# Patient Record
Sex: FEMALE | Race: BLACK OR AFRICAN AMERICAN | NOT HISPANIC OR LATINO | Employment: OTHER | URBAN - METROPOLITAN AREA
[De-identification: names, ages, dates, MRNs, and addresses within clinical notes are randomized per-mention and may not be internally consistent; named-entity substitution may affect disease eponyms.]

---

## 2017-01-30 ENCOUNTER — TELEPHONE (OUTPATIENT)
Dept: ENDOCRINOLOGY | Facility: CLINIC | Age: 72
End: 2017-01-30

## 2017-01-30 DIAGNOSIS — E11.40 TYPE 2 DIABETES MELLITUS WITH DIABETIC NEUROPATHY, WITH LONG-TERM CURRENT USE OF INSULIN: ICD-10-CM

## 2017-01-30 DIAGNOSIS — E11.22 UNCONTROLLED TYPE 2 DIABETES MELLITUS WITH CHRONIC KIDNEY DISEASE, WITH LONG-TERM CURRENT USE OF INSULIN, UNSPECIFIED CKD STAGE: Primary | ICD-10-CM

## 2017-01-30 DIAGNOSIS — Z79.4 UNCONTROLLED TYPE 2 DIABETES MELLITUS WITH CHRONIC KIDNEY DISEASE, WITH LONG-TERM CURRENT USE OF INSULIN, UNSPECIFIED CKD STAGE: Primary | ICD-10-CM

## 2017-01-30 DIAGNOSIS — Z79.4 TYPE 2 DIABETES MELLITUS WITH DIABETIC NEUROPATHY, WITH LONG-TERM CURRENT USE OF INSULIN: ICD-10-CM

## 2017-01-30 DIAGNOSIS — E11.65 UNCONTROLLED TYPE 2 DIABETES MELLITUS WITH CHRONIC KIDNEY DISEASE, WITH LONG-TERM CURRENT USE OF INSULIN, UNSPECIFIED CKD STAGE: Primary | ICD-10-CM

## 2017-01-30 NOTE — TELEPHONE ENCOUNTER
----- Message from Jodi Barney sent at 1/25/2017 10:54 AM CST -----  Contact: Patient  Patient is requesting to complete labs at W location [facility] to 2/8/2017 appointment.    Please call patient at [phone].

## 2017-02-01 ENCOUNTER — LAB VISIT (OUTPATIENT)
Dept: LAB | Facility: HOSPITAL | Age: 72
End: 2017-02-01
Attending: INTERNAL MEDICINE
Payer: MEDICARE

## 2017-02-01 DIAGNOSIS — Z79.4 TYPE 2 DIABETES MELLITUS WITH DIABETIC NEUROPATHY, WITH LONG-TERM CURRENT USE OF INSULIN: ICD-10-CM

## 2017-02-01 DIAGNOSIS — E11.40 TYPE 2 DIABETES MELLITUS WITH DIABETIC NEUROPATHY, WITH LONG-TERM CURRENT USE OF INSULIN: ICD-10-CM

## 2017-02-01 DIAGNOSIS — E11.22 UNCONTROLLED TYPE 2 DIABETES MELLITUS WITH CHRONIC KIDNEY DISEASE, WITH LONG-TERM CURRENT USE OF INSULIN, UNSPECIFIED CKD STAGE: ICD-10-CM

## 2017-02-01 DIAGNOSIS — E11.65 UNCONTROLLED TYPE 2 DIABETES MELLITUS WITH CHRONIC KIDNEY DISEASE, WITH LONG-TERM CURRENT USE OF INSULIN, UNSPECIFIED CKD STAGE: ICD-10-CM

## 2017-02-01 DIAGNOSIS — Z79.4 UNCONTROLLED TYPE 2 DIABETES MELLITUS WITH CHRONIC KIDNEY DISEASE, WITH LONG-TERM CURRENT USE OF INSULIN, UNSPECIFIED CKD STAGE: ICD-10-CM

## 2017-02-01 LAB
ALBUMIN SERPL BCP-MCNC: 3.4 G/DL
ANION GAP SERPL CALC-SCNC: 10 MMOL/L
BUN SERPL-MCNC: 16 MG/DL
CALCIUM SERPL-MCNC: 9.2 MG/DL
CHLORIDE SERPL-SCNC: 105 MMOL/L
CO2 SERPL-SCNC: 26 MMOL/L
CREAT SERPL-MCNC: 0.8 MG/DL
EST. GFR  (AFRICAN AMERICAN): >60 ML/MIN/1.73 M^2
EST. GFR  (NON AFRICAN AMERICAN): >60 ML/MIN/1.73 M^2
ESTIMATED AVG GLUCOSE: 183 MG/DL
GLUCOSE SERPL-MCNC: 179 MG/DL
HBA1C MFR BLD HPLC: 8 %
PHOSPHATE SERPL-MCNC: 2.9 MG/DL
POTASSIUM SERPL-SCNC: 4 MMOL/L
SODIUM SERPL-SCNC: 141 MMOL/L

## 2017-02-01 PROCEDURE — 36415 COLL VENOUS BLD VENIPUNCTURE: CPT

## 2017-02-01 PROCEDURE — 83036 HEMOGLOBIN GLYCOSYLATED A1C: CPT

## 2017-02-01 PROCEDURE — 80069 RENAL FUNCTION PANEL: CPT

## 2017-02-08 ENCOUNTER — OFFICE VISIT (OUTPATIENT)
Dept: ENDOCRINOLOGY | Facility: CLINIC | Age: 72
End: 2017-02-08
Payer: MEDICARE

## 2017-02-08 VITALS
WEIGHT: 173.06 LBS | HEIGHT: 62 IN | BODY MASS INDEX: 31.85 KG/M2 | SYSTOLIC BLOOD PRESSURE: 130 MMHG | HEART RATE: 90 BPM | DIASTOLIC BLOOD PRESSURE: 76 MMHG

## 2017-02-08 DIAGNOSIS — Z79.4 ENCOUNTER FOR LONG-TERM (CURRENT) USE OF INSULIN: ICD-10-CM

## 2017-02-08 PROCEDURE — 1157F ADVNC CARE PLAN IN RCRD: CPT | Mod: S$GLB,,, | Performed by: INTERNAL MEDICINE

## 2017-02-08 PROCEDURE — 99999 PR PBB SHADOW E&M-EST. PATIENT-LVL III: CPT | Mod: PBBFAC,,, | Performed by: INTERNAL MEDICINE

## 2017-02-08 PROCEDURE — 3075F SYST BP GE 130 - 139MM HG: CPT | Mod: S$GLB,,, | Performed by: INTERNAL MEDICINE

## 2017-02-08 PROCEDURE — 1126F AMNT PAIN NOTED NONE PRSNT: CPT | Mod: S$GLB,,, | Performed by: INTERNAL MEDICINE

## 2017-02-08 PROCEDURE — 4010F ACE/ARB THERAPY RXD/TAKEN: CPT | Mod: S$GLB,,, | Performed by: INTERNAL MEDICINE

## 2017-02-08 PROCEDURE — 99499 UNLISTED E&M SERVICE: CPT | Mod: S$GLB,,, | Performed by: INTERNAL MEDICINE

## 2017-02-08 PROCEDURE — 2022F DILAT RTA XM EVC RTNOPTHY: CPT | Mod: S$GLB,,, | Performed by: INTERNAL MEDICINE

## 2017-02-08 PROCEDURE — 99214 OFFICE O/P EST MOD 30 MIN: CPT | Mod: S$GLB,,, | Performed by: INTERNAL MEDICINE

## 2017-02-08 PROCEDURE — 3078F DIAST BP <80 MM HG: CPT | Mod: S$GLB,,, | Performed by: INTERNAL MEDICINE

## 2017-02-08 PROCEDURE — 3045F PR MOST RECENT HEMOGLOBIN A1C LEVEL 7.0-9.0%: CPT | Mod: S$GLB,,, | Performed by: INTERNAL MEDICINE

## 2017-02-08 PROCEDURE — 1160F RVW MEDS BY RX/DR IN RCRD: CPT | Mod: S$GLB,,, | Performed by: INTERNAL MEDICINE

## 2017-02-08 PROCEDURE — 1159F MED LIST DOCD IN RCRD: CPT | Mod: S$GLB,,, | Performed by: INTERNAL MEDICINE

## 2017-02-08 RX ORDER — GLIMEPIRIDE 4 MG/1
2 TABLET ORAL
Qty: 45 TABLET | Refills: 3
Start: 2017-02-08 | End: 2017-02-15 | Stop reason: SDUPTHER

## 2017-02-08 NOTE — MR AVS SNAPSHOT
[facility]Met[facility]  [address]  [facility] [address]  Phone: [phone]  Fax: [phone]                  Alisia Gusman   2017 1:00 PM   Office Visit    Description:  Female : 1945   Provider:  Lindy Nelson MD   Department:  [facility]Metab           Reason for Visit     Diabetes Mellitus           Diagnoses this Visit        Comments    Type 2 diabetes, uncontrolled, with neuropathy    -  Primary     Encounter for long-term (current) use of insulin                To Do List           Future Appointments        Provider Department Dept Phone    2017 10:20 AM MD [facility] - Internal Medicine [phone]    2017 8:00 AM LAB, APPOINTMENT [facility] [phone]    2017 8:30 AM MD [facility] - Endo/Diab/Metab [phone]      Goals (5 Years of Data)     None       These Medications        Disp Refills Start End    glimepiride (AMARYL) 4 MG tablet 45 tablet 3 2017    Take 0.5 tablets (2 mg total) by mouth before breakfast. - Oral    Pharmacy: [facility] Mail [facility] - [address] Ph #: [phone]         [facility]     [facility] On Call Nurse Care Line -  Assistance  Registered nurses in the [facility] On Call Center provide clinical advisement, health education, appointment booking, and other advisory services.  Call for this free service at [phone].             Medications           Message regarding Medications     Verify the changes and/or additions to your medication regime listed below are the same as discussed with your clinician today.  If any of these changes or additions are incorrect, please notify your healthcare provider.             Verify that the below list of medications is an accurate representation of the medications you are currently taking.  If none reported, the list may be blank. If incorrect, please contact your  "healthcare provider. Carry this list with you in case of emergency.           Current Medications     ACCU-CHEK DOMENICO PLUS TEST STRP Strp USE THREE TIMES DAILY AS DIRECTED    ACCU-CHEK SOFTCLIX LANCETS Misc 1 each by Misc.(Non-Drug; Combo Route) route 3 (three) times daily.    albuterol 90 mcg/actuation inhaler Inhale 2 puffs into the lungs every 6 (six) hours as needed for Wheezing.    aspirin 81 MG chewable tablet Take 1 tablet by mouth At bedtime.    BD ALCOHOL SWABS PadM 1 each 2 (two) times daily.    calcium carbonate-vitamin D3 (CALTRATE 600 + D) 600 mg(1,500mg) -400 unit Chew once daily.     cyanocobalamin (VITAMIN B-12) 1000 MCG tablet Take 100 mcg by mouth once daily.    fluticasone (FLONASE) 50 mcg/actuation nasal spray 2 sprays by Each Nare route once daily.    FOLIC ACID/MULTIVIT-MIN/LUTEIN (CENTRUM SILVER ORAL) Take 1 tablet by mouth once daily.    gabapentin (NEURONTIN) 300 MG capsule 300mg in AM, 300mg in PM and 600mg at bedtime    insulin glargine (LANTUS SOLOSTAR) 100 unit/mL (3 mL) InPn pen Inject 22 Units into the skin once daily.    insulin regular 100 unit/mL Inj injection Inject 4 Units into the skin 3 (three) times daily before meals. RELION brand    insulin syringe-needle U-100 (BD INSULIN SYRINGE ULTRA-FINE) 0.3 mL 31 gauge x 15/64" Syrg 1 each by Misc.(Non-Drug; Combo Route) route 2 (two) times daily.    insulin syringe-needle U-100 (BD INSULIN SYRINGE ULTRA-FINE) 1/2 mL 31 gauge x 15/64" Syrg 3 each by Misc.(Non-Drug; Combo Route) route 3 (three) times daily.    losartan-hydrochlorothiazide 50-12.5 mg (HYZAAR) 50-12.5 mg per tablet Take 1 tablet by mouth once daily.    metformin (GLUCOPHAGE-XR) 750 MG 24 hr tablet Take 1 tablet (750 mg total) by mouth 2 (two) times daily with meals.    pen needle, diabetic (BD ULTRA-FINE SUSIE PEN NEEDLES) 32 gauge x 5/32" Ndle For one time daily injection    potassium chloride (KLOR-CON) 10 MEQ TbSR TAKE 1 TABLET ONE TIME DAILY    pravastatin (PRAVACHOL) " "40 MG tablet TAKE 1 TABLET (40 MG TOTAL) BY MOUTH NIGHTLY.    glimepiride (AMARYL) 4 MG tablet Take 0.5 tablets (2 mg total) by mouth before breakfast.           Clinical Reference Information           Your Vitals Were     BP Pulse Height Weight BMI    130/76 (BP Location: Left arm, Patient Position: Sitting, BP Method: Manual) 90 5' 2" (1.575 m) 78.5 kg (173 lb 1 oz) 31.65 kg/m2      Blood Pressure          Most Recent Value    BP  130/76      Allergies as of 2/8/2017     Nubain [Nalbuphine]    Grass Pollen-june Grass Standard    Sinus & Allergy [Chlorpheniramine-phenylephrine]      Immunizations Administered on Date of Encounter - 2/8/2017     None      Orders Placed During Today's Visit     Future Labs/Procedures Expected by Expires    Hemoglobin A1c  2/8/2017 4/9/2018      [facility] Sign-Up     Activating your [facility] account is as easy as 1-2-3!     1) Visit [email or web address], select Sign Up Now, enter this activation code and your date of birth, then select Next.  [email or web address]  Expires: 3/25/2017  1:59 PM      2) Create a username and password to use when you visit [facility] in the future and select a security question in case you lose your password and select Next.    3) Enter your e-mail address and click Sign Up!    Additional Information  If you have questions, please e-mail [email or web address] or call [phone] to talk to our [facility] staff. Remember, [facility] is NOT to be used for urgent needs. For medical emergencies, dial 911.         Language Assistance Services     ATTENTION: Language assistance services are available, free of charge. Please call [phone].      ATENCIÓN: Si habla nissa, tiene a salguero disposición servicios gratuitos de asistencia lingüística. Llame al [phone].     CHÚ Ý: N?u b?n nói Ti?ng Vi?t, có các d?ch v? h? tr? ngôn ng? mi?n phí dành cho b?n. G?i s? [phone].         [facility] complies with applicable Federal civil rights laws and " does not discriminate on the basis of race, color, national origin, age, disability, or sex.

## 2017-02-08 NOTE — PROGRESS NOTES
Subjective:     Patient ID: Alisia Gusman is a 71 y.o. female.    Chief Complaint: Diabetes Mellitus    HPI:   Ms. Gusman is a 71 y.o. female who is here for a follow-up visit for evaluation type 2 diabetes mellitus uncontrolled on insulin for the past three years. T2DM is complicated by neuropathy, currently on gabapentin.   Reports numbness associated with burning over right and left plantar aspect of feet. Limited to toes and mid foot.   Stopped nexium due to cost, GERD bothering her at night. (so not sleeping well.)  Continues to exercise regularly. Eats healthy.    Regimen:  Lantus 22 units at bedtime  Regular insulin 5 units thirty minutes before supper only  Metformin 750 mg one tablet twice a day    No glimepiride:  Fastin, 195, 124, 164, 191, 161, 155, 156  Before lunch: 189, 156, 136, 123, 242, 212, x  Before dinner: x, 225, 261, 199, 229, 191, 262, 164  Bedtime: x, 265, 207, 236, 197, 177, 203, 193    With glimepiride:  Fastin, 130, 139, 156, 143, 152, 153  Before lunch: 155, x,x, 206, 113, 168, 160  Before dinner: 237, x, 167, 140, 185, 185, 233  Beditme: 238, 192, 200, 223, 186, 225, x  Lowest blood sugars were: 80, 86, 87, 83, 81    ADA STANDARDS of CARE:  ACE inhibitor of angiotensin II receptor blocker: Losartan 50 mg  Statin drug: Pravastatin 40 mg  Low dose ASA: baby aspirin  Eye exam within last year: 2016  Dental exam: 2016  Flu shot: up to date  Pneumonia vaccine: up to date  Microalbumin: normal - 3/2016    Review of Systems   Constitutional: Negative for chills and fever.   HENT: Negative for congestion and sinus pressure.    Eyes: Negative for visual disturbance.   Respiratory: Negative for chest tightness and shortness of breath.    Cardiovascular: Negative for chest pain, palpitations and leg swelling.   Gastrointestinal: Negative for abdominal pain and vomiting.   Genitourinary: Negative for dysuria.   Musculoskeletal: Negative for arthralgias.   Skin: Negative for  "rash.   Neurological: Negative for weakness.   Hematological: Does not bruise/bleed easily.   Psychiatric/Behavioral: Negative for sleep disturbance.        Objective:     Physical Exam   Constitutional: She appears well-developed and well-nourished.   Cardiovascular: Normal rate, regular rhythm and normal heart sounds.    Pulmonary/Chest: Effort normal and breath sounds normal.   Abdominal:   Sites of insulin administration over upper posterior thighs are not firm.    Skin:   Microfilament test is absent b/l  Vibratory sense is intact b/l  Good pulses b/l +2  No edema  No calluses or abrasion.        Vitals:    02/08/17 1313   BP: 130/76   BP Location: Left arm   Patient Position: Sitting   BP Method: Manual   Pulse: 90   Weight: 78.5 kg (173 lb 1 oz)   Height: 5' 2" (1.575 m)     Results for CINDA TATUM (MRN [id number]) as of 2/8/2017 13:36   Ref. Range 2/1/2017 08:29   Hemoglobin A1C Latest Ref Range: 4.5 - 6.2 % 8.0 (H)   Estimated Avg Glucose Latest Ref Range: 68 - 131 mg/dL 183 (H)   Results for CINDA TATUM (MRN [id number]) as of 2/8/2017 13:36   Ref. Range 2/1/2017 08:29   Sodium Latest Ref Range: 136 - 145 mmol/L 141   Potassium Latest Ref Range: 3.5 - 5.1 mmol/L 4.0   Chloride Latest Ref Range: 95 - 110 mmol/L 105   CO2 Latest Ref Range: 23 - 29 mmol/L 26   Anion Gap Latest Ref Range: 8 - 16 mmol/L 10   BUN, Bld Latest Ref Range: 8 - 23 mg/dL 16   Creatinine Latest Ref Range: 0.5 - 1.4 mg/dL 0.8   eGFR if non African American Latest Ref Range: >60 mL/min/1.73 m^2 >60.0   eGFR if African American Latest Ref Range: >60 mL/min/1.73 m^2 >60.0   Glucose Latest Ref Range: 70 - 110 mg/dL 179 (H)   Calcium Latest Ref Range: 8.7 - 10.5 mg/dL 9.2   Phosphorus Latest Ref Range: 2.7 - 4.5 mg/dL 2.9       Assessment/Plan:     1. Type 2 diabetes, uncontrolled, with neuropathy  - restart glimepiride, no lows on lantus and regular with dinner only.   - metformin is still ideal given age, lack of s/e and good kidney " function  - Hemoglobin A1c; Future  - glimepiride (AMARYL) 4 MG tablet; Take 0.5 tablets (2 mg total) by mouth before breakfast.  Dispense: 45 tablet; Refill: 3    2. Encounter for long-term (current) use of insulin  - continue Lantus 22 units and regular insulin 5 units before dinnertime.   - discussed hypoglycemia symptoms and treatment. Advised to have a snack handy as she is physically very active. Also check if she has a light lunch prior to her exercise class.    Repeat labs in four months.

## 2017-02-15 RX ORDER — GLIMEPIRIDE 4 MG/1
2 TABLET ORAL
Qty: 45 TABLET | Refills: 3 | Status: SHIPPED | OUTPATIENT
Start: 2017-02-15 | End: 2017-09-21 | Stop reason: SDUPTHER

## 2017-02-16 ENCOUNTER — OFFICE VISIT (OUTPATIENT)
Dept: INTERNAL MEDICINE | Facility: CLINIC | Age: 72
End: 2017-02-16
Payer: MEDICARE

## 2017-02-16 VITALS
HEART RATE: 80 BPM | SYSTOLIC BLOOD PRESSURE: 131 MMHG | HEIGHT: 65 IN | TEMPERATURE: 98 F | WEIGHT: 175.06 LBS | RESPIRATION RATE: 17 BRPM | BODY MASS INDEX: 29.17 KG/M2 | DIASTOLIC BLOOD PRESSURE: 79 MMHG

## 2017-02-16 DIAGNOSIS — E11.42 DIABETIC POLYNEUROPATHY ASSOCIATED WITH TYPE 2 DIABETES MELLITUS: ICD-10-CM

## 2017-02-16 DIAGNOSIS — K21.9 GASTROESOPHAGEAL REFLUX DISEASE, ESOPHAGITIS PRESENCE NOT SPECIFIED: ICD-10-CM

## 2017-02-16 DIAGNOSIS — R00.2 HEART PALPITATIONS: Primary | ICD-10-CM

## 2017-02-16 DIAGNOSIS — I70.0 AORTIC ATHEROSCLEROSIS: ICD-10-CM

## 2017-02-16 DIAGNOSIS — I15.2 HYPERTENSION ASSOCIATED WITH DIABETES: ICD-10-CM

## 2017-02-16 DIAGNOSIS — E11.59 HYPERTENSION ASSOCIATED WITH DIABETES: ICD-10-CM

## 2017-02-16 PROCEDURE — 4010F ACE/ARB THERAPY RXD/TAKEN: CPT | Mod: S$GLB,,, | Performed by: INTERNAL MEDICINE

## 2017-02-16 PROCEDURE — 99999 PR PBB SHADOW E&M-EST. PATIENT-LVL III: CPT | Mod: PBBFAC,,, | Performed by: INTERNAL MEDICINE

## 2017-02-16 PROCEDURE — 3078F DIAST BP <80 MM HG: CPT | Mod: S$GLB,,, | Performed by: INTERNAL MEDICINE

## 2017-02-16 PROCEDURE — 99499 UNLISTED E&M SERVICE: CPT | Mod: S$GLB,,, | Performed by: INTERNAL MEDICINE

## 2017-02-16 PROCEDURE — 1160F RVW MEDS BY RX/DR IN RCRD: CPT | Mod: S$GLB,,, | Performed by: INTERNAL MEDICINE

## 2017-02-16 PROCEDURE — 93005 ELECTROCARDIOGRAM TRACING: CPT | Mod: S$GLB,,, | Performed by: INTERNAL MEDICINE

## 2017-02-16 PROCEDURE — 93010 ELECTROCARDIOGRAM REPORT: CPT | Mod: S$GLB,,, | Performed by: INTERNAL MEDICINE

## 2017-02-16 PROCEDURE — 3075F SYST BP GE 130 - 139MM HG: CPT | Mod: S$GLB,,, | Performed by: INTERNAL MEDICINE

## 2017-02-16 PROCEDURE — 2022F DILAT RTA XM EVC RTNOPTHY: CPT | Mod: S$GLB,,, | Performed by: INTERNAL MEDICINE

## 2017-02-16 PROCEDURE — 99214 OFFICE O/P EST MOD 30 MIN: CPT | Mod: S$GLB,,, | Performed by: INTERNAL MEDICINE

## 2017-02-16 PROCEDURE — 3045F PR MOST RECENT HEMOGLOBIN A1C LEVEL 7.0-9.0%: CPT | Mod: S$GLB,,, | Performed by: INTERNAL MEDICINE

## 2017-02-16 PROCEDURE — 1126F AMNT PAIN NOTED NONE PRSNT: CPT | Mod: S$GLB,,, | Performed by: INTERNAL MEDICINE

## 2017-02-16 PROCEDURE — 1159F MED LIST DOCD IN RCRD: CPT | Mod: S$GLB,,, | Performed by: INTERNAL MEDICINE

## 2017-02-16 PROCEDURE — 1157F ADVNC CARE PLAN IN RCRD: CPT | Mod: S$GLB,,, | Performed by: INTERNAL MEDICINE

## 2017-02-16 NOTE — PROGRESS NOTES
"Subjective:       Patient ID: Alisia Gusman is a 71 y.o. female.    Chief Complaint: Follow-up (right side arth joint ) and Shortness of Breath    HPI   HTN - pt was not taking her BP meds except intermittently. However after she saw Dr. Nelson and BP was elevated. She restarted back on her losartan-hctz 50-12.5mg daily.     DM2 - follows w/ Dr. Nelson. a1c is 8. Doing better.   Diabetic neuropathy. On gabapentin 300mg TID. Burning in the feet. Exercises 4 days a week.     Feels some palpitations in the mid substernal area; gets it almost daily; last up to 5 min but sometimes seconds. Not associated w/ CP/SOB/dizziness.     CT A/P 9/2012 w/ aortic atherosclerosis. On pravastatin 40mg dialy and asa 81mg daily.    Does have the feeling of something coming up the esophagus when she lies down at night and then goes down when she wakes up. Previously on nexium.     Review of Systems   Constitutional: Negative for chills and fever.   HENT: Positive for postnasal drip. Negative for congestion and sinus pressure.    Respiratory: Negative for cough, shortness of breath and wheezing.    Cardiovascular: Positive for palpitations. Negative for chest pain and leg swelling.   Gastrointestinal: Negative for abdominal pain, constipation, diarrhea, nausea and vomiting.   Endocrine: Negative for polydipsia and polyuria.   Neurological: Positive for numbness (B feet). Negative for dizziness and weakness.   Psychiatric/Behavioral: Negative for dysphoric mood and sleep disturbance. The patient is not nervous/anxious.          Objective:      Physical Exam    Visit Vitals    /79    Pulse 80    Temp 97.8 °F (36.6 °C) (Oral)    Resp 17    Ht 5' 5" (1.651 m)    Wt 79.4 kg (175 lb 0.7 oz)    BMI 29.13 kg/m2     Gen - A+Ox4, NAD  HEENT - PERRL,OP clear. MMM.   NECK - No LAD  CV - RRR, no m/r  Chest - CTAB, no wheezing/rhonchi  Abd - S/NT/ND/+BS  Ext - 2+ BDP and radial pulses. No LE edema.   Feet - no open lesions. Darkening of B " big toenails. No sensation to monofilament.   MSK - normal gait.     Labs and MMG reviewed w/ pt.    Assessment/Plan     Alisia was seen today for follow-up and shortness of breath.    Diagnoses and all orders for this visit:    Heart palpitations - TSH WNL 11/18/16. Will check EKG and 48 hr holter. If palpitations not captured on 48 hr holter, may do 30 day monitor.   -     IN OFFICE EKG 12-LEAD (to [place])  -     Holter monitor - 48 hour; Future    Diabetic polyneuropathy associated with type 2 diabetes mellitus - doing better but not quite controlled. Cont to work on this w/ Dr. Nelson. Pt exercises 4x/week. Discussed foot hygiene.     Aortic atherosclerosis - LDL<70. On asa 81 and atorvastatin. Cont current medicines.    Hypertension associated with diabetes - Stable and controlled. Continue current medications. Advised to keep consistently taking her BP medicine.    Gastroesophageal reflux disease, esophagitis presence not specified  -     ranitidine (ZANTAC) 150 MG tablet; Take 1 tablet (150 mg total) by mouth 2 (two) times daily.      Return in about 3 months (around 5/16/2017). Pt will get eye exam from outside doctor and will fax the records over to us.      Selena Montemayor MD  Department of Internal Medicine - [facility]  10:47 AM

## 2017-02-16 NOTE — MR AVS SNAPSHOT
[facility] - Internal Medicine  [address]  [facility] [address]  Phone: [phone]  Fax: [phone]                  Alisia Gusman   2017 10:20 AM   Office Visit    Description:  Female : 1945   Provider:  Selena Montemayor MD   Department:  [facility] - Internal Medicine           Reason for Visit     Follow-up     Shortness of Breath           Diagnoses this Visit        Comments    Heart palpitations    -  Primary     Diabetic polyneuropathy associated with type 2 diabetes mellitus         Aortic atherosclerosis         Hypertension associated with diabetes         Gastroesophageal reflux disease, esophagitis presence not specified                To Do List           Future Appointments        Provider Department Dept Phone    3/2/2017 9:00 AM HRA, [facility] - Internal Medicine [phone]    2017 8:00 AM LAB, APPOINTMENT [facility] [phone]    2017 8:30 AM Lindy Nelson MD [facility] - Endo/Diab/Metab [phone]      Goals (5 Years of Data)     None      Follow-Up and Disposition     Return in about 3 months (around 2017).    Follow-up and Disposition History       These Medications        Disp Refills Start End    ranitidine (ZANTAC) 150 MG tablet 180 tablet 0 2017    Take 1 tablet (150 mg total) by mouth 2 (two) times daily. - Oral    Pharmacy: [facility] - [address] Ph #: [phone]         [facility] On Call     [facility] On [facility] Nurse Care Line -  Assistance  Registered nurses in the [facility] On Call Center provide clinical advisement, health education, appointment booking, and other advisory services.  Call for this free service at [phone].             Medications           Message regarding Medications     Verify the changes and/or additions to your medication regime listed below are the same as discussed with your clinician today.  If any of these  "changes or additions are incorrect, please notify your healthcare provider.        START taking these NEW medications        Refills    ranitidine (ZANTAC) 150 MG tablet 0    Sig: Take 1 tablet (150 mg total) by mouth 2 (two) times daily.    Class: Normal    Route: Oral      STOP taking these medications     influenza vaccine 180 mcg/0.5 mL(for patients > 65) injection            Verify that the below list of medications is an accurate representation of the medications you are currently taking.  If none reported, the list may be blank. If incorrect, please contact your healthcare provider. Carry this list with you in case of emergency.           Current Medications     albuterol 90 mcg/actuation inhaler Inhale 2 puffs into the lungs every 6 (six) hours as needed for Wheezing.    aspirin 81 MG chewable tablet Take 1 tablet by mouth At bedtime.    BD ALCOHOL SWABS PadM 1 each 2 (two) times daily.    calcium carbonate-vitamin D3 (CALTRATE 600 + D) 600 mg(1,500mg) -400 unit Chew once daily.     cyanocobalamin (VITAMIN B-12) 1000 MCG tablet Take 100 mcg by mouth once daily.    fluticasone (FLONASE) 50 mcg/actuation nasal spray 2 sprays by Each Nare route once daily.    FOLIC ACID/MULTIVIT-MIN/LUTEIN (CENTRUM SILVER ORAL) Take 1 tablet by mouth once daily.    gabapentin (NEURONTIN) 300 MG capsule 300mg in AM, 300mg in PM and 600mg at bedtime    glimepiride (AMARYL) 4 MG tablet Take 0.5 tablets (2 mg total) by mouth before breakfast.    insulin glargine (LANTUS SOLOSTAR) 100 unit/mL (3 mL) InPn pen Inject 22 Units into the skin once daily.    insulin regular 100 unit/mL Inj injection Inject 4 Units into the skin 3 (three) times daily before meals. RELION brand    insulin syringe-needle U-100 (BD INSULIN SYRINGE ULTRA-FINE) 0.3 mL 31 gauge x 15/64" Syrg 1 each by Misc.(Non-Drug; Combo Route) route 2 (two) times daily.    insulin syringe-needle U-100 (BD INSULIN SYRINGE ULTRA-FINE) 1/2 mL 31 gauge x 15/64" Syrg 3 each by " "Misc.(Non-Drug; Combo Route) route 3 (three) times daily.    losartan-hydrochlorothiazide 50-12.5 mg (HYZAAR) 50-12.5 mg per tablet Take 1 tablet by mouth once daily.    metformin (GLUCOPHAGE-XR) 750 MG 24 hr tablet Take 1 tablet (750 mg total) by mouth 2 (two) times daily with meals.    pen needle, diabetic (BD ULTRA-FINE SUSIE PEN NEEDLES) 32 gauge x 5/32" Ndle For one time daily injection    potassium chloride (KLOR-CON) 10 MEQ TbSR TAKE 1 TABLET ONE TIME DAILY    pravastatin (PRAVACHOL) 40 MG tablet TAKE 1 TABLET (40 MG TOTAL) BY MOUTH NIGHTLY.    ACCU-CHEK DOMENICO PLUS TEST STRP Strp USE THREE TIMES DAILY AS DIRECTED    ACCU-CHEK SOFTCLIX LANCETS Misc 1 each by Misc.(Non-Drug; Combo Route) route 3 (three) times daily.    ranitidine (ZANTAC) 150 MG tablet Take 1 tablet (150 mg total) by mouth 2 (two) times daily.           Clinical Reference Information           Your Vitals Were     BP Pulse Temp Resp Height Weight    131/79 80 97.8 °F (36.6 °C) (Oral) 17 5' 5" (1.651 m) 79.4 kg (175 lb 0.7 oz)    BMI                29.13 kg/m2          Blood Pressure          Most Recent Value    BP  131/79      Allergies as of 2/16/2017     Nubain [Nalbuphine]    Grass Pollen-june Grass Standard    Sinus & Allergy [Chlorpheniramine-phenylephrine]      Immunizations Administered on Date of Encounter - 2/16/2017     None      Orders Placed During Today's Visit      Normal Orders This Visit    IN OFFICE EKG 12-LEAD (to [place])     Future Labs/Procedures Expected by Expires    Holter monitor - 48 hour  As directed 2/16/2018      [facility] Sign-Up     Activating your [facility] account is as easy as 1-2-3!     1) Visit [email or web address], select Sign Up Now, enter this activation code and your date of birth, then select Next.  [email or web address]  Expires: 3/25/2017  1:59 PM      2) Create a username and password to use when you visit [facility] in the future and select a security question in case you lose your password and select Next.    3) " Enter your e-mail address and click Sign Up!    Additional Information  If you have questions, please e-mail [email or web address] or call [phone] to talk to our [facility] staff. Remember, [facility] is NOT to be used for urgent needs. For medical emergencies, dial 911.         Language Assistance Services     ATTENTION: Language assistance services are available, free of charge. Please call [phone].      ATENCIÓN: Si habla español, tiene a salguero disposición servicios gratuitos de asistencia lingüística. Llame al [phone].     CHÚ Ý: N?u b?n nói Ti?ng Vi?t, có các d?ch v? h? tr? ngôn ng? mi?n phí dành cho b?n. G?i s? [phone].         [facility] complies with applicable Federal civil rights laws and does not discriminate on the basis of race, color, national origin, age, disability, or sex.

## 2017-02-17 ENCOUNTER — CLINICAL SUPPORT (OUTPATIENT)
Dept: ELECTROPHYSIOLOGY | Facility: CLINIC | Age: 72
End: 2017-02-17
Payer: MEDICARE

## 2017-02-17 DIAGNOSIS — R00.2 HEART PALPITATIONS: ICD-10-CM

## 2017-02-17 PROCEDURE — 93224 XTRNL ECG REC UP TO 48 HRS: CPT | Mod: S$GLB,,, | Performed by: INTERNAL MEDICINE

## 2017-02-22 ENCOUNTER — TELEPHONE (OUTPATIENT)
Dept: INTERNAL MEDICINE | Facility: CLINIC | Age: 72
End: 2017-02-22

## 2017-02-22 NOTE — TELEPHONE ENCOUNTER
----- Message from Selena Montemayor MD sent at 2/21/2017  6:18 PM CST -----  Please call and notify patient that there were some early beats on the holter monitor but this is benign. The episode of shortness of breath she reported corresponds to normal rhythm just a faster heart rate.

## 2017-03-02 ENCOUNTER — OFFICE VISIT (OUTPATIENT)
Dept: INTERNAL MEDICINE | Facility: CLINIC | Age: 72
End: 2017-03-02
Payer: MEDICARE

## 2017-03-02 VITALS
WEIGHT: 175.94 LBS | HEIGHT: 65 IN | SYSTOLIC BLOOD PRESSURE: 122 MMHG | DIASTOLIC BLOOD PRESSURE: 62 MMHG | HEART RATE: 80 BPM | BODY MASS INDEX: 29.31 KG/M2

## 2017-03-02 DIAGNOSIS — Z85.3 HISTORY OF BREAST CANCER IN FEMALE: ICD-10-CM

## 2017-03-02 DIAGNOSIS — I70.0 AORTIC ATHEROSCLEROSIS: ICD-10-CM

## 2017-03-02 DIAGNOSIS — Z79.4 TYPE 2 DIABETES MELLITUS WITH DIABETIC NEPHROPATHY, WITH LONG-TERM CURRENT USE OF INSULIN: ICD-10-CM

## 2017-03-02 DIAGNOSIS — K57.30 DIVERTICULOSIS OF LARGE INTESTINE WITHOUT HEMORRHAGE: ICD-10-CM

## 2017-03-02 DIAGNOSIS — N18.2 CHRONIC KIDNEY DISEASE, STAGE II (MILD): ICD-10-CM

## 2017-03-02 DIAGNOSIS — I10 ESSENTIAL HYPERTENSION: ICD-10-CM

## 2017-03-02 DIAGNOSIS — E11.42 DIABETIC POLYNEUROPATHY ASSOCIATED WITH TYPE 2 DIABETES MELLITUS: ICD-10-CM

## 2017-03-02 DIAGNOSIS — E78.5 HYPERLIPIDEMIA, UNSPECIFIED HYPERLIPIDEMIA TYPE: ICD-10-CM

## 2017-03-02 DIAGNOSIS — Z00.00 ENCOUNTER FOR PREVENTIVE HEALTH EXAMINATION: ICD-10-CM

## 2017-03-02 DIAGNOSIS — M85.80 OSTEOPENIA: ICD-10-CM

## 2017-03-02 DIAGNOSIS — Z78.0 POST-MENOPAUSAL: Primary | ICD-10-CM

## 2017-03-02 DIAGNOSIS — E11.21 TYPE 2 DIABETES MELLITUS WITH DIABETIC NEPHROPATHY, WITH LONG-TERM CURRENT USE OF INSULIN: ICD-10-CM

## 2017-03-02 PROCEDURE — 99999 PR PBB SHADOW E&M-EST. PATIENT-LVL V: CPT | Mod: PBBFAC,,, | Performed by: NURSE PRACTITIONER

## 2017-03-02 PROCEDURE — 99499 UNLISTED E&M SERVICE: CPT | Mod: S$GLB,,, | Performed by: NURSE PRACTITIONER

## 2017-03-02 PROCEDURE — 99397 PER PM REEVAL EST PAT 65+ YR: CPT | Mod: S$GLB,,, | Performed by: NURSE PRACTITIONER

## 2017-03-02 RX ORDER — PHENYLEPHRINE HCL 10 MG
500 TABLET ORAL 3 TIMES DAILY
COMMUNITY
End: 2017-06-14

## 2017-03-02 NOTE — PROGRESS NOTES
"Alisia Gusman presented for a  Medicare AWV and comprehensive Health Risk Assessment today. The following components were reviewed and updated:    · Medical history  · Family History  · Social history  · Allergies and Current Medications  · Health Risk Assessment  · Health Maintenance  · Care Team     ** See Completed Assessments for Annual Wellness Visit within the encounter summary.**       The following assessments were completed:  · Living Situation  · CAGE  · Depression Screening  · Timed Get Up and Go  · Whisper Test  · Cognitive Function Screening  ·   ·   ·   · Nutrition Screening  · ADL Screening  · PAQ Screening    Vitals:    03/02/17 0855   BP: 122/62   BP Location: Left arm   Patient Position: Sitting   BP Method: Manual   Pulse: 80   Weight: 79.8 kg (175 lb 14.8 oz)   Height: 5' 5" (1.651 m)     Body mass index is 29.28 kg/(m^2).  Physical Exam   Constitutional: She is oriented to person, place, and time. She appears well-developed and well-nourished.   HENT:   Head: Normocephalic.   Cardiovascular: Normal rate, regular rhythm and normal heart sounds.    No murmur heard.  Pulmonary/Chest: Effort normal and breath sounds normal. She has no wheezes. She has no rales.   Abdominal: Soft. Bowel sounds are normal.   Musculoskeletal: Normal range of motion. She exhibits no edema.   Neurological: She is alert and oriented to person, place, and time. She exhibits normal muscle tone.   Skin: Skin is warm and dry.   Psychiatric: She has a normal mood and affect.   Nursing note and vitals reviewed.        Diagnoses and health risks identified today and associated recommendations/orders:    1. Encounter for preventive health examination  Follow by outside Optometrist, Dr. Butts. She will schedule appointment.  Here for Health Risk Assessment. Follow up in one year.    2. Post-menopausal  - DXA Bone Density Spine And Hip; Future    3. Essential hypertension  Chronic, stable on current medications. Followed by " PCP.    4. Type 2 diabetes, uncontrolled, with neuropathy  Chronic, stable on current medications. A1C trending downward. Followed by Endocrinology, PCP.    5. Diabetic polyneuropathy associated with type 2 diabetes mellitus  Chronic, stable on current medications. Followed by Endocrinology, PCP.    6. Type 2 diabetes mellitus with diabetic nephropathy, with long-term current use of insulin  Chronic, stable on current medications. Followed by Endocrinology, PCP.    7. Chronic kidney disease, stage II (mild)  Chronic, mild, stable. GFR 85. .  Followed by PCP, Endocrinology.    8. Aortic atherosclerosis  Chronic, stable on current medications. Noted on CT of abdomen/pelvis 9/17/12.  Followed by PCP.    9. Hyperlipidemia, unspecified hyperlipidemia type  Chronic, stable on current medication. Followed by PCP.    10. Osteopenia  Chronic, stable on current medication. DEXA ordered. Followed by PCP.    11. History of breast cancer in female  Stable. Followed by PCP.    12. Diverticulosis of large intestine without hemorrhage  Chronic, stable. Followed by PCP.      Provided Alisia with a 5-10 year written screening schedule and personal prevention plan. Recommendations were developed using the USPSTF age appropriate recommendations. Education, counseling, and referrals were provided as needed. After Visit Summary printed and given to patient which includes a list of additional screenings\tests needed.    Return in 2 months (on 5/16/2017).with PCP.    Adrianne Chu NP

## 2017-03-02 NOTE — PATIENT INSTRUCTIONS
Counseling and Referral of Other Preventative  (Italic type indicates deductible and co-insurance are waived)    Patient Name: Alisia Gusman  Today's Date: 3/2/2017      SERVICE LIMITATIONS RECOMMENDATION    Vaccines    · Pneumococcal (once after 65)    · Influenza (annually)    · Hepatitis B (if medium/high risk)    · Prevnar 13      Hepatitis B medium/high risk factors:       - End-stage renal disease       - Hemophiliacs who received Factor VII or         IX concentrates       - Clients of institutions for the mentally             retarded       - Persons who live in the same house as          a HepB carrier       - Homosexual men       - Illicit injectable drug abusers     Pneumococcal: Done, no repeat necessary     Influenza: Done, repeat in one year     Hepatitis B: N/A Not indicated     Prevnar 13: N/A Done, no repeat necessary    Mammogram (biennial age 50-74)  Annually (age 40 or over)  Last done 12/05/16, recommend to repeat every 1  years    Pap (up to age 70 and after 70 if unknown history or abnormal study last 10 years)    N/A Followed by Gynecology - 5/31/16     The USPSTF recommends against screening for cervical cancer in women older than age 65 years who have had adequate prior screening and are not otherwise at high risk for cervical cancer.      Colorectal cancer screening (to age 75)    · Fecal occult blood test (annual)  · Flexible sigmoidoscopy (5y)  · Screening colonoscopy (10y)  · Barium enema   Last done 11/06/15, recommend to repeat every 5  years    Diabetes self-management training (no USPSTF recommendations)  Requires referral by treating physician for patient with diabetes or renal disease. 10 hours of initial DSMT sessions of no less than 30 minutes each in a continuous 12-month period. 2 hours of follow-up DSMT in subsequent years.  N/A Followed by Diabetic Management    Bone mass measurements (age 65 & older, biennial)  Requires diagnosis related to osteoporosis or estrogen  deficiency. Biennial benefit unless patient has history of long-term glucocorticoid  Scheduled, see appointments    Glaucoma screening (no USPSTF recommendation)  Diabetes mellitus, family history   , age 50 or over    American, age 65 or over  N/A Will schedule own appointment    Medical nutrition therapy for diabetes or renal disease (no recommended schedule)  Requires referral by treating physician for patient with diabetes or renal disease or kidney transplant within the past 3 years.  Can be provided in same year as diabetes self-management training (DSMT), and CMS recommends medical nutrition therapy take place after DSMT. Up to 3 hours for initial year and 2 hours in subsequent years.  N/A Not indicated    Cardiovascular screening blood tests (every 5 years)  · Fasting lipid panel  Order as a panel if possible  Last done 11/18/18, recommend to repeat every 1  years    Diabetes screening tests (at least every 3 years, Medicare covers annually or at 6-month intervals for prediabetic patients)  · Fasting blood sugar (FBS) or glucose tolerance test (GTT)  Patient must be diagnosed with one of the following:       - Hypertension       - Dyslipidemia       - Obesity (BMI 30kg/m2)       - Previous elevated impaired FBS or GTT       ... or any two of the following:       - Overweight (BMI 25 but <30)       - Family history of diabetes       - Age 65 or older       - History of gestational diabetes or birth of baby weighing more than 9 pounds  Last done 2/01/17, recommend to repeat every 4  months    Abdominal aortic aneurysm screening (once)  · Sonogram   Limited to patients who meet one of the following criteria:       - Men who are 65-75 years old and have smoked more than 100 cigarette in their lifetime       - Anyone with a family history of abdominal aortic aneurysm       - Anyone recommended for screening by the USPSTF  N/A Not indicated    HIV screening (annually for increased risk  patients)  · HIV-1 and HIV-2 by EIA, or GERMAN, rapid antibody test or oral mucosa transudate  Patients must be at increased risk for HIV infection per USPSTF guidelines or pregnant. Tests covered annually for patient at increased risk or as requested by the patient. Pregnant patients may receive up to 3 tests during pregnancy.  Risks discussed, screening is not recommended    Smoking cessation counseling (up to 8 sessions per year)  Patients must be asymptomatic of tobacco-related conditions to receive as a preventative service.  Not indicated    Subsequent annual wellness visit  At least 12 months since last AWV  Return in one year     The following information is provided to all patients.  This information is to help you find resources for any of the problems found today that may be affecting your health:                Living healthy guide: www.[facility].[place].gov      Understanding Diabetes: www.diabetes.org      Eating healthy: www.cdc.gov/healthyweight      CDC home safety checklist: www.cdc.gov/steadi/patient.html      Agency on Aging: www.goea.[place].[facility]      Alcoholics anonymous (AA): www.aa.org      Physical Activity: www.osvaldo.nih.gov/lp8zcji      Tobacco use: www.quitwithusla.org

## 2017-03-02 NOTE — MR AVS SNAPSHOT
[facility] Medicine  [address]  [facility] [address]  Phone: [phone]  Fax: [phone]                  Alisia Gusman   3/2/2017 9:00 AM   Office Visit    Description:  Female : 1945   Provider:  HRA, NOM 3   Department:  [facility] - [facility] Medicine           Reason for Visit     HRA 3           Diagnoses this Visit        Comments    Post-menopausal    -  Primary     Encounter for preventive health examination                To Do List           Future Appointments        Provider Department Dept Phone    2017 9:00 AM MD [facility] Blancas [facility] - Internal Medicine [phone]    2017 8:00 AM LAB, APPOINTMENT [facility] [phone]    2017 8:30 AM MD [facility] Shaver [facility] - Endo/Diab/Metab [phone]      Goals (5 Years of Data)     None      [facility]     [facility] Care Line -  Assistance  Registered nurses in the [facility] provide clinical advisement, health education, appointment booking, and other advisory services.  Call for this free service at [phone].             Medications           Message regarding Medications     Verify the changes and/or additions to your medication regime listed below are the same as discussed with your clinician today.  If any of these changes or additions are incorrect, please notify your healthcare provider.             Verify that the below list of medications is an accurate representation of the medications you are currently taking.  If none reported, the list may be blank. If incorrect, please contact your healthcare provider. Carry this list with you in case of emergency.           Current Medications     ACCU-CHEK DOMENICO PLUS TEST STRP Strp USE THREE TIMES DAILY AS DIRECTED    ACCU-CHEK SOFTCLIX LANCETS Misc 1 each by Misc.(Non-Drug; Combo Route) route 3 (three) times daily.    albuterol 90 mcg/actuation inhaler Inhale 2 puffs into the lungs every  "6 (six) hours as needed for Wheezing.    aspirin 81 MG chewable tablet Take 1 tablet by mouth At bedtime.    BD ALCOHOL SWABS PadM 1 each 2 (two) times daily.    calcium carbonate-vitamin D3 (CALTRATE 600 + D) 600 mg(1,500mg) -400 unit Chew 1 tablet once daily.     cinnamon bark 500 mg capsule Take 500 mg by mouth 3 (three) times daily.    cyanocobalamin (VITAMIN B-12) 1000 MCG tablet Take 100 mcg by mouth once daily.    fluticasone (FLONASE) 50 mcg/actuation nasal spray 2 sprays by Each Nare route once daily.    FOLIC ACID/MULTIVIT-MIN/LUTEIN (CENTRUM SILVER ORAL) Take 1 tablet by mouth once daily.    gabapentin (NEURONTIN) 300 MG capsule 300mg in AM, 300mg in PM and 600mg at bedtime    glimepiride (AMARYL) 4 MG tablet Take 0.5 tablets (2 mg total) by mouth before breakfast.    insulin glargine (LANTUS SOLOSTAR) 100 unit/mL (3 mL) InPn pen Inject 22 Units into the skin once daily.    insulin regular 100 unit/mL Inj injection Inject 4 Units into the skin 3 (three) times daily before meals. RELION brand    insulin syringe-needle U-100 (BD INSULIN SYRINGE ULTRA-FINE) 0.3 mL 31 gauge x 15/64" Syrg 1 each by Misc.(Non-Drug; Combo Route) route 2 (two) times daily.    insulin syringe-needle U-100 (BD INSULIN SYRINGE ULTRA-FINE) 1/2 mL 31 gauge x 15/64" Syrg 3 each by Misc.(Non-Drug; Combo Route) route 3 (three) times daily.    losartan-hydrochlorothiazide 50-12.5 mg (HYZAAR) 50-12.5 mg per tablet Take 1 tablet by mouth once daily.    metformin (GLUCOPHAGE-XR) 750 MG 24 hr tablet Take 1 tablet (750 mg total) by mouth 2 (two) times daily with meals.    potassium chloride (KLOR-CON) 10 MEQ TbSR TAKE 1 TABLET ONE TIME DAILY    pravastatin (PRAVACHOL) 40 MG tablet TAKE 1 TABLET (40 MG TOTAL) BY MOUTH NIGHTLY.    ranitidine (ZANTAC) 150 MG tablet Take 1 tablet (150 mg total) by mouth 2 (two) times daily.    pen needle, diabetic (BD ULTRA-FINE SUSIE PEN NEEDLES) 32 gauge x 5/32" Ndle For one time daily injection         "   Clinical Reference Information           Your Vitals Were     BP                   122/62 (BP Location: Left arm, Patient Position: Sitting, BP Method: Manual)           Blood Pressure          Most Recent Value    BP  122/62      Allergies as of 3/2/2017     Nubain [Nalbuphine]    Grass Pollen-june Grass Standard    Sinus & Allergy [Chlorpheniramine-phenylephrine]      Immunizations Administered on Date of Encounter - 3/2/2017     None      Orders Placed During Today's Visit     Future Labs/Procedures Expected by Expires    DXA Bone Density Spine And Hip  3/2/2017 3/2/2018      [facility] Sign-Up     Activating your [facility] account is as easy as 1-2-3!     1) Visit [email or web address], select Sign Up Now, enter this activation code and your date of birth, then select Next.  [email or web address]  Expires: 3/25/2017  1:59 PM      2) Create a username and password to use when you visit [facility] in the future and select a security question in case you lose your password and select Next.    3) Enter your e-mail address and click Sign Up!    Additional Information  If you have questions, please e-mail [email or web address] or call [phone] to talk to our [facility] staff. Remember, [facility] is NOT to be used for urgent needs. For medical emergencies, dial 911.         Instructions      Counseling and Referral of Other Preventative  (Italic type indicates deductible and co-insurance are waived)    Patient Name: Alisia Gusman  Today's Date: 3/2/2017      SERVICE LIMITATIONS RECOMMENDATION    Vaccines    · Pneumococcal (once after 65)    · Influenza (annually)    · Hepatitis B (if medium/high risk)    · Prevnar 13      Hepatitis B medium/high risk factors:       - End-stage renal disease       - Hemophiliacs who received Factor VII or         IX concentrates       - Clients of institutions for the mentally             retarded       - Persons who live in the same house as          a HepB carrier       - Homosexual men        - Illicit injectable drug abusers     Pneumococcal: Done, no repeat necessary     Influenza: Done, repeat in one year     Hepatitis B: N/A Not indicated     Prevnar 13: N/A Done, no repeat necessary    Mammogram (biennial age 50-74)  Annually (age 40 or over)  Last done 12/05/16, recommend to repeat every 1  years    Pap (up to age 70 and after 70 if unknown history or abnormal study last 10 years)    N/A Followed by Gynecology - 5/31/16     The USPSTF recommends against screening for cervical cancer in women older than age 65 years who have had adequate prior screening and are not otherwise at high risk for cervical cancer.      Colorectal cancer screening (to age 75)    · Fecal occult blood test (annual)  · Flexible sigmoidoscopy (5y)  · Screening colonoscopy (10y)  · Barium enema   Last done 11/06/15, recommend to repeat every 5  years    Diabetes self-management training (no USPSTF recommendations)  Requires referral by treating physician for patient with diabetes or renal disease. 10 hours of initial DSMT sessions of no less than 30 minutes each in a continuous 12-month period. 2 hours of follow-up DSMT in subsequent years.  N/A Followed by Diabetic Management    Bone mass measurements (age 65 & older, biennial)  Requires diagnosis related to osteoporosis or estrogen deficiency. Biennial benefit unless patient has history of long-term glucocorticoid  Scheduled, see appointments    Glaucoma screening (no USPSTF recommendation)  Diabetes mellitus, family history   , age 50 or over    American, age 65 or over  N/A Will schedule own appointment    Medical nutrition therapy for diabetes or renal disease (no recommended schedule)  Requires referral by treating physician for patient with diabetes or renal disease or kidney transplant within the past 3 years.  Can be provided in same year as diabetes self-management training (DSMT), and CMS recommends medical nutrition therapy take place after  DSMT. Up to 3 hours for initial year and 2 hours in subsequent years.  N/A Not indicated    Cardiovascular screening blood tests (every 5 years)  · Fasting lipid panel  Order as a panel if possible  Last done 11/18/18, recommend to repeat every 1  years    Diabetes screening tests (at least every 3 years, Medicare covers annually or at 6-month intervals for prediabetic patients)  · Fasting blood sugar (FBS) or glucose tolerance test (GTT)  Patient must be diagnosed with one of the following:       - Hypertension       - Dyslipidemia       - Obesity (BMI 30kg/m2)       - Previous elevated impaired FBS or GTT       ... or any two of the following:       - Overweight (BMI 25 but <30)       - Family history of diabetes       - Age 65 or older       - History of gestational diabetes or birth of baby weighing more than 9 pounds  Last done 2/01/17, recommend to repeat every 4  months    Abdominal aortic aneurysm screening (once)  · Sonogram   Limited to patients who meet one of the following criteria:       - Men who are 65-75 years old and have smoked more than 100 cigarette in their lifetime       - Anyone with a family history of abdominal aortic aneurysm       - Anyone recommended for screening by the USPSTF  N/A Not indicated    HIV screening (annually for increased risk patients)  · HIV-1 and HIV-2 by EIA, or GERMAN, rapid antibody test or oral mucosa transudate  Patients must be at increased risk for HIV infection per USPSTF guidelines or pregnant. Tests covered annually for patient at increased risk or as requested by the patient. Pregnant patients may receive up to 3 tests during pregnancy.  Risks discussed, screening is not recommended    Smoking cessation counseling (up to 8 sessions per year)  Patients must be asymptomatic of tobacco-related conditions to receive as a preventative service.  Not indicated    Subsequent annual wellness visit  At least 12 months since last AWV  Return in one year     The following  information is provided to all patients.  This information is to help you find resources for any of the problems found today that may be affecting your health:                Living healthy guide: www.[facility].[place].gov      Understanding Diabetes: www.diabetes.org      Eating healthy: www.cdc.gov/healthyweight      CDC home safety checklist: www.cdc.gov/steadi/patient.html      Agency on Aging: www.goea.[place].[facility]      Alcoholics anonymous (AA): www.aa.org      Physical Activity: www.osvaldo.nih.gov/xw1lwgr      Tobacco use: www.quitwithusla.org          Language Assistance Services     ATTENTION: Language assistance services are available, free of charge. Please call [phone].      ATENCIÓN: Si marila nissa, tiene a salguero disposición servicios gratuitos de asistencia lingüística. Llame al [phone].     CHÚ Ý: N?u b?n nói Ti?ng Vi?t, có các d?ch v? h? tr? ngôn ng? mi?n phí dành cho b?n. G?i s? [phone].         [facility] complies with applicable Federal civil rights laws and does not discriminate on the basis of race, color, national origin, age, disability, or sex.

## 2017-03-03 DIAGNOSIS — E11.9 TYPE 2 DIABETES MELLITUS WITHOUT COMPLICATION: ICD-10-CM

## 2017-04-06 ENCOUNTER — OFFICE VISIT (OUTPATIENT)
Dept: INTERNAL MEDICINE | Facility: CLINIC | Age: 72
End: 2017-04-06
Payer: MEDICARE

## 2017-04-06 VITALS
HEART RATE: 88 BPM | HEIGHT: 62 IN | SYSTOLIC BLOOD PRESSURE: 116 MMHG | WEIGHT: 177 LBS | TEMPERATURE: 98 F | RESPIRATION RATE: 17 BRPM | DIASTOLIC BLOOD PRESSURE: 60 MMHG | BODY MASS INDEX: 32.57 KG/M2

## 2017-04-06 DIAGNOSIS — R63.4 WEIGHT LOSS: ICD-10-CM

## 2017-04-06 DIAGNOSIS — R05.3 CHRONIC COUGH: Primary | ICD-10-CM

## 2017-04-06 DIAGNOSIS — L74.9 SWEATING ABNORMALITY: ICD-10-CM

## 2017-04-06 LAB
CREAT UR-MCNC: 74 MG/DL
MICROALBUMIN UR DL<=1MG/L-MCNC: 7 UG/ML
MICROALBUMIN/CREATININE RATIO: 9.5 UG/MG

## 2017-04-06 PROCEDURE — 82570 ASSAY OF URINE CREATININE: CPT

## 2017-04-06 PROCEDURE — 3074F SYST BP LT 130 MM HG: CPT | Mod: S$GLB,,, | Performed by: INTERNAL MEDICINE

## 2017-04-06 PROCEDURE — 1157F ADVNC CARE PLAN IN RCRD: CPT | Mod: S$GLB,,, | Performed by: INTERNAL MEDICINE

## 2017-04-06 PROCEDURE — 3045F PR MOST RECENT HEMOGLOBIN A1C LEVEL 7.0-9.0%: CPT | Mod: S$GLB,,, | Performed by: INTERNAL MEDICINE

## 2017-04-06 PROCEDURE — 1126F AMNT PAIN NOTED NONE PRSNT: CPT | Mod: S$GLB,,, | Performed by: INTERNAL MEDICINE

## 2017-04-06 PROCEDURE — 3078F DIAST BP <80 MM HG: CPT | Mod: S$GLB,,, | Performed by: INTERNAL MEDICINE

## 2017-04-06 PROCEDURE — 99499 UNLISTED E&M SERVICE: CPT | Mod: S$GLB,,, | Performed by: INTERNAL MEDICINE

## 2017-04-06 PROCEDURE — 4010F ACE/ARB THERAPY RXD/TAKEN: CPT | Mod: S$GLB,,, | Performed by: INTERNAL MEDICINE

## 2017-04-06 PROCEDURE — 1159F MED LIST DOCD IN RCRD: CPT | Mod: S$GLB,,, | Performed by: INTERNAL MEDICINE

## 2017-04-06 PROCEDURE — 99214 OFFICE O/P EST MOD 30 MIN: CPT | Mod: S$GLB,,, | Performed by: INTERNAL MEDICINE

## 2017-04-06 PROCEDURE — 1160F RVW MEDS BY RX/DR IN RCRD: CPT | Mod: S$GLB,,, | Performed by: INTERNAL MEDICINE

## 2017-04-06 PROCEDURE — 99999 PR PBB SHADOW E&M-EST. PATIENT-LVL IV: CPT | Mod: PBBFAC,,, | Performed by: INTERNAL MEDICINE

## 2017-04-06 NOTE — MR AVS SNAPSHOT
[facility] - Internal Medicine  [address]  [facility] [address]  Phone: [phone]  Fax: [phone]                  Alisia Gusman   2017 11:40 AM   Office Visit    Description:  Female : 1945   Provider:  Selena Montemayor MD   Department:  [facility]           Reason for Visit     Chest Congestion     Cough           Diagnoses this Visit        Comments    Chronic cough    -  Primary     Type 2 diabetes, uncontrolled, with neuropathy         Weight loss         Sweating abnormality                To Do List           Future Appointments        Provider Department Dept Phone    2017 12:20 PM LAB, APPOINTMENT [facility] [phone]    2017 9:20 AM [facility], DEXA1 [facility] [phone]    2017 8:30 AM Pauly Chirinos MD [place] - [facility] Medicine [phone]    2017 9:00 AM Selena Montemayor MD [facility] - Internal Medicine [phone]    2017 8:00 AM LAB, APPOINTMENT [facility] [phone]      Goals (5 Years of Data)     None      [facility] On [facility]     [facility] Nurse Care Line -  Assistance  Unless otherwise directed by your provider, please contact [facility], our nurse care line that is available for  assistance.     Registered nurses in the [facility] provide: appointment scheduling, clinical advisement, health education, and other advisory services.  Call: [phone] (toll free)               Medications           Message regarding Medications     Verify the changes and/or additions to your medication regime listed below are the same as discussed with your clinician today.  If any of these changes or additions are incorrect, please notify your healthcare provider.        STOP taking these medications     albuterol 90 mcg/actuation inhaler Inhale 2 puffs into the lungs every 6 (six) hours as needed for Wheezing.           Verify that the below  "list of medications is an accurate representation of the medications you are currently taking.  If none reported, the list may be blank. If incorrect, please contact your healthcare provider. Carry this list with you in case of emergency.           Current Medications     ACCU-CHEK DOMENICO PLUS TEST STRP Strp USE THREE TIMES DAILY AS DIRECTED    ACCU-CHEK SOFTCLIX LANCETS Misc 1 each by Misc.(Non-Drug; Combo Route) route 3 (three) times daily.    aspirin 81 MG chewable tablet Take 1 tablet by mouth At bedtime.    BD ALCOHOL SWABS PadM 1 each 2 (two) times daily.    calcium carbonate-vitamin D3 (CALTRATE 600 + D) 600 mg(1,500mg) -400 unit Chew 1 tablet once daily.     cinnamon bark 500 mg capsule Take 500 mg by mouth 3 (three) times daily.    cyanocobalamin (VITAMIN B-12) 1000 MCG tablet Take 100 mcg by mouth once daily.    fluticasone (FLONASE) 50 mcg/actuation nasal spray 2 sprays by Each Nare route once daily.    FOLIC ACID/MULTIVIT-MIN/LUTEIN (CENTRUM SILVER ORAL) Take 1 tablet by mouth once daily.    gabapentin (NEURONTIN) 300 MG capsule 300mg in AM, 300mg in PM and 600mg at bedtime    glimepiride (AMARYL) 4 MG tablet Take 0.5 tablets (2 mg total) by mouth before breakfast.    insulin glargine (LANTUS SOLOSTAR) 100 unit/mL (3 mL) InPn pen Inject 22 Units into the skin once daily.    insulin regular 100 unit/mL Inj injection Inject 4 Units into the skin 3 (three) times daily before meals. RELION brand    insulin syringe-needle U-100 (BD INSULIN SYRINGE ULTRA-FINE) 0.3 mL 31 gauge x 15/64" Syrg 1 each by Misc.(Non-Drug; Combo Route) route 2 (two) times daily.    insulin syringe-needle U-100 (BD INSULIN SYRINGE ULTRA-FINE) 1/2 mL 31 gauge x 15/64" Syrg 3 each by Misc.(Non-Drug; Combo Route) route 3 (three) times daily.    losartan-hydrochlorothiazide 50-12.5 mg (HYZAAR) 50-12.5 mg per tablet Take 1 tablet by mouth once daily.    metformin (GLUCOPHAGE-XR) 750 MG 24 hr tablet Take 1 tablet (750 mg total) by mouth 2 " "(two) times daily with meals.    pen needle, diabetic (BD ULTRA-FINE SUSIE PEN NEEDLES) 32 gauge x 5/32" Ndle For one time daily injection    potassium chloride (KLOR-CON) 10 MEQ TbSR TAKE 1 TABLET ONE TIME DAILY    pravastatin (PRAVACHOL) 40 MG tablet TAKE 1 TABLET (40 MG TOTAL) BY MOUTH NIGHTLY.    ranitidine (ZANTAC) 150 MG tablet Take 1 tablet (150 mg total) by mouth 2 (two) times daily.           Clinical Reference Information           Your Vitals Were     BP Pulse Temp Resp Height Weight    116/60 88 97.5 °F (36.4 °C) (Oral) 17 5' 2" (1.575 m) 80.3 kg (177 lb 0.5 oz)    BMI                32.38 kg/m2          Blood Pressure          Most Recent Value    BP  116/60      Allergies as of 4/6/2017     Nubain [Nalbuphine]    Grass Pollen-june Grass Standard    Sinus & Allergy [Chlorpheniramine-phenylephrine]      Immunizations Administered on Date of Encounter - 4/6/2017     None      Orders Placed During Today's Visit      Normal Orders This Visit    Ambulatory Referral to ENT     MICROALBUMIN / CREATININE RATIO URINE     Future Labs/Procedures Expected by Expires    Comprehensive metabolic panel  4/6/2017 (Approximate) 6/5/2018    TSH  4/6/2017 6/5/2018      [facility] Sign-Up     Activating your [facility] account is as easy as 1-2-3!     1) Visit [email or web address], select Sign Up Now, enter this activation code and your date of birth, then select Next.  [email or web address]  Expires: 5/21/2017 12:17 PM      2) Create a username and password to use when you visit [facility] in the future and select a security question in case you lose your password and select Next.    3) Enter your e-mail address and click Sign Up!    Additional Information  If you have questions, please e-mail [email or web address] or call [phone] to talk to our [facility] staff. Remember, [facility] is NOT to be used for urgent needs. For medical emergencies, dial 911.         Instructions    Use flonase nightly for 3-4 weeks to see if it helps with " the cough at night. If the cough does not resolve, follow up with ENT (ear, nose and throat doctor).        Language Assistance Services     ATTENTION: Language assistance services are available, free of charge. Please call [phone].      ATENCIÓN: Si earnest azar, tiene a salguero disposición servicios gratuitos de asistencia lingüística. Llame al [phone].     MARC Ý: N?u b?n nói Ti?ng Vi?t, có các d?ch v? h? tr? ngôn ng? mi?n phí dành cho b?n. G?i s? [phone].         [facility] complies with applicable Federal civil rights laws and does not discriminate on the basis of race, color, national origin, age, disability, or sex.

## 2017-04-06 NOTE — PATIENT INSTRUCTIONS
Use flonase nightly for 3-4 weeks to see if it helps with the cough at night. If the cough does not resolve, follow up with ENT (ear, nose and throat doctor).

## 2017-04-06 NOTE — PROGRESS NOTES
"Subjective:       Patient ID: Alisia Gusman is a 71 y.o. female.    Chief Complaint: Chest Congestion and Cough    HPI urgent  Chronic cough x 3 yrs.   When she tries to sleep at night, starts coughing. Sometimes in AM, cough productive of white sputum sometimes. No cough during the day. Thinks this is worse over the last 3 yrs.     Takes zantac 150mg bid. Reports this helps w/ the acid reflux.     Started using flonase a week ago but for 2 nights. L ear fullness and postnasal drip. No rhinorrhea.     Reports that pt feels a slight wheeze when she's lying down at night.     C/o sweats at night a few nights a week previously. Then for x 2 weeks, she's been having sweats (not soaked in sweats) intermittently throughout the day. Restarted on glimepiride 2/23/17. Not associated w/ any CP/dizziness/palpitations/SOB. Reports a few lbs weight loss. Water aerobics 1-2x/week. Tries to cut back on the eating.     Review of Systems   Constitutional: Negative for chills and fever.   HENT: Negative for ear pain, postnasal drip, rhinorrhea and sore throat.    Respiratory: Positive for cough. Negative for shortness of breath and wheezing.    Cardiovascular: Negative for chest pain.   Musculoskeletal: Negative for myalgias.   Skin: Negative for rash.   Allergic/Immunologic: Negative for environmental allergies.   Neurological: Negative for headaches.         Objective:      Physical Exam    Resp 17  Ht 5' 2" (1.575 m)  Wt 80.3 kg (177 lb 0.5 oz)  BMI 32.38 kg/m2    Gen - A+OX4, NAD  HEENT - PERRL, OP clear. MMM. TM normal  Neck - no LAD  CV - RRR, no m/r  Chest - CTAB, no wheezing/rhonchi/crackles  Abd - S/NT/ND/+BS  Ext - 2+ B radial pulses. No LE edema.     Glucose log:              Assessment/Plan     Alisia was seen today for chest congestion and cough.    Diagnoses and all orders for this visit:    Chronic cough - try flonase nightly x at least 3-4 weeks to assess if it helps w/ cough.   -     Ambulatory Referral to " ENT    Type 2 diabetes, uncontrolled, with neuropathy - cont current meds. F/u w/ endocrinology  -     MICROALBUMIN / CREATININE RATIO URINE    Weight loss - objectively here pt has not lost weight.   -     TSH; Future  -     Comprehensive metabolic panel; Future    Sweating abnormality - check glucose during sweating eps to r/o hypoglycemia.  -     TSH; Future  -     Comprehensive metabolic panel; Future    Return in about 3 months (around 7/6/2017).      Selena Montemayor MD  Department of Internal Medicine - [facility]  11:49 AM

## 2017-04-11 ENCOUNTER — HOSPITAL ENCOUNTER (OUTPATIENT)
Dept: RADIOLOGY | Facility: CLINIC | Age: 72
Discharge: HOME | End: 2017-04-11
Attending: NURSE PRACTITIONER
Payer: MEDICARE

## 2017-04-11 DIAGNOSIS — Z78.0 POST-MENOPAUSAL: ICD-10-CM

## 2017-04-11 PROCEDURE — 77080 DXA BONE DENSITY AXIAL: CPT | Mod: 26,,, | Performed by: INTERNAL MEDICINE

## 2017-04-11 PROCEDURE — 77080 DXA BONE DENSITY AXIAL: CPT | Mod: TC

## 2017-04-13 DIAGNOSIS — E11.9 DIABETES MELLITUS WITHOUT COMPLICATION: ICD-10-CM

## 2017-04-25 ENCOUNTER — HOSPITAL ENCOUNTER (EMERGENCY)
Facility: HOSPITAL | Age: 72
Discharge: HOME | End: 2017-04-25
Attending: EMERGENCY MEDICINE | Admitting: EMERGENCY MEDICINE
Payer: MEDICARE

## 2017-04-25 VITALS
TEMPERATURE: 98 F | BODY MASS INDEX: 32.39 KG/M2 | SYSTOLIC BLOOD PRESSURE: 142 MMHG | DIASTOLIC BLOOD PRESSURE: 73 MMHG | RESPIRATION RATE: 18 BRPM | WEIGHT: 176 LBS | HEIGHT: 62 IN | HEART RATE: 83 BPM | OXYGEN SATURATION: 99 %

## 2017-04-25 DIAGNOSIS — R09.82 POST-NASAL DRIP: Primary | ICD-10-CM

## 2017-04-25 DIAGNOSIS — R05.9 COUGH: ICD-10-CM

## 2017-04-25 PROCEDURE — 99283 EMERGENCY DEPT VISIT LOW MDM: CPT | Mod: ,,, | Performed by: EMERGENCY MEDICINE

## 2017-04-25 PROCEDURE — 99283 EMERGENCY DEPT VISIT LOW MDM: CPT

## 2017-04-25 RX ORDER — LORATADINE 10 MG/1
10 TABLET ORAL DAILY
Qty: 30 TABLET | Refills: 0 | COMMUNITY
Start: 2017-04-25 | End: 2017-10-09

## 2017-04-25 NOTE — ED TRIAGE NOTES
Presents to ER for complaint of cough and wheezing especially at night.  Reports cough productive of minimal amount of white sputum.

## 2017-04-25 NOTE — ED AVS SNAPSHOT
[facility]  [address] [address]               Alisia Gusman   2017  9:12 AM   ED    Description:  Female : 1945   Department:  [facility]           Your Care was Coordinated By:     Provider Role From To    Chetan Alvarez III, MD Attending Provider 17 0912 --      Reason for Visit     Wheezing           Diagnoses this Visit        Comments    Post-nasal drip    -  Primary     Cough           ED Disposition     None           To Do List           Follow-up Information     Follow up with Selena Montemayor MD. Schedule an appointment as soon as possible for a visit in 3 days.    Specialty:  Internal Medicine    Contact information:    [address]  [place] [address]  [phone]        PURCHASE these Medications (No prescription required)        Start End    loratadine (CLARITIN) 10 mg tablet 2017    Sig - Route: Take 1 tablet (10 mg total) by mouth once daily. - Oral    Class: OTC      [facility] On Call     [facility] Care [facility] -  Assistance  Unless otherwise directed by your provider, please contact [facility], our nurse care line that is available for  assistance.     Registered nurses in the [facility] provide: appointment scheduling, clinical advisement, health education, and other advisory services.  Call: [phone] (toll free)               Medications           Message regarding Medications     Verify the changes and/or additions to your medication regime listed below are the same as discussed with your clinician today.  If any of these changes or additions are incorrect, please notify your healthcare provider.        START taking these NEW medications        Refills    loratadine (CLARITIN) 10 mg tablet 0    Sig: Take 1 tablet (10 mg total) by mouth once daily.    Class: OTC    Route: Oral           Verify that the below list of medications is an accurate  "representation of the medications you are currently taking.  If none reported, the list may be blank. If incorrect, please contact your healthcare provider. Carry this list with you in case of emergency.           Current Medications     ACCU-CHEK DOMENICO PLUS TEST STRP Strp USE THREE TIMES DAILY AS DIRECTED    ACCU-CHEK SOFTCLIX LANCETS Misc 1 each by Misc.(Non-Drug; Combo Route) route 3 (three) times daily.    aspirin 81 MG chewable tablet Take 1 tablet by mouth At bedtime.    BD ALCOHOL SWABS PadM 1 each 2 (two) times daily.    calcium carbonate-vitamin D3 (CALTRATE 600 + D) 600 mg(1,500mg) -400 unit Chew 1 tablet once daily.     cinnamon bark 500 mg capsule Take 500 mg by mouth 3 (three) times daily.    cyanocobalamin (VITAMIN B-12) 1000 MCG tablet Take 100 mcg by mouth once daily.    fluticasone (FLONASE) 50 mcg/actuation nasal spray 2 sprays by Each Nare route once daily.    FOLIC ACID/MULTIVIT-MIN/LUTEIN (CENTRUM SILVER ORAL) Take 1 tablet by mouth once daily.    gabapentin (NEURONTIN) 300 MG capsule 300mg in AM, 300mg in PM and 600mg at bedtime    glimepiride (AMARYL) 4 MG tablet Take 0.5 tablets (2 mg total) by mouth before breakfast.    insulin glargine (LANTUS SOLOSTAR) 100 unit/mL (3 mL) InPn pen Inject 22 Units into the skin once daily.    insulin regular 100 unit/mL Inj injection Inject 4 Units into the skin 3 (three) times daily before meals. RELION brand    insulin syringe-needle U-100 (BD INSULIN SYRINGE ULTRA-FINE) 0.3 mL 31 gauge x 15/64" Syrg 1 each by Misc.(Non-Drug; Combo Route) route 2 (two) times daily.    insulin syringe-needle U-100 (BD INSULIN SYRINGE ULTRA-FINE) 1/2 mL 31 gauge x 15/64" Syrg 3 each by Misc.(Non-Drug; Combo Route) route 3 (three) times daily.    losartan-hydrochlorothiazide 50-12.5 mg (HYZAAR) 50-12.5 mg per tablet Take 1 tablet by mouth once daily.    metformin (GLUCOPHAGE-XR) 750 MG 24 hr tablet Take 1 tablet (750 mg total) by mouth 2 (two) times daily with meals.    pen " "needle, diabetic (BD ULTRA-FINE SUSIE PEN NEEDLES) 32 gauge x 5/32" Ndle For one time daily injection    potassium chloride (KLOR-CON) 10 MEQ TbSR TAKE 1 TABLET ONE TIME DAILY    pravastatin (PRAVACHOL) 40 MG tablet TAKE 1 TABLET (40 MG TOTAL) BY MOUTH NIGHTLY.    ranitidine (ZANTAC) 150 MG tablet Take 1 tablet (150 mg total) by mouth 2 (two) times daily.    loratadine (CLARITIN) 10 mg tablet Take 1 tablet (10 mg total) by mouth once daily.           Clinical Reference Information           Your Vitals Were     BP Pulse Temp Resp Height Weight    142/73 83 98.2 °F (36.8 °C) (Oral) 18 5' 2" (1.575 m) 79.8 kg (176 lb)    SpO2 BMI             99% 32.19 kg/m2         Allergies as of 4/25/2017        Reactions    Nubain [Nalbuphine] Other (See Comments)    Other reaction(s): Hypotension    Grass Pollen-tim Grass Standard     Sinus & Allergy [Chlorpheniramine-phenylephrine]       Immunizations Administered on Date of Encounter - 4/25/2017     None      ED Micro, Lab, POCT     None      ED Imaging Orders     Start Ordered       Status Ordering Provider    04/25/17 0921 04/25/17 0920  X-Ray Chest PA And Lateral  1 time imaging      Final result       Discharge References/Attachments     ALLERGIC RHINITIS (ENGLISH)    ALLERGIES (NASAL), UNDERSTANDING (ENGLISH)      Your Scheduled Appointments     May 01, 2017  8:30 AM CDT   Established Patient Visit with Pauly Chirinos MD   [facility] ([facility])    [address]  [facility] [address]   [phone]            May 16, 2017  9:00 AM CDT   Established Patient Visit with MD [facility] Internal Medicine ([facility])    [address]  [place] [address] [phone]            Jun 07, 2017  8:00 AM CDT   Non-Fasting Lab with LAB, APPOINTMENT [facility] ([facility])    [address]  [place] [address] "   [phone]            Jun 14, 2017  8:30 AM CDT   Established Patient with MD [facility] - Endo/Diab/Metab ([facility] )    [address]  [facility] [address]   [phone]              [facility] Sign-Up     Activating your [facility] account is as easy as 1-2-3!     1) Visit [email or web address], select Sign Up Now, enter this activation code and your date of birth, then select Next.  [email or web address]  Expires: 5/21/2017 12:17 PM      2) Create a username and password to use when you visit [facility] in the future and select a security question in case you lose your password and select Next.    3) Enter your e-mail address and click Sign Up!    Additional Information  If you have questions, please e-mail [email or web address] or call [phone] to talk to our [facility] staff. Remember, [facility] is NOT to be used for urgent needs. For medical emergencies, dial 911.          [facility] complies with applicable Federal civil rights laws and does not discriminate on the basis of race, color, national origin, age, disability, or sex.        Language Assistance Services     ATTENTION: Language assistance services are available, free of charge. Please call [phone].      ATENCIÓN: Si habla español, tiene a salguero disposición servicios gratuitos de asistencia lingüística. Llame al [phone].     CHÚ Ý: N?u b?n nói Ti?ng Vi?t, có các d?ch v? h? tr? ngôn ng? mi?n phí dành cho b?n. G?i s? [phone].

## 2017-04-25 NOTE — ED NOTES
GENERAL: The patient is a well-developed, well-nourished female in no apparent distress. She is alert and oriented x3.    HEENT: Head is normocephalic and atraumatic. Extraocular muscles are intact. Pupils are equal, round, and reactive to light and accommodation. Nares appeared normal. Mouth is well hydrated and without lesions. Mucous membranes are moist. Posterior pharynx clear of any exudate or lesions.    NECK: Supple. No carotid bruits. No lymphadenopathy or thyromegaly.    LUNGS: Clear to auscultation.  Reports productive cough of white sputum.    HEART: Regular rate and rhythm without murmur.     ABDOMEN: Soft, nontender, and nondistended. Positive bowel sounds. No hepatosplenomegaly was noted.     EXTREMITIES: Without any cyanosis, clubbing, rash, lesions or edema.     NEUROLOGIC: Cranial nerves II through XII are grossly intact.     PSYCHIATRIC: Flat affect, but denies suicidal or homicidal ideations.    SKIN: No ulceration or induration present.

## 2017-04-25 NOTE — ED PROVIDER NOTES
"Encounter Date: 4/25/2017    SCRIBE #1 NOTE: I, Angela Coombs, am scribing for, and in the presence of, Dr. Alvarez .       History     Chief Complaint   Patient presents with    Wheezing     off and on for 1 year, seen by pcp and can't find anything, increases at night     Review of patient's allergies indicates:   Allergen Reactions    Nubain [nalbuphine] Other (See Comments)     Other reaction(s): Hypotension    Grass pollen-june grass standard     Sinus & allergy [chlorpheniramine-phenylephrine]      HPI Comments: Time patient was seen by the provider: 9:14 AM      The patient is a 71 y.o. female with a hx of DM and HTN who presents to the ED with a complaint of wheezing that has been going on for a year. Associated symptoms include SOB during the first 30 mins in the morning, chronic nonproductive cough(occasional production of white sputum in the mornings upon waking), and a sensation of "a dripping constant on both sides of the neck." Denies any fever, CP, or pain elsewhere in the body. States not feeling good all day yesterday. Pt has seen PCP regarding similar symptoms and was put on Flonase. No further complains or concerns at this time.             The history is provided by the patient and medical records.     Past Medical History:   Diagnosis Date    Allergy     Anemia     Arthritis     Asthma     Cancer 1993    L eft breast    Cataract     Chronic cervical radiculopathy 9/20/2012    Diabetes mellitus     Diabetes mellitus type II     Diabetes with neurologic complications     Diverticulosis     Dry eyes     Dysphagia     after knee surgery June 2014    GERD (gastroesophageal reflux disease)     Hyperlipidemia     Hypertension     Neuropathy     feet    Scalp tenderness     Shortness of breath     Ulcer      Past Surgical History:   Procedure Laterality Date    BREAST SURGERY      Lumpectomy 1993    CARPAL TUNNEL RELEASE Bilateral 2011    CATARACT EXTRACTION W/  INTRAOCULAR " LENS IMPLANT Bilateral 2013 and 2014    CHOLECYSTECTOMY      COLONOSCOPY N/A 11/6/2015    Procedure: COLONOSCOPY;  Surgeon: Major Michelle MD;  Location: [facility] ([address] [place] [address]);  Service: Endoscopy;  Laterality: N/A;    HYSTERECTOMY      partial hyst    JOINT REPLACEMENT Left June 2014    knee    uvuloplasty       Family History   Problem Relation Age of Onset    Diabetes Mother     Diabetes Sister     Heart disease Father     No Known Problems Brother     No Known Problems Daughter     No Known Problems Son     Cancer Sister      breast and colon ca    Diabetes Sister     Cancer Sister      colon ca    Arthritis Sister     Psoriasis Sister     No Known Problems Daughter     Asthma Neg Hx     Emphysema Neg Hx     Lupus Neg Hx     Rheum arthritis Neg Hx     Melanoma Neg Hx      Social History   Substance Use Topics    Smoking status: Never Smoker    Smokeless tobacco: Never Used    Alcohol use No     Review of Systems   Constitutional: Negative for fever.   HENT: Positive for postnasal drip. Negative for sore throat.    Eyes: Negative for pain.   Respiratory: Positive for cough (Chronic nonproductive cough with occasional production of white sputum in the mornings) and shortness of breath (During the first 30mins in the mornings).    Cardiovascular: Negative for chest pain.   Gastrointestinal: Negative for abdominal pain and nausea.   Genitourinary: Negative for dysuria.   Musculoskeletal: Negative for back pain.   Skin: Negative for rash.   Neurological: Negative for weakness.   Hematological: Does not bruise/bleed easily.       Physical Exam   Initial Vitals   BP Pulse Resp Temp SpO2   04/25/17 0909 04/25/17 0909 04/25/17 0909 04/25/17 0909 04/25/17 0909   142/73 83 18 98.2 °F (36.8 °C) 99 %     Physical Exam    Nursing note and vitals reviewed.  Constitutional: She appears well-developed and well-nourished.   HENT:   Head: Normocephalic and atraumatic.   No sinus tenderness   Eyes: EOM are  normal. Pupils are equal, round, and reactive to light.   Neck: Normal range of motion. Neck supple.   Cardiovascular: Normal rate and regular rhythm. Exam reveals no gallop and no friction rub.    No murmur heard.  Pulmonary/Chest: Breath sounds normal. No respiratory distress. She has no wheezes. She has no rhonchi. She has no rales.   Musculoskeletal: Normal range of motion. She exhibits no edema or tenderness.   Neurological: She is alert and oriented to person, place, and time. She has normal strength. No cranial nerve deficit or sensory deficit.   Skin: No erythema.   Psychiatric: She has a normal mood and affect. Thought content normal.         ED Course   Procedures  Labs Reviewed - No data to display       X-Rays:   Independently Interpreted Readings:   Chest X-Ray: No acute process     Medical Decision Making:   History:   Old Medical Records: I decided to obtain old medical records.  Initial Assessment:   Pt with morning cough and post nasal drip, with no other cardiopulmonary worrisome etiology. Will check CXR but likely discharge with decongestant.  Differential Diagnosis:   Post nasal drip, bronchitis and PNA.  Independently Interpreted Test(s):   I have ordered and independently interpreted X-rays - see prior notes.  Clinical Tests:   Radiological Study: Ordered            Scribe Attestation:   Scribe #1: I performed the above scribed service and the documentation accurately describes the services I performed. I attest to the accuracy of the note.    Attending Attestation:           Physician Attestation for Scribe:  Physician Attestation Statement for Scribe #1: I, Dr. Alvarez, reviewed documentation, as scribed by Angela Coombs in my presence, and it is both accurate and complete.                 ED Course     Clinical Impression:   The primary encounter diagnosis was Post-nasal drip. A diagnosis of Cough was also pertinent to this visit.    Disposition:   Disposition: Discharged  Condition:  Stable       Chetan Alvarez III, MD  04/25/17 4647

## 2017-05-01 ENCOUNTER — HOSPITAL ENCOUNTER (OUTPATIENT)
Dept: RADIOLOGY | Facility: HOSPITAL | Age: 72
Discharge: HOME | End: 2017-05-01
Attending: ORTHOPAEDIC SURGERY
Payer: MEDICARE

## 2017-05-01 ENCOUNTER — OFFICE VISIT (OUTPATIENT)
Dept: SPORTS MEDICINE | Facility: CLINIC | Age: 72
End: 2017-05-01
Payer: MEDICARE

## 2017-05-01 VITALS
WEIGHT: 176 LBS | DIASTOLIC BLOOD PRESSURE: 82 MMHG | BODY MASS INDEX: 32.39 KG/M2 | HEART RATE: 91 BPM | HEIGHT: 62 IN | SYSTOLIC BLOOD PRESSURE: 141 MMHG

## 2017-05-01 DIAGNOSIS — M12.561 TRAUMATIC ARTHRITIS OF RIGHT KNEE: ICD-10-CM

## 2017-05-01 DIAGNOSIS — M23.92 KNEE INTERNAL DERANGEMENT, LEFT: ICD-10-CM

## 2017-05-01 DIAGNOSIS — M25.562 LEFT KNEE PAIN, UNSPECIFIED CHRONICITY: Primary | ICD-10-CM

## 2017-05-01 DIAGNOSIS — M25.562 LEFT KNEE PAIN, UNSPECIFIED CHRONICITY: ICD-10-CM

## 2017-05-01 DIAGNOSIS — M21.161 GENU VARUM OF RIGHT LOWER EXTREMITY: ICD-10-CM

## 2017-05-01 PROCEDURE — 99999 PR PBB SHADOW E&M-EST. PATIENT-LVL V: CPT | Mod: PBBFAC,,, | Performed by: ORTHOPAEDIC SURGERY

## 2017-05-01 PROCEDURE — 99499 UNLISTED E&M SERVICE: CPT | Mod: S$GLB,,, | Performed by: ORTHOPAEDIC SURGERY

## 2017-05-01 PROCEDURE — 1125F AMNT PAIN NOTED PAIN PRSNT: CPT | Mod: S$GLB,,, | Performed by: ORTHOPAEDIC SURGERY

## 2017-05-01 PROCEDURE — 1160F RVW MEDS BY RX/DR IN RCRD: CPT | Mod: S$GLB,,, | Performed by: ORTHOPAEDIC SURGERY

## 2017-05-01 PROCEDURE — 1159F MED LIST DOCD IN RCRD: CPT | Mod: S$GLB,,, | Performed by: ORTHOPAEDIC SURGERY

## 2017-05-01 PROCEDURE — 73564 X-RAY EXAM KNEE 4 OR MORE: CPT | Mod: TC,50,PO,LT

## 2017-05-01 PROCEDURE — 3077F SYST BP >= 140 MM HG: CPT | Mod: S$GLB,,, | Performed by: ORTHOPAEDIC SURGERY

## 2017-05-01 PROCEDURE — 99214 OFFICE O/P EST MOD 30 MIN: CPT | Mod: S$GLB,,, | Performed by: ORTHOPAEDIC SURGERY

## 2017-05-01 PROCEDURE — 3079F DIAST BP 80-89 MM HG: CPT | Mod: S$GLB,,, | Performed by: ORTHOPAEDIC SURGERY

## 2017-05-01 PROCEDURE — 73564 X-RAY EXAM KNEE 4 OR MORE: CPT | Mod: 26,50,, | Performed by: RADIOLOGY

## 2017-05-01 RX ORDER — MELOXICAM 15 MG/1
15 TABLET ORAL DAILY
Qty: 30 TABLET | Refills: 6 | Status: SHIPPED | OUTPATIENT
Start: 2017-05-01 | End: 2017-06-14

## 2017-05-01 NOTE — MR AVS SNAPSHOT
[facility]  [address]  [facility] [address]  Phone: [phone]                  Alisia Gusman   2017 8:30 AM   Office Visit    Description:  Female : 1945   Provider:  Pauly Chirinos MD   Department:  [facility]           Reason for Visit     Left Knee - Follow-up           Diagnoses this Visit        Comments    Left knee pain, unspecified chronicity    -  Primary     Knee internal derangement, left         Genu varum of right lower extremity         Traumatic arthritis of right knee                To Do List           Future Appointments        Provider Department Dept Phone    2017 9:00 AM Selena Montemayor MD [facility] - Internal Medicine [phone]    2017 8:00 AM LAB, APPOINTMENT [facility] [phone]    2017 8:30 AM Lindy Nelson MD [facility] - Endo/Diab/Metab [phone]      Goals (5 Years of Data)     None      Follow-Up and Disposition     Return in about 6 months (around 2017), or RTC in 6 months with Mid-level provider Patient will fill out IKDC, SF-12 and KOOS and bilateral kne, for RTC in 6 months with Mid-level provider Patient will fill out IKDC, SF-12 and KOOS and bilateral kne.       These Medications        Disp Refills Start End    meloxicam (MOBIC) 15 MG tablet 30 tablet 6 2017     Take 1 tablet (15 mg total) by mouth once daily. - Oral    Pharmacy: [facility] [address] [place] [address] (N), [place] - [address] Ph #: [phone]         [facility]     [facility] Care [facility] -  Assistance  Unless otherwise directed by your provider, please contact [facility], our nurse care line that is available for  assistance.     Registered nurses in the [facility] provide: appointment scheduling, clinical advisement, health education, and other advisory services.  Call: [phone] (toll free)               Medications           Message  regarding Medications     Verify the changes and/or additions to your medication regime listed below are the same as discussed with your clinician today.  If any of these changes or additions are incorrect, please notify your healthcare provider.        START taking these NEW medications        Refills    meloxicam (MOBIC) 15 MG tablet 6    Sig: Take 1 tablet (15 mg total) by mouth once daily.    Class: Normal    Route: Oral           Verify that the below list of medications is an accurate representation of the medications you are currently taking.  If none reported, the list may be blank. If incorrect, please contact your healthcare provider. Carry this list with you in case of emergency.           Current Medications     ACCU-CHEK DOMENICO PLUS TEST STRP Strp USE THREE TIMES DAILY AS DIRECTED    ACCU-CHEK SOFTCLIX LANCETS Misc 1 each by Misc.(Non-Drug; Combo Route) route 3 (three) times daily.    aspirin 81 MG chewable tablet Take 1 tablet by mouth At bedtime.    BD ALCOHOL SWABS PadM 1 each 2 (two) times daily.    calcium carbonate-vitamin D3 (CALTRATE 600 + D) 600 mg(1,500mg) -400 unit Chew 1 tablet once daily.     cinnamon bark 500 mg capsule Take 500 mg by mouth 3 (three) times daily.    cyanocobalamin (VITAMIN B-12) 1000 MCG tablet Take 100 mcg by mouth once daily.    fluticasone (FLONASE) 50 mcg/actuation nasal spray 2 sprays by Each Nare route once daily.    FOLIC ACID/MULTIVIT-MIN/LUTEIN (CENTRUM SILVER ORAL) Take 1 tablet by mouth once daily.    gabapentin (NEURONTIN) 300 MG capsule 300mg in AM, 300mg in PM and 600mg at bedtime    glimepiride (AMARYL) 4 MG tablet Take 0.5 tablets (2 mg total) by mouth before breakfast.    insulin glargine (LANTUS SOLOSTAR) 100 unit/mL (3 mL) InPn pen Inject 22 Units into the skin once daily.    insulin regular 100 unit/mL Inj injection Inject 4 Units into the skin 3 (three) times daily before meals. RELION brand    insulin syringe-needle U-100 (BD INSULIN SYRINGE  "ULTRA-FINE) 0.3 mL 31 gauge x 15/64" Syrg 1 each by Misc.(Non-Drug; Combo Route) route 2 (two) times daily.    insulin syringe-needle U-100 (BD INSULIN SYRINGE ULTRA-FINE) 1/2 mL 31 gauge x 15/64" Syrg 3 each by Misc.(Non-Drug; Combo Route) route 3 (three) times daily.    loratadine (CLARITIN) 10 mg tablet Take 1 tablet (10 mg total) by mouth once daily.    losartan-hydrochlorothiazide 50-12.5 mg (HYZAAR) 50-12.5 mg per tablet Take 1 tablet by mouth once daily.    metformin (GLUCOPHAGE-XR) 750 MG 24 hr tablet Take 1 tablet (750 mg total) by mouth 2 (two) times daily with meals.    pen needle, diabetic (BD ULTRA-FINE SUSIE PEN NEEDLES) 32 gauge x 5/32" Ndle For one time daily injection    potassium chloride (KLOR-CON) 10 MEQ TbSR TAKE 1 TABLET ONE TIME DAILY    pravastatin (PRAVACHOL) 40 MG tablet TAKE 1 TABLET (40 MG TOTAL) BY MOUTH NIGHTLY.    ranitidine (ZANTAC) 150 MG tablet Take 1 tablet (150 mg total) by mouth 2 (two) times daily.    meloxicam (MOBIC) 15 MG tablet Take 1 tablet (15 mg total) by mouth once daily.           Clinical Reference Information           Your Vitals Were     BP Pulse Height Weight BMI    141/82 91 5' 2" (1.575 m) 79.8 kg (176 lb) 32.19 kg/m2      Blood Pressure          Most Recent Value    BP  (!)  141/82      Allergies as of 5/1/2017     Nubain [Nalbuphine]    Grass Pollen-june Grass Standard    Sinus & Allergy [Chlorpheniramine-phenylephrine]      Immunizations Administered on Date of Encounter - 5/1/2017     None      Orders Placed During Today's Visit     Future Labs/Procedures Expected by Expires    X-ray Knee Ortho Bilateral with Flexion  5/1/2017 5/1/2018      [facility] Sign-Up     Activating your [facility] account is as easy as 1-2-3!     1) Visit [email or web address], select Sign Up Now, enter this activation code and your date of birth, then select Next.  [email or web address]  Expires: 5/21/2017 12:17 PM      2) Create a username and password to use when you visit [facility] in the " future and select a security question in case you lose your password and select Next.    3) Enter your e-mail address and click Sign Up!    Additional Information  If you have questions, please e-mail [email or web address] or call [phone] to talk to our [facility] staff. Remember, [facility] is NOT to be used for urgent needs. For medical emergencies, dial 911.         Instructions        DESCRIPTION  Synvisc-One (hylan G-F 20) is an elastoviscous high molecular weight fluid containing hylan A and hylan B polymers produced from chicken lucio. Hylans are derivatives of hyaluronan (sodium hyaluronate). Hylan G-F 20 is unique in that the hyaluronan is chemically cross linked. Hyaluronan is a long-chain polymer containing repeating disaccharide units of Yp-gfbqbyaejzp-T-acetylglucosamine.     INDICATIONS FOR USE  Synvisc-One is indicated for the treatment of pain in osteoarthritis (OA) of the knee in patients who have failed to respond adequately to conservative nonpharmacologic therapy and simple analgesics, e.g., acetaminophen.     Who is a candidate for Synvisc-One  Patients with knee osteoarthritis, who have tried diet, exercise and over-the-counter pain medication but still have pain, should talk to their doctor to see if Synvisc-One is right for them.     How Synvisc-One is administered  Synvisc-One is a single injection. It's a simple, in-office procedure that only takes a few minutes.     What you can expect following a Synvisc-One knee injection Synvisc-One can provide up to six months of osteoarthritis knee pain relief. Everyone responds differently, but in a medical study* patients experienced relief starting one month after the injection.     After the injection, you can resume normal day-to-day activities, but you should avoid any strenuous activities for about 48 hours.     Contraindication  Do not administer to patients with known hypersensitivity (allergy) to hyaluronan (sodium hyaluronate)  preparations.   Do not inject Synvisc-One in the knees of patients having knee joint infections or skin diseases or infections in the area of theinjection site.    What are possible side effects?  Synvisc may occur short-term pain, swelling at the injection site and / or the appearance of synovial exudate after injection. In some cases, exudation may be significant and cause more prolonged pain.     Important Safety Information  Before trying Synvisc-One or SYNVISC, tell your doctor if you are allergic to products from birds - such as feathers, eggs or poultry - or if your leg is swollen or infected. Synvisc-One and SYNVISC are only for injection into the knee, performed by a doctor or other qualified health care professional. Synvisc-One and SYNVISC have not been tested to show pain relief in joints other than the knee. Talk to your doctor before resuming strenuous weight-bearing activities after treatment. Synvisc-One and SYNVISC have not been tested in children, pregnant women or women who are nursing. You should tell your doctor if you think you are pregnant or if you are nursing a child. The side effects most commonly seen when Synvisc-One or SYNVISC is injected into the knee were pain, swelling and/or fluid buildup in or around the knee. Cases where the swelling is extensive or painful should be discussed with your doctor. Allergic reactions such as rash and hives have been reported rarely.            Language Assistance Services     ATTENTION: Language assistance services are available, free of charge. Please call [phone].      ATENCIÓN: Si habla nissa, tiene a salguero disposición servicios gratuitos de asistencia lingüística. Llame al [phone].     [facility] Ý: N?u b?n nói Ti?ng Vi?t, có các d?ch v? h? tr? ngôn ng? mi?n phí dành cho b?n. G?i s? [phone].         [facility] complies with applicable Federal civil rights laws and does not discriminate on the basis of race, color,  national origin, age, disability, or sex.

## 2017-05-01 NOTE — PATIENT INSTRUCTIONS
DESCRIPTION  Synvisc-One (hylan G-F 20) is an elastoviscous high molecular weight fluid containing hylan A and hylan B polymers produced from chicken lucio. Hylans are derivatives of hyaluronan (sodium hyaluronate). Hylan G-F 20 is unique in that the hyaluronan is chemically cross linked. Hyaluronan is a long-chain polymer containing repeating disaccharide units of Nk-mraewzfnley-X-acetylglucosamine.     INDICATIONS FOR USE  Synvisc-One is indicated for the treatment of pain in osteoarthritis (OA) of the knee in patients who have failed to respond adequately to conservative nonpharmacologic therapy and simple analgesics, e.g., acetaminophen.     Who is a candidate for Synvisc-One  Patients with knee osteoarthritis, who have tried diet, exercise and over-the-counter pain medication but still have pain, should talk to their doctor to see if Synvisc-One is right for them.     How Synvisc-One is administered  Synvisc-One is a single injection. It's a simple, in-office procedure that only takes a few minutes.     What you can expect following a Synvisc-One knee injection Synvisc-One can provide up to six months of osteoarthritis knee pain relief. Everyone responds differently, but in a medical study* patients experienced relief starting one month after the injection.     After the injection, you can resume normal day-to-day activities, but you should avoid any strenuous activities for about 48 hours.     Contraindication  Do not administer to patients with known hypersensitivity (allergy) to hyaluronan (sodium hyaluronate) preparations.   Do not inject Synvisc-One in the knees of patients having knee joint infections or skin diseases or infections in the area of theinjection site.    What are possible side effects?  Synvisc may occur short-term pain, swelling at the injection site and / or the appearance of synovial exudate after injection. In some cases, exudation may be significant and cause more prolonged pain.      Important Safety Information  Before trying Synvisc-One or SYNVISC, tell your doctor if you are allergic to products from birds - such as feathers, eggs or poultry - or if your leg is swollen or infected. Synvisc-One and SYNVISC are only for injection into the knee, performed by a doctor or other qualified health care professional. Synvisc-One and SYNVISC have not been tested to show pain relief in joints other than the knee. Talk to your doctor before resuming strenuous weight-bearing activities after treatment. Synvisc-One and SYNVISC have not been tested in children, pregnant women or women who are nursing. You should tell your doctor if you think you are pregnant or if you are nursing a child. The side effects most commonly seen when Synvisc-One or SYNVISC is injected into the knee were pain, swelling and/or fluid buildup in or around the knee. Cases where the swelling is extensive or painful should be discussed with your doctor. Allergic reactions such as rash and hives have been reported rarely.

## 2017-05-01 NOTE — PROGRESS NOTES
Subjective:          Chief Complaint: Alisia Gusman is a 71 y.o. female who had concerns including Follow-up of the Left Knee.    HPI Comments: Patient is here for a follow up for her left knee. She is doing okay. She has noticed increased stiffness and pain. She recently increased her distance of walking in the lap pool at [place]. She was walking for 2 hours at a time. It got a little better when she went back to one hour at a time but she is still having pain.     DATE OF PROCEDURE:  11/04/2014  ATTENDING SURGEON:  Pauly Chirinos M.D.  FIRST ASSISTANT:  Raheem Garcia M.D. (RES)  SECOND ASSISTANT:  Karen Crocker.    PREOPERATIVE DIAGNOSES:  1.  Left knee anterior interval scar.  2.  Left knee intraarticular adhesions.  3.  Left knee medial meniscus tear.  4.  Left knee arthrofibrosis.    POSTOPERATIVE DIAGNOSES:  1.  Left knee anterior interval scar.  2.  Left knee intraarticular adhesions.  3.  Left knee medial meniscus tear.  4.  Left knee arthrofibrosis.    PROCEDURES PERFORMED:  1.  Left knee arthroscopic anterior interval and lateral release.  2.  Left knee arthroscopic lysis of adhesions.  3.  Left knee arthroscopic medial meniscectomy.  4.  Left knee arthroscopic manipulation under anesthesia.         DATE OF PROCEDURE:  06/17/2014  ATTENDING SURGEON:  Pauly Chirinos M.D.  FIRST ASSISTANT:  Kellee Moe PA-C.    PREOPERATIVE DIAGNOSIS:  Left knee osteoarthritis.  POSTOPERATIVE DIAGNOSIS:  Left knee osteoarthritis.    OPERATIVE PROCEDURES PERFORMED:  1.  Left total knee replacement.  2.  Left femoral autologous bone grafting.         Handedness: right-handed  Sport played:    Level:            Knee Pain    Associated symptoms include stiffness. Pertinent negatives include no numbness. There is no history of gout.       Review of Systems   Constitution: Negative for chills, decreased appetite, diaphoresis, weakness, malaise/fatigue, night sweats, weight gain and weight loss.   Eyes: Negative for blurred  vision, double vision and pain.   Cardiovascular: Negative for chest pain, cyanosis, dyspnea on exertion, leg swelling, orthopnea and palpitations.   Respiratory: Negative for hemoptysis, shortness of breath and wheezing.    Skin: Negative for color change and rash.   Musculoskeletal: Positive for joint pain, joint swelling and stiffness. Negative for arthritis, back pain, falls, gout, muscle cramps, muscle weakness and myalgias.   Gastrointestinal: Positive for heartburn. Negative for hematemesis, hematochezia, melena, nausea and vomiting.   Genitourinary: Negative for dysuria, frequency and hematuria.   Neurological: Negative for dizziness, light-headedness, loss of balance and numbness.   Psychiatric/Behavioral: Negative for altered mental status, depression and memory loss. The patient does not have insomnia and is not nervous/anxious.        Pain Related Questions  Over the past 3 days, what was your average pain during activity? (I.e. running, jogging, walking, climbing stairs, getting dressed, ect.): 3  Over the past 3 days, what was your highest pain level?: 3  Over the past 3 days, what was your lowest pain level? : 3    Other  How many nights a week are you awakened by your affected body part?: 0  Was the patient's HEIGHT measured or patient reported?: Patient Reported  Was the patient's WEIGHT measured or patient reported?: Measured      Objective:        General: Alisia is well-developed, well-nourished, appears stated age, in no acute distress, alert and oriented to time, place and person.     General    Vitals reviewed.  Constitutional: She is oriented to person, place, and time. She appears well-developed and well-nourished. No distress.   HENT:   Head: Normocephalic and atraumatic.   Mouth/Throat: No oropharyngeal exudate.   Eyes: Right eye exhibits no discharge. Left eye exhibits no discharge.   Neck: Normal range of motion. No tracheal deviation present.   Cardiovascular: Normal rate and intact  distal pulses.    Pulmonary/Chest: Effort normal and breath sounds normal. No respiratory distress.   Abdominal: She exhibits no distension.   Neurological: She is alert and oriented to person, place, and time. She has normal reflexes. No cranial nerve deficit. She exhibits normal muscle tone. Coordination normal.   Psychiatric: She has a normal mood and affect. Her behavior is normal. Judgment and thought content normal.     General Musculoskeletal Exam   Gait: normal       Right Knee Exam     Inspection   Erythema: absent  Scars: absent  Swelling: absent  Effusion: effusion  Deformity: deformity  Bruising: absent    Tenderness   The patient is experiencing no tenderness.         Range of Motion   Extension: 0   Flexion: 130     Tests   Meniscus   Kris:  Medial - negative Lateral - negative  Ligament Examination Lachman: normal (-1 to 2mm) PCL-Posterior Drawer: normal (0 to 2mm)     MCL - Valgus: normal (0 to 2mm)  LCL - Varus: normalPivot Shift: normal (Equal)Reverse Pivot Shift: normal (Equal)Dial Test at 30 degrees: normal (< 5 degrees)Dial Test at 90 degrees: normal (< 5 degrees)  Posterior Sag Test: negative  Posterolateral Corner: unstable (>15 degrees difference)  Patella   Patellar Apprehension: negative  Passive Patellar Tilt: neutral  Patellar Tracking: normal  Patellar Glide (quadrants): Lateral - 1   Medial - 2  Q-Angle at 90 degrees: normal  Patellar Grind: negative  J-Sign: none    Other   Meniscal Cyst: absent  Popliteal (Baker's) Cyst: absent  Sensation: normal    Left Knee Exam     Inspection   Erythema: absent  Scars: present  Swelling: absent  Effusion: absent  Deformity: deformity  Bruising: absent    Tenderness   The patient tender to palpation of the medial retinaculum (mild with terminal flexion).    Range of Motion   Extension: 0   Flexion: 130     Tests   Meniscus   Kris:  Medial - negative Lateral - negative  Stability Lachman: normal (-1 to 2mm) PCL-Posterior Drawer: normal (0 to  2mm)  MCL - Valgus: normal (0 to 2mm)  LCL - Varus: normal (0 to 2mm)Pivot Shift: normal (Equal)Reverse Pivot Shift: normal (Equal)Dial Test at 30 degrees: normal (< 5 degrees)Dial Test at 90 degrees: normal (< 5 degrees)  Posterior Sag Test: negative  Posterolateral Corner: unstable (>15 degrees difference)  Patella   Patellar Apprehension: negative  Passive Patellar Tilt: neutral  Patellar Tracking: normal  Patellar Glide (Quadrants): Lateral - 1 Medial - 2  Q-Angle at 90 degrees: normal  Patellar Grind: negative  J-Sign: J sign absent    Other   Meniscal Cyst: absent  Popliteal (Baker's) Cyst: absent  Muscle Tightness: hamstring tightness  Sensation: normal    Comments:  Mild quad atrophy        Right Hip Exam     Tests   Zenaida: negative  Left Hip Exam     Tests   Zenaida: negative          Muscle Strength   Right Lower Extremity   Hip Abduction: 5/5   Quadriceps:  5/5   Hamstrin/5   Left Lower Extremity   Hip Abduction: 5/5   Quadriceps:  4/5   Hamstrin/5     Reflexes     Left Side  Quadriceps:  2+  Achilles:  2+    Right Side   Quadriceps:  2+  Achilles:  2+    Vascular Exam     Right Pulses  Dorsalis Pedis:      2+  Posterior Tibial:      2+        Left Pulses  Dorsalis Pedis:      2+  Posterior Tibial:      2+        Edema  Right Lower Leg: absent  Left Lower Leg: absent      RADIOGRAPHS TODAY: Radiographs ordered and reviewed today in clinic of the bilateral knee demonstrates Complete bone on bone loss at the medial compartment right knee with medial greater than lateral disease noted; spurring at the medial/lateral joint lines; mild varus KL 3 deformity noted.   Left TKR - Depuy in good position with maintenance of alignment and reduction.            Assessment:       Encounter Diagnoses   Name Primary?    Left knee pain, unspecified chronicity Yes    Knee internal derangement, left     Genu varum of right lower extremity     Traumatic arthritis of right knee           Plan:       1. IKDC, SF-12  and KOOS was filled out today in clinic.     RTC in 6 months with Mid-level provider Patient will fill out IKDC, SF-12 and KOOS and bilateral knee series on return.    2. Continue with activities as tolerated    3. Meloxicam 15mg qday prescribed today                            Patient questionnaires may have been collected.

## 2017-05-16 ENCOUNTER — OFFICE VISIT (OUTPATIENT)
Dept: INTERNAL MEDICINE | Facility: CLINIC | Age: 72
End: 2017-05-16
Payer: MEDICARE

## 2017-05-16 VITALS
DIASTOLIC BLOOD PRESSURE: 66 MMHG | HEART RATE: 86 BPM | BODY MASS INDEX: 32.81 KG/M2 | WEIGHT: 178.31 LBS | RESPIRATION RATE: 17 BRPM | SYSTOLIC BLOOD PRESSURE: 126 MMHG | TEMPERATURE: 98 F | HEIGHT: 62 IN

## 2017-05-16 DIAGNOSIS — R27.9 LACK OF COORDINATION: ICD-10-CM

## 2017-05-16 DIAGNOSIS — E11.69 HYPERLIPIDEMIA ASSOCIATED WITH TYPE 2 DIABETES MELLITUS: ICD-10-CM

## 2017-05-16 DIAGNOSIS — E11.59 HYPERTENSION ASSOCIATED WITH DIABETES: ICD-10-CM

## 2017-05-16 DIAGNOSIS — E11.42 DIABETIC PERIPHERAL NEUROPATHY: ICD-10-CM

## 2017-05-16 DIAGNOSIS — E78.5 HYPERLIPIDEMIA ASSOCIATED WITH TYPE 2 DIABETES MELLITUS: ICD-10-CM

## 2017-05-16 DIAGNOSIS — E66.9 DIABETES MELLITUS TYPE 2 IN OBESE: Primary | ICD-10-CM

## 2017-05-16 DIAGNOSIS — I15.2 HYPERTENSION ASSOCIATED WITH DIABETES: ICD-10-CM

## 2017-05-16 DIAGNOSIS — E11.69 DIABETES MELLITUS TYPE 2 IN OBESE: Primary | ICD-10-CM

## 2017-05-16 DIAGNOSIS — L21.9 SEBORRHEIC DERMATITIS OF SCALP: ICD-10-CM

## 2017-05-16 DIAGNOSIS — L81.9 HYPERPIGMENTATION: ICD-10-CM

## 2017-05-16 DIAGNOSIS — K21.9 GASTROESOPHAGEAL REFLUX DISEASE, ESOPHAGITIS PRESENCE NOT SPECIFIED: ICD-10-CM

## 2017-05-16 PROCEDURE — 1126F AMNT PAIN NOTED NONE PRSNT: CPT | Mod: S$GLB,,, | Performed by: INTERNAL MEDICINE

## 2017-05-16 PROCEDURE — 1160F RVW MEDS BY RX/DR IN RCRD: CPT | Mod: S$GLB,,, | Performed by: INTERNAL MEDICINE

## 2017-05-16 PROCEDURE — 3078F DIAST BP <80 MM HG: CPT | Mod: S$GLB,,, | Performed by: INTERNAL MEDICINE

## 2017-05-16 PROCEDURE — 99214 OFFICE O/P EST MOD 30 MIN: CPT | Mod: S$GLB,,, | Performed by: INTERNAL MEDICINE

## 2017-05-16 PROCEDURE — 99999 PR PBB SHADOW E&M-EST. PATIENT-LVL III: CPT | Mod: PBBFAC,,, | Performed by: INTERNAL MEDICINE

## 2017-05-16 PROCEDURE — 3045F PR MOST RECENT HEMOGLOBIN A1C LEVEL 7.0-9.0%: CPT | Mod: S$GLB,,, | Performed by: INTERNAL MEDICINE

## 2017-05-16 PROCEDURE — 4010F ACE/ARB THERAPY RXD/TAKEN: CPT | Mod: S$GLB,,, | Performed by: INTERNAL MEDICINE

## 2017-05-16 PROCEDURE — 1159F MED LIST DOCD IN RCRD: CPT | Mod: S$GLB,,, | Performed by: INTERNAL MEDICINE

## 2017-05-16 PROCEDURE — 99499 UNLISTED E&M SERVICE: CPT | Mod: S$GLB,,, | Performed by: INTERNAL MEDICINE

## 2017-05-16 PROCEDURE — 3074F SYST BP LT 130 MM HG: CPT | Mod: S$GLB,,, | Performed by: INTERNAL MEDICINE

## 2017-05-16 RX ORDER — GABAPENTIN 300 MG/1
CAPSULE ORAL
Qty: 360 CAPSULE | Refills: 3 | Status: SHIPPED | OUTPATIENT
Start: 2017-05-16 | End: 2018-01-03 | Stop reason: SDUPTHER

## 2017-05-16 RX ORDER — KETOCONAZOLE 20 MG/ML
SHAMPOO, SUSPENSION TOPICAL
Qty: 120 ML | Refills: 0 | Status: SHIPPED | OUTPATIENT
Start: 2017-05-18 | End: 2017-06-14

## 2017-05-16 RX ORDER — ALBUTEROL SULFATE 90 UG/1
1-2 AEROSOL, METERED RESPIRATORY (INHALATION) EVERY 6 HOURS PRN
Qty: 3 INHALER | Refills: 11 | Status: SHIPPED | OUTPATIENT
Start: 2017-05-16 | End: 2017-07-21

## 2017-05-16 RX ORDER — CLOTRIMAZOLE AND BETAMETHASONE DIPROPIONATE 10; .64 MG/G; MG/G
CREAM TOPICAL 2 TIMES DAILY
Qty: 45 G | Refills: 0 | Status: SHIPPED | OUTPATIENT
Start: 2017-05-16 | End: 2017-06-14

## 2017-05-16 NOTE — PATIENT INSTRUCTIONS
Start alpha lipoic over the counter daily to help with burning sensation in the feet.    Increase regular insulin to 6 units prior to dinner.

## 2017-05-16 NOTE — MR AVS SNAPSHOT
[facility]  [address]  [facility] [address]  Phone: [phone]  Fax: [phone]                  Alisia Gusman   2017 9:00 AM   Office Visit    Description:  Female : 1945   Provider:  Selena Montemayor MD   Department:  [facility]           Reason for Visit     Hyperlipidemia     Hypertension           Diagnoses this Visit        Comments    Diabetes mellitus type 2 in obese    -  Primary     Gastroesophageal reflux disease, esophagitis presence not specified         Hyperlipidemia associated with type 2 diabetes mellitus         Hypertension associated with diabetes         Diabetic peripheral neuropathy         Lack of coordination         Hyperpigmentation         Seborrheic dermatitis of scalp                To Do List           Future Appointments        Provider Department Dept Phone    2017 8:00 AM LAB, APPOINTMENT [facility] [phone]    2017 8:30 AM MD [facility] - Endo/Diab/Metab [phone]      Goals (5 Years of Data)     None      Follow-Up and Disposition     Return in about 4 months (around 2017).       These Medications        Disp Refills Start End    ranitidine (ZANTAC) 150 MG tablet 180 tablet 3 2017    Take 1 tablet (150 mg total) by mouth 2 (two) times daily. - Oral    Pharmacy: [facility] - [address] Ph #: [phone]       gabapentin (NEURONTIN) 300 MG capsule 360 capsule 3 2017     300mg in AM, 300mg in PM and 600mg at bedtime    Pharmacy: [facility] - [address] Ph #: [phone]       albuterol 90 mcg/actuation inhaler 3 Inhaler 11 2017     Inhale 1-2 puffs into the lungs every 6 (six) hours as needed for Wheezing or Shortness of Breath. Rescue - Inhalation    Pharmacy: [facility] - [address] Ph #:  [phone]       insulin regular 100 unit/mL Inj injection 10 mL 0 5/16/2017 5/16/2018    Inject 6 Units into the skin before dinner. RELION brand - Subcutaneous    Pharmacy: [facility] - [facility] [address] Ph #: [phone]       clotrimazole-betamethasone 1-0.05% (LOTRISONE) cream 45 g 0 5/16/2017 6/15/2017    Apply topically 2 (two) times daily. - Topical (Top)    Pharmacy: [facility] - [facility] [address] Ph #: [phone]       ketoconazole (NIZORAL) 2 % shampoo 120 mL 0 5/18/2017     Apply topically twice a week. - Topical (Top)    Pharmacy: [facility] [address] Ph #: [phone]         [facility]     [facility] Care Line - 24/7 Assistance  Unless otherwise directed by your provider, please contact [facility], our nurse care line that is available for 24/7 assistance.     Registered nurses in the [facility] Center provide: appointment scheduling, clinical advisement, health education, and other advisory services.  Call: [phone] (toll free)               Medications           Message regarding Medications     Verify the changes and/or additions to your medication regime listed below are the same as discussed with your clinician today.  If any of these changes or additions are incorrect, please notify your healthcare provider.        START taking these NEW medications        Refills    albuterol 90 mcg/actuation inhaler 11    Sig: Inhale 1-2 puffs into the lungs every 6 (six) hours as needed for Wheezing or Shortness of Breath. Rescue    Class: Normal    Route: Inhalation    clotrimazole-betamethasone 1-0.05% (LOTRISONE) cream 0    Sig: Apply topically 2 (two) times daily.    Class: Print    Route: Topical (Top)    ketoconazole (NIZORAL) 2 % shampoo 0    Starting on: 5/18/2017    Sig: Apply topically twice a week.    Class: Print    Route: Topical (Top)     "  CHANGE how you are taking these medications     Start Taking Instead of    insulin regular 100 unit/mL Inj injection insulin regular 100 unit/mL Inj injection    Dosage:  Inject 6 Units into the skin before dinner. RELION brand Dosage:  Inject 4 Units into the skin 3 (three) times daily before meals. RELION brand    Reason for Change:  Reorder            Verify that the below list of medications is an accurate representation of the medications you are currently taking.  If none reported, the list may be blank. If incorrect, please contact your healthcare provider. Carry this list with you in case of emergency.           Current Medications     ACCU-CHEK DOMENICO PLUS TEST STRP Strp USE THREE TIMES DAILY AS DIRECTED    ACCU-CHEK SOFTCLIX LANCETS Misc 1 each by Misc.(Non-Drug; Combo Route) route 3 (three) times daily.    aspirin 81 MG chewable tablet Take 1 tablet by mouth At bedtime.    BD ALCOHOL SWABS PadM 1 each 2 (two) times daily.    calcium carbonate-vitamin D3 (CALTRATE 600 + D) 600 mg(1,500mg) -400 unit Chew 1 tablet once daily.     cinnamon bark 500 mg capsule Take 500 mg by mouth 3 (three) times daily.    cyanocobalamin (VITAMIN B-12) 1000 MCG tablet Take 100 mcg by mouth once daily.    fluticasone (FLONASE) 50 mcg/actuation nasal spray 2 sprays by Each Nare route once daily.    FOLIC ACID/MULTIVIT-MIN/LUTEIN (CENTRUM SILVER ORAL) Take 1 tablet by mouth once daily.    gabapentin (NEURONTIN) 300 MG capsule 300mg in AM, 300mg in PM and 600mg at bedtime    glimepiride (AMARYL) 4 MG tablet Take 0.5 tablets (2 mg total) by mouth before breakfast.    insulin glargine (LANTUS SOLOSTAR) 100 unit/mL (3 mL) InPn pen Inject 22 Units into the skin once daily.    insulin regular 100 unit/mL Inj injection Inject 6 Units into the skin before dinner. RELION brand    insulin syringe-needle U-100 (BD INSULIN SYRINGE ULTRA-FINE) 0.3 mL 31 gauge x 15/64" Syrg 1 each by Misc.(Non-Drug; Combo Route) route 2 (two) times daily.    " "insulin syringe-needle U-100 (BD INSULIN SYRINGE ULTRA-FINE) 1/2 mL 31 gauge x 15/64" Syrg 3 each by Misc.(Non-Drug; Combo Route) route 3 (three) times daily.    loratadine (CLARITIN) 10 mg tablet Take 1 tablet (10 mg total) by mouth once daily.    losartan-hydrochlorothiazide 50-12.5 mg (HYZAAR) 50-12.5 mg per tablet Take 1 tablet by mouth once daily.    meloxicam (MOBIC) 15 MG tablet Take 1 tablet (15 mg total) by mouth once daily.    metformin (GLUCOPHAGE-XR) 750 MG 24 hr tablet Take 1 tablet (750 mg total) by mouth 2 (two) times daily with meals.    pen needle, diabetic (BD ULTRA-FINE SUSIE PEN NEEDLES) 32 gauge x 5/32" Ndle For one time daily injection    potassium chloride (KLOR-CON) 10 MEQ TbSR TAKE 1 TABLET ONE TIME DAILY    pravastatin (PRAVACHOL) 40 MG tablet TAKE 1 TABLET (40 MG TOTAL) BY MOUTH NIGHTLY.    ranitidine (ZANTAC) 150 MG tablet Take 1 tablet (150 mg total) by mouth 2 (two) times daily.    albuterol 90 mcg/actuation inhaler Inhale 1-2 puffs into the lungs every 6 (six) hours as needed for Wheezing or Shortness of Breath. Rescue    clotrimazole-betamethasone 1-0.05% (LOTRISONE) cream Apply topically 2 (two) times daily.    ketoconazole (NIZORAL) 2 % shampoo Starting on May 18, 2017. Apply topically twice a week.           Clinical Reference Information           Your Vitals Were     BP Pulse Temp Resp Height Weight    126/66 86 97.9 °F (36.6 °C) (Oral) 17 5' 2" (1.575 m) 80.9 kg (178 lb 4.8 oz)    BMI                32.61 kg/m2          Blood Pressure          Most Recent Value    BP  126/66      Allergies as of 5/16/2017     Nubain [Nalbuphine]    Grass Pollen-tim Grass Standard    Sinus & Allergy [Chlorpheniramine-phenylephrine]      Immunizations Administered on Date of Encounter - 5/16/2017     None      Orders Placed During Today's Visit     Future Labs/Procedures Expected by Expires    Lipid panel  5/16/2017 7/15/2018    Vitamin B12  5/16/2017 7/15/2018      [facility] Sign-Up     " Activating your [facility] account is as easy as 1-2-3!     1) Visit [email or web address], select Sign Up Now, enter this activation code and your date of birth, then select Next.  [email or web address]  Expires: 5/21/2017 12:17 PM      2) Create a username and password to use when you visit [facility] in the future and select a security question in case you lose your password and select Next.    3) Enter your e-mail address and click Sign Up!    Additional Information  If you have questions, please e-mail [email or web address] or call [phone] to talk to our [facility] staff. Remember, [facility] is NOT to be used for urgent needs. For medical emergencies, dial 911.         Instructions    Start alpha lipoic over the counter daily to help with burning sensation in the feet.    Increase regular insulin to 6 units prior to dinner.        Language Assistance Services     ATTENTION: Language assistance services are available, free of charge. Please call [phone].      ATENCIÓN: Si habla nissa, tiene a salguero disposición servicios gratuitos de asistencia lingüística. Llame al [phone].     MARC Ý: N?u b?n nói Ti?ng Vi?t, có các d?ch v? h? tr? ngôn ng? mi?n phí dành cho b?n. G?i s? [phone].         [facility] complies with applicable Federal civil rights laws and does not discriminate on the basis of race, color, national origin, age, disability, or sex.

## 2017-06-07 ENCOUNTER — LAB VISIT (OUTPATIENT)
Dept: LAB | Facility: HOSPITAL | Age: 72
End: 2017-06-07
Attending: INTERNAL MEDICINE
Payer: MEDICARE

## 2017-06-07 DIAGNOSIS — R63.4 WEIGHT LOSS: ICD-10-CM

## 2017-06-07 DIAGNOSIS — L74.9 SWEATING ABNORMALITY: ICD-10-CM

## 2017-06-07 LAB
ALBUMIN SERPL BCP-MCNC: 3.4 G/DL
ALP SERPL-CCNC: 50 U/L
ALT SERPL W/O P-5'-P-CCNC: 13 U/L
ANION GAP SERPL CALC-SCNC: 11 MMOL/L
AST SERPL-CCNC: 18 U/L
BILIRUB SERPL-MCNC: 0.8 MG/DL
BUN SERPL-MCNC: 15 MG/DL
CALCIUM SERPL-MCNC: 9.8 MG/DL
CHLORIDE SERPL-SCNC: 107 MMOL/L
CO2 SERPL-SCNC: 25 MMOL/L
CREAT SERPL-MCNC: 0.9 MG/DL
EST. GFR  (AFRICAN AMERICAN): >60 ML/MIN/1.73 M^2
EST. GFR  (NON AFRICAN AMERICAN): >60 ML/MIN/1.73 M^2
GLUCOSE SERPL-MCNC: 139 MG/DL
POTASSIUM SERPL-SCNC: 4.1 MMOL/L
PROT SERPL-MCNC: 6.8 G/DL
SODIUM SERPL-SCNC: 143 MMOL/L
TSH SERPL DL<=0.005 MIU/L-ACNC: 1.94 UIU/ML

## 2017-06-07 PROCEDURE — 84443 ASSAY THYROID STIM HORMONE: CPT

## 2017-06-07 PROCEDURE — 83036 HEMOGLOBIN GLYCOSYLATED A1C: CPT

## 2017-06-07 PROCEDURE — 36415 COLL VENOUS BLD VENIPUNCTURE: CPT

## 2017-06-07 PROCEDURE — 80053 COMPREHEN METABOLIC PANEL: CPT

## 2017-06-08 LAB
ESTIMATED AVG GLUCOSE: 180 MG/DL
HBA1C MFR BLD HPLC: 7.9 %

## 2017-06-11 ENCOUNTER — TELEPHONE (OUTPATIENT)
Dept: INTERNAL MEDICINE | Facility: CLINIC | Age: 72
End: 2017-06-11

## 2017-06-12 NOTE — TELEPHONE ENCOUNTER
Please call and let pt know that her thyroid level and liver and kidney functions are normal. Her a1c is 7.9. Follow up with Dr. Nelson as scheduled. Please make sure pt does go in for fasting labs for her lipid panel and I'd also like to check her b12. Those are ordered. Please schedule.

## 2017-06-14 ENCOUNTER — OFFICE VISIT (OUTPATIENT)
Dept: ENDOCRINOLOGY | Facility: CLINIC | Age: 72
End: 2017-06-14
Payer: MEDICARE

## 2017-06-14 VITALS
SYSTOLIC BLOOD PRESSURE: 137 MMHG | WEIGHT: 178 LBS | DIASTOLIC BLOOD PRESSURE: 76 MMHG | BODY MASS INDEX: 32.76 KG/M2 | HEIGHT: 62 IN | HEART RATE: 89 BPM

## 2017-06-14 DIAGNOSIS — Z79.4 ENCOUNTER FOR LONG-TERM (CURRENT) USE OF INSULIN: ICD-10-CM

## 2017-06-14 DIAGNOSIS — I10 ESSENTIAL HYPERTENSION: ICD-10-CM

## 2017-06-14 PROCEDURE — 99999 PR PBB SHADOW E&M-EST. PATIENT-LVL III: CPT | Mod: PBBFAC,,, | Performed by: INTERNAL MEDICINE

## 2017-06-14 PROCEDURE — 3045F PR MOST RECENT HEMOGLOBIN A1C LEVEL 7.0-9.0%: CPT | Mod: S$GLB,,, | Performed by: INTERNAL MEDICINE

## 2017-06-14 PROCEDURE — 4010F ACE/ARB THERAPY RXD/TAKEN: CPT | Mod: S$GLB,,, | Performed by: INTERNAL MEDICINE

## 2017-06-14 PROCEDURE — 1126F AMNT PAIN NOTED NONE PRSNT: CPT | Mod: S$GLB,,, | Performed by: INTERNAL MEDICINE

## 2017-06-14 PROCEDURE — 99214 OFFICE O/P EST MOD 30 MIN: CPT | Mod: S$GLB,,, | Performed by: INTERNAL MEDICINE

## 2017-06-14 PROCEDURE — 99499 UNLISTED E&M SERVICE: CPT | Mod: S$GLB,,, | Performed by: INTERNAL MEDICINE

## 2017-06-14 PROCEDURE — 1159F MED LIST DOCD IN RCRD: CPT | Mod: S$GLB,,, | Performed by: INTERNAL MEDICINE

## 2017-06-14 NOTE — PROGRESS NOTES
Subjective:     Patient ID: Alisia Gusman is a 71 y.o. female.    Chief Complaint: Diabetes Mellitus    HPI:   Ms. Gusman is a 71 y.o. female who is here for a follow-up visit for evaluation type 2 diabetes that is reasonably well controlled.     Exercise class and then likes to go in the pool to walk in the water.     Breakfast: two strips of doty, two slices of toast and half a glass of milk  Lunch: half a bag of chips and sandwich severino, mustard, turkey breast   Dinner: string beans, potato (small amount), meat loaf and two tbsp of rice    Fastin, 160, 135, 127, 164, 159, 132, 139, 124  Before lunch: 188, 123, 117, 124, 261, 154, 122, 141  Before dinner: 151, 315, (ice cream), 167, 214, 216, 179, 131  Bedtime: x, 154, 230, 171, 157, 210, 183, 174, 169    Reports higher /81, 102/62, 135/64, 136/75 last week    Regimen:   Relion R 6 units before meals  Lantus 22 units before bedtime (usually at 9)  Glimepiride 4 mg HALF a tablet daily   Metformin 750 mg XR two tablets a day - has significant diarrhea daily after breakfast.      Takes ranitidine 150 mg one tablet twice a day    Also feels overheated during the day, does not feel like a hot flush and does not occur with exposure to sun (she avoids), or spicy/hot foods.      Eye exam is uptodate    Review of Systems   Constitutional: Negative for chills and fever.   HENT: Negative for congestion and sinus pressure.    Eyes: Negative for visual disturbance.   Respiratory: Negative for chest tightness and shortness of breath.    Cardiovascular: Negative for chest pain, palpitations and leg swelling.   Gastrointestinal: Negative for abdominal pain and vomiting.   Genitourinary: Negative for dysuria.   Musculoskeletal: Negative for arthralgias.   Skin: Negative for rash.   Neurological: Negative for weakness.   Hematological: Does not bruise/bleed easily.   Psychiatric/Behavioral: Negative for sleep disturbance.        Objective:     Physical Exam    Vitals:  "   06/14/17 0833   BP: 137/76   BP Location: Right arm   Patient Position: Sitting   BP Method: Automatic   Pulse: 89   Weight: 80.7 kg (178 lb)   Height: 5' 2" (1.575 m)     Hemoglobin A1C   Date Value Ref Range Status   06/07/2017 7.9 (H) 4.5 - 6.2 % Final     Comment:     According to ADA guidelines, hemoglobin A1C <7.0% represents  optimal control in non-pregnant diabetic patients.  Different  metrics may apply to specific populations.   Standards of Medical Care in Diabetes - 2016.  For the purpose of screening for the presence of diabetes:  <5.7%     Consistent with the absence of diabetes  5.7-6.4%  Consistent with increasing risk for diabetes   (prediabetes)  >or=6.5%  Consistent with diabetes  Currently no consensus exists for use of hemoglobin A1C  for diagnosis of diabetes for children.     02/01/2017 8.0 (H) 4.5 - 6.2 % Final     Comment:     According to ADA guidelines, hemoglobin A1C <7.0% represents  optimal control in non-pregnant diabetic patients.  Different  metrics may apply to specific populations.   Standards of Medical Care in Diabetes - 2016.  For the purpose of screening for the presence of diabetes:  <5.7%     Consistent with the absence of diabetes  5.7-6.4%  Consistent with increasing risk for diabetes   (prediabetes)  >or=6.5%  Consistent with diabetes  Currently no consensus exists for use of hemoglobin A1C  for diagnosis of diabetes for children.     11/03/2016 8.4 (H) 4.5 - 6.2 % Final     Comment:     According to ADA guidelines, hemoglobin A1C <7.0% represents  optimal control in non-pregnant diabetic patients.  Different  metrics may apply to specific populations.   Standards of Medical Care in Diabetes - 2016.  For the purpose of screening for the presence of diabetes:  <5.7%     Consistent with the absence of diabetes  5.7-6.4%  Consistent with increasing risk for diabetes   (prediabetes)  >or=6.5%  Consistent with diabetes  Currently no consensus exists for use of hemoglobin " A1C  for diagnosis of diabetes for children.       Results for CINDA TATUM (MRN [id number]) as of 6/14/2017 09:10   Ref. Range 4/6/2017 12:17   Microalbum.,U,Random Latest Units: ug/mL 7.0   Microalb Creat Ratio Latest Ref Range: 0.0 - 30.0 ug/mg 9.5     4/2017 BONE MINERAL DENSITY RESULTS:  Lumbar Spine: Lumbar bone mineral density L1-L4 is 0.942g/cm2, which is a t-score of -1.0. The z-score is 0.7.    Total Hip: The total hip bone mineral density is 0.777g/cm2.  The t-score is -1.3, and the z-score is -0.4.  Femoral neck BMD is 0.741g/cm2 and the t-score is -1.0.    Assessment/Plan:     1. Type 2 diabetes, uncontrolled, with neuropathy  - has improved from 8.4% to 7.9% in the past eight months  - excellent dietary and exercise compliance.   - cut back on metformin 750 mg XR one tablet daily due to diarrhea    2. Encounter for long-term (current) use of insulin  - continue Relion 6 units before meals  - Lantus 23 units at bedtime to avoid fasting BG in the 160s.   - very close to goal, (ideally about 7.5%)  - will send log in a few weeks.     3. Essential hypertension  - reviewed BP readings from a few weeks ago, had several episodes of BP that was elevated, unclear if related to ibuprofen use.   - for now will monitor, BP in the office is acceptable  - will continue to monitor and if needed adjust mendicatins    PLAN:  F/u in six months  Labs one week before visit.

## 2017-06-14 NOTE — PATIENT INSTRUCTIONS
Lantus 23 units at bedtime, since we are cutting back on the metformin to one a day, you may need more lantus. OK to increase to 25 units at night if you find your BG in the morning are >140 - 160.   Continue insulin R 6 units before meals (about half an hour)    Send log in about a week or two.

## 2017-06-15 ENCOUNTER — LAB VISIT (OUTPATIENT)
Dept: LAB | Facility: HOSPITAL | Age: 72
End: 2017-06-15
Attending: INTERNAL MEDICINE
Payer: MEDICARE

## 2017-06-15 DIAGNOSIS — E11.59 HYPERTENSION ASSOCIATED WITH DIABETES: ICD-10-CM

## 2017-06-15 DIAGNOSIS — R27.9 LACK OF COORDINATION: ICD-10-CM

## 2017-06-15 DIAGNOSIS — E11.42 DIABETIC PERIPHERAL NEUROPATHY: ICD-10-CM

## 2017-06-15 DIAGNOSIS — E11.69 HYPERLIPIDEMIA ASSOCIATED WITH TYPE 2 DIABETES MELLITUS: ICD-10-CM

## 2017-06-15 DIAGNOSIS — E78.5 HYPERLIPIDEMIA ASSOCIATED WITH TYPE 2 DIABETES MELLITUS: ICD-10-CM

## 2017-06-15 DIAGNOSIS — I15.2 HYPERTENSION ASSOCIATED WITH DIABETES: ICD-10-CM

## 2017-06-15 LAB
CHOLEST/HDLC SERPL: 2.9 {RATIO}
HDL/CHOLESTEROL RATIO: 34.9 %
HDLC SERPL-MCNC: 169 MG/DL
HDLC SERPL-MCNC: 59 MG/DL
LDLC SERPL CALC-MCNC: 59.4 MG/DL
NONHDLC SERPL-MCNC: 110 MG/DL
TRIGL SERPL-MCNC: 253 MG/DL
VIT B12 SERPL-MCNC: 1144 PG/ML

## 2017-06-15 PROCEDURE — 80061 LIPID PANEL: CPT

## 2017-06-15 PROCEDURE — 36415 COLL VENOUS BLD VENIPUNCTURE: CPT

## 2017-06-15 PROCEDURE — 82607 VITAMIN B-12: CPT

## 2017-07-19 ENCOUNTER — OFFICE VISIT (OUTPATIENT)
Dept: PODIATRY | Facility: CLINIC | Age: 72
End: 2017-07-19
Payer: MEDICARE

## 2017-07-19 VITALS
WEIGHT: 172 LBS | HEART RATE: 88 BPM | HEIGHT: 62 IN | DIASTOLIC BLOOD PRESSURE: 77 MMHG | BODY MASS INDEX: 31.65 KG/M2 | SYSTOLIC BLOOD PRESSURE: 137 MMHG

## 2017-07-19 DIAGNOSIS — L84 CORN OR CALLUS: ICD-10-CM

## 2017-07-19 DIAGNOSIS — E11.49 TYPE II DIABETES MELLITUS WITH NEUROLOGICAL MANIFESTATIONS: Primary | ICD-10-CM

## 2017-07-19 DIAGNOSIS — B35.1 ONYCHOMYCOSIS DUE TO DERMATOPHYTE: ICD-10-CM

## 2017-07-19 PROCEDURE — 99999 PR PBB SHADOW E&M-EST. PATIENT-LVL III: CPT | Mod: PBBFAC,,, | Performed by: PODIATRIST

## 2017-07-19 PROCEDURE — 99499 UNLISTED E&M SERVICE: CPT | Mod: S$GLB,,, | Performed by: PODIATRIST

## 2017-07-19 PROCEDURE — 11721 DEBRIDE NAIL 6 OR MORE: CPT | Mod: 59,Q9,S$GLB, | Performed by: PODIATRIST

## 2017-07-19 PROCEDURE — 11056 PARNG/CUTG B9 HYPRKR LES 2-4: CPT | Mod: Q9,S$GLB,, | Performed by: PODIATRIST

## 2017-07-19 NOTE — PROGRESS NOTES
Subjective:      Patient ID: Alisia Gusman is a 71 y.o. female.    Chief Complaint: Diabetes Mellitus (pcp Dr DAMON 5/16/17); Diabetic Foot Exam; Nail Care; and Peripheral Neuropathy    Alisia is a 71 y.o. female who presents to the clinic for evaluation and treatment of high risk feet. Alisia has a past medical history of Allergy; Anemia; Arthritis; Asthma; Cancer (1993); Cataract; Chronic cervical radiculopathy (9/20/2012); Diabetes mellitus; Diabetes mellitus type II; Diabetes with neurologic complications; Diverticulosis; Dry eyes; Dysphagia; GERD (gastroesophageal reflux disease); Hyperlipidemia; Hypertension; Neuropathy; Scalp tenderness; Shortness of breath; and Ulcer. The patient's chief complaint is long, thick toenails. This patient has documented high risk feet requiring routine maintenance secondary to diabetes mellitis and those secondary complications of diabetes, as mentioned..    PCP: Selena Damon MD    Date Last Seen by PCP:   Chief Complaint   Patient presents with    Diabetes Mellitus     pcp Dr DAMON 5/16/17    Diabetic Foot Exam    Nail Care    Peripheral Neuropathy         Hemoglobin A1C   Date Value Ref Range Status   06/07/2017 7.9 (H) 4.5 - 6.2 % Final     Comment:     According to ADA guidelines, hemoglobin A1C <7.0% represents  optimal control in non-pregnant diabetic patients.  Different  metrics may apply to specific populations.   Standards of Medical Care in Diabetes - 2016.  For the purpose of screening for the presence of diabetes:  <5.7%     Consistent with the absence of diabetes  5.7-6.4%  Consistent with increasing risk for diabetes   (prediabetes)  >or=6.5%  Consistent with diabetes  Currently no consensus exists for use of hemoglobin A1C  for diagnosis of diabetes for children.     02/01/2017 8.0 (H) 4.5 - 6.2 % Final     Comment:     According to ADA guidelines, hemoglobin A1C <7.0% represents  optimal control in non-pregnant diabetic patients.  Different  metrics may apply to specific  "populations.   Standards of Medical Care in Diabetes - 2016.  For the purpose of screening for the presence of diabetes:  <5.7%     Consistent with the absence of diabetes  5.7-6.4%  Consistent with increasing risk for diabetes   (prediabetes)  >or=6.5%  Consistent with diabetes  Currently no consensus exists for use of hemoglobin A1C  for diagnosis of diabetes for children.     11/03/2016 8.4 (H) 4.5 - 6.2 % Final     Comment:     According to ADA guidelines, hemoglobin A1C <7.0% represents  optimal control in non-pregnant diabetic patients.  Different  metrics may apply to specific populations.   Standards of Medical Care in Diabetes - 2016.  For the purpose of screening for the presence of diabetes:  <5.7%     Consistent with the absence of diabetes  5.7-6.4%  Consistent with increasing risk for diabetes   (prediabetes)  >or=6.5%  Consistent with diabetes  Currently no consensus exists for use of hemoglobin A1C  for diagnosis of diabetes for children.         Review of Systems   Constitution: Negative for chills, decreased appetite and fever.   Cardiovascular: Negative for leg swelling.   Skin: Positive for dry skin and nail changes.   Musculoskeletal: Negative for arthritis, joint pain, joint swelling and myalgias.   Gastrointestinal: Negative for nausea and vomiting.   Neurological: Positive for numbness. Negative for loss of balance and paresthesias.           Objective:       Vitals:    07/19/17 0829   BP: 137/77   Pulse: 88   Weight: 78 kg (172 lb)   Height: 5' 2" (1.575 m)   PainSc: 0-No pain        Physical Exam   Constitutional: She is oriented to person, place, and time. She appears well-developed and well-nourished.   Cardiovascular:   Pulses:       Dorsalis pedis pulses are 2+ on the right side, and 2+ on the left side.        Posterior tibial pulses are 1+ on the right side, and 1+ on the left side.   Toes are cool to touch. Feet are warm proximally.There is decreased digital hair. Skin is atrophic, " slightly hyperpigmented, and mildly edematous       Musculoskeletal: Normal range of motion. She exhibits no edema or tenderness.   Adequate joint range of motion without pain, limitation, nor crepitation Bilateral feet and ankle joints. Muscle strength is 5/5 in all groups bilaterally.       reducible IPJ digital contracture 2-3 b/l     Neurological: She is alert and oriented to person, place, and time.   [place]-Leonel 5.07 monofilamant testing is diminished Vikash feet. Sharp/dull sensation diminished Bilaterally. Light touch absent Bilaterally.       Skin: Skin is warm and dry. No ecchymosis and no lesion noted. No erythema.   Nails x10 are elongated by  2-5mm's, thickened by 2-5 mm's, dystrophic, and are darkened in  coloration . Xerosis Bilaterally. No open lesions noted.    Hyperkeratotic tissue noted to distal second toes b/l      Psychiatric: She has a normal mood and affect. Her behavior is normal.             Assessment:       Encounter Diagnoses   Name Primary?    Type II diabetes mellitus with neurological manifestations Yes    Onychomycosis due to dermatophyte     Corn or callus          Plan:       Alisia was seen today for diabetes mellitus, diabetic foot exam, nail care and peripheral neuropathy.    Diagnoses and all orders for this visit:    Type II diabetes mellitus with neurological manifestations  -     DIABETIC SHOES FOR HOME USE    Onychomycosis due to dermatophyte  -     DIABETIC SHOES FOR HOME USE    Corn or callus  -     DIABETIC SHOES FOR HOME USE      I counseled the patient on her conditions, their implications and medical management.        - Shoe inspection. Diabetic Foot Education. Patient reminded of the importance of good nutrition and blood sugar control to help prevent podiatric complications of diabetes. Patient instructed on proper foot hygeine. We discussed wearing proper shoe gear, daily foot inspections, never walking without protective shoe gear, never putting sharp  instruments to feet, routine podiatric nail visits every 2-3 months.      - With patient's permission, nails were aggressively reduced and debrided x 10 to their soft tissue attachment mechanically and with electric , removing all offending nail and debris. Patient relates relief following the procedure. She will continue to monitor the areas daily, inspect her feet, wear protective shoe gear when ambulatory, moisturizer to maintain skin integrity and follow in this office in approximately 2-3 months, sooner p.r.n.    - After cleansing the  area w/ alcohol prep pad the above mentioned hyperkeratosis was trimmed utilizing No 15 scapel, to a smooth base with out incident. Patient tolerated this  well and reported comfort to the area of  distal second toes b/l     - Patient requires diabetic shoes due to high risk for ulceration and /or amputation. Patient was  dispensed a prescription for diabetic shoes with one pair of custom molded inserts. A list of potential orthotist  was also provided

## 2017-07-21 ENCOUNTER — OFFICE VISIT (OUTPATIENT)
Dept: INTERNAL MEDICINE | Facility: CLINIC | Age: 72
End: 2017-07-21
Payer: MEDICARE

## 2017-07-21 VITALS
TEMPERATURE: 98 F | WEIGHT: 178 LBS | DIASTOLIC BLOOD PRESSURE: 80 MMHG | HEART RATE: 78 BPM | RESPIRATION RATE: 18 BRPM | SYSTOLIC BLOOD PRESSURE: 124 MMHG | HEIGHT: 62 IN | BODY MASS INDEX: 32.76 KG/M2

## 2017-07-21 DIAGNOSIS — R09.82 POSTNASAL DRIP: ICD-10-CM

## 2017-07-21 DIAGNOSIS — I15.2 HYPERTENSION ASSOCIATED WITH DIABETES: ICD-10-CM

## 2017-07-21 DIAGNOSIS — R05.3 CHRONIC COUGH: Primary | ICD-10-CM

## 2017-07-21 DIAGNOSIS — E11.69 DIABETES MELLITUS TYPE 2 IN OBESE: ICD-10-CM

## 2017-07-21 DIAGNOSIS — E11.59 HYPERTENSION ASSOCIATED WITH DIABETES: ICD-10-CM

## 2017-07-21 DIAGNOSIS — E66.9 DIABETES MELLITUS TYPE 2 IN OBESE: ICD-10-CM

## 2017-07-21 PROCEDURE — 1126F AMNT PAIN NOTED NONE PRSNT: CPT | Mod: S$GLB,,, | Performed by: INTERNAL MEDICINE

## 2017-07-21 PROCEDURE — 99214 OFFICE O/P EST MOD 30 MIN: CPT | Mod: S$GLB,,, | Performed by: INTERNAL MEDICINE

## 2017-07-21 PROCEDURE — 3045F PR MOST RECENT HEMOGLOBIN A1C LEVEL 7.0-9.0%: CPT | Mod: S$GLB,,, | Performed by: INTERNAL MEDICINE

## 2017-07-21 PROCEDURE — 99999 PR PBB SHADOW E&M-EST. PATIENT-LVL III: CPT | Mod: PBBFAC,,, | Performed by: INTERNAL MEDICINE

## 2017-07-21 PROCEDURE — 1159F MED LIST DOCD IN RCRD: CPT | Mod: S$GLB,,, | Performed by: INTERNAL MEDICINE

## 2017-07-21 RX ORDER — HYDROCHLOROTHIAZIDE 25 MG/1
25 TABLET ORAL DAILY
Qty: 90 TABLET | Refills: 3 | Status: SHIPPED | OUTPATIENT
Start: 2017-07-21 | End: 2017-09-29 | Stop reason: SDUPTHER

## 2017-07-21 RX ORDER — FLUTICASONE PROPIONATE 50 MCG
2 SPRAY, SUSPENSION (ML) NASAL DAILY
Qty: 3 BOTTLE | Refills: 3 | Status: SHIPPED | OUTPATIENT
Start: 2017-07-21 | End: 2017-07-26

## 2017-07-21 NOTE — PROGRESS NOTES
"Subjective:       Patient ID: Alisia Gusman is a 71 y.o. female.    Chief Complaint: Shortness of Breath; Cough; and Fatigue    HPI urgent    Chronic cough (mild). On claritin.   Thinks she has walking pneumonia.   Difficulty sleeping and when she wakes up, hears a rattle in the chest. Cough (sometimes productive) is worse since the past week. Hasn't been to spa this week at all and usually there 4 days a week. Feels fatigued. No fevers/chills. Reports sweating a lot though. No rhinorrhea. Sometimes w/ postnasal drip.   On losartan-hctz 50-12.5mg daily.  Using flonase prn. Hasn't used it since Monday. Takes ranitidine BID.   PFT WNL 7/2013. Tried to rx albuterol in the past but it was too expensive.     DM2 - reports intermittently good and bad.     Review of Systems  No CP/palpitations.      Objective:      Physical Exam    /80   Pulse 78   Temp 97.9 °F (36.6 °C) (Oral)   Resp 18   Ht 5' 2" (1.575 m)   Wt 80.7 kg (178 lb)   BMI 32.56 kg/m²     GEN - A+OX4, NAD   HEENT - PERRL, EOMI, OP clear. MMM. TM dullness.  Neck - No thyromegaly or cervical LAD. No thyroid masses felt.  CV - RRR, no m/r   Chest - CTAB, no wheezing or rhonchi  Abd - S/NT/ND/+BS.   Ext - 2+B radial pulses. No LE edema.  Neuro - 2+ DTRs. Normal gait.   Skin - No rash.    Labs reviewed.    Assessment/Plan     Alisia was seen today for shortness of breath, cough and fatigue.    Diagnoses and all orders for this visit:    Chronic cough - stopped losartan to see if cough would resolve.  -     CT Chest Without Contrast; Future    Diabetes mellitus type 2 in obese - cont current regimen.    Postnasal drip  -     fluticasone (FLONASE) 50 mcg/actuation nasal spray; 2 sprays by Each Nare route once daily.    Hypertension associated with diabetes - BMP in 1 week. Pt to keep track of her BP at home.  Stop losartan-hydrochlorothiazide 50-12.5mg daily. Start taking hydrochlorothiazide 25mg daily.   -     hydrochlorothiazide (HYDRODIURIL) 25 MG " tablet; Take 1 tablet (25 mg total) by mouth once daily.  -     Basic metabolic panel; Future    Return in about 2 months (around 9/21/2017).      Selena Montemayor MD  Department of Internal Medicine - [facility]  1:46 PM

## 2017-07-21 NOTE — PATIENT INSTRUCTIONS
Stop losartan-hydrochlorothiazide 50-12.5mg daily. Start taking hydrochlorothiazide 25mg daily.     Schedule CT scan of the chest to assess for the cough.

## 2017-07-26 ENCOUNTER — TELEPHONE (OUTPATIENT)
Dept: INTERNAL MEDICINE | Facility: CLINIC | Age: 72
End: 2017-07-26

## 2017-07-26 ENCOUNTER — HOSPITAL ENCOUNTER (OUTPATIENT)
Dept: RADIOLOGY | Facility: HOSPITAL | Age: 72
Discharge: HOME | End: 2017-07-26
Attending: INTERNAL MEDICINE
Payer: MEDICARE

## 2017-07-26 DIAGNOSIS — R05.3 CHRONIC COUGH: ICD-10-CM

## 2017-07-26 DIAGNOSIS — J47.9 ADULT BRONCHIECTASIS: ICD-10-CM

## 2017-07-26 PROCEDURE — 71250 CT THORAX DX C-: CPT | Mod: 26,,, | Performed by: RADIOLOGY

## 2017-07-26 PROCEDURE — 71250 CT THORAX DX C-: CPT | Mod: TC

## 2017-07-26 NOTE — TELEPHONE ENCOUNTER
Results for blood work and CAT scan given.  Adult bronchiectasis.  Refer back to see Dr. Villalba.  Patient will call to make an appointment.  Potassium on the low side of normal.  Continue potassium supplement.

## 2017-08-01 ENCOUNTER — OFFICE VISIT (OUTPATIENT)
Dept: PULMONOLOGY | Facility: CLINIC | Age: 72
End: 2017-08-01
Payer: MEDICARE

## 2017-08-01 ENCOUNTER — HOSPITAL ENCOUNTER (OUTPATIENT)
Dept: PULMONOLOGY | Facility: CLINIC | Age: 72
Discharge: HOME | End: 2017-08-01
Payer: MEDICARE

## 2017-08-01 VITALS
DIASTOLIC BLOOD PRESSURE: 76 MMHG | RESPIRATION RATE: 14 BRPM | SYSTOLIC BLOOD PRESSURE: 126 MMHG | BODY MASS INDEX: 32.91 KG/M2 | WEIGHT: 178.81 LBS | OXYGEN SATURATION: 98 % | HEART RATE: 88 BPM | HEIGHT: 62 IN

## 2017-08-01 DIAGNOSIS — R05.3 CHRONIC COUGH: ICD-10-CM

## 2017-08-01 DIAGNOSIS — E66.9 NON MORBID OBESITY, UNSPECIFIED OBESITY TYPE: ICD-10-CM

## 2017-08-01 DIAGNOSIS — I10 ESSENTIAL HYPERTENSION: Primary | ICD-10-CM

## 2017-08-01 LAB
POST FEV1 FVC: 0.89
POST FEV1: 2.01
POST FVC: 2.26
PRE FEV1 FVC: 85
PRE FEV1: 1.9
PRE FVC: 2.23
PREDICTED FEV1 FVC: 79
PREDICTED FEV1: 1.98
PREDICTED FVC: 2.57

## 2017-08-01 PROCEDURE — 99203 OFFICE O/P NEW LOW 30 MIN: CPT | Mod: 25,S$GLB,, | Performed by: INTERNAL MEDICINE

## 2017-08-01 PROCEDURE — 99499 UNLISTED E&M SERVICE: CPT | Mod: S$GLB,,, | Performed by: INTERNAL MEDICINE

## 2017-08-01 PROCEDURE — 1125F AMNT PAIN NOTED PAIN PRSNT: CPT | Mod: S$GLB,,, | Performed by: INTERNAL MEDICINE

## 2017-08-01 PROCEDURE — 94060 EVALUATION OF WHEEZING: CPT | Mod: S$GLB,,, | Performed by: INTERNAL MEDICINE

## 2017-08-01 PROCEDURE — 1159F MED LIST DOCD IN RCRD: CPT | Mod: S$GLB,,, | Performed by: INTERNAL MEDICINE

## 2017-08-01 PROCEDURE — 99999 PR PBB SHADOW E&M-EST. PATIENT-LVL IV: CPT | Mod: PBBFAC,,, | Performed by: INTERNAL MEDICINE

## 2017-08-02 ENCOUNTER — TELEPHONE (OUTPATIENT)
Dept: RADIOLOGY | Facility: HOSPITAL | Age: 72
End: 2017-08-02

## 2017-08-02 NOTE — PROGRESS NOTES
Subjective:       Patient ID: Alisia Gusman is a 72 y.o. female.    Chief Complaint: Cough    HPI HISTORY OF PRESENT ILLNESS:  Ms. Gusman is a 72-year-old nonsmoker, who comes   for assessment of chronic cough.  She has had previous visits here (the most recent   of which was in July 2013).  Today, she describes an ongoing problem with cough   and wheezing.  The symptoms seem to be worse during the night and in the early   morning after she awakens.  She also describes a mild amount of nonpurulent   sputum.  She states that these problems have been present for most of the past   year.    In her previous evaluation here, Ms. Gusman has had respiratory symptoms   which seem to be the result of gastroesophageal reflux and/or allergic rhinitis.    She had clinical benefit from medical treatment for these conditions.  At   present, she is not having any prominent nasal symptoms.  She does feel   postnasal drainage at times during the day, but this does not seem to correlate   with the cough and wheezing.  She currently takes Zantac for control of upper GI   symptoms.  She had previously taken Nexium, which may have been more effective.    She has not had any fever or hemoptysis.    DATA:  I have reviewed the images from a chest x-ray done in April and a CT scan   of the chest done in July.  These show the cardiac silhouette is at the upper   limit of normal in size.  There are no acute cardiovascular abnormalities.    There appears to be mild bronchiectasis involving the lower lung zones.  This   may not be a new feature, since a previous cardiac study done in 2013 suggests   similar findings (allowing for the difference in technique between these two   scans).      CB/HN  dd: 08/01/2017 19:54:05 (CDT)  td: 08/01/2017 23:53:12 (CDT)  Doc ID   #[id number]  Job ID #[id number]    CC:       Review of Systems   Constitutional: Negative for fever and fatigue.   HENT: Positive for postnasal drip and congestion. Negative for sinus  pressure and voice change.    Respiratory: Positive for cough, sputum production and wheezing. Negative for shortness of breath and dyspnea on extertion.    Cardiovascular: Negative for chest pain and leg swelling.   Genitourinary: Negative for difficulty urinating.   Musculoskeletal: Negative for arthralgias and back pain.   Skin: Negative for rash.   Gastrointestinal: Negative for abdominal pain and acid reflux.   Neurological: Negative for dizziness and weakness.   Hematological: Negative for adenopathy.       Objective:      Physical Exam   Constitutional: She is oriented to person, place, and time. She appears well-developed and well-nourished.   HENT:   Head: Normocephalic.   Nose: Nose normal.   Mouth/Throat: No oropharyngeal exudate.   Neck: Normal range of motion. No JVD present. No tracheal deviation present. No thyromegaly present.   Cardiovascular: Normal rate, regular rhythm and normal heart sounds.    No murmur heard.  Pulmonary/Chest: Normal expansion. No stridor. She has no wheezes. She has no rhonchi. She has no rales. She exhibits no tenderness.   Abdominal: Soft. There is no tenderness.   Musculoskeletal: She exhibits no edema.   Lymphadenopathy:     She has no cervical adenopathy.   Neurological: She is alert and oriented to person, place, and time.   Skin: Skin is warm and dry. No rash noted. No erythema. Nails show no clubbing.   Psychiatric: She has a normal mood and affect.   Vitals reviewed.    Personal Diagnostic Review    No flowsheet data found.      Assessment:       1. Essential hypertension    2. Chronic cough    3. Non morbid obesity, unspecified obesity type        Outpatient Encounter Prescriptions as of 8/1/2017   Medication Sig Dispense Refill    ACCU-CHEK DOMENICO PLUS TEST STRP Strp USE THREE TIMES DAILY AS DIRECTED 300 strip 11    ACCU-CHEK SOFTCLIX LANCETS Misc 1 each by Misc.(Non-Drug; Combo Route) route 3 (three) times daily. 270 each 3    aspirin 81 MG chewable tablet Take  "1 tablet by mouth At bedtime.      BD ALCOHOL SWABS PadM 1 each 2 (two) times daily.      calcium carbonate-vitamin D3 (CALTRATE 600 + D) 600 mg(1,500mg) -400 unit Chew 1 tablet once daily.       cyanocobalamin (VITAMIN B-12) 1000 MCG tablet Take 100 mcg by mouth once daily.      FOLIC ACID/MULTIVIT-MIN/LUTEIN (CENTRUM SILVER ORAL) Take 1 tablet by mouth once daily.      gabapentin (NEURONTIN) 300 MG capsule 300mg in AM, 300mg in PM and 600mg at bedtime 360 capsule 3    glimepiride (AMARYL) 4 MG tablet Take 0.5 tablets (2 mg total) by mouth before breakfast. 45 tablet 3    hydrochlorothiazide (HYDRODIURIL) 25 MG tablet Take 1 tablet (25 mg total) by mouth once daily. 90 tablet 3    insulin glargine (LANTUS SOLOSTAR) 100 unit/mL (3 mL) InPn pen Inject 22 Units into the skin once daily. 3 Box 4    insulin regular 100 unit/mL Inj injection Inject 6 Units into the skin before dinner. RELION brand 10 mL 0    insulin syringe-needle U-100 (BD INSULIN SYRINGE ULTRA-FINE) 0.3 mL 31 gauge x 15/64" Syrg 1 each by Misc.(Non-Drug; Combo Route) route 2 (two) times daily. 200 Syringe 0    insulin syringe-needle U-100 (BD INSULIN SYRINGE ULTRA-FINE) 1/2 mL 31 gauge x 15/64" Syrg 3 each by Misc.(Non-Drug; Combo Route) route 3 (three) times daily. 100 Syringe 11    loratadine (CLARITIN) 10 mg tablet Take 1 tablet (10 mg total) by mouth once daily. 30 tablet 0    metformin (GLUCOPHAGE-XR) 750 MG 24 hr tablet Take 1 tablet (750 mg total) by mouth 2 (two) times daily with meals. 180 tablet 3    pen needle, diabetic (BD ULTRA-FINE SUSIE PEN NEEDLES) 32 gauge x 5/32" Ndle For one time daily injection 150 each 4    potassium chloride (KLOR-CON) 10 MEQ TbSR TAKE 1 TABLET ONE TIME DAILY 90 tablet 3    pravastatin (PRAVACHOL) 40 MG tablet TAKE 1 TABLET (40 MG TOTAL) BY MOUTH NIGHTLY. 90 tablet 3    ranitidine (ZANTAC) 150 MG tablet Take 1 tablet (150 mg total) by mouth 2 (two) times daily. 180 tablet 3     No " facility-administered encounter medications on file as of 8/1/2017.      Orders Placed This Encounter   Procedures    FL Esophagram Complete     Standing Status:   Future     Standing Expiration Date:   8/1/2018    Spirometry with/without bronchodilator     Standing Status:   Future     Number of Occurrences:   1     Standing Expiration Date:   8/1/2018     Plan:     Pre/Post Spirometry and Ba Swallow.  Phone report and decide on any changes in therapy.

## 2017-08-03 ENCOUNTER — HOSPITAL ENCOUNTER (OUTPATIENT)
Dept: RADIOLOGY | Facility: HOSPITAL | Age: 72
Discharge: HOME | End: 2017-08-03
Attending: INTERNAL MEDICINE
Payer: MEDICARE

## 2017-08-03 DIAGNOSIS — R05.3 CHRONIC COUGH: ICD-10-CM

## 2017-08-03 PROCEDURE — 74220 X-RAY XM ESOPHAGUS 1CNTRST: CPT | Mod: TC

## 2017-08-03 PROCEDURE — 74220 X-RAY XM ESOPHAGUS 1CNTRST: CPT | Mod: 26,,, | Performed by: RADIOLOGY

## 2017-08-07 ENCOUNTER — TELEPHONE (OUTPATIENT)
Dept: PULMONOLOGY | Facility: CLINIC | Age: 72
End: 2017-08-07

## 2017-08-07 DIAGNOSIS — R05.3 CHRONIC COUGH: ICD-10-CM

## 2017-08-07 DIAGNOSIS — K21.00 GASTROESOPHAGEAL REFLUX DISEASE WITH ESOPHAGITIS: Primary | ICD-10-CM

## 2017-08-07 NOTE — TELEPHONE ENCOUNTER
----- Message from Georgia Gant sent at 8/7/2017 12:57 PM CDT -----  Contact: Self  /   [phone]  Villalba   -    Patient  Needs to speak with the nurse in regards to her test result   Thanks,

## 2017-08-08 RX ORDER — PANTOPRAZOLE SODIUM 40 MG/1
40 TABLET, DELAYED RELEASE ORAL DAILY
Qty: 30 TABLET | Refills: 6 | Status: SHIPPED | OUTPATIENT
Start: 2017-08-08 | End: 2018-01-16

## 2017-08-08 NOTE — TELEPHONE ENCOUNTER
Pt informed that recent spirometry is normal.  UGI Xray reported to show esophageal narrowing, dysmotility, and reflux.  I believe her cough relates to these problems.  She has uncontrolled UGI symptoms on current therapy with Zantac.  I have asked her to begin Protonix 40 mg daily, and have visit in GI in 3-4 weeks to f/u result from this treatment for long term plan for management of UGI symptoms.

## 2017-08-24 ENCOUNTER — OFFICE VISIT (OUTPATIENT)
Dept: INTERNAL MEDICINE | Facility: CLINIC | Age: 72
End: 2017-08-24
Payer: MEDICARE

## 2017-08-24 ENCOUNTER — TELEPHONE (OUTPATIENT)
Dept: ENDOCRINOLOGY | Facility: CLINIC | Age: 72
End: 2017-08-24

## 2017-08-24 VITALS
WEIGHT: 179.13 LBS | HEART RATE: 92 BPM | BODY MASS INDEX: 32.96 KG/M2 | RESPIRATION RATE: 16 BRPM | HEIGHT: 62 IN | SYSTOLIC BLOOD PRESSURE: 134 MMHG | TEMPERATURE: 98 F | DIASTOLIC BLOOD PRESSURE: 70 MMHG

## 2017-08-24 DIAGNOSIS — J47.9 ADULT BRONCHIECTASIS: ICD-10-CM

## 2017-08-24 DIAGNOSIS — K21.00 GASTROESOPHAGEAL REFLUX DISEASE WITH ESOPHAGITIS: Primary | ICD-10-CM

## 2017-08-24 DIAGNOSIS — B35.1 ONYCHOMYCOSIS: ICD-10-CM

## 2017-08-24 DIAGNOSIS — E11.69 DIABETES MELLITUS TYPE 2 IN OBESE: ICD-10-CM

## 2017-08-24 DIAGNOSIS — Z79.899 ENCOUNTER FOR MONITORING LONG-TERM PROTON PUMP INHIBITOR THERAPY: ICD-10-CM

## 2017-08-24 DIAGNOSIS — Z51.81 ENCOUNTER FOR MONITORING LONG-TERM PROTON PUMP INHIBITOR THERAPY: ICD-10-CM

## 2017-08-24 DIAGNOSIS — K76.0 FATTY LIVER: ICD-10-CM

## 2017-08-24 DIAGNOSIS — E66.9 DIABETES MELLITUS TYPE 2 IN OBESE: ICD-10-CM

## 2017-08-24 PROCEDURE — 3078F DIAST BP <80 MM HG: CPT | Mod: S$GLB,,, | Performed by: INTERNAL MEDICINE

## 2017-08-24 PROCEDURE — 3008F BODY MASS INDEX DOCD: CPT | Mod: S$GLB,,, | Performed by: INTERNAL MEDICINE

## 2017-08-24 PROCEDURE — 3075F SYST BP GE 130 - 139MM HG: CPT | Mod: S$GLB,,, | Performed by: INTERNAL MEDICINE

## 2017-08-24 PROCEDURE — 99999 PR PBB SHADOW E&M-EST. PATIENT-LVL IV: CPT | Mod: PBBFAC,,, | Performed by: INTERNAL MEDICINE

## 2017-08-24 PROCEDURE — 99499 UNLISTED E&M SERVICE: CPT | Mod: S$GLB,,, | Performed by: INTERNAL MEDICINE

## 2017-08-24 PROCEDURE — 1159F MED LIST DOCD IN RCRD: CPT | Mod: S$GLB,,, | Performed by: INTERNAL MEDICINE

## 2017-08-24 PROCEDURE — 3045F PR MOST RECENT HEMOGLOBIN A1C LEVEL 7.0-9.0%: CPT | Mod: S$GLB,,, | Performed by: INTERNAL MEDICINE

## 2017-08-24 PROCEDURE — 99214 OFFICE O/P EST MOD 30 MIN: CPT | Mod: S$GLB,,, | Performed by: INTERNAL MEDICINE

## 2017-08-24 PROCEDURE — 1126F AMNT PAIN NOTED NONE PRSNT: CPT | Mod: S$GLB,,, | Performed by: INTERNAL MEDICINE

## 2017-08-24 RX ORDER — TERBINAFINE HYDROCHLORIDE 250 MG/1
250 TABLET ORAL DAILY
Qty: 30 TABLET | Refills: 0 | Status: SHIPPED | OUTPATIENT
Start: 2017-08-24 | End: 2017-08-24 | Stop reason: SDUPTHER

## 2017-08-24 RX ORDER — TERBINAFINE HYDROCHLORIDE 250 MG/1
250 TABLET ORAL DAILY
Qty: 30 TABLET | Refills: 0 | Status: SHIPPED | OUTPATIENT
Start: 2017-08-24 | End: 2017-09-21 | Stop reason: SDUPTHER

## 2017-08-24 NOTE — PROGRESS NOTES
"Subjective:       Patient ID: Alisia Gusman is a 72 y.o. female.    Chief Complaint: Nail Problem (left thumb nail  )    HPI  Chronic cough. CT chest w/ bronchiectasis. Recently saw Dr. Villalba.  PFT wnl.  Recent esophagram ordered by Dr. Villalba shows mild esophageal dysmotility, small hiatal hernia, slight narrowing in the distal esophagus, small gastric polyp, mild gastroesophageal reflux.  Started on protonix 40mg daily. Pt reports that this helps her acid reflux a lot.   Sometimes w/ coughing when she's eating/drinking water. Wakes up in the middle of the night w/ cough and wheezing. Was waking up every other hour due to this. Now on protonix, it's allowing her to sleep.     No fevers/chills.     Reports infrequently will have epigastric pain. Not associated w/ exercise. Transient and last a few min.     C/o L thumb w/ thickened fingernails that's receding. Occurs in the toes also. No pain.   Fatty liver. Has been using OTC nail fungus.     Snores at night. Doesn't always feel like she's refreshed from sleep. Sometimes naps in the day.  TSH 6/7/17 - WNL. B12 6/15/17 - high.    Review of Systems  as above in HPI.     Objective:      Physical Exam    /70   Pulse 92   Temp 98.4 °F (36.9 °C) (Oral)   Resp 16   Ht 5' 2" (1.575 m)   Wt 81.2 kg (179 lb 1.6 oz)   BMI 32.76 kg/m²      GEN - A+OX4, NAD   HEENT - PERRL, EOMI, OP clear. MMM. TM dullness.  Neck - No thyromegaly or cervical LAD. No thyroid masses felt.  CV - RRR, no m/r   Chest - CTAB, no wheezing or rhonchi  Abd - S/NT/ND/+BS.   Ext - 2+B radial pulses. No LE edema.  Neuro - 2+ DTRs. Normal gait.   Skin - No rash.                        Assessment/Plan     Alisia was seen today for nail problem.    Diagnoses and all orders for this visit:    Gastroesophageal reflux disease with esophagitis  -     Ambulatory referral to Gastroenterology  -     Vitamin B12; Future  -     Vitamin D; Future    Adult bronchiectasis - PFT normal.  Follow-up with " pulmonology.    Onychomycosis - discussed risks including liver issues.  Recheck CMP in 1 month.  Reassess at 1 month to see if any benefit on Lamisil.  If no improvement in the nails, will discontinue.  -     Comprehensive metabolic panel; Future  -     terbinafine HCl (LAMISIL) 250 mg tablet; Take 1 tablet (250 mg total) by mouth once daily.    Fatty liver  -     Comprehensive metabolic panel; Future    Diabetes mellitus type 2 in obese = last A1c 7.9 in June.  Will repeat hemoglobin A1c.  -     Hemoglobin A1c; Future    Encounter for monitoring long-term proton pump inhibitor therapy  -     Vitamin B12; Future  -     Vitamin D; Future  -     CBC auto differential; Future      Return in about 4 weeks (around 9/21/2017).      Selena Montemayor MD  Department of Internal Medicine - [facility]  3:33 PM

## 2017-08-24 NOTE — TELEPHONE ENCOUNTER
Reviewed blood sugar log and regimen.    Fastin, 131, 142, 142, 138, 147, 145  Before lunch: 131, 196, 163, 82, 145  Before dinner: x, 177, 186, 239, 239, 149,   Bedtime: 253, 141,170, 167, 171, 182    Trying to take into consideration:  Cost of medications  Lifestyle (very active person)  And persistent diarrhea    PLAN:  Decrease Metformin 750 mg one a day  lantus 26 units at 9 pm (increase from 23)  If fasting blood sugars are > 150 increase to 28 units  Increase regular 7 units before dinner only     Continue glimepiride 2 mg daily   Send log in two weeks.

## 2017-09-14 ENCOUNTER — LAB VISIT (OUTPATIENT)
Dept: LAB | Facility: HOSPITAL | Age: 72
End: 2017-09-14
Attending: INTERNAL MEDICINE
Payer: MEDICARE

## 2017-09-14 DIAGNOSIS — B35.1 ONYCHOMYCOSIS: ICD-10-CM

## 2017-09-14 DIAGNOSIS — Z51.81 ENCOUNTER FOR MONITORING LONG-TERM PROTON PUMP INHIBITOR THERAPY: ICD-10-CM

## 2017-09-14 DIAGNOSIS — K21.00 GASTROESOPHAGEAL REFLUX DISEASE WITH ESOPHAGITIS: ICD-10-CM

## 2017-09-14 DIAGNOSIS — Z79.899 ENCOUNTER FOR MONITORING LONG-TERM PROTON PUMP INHIBITOR THERAPY: ICD-10-CM

## 2017-09-14 DIAGNOSIS — E66.9 DIABETES MELLITUS TYPE 2 IN OBESE: ICD-10-CM

## 2017-09-14 DIAGNOSIS — K76.0 FATTY LIVER: ICD-10-CM

## 2017-09-14 DIAGNOSIS — E11.69 DIABETES MELLITUS TYPE 2 IN OBESE: ICD-10-CM

## 2017-09-14 LAB
25(OH)D3+25(OH)D2 SERPL-MCNC: 44 NG/ML
ALBUMIN SERPL BCP-MCNC: 3.4 G/DL
ALP SERPL-CCNC: 52 U/L
ALT SERPL W/O P-5'-P-CCNC: 18 U/L
ANION GAP SERPL CALC-SCNC: 13 MMOL/L
AST SERPL-CCNC: 20 U/L
BASOPHILS # BLD AUTO: 0.05 K/UL
BASOPHILS NFR BLD: 0.8 %
BILIRUB SERPL-MCNC: 1.1 MG/DL
BUN SERPL-MCNC: 13 MG/DL
CALCIUM SERPL-MCNC: 9.7 MG/DL
CHLORIDE SERPL-SCNC: 102 MMOL/L
CO2 SERPL-SCNC: 28 MMOL/L
CREAT SERPL-MCNC: 0.9 MG/DL
DIFFERENTIAL METHOD: NORMAL
EOSINOPHIL # BLD AUTO: 0.1 K/UL
EOSINOPHIL NFR BLD: 2 %
ERYTHROCYTE [DISTWIDTH] IN BLOOD BY AUTOMATED COUNT: 14.1 %
EST. GFR  (AFRICAN AMERICAN): >60 ML/MIN/1.73 M^2
EST. GFR  (NON AFRICAN AMERICAN): >60 ML/MIN/1.73 M^2
ESTIMATED AVG GLUCOSE: 180 MG/DL
GLUCOSE SERPL-MCNC: 173 MG/DL
HBA1C MFR BLD HPLC: 7.9 %
HCT VFR BLD AUTO: 40 %
HGB BLD-MCNC: 13.3 G/DL
LYMPHOCYTES # BLD AUTO: 2.9 K/UL
LYMPHOCYTES NFR BLD: 45.9 %
MCH RBC QN AUTO: 29 PG
MCHC RBC AUTO-ENTMCNC: 33.3 G/DL
MCV RBC AUTO: 87 FL
MONOCYTES # BLD AUTO: 0.5 K/UL
MONOCYTES NFR BLD: 7.7 %
NEUTROPHILS # BLD AUTO: 2.8 K/UL
NEUTROPHILS NFR BLD: 43.3 %
PLATELET # BLD AUTO: 180 K/UL
PMV BLD AUTO: 11.1 FL
POTASSIUM SERPL-SCNC: 3.7 MMOL/L
PROT SERPL-MCNC: 6.8 G/DL
RBC # BLD AUTO: 4.59 M/UL
SODIUM SERPL-SCNC: 143 MMOL/L
VIT B12 SERPL-MCNC: 1515 PG/ML
WBC # BLD AUTO: 6.38 K/UL

## 2017-09-14 PROCEDURE — 82306 VITAMIN D 25 HYDROXY: CPT

## 2017-09-14 PROCEDURE — 80053 COMPREHEN METABOLIC PANEL: CPT

## 2017-09-14 PROCEDURE — 36415 COLL VENOUS BLD VENIPUNCTURE: CPT

## 2017-09-14 PROCEDURE — 82607 VITAMIN B-12: CPT

## 2017-09-14 PROCEDURE — 83036 HEMOGLOBIN GLYCOSYLATED A1C: CPT

## 2017-09-14 PROCEDURE — 85025 COMPLETE CBC W/AUTO DIFF WBC: CPT

## 2017-09-21 ENCOUNTER — IMMUNIZATION (OUTPATIENT)
Dept: INTERNAL MEDICINE | Facility: CLINIC | Age: 72
End: 2017-09-21
Payer: MEDICARE

## 2017-09-21 ENCOUNTER — OFFICE VISIT (OUTPATIENT)
Dept: INTERNAL MEDICINE | Facility: CLINIC | Age: 72
End: 2017-09-21
Payer: MEDICARE

## 2017-09-21 VITALS
TEMPERATURE: 98 F | HEART RATE: 89 BPM | DIASTOLIC BLOOD PRESSURE: 70 MMHG | SYSTOLIC BLOOD PRESSURE: 128 MMHG | WEIGHT: 179.44 LBS | BODY MASS INDEX: 33.02 KG/M2 | HEIGHT: 62 IN | RESPIRATION RATE: 17 BRPM

## 2017-09-21 DIAGNOSIS — E11.69 DIABETES MELLITUS TYPE 2 IN OBESE: Primary | ICD-10-CM

## 2017-09-21 DIAGNOSIS — B35.1 ONYCHOMYCOSIS: ICD-10-CM

## 2017-09-21 DIAGNOSIS — E66.9 DIABETES MELLITUS TYPE 2 IN OBESE: Primary | ICD-10-CM

## 2017-09-21 PROCEDURE — 3078F DIAST BP <80 MM HG: CPT | Mod: S$GLB,,, | Performed by: INTERNAL MEDICINE

## 2017-09-21 PROCEDURE — 1126F AMNT PAIN NOTED NONE PRSNT: CPT | Mod: S$GLB,,, | Performed by: INTERNAL MEDICINE

## 2017-09-21 PROCEDURE — 99214 OFFICE O/P EST MOD 30 MIN: CPT | Mod: S$GLB,,, | Performed by: INTERNAL MEDICINE

## 2017-09-21 PROCEDURE — 3008F BODY MASS INDEX DOCD: CPT | Mod: S$GLB,,, | Performed by: INTERNAL MEDICINE

## 2017-09-21 PROCEDURE — 3045F PR MOST RECENT HEMOGLOBIN A1C LEVEL 7.0-9.0%: CPT | Mod: S$GLB,,, | Performed by: INTERNAL MEDICINE

## 2017-09-21 PROCEDURE — 99499 UNLISTED E&M SERVICE: CPT | Mod: S$GLB,,, | Performed by: INTERNAL MEDICINE

## 2017-09-21 PROCEDURE — 3074F SYST BP LT 130 MM HG: CPT | Mod: S$GLB,,, | Performed by: INTERNAL MEDICINE

## 2017-09-21 PROCEDURE — 1159F MED LIST DOCD IN RCRD: CPT | Mod: S$GLB,,, | Performed by: INTERNAL MEDICINE

## 2017-09-21 PROCEDURE — 99999 PR PBB SHADOW E&M-EST. PATIENT-LVL III: CPT | Mod: PBBFAC,,, | Performed by: INTERNAL MEDICINE

## 2017-09-21 PROCEDURE — G0008 ADMIN INFLUENZA VIRUS VAC: HCPCS | Mod: S$GLB,,, | Performed by: INTERNAL MEDICINE

## 2017-09-21 PROCEDURE — 90662 IIV NO PRSV INCREASED AG IM: CPT | Mod: S$GLB,,, | Performed by: INTERNAL MEDICINE

## 2017-09-21 RX ORDER — TERBINAFINE HYDROCHLORIDE 250 MG/1
250 TABLET ORAL DAILY
Qty: 30 TABLET | Refills: 1 | Status: SHIPPED | OUTPATIENT
Start: 2017-09-21 | End: 2017-11-20

## 2017-09-21 RX ORDER — METFORMIN HYDROCHLORIDE 750 MG/1
750 TABLET, EXTENDED RELEASE ORAL DAILY
Start: 2017-09-21 | End: 2018-01-29 | Stop reason: SDUPTHER

## 2017-09-21 RX ORDER — AMOXICILLIN 500 MG/1
500 CAPSULE ORAL 4 TIMES DAILY
COMMUNITY
End: 2017-10-09

## 2017-09-21 RX ORDER — GLIMEPIRIDE 4 MG/1
4 TABLET ORAL
Qty: 45 TABLET | Refills: 3 | Status: SHIPPED | OUTPATIENT
Start: 2017-09-21 | End: 2018-03-13 | Stop reason: SDUPTHER

## 2017-09-21 RX ORDER — INSULIN GLARGINE 100 [IU]/ML
26 INJECTION, SOLUTION SUBCUTANEOUS DAILY
Start: 2017-09-21 | End: 2017-12-18 | Stop reason: SDUPTHER

## 2017-09-21 NOTE — PROGRESS NOTES
"Subjective:       Patient ID: Alisia Gusman is a 72 y.o. female.    Chief Complaint: Nail Problem (left thumb nail check)    HPI   Onychomycosis. Started on lamisil. Uncertain if better. Recent CMP last week w/ normal liver enzymes. T bili 1.1. No rash. No pain.    DM2 - a1c 7.9 9/14/17  Dec metformin to daily in July due to diarrhea. Even when she was taking xr daily, she would still have diarrhea.   Is regularly exercises.   On lantus 26 units nightly and amaryl 2mg daily    Review of Systems  Comprehensive review of systems otherwise negative. See history/subjective section for more details.    Objective:      Physical Exam    /70   Pulse 89   Temp 97.9 °F (36.6 °C) (Oral)   Resp 17   Ht 5' 2" (1.575 m)   Wt 81.4 kg (179 lb 7.3 oz)   BMI 32.82 kg/m²     GEN - A+OX4, NAD   HEENT - PERRL, EOMI, OP clear. MMM. TM dullness.  Neck - No thyromegaly or cervical LAD. No thyroid masses felt.  CV - RRR, no m/r   Chest - CTAB, no wheezing or rhonchi  Abd - S/NT/ND/+BS.   Ext - 2+B radial pulses. No LE edema.  Neuro - 2+ DTRs. Normal gait.   Skin - No rash. Nails look similar.    Labs reviewed w/ pt.    Assessment/Plan     Alisia was seen today for nail problem.    Diagnoses and all orders for this visit:    Diabetes mellitus type 2 in obese - in amaryl to 4mg daily.  -     glimepiride (AMARYL) 4 MG tablet; Take 1 tablet (4 mg total) by mouth daily with breakfast.  -     insulin glargine (LANTUS SOLOSTAR) 100 unit/mL (3 mL) InPn pen; Inject 26 Units into the skin once daily. If over 150 then use 28 units    Type 2 diabetes, uncontrolled, with neuropathy  -     glimepiride (AMARYL) 4 MG tablet; Take 1 tablet (4 mg total) by mouth daily with breakfast.  -     insulin glargine (LANTUS SOLOSTAR) 100 unit/mL (3 mL) InPn pen; Inject 26 Units into the skin once daily. If over 150 then use 28 units    Onychomycosis  -     terbinafine HCl (LAMISIL) 250 mg tablet; Take 1 tablet (250 mg total) by mouth once " daily.      Return in about 3 months (around 12/21/2017).      Selena Montemayor MD  Department of Internal Medicine - [facility]  11:50 AM

## 2017-09-29 ENCOUNTER — TELEPHONE (OUTPATIENT)
Dept: OBSTETRICS AND GYNECOLOGY | Facility: CLINIC | Age: 72
End: 2017-09-29

## 2017-09-29 DIAGNOSIS — E11.59 HYPERTENSION ASSOCIATED WITH DIABETES: ICD-10-CM

## 2017-09-29 DIAGNOSIS — I15.2 HYPERTENSION ASSOCIATED WITH DIABETES: ICD-10-CM

## 2017-09-29 RX ORDER — HYDROCHLOROTHIAZIDE 25 MG/1
25 TABLET ORAL DAILY
Qty: 90 TABLET | Refills: 3 | Status: SHIPPED | OUTPATIENT
Start: 2017-09-29 | End: 2018-08-27 | Stop reason: SDUPTHER

## 2017-09-29 NOTE — TELEPHONE ENCOUNTER
----- Message from Mari Newsome sent at 9/29/2017 11:04 AM CDT -----  Contact: CINDA TATUM [[id number]]  x_  1st Request  _  2nd Request  _  3rd Request    Who: CINDA TATUM [[id number]]    Why: Requesting a call back in regards to scheduling an appt.  Please return the call at earliest convenience. Thanks!    What Number to Call Back: [phone] or [phone]    When to Expect a call back: (Within 24 hours)

## 2017-09-29 NOTE — TELEPHONE ENCOUNTER
----- Message from Reji Carlton sent at 9/29/2017 10:42 AM CDT -----  Contact: Self [phone]  Type: Rx    Name of medication(s): hydrochlorothiazide (HYDRODIURIL) 25 MG tablet    Is this a refill? New rx? Refill    Who prescribed medication? Dr Montemayor    Pharmacy Name, Phone, & Location: [facility] - [facility] [address]    Comments:Advice    Thanks

## 2017-10-04 ENCOUNTER — TELEPHONE (OUTPATIENT)
Dept: ENDOCRINOLOGY | Facility: CLINIC | Age: 72
End: 2017-10-04

## 2017-10-04 NOTE — TELEPHONE ENCOUNTER
Her control is getting better, last A1C was 7.9%.   This is why I asked to see her in six months, if she is having any issues in the mean time she can call and see an NP or urgent appt with MD.   Did you look on the thursdays on the 6th floor?

## 2017-10-04 NOTE — TELEPHONE ENCOUNTER
Please advise. No available appointments.    ----- Message from Zahira Ting sent at 10/4/2017 11:30 AM CDT -----  Contact: Alisia    Tel:   [phone]    or    cell:  [phone]   Pt.says she is trying to get in to see you in January.   Asking why are you putting her off for 6 mos. , her pcp changed her meds.and you changed some meds.    Would very much like to see you sooner if possible.    As per pt..    Pls call to discuss this.

## 2017-10-09 ENCOUNTER — TELEPHONE (OUTPATIENT)
Dept: ENDOCRINOLOGY | Facility: CLINIC | Age: 72
End: 2017-10-09

## 2017-10-09 ENCOUNTER — OFFICE VISIT (OUTPATIENT)
Dept: ENDOCRINOLOGY | Facility: CLINIC | Age: 72
End: 2017-10-09
Payer: MEDICARE

## 2017-10-09 VITALS
RESPIRATION RATE: 18 BRPM | SYSTOLIC BLOOD PRESSURE: 140 MMHG | BODY MASS INDEX: 32.81 KG/M2 | HEART RATE: 91 BPM | HEIGHT: 62 IN | DIASTOLIC BLOOD PRESSURE: 68 MMHG | WEIGHT: 178.31 LBS

## 2017-10-09 DIAGNOSIS — Z79.4 TYPE 2 DIABETES MELLITUS WITH HYPERGLYCEMIA, WITH LONG-TERM CURRENT USE OF INSULIN: ICD-10-CM

## 2017-10-09 DIAGNOSIS — I10 ESSENTIAL HYPERTENSION: ICD-10-CM

## 2017-10-09 DIAGNOSIS — E66.9 OBESITY (BMI 30-39.9): ICD-10-CM

## 2017-10-09 DIAGNOSIS — E78.2 MIXED HYPERLIPIDEMIA: ICD-10-CM

## 2017-10-09 DIAGNOSIS — E11.69 DIABETES MELLITUS TYPE 2 IN OBESE: ICD-10-CM

## 2017-10-09 DIAGNOSIS — E11.65 TYPE 2 DIABETES MELLITUS WITH HYPERGLYCEMIA, WITH LONG-TERM CURRENT USE OF INSULIN: ICD-10-CM

## 2017-10-09 DIAGNOSIS — E66.9 DIABETES MELLITUS TYPE 2 IN OBESE: ICD-10-CM

## 2017-10-09 DIAGNOSIS — E11.42 DIABETIC PERIPHERAL NEUROPATHY: ICD-10-CM

## 2017-10-09 PROCEDURE — 99214 OFFICE O/P EST MOD 30 MIN: CPT | Mod: S$GLB,,, | Performed by: NURSE PRACTITIONER

## 2017-10-09 PROCEDURE — 99499 UNLISTED E&M SERVICE: CPT | Mod: S$GLB,,, | Performed by: NURSE PRACTITIONER

## 2017-10-09 PROCEDURE — 99999 PR PBB SHADOW E&M-EST. PATIENT-LVL III: CPT | Mod: PBBFAC,,, | Performed by: NURSE PRACTITIONER

## 2017-10-09 NOTE — TELEPHONE ENCOUNTER
She is a patient of Dr. Nelson and would like to follow up with her. She was seen by me today and will see me in 3 months, can we schedule her with Dr. Nelson in 6 months? She said she called and nothing was available up to February 2018.   Thank you in advance for your assistance.

## 2017-10-09 NOTE — PATIENT INSTRUCTIONS
Alpha Lipoic Acid daily.   Over the counter fish oil.     Continue Metformin  mg nightly, Glimepiride 4 mg daily with breakfast, and Lantus 26 units nightly  Add Regular insulin 6 units 30 minutes before lunch.   Increase Regular insulin to 9 units 30 minutes before dinner.     Check with insurance on Humalog or Novolog.             Snacks can be an important part of a balanced, healthy meal plan. They allow you to eat more frequently, feeling full and satisfied throughout the day. Also, they allow you to spread carbohydrates evenly, which may stabilize blood sugars.  Plus, snacks are enjoyable!     The amount of carbohydrate needed at snacks varies. Generally, about 15-30 grams of carbohydrate per snack is recommended.  Below you will find some tasty treats.       0-5 gm carb   Crystal Light   Vitamin Water Zero   Herbal tea, unsweetened   2 tsp peanut butter on celery   1./2 cup sugar-free jell-o   1 sugar-free popsicle   ¼ cup blueberries   8oz Blue [place] unsweetened almond milk   5 baby carrots & celery sticks, cucumbers, bell peppers dipped in ¼ cup salsa, 2Tbsp light ranch dressing or 2Tbsp plain Greek yogurt   10 Goldfish crackers   ½ oz low-fat cheese or string cheese   1 closed handful of nuts, unsalted   1 Tbsp of sunflower seeds, unsalted   1 cup Smart Pop popcorn   1 whole grain brown rice cake        15 gm carb   1 small piece of fruit or ½ banana or 1/2 cup lite canned fruit   3 yecenia cracker squares   3 cups Smart Pop popcorn, top spray butter, Hirsch lite salt or cinnamon and Truvia   5 Vanilla Wafers   ½ cup low fat, no added sugar ice cream or frozen yogurt (Blue bell, Blue Bunny, Weight Watchers, Skinny Cow)   ½ turkey, ham, or chicken sandwich   ½ c fruit with ½ c Cottage cheese   4-6 unsalted wheat crackers with 1 oz low fat cheese or 1 tbsp peanut butter    30-45 goldfish crackers (depending on flavor)    7-8 Zoroastrianism mini brown rice cakes (caramel, apple  cinnamon, chocolate)    12 Pentecostalism mini brown rice cakes (cheddar, bbq, ranch)    1/3 cup hummus dip with raw veg   1/2 whole wheat justino, 1Tbsp hummus   Mini Pizza (1/2 whole wheat English muffin, low-fat  cheese, tomato sauce)   100 calorie snack pack (Oreo, Chips Ahoy, Ritz Mix, Baked Cheetos)   4-6 oz. light or Greek Style yogurt (Chobani, Yoplait, Okios, [place]oneyfield)   ½ cup sugar-free pudding     6 in. wheat tortilla or justino oven toasted chips (topped with spray butter flavoring, cinnamon, Truvia OR spray butter, garlic powder, chili powder)    18 BBQ Popchips (available at [employer], [employer], Fresh Market)

## 2017-10-16 RX ORDER — PEN NEEDLE, DIABETIC 31 GX5/16"
NEEDLE, DISPOSABLE MISCELLANEOUS
Qty: 90 EACH | Refills: 3 | Status: SHIPPED | OUTPATIENT
Start: 2017-10-16 | End: 2020-03-04 | Stop reason: SDUPTHER

## 2017-10-23 ENCOUNTER — OFFICE VISIT (OUTPATIENT)
Dept: PODIATRY | Facility: CLINIC | Age: 72
End: 2017-10-23
Payer: MEDICARE

## 2017-10-23 VITALS
HEART RATE: 86 BPM | RESPIRATION RATE: 18 BRPM | WEIGHT: 178 LBS | DIASTOLIC BLOOD PRESSURE: 75 MMHG | HEIGHT: 62 IN | BODY MASS INDEX: 32.76 KG/M2 | SYSTOLIC BLOOD PRESSURE: 133 MMHG

## 2017-10-23 DIAGNOSIS — B35.1 ONYCHOMYCOSIS DUE TO DERMATOPHYTE: ICD-10-CM

## 2017-10-23 DIAGNOSIS — E11.49 TYPE II DIABETES MELLITUS WITH NEUROLOGICAL MANIFESTATIONS: Primary | ICD-10-CM

## 2017-10-23 PROCEDURE — 99999 PR PBB SHADOW E&M-EST. PATIENT-LVL III: CPT | Mod: PBBFAC,,, | Performed by: PODIATRIST

## 2017-10-23 PROCEDURE — 99499 UNLISTED E&M SERVICE: CPT | Mod: S$GLB,,, | Performed by: PODIATRIST

## 2017-10-23 PROCEDURE — 11721 DEBRIDE NAIL 6 OR MORE: CPT | Mod: Q9,S$GLB,, | Performed by: PODIATRIST

## 2017-10-23 RX ORDER — MENTHOL 5.8 MG/1
LOZENGE ORAL
COMMUNITY
Start: 2017-10-16 | End: 2017-11-28

## 2017-10-23 NOTE — PROGRESS NOTES
Subjective:      Patient ID: Alisia Gusman is a 72 y.o. female.    Chief Complaint: PCP (Selena Montemayor MD 9/21/17); Diabetic Foot Exam; Nail Care; and Foot Problem (numbness,tingling,burning )    Alisia is a 72 y.o. female who presents to the clinic for evaluation and treatment of high risk feet. Alisia has a past medical history of Adult bronchiectasis (7/26/2017); Allergy; Anemia; Arthritis; Asthma; Cancer (1993); Cataract; Chronic cervical radiculopathy (9/20/2012); Diabetes mellitus; Diabetes mellitus type II; Diabetes with neurologic complications; Diverticulosis; Dry eyes; Dysphagia; GERD (gastroesophageal reflux disease); Hyperlipidemia; Hypertension; Neuropathy; Scalp tenderness; Shortness of breath; and Ulcer. The patient's chief complaint is long, thick toenails. This patient has documented high risk feet requiring routine maintenance secondary to diabetes mellitis and those secondary complications of diabetes, as mentioned..    PCP: Selena Montemayor MD    Date Last Seen by PCP:   Chief Complaint   Patient presents with    PCP     Selena Montemayor MD 9/21/17    Diabetic Foot Exam    Nail Care    Foot Problem     numbness,tingling,burning          Hemoglobin A1C   Date Value Ref Range Status   09/14/2017 7.9 (H) 4.0 - 5.6 % Final     Comment:     According to ADA guidelines, hemoglobin A1c <7.0% represents  optimal control in non-pregnant diabetic patients. Different  metrics may apply to specific patient populations.   Standards of Medical Care in Diabetes-2016.  For the purpose of screening for the presence of diabetes:  <5.7%     Consistent with the absence of diabetes  5.7-6.4%  Consistent with increasing risk for diabetes   (prediabetes)  >or=6.5%  Consistent with diabetes  Currently, no consensus exists for use of hemoglobin A1c  for diagnosis of diabetes for children.  This Hemoglobin A1c assay has significant interference with fetal   hemoglobin   (HbF). The results are invalid for patients with abnormal amounts of    HbF,   including those with known Hereditary Persistence   of Fetal Hemoglobin. Heterozygous hemoglobin variants (HbAS, HbAC,   HbAD, HbAE, HbA2) do not significantly interfere with this assay;   however, presence of multiple variants in a sample may impact the %   interference.     06/07/2017 7.9 (H) 4.5 - 6.2 % Final     Comment:     According to ADA guidelines, hemoglobin A1C <7.0% represents  optimal control in non-pregnant diabetic patients.  Different  metrics may apply to specific populations.   Standards of Medical Care in Diabetes - 2016.  For the purpose of screening for the presence of diabetes:  <5.7%     Consistent with the absence of diabetes  5.7-6.4%  Consistent with increasing risk for diabetes   (prediabetes)  >or=6.5%  Consistent with diabetes  Currently no consensus exists for use of hemoglobin A1C  for diagnosis of diabetes for children.     02/01/2017 8.0 (H) 4.5 - 6.2 % Final     Comment:     According to ADA guidelines, hemoglobin A1C <7.0% represents  optimal control in non-pregnant diabetic patients.  Different  metrics may apply to specific populations.   Standards of Medical Care in Diabetes - 2016.  For the purpose of screening for the presence of diabetes:  <5.7%     Consistent with the absence of diabetes  5.7-6.4%  Consistent with increasing risk for diabetes   (prediabetes)  >or=6.5%  Consistent with diabetes  Currently no consensus exists for use of hemoglobin A1C  for diagnosis of diabetes for children.         Review of Systems   Constitution: Negative for chills, decreased appetite and fever.   Cardiovascular: Negative for leg swelling.   Skin: Positive for dry skin and nail changes. Negative for flushing and itching.   Musculoskeletal: Negative for arthritis, joint pain, joint swelling and myalgias.   Gastrointestinal: Negative for nausea and vomiting.   Neurological: Positive for numbness. Negative for loss of balance and paresthesias.           Objective:       Vitals:     "10/23/17 0918   BP: 133/75   Pulse: 86   Resp: 18   Weight: 80.7 kg (178 lb)   Height: 5' 2" (1.575 m)   PainSc: 0-No pain        Physical Exam   Constitutional: She is oriented to person, place, and time. She appears well-developed and well-nourished.   Cardiovascular:   Pulses:       Dorsalis pedis pulses are 2+ on the right side, and 2+ on the left side.        Posterior tibial pulses are 1+ on the right side, and 1+ on the left side.   Toes are cool to touch. Feet are warm proximally.There is decreased digital hair. Skin is atrophic, slightly hyperpigmented, and mildly edematous       Musculoskeletal: Normal range of motion. She exhibits no edema or tenderness.   Adequate joint range of motion without pain, limitation, nor crepitation Bilateral feet and ankle joints. Muscle strength is 5/5 in all groups bilaterally.       reducible IPJ digital contracture 2-3 b/l     Neurological: She is alert and oriented to person, place, and time.   [place] 5.07 monofilamant testing is diminished Vikash feet. Sharp/dull sensation diminished Bilaterally. Light touch absent Bilaterally.       Skin: Skin is warm and dry. No ecchymosis and no lesion noted. No erythema.   Nails x10 are elongated by  2-4mm's, thickened by 2-5 mm's, dystrophic, and are darkened in  coloration . Xerosis Bilaterally. No open lesions noted.     Psychiatric: She has a normal mood and affect. Her behavior is normal.   Vitals reviewed.            Assessment:       Encounter Diagnoses   Name Primary?    Type II diabetes mellitus with neurological manifestations Yes    Onychomycosis due to dermatophyte          Plan:       Alisia was seen today for pcp, diabetic foot exam, nail care and foot problem.    Diagnoses and all orders for this visit:    Type II diabetes mellitus with neurological manifestations    Onychomycosis due to dermatophyte      I counseled the patient on her conditions, their implications and medical management.        - Shoe " inspection. Diabetic Foot Education. Patient reminded of the importance of good nutrition and blood sugar control to help prevent podiatric complications of diabetes. Patient instructed on proper foot hygeine. We discussed wearing proper shoe gear, daily foot inspections, never walking without protective shoe gear, never putting sharp instruments to feet, routine podiatric nail visits every 2-3 months.      - With patient's permission, nails were aggressively reduced and debrided x 10 to their soft tissue attachment mechanically and with electric , removing all offending nail and debris. Patient relates relief following the procedure. She will continue to monitor the areas daily, inspect her feet, wear protective shoe gear when ambulatory, moisturizer to maintain skin integrity and follow in this office in approximately 2-3 months, sooner p.r.n.

## 2017-10-24 ENCOUNTER — TELEPHONE (OUTPATIENT)
Dept: ENDOCRINOLOGY | Facility: CLINIC | Age: 72
End: 2017-10-24

## 2017-10-27 NOTE — TELEPHONE ENCOUNTER
Spoke with patient about her blood sugar readings, She stated that she didn't mail the readings in yet. She wanted to give them over the phone.     10/23    Morning-161, 1 glimeprirde    Lunch-179, 6 units of Novolin     Dinner-133 7 units of Novolin    Before Bed -155 26 of Lantus Solostar    10-24    Morning-102 1 glimepride    Lunch -164 6 units of Novolin    Dinner-211 7 units of Novolin     Before Bed 216 26 Lantus Solostar

## 2017-11-02 ENCOUNTER — TELEPHONE (OUTPATIENT)
Dept: ENDOCRINOLOGY | Facility: HOSPITAL | Age: 72
End: 2017-11-02

## 2017-11-03 NOTE — TELEPHONE ENCOUNTER
Please call patient and inform her that I received her BG logs. Thank you for sending them.     Continue Glimepiride, Metformin, and Lantus 26 units.   Increase regular insulin with lunch to 8 units and increase regular insulin with dinner to 9 units as previously directed at last visit. Regular insulin should be administered about 30 minutes before your meal. Hold if you are skipping a meal. We may need to discuss adding Regular insulin with breakfast and getting rid of the Glimepiride at your next visit.   Please let me know if you have any questions or concerns.

## 2017-11-03 NOTE — TELEPHONE ENCOUNTER
Spoke with patient and she understands the new instructions that was giving to her. She also had some concerns about her insulin Novolin, she stated she doesn't think that its working for her. She doesn't take the insulin at noon like she suppose to, she takes it around 2.

## 2017-11-13 RX ORDER — DEXTROSE 4 G
1 TABLET,CHEWABLE ORAL DAILY
Qty: 1 EACH | Refills: 0 | Status: SHIPPED | OUTPATIENT
Start: 2017-11-13 | End: 2022-02-10

## 2017-11-13 NOTE — TELEPHONE ENCOUNTER
----- Message from Maxi Thacker sent at 11/13/2017  9:21 AM CST -----  Contact: self [phone]  Type: Orders Request    What orders/ testing are being requested? Mammogram      Is there a future appointment scheduled for the patient with PCP? Yes     When? 01/03/17    Comments:  Please advise, Thanks

## 2017-11-27 ENCOUNTER — TELEPHONE (OUTPATIENT)
Dept: INTERNAL MEDICINE | Facility: CLINIC | Age: 72
End: 2017-11-27

## 2017-11-27 DIAGNOSIS — Z12.31 ENCOUNTER FOR SCREENING MAMMOGRAM FOR BREAST CANCER: Primary | ICD-10-CM

## 2017-11-27 NOTE — TELEPHONE ENCOUNTER
----- Message from Alisha Cadena sent at 11/27/2017 11:04 AM CST -----  Contact: SElf [phone]  Patient would like to schedule their annual mammogram appointment, please enter the order and contact the patient to schedule.

## 2017-11-28 ENCOUNTER — OFFICE VISIT (OUTPATIENT)
Dept: GASTROENTEROLOGY | Facility: CLINIC | Age: 72
End: 2017-11-28
Payer: MEDICARE

## 2017-11-28 VITALS
HEIGHT: 62 IN | SYSTOLIC BLOOD PRESSURE: 139 MMHG | DIASTOLIC BLOOD PRESSURE: 77 MMHG | WEIGHT: 178.38 LBS | BODY MASS INDEX: 32.82 KG/M2 | HEART RATE: 78 BPM

## 2017-11-28 DIAGNOSIS — Z86.010 HISTORY OF COLON POLYPS: ICD-10-CM

## 2017-11-28 DIAGNOSIS — K21.9 GASTROESOPHAGEAL REFLUX DISEASE WITHOUT ESOPHAGITIS: Primary | ICD-10-CM

## 2017-11-28 PROCEDURE — 99499 UNLISTED E&M SERVICE: CPT | Mod: S$GLB,,, | Performed by: NURSE PRACTITIONER

## 2017-11-28 PROCEDURE — 99214 OFFICE O/P EST MOD 30 MIN: CPT | Mod: S$GLB,,, | Performed by: NURSE PRACTITIONER

## 2017-11-28 PROCEDURE — 99999 PR PBB SHADOW E&M-EST. PATIENT-LVL III: CPT | Mod: PBBFAC,,, | Performed by: NURSE PRACTITIONER

## 2017-11-28 RX ORDER — TERBINAFINE HYDROCHLORIDE 250 MG/1
250 TABLET ORAL DAILY
COMMUNITY
End: 2018-03-07

## 2017-11-28 NOTE — LETTER
November 29, 2017      CALILE Villalba MD  [address] [facility] [address]           [facility] Gastroenterology  [address]  [facility] [address]  Phone: [phone]  Fax: [phone]          Patient: Alisia Gusman   MR Number: [id number]   YOB: 1945   Date of Visit: 11/28/2017       Dear Dr. CALLIE Villalba:    Thank you for referring Alisia Gusman to me for evaluation. Attached you will find relevant portions of my assessment and plan of care.    If you have questions, please do not hesitate to call me. I look forward to following Alisia Gusman along with you.    Sincerely,    Deysi Lomax, SANG    Enclosure  CC:  No Recipients    If you would like to receive this communication electronically, please contact [email or web address] or [phone] to request more information on [employer] Link access.    For providers and/or their staff who would like to refer a patient to [facility], please contact us through our one-stop-shop provider referral line, [facility], at [phone].    If you feel you have received this communication in error or would no longer like to receive these types of communications, please e-mail [email or web address]

## 2017-11-28 NOTE — PROGRESS NOTES
[facility] Note    Reason for Visit:  The primary encounter diagnosis was Gastroesophageal reflux disease without esophagitis. A diagnosis of History of colon polyps was also pertinent to this visit.    PCP:   Selena Montemayor   [address] [facility] [place] [address]    Referring MD:  CALLIE Villalba Md  [address] [place]  [place] [address]    HPI:  This is a 72 y.o. female here for evaluation of Gerd. Ms. Gusman was last seen in clinic in 2012 with Dr. Ying. She is new to me.     Hx of Gerd x years. Having acidic taste in mouth and regurgitation occurring nightly. Began Protonix 40 mg before dinner in August. Reflux symptoms improved still occurring a few times per week. Stille eating spicy food frequently. Denies dysphagia and odynophagia. NSAID usage- none. Last EGD 4/2015 A single fundic gland polyp. Two gastric polyps bx benign. Otherwise normal.     Normal formed stool daily. Denies abdominal pain, blood in stool, and black tarry stool.     ROS:  Constitutional: No fevers, no chills, No unintentional weight loss, no fatigue   ENT: No allergies  CV: No chest pain, no palpitations, no perif. edema  Pulm: No cough, No shortness of breath, no wheezes, no sputum  Ophtho: No vision changes  GI: see HPI; also no nausea, no vomiting, no change in appetite  Derm: No rash  Heme: No lymphadenopathy, No bruising  MSK: No arthritis, no muscle pain, no muscle weakness  : No dysuria, No hematuria  Endo: No hot or cold intolerance  Neuro: No syncope, No seizure     Medical History:  has a past medical history of Adult bronchiectasis (7/26/2017); Allergy; Anemia; Arthritis; Asthma; Cancer (1993); Cataract; Chronic cervical radiculopathy (9/20/2012); Diabetes mellitus; Diabetes mellitus type II; Diabetes with neurologic complications; Diverticulosis; Dry eyes; Dysphagia; GERD (gastroesophageal reflux disease); Hyperlipidemia; Hypertension; Neuropathy; Scalp tenderness; Shortness of breath;  "and Ulcer.    Surgical History:  has a past surgical history that includes Cholecystectomy; Breast surgery; Carpal tunnel release (Bilateral, 2011); uvuloplasty; Hysterectomy; Joint replacement (Left, June 2014); Cataract extraction w/  intraocular lens implant (Bilateral, 2013 and 2014); and Colonoscopy (N/A, 11/6/2015).    Family History: family history includes Arthritis in her sister; Cancer in her sister and sister; Colon polyps in her sister and sister; Diabetes in her mother, sister, and sister; Heart disease in her father; No Known Problems in her brother, daughter, daughter, and son; Psoriasis in her sister..     Social History:  reports that she has never smoked. She has never used smokeless tobacco. She reports that she does not drink alcohol or use drugs.    Review of patient's allergies indicates:   Allergen Reactions    Nubain [nalbuphine] Other (See Comments)     Other reaction(s): Hypotension    Grass pollen-june grass standard     Losartan      cough    Sinus & allergy [chlorpheniramine-phenylephrine]      Current Outpatient Prescriptions   Medication Sig    ACCU-CHEK DOMENICO PLUS TEST STRP Strp USE THREE TIMES DAILY AS DIRECTED    ACCU-CHEK SOFTCLIX LANCETS Misc 1 each by Misc.(Non-Drug; Combo Route) route 3 (three) times daily.    ALCOHOL ANTISEPTIC PADS (ALCOHOL PREP PADS TOP)     aspirin 81 MG chewable tablet Take 1 tablet by mouth At bedtime.    BD ALCOHOL SWABS PadM 1 each 2 (two) times daily.    BD ULTRA-FINE SUSIE PEN NEEDLES 32 gauge x 5/32" Ndle USE  TO  INJECT  ONE  TIME  DAILY    blood-glucose meter Misc 1 each by Misc.(Non-Drug; Combo Route) route once daily.    calcium carbonate-vitamin D3 (CALTRATE 600 + D) 600 mg(1,500mg) -400 unit Chew 1 tablet once daily.     cyanocobalamin (VITAMIN B-12) 1000 MCG tablet Take 100 mcg by mouth once daily.    FOLIC ACID/MULTIVIT-MIN/LUTEIN (CENTRUM SILVER ORAL) Take 1 tablet by mouth once daily.    gabapentin (NEURONTIN) 300 MG capsule " "300mg in AM, 300mg in PM and 600mg at bedtime (Patient taking differently: 300mg in AM, 300mg in Afternoon, 300 mg in the evening and 600mg at bedtime)    glimepiride (AMARYL) 4 MG tablet Take 1 tablet (4 mg total) by mouth daily with breakfast.    hydrochlorothiazide (HYDRODIURIL) 25 MG tablet Take 1 tablet (25 mg total) by mouth once daily.    insulin glargine (LANTUS SOLOSTAR) 100 unit/mL (3 mL) InPn pen Inject 26 Units into the skin once daily. If over 150 then use 28 units    insulin regular 100 unit/mL Inj injection RELION brand  Inject 8 units with lunch and 9 units with dinner.    insulin syringe-needle U-100 (BD INSULIN SYRINGE ULTRA-FINE) 0.3 mL 31 gauge x 15/64" Syrg 1 each by Misc.(Non-Drug; Combo Route) route 2 (two) times daily.    metformin (GLUCOPHAGE-XR) 750 MG 24 hr tablet Take 1 tablet (750 mg total) by mouth once daily.    pantoprazole (PROTONIX) 40 MG tablet Take 1 tablet (40 mg total) by mouth once daily.    potassium chloride (KLOR-CON) 10 MEQ TbSR TAKE 1 TABLET ONE TIME DAILY    pravastatin (PRAVACHOL) 40 MG tablet TAKE 1 TABLET (40 MG TOTAL) BY MOUTH NIGHTLY.    ranitidine (ZANTAC) 300 MG tablet Take 1 tablet (300 mg total) by mouth nightly.    terbinafine HCl (LAMISIL) 250 mg tablet Take 250 mg by mouth once daily.     No current facility-administered medications for this visit.      Objective Findings:    Vital Signs:  /77   Pulse 78   Ht 5' 2" (1.575 m)   Wt 80.9 kg (178 lb 5.6 oz)   BMI 32.62 kg/m²   Body mass index is 32.62 kg/m².    Physical Exam:  General Appearance: Well appearing in no acute distress  Head: Normocephalic, without obvious abnormality  Eyes: No scleral icterus, EOMI  ENT: Neck supple, Lips, mucosa, and tongue normal; teeth and gums normal  Lungs: CTA bilaterally in anterior and posterior fields, no wheezes, no crackles.  Heart: Regular rate and rhythm, no murmurs heard  Abdomen: Soft, non tender, non distended with positive bowel sounds in all " four quadrants.  Extremities: No clubbing, cyanosis or edema  Skin: No rash to exposed areas  Neurologic: AAOx4    Labs:  Lab Results   Component Value Date    WBC 6.38 09/14/2017    HGB 13.3 09/14/2017    HCT 40.0 09/14/2017     09/14/2017    CHOL 169 06/15/2017    TRIG 253 (H) 06/15/2017    HDL 59 06/15/2017    ALT 18 09/14/2017    AST 20 09/14/2017     09/14/2017    K 3.7 09/14/2017     09/14/2017    CREATININE 0.9 09/14/2017    BUN 13 09/14/2017    CO2 28 09/14/2017    TSH 1.938 06/07/2017    INR 1.0 07/05/2014    HGBA1C 7.9 (H) 09/14/2017     Endoscopy:    EGD- 4/22/2015- A single fundic gland polyp. Two gastric polyps bx benign. Otherwise normal.     Colonoscopy- 11/6/2015- ascending colon polyp removed, transverse colon polyps x 3 removed, descending colon polyp removed. Diverticulosis in the entire examined colon. Repeat in 3 years.     Assessment:  1. Gastroesophageal reflux disease without esophagitis    2. History of colon polyps      Recommendations:  1. Continue taking Protonix 40 mg in morning and add Zantac 300 mg at night. Sent to pharmacy.   2. Repeat colonoscopy 2018 and pt understood.     Return in about 8 weeks (around 1/23/2018).    Order summary:  Orders Placed This Encounter    ranitidine (ZANTAC) 300 MG tablet     Thank you so much for allowing me to participate in the care of TRACEY Roa, FNP-C

## 2017-12-07 ENCOUNTER — HOSPITAL ENCOUNTER (OUTPATIENT)
Dept: RADIOLOGY | Facility: HOSPITAL | Age: 72
Discharge: HOME | End: 2017-12-07
Attending: INTERNAL MEDICINE
Payer: MEDICARE

## 2017-12-07 DIAGNOSIS — Z12.31 ENCOUNTER FOR SCREENING MAMMOGRAM FOR BREAST CANCER: ICD-10-CM

## 2017-12-07 PROCEDURE — 77067 SCR MAMMO BI INCL CAD: CPT | Mod: TC,PO

## 2017-12-07 PROCEDURE — 77067 SCR MAMMO BI INCL CAD: CPT | Mod: 26,,, | Performed by: RADIOLOGY

## 2017-12-07 PROCEDURE — 77063 BREAST TOMOSYNTHESIS BI: CPT | Mod: 26,,, | Performed by: RADIOLOGY

## 2017-12-18 DIAGNOSIS — E66.9 DIABETES MELLITUS TYPE 2 IN OBESE: ICD-10-CM

## 2017-12-18 DIAGNOSIS — E11.69 DIABETES MELLITUS TYPE 2 IN OBESE: ICD-10-CM

## 2017-12-18 RX ORDER — INSULIN GLARGINE 100 [IU]/ML
INJECTION, SOLUTION SUBCUTANEOUS
Qty: 30 ML | Refills: 4 | Status: SHIPPED | OUTPATIENT
Start: 2017-12-18 | End: 2018-09-18

## 2017-12-18 RX ORDER — BLOOD SUGAR DIAGNOSTIC
STRIP MISCELLANEOUS
Qty: 300 STRIP | Refills: 3 | Status: SHIPPED | OUTPATIENT
Start: 2017-12-18 | End: 2018-10-19 | Stop reason: SDUPTHER

## 2017-12-18 NOTE — TELEPHONE ENCOUNTER
----- Message from Geogria Gant sent at 12/18/2017  9:51 AM CST -----  Contact: Pt  [phone]  Dipp   -  Pt calling to get an refill on medication Lancets  And  ACCU-CHEK DOMENICO PLUS TEST STRP Strp  , call the pt back to verify . Pt stated that an new prescription is needed through [facility]  back number [phone]  Thanks,

## 2017-12-19 NOTE — TELEPHONE ENCOUNTER
Left pt a message letting her know that both requested scripts were sent to her pharmacy on yesterday.    ----- Message from Dominga Chance sent at 12/19/2017  8:34 AM CST -----  Contact: Self  Needs the following meds send to:  LANTUS SOLOSTAR 100 unit/mL (3 mL) InPn pen  ACCU-CHEK DOMENICO PLUS TEST STRP Strp      ..  [facility] - [place], [place] - [address]  [address] [facility] [address]  Phone: [phone] Fax: [phone]

## 2018-01-03 ENCOUNTER — OFFICE VISIT (OUTPATIENT)
Dept: INTERNAL MEDICINE | Facility: CLINIC | Age: 73
End: 2018-01-03
Payer: MEDICARE

## 2018-01-03 VITALS
SYSTOLIC BLOOD PRESSURE: 134 MMHG | DIASTOLIC BLOOD PRESSURE: 68 MMHG | OXYGEN SATURATION: 98 % | TEMPERATURE: 98 F | WEIGHT: 176 LBS | HEIGHT: 62 IN | BODY MASS INDEX: 32.39 KG/M2 | HEART RATE: 85 BPM

## 2018-01-03 DIAGNOSIS — I70.0 AORTIC ATHEROSCLEROSIS: ICD-10-CM

## 2018-01-03 DIAGNOSIS — Z79.4 CURRENT USE OF INSULIN: ICD-10-CM

## 2018-01-03 DIAGNOSIS — E66.9 DIABETES MELLITUS TYPE 2 IN OBESE: Primary | ICD-10-CM

## 2018-01-03 DIAGNOSIS — R00.2 PALPITATIONS: ICD-10-CM

## 2018-01-03 DIAGNOSIS — E11.69 DIABETES MELLITUS TYPE 2 IN OBESE: Primary | ICD-10-CM

## 2018-01-03 DIAGNOSIS — E11.59 HYPERTENSION ASSOCIATED WITH DIABETES: ICD-10-CM

## 2018-01-03 DIAGNOSIS — K76.0 FATTY LIVER: ICD-10-CM

## 2018-01-03 DIAGNOSIS — E87.6 DRUG-INDUCED HYPOKALEMIA: ICD-10-CM

## 2018-01-03 DIAGNOSIS — E11.69 HYPERLIPIDEMIA ASSOCIATED WITH TYPE 2 DIABETES MELLITUS: ICD-10-CM

## 2018-01-03 DIAGNOSIS — K21.9 GASTROESOPHAGEAL REFLUX DISEASE, ESOPHAGITIS PRESENCE NOT SPECIFIED: ICD-10-CM

## 2018-01-03 DIAGNOSIS — I15.2 HYPERTENSION ASSOCIATED WITH DIABETES: ICD-10-CM

## 2018-01-03 DIAGNOSIS — T50.905A DRUG-INDUCED HYPOKALEMIA: ICD-10-CM

## 2018-01-03 DIAGNOSIS — J47.9 ADULT BRONCHIECTASIS: ICD-10-CM

## 2018-01-03 DIAGNOSIS — E11.42 DIABETIC PERIPHERAL NEUROPATHY: ICD-10-CM

## 2018-01-03 DIAGNOSIS — E78.5 HYPERLIPIDEMIA ASSOCIATED WITH TYPE 2 DIABETES MELLITUS: ICD-10-CM

## 2018-01-03 PROCEDURE — 99214 OFFICE O/P EST MOD 30 MIN: CPT | Mod: S$GLB,,, | Performed by: INTERNAL MEDICINE

## 2018-01-03 PROCEDURE — 99999 PR PBB SHADOW E&M-EST. PATIENT-LVL IV: CPT | Mod: PBBFAC,,, | Performed by: INTERNAL MEDICINE

## 2018-01-03 PROCEDURE — 99499 UNLISTED E&M SERVICE: CPT | Mod: S$GLB,,, | Performed by: INTERNAL MEDICINE

## 2018-01-03 RX ORDER — ATENOLOL 25 MG/1
12.5 TABLET ORAL DAILY
Qty: 45 TABLET | Refills: 3 | Status: SHIPPED | OUTPATIENT
Start: 2018-01-03 | End: 2018-11-29 | Stop reason: SDUPTHER

## 2018-01-03 RX ORDER — POTASSIUM CHLORIDE 750 MG/1
TABLET, EXTENDED RELEASE ORAL
Qty: 90 TABLET | Refills: 3 | Status: SHIPPED | OUTPATIENT
Start: 2018-01-03 | End: 2018-08-28 | Stop reason: SDUPTHER

## 2018-01-03 RX ORDER — PRAVASTATIN SODIUM 40 MG/1
TABLET ORAL
Qty: 90 TABLET | Refills: 3 | Status: SHIPPED | OUTPATIENT
Start: 2018-01-03 | End: 2019-01-16 | Stop reason: SDUPTHER

## 2018-01-03 RX ORDER — GABAPENTIN 300 MG/1
CAPSULE ORAL
Qty: 360 CAPSULE | Refills: 3 | Status: SHIPPED | OUTPATIENT
Start: 2018-01-03 | End: 2018-10-17 | Stop reason: SDUPTHER

## 2018-01-03 RX ORDER — SYRINGE AND NEEDLE,INSULIN,1ML 31GX15/64"
1 SYRINGE, EMPTY DISPOSABLE MISCELLANEOUS 2 TIMES DAILY
Qty: 200 SYRINGE | Refills: 0 | Status: SHIPPED | OUTPATIENT
Start: 2018-01-03 | End: 2018-03-23 | Stop reason: SDUPTHER

## 2018-01-03 NOTE — PROGRESS NOTES
"Subjective:       Patient ID: Alisia Gusman is a 72 y.o. female.    Chief Complaint: DM2 follow up    HPI   Pt is well known to me. Last seen 9/21/17.   Onychomycosis of fingers. Tried on lamisil for 3 mo and it's helped immensely. Off of it x 3 weeks now as humana didn't have lamisil.     DM2 - decreased 7/2017 metformin to xr 750mg daily from BID 2/2 diarrhea. Increased amaryl from 2mg to 4mg qam w/ breakfast. Continued on lantus 26 units daily (if glucose over 150, use 28 units). Seen in endocrinology w/ Adrianne Licea NP 10/9/17 and added regular 6 u before lunch and 9 u before dinner. This was eventually inc to 8 units before lunch. F/u appt w/ Dr. Nelson 1/29/18  Checks glucose TID. Reviewed pt's glucose log.  a1c 9/14/17 was 7.9  MAC 4/6/17 neg  Foot - 10/23/17 w/ Dr. Dao.  Asa 81mg daily, pravastatin 40mg daily  LDL 59.4    GERD - protonix 40mg qam and added zantac 300mg qhs. Seen in GI. Repeat cscope in 2018.   Esophagram - mild esophageal dysmotility. Small hiatal hernia and slight narrowing in the distal esophagus. Small gastric polyp. Mild GERD.   Upcoming appt w/ Deysi Lomax NP 1/23/18    Chronic cough. Adult bronchiectasis. No fevers/chills. Reports cough is no longer productive. Dry cough occasionally.     CT A/P 9/2012 w/ aortic atherosclerosis. On pravastatin 40mg dialy and asa 81mg daily.    Review of Systems   Constitutional: Negative for chills and fever.   HENT: Negative.    Respiratory: Positive for cough. Negative for shortness of breath and wheezing.    Cardiovascular: Positive for palpitations (sometimes it gets very "heavy" - 2-3days out of the week. last for a second. not assoc w/ CP/SOB/lightheadedness. had Holter in 2/2017 neg). Negative for chest pain and leg swelling.   Gastrointestinal: Negative.    Genitourinary: Negative.    Skin: Positive for rash.   Neurological: Positive for numbness (in the feet. chronic. ). Negative for dizziness, weakness, light-headedness and " "headaches.         Objective:      Physical Exam    /68 (BP Location: Right arm, Patient Position: Sitting, BP Method: Large (Manual))   Pulse 85   Temp 97.9 °F (36.6 °C)   Ht 5' 2" (1.575 m)   Wt 79.8 kg (176 lb)   LMP  (LMP Unknown)   SpO2 98%   BMI 32.19 kg/m²     GEN - A+OX4, NAD   HEENT - PERRL, EOMI, OP clear. MMM. TM dullness.  Neck - No thyromegaly or cervical LAD. No thyroid masses felt.  CV - RRR, no m/r   Chest - CTAB, no wheezing or rhonchi  Abd - S/NT/ND/+BS.   Ext - 2+B radial pulses. No LE edema.  Neuro - 2+ DTRs. Normal gait.   Skin - No rash. Fingernails are much better. Toenails are not improved..    Labs reviewed.    Assessment/Plan     Alisia was seen today for annual exam.    Diagnoses and all orders for this visit:    Diabetes mellitus type 2 in obese  -     insulin syringe-needle U-100 (BD INSULIN SYRINGE ULTRA-FINE) 0.3 mL 31 gauge x 15/64" Syrg; 1 each by Misc.(Non-Drug; Combo Route) route 2 (two) times daily.    Hyperlipidemia associated with type 2 diabetes mellitus  -     pravastatin (PRAVACHOL) 40 MG tablet; TAKE 1 TABLET (40 MG TOTAL) BY MOUTH NIGHTLY.    Gastroesophageal reflux disease, esophagitis presence not specified - cont nexium    Adult bronchiectasis - cough much improved after PPI.     Current use of insulin  -     insulin syringe-needle U-100 (BD INSULIN SYRINGE ULTRA-FINE) 0.3 mL 31 gauge x 15/64" Syrg; 1 each by Misc.(Non-Drug; Combo Route) route 2 (two) times daily.    Type 2 diabetes mellitus, uncontrolled, with neuropathy  -     insulin syringe-needle U-100 (BD INSULIN SYRINGE ULTRA-FINE) 0.3 mL 31 gauge x 15/64" Syrg; 1 each by Misc.(Non-Drug; Combo Route) route 2 (two) times daily.    Aortic atherosclerosis - cont asa 81 and statin.     Hypertension associated with diabetes - Stable and controlled. Continue current medications.  -     atenolol (TENORMIN) 25 MG tablet; Take 0.5 tablets (12.5 mg total) by mouth once daily.    Palpitations - add 1/2 of atenolol " "25mg daily to see if helps w/ palpitations. 48hr holter neg.  -     atenolol (TENORMIN) 25 MG tablet; Take 0.5 tablets (12.5 mg total) by mouth once daily.    Diabetic peripheral neuropathy  -     gabapentin (NEURONTIN) 300 MG capsule; 300mg in AM, 300mg in PM and 600mg at bedtime  -     insulin syringe-needle U-100 (BD INSULIN SYRINGE ULTRA-FINE) 0.3 mL 31 gauge x 15/64" Syrg; 1 each by Misc.(Non-Drug; Combo Route) route 2 (two) times daily.    Drug-induced hypokalemia  -     potassium chloride (KLOR-CON) 10 MEQ TbSR; TAKE 1 TABLET ONE TIME DAILY    Fatty liver  -     US Abdomen Complete; Future      Return in about 4 months (around 5/3/2018).      Selena Montemayor MD  Department of Internal Medicine - [facility]  10:45 AM  "

## 2018-01-08 ENCOUNTER — HOSPITAL ENCOUNTER (OUTPATIENT)
Dept: RADIOLOGY | Facility: HOSPITAL | Age: 73
Discharge: HOME | End: 2018-01-08
Attending: INTERNAL MEDICINE
Payer: MEDICARE

## 2018-01-08 DIAGNOSIS — K76.0 FATTY LIVER: ICD-10-CM

## 2018-01-08 PROCEDURE — 76700 US EXAM ABDOM COMPLETE: CPT | Mod: 26,,, | Performed by: RADIOLOGY

## 2018-01-08 PROCEDURE — 76700 US EXAM ABDOM COMPLETE: CPT | Mod: TC

## 2018-01-11 ENCOUNTER — TELEPHONE (OUTPATIENT)
Dept: INTERNAL MEDICINE | Facility: CLINIC | Age: 73
End: 2018-01-11

## 2018-01-11 NOTE — TELEPHONE ENCOUNTER
Please call and let pt know that her US shows fatty liver, which is not new. There are no masses seen. Liver function is ok though. I recommend keep working on weight loss as that can help with the fatty liver. We'll repeat the ultrasound in a year.

## 2018-01-16 ENCOUNTER — OFFICE VISIT (OUTPATIENT)
Dept: OTOLARYNGOLOGY | Facility: CLINIC | Age: 73
End: 2018-01-16
Payer: MEDICARE

## 2018-01-16 VITALS
SYSTOLIC BLOOD PRESSURE: 148 MMHG | HEIGHT: 62 IN | HEART RATE: 75 BPM | DIASTOLIC BLOOD PRESSURE: 75 MMHG | WEIGHT: 183 LBS | BODY MASS INDEX: 33.68 KG/M2

## 2018-01-16 DIAGNOSIS — R05.3 CHRONIC COUGH: ICD-10-CM

## 2018-01-16 DIAGNOSIS — J31.0 CHRONIC RHINITIS, UNSPECIFIED TYPE: Primary | ICD-10-CM

## 2018-01-16 DIAGNOSIS — K21.9 LARYNGOPHARYNGEAL REFLUX (LPR): ICD-10-CM

## 2018-01-16 DIAGNOSIS — H68.002 SALPINGITIS OF LEFT EUSTACHIAN TUBE: ICD-10-CM

## 2018-01-16 PROCEDURE — 31575 DIAGNOSTIC LARYNGOSCOPY: CPT | Mod: S$GLB,,, | Performed by: OTOLARYNGOLOGY

## 2018-01-16 PROCEDURE — 99204 OFFICE O/P NEW MOD 45 MIN: CPT | Mod: 25,S$GLB,, | Performed by: OTOLARYNGOLOGY

## 2018-01-16 PROCEDURE — 99499 UNLISTED E&M SERVICE: CPT | Mod: S$GLB,,, | Performed by: OTOLARYNGOLOGY

## 2018-01-16 PROCEDURE — 99999 PR PBB SHADOW E&M-EST. PATIENT-LVL III: CPT | Mod: PBBFAC,,, | Performed by: OTOLARYNGOLOGY

## 2018-01-16 RX ORDER — PANTOPRAZOLE SODIUM 40 MG/1
40 TABLET, DELAYED RELEASE ORAL
Qty: 180 TABLET | Refills: 1 | Status: SHIPPED | OUTPATIENT
Start: 2018-01-16 | End: 2018-07-28 | Stop reason: SDUPTHER

## 2018-01-16 NOTE — PROGRESS NOTES
Subjective:      Alisia Gusman is a 72 y.o. female who was self-referred for aural fullness.    She relates a several-month history of daily left-sided aural fullness, ear pain, and associated throat pain and feeling of something draining from the ear to the throat.  She denies hearing loss but notes tinnitus, and also describes a runny nose and occasional facial pressure sensation.  She notes baseline nasal congestion and hyposmia which are longstanding, and have not improved with Flonase.  She relates having an EGD recently that was normal, but was recently placed on pantoprazole by Dr. Villalba, which she takes once a day before supper.  She denies pruritic symptoms and denies prior allergy testing or history of asthma.  She denies prior nasal trauma os surgery.    SNOT-22 score = 19, NOSE score = 15%, ETDQ-7 score = 2.9    Global QOL = 50%    Days missed = Less than 5      Past Medical History  She has a past medical history of Adult bronchiectasis; Allergy; Anemia; Arthritis; Asthma; Breast cancer; Cancer; Cataract; Chronic cervical radiculopathy; Diabetes mellitus; Diabetes mellitus type II; Diabetes with neurologic complications; Diverticulosis; Dry eyes; Dysphagia; GERD (gastroesophageal reflux disease); Hyperlipidemia; Hypertension; Neuropathy; Scalp tenderness; Shortness of breath; and Ulcer.    Past Surgical History  She has a past surgical history that includes Cholecystectomy; Carpal tunnel release (Bilateral, 2011); uvuloplasty; Hysterectomy; Joint replacement (Left, June 2014); Cataract extraction w/  intraocular lens implant (Bilateral, 2013 and 2014); Colonoscopy (N/A, 11/6/2015); Breast biopsy (1993); and Breast lumpectomy (Left).    Family History  Her family history includes Arthritis in her sister; Cancer in her sister and sister; Colon polyps in her sister and sister; Diabetes in her mother, sister, and sister; Heart disease in her father; No Known Problems in her brother, daughter, daughter,  and son; Psoriasis in her sister.    Social History  She reports that she has never smoked. She has never used smokeless tobacco. She reports that she does not drink alcohol or use drugs.    Allergies  She is allergic to nubain [nalbuphine]; grass pollen-tim grass standard; losartan; and sinus & allergy [chlorpheniramine-phenylephrine].    Medications   She has a current medication list which includes the following prescription(s): accu-chek shakila plus test strp, accu-chek softclix lancets, alcohol antiseptic pads, aspirin, atenolol, bd alcohol swabs, bd ultra-fine leona pen needles, blood-glucose meter, calcium carbonate-vitamin d3, cyanocobalamin, folic acid/multivit-min/lutein, gabapentin, glimepiride, hydrochlorothiazide, insulin regular, insulin syringe-needle u-100, lantus solostar, metformin, potassium chloride, pravastatin, ranitidine, terbinafine hcl, and pantoprazole.    Review of Systems  Review of Systems   Constitutional: Negative for fatigue, fever and unexpected weight change.   HENT: Positive for ear discharge, rhinorrhea, sinus pressure, tinnitus and trouble swallowing. Negative for congestion, dental problem, ear pain, facial swelling, hearing loss, hoarse voice, nosebleeds, postnasal drip, sore throat and voice change.    Eyes: Negative for photophobia, discharge, itching and visual disturbance.   Respiratory: Positive for apnea, cough and shortness of breath. Negative for wheezing.    Cardiovascular: Negative for chest pain and palpitations.   Gastrointestinal: Negative for abdominal pain, nausea and vomiting.   Endocrine: Negative for cold intolerance and heat intolerance.   Genitourinary: Negative for difficulty urinating.   Musculoskeletal: Positive for arthralgias. Negative for back pain, myalgias and neck pain.   Skin: Negative for rash.   Allergic/Immunologic: Positive for environmental allergies. Negative for food allergies.   Neurological: Negative for dizziness, seizures, syncope,  "weakness and headaches.   Hematological: Negative for adenopathy. Does not bruise/bleed easily.   Psychiatric/Behavioral: Negative for decreased concentration, dysphoric mood and sleep disturbance. The patient is not nervous/anxious.           Objective:     BP (!) 148/75   Pulse 75   Ht 5' 2" (1.575 m)   Wt 83 kg (183 lb)   LMP  (LMP Unknown)   BMI 33.47 kg/m²        Constitutional:   She appears well-developed. She is cooperative. Normal speech.  No hoarse voice.      Head:  Normocephalic. Salivary glands normal.  Facial strength is normal.      Ears:    Right Ear: No drainage or tenderness. Tympanic membrane is not perforated. Tympanic membrane mobility is normal. No middle ear effusion. No decreased hearing is noted.   Left Ear: No drainage or tenderness. Tympanic membrane is not perforated. Tympanic membrane mobility is normal.  No middle ear effusion. No decreased hearing is noted.     Nose:  No mucosal edema, rhinorrhea, septal deviation or polyps. No epistaxis. Turbinates normal, no turbinate masses and no turbinate hypertrophy.  Right sinus exhibits no maxillary sinus tenderness and no frontal sinus tenderness. Left sinus exhibits no maxillary sinus tenderness and no frontal sinus tenderness.     Mouth/Throat  Oropharynx clear and moist without lesions or asymmetry and normal uvula midline. She does not have dentures. Normal dentition. No oral lesions or mucous membrane lesions. No oropharyngeal exudate or posterior oropharyngeal erythema. Mirror exam not performed due to patient tolerance.  Mirror exam not performed due to patient tolerance.      Neck:  Neck normal without thyromegaly masses, asymmetry, normal tracheal structure, crepitus, and tenderness, thyroid normal, trachea normal and no adenopathy. Normal range of motion present.     She has no cervical adenopathy.     Cardiovascular:   Regular rhythm.      Pulmonary/Chest:   Effort normal.     Psychiatric:   She has a normal mood and affect. " Her speech is normal and behavior is normal.     Neurological:   No cranial nerve deficit.     Skin:   No rash noted.       Procedure    Flexible laryngoscopy performed.  See procedure note.     Left nasal valve     Left MT     Left middle meatus     Left choana     Right nasal valve     Right MT     Right choana     Nasopharynx     Postcricoid edema     Larynx        Data Reviewed    WBC (K/uL)   Date Value   09/14/2017 6.38     Eosinophil% (%)   Date Value   09/14/2017 2.0     Eos # (K/uL)   Date Value   09/14/2017 0.1     Platelets (K/uL)   Date Value   09/14/2017 180     Glucose (mg/dL)   Date Value   09/14/2017 173 (H)     No results found for: IGE    No sinus imaging available.       Assessment:     1. Chronic rhinitis, unspecified type    2. Salpingitis of left eustachian tube    3. Laryngopharyngeal reflux (LPR)    4. Chronic cough         Plan:     I had a long discussion with the patient regarding her condition and the further workup and management options.  I reassured her as to the benign nature of today's findings.  I prescribed an increase of her pantoprazole to 40mg BID AC to treat extra-esophageal reflux as a contributor to her symptoms.  I advised that she may continue flonase as well.    Follow-up in about 3 months (around 4/16/2018).

## 2018-01-16 NOTE — PROCEDURES
Laryngoscopy  Date/Time: 1/16/2018 4:51 PM  Performed by: GEORGE SMITH  Authorized by: GEORGE SMITH     Consent Done?:  Yes (Verbal)  Anesthesia:     Local anesthetic:  Lidocaine 4% and Rene-Synephrine 1/2%    Patient tolerance:  Patient tolerated the procedure well with no immediate complications  Laryngoscopy:     Areas examined:  Nasopharynx, oropharynx, hypopharynx, larynx, vocal cords and nasal cavities    Laryngoscope size:  4 mm  Nose Intranasal:      Mucosa no polyps     No mucosa lesions present     No septum gross deformity     Turbinates not enlarged  Nasopharynx:      No mucosa lesions     Adenoids not present     Posterior choanae patent     Eustachian tube patent  Larynx/hypopharynx:      No epiglottis lesions     No epiglottis edema     No AE folds lesions     No vocal cord polyps     Equal and normal bilateral     No hypopharynx lesions     No piriform sinus pooling     No piriform sinus lesions     Post cricoid edema     No post cricoid erythema     Sinonasal exam wnl.  Mild posterior MT hypertrophy and mucus.  Moderate postcricoid edema.

## 2018-01-22 ENCOUNTER — LAB VISIT (OUTPATIENT)
Dept: LAB | Facility: HOSPITAL | Age: 73
End: 2018-01-22
Payer: MEDICARE

## 2018-01-22 DIAGNOSIS — E78.2 MIXED HYPERLIPIDEMIA: ICD-10-CM

## 2018-01-22 LAB
ALBUMIN SERPL BCP-MCNC: 3.6 G/DL
ALP SERPL-CCNC: 55 U/L
ALT SERPL W/O P-5'-P-CCNC: 12 U/L
ANION GAP SERPL CALC-SCNC: 9 MMOL/L
AST SERPL-CCNC: 16 U/L
BILIRUB SERPL-MCNC: 0.9 MG/DL
BUN SERPL-MCNC: 16 MG/DL
CALCIUM SERPL-MCNC: 9.7 MG/DL
CHLORIDE SERPL-SCNC: 104 MMOL/L
CHOLEST SERPL-MCNC: 170 MG/DL
CHOLEST/HDLC SERPL: 2.9 {RATIO}
CO2 SERPL-SCNC: 29 MMOL/L
CREAT SERPL-MCNC: 0.9 MG/DL
EST. GFR  (AFRICAN AMERICAN): >60 ML/MIN/1.73 M^2
EST. GFR  (NON AFRICAN AMERICAN): >60 ML/MIN/1.73 M^2
ESTIMATED AVG GLUCOSE: 171 MG/DL
GLUCOSE SERPL-MCNC: 136 MG/DL
HBA1C MFR BLD HPLC: 7.6 %
HDLC SERPL-MCNC: 58 MG/DL
HDLC SERPL: 34.1 %
LDLC SERPL CALC-MCNC: 78.4 MG/DL
NONHDLC SERPL-MCNC: 112 MG/DL
POTASSIUM SERPL-SCNC: 3.7 MMOL/L
PROT SERPL-MCNC: 6.7 G/DL
SODIUM SERPL-SCNC: 142 MMOL/L
TRIGL SERPL-MCNC: 168 MG/DL

## 2018-01-22 PROCEDURE — 80053 COMPREHEN METABOLIC PANEL: CPT

## 2018-01-22 PROCEDURE — 36415 COLL VENOUS BLD VENIPUNCTURE: CPT

## 2018-01-22 PROCEDURE — 80061 LIPID PANEL: CPT

## 2018-01-22 PROCEDURE — 83036 HEMOGLOBIN GLYCOSYLATED A1C: CPT

## 2018-01-29 ENCOUNTER — OFFICE VISIT (OUTPATIENT)
Dept: ENDOCRINOLOGY | Facility: CLINIC | Age: 73
End: 2018-01-29
Payer: MEDICARE

## 2018-01-29 VITALS
HEIGHT: 62 IN | DIASTOLIC BLOOD PRESSURE: 76 MMHG | HEART RATE: 60 BPM | WEIGHT: 181 LBS | SYSTOLIC BLOOD PRESSURE: 124 MMHG | BODY MASS INDEX: 33.31 KG/M2 | RESPIRATION RATE: 16 BRPM

## 2018-01-29 DIAGNOSIS — E66.9 DIABETES MELLITUS TYPE 2 IN OBESE: ICD-10-CM

## 2018-01-29 DIAGNOSIS — E11.69 DIABETES MELLITUS TYPE 2 IN OBESE: ICD-10-CM

## 2018-01-29 DIAGNOSIS — Z79.4 ENCOUNTER FOR LONG-TERM (CURRENT) USE OF INSULIN: ICD-10-CM

## 2018-01-29 DIAGNOSIS — E11.40 TYPE 2 DIABETES, CONTROLLED, WITH NEUROPATHY: ICD-10-CM

## 2018-01-29 DIAGNOSIS — E11.42 DIABETIC POLYNEUROPATHY ASSOCIATED WITH TYPE 2 DIABETES MELLITUS: Primary | ICD-10-CM

## 2018-01-29 PROCEDURE — 99214 OFFICE O/P EST MOD 30 MIN: CPT | Mod: S$GLB,,, | Performed by: INTERNAL MEDICINE

## 2018-01-29 PROCEDURE — 99499 UNLISTED E&M SERVICE: CPT | Mod: S$GLB,,, | Performed by: INTERNAL MEDICINE

## 2018-01-29 PROCEDURE — 99999 PR PBB SHADOW E&M-EST. PATIENT-LVL III: CPT | Mod: PBBFAC,,, | Performed by: INTERNAL MEDICINE

## 2018-01-29 RX ORDER — METFORMIN HYDROCHLORIDE 750 MG/1
TABLET, EXTENDED RELEASE ORAL
Qty: 90 TABLET | Refills: 3
Start: 2018-01-29 | End: 2018-02-15 | Stop reason: SDUPTHER

## 2018-01-29 NOTE — PROGRESS NOTES
"Subjective:     Patient ID: Alisia Gusman is a 72 y.o. female.    Chief Complaint: Diabetes    HPI:   Ms. Gusman is a 72 y.o. female who is here for a follow-up visit for evaluation of type 2 diabetes that is controlled complicated by peripheral neuropathy.   She reports shortness of breath on the weekend, feels like a little flutter and associates this with a little shortness of breath. She reports atenolol does not help. It feels like a "worry or nervousness" but she denies any stressful stimuli or situation. Denies personal stressors.     Denies chest pain/pressure. Has neuropathy that affects her feet bilaterally. Has numbness and burning. Does not interfere with sleep. Currently on gabapentin daily.   Silver sneakers, (/), fit and Fun, and water aerobics ( and ); also walks in the pool for one hour.      Diabetes regimen:   Metformin  mg nightly,   Glimepiride 4 mg daily with breakfast  Lantus 26 units nightly  Regular insulin 7 units 30 minutes before lunch.   Regular insulin to 9 units 30 minutes before dinner.      No difficulties with injections. Denies hypoglycemic symptoms or bg less than 70.    Reviewed blood sugar logs.   Fastin, 143, 144, 160, 116, 118, 128  Lunch: 183, 236, 174, 177, 174, 162, 179  Dinner: 184, 152, 168, 155, 193, 249, 150  Bedtime: 156, 220, 226, 172, 169, 193, 176    Most recent A1C is 7.6%.     ADA STANDARDS of CARE:        ACE inhibitor of angiotensin II receptor blocker:  No microalbuminuria, on HCTZ well controlled.         Statin drug:  Pravastatin 40 mg daily         Low dose ASA: 81 mg daily         Eye exam within last year:         Dental exam:         Flu shot:        Pneumonia vaccine:        Microalbumin:         Review of Systems   Constitutional: Negative for chills and fever.   HENT: Negative for congestion and sinus pressure.    Eyes: Negative for visual disturbance.   Respiratory: Negative for chest tightness and shortness of breath.  " "  Cardiovascular: Negative for chest pain, palpitations and leg swelling.   Gastrointestinal: Negative for abdominal pain and vomiting.   Genitourinary: Negative for dysuria.   Musculoskeletal: Negative for arthralgias.   Skin: Negative for rash.   Neurological: Negative for weakness.   Hematological: Does not bruise/bleed easily.   Psychiatric/Behavioral: Negative for sleep disturbance.        Objective:     Physical Exam    Vitals:    01/29/18 0938   BP: 124/76   BP Location: Right arm   Patient Position: Sitting   BP Method: Medium (Manual)   Pulse: 60   Resp: 16   Weight: 82.1 kg (181 lb)   Height: 5' 2" (1.575 m)     Hemoglobin A1C   Date Value Ref Range Status   01/22/2018 7.6 (H) 4.0 - 5.6 % Final     Comment:     According to ADA guidelines, hemoglobin A1c <7.0% represents  optimal control in non-pregnant diabetic patients. Different  metrics may apply to specific patient populations.   Standards of Medical Care in Diabetes-2016.  For the purpose of screening for the presence of diabetes:  <5.7%     Consistent with the absence of diabetes  5.7-6.4%  Consistent with increasing risk for diabetes   (prediabetes)  >or=6.5%  Consistent with diabetes  Currently, no consensus exists for use of hemoglobin A1c  for diagnosis of diabetes for children.  This Hemoglobin A1c assay has significant interference with fetal   hemoglobin   (HbF). The results are invalid for patients with abnormal amounts of   HbF,   including those with known Hereditary Persistence   of Fetal Hemoglobin. Heterozygous hemoglobin variants (HbAS, HbAC,   HbAD, HbAE, HbA2) do not significantly interfere with this assay;   however, presence of multiple variants in a sample may impact the %   interference.     09/14/2017 7.9 (H) 4.0 - 5.6 % Final     Comment:     According to ADA guidelines, hemoglobin A1c <7.0% represents  optimal control in non-pregnant diabetic patients. Different  metrics may apply to specific patient populations.   Standards " of Medical Care in Diabetes-2016.  For the purpose of screening for the presence of diabetes:  <5.7%     Consistent with the absence of diabetes  5.7-6.4%  Consistent with increasing risk for diabetes   (prediabetes)  >or=6.5%  Consistent with diabetes  Currently, no consensus exists for use of hemoglobin A1c  for diagnosis of diabetes for children.  This Hemoglobin A1c assay has significant interference with fetal   hemoglobin   (HbF). The results are invalid for patients with abnormal amounts of   HbF,   including those with known Hereditary Persistence   of Fetal Hemoglobin. Heterozygous hemoglobin variants (HbAS, HbAC,   HbAD, HbAE, HbA2) do not significantly interfere with this assay;   however, presence of multiple variants in a sample may impact the %   interference.     06/07/2017 7.9 (H) 4.5 - 6.2 % Final     Comment:     According to ADA guidelines, hemoglobin A1C <7.0% represents  optimal control in non-pregnant diabetic patients.  Different  metrics may apply to specific populations.   Standards of Medical Care in Diabetes - 2016.  For the purpose of screening for the presence of diabetes:  <5.7%     Consistent with the absence of diabetes  5.7-6.4%  Consistent with increasing risk for diabetes   (prediabetes)  >or=6.5%  Consistent with diabetes  Currently no consensus exists for use of hemoglobin A1C  for diagnosis of diabetes for children.     Results for CINDA TATUM (MRN [id number]) as of 1/29/2018 09:56   Ref. Range 1/22/2018 09:11   BUN, Bld Latest Ref Range: 8 - 23 mg/dL 16   Creatinine Latest Ref Range: 0.5 - 1.4 mg/dL 0.9   eGFR if non African American Latest Ref Range: >60 mL/min/1.73 m^2 >60   eGFR if  Latest Ref Range: >60 mL/min/1.73 m^2 >60   Glucose Latest Ref Range: 70 - 110 mg/dL 136 (H)     Results for CINDA TATUM (MRN [id number]) as of 1/29/2018 09:56   Ref. Range 1/22/2018 09:11   Cholesterol Latest Ref Range: 120 - 199 mg/dL 170   HDL Latest Ref Range: 40 - 75 mg/dL  58   LDL Cholesterol Latest Ref Range: 63.0 - 159.0 mg/dL 78.4   Total Cholesterol/HDL Ratio Latest Ref Range: 2.0 - 5.0  2.9   Triglycerides Latest Ref Range: 30 - 150 mg/dL 168 (H)   Results for CINDA TATUM (MRN [id number]) as of 1/29/2018 09:56   Ref. Range 4/6/2017 12:17   Microalbum.,U,Random Latest Units: ug/mL 7.0   Microalb Creat Ratio Latest Ref Range: 0.0 - 30.0 ug/mg 9.5     Assessment/Plan:     1. Diabetic polyneuropathy associated with type 2 diabetes mellitus      2. Type 2 diabetes, controlled, with neuropathy      3. Encounter for long-term (current) use of insulin      4. Type 2 diabetes, uncontrolled, with neuropathy    - metFORMIN (GLUCOPHAGE-XR) 750 MG 24 hr tablet; One tablet with dinner.  Dispense: 90 tablet; Refill: 3    5. Diabetes mellitus type 2 in obese    - metFORMIN (GLUCOPHAGE-XR) 750 MG 24 hr tablet; One tablet with dinner.  Dispense: 90 tablet; Refill: 3

## 2018-01-29 NOTE — PATIENT INSTRUCTIONS
Please continue current regimen.     Regular insulin 7 units before lunch and 9 units before dinner  lantus 26 units before bedtime  Metformin 750 mg XR one tablet at bedtime   Glimepiride 4 mg daily (one tablet)

## 2018-01-31 ENCOUNTER — PES CALL (OUTPATIENT)
Dept: ADMINISTRATIVE | Facility: CLINIC | Age: 73
End: 2018-01-31

## 2018-02-15 DIAGNOSIS — E66.9 DIABETES MELLITUS TYPE 2 IN OBESE: ICD-10-CM

## 2018-02-15 DIAGNOSIS — E11.69 DIABETES MELLITUS TYPE 2 IN OBESE: ICD-10-CM

## 2018-02-15 RX ORDER — METFORMIN HYDROCHLORIDE 750 MG/1
TABLET, EXTENDED RELEASE ORAL
Qty: 90 TABLET | Refills: 3 | Status: SHIPPED | OUTPATIENT
Start: 2018-02-15 | End: 2018-10-17 | Stop reason: SDUPTHER

## 2018-02-15 NOTE — TELEPHONE ENCOUNTER
----- Message from Zahira Maguire sent at 2/15/2018  9:09 AM CST -----  Contact: Alisia      [phone] or cell: [phone]   Pt.says she is out of Metformin.  [facility] says they have not received your order for the Metformin 750mgs.  ER . Needs a 3 mos. Supply.     Asking for you to please send this to her asap.

## 2018-03-07 ENCOUNTER — OFFICE VISIT (OUTPATIENT)
Dept: INTERNAL MEDICINE | Facility: CLINIC | Age: 73
End: 2018-03-07
Payer: MEDICARE

## 2018-03-07 VITALS
HEIGHT: 62 IN | WEIGHT: 181 LBS | BODY MASS INDEX: 33.31 KG/M2 | SYSTOLIC BLOOD PRESSURE: 124 MMHG | HEART RATE: 78 BPM | DIASTOLIC BLOOD PRESSURE: 68 MMHG

## 2018-03-07 DIAGNOSIS — E66.9 OBESITY (BMI 30-39.9): ICD-10-CM

## 2018-03-07 DIAGNOSIS — N18.2 CHRONIC KIDNEY DISEASE, STAGE II (MILD): ICD-10-CM

## 2018-03-07 DIAGNOSIS — E11.65 TYPE 2 DIABETES MELLITUS WITH HYPERGLYCEMIA, WITH LONG-TERM CURRENT USE OF INSULIN: ICD-10-CM

## 2018-03-07 DIAGNOSIS — K21.9 GASTROESOPHAGEAL REFLUX DISEASE, ESOPHAGITIS PRESENCE NOT SPECIFIED: ICD-10-CM

## 2018-03-07 DIAGNOSIS — E11.42 DIABETIC POLYNEUROPATHY ASSOCIATED WITH TYPE 2 DIABETES MELLITUS: ICD-10-CM

## 2018-03-07 DIAGNOSIS — I70.0 AORTIC ATHEROSCLEROSIS: ICD-10-CM

## 2018-03-07 DIAGNOSIS — M85.80 OSTEOPENIA, UNSPECIFIED LOCATION: ICD-10-CM

## 2018-03-07 DIAGNOSIS — I10 ESSENTIAL HYPERTENSION: ICD-10-CM

## 2018-03-07 DIAGNOSIS — E11.21 TYPE 2 DIABETES MELLITUS WITH DIABETIC NEPHROPATHY, WITH LONG-TERM CURRENT USE OF INSULIN: ICD-10-CM

## 2018-03-07 DIAGNOSIS — E78.5 HYPERLIPIDEMIA, UNSPECIFIED HYPERLIPIDEMIA TYPE: ICD-10-CM

## 2018-03-07 DIAGNOSIS — E11.40 TYPE 2 DIABETES, CONTROLLED, WITH NEUROPATHY: ICD-10-CM

## 2018-03-07 DIAGNOSIS — K57.30 DIVERTICULOSIS OF LARGE INTESTINE WITHOUT HEMORRHAGE: ICD-10-CM

## 2018-03-07 DIAGNOSIS — J47.9 ADULT BRONCHIECTASIS: ICD-10-CM

## 2018-03-07 DIAGNOSIS — Z00.00 ENCOUNTER FOR PREVENTIVE HEALTH EXAMINATION: Primary | ICD-10-CM

## 2018-03-07 DIAGNOSIS — Z85.3 HISTORY OF BREAST CANCER IN FEMALE: ICD-10-CM

## 2018-03-07 DIAGNOSIS — Z79.4 TYPE 2 DIABETES MELLITUS WITH HYPERGLYCEMIA, WITH LONG-TERM CURRENT USE OF INSULIN: ICD-10-CM

## 2018-03-07 DIAGNOSIS — Z79.4 TYPE 2 DIABETES MELLITUS WITH DIABETIC NEPHROPATHY, WITH LONG-TERM CURRENT USE OF INSULIN: ICD-10-CM

## 2018-03-07 DIAGNOSIS — R05.3 CHRONIC COUGH: ICD-10-CM

## 2018-03-07 PROCEDURE — 99499 UNLISTED E&M SERVICE: CPT | Mod: S$GLB,,, | Performed by: NURSE PRACTITIONER

## 2018-03-07 PROCEDURE — 99999 PR PBB SHADOW E&M-EST. PATIENT-LVL V: CPT | Mod: PBBFAC,,, | Performed by: NURSE PRACTITIONER

## 2018-03-07 PROCEDURE — G0439 PPPS, SUBSEQ VISIT: HCPCS | Mod: S$GLB,,, | Performed by: NURSE PRACTITIONER

## 2018-03-07 RX ORDER — IBUPROFEN 200 MG
200 TABLET ORAL EVERY 6 HOURS PRN
Status: ON HOLD | COMMUNITY
End: 2021-11-24 | Stop reason: HOSPADM

## 2018-03-07 NOTE — PATIENT INSTRUCTIONS
Counseling and Referral of Other Preventative  (Italic type indicates deductible and co-insurance are waived)    Patient Name: Alisia Gusman  Today's Date: 3/7/2018    Health Maintenance       Date Due Completion Date    Urine Microalbumin 04/06/2018 4/6/2017    Hemoglobin A1c 07/22/2018 1/22/2018    Eye Exam 10/06/2018 10/6/2017    Override on 3/20/2017: Done ([facility]    Override on 3/16/2016: Done (seen by outside MD - Dr. Kike Butts)    Override on 6/1/2015: Done    Override on 2/7/2014: Done    Override on 1/29/2013: (N/S)    Foot Exam 10/09/2018 10/9/2017    Override on 2/16/2017: Done    Override on 3/1/2016: Done    Override on 4/8/2015: Done    Colonoscopy 11/06/2018 11/6/2015    Override on 9/16/2010: Done    Mammogram 12/07/2018 12/7/2017    Override on 11/17/2011: Done    Lipid Panel 01/22/2019 1/22/2018    High Dose Statin 03/07/2019 3/7/2018    DEXA SCAN 04/11/2020 4/11/2017    Override on 9/6/2011: Done    TETANUS VACCINE 05/31/2026 5/31/2016 (N/S)    Override on 5/31/2016: (N/S) (presciption given)        No orders of the defined types were placed in this encounter.    The following information is provided to all patients.  This information is to help you find resources for any of the problems found today that may be affecting your health:                Living healthy guide: www.[facility].[place].gov      Understanding Diabetes: www.diabetes.org      Eating healthy: www.cdc.gov/healthyweight      CDC home safety checklist: www.cdc.gov/steadi/patient.html      Agency on Aging: www.goea.[place].gov      Alcoholics anonymous (AA): www.aa.org      Physical Activity: www.osvaldo.nih.gov/rp9tvif      Tobacco use: www.quitwithusla.org

## 2018-03-07 NOTE — PROGRESS NOTES
"Alisia Gusman presented for a  Medicare AWV and comprehensive Health Risk Assessment today. The following components were reviewed and updated:    · Medical history  · Family History  · Social history  · Allergies and Current Medications  · Health Risk Assessment  · Health Maintenance  · Care Team     ** See Completed Assessments for Annual Wellness Visit within the encounter summary.**       The following assessments were completed:  · Living Situation  · CAGE  · Depression Screening  · Timed Get Up and Go  · Whisper Test  · Cognitive Function Screening  ·   ·   ·   · Nutrition Screening  · ADL Screening  · PAQ Screening    Vitals:    03/07/18 1005   BP: 124/68   BP Location: Right arm   Pulse: 78   Weight: 82.1 kg (181 lb)   Height: 5' 2" (1.575 m)     Body mass index is 33.1 kg/m².  Physical Exam   Constitutional: She is oriented to person, place, and time.   Obese   HENT:   Head: Normocephalic.   Cardiovascular: Normal rate and regular rhythm.    Pulmonary/Chest: Effort normal and breath sounds normal.   Abdominal: Soft. Bowel sounds are normal.   Obese   Musculoskeletal: Normal range of motion. She exhibits no edema.   Neurological: She is alert and oriented to person, place, and time.   Skin: Skin is warm and dry.   Psychiatric: She has a normal mood and affect.   Nursing note and vitals reviewed.        Diagnoses and health risks identified today and associated recommendations/orders:    1. Encounter for preventive health examination  Here for Health Risk Assessment/Annual Wellness Visit.  Follow up in one year.    2. Essential hypertension  Chronic, stable on current medications. Followed by PCP.    3. Aortic atherosclerosis  Chronic, stable on current medications. Noted CT abdomen/pelvix 9/17/12. Followed by PCP.    4. Hyperlipidemia, unspecified hyperlipidemia type  Chronic, stable on current medication. Followed by PCP.    5. Type 2 diabetes, controlled, with neuropathy  Chronic, stable on current " medications. Followed by PCP, Endocrinology.    6. Diabetic polyneuropathy associated with type 2 diabetes mellitus  Chronic, stable on current medications. Followed by PCP, Endocrinology, Podiatry.    7. Type 2 diabetes mellitus with diabetic nephropathy, with long-term current use of insulin  Chronic, stable on current medications. Followed by PCP, Endocrinology.    8. Chronic kidney disease, stage II (mild)  Chronic, mild, stable. GFR 73.  Followed by PCP.    9. Adult bronchiectasis  Chronic, stable. Followed by PCP, Pulmonology.    10. Chronic cough  Chronic, seen by ENT. PPI increased to BID. Followed by PCP, Gastroenterology, ENT.    11. Gastroesophageal reflux disease, esophagitis presence not specified  Chronic, stable on current medications. Reporting persistent cough, seen by ENT, PPI increased to BID. Followed by PCP.    12. Diverticulosis of large intestine without hemorrhage  Chronic, stable. Followed by PCP.    13. Obesity (BMI 30-39.9)  Chronic, reinforced diet/exercise per PCP recommendations. Followed by PCP.    14. Type 2 diabetes mellitus with hyperglycemia, with long-term current use of insulin  Chronic, stable on current medications. Followed by PCP, Endocrinology.    15. History of breast cancer in female  Stable. Followed by PCP.    16. Osteopenia, unspecified location  Chronic, stable on current medications. Followed by PCP.      Provided Alisai with a 5-10 year written screening schedule and personal prevention plan. Recommendations were developed using the USPSTF age appropriate recommendations. Education, counseling, and referrals were provided as needed. After Visit Summary printed and given to patient which includes a list of additional screenings\tests needed.    Follow-up in 2 months (on 5/7/2018).with PCP    Adrianne Chu NP

## 2018-03-13 DIAGNOSIS — E11.69 DIABETES MELLITUS TYPE 2 IN OBESE: ICD-10-CM

## 2018-03-13 DIAGNOSIS — E66.9 DIABETES MELLITUS TYPE 2 IN OBESE: ICD-10-CM

## 2018-03-13 RX ORDER — GLIMEPIRIDE 4 MG/1
TABLET ORAL
Qty: 90 TABLET | Refills: 3 | Status: SHIPPED | OUTPATIENT
Start: 2018-03-13 | End: 2019-03-15 | Stop reason: SDUPTHER

## 2018-03-14 ENCOUNTER — OFFICE VISIT (OUTPATIENT)
Dept: PODIATRY | Facility: CLINIC | Age: 73
End: 2018-03-14
Payer: MEDICARE

## 2018-03-14 VITALS
SYSTOLIC BLOOD PRESSURE: 128 MMHG | HEIGHT: 62 IN | WEIGHT: 181 LBS | BODY MASS INDEX: 33.31 KG/M2 | RESPIRATION RATE: 18 BRPM | HEART RATE: 72 BPM | DIASTOLIC BLOOD PRESSURE: 80 MMHG

## 2018-03-14 DIAGNOSIS — R20.2 NUMBNESS AND TINGLING: ICD-10-CM

## 2018-03-14 DIAGNOSIS — R20.0 NUMBNESS AND TINGLING: ICD-10-CM

## 2018-03-14 DIAGNOSIS — E11.49 TYPE II DIABETES MELLITUS WITH NEUROLOGICAL MANIFESTATIONS: Primary | ICD-10-CM

## 2018-03-14 PROCEDURE — 3079F DIAST BP 80-89 MM HG: CPT | Mod: CPTII,S$GLB,, | Performed by: PODIATRIST

## 2018-03-14 PROCEDURE — 99499 UNLISTED E&M SERVICE: CPT | Mod: S$GLB,,, | Performed by: PODIATRIST

## 2018-03-14 PROCEDURE — 3074F SYST BP LT 130 MM HG: CPT | Mod: CPTII,S$GLB,, | Performed by: PODIATRIST

## 2018-03-14 PROCEDURE — 3045F PR MOST RECENT HEMOGLOBIN A1C LEVEL 7.0-9.0%: CPT | Mod: CPTII,S$GLB,, | Performed by: PODIATRIST

## 2018-03-14 PROCEDURE — 99999 PR PBB SHADOW E&M-EST. PATIENT-LVL III: CPT | Mod: PBBFAC,,, | Performed by: PODIATRIST

## 2018-03-14 PROCEDURE — 99213 OFFICE O/P EST LOW 20 MIN: CPT | Mod: S$GLB,,, | Performed by: PODIATRIST

## 2018-03-14 NOTE — TELEPHONE ENCOUNTER
----- Message from Zulma Bruce sent at 3/14/2018  9:47 AM CDT -----  Contact: Pt at [phone]  Pt calling to f/u on refill request on script glimepiride (AMARYL) 4 MG tablet.

## 2018-03-15 NOTE — PROGRESS NOTES
Subjective:      Patient ID: Alisia Gusman is a 72 y.o. female.    Chief Complaint: PCP (Adrianne Chu NP  1/3/18); Diabetic Foot Exam; Nail Problem; Nail Care; and Foot Pain    Alisia is a 72 y.o. female who presents to the clinic for evaluation and treatment of high risk feet. Alisia has a past medical history of Adult bronchiectasis (7/26/2017); Allergy; Anemia; Arthritis; Asthma; Breast cancer; Cancer (1993); Cataract; Chronic cervical radiculopathy (9/20/2012); Diabetes mellitus; Diabetes mellitus type II; Diabetes with neurologic complications; Diverticulosis; Dry eyes; Dysphagia; GERD (gastroesophageal reflux disease); Hyperlipidemia; Hypertension; Neuropathy; Scalp tenderness; Shortness of breath; and Ulcer. The patient's chief complaint is numbness and burning to feet . This patient has documented high risk feet requiring routine maintenance secondary to diabetes mellitis and those secondary complications of diabetes, as mentioned..    PCP: Selena Montemayor MD    Date Last Seen by PCP:   Chief Complaint   Patient presents with    PCP     dArianne Chu NP  1/3/18    Diabetic Foot Exam    Nail Problem    Nail Care    Foot Pain         Hemoglobin A1C   Date Value Ref Range Status   01/22/2018 7.6 (H) 4.0 - 5.6 % Final     Comment:     According to ADA guidelines, hemoglobin A1c <7.0% represents  optimal control in non-pregnant diabetic patients. Different  metrics may apply to specific patient populations.   Standards of Medical Care in Diabetes-2016.  For the purpose of screening for the presence of diabetes:  <5.7%     Consistent with the absence of diabetes  5.7-6.4%  Consistent with increasing risk for diabetes   (prediabetes)  >or=6.5%  Consistent with diabetes  Currently, no consensus exists for use of hemoglobin A1c  for diagnosis of diabetes for children.  This Hemoglobin A1c assay has significant interference with fetal   hemoglobin   (HbF). The results are invalid for patients with  abnormal amounts of   HbF,   including those with known Hereditary Persistence   of Fetal Hemoglobin. Heterozygous hemoglobin variants (HbAS, HbAC,   HbAD, HbAE, HbA2) do not significantly interfere with this assay;   however, presence of multiple variants in a sample may impact the %   interference.     09/14/2017 7.9 (H) 4.0 - 5.6 % Final     Comment:     According to ADA guidelines, hemoglobin A1c <7.0% represents  optimal control in non-pregnant diabetic patients. Different  metrics may apply to specific patient populations.   Standards of Medical Care in Diabetes-2016.  For the purpose of screening for the presence of diabetes:  <5.7%     Consistent with the absence of diabetes  5.7-6.4%  Consistent with increasing risk for diabetes   (prediabetes)  >or=6.5%  Consistent with diabetes  Currently, no consensus exists for use of hemoglobin A1c  for diagnosis of diabetes for children.  This Hemoglobin A1c assay has significant interference with fetal   hemoglobin   (HbF). The results are invalid for patients with abnormal amounts of   HbF,   including those with known Hereditary Persistence   of Fetal Hemoglobin. Heterozygous hemoglobin variants (HbAS, HbAC,   HbAD, HbAE, HbA2) do not significantly interfere with this assay;   however, presence of multiple variants in a sample may impact the %   interference.     06/07/2017 7.9 (H) 4.5 - 6.2 % Final     Comment:     According to ADA guidelines, hemoglobin A1C <7.0% represents  optimal control in non-pregnant diabetic patients.  Different  metrics may apply to specific populations.   Standards of Medical Care in Diabetes - 2016.  For the purpose of screening for the presence of diabetes:  <5.7%     Consistent with the absence of diabetes  5.7-6.4%  Consistent with increasing risk for diabetes   (prediabetes)  >or=6.5%  Consistent with diabetes  Currently no consensus exists for use of hemoglobin A1C  for diagnosis of diabetes for children.         Review of Systems  "  Constitution: Negative for chills, decreased appetite and fever.   Cardiovascular: Negative for leg swelling.   Skin: Positive for dry skin and nail changes. Negative for flushing and itching.   Musculoskeletal: Negative for arthritis, joint pain, joint swelling and myalgias.   Gastrointestinal: Negative for nausea and vomiting.   Neurological: Positive for numbness and paresthesias. Negative for loss of balance.           Objective:       Vitals:    03/14/18 1313   BP: 128/80   Pulse: 72   Resp: 18   Weight: 82.1 kg (181 lb)   Height: 5' 2" (1.575 m)   PainSc:   7   PainLoc: Foot        Physical Exam   Constitutional: She is oriented to person, place, and time. She appears well-developed and well-nourished.   Cardiovascular:   Pulses:       Dorsalis pedis pulses are 2+ on the right side, and 2+ on the left side.        Posterior tibial pulses are 1+ on the right side, and 1+ on the left side.   Toes are cool to touch. Feet are warm proximally.There is decreased digital hair. Skin is atrophic, slightly hyperpigmented, and mildly edematous       Musculoskeletal: Normal range of motion. She exhibits no edema or tenderness.   Adequate joint range of motion without pain, limitation, nor crepitation Bilateral feet and ankle joints. Muscle strength is 5/5 in all groups bilaterally.       reducible IPJ digital contracture 2-3 b/l     Neurological: She is alert and oriented to person, place, and time.   Oberon-Leonel 5.07 monofilamant testing is diminished Vikash feet. Sharp/dull sensation diminished Bilaterally. Light touch absent Bilaterally.       Skin: Skin is warm and dry. No ecchymosis and no lesion noted. No erythema.   Psychiatric: She has a normal mood and affect. Her behavior is normal.   Vitals reviewed.            Assessment:       Encounter Diagnoses   Name Primary?    Type II diabetes mellitus with neurological manifestations Yes    Numbness and tingling          Plan:       Alisia was seen today for pcp, " diabetic foot exam, nail problem, nail care and foot pain.    Diagnoses and all orders for this visit:    Type II diabetes mellitus with neurological manifestations    Numbness and tingling      I counseled the patient on her conditions, their implications and medical management.        - Shoe inspection. Diabetic Foot Education. Patient reminded of the importance of good nutrition and blood sugar control to help prevent podiatric complications of diabetes. Patient instructed on proper foot hygeine. We discussed wearing proper shoe gear, daily foot inspections, never walking without protective shoe gear, never putting sharp instruments to feet, routine podiatric nail visits every 2-3 months.      - Discussed the  importance of maintaining  low blood sugar levels, and the direct affect elevated blood sugars have on progression of neuropathic symptoms    - Rx topical compound pain cream [facility]     - will increased gabapentin to 2 pills TID

## 2018-03-23 DIAGNOSIS — E11.42 DIABETIC PERIPHERAL NEUROPATHY: ICD-10-CM

## 2018-03-23 DIAGNOSIS — E11.69 DIABETES MELLITUS TYPE 2 IN OBESE: ICD-10-CM

## 2018-03-23 DIAGNOSIS — Z79.4 CURRENT USE OF INSULIN: ICD-10-CM

## 2018-03-23 DIAGNOSIS — E66.9 DIABETES MELLITUS TYPE 2 IN OBESE: ICD-10-CM

## 2018-04-10 ENCOUNTER — TELEPHONE (OUTPATIENT)
Dept: ENDOCRINOLOGY | Facility: CLINIC | Age: 73
End: 2018-04-10

## 2018-04-16 ENCOUNTER — TELEPHONE (OUTPATIENT)
Dept: INTERNAL MEDICINE | Facility: CLINIC | Age: 73
End: 2018-04-16

## 2018-04-16 DIAGNOSIS — E66.9 DIABETES MELLITUS TYPE 2 IN OBESE: Primary | ICD-10-CM

## 2018-04-16 DIAGNOSIS — E11.69 DIABETES MELLITUS TYPE 2 IN OBESE: Primary | ICD-10-CM

## 2018-04-16 NOTE — TELEPHONE ENCOUNTER
----- Message from Preston Browning sent at 4/16/2018  9:33 AM CDT -----  Contact: Patient [phone]  She is requesting labs if you think she needs them done before her appointment on 5/7/18.    Thank you

## 2018-04-16 NOTE — TELEPHONE ENCOUNTER
Yes ordered including urine. Looks like pt has labs on 5/1 for Dr. Nelson. Please see if she wants to do it all at once. Thanks!

## 2018-04-17 ENCOUNTER — OFFICE VISIT (OUTPATIENT)
Dept: OTOLARYNGOLOGY | Facility: CLINIC | Age: 73
End: 2018-04-17
Payer: MEDICARE

## 2018-04-17 VITALS — SYSTOLIC BLOOD PRESSURE: 131 MMHG | HEART RATE: 72 BPM | DIASTOLIC BLOOD PRESSURE: 73 MMHG

## 2018-04-17 DIAGNOSIS — K21.9 LARYNGOPHARYNGEAL REFLUX (LPR): Primary | ICD-10-CM

## 2018-04-17 DIAGNOSIS — H68.002 SALPINGITIS OF LEFT EUSTACHIAN TUBE: ICD-10-CM

## 2018-04-17 DIAGNOSIS — J31.0 CHRONIC RHINITIS, UNSPECIFIED TYPE: ICD-10-CM

## 2018-04-17 PROCEDURE — 99213 OFFICE O/P EST LOW 20 MIN: CPT | Mod: S$GLB,,, | Performed by: OTOLARYNGOLOGY

## 2018-04-17 PROCEDURE — 3075F SYST BP GE 130 - 139MM HG: CPT | Mod: CPTII,S$GLB,, | Performed by: OTOLARYNGOLOGY

## 2018-04-17 PROCEDURE — 99499 UNLISTED E&M SERVICE: CPT | Mod: S$PBB,,, | Performed by: OTOLARYNGOLOGY

## 2018-04-17 PROCEDURE — 99999 PR PBB SHADOW E&M-EST. PATIENT-LVL IV: CPT | Mod: PBBFAC,,, | Performed by: OTOLARYNGOLOGY

## 2018-04-17 PROCEDURE — 3078F DIAST BP <80 MM HG: CPT | Mod: CPTII,S$GLB,, | Performed by: OTOLARYNGOLOGY

## 2018-04-17 RX ORDER — FAMOTIDINE 20 MG/1
20 TABLET, FILM COATED ORAL NIGHTLY
Qty: 60 TABLET | Refills: 2 | Status: SHIPPED | OUTPATIENT
Start: 2018-04-17 | End: 2018-10-17 | Stop reason: SDUPTHER

## 2018-04-17 NOTE — PROGRESS NOTES
Subjective:      Alisia is a 72 y.o. female who comes for follow-up of reflux.  Her last visit with me was on 1/16/2018.  Doing better, breathing well through nose, ears less bothersome, perhaps 5 days monthly she has aural fullness and pressure.  Using protonix 40 mg BID now, still feels a glob of mucus in throat at night, awakens with white around mouth.  Using Flonase as needed.    SNOT-22 score = 14, NOSE score = 25%    The patient's medications, allergies, past medical, surgical, social and family histories were reviewed and updated as appropriate.    A detailed review of systems was obtained with pertinent positives as per the above HPI, and otherwise negative.        Objective:     /73   Pulse 72   LMP  (LMP Unknown)        Constitutional:   She appears well-developed. She is cooperative. Normal speech.  No hoarse voice.      Head:  Normocephalic. Salivary glands normal.  Facial strength is normal.      Ears:    Right Ear: No drainage or tenderness. Tympanic membrane is not perforated. Tympanic membrane mobility is normal. No middle ear effusion. No decreased hearing is noted.   Left Ear: No drainage or tenderness. Tympanic membrane is not perforated. Tympanic membrane mobility is normal.  No middle ear effusion. No decreased hearing is noted.     Nose:  No mucosal edema, rhinorrhea, septal deviation or polyps. No epistaxis. Turbinates normal, no turbinate masses and no turbinate hypertrophy.  Right sinus exhibits no maxillary sinus tenderness and no frontal sinus tenderness. Left sinus exhibits no maxillary sinus tenderness and no frontal sinus tenderness.     Mouth/Throat  Oropharynx clear and moist without lesions or asymmetry and normal uvula midline. She does not have dentures. Normal dentition. No oral lesions or mucous membrane lesions. No oropharyngeal exudate or posterior oropharyngeal erythema. Mirror exam not performed due to patient tolerance.  Mirror exam not performed due to patient  tolerance.      Neck:  Neck normal without thyromegaly masses, asymmetry, normal tracheal structure, crepitus, and tenderness, thyroid normal, trachea normal and no adenopathy. Normal range of motion present.     She has no cervical adenopathy.     Cardiovascular:   Regular rhythm.      Pulmonary/Chest:   Effort normal.     Psychiatric:   She has a normal mood and affect. Her speech is normal and behavior is normal.     Neurological:   No cranial nerve deficit.     Skin:   No rash noted.       Procedure    None        Data Reviewed    WBC (K/uL)   Date Value   09/14/2017 6.38     Eosinophil% (%)   Date Value   09/14/2017 2.0     Eos # (K/uL)   Date Value   09/14/2017 0.1     Platelets (K/uL)   Date Value   09/14/2017 180     Glucose (mg/dL)   Date Value   01/22/2018 136 (H)     No results found for: IGE      Assessment:     1. Laryngopharyngeal reflux (LPR)    2. Chronic rhinitis, unspecified type    3. Salpingitis of left eustachian tube         Plan:     Rx pepcid 20 mg nightly.  Continue protonix BID and flonase prn.  May call with update.  Follow-up in about 3 months (around 7/17/2018).

## 2018-05-01 ENCOUNTER — LAB VISIT (OUTPATIENT)
Dept: LAB | Facility: HOSPITAL | Age: 73
End: 2018-05-01
Attending: INTERNAL MEDICINE
Payer: MEDICARE

## 2018-05-01 DIAGNOSIS — E11.42 DIABETIC POLYNEUROPATHY ASSOCIATED WITH TYPE 2 DIABETES MELLITUS: ICD-10-CM

## 2018-05-01 LAB
ESTIMATED AVG GLUCOSE: 180 MG/DL
HBA1C MFR BLD HPLC: 7.9 %

## 2018-05-01 PROCEDURE — 36415 COLL VENOUS BLD VENIPUNCTURE: CPT

## 2018-05-01 PROCEDURE — 83036 HEMOGLOBIN GLYCOSYLATED A1C: CPT

## 2018-05-07 ENCOUNTER — OFFICE VISIT (OUTPATIENT)
Dept: INTERNAL MEDICINE | Facility: CLINIC | Age: 73
End: 2018-05-07
Payer: MEDICARE

## 2018-05-07 ENCOUNTER — LAB VISIT (OUTPATIENT)
Dept: LAB | Facility: HOSPITAL | Age: 73
End: 2018-05-07
Attending: INTERNAL MEDICINE
Payer: MEDICARE

## 2018-05-07 VITALS
DIASTOLIC BLOOD PRESSURE: 78 MMHG | TEMPERATURE: 98 F | BODY MASS INDEX: 33.63 KG/M2 | HEART RATE: 69 BPM | SYSTOLIC BLOOD PRESSURE: 127 MMHG | HEIGHT: 62 IN | WEIGHT: 182.75 LBS

## 2018-05-07 DIAGNOSIS — Z79.899 ENCOUNTER FOR MONITORING PROTON PUMP INHIBITOR THERAPY: ICD-10-CM

## 2018-05-07 DIAGNOSIS — E11.9 DIABETES MELLITUS WITHOUT COMPLICATION: ICD-10-CM

## 2018-05-07 DIAGNOSIS — Z51.81 ENCOUNTER FOR MONITORING PROTON PUMP INHIBITOR THERAPY: ICD-10-CM

## 2018-05-07 DIAGNOSIS — K21.9 LARYNGOPHARYNGEAL REFLUX (LPR): ICD-10-CM

## 2018-05-07 DIAGNOSIS — E11.42 DIABETIC POLYNEUROPATHY ASSOCIATED WITH TYPE 2 DIABETES MELLITUS: Primary | ICD-10-CM

## 2018-05-07 DIAGNOSIS — R60.0 BILATERAL LOWER EXTREMITY EDEMA: ICD-10-CM

## 2018-05-07 LAB
25(OH)D3+25(OH)D2 SERPL-MCNC: 51 NG/ML
ALBUMIN SERPL BCP-MCNC: 3.7 G/DL
ALP SERPL-CCNC: 59 U/L
ALT SERPL W/O P-5'-P-CCNC: 16 U/L
ANION GAP SERPL CALC-SCNC: 9 MMOL/L
AST SERPL-CCNC: 21 U/L
BASOPHILS # BLD AUTO: 0.04 K/UL
BASOPHILS NFR BLD: 0.6 %
BILIRUB SERPL-MCNC: 1.2 MG/DL
BNP SERPL-MCNC: <10 PG/ML
BUN SERPL-MCNC: 14 MG/DL
CALCIUM SERPL-MCNC: 10.4 MG/DL
CHLORIDE SERPL-SCNC: 103 MMOL/L
CO2 SERPL-SCNC: 29 MMOL/L
CREAT SERPL-MCNC: 0.9 MG/DL
DIFFERENTIAL METHOD: NORMAL
EOSINOPHIL # BLD AUTO: 0.1 K/UL
EOSINOPHIL NFR BLD: 1.7 %
ERYTHROCYTE [DISTWIDTH] IN BLOOD BY AUTOMATED COUNT: 14.3 %
EST. GFR  (AFRICAN AMERICAN): >60 ML/MIN/1.73 M^2
EST. GFR  (NON AFRICAN AMERICAN): >60 ML/MIN/1.73 M^2
GLUCOSE SERPL-MCNC: 193 MG/DL
HCT VFR BLD AUTO: 39.2 %
HGB BLD-MCNC: 13.2 G/DL
LYMPHOCYTES # BLD AUTO: 3 K/UL
LYMPHOCYTES NFR BLD: 43 %
MAGNESIUM SERPL-MCNC: 1.7 MG/DL
MCH RBC QN AUTO: 29 PG
MCHC RBC AUTO-ENTMCNC: 33.7 G/DL
MCV RBC AUTO: 86 FL
MONOCYTES # BLD AUTO: 0.7 K/UL
MONOCYTES NFR BLD: 9.2 %
NEUTROPHILS # BLD AUTO: 3.2 K/UL
NEUTROPHILS NFR BLD: 45.2 %
PLATELET # BLD AUTO: 190 K/UL
PMV BLD AUTO: 11.5 FL
POTASSIUM SERPL-SCNC: 4.1 MMOL/L
PROT SERPL-MCNC: 7.1 G/DL
RBC # BLD AUTO: 4.55 M/UL
SODIUM SERPL-SCNC: 141 MMOL/L
VIT B12 SERPL-MCNC: 1672 PG/ML
WBC # BLD AUTO: 7.04 K/UL

## 2018-05-07 PROCEDURE — 99999 PR PBB SHADOW E&M-EST. PATIENT-LVL III: CPT | Mod: PBBFAC,,, | Performed by: INTERNAL MEDICINE

## 2018-05-07 PROCEDURE — 83880 ASSAY OF NATRIURETIC PEPTIDE: CPT

## 2018-05-07 PROCEDURE — 83735 ASSAY OF MAGNESIUM: CPT

## 2018-05-07 PROCEDURE — 99499 UNLISTED E&M SERVICE: CPT | Mod: S$PBB,,, | Performed by: INTERNAL MEDICINE

## 2018-05-07 PROCEDURE — 82607 VITAMIN B-12: CPT

## 2018-05-07 PROCEDURE — 3045F PR MOST RECENT HEMOGLOBIN A1C LEVEL 7.0-9.0%: CPT | Mod: CPTII,S$GLB,, | Performed by: INTERNAL MEDICINE

## 2018-05-07 PROCEDURE — 3074F SYST BP LT 130 MM HG: CPT | Mod: CPTII,S$GLB,, | Performed by: INTERNAL MEDICINE

## 2018-05-07 PROCEDURE — 82306 VITAMIN D 25 HYDROXY: CPT

## 2018-05-07 PROCEDURE — 36415 COLL VENOUS BLD VENIPUNCTURE: CPT

## 2018-05-07 PROCEDURE — 80053 COMPREHEN METABOLIC PANEL: CPT

## 2018-05-07 PROCEDURE — 85025 COMPLETE CBC W/AUTO DIFF WBC: CPT

## 2018-05-07 PROCEDURE — 99214 OFFICE O/P EST MOD 30 MIN: CPT | Mod: S$GLB,,, | Performed by: INTERNAL MEDICINE

## 2018-05-07 PROCEDURE — 3078F DIAST BP <80 MM HG: CPT | Mod: CPTII,S$GLB,, | Performed by: INTERNAL MEDICINE

## 2018-05-07 NOTE — PROGRESS NOTES
"Subjective:       Patient ID: Alisia Gusman is a 72 y.o. female.    Chief Complaint: f/u for DM    HPI   Pt is well known to me. Last seen 1/3/18    DM2 - lantus 26 units at bedtime, regular 7 units w/ lung and 8 units w/ dinner. Glimepiride 4mg before breakfast. Metformin xr 750mg daily.   Follows w/ Dr. Nelson. Last seen pt 1/29/18. Has appt tomorrow.   Glucose log reviewed. Fasting glucose from 110s-170s. Does not eat any breakfast but for some reason lunch and dinner readings are all in the 200s.   a1c 5/1/18 - 7.9<--7.6  Vision is same. On gabapentin for numbness/tingling.   Still w/ diarrhea due to metformin.     HLD - LDL 1/22/18 78.4  No myalgia. No nausea/vomiting.    GERD/LPR - recently saw Dr. Covarrubias. Currently on protonix BID and flonase prn. Given pepcid 20mg nightly.  Cough is occasional. This is better w/ pepcid.     HTN and palpitations. At last visit, added 1/2 of atenolol 25mg daily to see if it helps w/ palpitations.     Sometimes w/ intermittent vaginal discharge. No blood. No vaginal dryness or itching. No dysuria/hematuria/urinary frequency.     BLE ankle edema since Jan. Not new. No long car rides or plane rides.     Review of Systems  as above in HPI.     Objective:      Physical Exam    /78 (BP Location: Right arm, Patient Position: Sitting, BP Method: Large (Manual))   Pulse 69   Temp 97.6 °F (36.4 °C)   Ht 5' 2" (1.575 m)   Wt 82.9 kg (182 lb 12.2 oz)   LMP  (LMP Unknown)   BMI 33.43 kg/m²     GEN - A+OX4, NAD   HEENT - PERRL, EOMI, OP clear. MMM. TM dullness.  Neck - No thyromegaly or cervical LAD. No thyroid masses felt.  CV - RRR, no m/r   Chest - CTAB, no wheezing or rhonchi  Abd - S/NT/ND/+BS.   Ext - 1+ B dp and 2+B radial pulses. Trace BLE ankle edema.  Neuro - 2+ DTRs. Normal gait.   Skin - No rash. Small sebaceous cyst on the R/mid upper back.     Labs reviewed.     Assessment/Plan     Alisia was seen today for follow-up.    Diagnoses and all orders for this " visit:    Diabetic polyneuropathy associated with type 2 diabetes mellitus - as she's seeing Dr. Nelson tomorrow, will defer changing her DM regimen.   -     Microalbumin/creatinine urine ratio; Future    Laryngopharyngeal reflux (LPR) - cont protonix bid and pepcid. Finds benefit w/ cough and hoarse voice. Discussed risks of chronic PPI inc malabsorption and renal disease. Pt agrees benefits outweigh the risk at this point. Will check vitamins and renal function.   -     Comprehensive metabolic panel; Future  -     CBC auto differential; Future  -     Vitamin D; Future  -     Vitamin B12; Future  -     Magnesium; Future    Encounter for monitoring proton pump inhibitor therapy  -     Comprehensive metabolic panel; Future  -     CBC auto differential; Future  -     Vitamin D; Future  -     Vitamin B12; Future  -     Magnesium; Future    BMI 33.0-33.9,adult  -     CBC auto differential; Future    Bilateral lower extremity edema - probably will end up needing compression stockings.  -     Brain natriuretic peptide; Future  -     US Lower Extremity Veins Bilateral Insufficiency; Future      Follow-up in about 5 months (around 10/7/2018).      Selena Montemayor MD  Department of Internal Medicine - [facility] [place] jim  7:09 AM

## 2018-05-08 ENCOUNTER — APPOINTMENT (OUTPATIENT)
Dept: LAB | Facility: HOSPITAL | Age: 73
End: 2018-05-08
Attending: INTERNAL MEDICINE
Payer: MEDICARE

## 2018-05-08 ENCOUNTER — OFFICE VISIT (OUTPATIENT)
Dept: ENDOCRINOLOGY | Facility: CLINIC | Age: 73
End: 2018-05-08
Payer: MEDICARE

## 2018-05-08 VITALS
DIASTOLIC BLOOD PRESSURE: 60 MMHG | BODY MASS INDEX: 32.3 KG/M2 | HEART RATE: 81 BPM | WEIGHT: 182.31 LBS | HEIGHT: 63 IN | RESPIRATION RATE: 17 BRPM | SYSTOLIC BLOOD PRESSURE: 118 MMHG

## 2018-05-08 DIAGNOSIS — N18.2 CHRONIC KIDNEY DISEASE, STAGE II (MILD): Primary | ICD-10-CM

## 2018-05-08 DIAGNOSIS — E11.42 DIABETIC PERIPHERAL NEUROPATHY ASSOCIATED WITH TYPE 2 DIABETES MELLITUS: ICD-10-CM

## 2018-05-08 DIAGNOSIS — Z79.4 TYPE 2 DIABETES MELLITUS WITH HYPERGLYCEMIA, WITH LONG-TERM CURRENT USE OF INSULIN: ICD-10-CM

## 2018-05-08 DIAGNOSIS — Z79.4 ENCOUNTER FOR LONG-TERM (CURRENT) USE OF INSULIN: ICD-10-CM

## 2018-05-08 DIAGNOSIS — E11.65 TYPE 2 DIABETES MELLITUS WITH HYPERGLYCEMIA, WITH LONG-TERM CURRENT USE OF INSULIN: ICD-10-CM

## 2018-05-08 PROCEDURE — 99999 PR PBB SHADOW E&M-EST. PATIENT-LVL IV: CPT | Mod: PBBFAC,,, | Performed by: INTERNAL MEDICINE

## 2018-05-08 PROCEDURE — 99499 UNLISTED E&M SERVICE: CPT | Mod: S$PBB,,, | Performed by: INTERNAL MEDICINE

## 2018-05-08 PROCEDURE — 3045F PR MOST RECENT HEMOGLOBIN A1C LEVEL 7.0-9.0%: CPT | Mod: CPTII,S$GLB,, | Performed by: INTERNAL MEDICINE

## 2018-05-08 PROCEDURE — 99214 OFFICE O/P EST MOD 30 MIN: CPT | Mod: S$GLB,,, | Performed by: INTERNAL MEDICINE

## 2018-05-08 PROCEDURE — 3074F SYST BP LT 130 MM HG: CPT | Mod: CPTII,S$GLB,, | Performed by: INTERNAL MEDICINE

## 2018-05-08 PROCEDURE — 3078F DIAST BP <80 MM HG: CPT | Mod: CPTII,S$GLB,, | Performed by: INTERNAL MEDICINE

## 2018-05-08 NOTE — PROGRESS NOTES
"Subjective:     Patient ID: Alisia Gusman is a 72 y.o. female.    Chief Complaint: No chief complaint on file.    HPI:   Ms. Gusman is a 72 y.o. female who is here for a follow-up visit for evaluationof type 2 diabetes that is controlled complicated by peripheral neuropathy.   She reports shortness of breath on the weekend, feels like a little flutter and associates this with a little shortness of breath. She reports atenolol does not help. It feels like a "worry or nervousness" but she denies any stressful stimuli or situation. Denies personal stressors.      Denies chest pain/pressure. Has neuropathy that affects her feet bilaterally. Has numbness and burning. Does not interfere with sleep. Currently on gabapentin daily.   Silver sneakers, (), fit and Fun, and water aerobics ( and ); also walks in the pool for one hour.        Diabetes regimen:   Metformin  mg nightly,   Glimepiride 4 mg daily with breakfast  Lantus 26 units nightly  Regular insulin 7 units 30 minutes before lunch.   Regular insulin to 9 units 30 minutes before dinner.      No difficulties with injections. Denies hypoglycemic symptoms or bg less than 70.     Reviewed blood sugar logs. Numbers look higher than previously. Reports taking R but notices a wet spot. Did not bring needles today.     Fastin, 133, 175, 143, 121, 211, 154, 148  Lunch: 204, 277, 224, 156, 239, 233, 249  Dinner: 275, 214, 238, 257, 279, 265, 203  Bedtime: 249, 206, 251, 212, 261, 204, 205     Most recent A1C is 7.9%.      ADA STANDARDS of CARE:        ACE inhibitor of angiotensin II receptor blocker:  No microalbuminuria, on HCTZ well controlled.         Statin drug:  Pravastatin 40 mg daily         Low dose ASA: 81 mg daily         Eye exam within last year:         Dental exam:         Flu shot:        Pneumonia vaccine:        Microalbumin: 2017 - normal    Review of Systems   Constitutional: Negative for chills and fever.   HENT: Negative for " congestion and sinus pressure.    Eyes: Negative for visual disturbance.   Respiratory: Negative for chest tightness and shortness of breath.    Cardiovascular: Negative for chest pain, palpitations and leg swelling.   Gastrointestinal: Negative for abdominal pain and vomiting.   Genitourinary: Negative for dysuria.   Musculoskeletal: Negative for arthralgias.   Skin: Negative for rash.   Neurological: Negative for weakness.   Hematological: Does not bruise/bleed easily.   Psychiatric/Behavioral: Negative for sleep disturbance.        Objective:     Physical Exam   Constitutional: She is oriented to person, place, and time. She appears well-developed and well-nourished. No distress.   HENT:   Head: Normocephalic and atraumatic.   Mouth/Throat: No oropharyngeal exudate.   Eyes: Conjunctivae and EOM are normal. Pupils are equal, round, and reactive to light. No scleral icterus.   Neck: Normal range of motion. Neck supple. No tracheal deviation present. No thyromegaly present.   Cardiovascular: Normal rate, regular rhythm, normal heart sounds and intact distal pulses.    Pulmonary/Chest: Effort normal and breath sounds normal.   Abdominal: Soft. Bowel sounds are normal. She exhibits no distension. There is no tenderness.   Sites of insulin administration are normal appearing.   Genitourinary: No breast discharge.   Musculoskeletal: Normal range of motion. She exhibits no edema or tenderness.   Lymphadenopathy:     She has no cervical adenopathy.   Neurological: She is alert and oriented to person, place, and time. She has normal reflexes. No cranial nerve deficit.   Skin: Skin is warm and dry.   FOOT EXAM:  Visual inspection is normal, no abrasions, bruising or calluses.   Microfilament test is intact b/l.   Vibratory sense is intact b/l.   Distal pulses are present b/l.    Psychiatric: She has a normal mood and affect.     Vitals:    05/08/18 1053   BP: 118/60   Pulse: 81   Resp: 17   Weight: 82.7 kg (182 lb 5.1 oz)  "  Height: 5' 3" (1.6 m)         Results for CINDA TATUM (MRN [id number]) as of 5/8/2018 10:49   Ref. Range 5/7/2018 11:15   Sodium Latest Ref Range: 136 - 145 mmol/L 141   Potassium Latest Ref Range: 3.5 - 5.1 mmol/L 4.1   Chloride Latest Ref Range: 95 - 110 mmol/L 103   CO2 Latest Ref Range: 23 - 29 mmol/L 29   Anion Gap Latest Ref Range: 8 - 16 mmol/L 9   BUN, Bld Latest Ref Range: 8 - 23 mg/dL 14   Creatinine Latest Ref Range: 0.5 - 1.4 mg/dL 0.9   eGFR if non African American Latest Ref Range: >60 mL/min/1.73 m^2 >60   eGFR if  Latest Ref Range: >60 mL/min/1.73 m^2 >60   Glucose Latest Ref Range: 70 - 110 mg/dL 193 (H)   Calcium Latest Ref Range: 8.7 - 10.5 mg/dL 10.4   Magnesium Latest Ref Range: 1.6 - 2.6 mg/dL 1.7   Alkaline Phosphatase Latest Ref Range: 55 - 135 U/L 59   Total Protein Latest Ref Range: 6.0 - 8.4 g/dL 7.1   Albumin Latest Ref Range: 3.5 - 5.2 g/dL 3.7   Total Bilirubin Latest Ref Range: 0.1 - 1.0 mg/dL 1.2 (H)   AST Latest Ref Range: 10 - 40 U/L 21   ALT Latest Ref Range: 10 - 44 U/L 16   Results for CINDA TATUM (MRN [id number]) as of 5/8/2018 10:49   Ref. Range 5/1/2018 08:09   Hemoglobin A1C Latest Ref Range: 4.0 - 5.6 % 7.9 (H)   Estimated Avg Glucose Latest Ref Range: 68 - 131 mg/dL 180 (H)     Assessment/Plan:     1. Chronic kidney disease, stage II (mild)  - avoid tight control, goal should be 7 - 7.5%    2. Type 2 diabetes mellitus with hyperglycemia, with long-term current use of insulin  - Has appointment with DM education 5/22/2018  - due to hyperglycemia that is occurring post prandially and much different then at her last visit, I am recommending she bring her needles and insulin to diabetes education appointment for injection review.   - if she is injecting her insulin correctly will need a 20% increase and additional insulin with breakfast (insulin R 4/10/10)  - may need to decrease lantus back to 26     3. Diabetic peripheral neuropathy associated with type 2 " diabetes mellitus  - DM foot check regularly.     Will call in a month or so with BG for review and titration of insulin.   F/u in three to six months. A1c in four months. Call in one month    4. Encounter for long-term (current) use of insulin  See above

## 2018-05-10 ENCOUNTER — HOSPITAL ENCOUNTER (OUTPATIENT)
Dept: RADIOLOGY | Facility: HOSPITAL | Age: 73
Discharge: HOME | End: 2018-05-10
Attending: INTERNAL MEDICINE
Payer: MEDICARE

## 2018-05-10 DIAGNOSIS — R60.0 BILATERAL LOWER EXTREMITY EDEMA: ICD-10-CM

## 2018-05-10 PROCEDURE — 93970 EXTREMITY STUDY: CPT | Mod: TC

## 2018-05-10 PROCEDURE — 93970 EXTREMITY STUDY: CPT | Mod: 26,,, | Performed by: RADIOLOGY

## 2018-05-22 ENCOUNTER — CLINICAL SUPPORT (OUTPATIENT)
Dept: DIABETES | Facility: CLINIC | Age: 73
End: 2018-05-22
Payer: MEDICARE

## 2018-05-22 DIAGNOSIS — E11.9 DIABETES MELLITUS WITHOUT COMPLICATION: ICD-10-CM

## 2018-05-22 PROCEDURE — 99999 PR PBB SHADOW E&M-EST. PATIENT-LVL I: CPT | Mod: PBBFAC,,,

## 2018-05-22 PROCEDURE — G0108 DIAB MANAGE TRN  PER INDIV: HCPCS | Mod: S$GLB,,, | Performed by: DIETITIAN, REGISTERED

## 2018-05-22 NOTE — PROGRESS NOTES
Diabetes Education  Author: Jo Flynn RD  Date: 5/22/2018    Diabetes Education Visit  Diabetes Education Record Assessment/Progress: Initial (last DE in 2016)    Diabetes Type  Diabetes Type : Type II    Diabetes History  Diabetes Diagnosis: >10 years    Nutrition  Meal Planning: 3 meals per day  What type of beverages do you drink?: water, diet soda/tea (coke zero; sometimes regular sprite)  Meal Plan 24 Hour Recall - Breakfast:  (2 toaster streudel with little icing; or toast with peanut butter; or grilled cheese; or blueberry pancakes with light syrup)  Meal Plan 24 Hour Recall - Lunch:  (sandwich, sometimes with fritos)  Meal Plan 24 Hour Recall - Dinner:  (mustard greens, 2 table sp rice, turkey neck (drains fat when cooking), cornbread (small pc))  Meal Plan 24 Hour Recall - Snack:  (berries, fruits, sometimes cookies - bertrand snaps, fun size M&Ms)    Monitoring   Monitoring: Accu-check [employer] Smart View ([employer] accu check)  Self Monitoring :  (SMBG 4 times daily)  Blood Glucose Logs: Yes (see upload)  Do you use a personal glucose monitor?: No  In the last month, how often have you had a low blood sugar reaction?: never  Can you tell when your blood sugar is too high?: no    Exercise   Exercise Type: exercise class, swimming, walking  Frequency: 3-5 Times per week    Current Diabetes Treatment   Current Treatment: Insulin, Oral Medication (Lantus (pen) 26 units at night (same time); Regular (vial) - 8 at lunch, 9 at dinner (takes 30 min before eating), currently none at breakfast; metformin 750 mg with dinner; glimiperide 4 mg with breakfast)    Social History  Preferred Learning Method: Face to Face  Primary Support: Self  Smoking Status: Never a Smoker    Barriers to Change  Barriers to Change: None  Learning Challenges : None    Readiness to Learn   Readiness to Learn : Acceptance    Cultural Influences  Cultural Influences: No      Diabetes Education Assessment/Progress  Diabetes Disease Process  (diabetes disease process and treatment options): Discussion, Instructed, Individual Session, Written Materials Provided, Comprehends Key Points (Reviewed disease process and discussed possible causes of recently elevated A1c. )    Nutrition (Incorporating nutritional management into one's lifestyle): Discussion, Instructed, Individual Session, Written Materials Provided, Comprehends Key Points (Complains of acid reflux - only at nightime when laying flat in bed. Discussed possible food causes of reflux. Reviewed mechanical changes to help lessen heartburn, especially sleeping with HOB elevated. Discussed all carb vs non-carb foods and reviewed appropriate amounts of carbs to have at each meal. Breakfast is currently a little carb heavy. Instructed to aim for 30-45 gm carb at each meal. Reviewed label reading and discussed appropriate serving sizes of frequent foods she eats.)    Physical Activity (incorporating physical activity into one's lifestyle): Discussion, Instructed, Individual Session, Written Materials Provided, Comprehends Key Points (Currrently meeting recommendations for PA - encouraged continuance.)    Medications (states correct name, dose, onset, peak, duration, side effects & timing of meds): Discussion, Individual Session, Instructed, Written Materials Provided, Comprehends Key Points (Reviewed timing and MOA of all current diabetes meds. We also reviewed use of her protonix and pepcid for heartburn. Reports she is only able to tolerate metformin once daily 2/2 diarrhea - reports she still occasionally has diarrhea despite once daily dose. Reviewed injection techniques with pen and syringe. Primary fault would be that she is removing needle immediately after injecting - instructed she needs to hold needle under the skin for at least 5-10 seconds to ensure absorption. Otherwise technique appropriate. Reviewed proper rotation of sites - she uses entirety of abdomen and sometimes outer thigh/hip. Per  MD recommendations - she will increase Regular doses to 4/10/10 -   30 min-1 hour before meals. Return for log review in 3 weeks.)    Monitoring (monitoring blood glucose/other parameters & using results): Individual Session, Discussion, Instructed, Written Materials Provided, Comprehends Key Points (Keeps excellent record of BG's, testing 4 times daily. All BG's elevated. Reviewed goal BG's and discussed need for increased insulin doses at this time.)    Acute Complications (preventing, detecting, and treating acute complications): Discussion, Instructed, Individual Session, Written Materials Provided, Comprehends Key Points (No reported hypos. Reviewed hypo vs hyper symptoms and discussed appropriate treatment for each. )    Chronic Complications (preventing, detecting, and treating chronic complications): Discussion    Diabetes Distress and Support Systems: Discussion (No distress noted)    Behavioral (readiness for change, lifestyle practices, self-care behaviors): Discussion (Reports she has lacked motivation for dietary changes in the past; thinks she feels a little more motivated to make changes  now.)        Goals  Patient has selected/evaluated goals during today's session: Yes, selected    Healthy Eating: Set (Limit carbs to 30-45 gm per meal.)  Start Date: 05/22/18         Diabetes Care Plan/Intervention  Education Plan/Intervention: Individual Follow-Up DSMT (3 week f/u for log review)      Diabetes Meal Plan  Restrictions: Restricted Carbohydrate  Carbohydrate Per Meal: 30-45g  Carbohydrate Per Snack : 7-15g      Education Units of Time   Time Spent: 60 min (75 minutes)      Health Maintenance was reviewed today with patient. Discussed with patient importance of routine eye exams, foot exams/foot care, blood work (i.e.: A1c, microalbumin, and lipid), dental visits, yearly flu vaccine, and pneumonia vaccine as indicated by PCP. Patient verbalized understanding.     Health Maintenance Topics with due  status: Not Due       Topic Last Completion Date    Colonoscopy 11/06/2015    TETANUS VACCINE 05/31/2016    DEXA SCAN 04/11/2017    Influenza Vaccine 09/21/2017    Eye Exam 10/06/2017    Foot Exam 10/09/2017    Mammogram 12/07/2017    Lipid Panel 01/22/2018    Hemoglobin A1c 05/01/2018    High Dose Statin 05/07/2018    Urine Microalbumin 05/08/2018     There are no preventive care reminders to display for this patient.

## 2018-05-22 NOTE — Clinical Note
She seems to be injecting appropriately - needs to hold the needle under the skin a little longer, but otherwise fine. Her BG's are still very elevated - uploaded log to my note. Told her to go ahead and start the doses you recommended - regular 4/10/10 with meals. She will come back in 3 weeks for log review.

## 2018-05-30 ENCOUNTER — TELEPHONE (OUTPATIENT)
Dept: ENDOCRINOLOGY | Facility: CLINIC | Age: 73
End: 2018-05-30

## 2018-05-30 NOTE — TELEPHONE ENCOUNTER
----- Message from Viri Roberts sent at 5/30/2018  8:34 AM CDT -----  Contact: self  Pt called in about wanting to schedule appt for  in August. Pt would like the nurse to give her a call back in regards to this matter      Pt can be reached at [phone]        TY

## 2018-06-12 ENCOUNTER — TELEPHONE (OUTPATIENT)
Dept: INTERNAL MEDICINE | Facility: CLINIC | Age: 73
End: 2018-06-12

## 2018-06-12 NOTE — TELEPHONE ENCOUNTER
Repeat in 3 mo (August). Dr. Nelson had ordered it 5/8/18. So she can schedule that when it's time.   She can see Dr. Burton, Dr. Ledesma, Dr. Hwang, Dr. Valenzuela, etc. Thanks!

## 2018-06-12 NOTE — TELEPHONE ENCOUNTER
----- Message from Magdalena Cali sent at 6/7/2018  8:02 AM CDT -----  Contact: Patient [phone]      ----- Message -----  From: Racheal Serrano  Sent: 6/7/2018   7:24 AM  To: Montemayor Selena Staff    Patient would like to speak with you about her diabetes.    Please call and advise.    Thank You

## 2018-06-12 NOTE — TELEPHONE ENCOUNTER
Spoke with pt, she would like to know when she needs to have blood work done to check up on her diabetes. And would like to know if there is a certain doctor she should see while you are gone on leave.        She wants someone as good as you.      Please advise.

## 2018-06-13 ENCOUNTER — CLINICAL SUPPORT (OUTPATIENT)
Dept: DIABETES | Facility: CLINIC | Age: 73
End: 2018-06-13
Payer: MEDICARE

## 2018-06-13 DIAGNOSIS — E11.9 DIABETES MELLITUS WITHOUT COMPLICATION: ICD-10-CM

## 2018-06-13 PROCEDURE — G0108 DIAB MANAGE TRN  PER INDIV: HCPCS | Mod: S$GLB,,, | Performed by: DIETITIAN, REGISTERED

## 2018-06-13 NOTE — PROGRESS NOTES
"Diabetes Education  Author: Jo Flynn RD  Date: 2018    Diabetes Education Visit  Diabetes Education Record Assessment/Progress: Comprehensive/Group (1 month f/u - log review)    Diabetes Type  Diabetes Type : Type II    Diabetes History  Diabetes Diagnosis: >10 years    Nutrition  Meal Planning: 3 meals per day  What type of beverages do you drink?: water, diet soda/tea (coke zero)  Meal Plan 24 Hour Recall - Breakfast:  (9am)  Meal Plan 24 Hour Recall - Lunch:  (if exercise class eats at 2-3pm; if no exercise 1pm)  Meal Plan 24 Hour Recall - Dinner:  (630-7pm)  Meal Plan 24 Hour Recall - Snack:  (bananas, canned fruit in lite syrup, bertrand snaps (4 or 5))    Monitoring   Self Monitoring :  (SMBG 4 times daily; FB-175; pre-lunch: 116-272; pre-dinner: 125-268; bedtime (2-3 hours after dinner): 139-242)  Blood Glucose Logs: Yes (see upload)  Do you use a personal glucose monitor?: No  In the last month, how often have you had a low blood sugar reaction?: never    Exercise   Exercise Type: exercise class, swimming, walking  Frequency: 3-5 Times per week  Duration: 1 hour    Current Diabetes Treatment   Current Treatment: Insulin, Oral Medication (metformin BID; glimiperide daily; Regular 4/10/10 AC; Lantus 26 units nightly)    Social History  Preferred Learning Method: Face to Face  Primary Support: Self  Smoking Status: Never a Smoker    Barriers to Change  Barriers to Change: None  Learning Challenges : None    Readiness to Learn   Readiness to Learn : Acceptance    Cultural Influences  Cultural Influences: No        Diabetes Education Assessment/Progress  Nutrition (Incorporating nutritional management into one's lifestyle): Discussion, Instructed, Individual Session, Written Materials Provided, Comprehends Key Points (Discussed list of snack foods appropriate for pt; recommended <20gm per snack. Pt was tearful during visit today - expressed some distress in trying to "figure out" carb amounts of " foods. Encouraged pt to focus less on carb amounts and more on picking foods/amounts from the lists we created today.)    Physical Activity (incorporating physical activity into one's lifestyle): Discussion (Currrently meeting recommendations for PA - encouraged continuance.)    Medications (states correct name, dose, onset, peak, duration, side effects & timing of meds): Discussion, Individual Session, Instructed, Written Materials Provided, Comprehends Key Points (Reviewed timing and MOA of all current diabetes meds. Reports metformin continues to give her daily diarrhea - discussed possibilty of stopping this med pending discussion with MD. She was very tearful today and expressed distress when we started discussing adding a correction scale and/or increasing insulin doses. She is very concerned and seemingly hard on herself for a continued need to change insulin doses. Discussed lifelong need for insulin titration - reviewed that this is a normal change for all patients. She also expressed distress in taking insulin 4-5 times per day - reports her  now developing worsening dementia and she has less and less time to care for herself. Discussed possibility of changing to mixed insulin so that she will only need to take 2 injections per day, as well as likely decrease # of BG checks per day - pt very amenable to this. Will discuss with MD and relay and changes to pt. Until possible med changes, instructed pt to continue current doses and begin using correction scale of 200-250 +1, 250-300 +2, with Regular insulin only.)    Monitoring (monitoring blood glucose/other parameters & using results): Individual Session, Discussion, Instructed, Written Materials Provided, Comprehends Key Points (Keeps excellent record of BG's, testing 4 times daily. All BG's elevated. Reviewed goal BG's and discussed need for increased insulin doses at this time.)    Acute Complications (preventing, detecting, and treating acute  complications): Discussion, Instructed, Individual Session, Written Materials Provided, Comprehends Key Points (No reported hypos. Reviewed hypo vs hyper symptoms and discussed appropriate treatment for each. )    Diabetes Distress and Support Systems: Discussion (Distress noted today - pt became very tearful during visit. Discussed steps we can take (relating to med changes and dietary restrictions) to help decrease these stressors. Pt amenable to making some changes.)        Goals  Patient has selected/evaluated goals during today's session: Yes, evaluated  Healthy Eating: In Progress (Limit carbs to 30-45 gm per meal.)         Diabetes Care Plan/Intervention  Education Plan/Intervention: Individual Follow-Up DSMT      Diabetes Meal Plan  Restrictions: Restricted Carbohydrate  Carbohydrate Per Meal: 30-45g  Carbohydrate Per Snack : 7-15g, 15-20g    Education Units of Time   Time Spent: 30 min (75 minutes)    Health Maintenance was reviewed today with patient. Discussed with patient importance of routine eye exams, foot exams/foot care, blood work (i.e.: A1c, microalbumin, and lipid), dental visits, yearly flu vaccine, and pneumonia vaccine as indicated by PCP. Patient verbalized understanding.     Health Maintenance Topics with due status: Not Due       Topic Last Completion Date    Colonoscopy 11/06/2015    TETANUS VACCINE 05/31/2016    DEXA SCAN 04/11/2017    Influenza Vaccine 09/21/2017    Eye Exam 10/06/2017    Foot Exam 10/09/2017    Mammogram 12/07/2017    Lipid Panel 01/22/2018    Hemoglobin A1c 05/01/2018    High Dose Statin 05/07/2018    Urine Microalbumin 05/08/2018     There are no preventive care reminders to display for this patient.

## 2018-06-13 NOTE — Clinical Note
Dr. Nelson, I saw Ms. Gusman today for f/u education - her latest BG logs are attached to my note/in media tab. She was very tearful today and expressed some distress with her insulin, specifically when we started talking about making dosage changes. I suggested possibly switching to mixed insulin (relion 70/30) so that she would only need to take 2 shots per day as opposed to 4 or 5. She was very receptive to this and would really like to change if possible - she is spending more time caring for her  with worsening dementia and has less time for herself. Would it be possible to change her to Relion 70/30 BID? She will most definitely come in for log reviews as needed. Thanks so much, Jo

## 2018-06-20 ENCOUNTER — PATIENT OUTREACH (OUTPATIENT)
Dept: DIABETES | Facility: CLINIC | Age: 73
End: 2018-06-20

## 2018-06-20 ENCOUNTER — TELEPHONE (OUTPATIENT)
Dept: ENDOCRINOLOGY | Facility: CLINIC | Age: 73
End: 2018-06-20

## 2018-06-20 NOTE — TELEPHONE ENCOUNTER
TDD is 42 units  10% less due to peaks of NPH and risk for hypoglycemia in CKD    70/30 NPH/R 26 units thirty minutes before breakfast   70/30 NPH/R 12 units thirty minutes before dinner time.

## 2018-06-20 NOTE — PROGRESS NOTES
Attempted to call pt to instruct on insulin changes -  reports pt not available today but will be available tomorrow.

## 2018-06-21 DIAGNOSIS — Z79.4 TYPE 2 DIABETES MELLITUS WITH COMPLICATION, WITH LONG-TERM CURRENT USE OF INSULIN: Primary | ICD-10-CM

## 2018-06-21 DIAGNOSIS — E11.8 TYPE 2 DIABETES MELLITUS WITH COMPLICATION, WITH LONG-TERM CURRENT USE OF INSULIN: Primary | ICD-10-CM

## 2018-06-21 NOTE — PROGRESS NOTES
Pt called to discuss possible switch to mixed insulin (Relion 70/30) from current Lantus and Relion Regular use. Pt reports she has about 3 or 4 pens of Lantus left and would like to finish those before switch. Scheduled appt in 1 month for mixed insulin teaching. Pt confirmed.     Pt also reports BG's have been doing well since our last visit - no logs in front of her, but thinks she is doing well.

## 2018-07-25 ENCOUNTER — CLINICAL SUPPORT (OUTPATIENT)
Dept: DIABETES | Facility: CLINIC | Age: 73
End: 2018-07-25
Payer: MEDICARE

## 2018-07-25 DIAGNOSIS — Z79.4 TYPE 2 DIABETES MELLITUS WITH COMPLICATION, WITH LONG-TERM CURRENT USE OF INSULIN: ICD-10-CM

## 2018-07-25 DIAGNOSIS — E11.8 TYPE 2 DIABETES MELLITUS WITH COMPLICATION, WITH LONG-TERM CURRENT USE OF INSULIN: ICD-10-CM

## 2018-07-25 PROCEDURE — G0108 DIAB MANAGE TRN  PER INDIV: HCPCS | Mod: S$GLB,,, | Performed by: DIETITIAN, REGISTERED

## 2018-07-25 NOTE — Clinical Note
Hi. Just added a note with new dosing. Can you please communicate with Ms. Gusman. I am so sorry I am going out of town.

## 2018-07-25 NOTE — PROGRESS NOTES
Diabetes Education  Author: Jo Flynn RD  Date: 7/25/2018    Diabetes Education Visit  Diabetes Education Record Assessment/Progress: Comprehensive/Group (6 week f/u)    Diabetes Type  Diabetes Type : Type II    Diabetes History  Diabetes Diagnosis: >10 years    Nutrition  Meal Planning: 3 meals per day  What type of beverages do you drink?: water, diet soda/tea (coke zero)    Monitoring   Self Monitoring :  (SMBG 4 times daily)  Blood Glucose Logs: Yes (see upload)  Do you use a personal glucose monitor?: No  In the last month, how often have you had a low blood sugar reaction?: once  What are your symptoms of low blood sugar?:  (weakness)  How do you treat low blood sugar?:  (candy or juice)    Exercise   Exercise Type: use exercise equipment, swimming  Frequency: 3-5 Times per week  Duration: > 1 hour    Current Diabetes Treatment   Current Treatment: Insulin, Oral Medication (glimiperide 4 mg daily; metformin 750 mg daily; Novolin 70/30 - 26 units in AM and 12 units in PM; still taking Lantus 26 units at night)    Social History  Preferred Learning Method: Face to Face  Primary Support: Self, Spouse  Occupation:  (retired)  Smoking Status: Never a Smoker    Barriers to Change  Barriers to Change: None  Learning Challenges : None        Diabetes Education Assessment/Progress  Diabetes Disease Process (diabetes disease process and treatment options): Discussion, Individual Session, Comprehends Key Points  Reviewed treatment options available for her at this time, especially different insulin options.    Nutrition (Incorporating nutritional management into one's lifestyle): Discussion, Instructed, Individual Session, Written Materials Provided, Comprehends Key Points (Pt reports some trouble with determining which foods contain carbohydrate. Reviewed carb vs non-carb food lists; reviewed label reading. Encouraged pt to reference DSM book when unsure if a food contains carb. Discussed appropriate eating pattern  for new mixed insulin routine. Instructed pt to consume 30-45 gm carb at breakfast and dinner, and 20-30 gm carb at lunch; <15 gm carb at morning snack; 15 gm snack at bedtime if BG <100.)    Physical Activity (incorporating physical activity into one's lifestyle): Discussion (Still meeting recommendations for PA - encouraged continuance.)    Medications (states correct name, dose, onset, peak, duration, side effects & timing of meds): Discussion, Individual Session, Instructed, Written Materials Provided, Comprehends Key Points, Needs Review (On last visit, pt with desire to switch to easier insulin routine - discussed with endo MD and rx sent for mixed insulin. Pt was supposed to receive instruction on mixed insulin today, but she started a few days ago (>1 week). In addition to the mixed insulin, she has continued to take evening Lantus of 26 units. Instructed pt to stop Lantus - only take mixed insulin 30 min before breakfast and 30 min before dinner. Based on BG logs, she will likely need increased doses of mixed insulin.)    Monitoring (monitoring blood glucose/other parameters & using results): Individual Session, Discussion, Comprehends Key Points, Instructed, Written Materials Provided  Reviewed goal BGs; encouraged to continue testing before meals and at bedtime.    Acute Complications (preventing, detecting, and treating acute complications): Discussion, Instructed, Individual Session, Written Materials Provided, Comprehends Key Points (A few hypos (upper 60, low 70s) since changing insulins - likely because she is taking Lantus AND mixed insulin. Reviewed hypo symptoms and appropriate treatment.)    Diabetes Distress and Support Systems: Discussion (Much more positive today; minimal distress noted.)    Behavioral (readiness for change, lifestyle practices, self-care behaviors): Discussion (Reports she has lacked motivation for dietary changes in the past; thinks she feels a little more motivated to make  changes  now.)          Goals  Patient has selected/evaluated goals during today's session: Yes, evaluated  Healthy Eating: In Progress (Limit carbs to 30-45 gm per meal.)         Diabetes Care Plan/Intervention  Education Plan/Intervention: Individual Follow-Up DSMT        Diabetes Meal Plan  Restrictions: Restricted Carbohydrate  Carbohydrate Per Meal: 30-45g  Carbohydrate Per Snack : 7-15g    Education Units of Time   Time Spent: 60 min    Health Maintenance was reviewed today with patient. Discussed with patient importance of routine eye exams, foot exams/foot care, blood work (i.e.: A1c, microalbumin, and lipid), dental visits, yearly flu vaccine, and pneumonia vaccine as indicated by PCP. Patient verbalized understanding.     Health Maintenance Topics with due status: Not Due       Topic Last Completion Date    Colonoscopy 11/06/2015    TETANUS VACCINE 05/31/2016    DEXA SCAN 04/11/2017    Influenza Vaccine 09/21/2017    Foot Exam 10/09/2017    Mammogram 12/07/2017    Lipid Panel 01/22/2018    Eye Exam 02/12/2018    High Dose Statin 05/07/2018    Hemoglobin A1c 05/08/2018    Urine Microalbumin 05/08/2018     There are no preventive care reminders to display for this patient.

## 2018-07-25 NOTE — Clinical Note
Dr. Nelson, Ms. Gusman was supposed to receive education on mixed insulin today, but she started taking the insulin about a week ago. In addition to the 26 units in am and 12 units in pm of the mixed insulin, she is STILL taking her lantus-26 units at bedtime. I instructed her to stop the lantus - but based on her BG logs, I think she'll need increased doses of the mixed insulin. Logs are attached to my note/in media tab. Can you let me know what doses to tell her? Thanks so much.

## 2018-07-26 NOTE — PROGRESS NOTES
Reviewed labs  NPH 26  NPH 12     And continuing lantus 26 at bedtime.     PLAN:  Please call ms Gusman and ask her to increase NPH 32 units in the morning and 20 units before dinner.

## 2018-07-28 DIAGNOSIS — K21.9 LARYNGOPHARYNGEAL REFLUX (LPR): ICD-10-CM

## 2018-07-30 RX ORDER — PANTOPRAZOLE SODIUM 40 MG/1
40 TABLET, DELAYED RELEASE ORAL
Qty: 180 TABLET | Refills: 1 | Status: SHIPPED | OUTPATIENT
Start: 2018-07-30 | End: 2018-11-30

## 2018-08-27 DIAGNOSIS — E11.59 HYPERTENSION ASSOCIATED WITH DIABETES: ICD-10-CM

## 2018-08-27 DIAGNOSIS — I15.2 HYPERTENSION ASSOCIATED WITH DIABETES: ICD-10-CM

## 2018-08-27 RX ORDER — HYDROCHLOROTHIAZIDE 25 MG/1
25 TABLET ORAL DAILY
Qty: 90 TABLET | Refills: 3 | Status: SHIPPED | OUTPATIENT
Start: 2018-08-27 | End: 2019-07-22 | Stop reason: SDUPTHER

## 2018-08-27 NOTE — TELEPHONE ENCOUNTER
----- Message from Kimo Mtz sent at 8/27/2018  1:15 PM CDT -----  Contact: Patient [phone]  RX request - refill or new RX.  Is this a refill or new RX:  Refill  RX name and strength: hydrochlorothiazide (HYDRODIURIL) 25 MG tablet    Pharmacy name and phone # ): [facility] - [facility] [id number]    Please call an advise  Thank you

## 2018-08-28 DIAGNOSIS — E87.6 DRUG-INDUCED HYPOKALEMIA: ICD-10-CM

## 2018-08-28 DIAGNOSIS — T50.905A DRUG-INDUCED HYPOKALEMIA: ICD-10-CM

## 2018-08-28 RX ORDER — POTASSIUM CHLORIDE 750 MG/1
TABLET, EXTENDED RELEASE ORAL
Qty: 90 TABLET | Refills: 0 | Status: SHIPPED | OUTPATIENT
Start: 2018-08-28 | End: 2019-01-16 | Stop reason: SDUPTHER

## 2018-08-29 ENCOUNTER — HOSPITAL ENCOUNTER (OUTPATIENT)
Dept: RADIOLOGY | Facility: HOSPITAL | Age: 73
Discharge: HOME | End: 2018-08-29
Attending: PHYSICIAN ASSISTANT
Payer: MEDICARE

## 2018-08-29 ENCOUNTER — OFFICE VISIT (OUTPATIENT)
Dept: SPORTS MEDICINE | Facility: CLINIC | Age: 73
End: 2018-08-29
Payer: MEDICARE

## 2018-08-29 VITALS
DIASTOLIC BLOOD PRESSURE: 77 MMHG | HEIGHT: 65 IN | SYSTOLIC BLOOD PRESSURE: 126 MMHG | WEIGHT: 182 LBS | HEART RATE: 88 BPM | BODY MASS INDEX: 30.32 KG/M2

## 2018-08-29 DIAGNOSIS — M25.562 LEFT KNEE PAIN, UNSPECIFIED CHRONICITY: ICD-10-CM

## 2018-08-29 DIAGNOSIS — M23.92 KNEE INTERNAL DERANGEMENT, LEFT: Primary | ICD-10-CM

## 2018-08-29 DIAGNOSIS — R60.0 EDEMA OF BOTH FEET: ICD-10-CM

## 2018-08-29 PROCEDURE — 73564 X-RAY EXAM KNEE 4 OR MORE: CPT | Mod: TC,50,FY,PO

## 2018-08-29 PROCEDURE — 99214 OFFICE O/P EST MOD 30 MIN: CPT | Mod: S$GLB,,, | Performed by: PHYSICIAN ASSISTANT

## 2018-08-29 PROCEDURE — 3074F SYST BP LT 130 MM HG: CPT | Mod: CPTII,S$GLB,, | Performed by: PHYSICIAN ASSISTANT

## 2018-08-29 PROCEDURE — 3078F DIAST BP <80 MM HG: CPT | Mod: CPTII,S$GLB,, | Performed by: PHYSICIAN ASSISTANT

## 2018-08-29 PROCEDURE — 73564 X-RAY EXAM KNEE 4 OR MORE: CPT | Mod: 26,50,, | Performed by: RADIOLOGY

## 2018-08-29 PROCEDURE — 99999 PR PBB SHADOW E&M-EST. PATIENT-LVL IV: CPT | Mod: PBBFAC,,, | Performed by: PHYSICIAN ASSISTANT

## 2018-08-29 RX ORDER — DICLOFENAC SODIUM 10 MG/G
2 GEL TOPICAL 3 TIMES DAILY
Qty: 1 TUBE | Refills: 0 | Status: SHIPPED | OUTPATIENT
Start: 2018-08-29 | End: 2018-09-18
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
## 2018-08-30 ENCOUNTER — OFFICE VISIT (OUTPATIENT)
Dept: INTERNAL MEDICINE | Facility: CLINIC | Age: 73
End: 2018-08-30
Payer: MEDICARE

## 2018-08-30 ENCOUNTER — LAB VISIT (OUTPATIENT)
Dept: LAB | Facility: HOSPITAL | Age: 73
End: 2018-08-30
Attending: INTERNAL MEDICINE
Payer: MEDICARE

## 2018-08-30 VITALS
HEART RATE: 72 BPM | BODY MASS INDEX: 32.58 KG/M2 | SYSTOLIC BLOOD PRESSURE: 130 MMHG | OXYGEN SATURATION: 98 % | WEIGHT: 183.88 LBS | HEIGHT: 63 IN | DIASTOLIC BLOOD PRESSURE: 70 MMHG

## 2018-08-30 DIAGNOSIS — R60.0 BILATERAL LOWER EXTREMITY EDEMA: Primary | ICD-10-CM

## 2018-08-30 DIAGNOSIS — R60.0 BILATERAL LOWER EXTREMITY EDEMA: ICD-10-CM

## 2018-08-30 DIAGNOSIS — E11.65 TYPE 2 DIABETES MELLITUS WITH HYPERGLYCEMIA, WITH LONG-TERM CURRENT USE OF INSULIN: ICD-10-CM

## 2018-08-30 DIAGNOSIS — E11.42 DIABETIC POLYNEUROPATHY ASSOCIATED WITH TYPE 2 DIABETES MELLITUS: ICD-10-CM

## 2018-08-30 DIAGNOSIS — I15.2 HYPERTENSION ASSOCIATED WITH DIABETES: ICD-10-CM

## 2018-08-30 DIAGNOSIS — E11.59 HYPERTENSION ASSOCIATED WITH DIABETES: ICD-10-CM

## 2018-08-30 DIAGNOSIS — Z79.4 TYPE 2 DIABETES MELLITUS WITH HYPERGLYCEMIA, WITH LONG-TERM CURRENT USE OF INSULIN: ICD-10-CM

## 2018-08-30 LAB
ANION GAP SERPL CALC-SCNC: 8 MMOL/L
BNP SERPL-MCNC: 14 PG/ML
BUN SERPL-MCNC: 13 MG/DL
CALCIUM SERPL-MCNC: 9.8 MG/DL
CHLORIDE SERPL-SCNC: 107 MMOL/L
CO2 SERPL-SCNC: 27 MMOL/L
CREAT SERPL-MCNC: 0.8 MG/DL
EST. GFR  (AFRICAN AMERICAN): >60 ML/MIN/1.73 M^2
EST. GFR  (NON AFRICAN AMERICAN): >60 ML/MIN/1.73 M^2
GLUCOSE SERPL-MCNC: 145 MG/DL
POTASSIUM SERPL-SCNC: 3.7 MMOL/L
SODIUM SERPL-SCNC: 142 MMOL/L

## 2018-08-30 PROCEDURE — 3075F SYST BP GE 130 - 139MM HG: CPT | Mod: CPTII,S$GLB,, | Performed by: INTERNAL MEDICINE

## 2018-08-30 PROCEDURE — 3045F PR MOST RECENT HEMOGLOBIN A1C LEVEL 7.0-9.0%: CPT | Mod: CPTII,S$GLB,, | Performed by: INTERNAL MEDICINE

## 2018-08-30 PROCEDURE — 80048 BASIC METABOLIC PNL TOTAL CA: CPT

## 2018-08-30 PROCEDURE — 99214 OFFICE O/P EST MOD 30 MIN: CPT | Mod: S$GLB,,, | Performed by: INTERNAL MEDICINE

## 2018-08-30 PROCEDURE — 3078F DIAST BP <80 MM HG: CPT | Mod: CPTII,S$GLB,, | Performed by: INTERNAL MEDICINE

## 2018-08-30 PROCEDURE — 36415 COLL VENOUS BLD VENIPUNCTURE: CPT

## 2018-08-30 PROCEDURE — 99999 PR PBB SHADOW E&M-EST. PATIENT-LVL IV: CPT | Mod: PBBFAC,,, | Performed by: INTERNAL MEDICINE

## 2018-08-30 PROCEDURE — 83880 ASSAY OF NATRIURETIC PEPTIDE: CPT

## 2018-08-30 NOTE — PROGRESS NOTES
CHIEF COMPLAINT:  Swelling of feet and ankles.    HISTORY OF PRESENT ILLNESS:  The patient is a 73-year-old female who is   currently being treated for diabetes, hypertension, hyperlipidemia as well as   neuropathy involving her feet who comes in today with complaints of increased   swelling of her feet and ankles for the past one to two weeks.  Her feet and   ankles are better in the morning, but tend to get worse as the day goes by.  She   states by the end of the day it is hard for her to keep her shoes on.  She has   been trying to keep her legs elevated as much as possible.  She does state that   she is going to [facility] and used the cold water pool, which   seemed to help.  She also reports ice seems to help as well to keep the swelling   down as well as make her feel better.  The patient was seen in Sports Medicine   concerning her knees.  She was prescribed compression stockings.  She is not   sure what compression strength.    REVIEW OF SYSTEMS:  The patient does complain of a dry cough.  She does get a   little bit out of breath when lying flat.  She does have problems with reflux.    She does report having some epigastric discomfort, which is relieved with   belching.  She does check her blood sugars four times a day.  The patient rates   her pain currently is 7/10.    PHYSICAL EXAMINATION:  GENERAL APPEARANCE:  No acute distress.  HEENT:  Trachea was midline without JVD.  PULMONARY:  Good inspiratory, expiratory breath sounds are heard.  Lungs are   clear to auscultation.  She had no crackles, wheezing or rhonchi.  CARDIOVASCULAR:  S1, S2.  EXTREMITIES:  Showed trace to 1+ foot and ankle edema.  The patient's feet were   inspected.  She had 2+ dorsal pedal pulses.  ABDOMEN:  Nontender, nondistended, without hepatosplenomegaly.    ASSESSMENT:  1.  Bilateral lower extremity edema, appears to be mild limited to just the foot   and the beginning of the ankle.  2.  Diabetes with neuropathy.  3.   Hypertension, currently stable.    PLAN:  We will check a BMP, BNP.  Further management will depend on results of   tests.      YARIEL/SHARI  dd: 08/30/2018 14:29:14 (CDT)  td: 08/31/2018 08:00:30 (CDT)  Doc ID   #[id number]  Job ID #[id number]    CC:

## 2018-09-03 ENCOUNTER — HOSPITAL ENCOUNTER (EMERGENCY)
Facility: HOSPITAL | Age: 73
Discharge: HOME | End: 2018-09-03
Attending: EMERGENCY MEDICINE
Payer: MEDICARE

## 2018-09-03 VITALS
RESPIRATION RATE: 17 BRPM | TEMPERATURE: 98 F | SYSTOLIC BLOOD PRESSURE: 125 MMHG | HEART RATE: 68 BPM | OXYGEN SATURATION: 98 % | DIASTOLIC BLOOD PRESSURE: 68 MMHG

## 2018-09-03 DIAGNOSIS — R07.9 CHEST PAIN, UNSPECIFIED TYPE: Primary | ICD-10-CM

## 2018-09-03 DIAGNOSIS — R00.2 PALPITATIONS: ICD-10-CM

## 2018-09-03 LAB
ALBUMIN SERPL BCP-MCNC: 3.3 G/DL
ALP SERPL-CCNC: 57 U/L
ALT SERPL W/O P-5'-P-CCNC: 12 U/L
ANION GAP SERPL CALC-SCNC: 12 MMOL/L
AST SERPL-CCNC: 19 U/L
BACTERIA #/AREA URNS AUTO: NORMAL /HPF
BASOPHILS # BLD AUTO: 0.05 K/UL
BASOPHILS NFR BLD: 0.7 %
BILIRUB SERPL-MCNC: 0.9 MG/DL
BILIRUB UR QL STRIP: NEGATIVE
BNP SERPL-MCNC: <10 PG/ML
BNP SERPL-MCNC: <10 PG/ML
BUN SERPL-MCNC: 17 MG/DL
CALCIUM SERPL-MCNC: 10.3 MG/DL
CHLORIDE SERPL-SCNC: 103 MMOL/L
CLARITY UR REFRACT.AUTO: CLEAR
CO2 SERPL-SCNC: 25 MMOL/L
COLOR UR AUTO: YELLOW
CREAT SERPL-MCNC: 0.9 MG/DL
DIFFERENTIAL METHOD: ABNORMAL
EOSINOPHIL # BLD AUTO: 0.1 K/UL
EOSINOPHIL NFR BLD: 1.9 %
ERYTHROCYTE [DISTWIDTH] IN BLOOD BY AUTOMATED COUNT: 14.5 %
EST. GFR  (AFRICAN AMERICAN): >60 ML/MIN/1.73 M^2
EST. GFR  (NON AFRICAN AMERICAN): >60 ML/MIN/1.73 M^2
GLUCOSE SERPL-MCNC: 110 MG/DL
GLUCOSE UR QL STRIP: NEGATIVE
HCT VFR BLD AUTO: 37.1 %
HGB BLD-MCNC: 11.9 G/DL
HGB UR QL STRIP: NEGATIVE
HYALINE CASTS UR QL AUTO: 1 /LPF
IMM GRANULOCYTES # BLD AUTO: 0.03 K/UL
IMM GRANULOCYTES NFR BLD AUTO: 0.4 %
INR PPP: 0.9
KETONES UR QL STRIP: NEGATIVE
LEUKOCYTE ESTERASE UR QL STRIP: ABNORMAL
LYMPHOCYTES # BLD AUTO: 3.5 K/UL
LYMPHOCYTES NFR BLD: 47.4 %
MCH RBC QN AUTO: 28.4 PG
MCHC RBC AUTO-ENTMCNC: 32.1 G/DL
MCV RBC AUTO: 89 FL
MICROSCOPIC COMMENT: NORMAL
MONOCYTES # BLD AUTO: 0.6 K/UL
MONOCYTES NFR BLD: 8.5 %
NEUTROPHILS # BLD AUTO: 3 K/UL
NEUTROPHILS NFR BLD: 41.1 %
NITRITE UR QL STRIP: NEGATIVE
NON-SQ EPI CELLS #/AREA URNS AUTO: 0 /HPF
NRBC BLD-RTO: 0 /100 WBC
PH UR STRIP: 5 [PH] (ref 5–8)
PLATELET # BLD AUTO: 174 K/UL
PMV BLD AUTO: 11 FL
POTASSIUM SERPL-SCNC: 3.6 MMOL/L
PROT SERPL-MCNC: 6.7 G/DL
PROT UR QL STRIP: NEGATIVE
PROTHROMBIN TIME: 9.6 SEC
RBC # BLD AUTO: 4.19 M/UL
RBC #/AREA URNS AUTO: 2 /HPF (ref 0–4)
SODIUM SERPL-SCNC: 140 MMOL/L
SP GR UR STRIP: 1.02 (ref 1–1.03)
SQUAMOUS #/AREA URNS AUTO: 5 /HPF
TROPONIN I SERPL DL<=0.01 NG/ML-MCNC: 0.01 NG/ML
TROPONIN I SERPL DL<=0.01 NG/ML-MCNC: <0.006 NG/ML
URN SPEC COLLECT METH UR: ABNORMAL
UROBILINOGEN UR STRIP-ACNC: NEGATIVE EU/DL
WBC # BLD AUTO: 7.39 K/UL
WBC #/AREA URNS AUTO: 3 /HPF (ref 0–5)

## 2018-09-03 PROCEDURE — 84484 ASSAY OF TROPONIN QUANT: CPT

## 2018-09-03 PROCEDURE — 99284 EMERGENCY DEPT VISIT MOD MDM: CPT | Mod: 25

## 2018-09-03 PROCEDURE — 93005 ELECTROCARDIOGRAM TRACING: CPT

## 2018-09-03 PROCEDURE — 81001 URINALYSIS AUTO W/SCOPE: CPT

## 2018-09-03 PROCEDURE — 25000003 PHARM REV CODE 250: Performed by: EMERGENCY MEDICINE

## 2018-09-03 PROCEDURE — 84484 ASSAY OF TROPONIN QUANT: CPT | Mod: 91

## 2018-09-03 PROCEDURE — 83880 ASSAY OF NATRIURETIC PEPTIDE: CPT

## 2018-09-03 PROCEDURE — 85610 PROTHROMBIN TIME: CPT

## 2018-09-03 PROCEDURE — 85025 COMPLETE CBC W/AUTO DIFF WBC: CPT

## 2018-09-03 PROCEDURE — 80053 COMPREHEN METABOLIC PANEL: CPT

## 2018-09-03 PROCEDURE — 93010 ELECTROCARDIOGRAM REPORT: CPT | Mod: ,,, | Performed by: INTERNAL MEDICINE

## 2018-09-03 PROCEDURE — 99284 EMERGENCY DEPT VISIT MOD MDM: CPT | Mod: ,,, | Performed by: EMERGENCY MEDICINE

## 2018-09-03 RX ORDER — ASPIRIN 325 MG
325 TABLET ORAL
Status: COMPLETED | OUTPATIENT
Start: 2018-09-03 | End: 2018-09-03

## 2018-09-03 RX ADMIN — ALUMINUM HYDROXIDE, MAGNESIUM HYDROXIDE, AND SIMETHICONE 50 ML: 200; 200; 20 SUSPENSION ORAL at 03:09

## 2018-09-03 RX ADMIN — ASPIRIN 325 MG ORAL TABLET 325 MG: 325 PILL ORAL at 02:09

## 2018-09-03 NOTE — ED TRIAGE NOTES
"Alisia Gusman, a 73 y.o. female presents to the ED w/ complaint of for mild epigastric chest pain that "feels like somone is walking" on her chest. PT denies SOB, dizziness, fever,and n/v/d. PT has no other complaints at this time.    Triage note:  Chief Complaint   Patient presents with    Palpitations     Pt reports palpitations. Denies chest pain or SOB.     Review of patient's allergies indicates:   Allergen Reactions    Nubain [nalbuphine] Other (See Comments)     Other reaction(s): Hypotension    Grass pollen-june grass standard     Losartan      cough    Sinus & allergy [chlorpheniramine-phenylephrine]      Past Medical History:   Diagnosis Date    Adult bronchiectasis 7/26/2017    Allergy     Anemia     Arthritis     Asthma     Breast cancer     Cancer 1993    L eft breast    Cataract     Chronic cervical radiculopathy 9/20/2012    Diabetes mellitus     Diabetes mellitus type II     Diabetes with neurologic complications     Diverticulosis     Dry eyes     Dysphagia     after knee surgery June 2014    GERD (gastroesophageal reflux disease)     Hyperlipidemia     Hypertension     Neuropathy     feet    Scalp tenderness     Shortness of breath     Ulcer      Adult Physical Assessment  LOC: Alisia Gusman, 73 y.o. female verified via two identifiers.  The patient is awake, alert, oriented and speaking appropriately at this time.  APPEARANCE: Patient resting comfortably and appears to be in no acute distress at this time. Patient is clean and well groomed, patient's clothing is properly fastened.  SKIN:The skin is warm and dry, color consistent with ethnicity, patient has normal skin turgor and moist mucus membranes, skin intact, no breakdown or brusing noted.  MUSCULOSKELETAL: Patient moving all extremities well, no obvious swelling or deformities noted.  RESPIRATORY: Airway is open and patent, respirations are spontaneous, patient has a normal effort and rate, no accessory muscle " use noted.  CARDIAC: Patient has a normal rate and rhythm, 1+ periphreal edema noted in any extremity, capillary refill < 3 seconds in all extremities  ABDOMEN: Soft and non tender to palpation, no abdominal distention noted. Bowel sounds present in all four quadrants.  NEUROLOGIC: Eyes open spontaneously, behavior appropriate to situation, follows commands, facial expression symmetrical, bilateral hand grasp equal and even, purposeful motor response noted, normal sensation in all extremities when touched with a finger.

## 2018-09-03 NOTE — ED NOTES
"Pt states that she feels slight relief from GI cocktail. Pt says that "the pain walking up and down her chest" is gone. Will continue to monitor.   "

## 2018-09-03 NOTE — ED PROVIDER NOTES
"Encounter Date: 9/3/2018    SCRIBE #1 NOTE: I, Mary Jansen, am scribing for, and in the presence of, . I have scribed the following portions of the note - the Resident attestation.       History     Chief Complaint   Patient presents with    Palpitations     Pt reports palpitations. Denies chest pain or SOB.     HPI     74 yo female presents for evaluation of chest discomfort. Describes a "feeling like something is crawling up and down my chest."  Denies any sensation of racing heart beat or irregular beating.  States "to be honest it feels like my GERD but my doctor said I should have my heart checked the next time it happens".  Symptoms began at 2100 and remained constant since.  No relief with ganesh seltzer at home.  No other associated symptoms     Review of patient's allergies indicates:   Allergen Reactions    Nubain [nalbuphine] Other (See Comments)     Other reaction(s): Hypotension    Grass pollen-june grass standard     Losartan      cough    Sinus & allergy [chlorpheniramine-phenylephrine]      Past Medical History:   Diagnosis Date    Adult bronchiectasis 7/26/2017    Allergy     Anemia     Arthritis     Asthma     Breast cancer     Cancer 1993    L eft breast    Cataract     Chronic cervical radiculopathy 9/20/2012    Diabetes mellitus     Diabetes mellitus type II     Diabetes with neurologic complications     Diverticulosis     Dry eyes     Dysphagia     after knee surgery June 2014    GERD (gastroesophageal reflux disease)     Hyperlipidemia     Hypertension     Neuropathy     feet    Scalp tenderness     Shortness of breath     Ulcer      Past Surgical History:   Procedure Laterality Date    BREAST BIOPSY  1993    BREAST LUMPECTOMY Left     CARPAL TUNNEL RELEASE Bilateral 2011    CATARACT EXTRACTION W/  INTRAOCULAR LENS IMPLANT Bilateral 2013 and 2014    CHOLECYSTECTOMY      EYE SURGERY      HYSTERECTOMY      partial hyst    JOINT REPLACEMENT Left June " 2014    knee    uvuloplasty       Family History   Problem Relation Age of Onset    Diabetes Mother     Diabetes Sister     Colon polyps Sister     Heart disease Father     No Known Problems Brother     No Known Problems Daughter     No Known Problems Son     Cancer Sister         breast and colon ca    Colon polyps Sister     Diabetes Sister     Cancer Sister         colon ca    Arthritis Sister     Psoriasis Sister     No Known Problems Daughter     Asthma Neg Hx     Emphysema Neg Hx     Lupus Neg Hx     Rheum arthritis Neg Hx     Melanoma Neg Hx     Colon cancer Neg Hx     Irritable bowel syndrome Neg Hx     Inflammatory bowel disease Neg Hx     Stomach cancer Neg Hx     Esophageal cancer Neg Hx      Social History     Tobacco Use    Smoking status: Never Smoker    Smokeless tobacco: Never Used   Substance Use Topics    Alcohol use: No    Drug use: No     Review of Systems   Constitutional: Negative for chills and fever.   HENT: Negative for sore throat and voice change.    Eyes: Negative for pain and visual disturbance.   Respiratory: Negative for cough and shortness of breath.    Cardiovascular: Positive for leg swelling (mild; chronic). Negative for chest pain.   Gastrointestinal: Negative for abdominal pain, constipation, diarrhea, nausea and vomiting.   Genitourinary: Negative for dysuria and flank pain.   Musculoskeletal: Negative for back pain and neck pain.   Skin: Negative for rash and wound.   Neurological: Negative for syncope, weakness and headaches.       Physical Exam     Initial Vitals [09/03/18 0141]   BP Pulse Resp Temp SpO2   (!) 155/93 71 17 97.8 °F (36.6 °C) 96 %      MAP       --         Physical Exam    Nursing note and vitals reviewed.  Constitutional: She is not diaphoretic. No distress.   HENT:   Head: Normocephalic and atraumatic.   Eyes: Conjunctivae are normal. No scleral icterus.   Neck: Normal range of motion.   Cardiovascular: Normal rate, regular  rhythm, normal heart sounds and intact distal pulses.   No murmur heard.  Pulmonary/Chest: Breath sounds normal. No respiratory distress.   Abdominal: Soft. There is no tenderness. There is no rebound.   Musculoskeletal: She exhibits edema (1+ pretibial edema bilaterally).   Neurological: She is alert and oriented to person, place, and time.   Skin: Skin is warm and dry.         ED Course   Procedures  Labs Reviewed   CBC W/ AUTO DIFFERENTIAL - Abnormal; Notable for the following components:       Result Value    Hemoglobin 11.9 (*)     All other components within normal limits   COMPREHENSIVE METABOLIC PANEL - Abnormal; Notable for the following components:    Albumin 3.3 (*)     All other components within normal limits   URINALYSIS, REFLEX TO URINE CULTURE - Abnormal; Notable for the following components:    Leukocytes, UA Trace (*)     All other components within normal limits    Narrative:     Preferred Collection Type->Urine, Clean Catch   B-TYPE NATRIURETIC PEPTIDE   TROPONIN I   PROTIME-INR   B-TYPE NATRIURETIC PEPTIDE   URINALYSIS MICROSCOPIC    Narrative:     Preferred Collection Type->Urine, Clean Catch   TROPONIN I          Imaging Results          X-Ray Chest AP Portable (Final result)  Result time 09/03/18 02:41:26    Final result by Paty Devlin MD (09/03/18 02:41:26)                 Impression:      No acute intrathoracic abnormality identified on this single radiographic view of the chest.      Electronically signed by: Paty Devlin MD  Date:    09/03/2018  Time:    02:41             Narrative:    EXAMINATION:  XR CHEST AP PORTABLE    CLINICAL HISTORY:  palpitations;    TECHNIQUE:  Single frontal view of the chest was performed.    COMPARISON:  Chest radiograph 04/25/2017    FINDINGS:  Monitoring leads overlie the chest.  The cardiomediastinal silhouette is stable.  The visualized airway is unremarkable.  The lungs appear symmetrically aerated without definite focal alveolar consolidation.   Slight increased attenuation in the lower lung zones likely secondary to overlying soft tissue.  No large pleural effusion or pneumothorax is appreciated.Visualized osseous structures demonstrate no acute abnormality.                                       APC / Resident Notes:   OSITO MCNEILL    DDx includes but is not limited to:ACS, GERD, PNA, pericarditis, PTX, PE  A/P:  Pt is a 74 yo female with chest discomfort.  Signs and symptoms consistent with GERD.  Pt has known h/o dz and states she has had similar episodes.  However, given her RF pt will need cardiac r/o.  1st set of enzymes neg.  CXR neg for acute cardio pulm process.  EKG NSR with normal intervals and no ST elevation. Will repeat Tn at 0500. If neg pt can be discharged with PCP f/u.  Dispo pending , labs/imaging and reassessment.  Case Discussed with Dr. Julio GIPSON  09/03/2018 3:22 AM      UPDATE   2nd Tn neg.  Discussed all options and results with pt  She reported feeling much better with symptom resolution after GI cocktail..  Will discharge  Selena GIPSON  09/03/2018 5:53 AM                  Attending Attestation:   Physician Attestation Statement for Resident:  As the supervising MD  I agree with the above history. -:   As the supervising MD I agree with the above PE.    As the supervising MD I agree with the above treatment, course, plan, and disposition.                       Clinical Impression:     1. Chest pain, unspecified type    2. Palpitations                                   Selena Leavitt MD  Resident  09/03/18 0597

## 2018-09-06 ENCOUNTER — TELEPHONE (OUTPATIENT)
Dept: INTERNAL MEDICINE | Facility: CLINIC | Age: 73
End: 2018-09-06

## 2018-09-06 NOTE — TELEPHONE ENCOUNTER
Pt stated that she is feeling fine, swelling has went done but pt did go to ED on 09/03/18. Appt scheduled, reminder mailed.

## 2018-09-11 ENCOUNTER — LAB VISIT (OUTPATIENT)
Dept: LAB | Facility: HOSPITAL | Age: 73
End: 2018-09-11
Attending: INTERNAL MEDICINE
Payer: MEDICARE

## 2018-09-11 DIAGNOSIS — Z79.4 TYPE 2 DIABETES MELLITUS WITH HYPERGLYCEMIA, WITH LONG-TERM CURRENT USE OF INSULIN: ICD-10-CM

## 2018-09-11 DIAGNOSIS — E11.65 TYPE 2 DIABETES MELLITUS WITH HYPERGLYCEMIA, WITH LONG-TERM CURRENT USE OF INSULIN: ICD-10-CM

## 2018-09-11 LAB
ESTIMATED AVG GLUCOSE: 151 MG/DL
HBA1C MFR BLD HPLC: 6.9 %

## 2018-09-11 PROCEDURE — 36415 COLL VENOUS BLD VENIPUNCTURE: CPT

## 2018-09-11 PROCEDURE — 83036 HEMOGLOBIN GLYCOSYLATED A1C: CPT

## 2018-09-17 NOTE — PROGRESS NOTES
Subjective:     Patient ID: Alisia Gusman is a 73 y.o. female.    Chief Complaint: No chief complaint on file.    HPI:   Ms. Gusman is a 73 y.o. female who is here for a follow-up visit for evaluation of type 2 diabetes that is controlled complicated by peripheral neuropathy.   Today she is feeling, feels some shortness of breath in the morning. Denies palpitations, chest pain or pressure. Thinks insulin may improve it.     Denies chest pain/pressure. Has neuropathy that affects her feet bilaterally. Has numbness and burning. Does not interfere with sleep. Currently on gabapentin daily.   Silver sneakers, (/), fit and Fun, and water aerobics ( and ); also walks in the pool for one hour.        Diabetes regimen:   Metformin  mg nightly,   Glimepiride 4 mg daily with breakfast  NPH/R 70/30 - 32 units before breakfast and 20 units before dinner time.       No difficulties with injections. Denies hypoglycemic symptoms or bg less than 70.     Reviewed blood sugar logs. Checks four times a day every day.       Fastin, 150, 160, 136, 137, 170, 138, 122, 111, 136  Lunch: x, 177, 125, 173, 167, 186, 110, 128, 150, 77  Dinner: 134, 246, 191, 126, 220, 220, 179, 166, 169  Bedtime: x, 181, 210, 185, x, 196, 225, x, 209     Most recent A1C is 6.9%.      ADA STANDARDS of CARE:        ACE inhibitor or angiotensin II receptor blocker:  No microalbuminuria, on HCTZ well controlled.         Statin drug:  Pravastatin 40 mg daily         Low dose ASA: 81 mg daily         Eye exam within last year:         Dental exam:         Flu shot:        Pneumonia vaccine:        Microalbumin: 2017 - normal    Review of Systems   No recent illness  Appetite is normal   BMs are normal.     Objective:     Physical Exam   Constitutional: She is oriented to person, place, and time. She appears well-developed and well-nourished. No distress.   HENT:   Head: Normocephalic and atraumatic.   Mouth/Throat: No oropharyngeal exudate.  "  Eyes: Conjunctivae and EOM are normal. Pupils are equal, round, and reactive to light. No scleral icterus.   Neck: Normal range of motion. Neck supple. No tracheal deviation present. No thyromegaly present.   Cardiovascular: Normal rate, regular rhythm, normal heart sounds and intact distal pulses.   Pulmonary/Chest: Effort normal and breath sounds normal.   Abdominal: Soft. Bowel sounds are normal. She exhibits no distension. There is no tenderness.   Sites of insulin administration are normal appearing.   Genitourinary: No breast discharge.   Musculoskeletal: Normal range of motion. She exhibits no edema or tenderness.   Lymphadenopathy:     She has no cervical adenopathy.   Neurological: She is alert and oriented to person, place, and time. She has normal reflexes. No cranial nerve deficit.   Skin: Skin is warm and dry.   FOOT EXAM:  Visual inspection is normal, no abrasions, bruising or calluses.   Microfilament test is diminshed b/l.   Vibratory sense is intact b/l.   Distal pulses are present b/l.    Psychiatric: She has a normal mood and affect.       Vitals:    09/18/18 1057   BP: 122/62   Pulse: 74   Resp: 16   Weight: 82.1 kg (180 lb 16 oz)   Height: 5' 2" (1.575 m)       Results for orders placed or performed in visit on 09/11/18   Hemoglobin A1c   Result Value Ref Range    Hemoglobin A1C 6.9 (H) 4.0 - 5.6 %    Estimated Avg Glucose 151 (H) 68 - 131 mg/dL     Results for CINDA TATUM (MRN [id number]) as of 9/18/2018 11:08   Ref. Range 5/1/2018 08:09 9/11/2018 08:25   Hemoglobin A1C Latest Ref Range: 4.0 - 5.6 % 7.9 (H) 6.9 (H)   Estimated Avg Glucose Latest Ref Range: 68 - 131 mg/dL 180 (H) 151 (H)   Results for CINDA TATUM (MRN [id number]) as of 9/18/2018 11:08   Ref. Range 9/3/2018 02:08   Sodium Latest Ref Range: 136 - 145 mmol/L 140   Potassium Latest Ref Range: 3.5 - 5.1 mmol/L 3.6   Chloride Latest Ref Range: 95 - 110 mmol/L 103   CO2 Latest Ref Range: 23 - 29 mmol/L 25   Anion Gap Latest Ref " Range: 8 - 16 mmol/L 12   BUN, Bld Latest Ref Range: 8 - 23 mg/dL 17   Creatinine Latest Ref Range: 0.5 - 1.4 mg/dL 0.9   eGFR if non African American Latest Ref Range: >60 mL/min/1.73 m^2 >60.0   eGFR if African American Latest Ref Range: >60 mL/min/1.73 m^2 >60.0   Glucose Latest Ref Range: 70 - 110 mg/dL 110   Calcium Latest Ref Range: 8.7 - 10.5 mg/dL 10.3   Alkaline Phosphatase Latest Ref Range: 55 - 135 U/L 57   Total Protein Latest Ref Range: 6.0 - 8.4 g/dL 6.7   Albumin Latest Ref Range: 3.5 - 5.2 g/dL 3.3 (L)   Total Bilirubin Latest Ref Range: 0.1 - 1.0 mg/dL 0.9   AST Latest Ref Range: 10 - 40 U/L 19   ALT Latest Ref Range: 10 - 44 U/L 12   Results for CINDA TATUM (MRN [id number]) as of 9/18/2018 11:08   Ref. Range 5/8/2018 12:04   Microalbum.,U,Random Latest Units: ug/mL 6.0   Microalb Creat Ratio Latest Ref Range: 0.0 - 30.0 ug/mg 3.5       Assessment/Plan:     1. Type 2 diabetes mellitus with hyperglycemia, with long-term current use of insulin  - no change continue current regimen  - no hypoglycemia and we are being very vigilant as she is taking salguero and 70/30  -     2. Obesity (BMI 30-39.9)  - lost two pounds    3. Diabetic peripheral neuropathy associated with type 2 diabetes mellitus  - discussed good foot care.     F/u in six months.

## 2018-09-18 ENCOUNTER — OFFICE VISIT (OUTPATIENT)
Dept: ENDOCRINOLOGY | Facility: CLINIC | Age: 73
End: 2018-09-18
Payer: MEDICARE

## 2018-09-18 VITALS
RESPIRATION RATE: 16 BRPM | WEIGHT: 181 LBS | HEIGHT: 62 IN | HEART RATE: 74 BPM | SYSTOLIC BLOOD PRESSURE: 122 MMHG | BODY MASS INDEX: 33.31 KG/M2 | DIASTOLIC BLOOD PRESSURE: 62 MMHG

## 2018-09-18 DIAGNOSIS — Z79.4 TYPE 2 DIABETES MELLITUS WITH HYPERGLYCEMIA, WITH LONG-TERM CURRENT USE OF INSULIN: Primary | ICD-10-CM

## 2018-09-18 DIAGNOSIS — E66.9 OBESITY (BMI 30-39.9): ICD-10-CM

## 2018-09-18 DIAGNOSIS — E11.42 DIABETIC PERIPHERAL NEUROPATHY ASSOCIATED WITH TYPE 2 DIABETES MELLITUS: ICD-10-CM

## 2018-09-18 DIAGNOSIS — E11.65 TYPE 2 DIABETES MELLITUS WITH HYPERGLYCEMIA, WITH LONG-TERM CURRENT USE OF INSULIN: Primary | ICD-10-CM

## 2018-09-18 PROCEDURE — 99215 OFFICE O/P EST HI 40 MIN: CPT | Mod: PBBFAC | Performed by: INTERNAL MEDICINE

## 2018-09-18 PROCEDURE — 3044F HG A1C LEVEL LT 7.0%: CPT | Mod: CPTII,,, | Performed by: INTERNAL MEDICINE

## 2018-09-18 PROCEDURE — 3074F SYST BP LT 130 MM HG: CPT | Mod: CPTII,,, | Performed by: INTERNAL MEDICINE

## 2018-09-18 PROCEDURE — 99999 PR PBB SHADOW E&M-EST. PATIENT-LVL V: CPT | Mod: PBBFAC,,, | Performed by: INTERNAL MEDICINE

## 2018-09-18 PROCEDURE — 3078F DIAST BP <80 MM HG: CPT | Mod: CPTII,,, | Performed by: INTERNAL MEDICINE

## 2018-09-18 PROCEDURE — 1101F PT FALLS ASSESS-DOCD LE1/YR: CPT | Mod: CPTII,,, | Performed by: INTERNAL MEDICINE

## 2018-09-18 PROCEDURE — 99213 OFFICE O/P EST LOW 20 MIN: CPT | Mod: S$PBB,,, | Performed by: INTERNAL MEDICINE

## 2018-10-03 DIAGNOSIS — E11.42 TYPE 2 DIABETES MELLITUS WITH POLYNEUROPATHY: ICD-10-CM

## 2018-10-03 RX ORDER — LANCETS
1 EACH MISCELLANEOUS 3 TIMES DAILY
Qty: 270 EACH | Refills: 3 | Status: SHIPPED | OUTPATIENT
Start: 2018-10-03 | End: 2022-12-23 | Stop reason: SDUPTHER

## 2018-10-03 NOTE — TELEPHONE ENCOUNTER
Send over Rx meter             ----- Message from Felicity Beard sent at 10/3/2018 12:21 PM CDT -----  Contact: Pt  Pt says she was supposeed to get a machine ordered to test her blood and was checking on the status    Pt can be reached at [phone]    Thanks    
The patient is a 33y Female complaining of cough.

## 2018-10-17 ENCOUNTER — IMMUNIZATION (OUTPATIENT)
Dept: INTERNAL MEDICINE | Facility: CLINIC | Age: 73
End: 2018-10-17
Payer: MEDICARE

## 2018-10-17 ENCOUNTER — OFFICE VISIT (OUTPATIENT)
Dept: INTERNAL MEDICINE | Facility: CLINIC | Age: 73
End: 2018-10-17
Payer: MEDICARE

## 2018-10-17 VITALS
BODY MASS INDEX: 33.68 KG/M2 | DIASTOLIC BLOOD PRESSURE: 64 MMHG | SYSTOLIC BLOOD PRESSURE: 124 MMHG | TEMPERATURE: 98 F | WEIGHT: 183 LBS | HEART RATE: 74 BPM | HEIGHT: 62 IN

## 2018-10-17 DIAGNOSIS — E11.42 DIABETIC PERIPHERAL NEUROPATHY: ICD-10-CM

## 2018-10-17 DIAGNOSIS — R20.2 PARESTHESIA: Primary | ICD-10-CM

## 2018-10-17 DIAGNOSIS — K21.9 LARYNGOPHARYNGEAL REFLUX (LPR): ICD-10-CM

## 2018-10-17 DIAGNOSIS — E11.69 DIABETES MELLITUS TYPE 2 IN OBESE: ICD-10-CM

## 2018-10-17 DIAGNOSIS — Z12.31 ENCOUNTER FOR SCREENING MAMMOGRAM FOR BREAST CANCER: ICD-10-CM

## 2018-10-17 DIAGNOSIS — E66.9 DIABETES MELLITUS TYPE 2 IN OBESE: ICD-10-CM

## 2018-10-17 DIAGNOSIS — R60.0 LOWER EXTREMITY EDEMA: ICD-10-CM

## 2018-10-17 PROCEDURE — 3078F DIAST BP <80 MM HG: CPT | Mod: CPTII,S$PBB,, | Performed by: INTERNAL MEDICINE

## 2018-10-17 PROCEDURE — 3074F SYST BP LT 130 MM HG: CPT | Mod: CPTII,S$PBB,, | Performed by: INTERNAL MEDICINE

## 2018-10-17 PROCEDURE — 90662 IIV NO PRSV INCREASED AG IM: CPT | Mod: PBBFAC

## 2018-10-17 PROCEDURE — 99999 PR PBB SHADOW E&M-EST. PATIENT-LVL III: CPT | Mod: PBBFAC,,, | Performed by: INTERNAL MEDICINE

## 2018-10-17 PROCEDURE — 99499 UNLISTED E&M SERVICE: CPT | Mod: S$GLB,,, | Performed by: INTERNAL MEDICINE

## 2018-10-17 PROCEDURE — 99213 OFFICE O/P EST LOW 20 MIN: CPT | Mod: PBBFAC,25 | Performed by: INTERNAL MEDICINE

## 2018-10-17 PROCEDURE — 1101F PT FALLS ASSESS-DOCD LE1/YR: CPT | Mod: CPTII,S$PBB,, | Performed by: INTERNAL MEDICINE

## 2018-10-17 PROCEDURE — 99214 OFFICE O/P EST MOD 30 MIN: CPT | Mod: S$PBB,,, | Performed by: INTERNAL MEDICINE

## 2018-10-17 PROCEDURE — 3044F HG A1C LEVEL LT 7.0%: CPT | Mod: CPTII,S$PBB,, | Performed by: INTERNAL MEDICINE

## 2018-10-17 RX ORDER — FAMOTIDINE 20 MG/1
20 TABLET, FILM COATED ORAL NIGHTLY
Qty: 60 TABLET | Refills: 2 | Status: SHIPPED | OUTPATIENT
Start: 2018-10-17 | End: 2018-10-19 | Stop reason: SDUPTHER

## 2018-10-17 RX ORDER — GABAPENTIN 300 MG/1
CAPSULE ORAL
Qty: 360 CAPSULE | Refills: 3 | Status: SHIPPED | OUTPATIENT
Start: 2018-10-17 | End: 2019-03-15

## 2018-10-17 RX ORDER — METFORMIN HYDROCHLORIDE 750 MG/1
TABLET, EXTENDED RELEASE ORAL
Qty: 90 TABLET | Refills: 3 | Status: SHIPPED | OUTPATIENT
Start: 2018-10-17 | End: 2019-07-22 | Stop reason: SDUPTHER

## 2018-10-17 NOTE — PROGRESS NOTES
"Subjective:       Patient ID: Alisia Gusman is a 73 y.o. female.    Chief Complaint: Follow-up    HPI   DM2 - (off lantus 26 units and regular at bedtime). On 70/30 32 in AM and  units BID. Glimepiride 4mg before breakfast. Metformin xr 750mg daily.   a1c 6.9<--7.9    C/o B feet and ankle swelling - since August (although based on notes, predates Aug). Mostly on the L foot and R ankle. Intermittent. No changes in diet. Sometimes does more walking in water aerobics. The day after that increased activity, may be a little more swollen.   cmp 9/3/18  BNP - <10  BLE US 5/10/18 - no evidence of significant reflux. No DVT.   Does not think she can tolerate higher pressure stockings. Wants to buy OTC as ins does not cover 15-20.   No leg pains.    Reports has been having SOB when she first wakes up. But after she eats breakfast and takes her insulin it improves. No CP. No HICKS. Not exertional.  C/o gas - belch. Taking protonix 40mg consistently and pepcid prn.   PFT 8/2017 normal.   CTA cardiac 10/13/13 - normal coronary CT angio.  Has f/u  appt w/ Dr. Covarrubias.   CXR 9/3/18 - reviewed  Holter - normal rhythm but faster heart rate when SOB. Added 1/2 of atenolol to see if it helps w/ feeling of palpitations. Doesn't think it helps.     C/o B hand pain - feels like it's burning. No weakness. Numbness/tingling in the fingers (have it in the feet also)    Review of Systems  as above.    Objective:      Physical Exam    /64 (BP Location: Left arm, Patient Position: Sitting, BP Method: Large (Manual))   Pulse 74   Temp 98 °F (36.7 °C)   Ht 5' 2" (1.575 m)   Wt 83 kg (183 lb)   LMP  (LMP Unknown)   BMI 33.47 kg/m²     GEN - A+OX4, NAD   HEENT - PERRL, EOMI, OP clear. MMM. TM dullness.  Neck - No thyromegaly or cervical LAD. No thyroid masses felt.  CV - RRR, no m/r   Chest - CTAB, no wheezing or rhonchi  Abd - S/NT/ND/+BS.   Ext - 1+ B dp and 2+B radial pulses. Trace BLE ankle edema.  Neuro - 2+ DTRs. Normal gait.   MSK " - good hand  B. No pain on palpation of the hands. No tenosynovitis. Marie's nodes.   Neuro - no tinel's or phalen's signs.    Labs reviewed.    Assessment/Plan     Alisia was seen today for follow-up.    Diagnoses and all orders for this visit:    Paresthesia  -     EMG W/ ULTRASOUND AND NERVE CONDUCTION TEST 4 Extremities; Future    Diabetic peripheral neuropathy - known BLE neuropathy. Pt takes OTC B12 1000mcg daily. B12 high 5/7/18. Told to take it every other day.  -     EMG W/ ULTRASOUND AND NERVE CONDUCTION TEST 4 Extremities; Future  -     gabapentin (NEURONTIN) 300 MG capsule; 300mg in AM, 300mg in PM and 600mg at bedtime    Type 2 diabetes, uncontrolled, with neuropathy - DM much improved on current regimen.  -     metFORMIN (GLUCOPHAGE-XR) 750 MG 24 hr tablet; One tablet with dinner.    Diabetes mellitus type 2 in obese  -     metFORMIN (GLUCOPHAGE-XR) 750 MG 24 hr tablet; One tablet with dinner.    Laryngopharyngeal reflux (LPR)  -     famotidine (PEPCID) 20 MG tablet; Take 1 tablet (20 mg total) by mouth every evening.    Encounter for screening mammogram for breast cancer  -     Mammo Digital Screening Bilat with CAD; Standing    Lower extremity edema  -     COMPRESSION STOCKINGS      Follow-up in about 6 months (around 4/17/2019).      Selena Montemayor MD  Department of Internal Medicine - [facility]  11:21 AM

## 2018-10-19 DIAGNOSIS — K21.9 LARYNGOPHARYNGEAL REFLUX (LPR): ICD-10-CM

## 2018-10-19 RX ORDER — FAMOTIDINE 20 MG/1
TABLET, FILM COATED ORAL
Qty: 90 TABLET | Refills: 2 | Status: SHIPPED | OUTPATIENT
Start: 2018-10-19 | End: 2019-03-15 | Stop reason: SDUPTHER

## 2018-10-20 RX ORDER — BLOOD SUGAR DIAGNOSTIC
STRIP MISCELLANEOUS
Qty: 300 STRIP | Refills: 3 | Status: SHIPPED | OUTPATIENT
Start: 2018-10-20 | End: 2019-08-20 | Stop reason: SDUPTHER

## 2018-10-31 ENCOUNTER — OFFICE VISIT (OUTPATIENT)
Dept: OTOLARYNGOLOGY | Facility: CLINIC | Age: 73
End: 2018-10-31
Payer: MEDICARE

## 2018-10-31 VITALS — DIASTOLIC BLOOD PRESSURE: 79 MMHG | SYSTOLIC BLOOD PRESSURE: 138 MMHG | HEART RATE: 89 BPM

## 2018-10-31 DIAGNOSIS — R05.3 CHRONIC COUGH: ICD-10-CM

## 2018-10-31 DIAGNOSIS — H61.23 IMPACTED CERUMEN OF BOTH EARS: ICD-10-CM

## 2018-10-31 DIAGNOSIS — K21.9 LARYNGOPHARYNGEAL REFLUX (LPR): ICD-10-CM

## 2018-10-31 DIAGNOSIS — H68.003 SALPINGITIS OF BOTH EUSTACHIAN TUBES: Primary | ICD-10-CM

## 2018-10-31 PROCEDURE — 99213 OFFICE O/P EST LOW 20 MIN: CPT | Mod: PBBFAC,25 | Performed by: OTOLARYNGOLOGY

## 2018-10-31 PROCEDURE — 69210 REMOVE IMPACTED EAR WAX UNI: CPT | Mod: 50,PBBFAC | Performed by: OTOLARYNGOLOGY

## 2018-10-31 PROCEDURE — 31575 DIAGNOSTIC LARYNGOSCOPY: CPT | Mod: PBBFAC | Performed by: OTOLARYNGOLOGY

## 2018-10-31 PROCEDURE — 3075F SYST BP GE 130 - 139MM HG: CPT | Mod: CPTII,,, | Performed by: OTOLARYNGOLOGY

## 2018-10-31 PROCEDURE — 3078F DIAST BP <80 MM HG: CPT | Mod: CPTII,,, | Performed by: OTOLARYNGOLOGY

## 2018-10-31 PROCEDURE — 99213 OFFICE O/P EST LOW 20 MIN: CPT | Mod: 25,S$PBB,, | Performed by: OTOLARYNGOLOGY

## 2018-10-31 PROCEDURE — 1101F PT FALLS ASSESS-DOCD LE1/YR: CPT | Mod: CPTII,,, | Performed by: OTOLARYNGOLOGY

## 2018-10-31 PROCEDURE — 99999 PR PBB SHADOW E&M-EST. PATIENT-LVL III: CPT | Mod: PBBFAC,,, | Performed by: OTOLARYNGOLOGY

## 2018-10-31 NOTE — PROCEDURES
Laryngoscopy  Date/Time: 10/31/2018 3:37 PM  Performed by: Ralph Covarrubias MD  Authorized by: Ralph Covarrubias MD     Consent Done?:  Yes (Verbal)  Anesthesia:     Local anesthetic:  Lidocaine 4% and Rene-Synephrine 1/2%    Patient tolerance:  Patient tolerated the procedure well with no immediate complications  Laryngoscopy:     Areas examined:  Nasopharynx, oropharynx, hypopharynx, larynx, vocal cords and nasal cavities    Laryngoscope size:  4 mm  Nose Intranasal:      Mucosa no polyps     No mucosa lesions present     No septum gross deformity     Turbinates not enlarged  Nasopharynx:      No mucosa lesions     Adenoids not present     Posterior choanae patent     Eustachian tube patent  Larynx/hypopharynx:      No epiglottis lesions     No epiglottis edema     No AE folds lesions     No vocal cord polyps     Equal and normal bilateral     No hypopharynx lesions     No piriform sinus pooling     No piriform sinus lesions     Post cricoid edema     Post cricoid erythema     Improved edema but still erythema of larynx.

## 2018-10-31 NOTE — PROCEDURES
Ear Cerumen Removal  Date/Time: 10/31/2018 2:41 PM  Performed by: Ralph Covarrubias MD  Authorized by: Ralph Covarrubias MD     Consent Done?:  Yes (Verbal)    Local anesthetic:  None  Location details:  Both ears  Procedure type: curette    Cerumen  Removal Results:  Cerumen completely removed  Patient tolerance:  Patient tolerated the procedure well with no immediate complications

## 2018-10-31 NOTE — PROGRESS NOTES
Subjective:      Alisia is a 73 y.o. female who comes for follow-up of reflux.  Her last visit with me was on 4/17/2018.  Using pantoprazole BID and pepcid nightly.  Over past 1 month the coughing has become worse, keeps her awake at night.  Feels like something is dripping in throat and mild aural fullness.  Denies dysphagia, voice change, hemoptysis.  No throat pain or sinus infections.    SNOT-22 score = 18, NOSE score = 5%, ETDQ-7 score = 3.1    The patient's medications, allergies, past medical, surgical, social and family histories were reviewed and updated as appropriate.    A detailed review of systems was obtained with pertinent positives as per the above HPI, and otherwise negative.        Objective:     /79   Pulse 89   LMP  (LMP Unknown)        Constitutional:   She appears well-developed. She is cooperative. Normal speech.  No hoarse voice.      Head:  Normocephalic. Salivary glands normal.  Facial strength is normal.      Ears:    Right Ear: No drainage or tenderness. Tympanic membrane is not perforated. Tympanic membrane mobility is normal. No middle ear effusion. No decreased hearing is noted.   Left Ear: No drainage or tenderness. Tympanic membrane is not perforated. Tympanic membrane mobility is normal.  No middle ear effusion. No decreased hearing is noted.     Nose:  No mucosal edema, rhinorrhea, septal deviation or polyps. No epistaxis. Turbinates normal, no turbinate masses and no turbinate hypertrophy.  Right sinus exhibits no maxillary sinus tenderness and no frontal sinus tenderness. Left sinus exhibits no maxillary sinus tenderness and no frontal sinus tenderness.     Mouth/Throat  Oropharynx clear and moist without lesions or asymmetry and normal uvula midline. She does not have dentures. Normal dentition. No oral lesions or mucous membrane lesions. No oropharyngeal exudate or posterior oropharyngeal erythema. Mirror exam not performed due to patient tolerance.  Mirror exam not  performed due to patient tolerance.      Neck:  Neck normal without thyromegaly masses, asymmetry, normal tracheal structure, crepitus, and tenderness, thyroid normal, trachea normal and no adenopathy. Normal range of motion present.     She has no cervical adenopathy.     Cardiovascular:   Regular rhythm.      Pulmonary/Chest:   Effort normal.     Psychiatric:   She has a normal mood and affect. Her speech is normal and behavior is normal.     Neurological:   No cranial nerve deficit.     Skin:   No rash noted.       Procedure    Cerumen impaction removed.  See procedure note.    Flexible laryngoscopy performed.  See procedure note.                Data Reviewed    WBC (K/uL)   Date Value   09/03/2018 7.39     Eosinophil% (%)   Date Value   09/03/2018 1.9     Eos # (K/uL)   Date Value   09/03/2018 0.1     Platelets (K/uL)   Date Value   09/03/2018 174     Glucose (mg/dL)   Date Value   09/03/2018 110     No results found for: IGE      Assessment:     1. Salpingitis of both eustachian tubes    2. Laryngopharyngeal reflux (LPR)    3. Chronic cough    4. Impacted cerumen of both ears         Plan:     Discussed continuance of PPI in light of her concerns about side effects.  I suggested trialing famotidine BID, but she wishes to continue on the current regimen with frequent renal function monitoring by her PCP.  Follow-up in about 6 months (around 4/30/2019).

## 2018-11-29 ENCOUNTER — TELEPHONE (OUTPATIENT)
Dept: INTERNAL MEDICINE | Facility: CLINIC | Age: 73
End: 2018-11-29

## 2018-11-29 DIAGNOSIS — E11.59 HYPERTENSION ASSOCIATED WITH DIABETES: ICD-10-CM

## 2018-11-29 DIAGNOSIS — R00.2 PALPITATIONS: ICD-10-CM

## 2018-11-29 DIAGNOSIS — I15.2 HYPERTENSION ASSOCIATED WITH DIABETES: ICD-10-CM

## 2018-11-29 NOTE — TELEPHONE ENCOUNTER
----- Message from Sandra John sent at 11/29/2018  8:58 AM CST -----  Contact: Patient [phone] or [phone]  Patient is calling to get an earlier appointment to come in to discuss medications and other questions about treatment.. Earliest appointment is for 3/15/19.      Please call and advise  Thank you

## 2018-11-30 ENCOUNTER — TELEPHONE (OUTPATIENT)
Dept: DERMATOLOGY | Facility: CLINIC | Age: 73
End: 2018-11-30

## 2018-11-30 RX ORDER — ATENOLOL 25 MG/1
TABLET ORAL
Qty: 45 TABLET | Refills: 3 | Status: SHIPPED | OUTPATIENT
Start: 2018-11-30 | End: 2019-07-22 | Stop reason: SDUPTHER

## 2018-11-30 NOTE — TELEPHONE ENCOUNTER
----- Message from Tay Sheth sent at 11/30/2018  9:28 AM CST -----  Contact: Patient   Patient Requesting Sooner Appointment.     Reason for sooner appt.: scalp dryness, skin check   When is the first available appointment? 2/8/19  Communication Preference: [phone]  Additional Information:

## 2018-11-30 NOTE — TELEPHONE ENCOUNTER
Spoke with pt, she says she has some questions about pantoprazole and pepcid. She says with the long term side effects that pantoprazole causes she would like to stop taking it. She asked ENT doctor in October but she was told to consult with PCP. She would like to discontinue pantoprazole and take pepcid one tab twice a day. Please advise     Pt also says it time for her to have some lab work done to check kidney and liver function.

## 2018-11-30 NOTE — TELEPHONE ENCOUNTER
Liver and kidney functions are fine on September.   That's definitely fine. Can stop pantoprazole and start pepcid.

## 2018-11-30 NOTE — TELEPHONE ENCOUNTER
----- Message from Iris Quezada sent at 11/30/2018  9:23 AM CST -----  Contact: Pt Home [phone] or Mobile [phone]  Patient is returning a phone call.  Who left a message for the patient:   Does patient know what this is regarding:    Comments: Patient said she received a call on yesterday and she would like a call back please.

## 2018-11-30 NOTE — TELEPHONE ENCOUNTER
I was able to speak to the patient and schedule her an appointment for Wednesday. She was very pleased with this and thanked me for the call.

## 2018-12-10 ENCOUNTER — HOSPITAL ENCOUNTER (OUTPATIENT)
Dept: RADIOLOGY | Facility: HOSPITAL | Age: 73
Discharge: HOME | End: 2018-12-10
Attending: INTERNAL MEDICINE
Payer: MEDICARE

## 2018-12-10 VITALS — HEIGHT: 62 IN | WEIGHT: 183 LBS | BODY MASS INDEX: 33.68 KG/M2

## 2018-12-10 DIAGNOSIS — Z12.31 ENCOUNTER FOR SCREENING MAMMOGRAM FOR BREAST CANCER: ICD-10-CM

## 2018-12-10 PROCEDURE — 77067 SCR MAMMO BI INCL CAD: CPT | Mod: 26,HCNC,, | Performed by: RADIOLOGY

## 2018-12-10 PROCEDURE — 77067 SCR MAMMO BI INCL CAD: CPT | Mod: TC,HCNC

## 2018-12-10 PROCEDURE — 77063 BREAST TOMOSYNTHESIS BI: CPT | Mod: 26,HCNC,, | Performed by: RADIOLOGY

## 2018-12-10 PROCEDURE — 77063 BREAST TOMOSYNTHESIS BI: CPT | Mod: TC,HCNC

## 2018-12-12 ENCOUNTER — OFFICE VISIT (OUTPATIENT)
Dept: DERMATOLOGY | Facility: CLINIC | Age: 73
End: 2018-12-12
Payer: MEDICARE

## 2018-12-12 DIAGNOSIS — L82.1 SEBORRHEIC KERATOSIS: ICD-10-CM

## 2018-12-12 DIAGNOSIS — L65.9 HAIR LOSS DISORDER: Primary | ICD-10-CM

## 2018-12-12 PROCEDURE — 1101F PT FALLS ASSESS-DOCD LE1/YR: CPT | Mod: CPTII,HCNC,S$GLB, | Performed by: NURSE PRACTITIONER

## 2018-12-12 PROCEDURE — 11900 INJECT SKIN LESIONS </W 7: CPT | Mod: HCNC,S$GLB,, | Performed by: NURSE PRACTITIONER

## 2018-12-12 PROCEDURE — 99202 OFFICE O/P NEW SF 15 MIN: CPT | Mod: 25,HCNC,S$GLB, | Performed by: NURSE PRACTITIONER

## 2018-12-12 PROCEDURE — 99999 PR PBB SHADOW E&M-EST. PATIENT-LVL II: CPT | Mod: PBBFAC,HCNC,, | Performed by: NURSE PRACTITIONER

## 2018-12-12 RX ORDER — KETOCONAZOLE 20 MG/ML
SHAMPOO, SUSPENSION TOPICAL
Qty: 120 ML | Refills: 5 | Status: SHIPPED | OUTPATIENT
Start: 2018-12-12 | End: 2020-05-19

## 2018-12-12 RX ORDER — CLOBETASOL PROPIONATE 0.46 MG/ML
SOLUTION TOPICAL
Qty: 50 ML | Refills: 3 | Status: SHIPPED | OUTPATIENT
Start: 2018-12-12 | End: 2019-03-21

## 2018-12-12 NOTE — PROGRESS NOTES
Subjective:       Patient ID:  Alisia Gusman is a 73 y.o. female who presents for   Chief Complaint   Patient presents with    Hair/Scalp Problem     scalp, 1+ year, itching, burning, Tx. otc shampoo     Hair/Scalp Problem  - Initial  Affected locations: scalp  Duration: 1 year  Signs / symptoms: burning, itching and scaling  Severity: mild to moderate  Timing: recurrent  Aggravated by: nothing  Treatments tried: OTC Head and Shoulders shampoo - washes once q 2 wks.  Improvement on treatment: no relief    hot rolls q 2 weeks  Denies any perms or relaxers    Review of Systems   Constitutional: Negative for fever, chills, weight loss, weight gain, fatigue, night sweats and malaise.   Skin: Positive for itching, rash and dry skin. Negative for daily sunscreen use and activity-related sunscreen use.   Hematologic/Lymphatic: Does not bruise/bleed easily.   Allergic/Immunologic: Positive for environmental allergies.        Objective:    Physical Exam   Constitutional: She appears well-developed and well-nourished. No distress.   Neurological: She is alert and oriented to person, place, and time. She is not disoriented.   Psychiatric: She has a normal mood and affect.   Skin:   Areas Examined (abnormalities noted in diagram):   Scalp / Hair Palpated and Inspected  Head / Face Inspection Performed  Neck Inspection Performed  Nails and Digits Inspection Performed              Diagram Legend     Erythematous scaling macule/papule c/w actinic keratosis       Vascular papule c/w angioma      Pigmented verrucoid papule/plaque c/w seborrheic keratosis      Yellow umbilicated papule c/w sebaceous hyperplasia      Irregularly shaped tan macule c/w lentigo     1-2 mm smooth white papules consistent with Milia      Movable subcutaneous cyst with punctum c/w epidermal inclusion cyst      Subcutaneous movable cyst c/w pilar cyst      Firm pink to brown papule c/w dermatofibroma      Pedunculated fleshy papule(s) c/w skin tag(s)       Evenly pigmented macule c/w junctional nevus     Mildly variegated pigmented, slightly irregular-bordered macule c/w mildly atypical nevus      Flesh colored to evenly pigmented papule c/w intradermal nevus       Pink pearly papule/plaque c/w basal cell carcinoma      Erythematous hyperkeratotic cursted plaque c/w SCC      Surgical scar with no sign of skin cancer recurrence      Open and closed comedones      Inflammatory papules and pustules      Verrucoid papule consistent consistent with wart     Erythematous eczematous patches and plaques     Dystrophic onycholytic nail with subungual debris c/w onychomycosis     Umbilicated papule    Erythematous-base heme-crusted tan verrucoid plaque consistent with inflamed seborrheic keratosis     Erythematous Silvery Scaling Plaque c/w Psoriasis     See annotation      Assessment / Plan:        Hair loss disorder  CCCA  -     ketoconazole (NIZORAL) 2 % shampoo; Wash hair with medicated shampoo at least 2x/week - let sit on scalp at least 5 minutes prior to rinsing  Dispense: 120 mL; Refill: 5  -     clobetasol (TEMOVATE) 0.05 % external solution; Use on scalp one - two times daily as needed for scaling or itching  Dispense: 50 mL; Refill: 3  -     triamcinolone acetonide injection 10 mg    Intralesional Kenalog 3mg/cc (1.4 cc total) injected into 2 lesions on the scalp today after obtaining verbal consent including risk of surrounding hypopigmentation. Patient tolerated procedure well.    Units: 2  NDC for Kenalog 10mg/cc:  9885-2239-40    Encouraged hair supplement daily- list given    Encourage natural (chemical and heat-free) hair styles and limited styling products. D/c flat iron use, hair relaxers, and adhesives to hold wigs or hair pieces to scalp.    Seborrheic keratosis  These are benign inherited growths without a malignant potential. Reassurance given to patient. No treatment is necessary.              Follow-up in about 6 weeks (around 1/23/2019).

## 2018-12-31 ENCOUNTER — TELEPHONE (OUTPATIENT)
Dept: INTERNAL MEDICINE | Facility: CLINIC | Age: 73
End: 2018-12-31

## 2018-12-31 ENCOUNTER — PROCEDURE VISIT (OUTPATIENT)
Dept: NEUROLOGY | Facility: CLINIC | Age: 73
End: 2018-12-31
Payer: MEDICARE

## 2018-12-31 DIAGNOSIS — E11.42 DIABETIC PERIPHERAL NEUROPATHY: ICD-10-CM

## 2018-12-31 DIAGNOSIS — R20.2 PARESTHESIA: ICD-10-CM

## 2018-12-31 PROCEDURE — 95886 MUSC TEST DONE W/N TEST COMP: CPT | Mod: HCNC,S$GLB,, | Performed by: PSYCHIATRY & NEUROLOGY

## 2018-12-31 PROCEDURE — 95912 NRV CNDJ TEST 11-12 STUDIES: CPT | Mod: HCNC,S$GLB,, | Performed by: PSYCHIATRY & NEUROLOGY

## 2019-01-08 ENCOUNTER — TELEPHONE (OUTPATIENT)
Dept: INTERNAL MEDICINE | Facility: CLINIC | Age: 74
End: 2019-01-08

## 2019-01-08 DIAGNOSIS — E66.9 DIABETES MELLITUS TYPE 2 IN OBESE: Primary | ICD-10-CM

## 2019-01-08 DIAGNOSIS — E11.69 DIABETES MELLITUS TYPE 2 IN OBESE: Primary | ICD-10-CM

## 2019-01-08 NOTE — TELEPHONE ENCOUNTER
----- Message from Kimo Mtz sent at 1/8/2019 10:21 AM CST -----  Contact: Patient [phone] or [phone]  Type: Referral to a Specialist    Where would you like a referral to? Podiatrist     Have you previously requested? No     Is the physician within the [facility]? Yes    Name and phone number of specialist:    Reason for appointment: Diabetic and is needing toe nails trimmed    Is an appointment scheduled with specialist? When?    Comments:    Please call an advise  Thank you

## 2019-01-14 ENCOUNTER — TELEPHONE (OUTPATIENT)
Dept: ENDOCRINOLOGY | Facility: CLINIC | Age: 74
End: 2019-01-14

## 2019-01-14 NOTE — TELEPHONE ENCOUNTER
----- Message from Shania Lora sent at 1/14/2019  3:41 PM CST -----  Contact: Self [phone]  PT states the top of her hand and feet are burning. She is requesting advice or an appointment.

## 2019-01-14 NOTE — TELEPHONE ENCOUNTER
Pt said that dermatologist gave her steroid shot when she visited there for a rash on top of her head. She stated that also have black blisters on top of her head. I ask did she talk to her dermatologist when she got these blisters she stated no. I recommend her to call them since she gotten her steroid shot. Pt didn't mention if her Blood sugars went up or not since she had the shot. I told her if this still continue I talk to dr boyce to see if we can see her earlier than may. She agrees

## 2019-01-16 DIAGNOSIS — E11.69 HYPERLIPIDEMIA ASSOCIATED WITH TYPE 2 DIABETES MELLITUS: ICD-10-CM

## 2019-01-16 DIAGNOSIS — E78.5 HYPERLIPIDEMIA ASSOCIATED WITH TYPE 2 DIABETES MELLITUS: ICD-10-CM

## 2019-01-16 DIAGNOSIS — E87.6 DRUG-INDUCED HYPOKALEMIA: ICD-10-CM

## 2019-01-16 DIAGNOSIS — T50.905A DRUG-INDUCED HYPOKALEMIA: ICD-10-CM

## 2019-01-17 ENCOUNTER — OFFICE VISIT (OUTPATIENT)
Dept: PODIATRY | Facility: CLINIC | Age: 74
End: 2019-01-17
Payer: MEDICARE

## 2019-01-17 VITALS
BODY MASS INDEX: 33.68 KG/M2 | HEIGHT: 62 IN | WEIGHT: 183 LBS | DIASTOLIC BLOOD PRESSURE: 76 MMHG | SYSTOLIC BLOOD PRESSURE: 140 MMHG | HEART RATE: 80 BPM

## 2019-01-17 DIAGNOSIS — E11.49 TYPE II DIABETES MELLITUS WITH NEUROLOGICAL MANIFESTATIONS: Primary | ICD-10-CM

## 2019-01-17 DIAGNOSIS — R20.2 NUMBNESS AND TINGLING: ICD-10-CM

## 2019-01-17 DIAGNOSIS — B35.1 ONYCHOMYCOSIS DUE TO DERMATOPHYTE: ICD-10-CM

## 2019-01-17 DIAGNOSIS — R20.0 NUMBNESS AND TINGLING: ICD-10-CM

## 2019-01-17 PROCEDURE — 11721 DEBRIDE NAIL 6 OR MORE: CPT | Mod: Q9,HCNC,S$GLB, | Performed by: PODIATRIST

## 2019-01-17 PROCEDURE — 99213 PR OFFICE/OUTPT VISIT, EST, LEVL III, 20-29 MIN: ICD-10-PCS | Mod: 25,HCNC,S$GLB, | Performed by: PODIATRIST

## 2019-01-17 PROCEDURE — 99999 PR PBB SHADOW E&M-EST. PATIENT-LVL III: CPT | Mod: PBBFAC,HCNC,, | Performed by: PODIATRIST

## 2019-01-17 PROCEDURE — 99213 OFFICE O/P EST LOW 20 MIN: CPT | Mod: 25,HCNC,S$GLB, | Performed by: PODIATRIST

## 2019-01-17 PROCEDURE — 3078F PR MOST RECENT DIASTOLIC BLOOD PRESSURE < 80 MM HG: ICD-10-PCS | Mod: CPTII,HCNC,S$GLB, | Performed by: PODIATRIST

## 2019-01-17 PROCEDURE — 3078F DIAST BP <80 MM HG: CPT | Mod: CPTII,HCNC,S$GLB, | Performed by: PODIATRIST

## 2019-01-17 PROCEDURE — 3077F PR MOST RECENT SYSTOLIC BLOOD PRESSURE >= 140 MM HG: ICD-10-PCS | Mod: CPTII,HCNC,S$GLB, | Performed by: PODIATRIST

## 2019-01-17 PROCEDURE — 11721 PR DEBRIDEMENT OF NAILS, 6 OR MORE: ICD-10-PCS | Mod: Q9,HCNC,S$GLB, | Performed by: PODIATRIST

## 2019-01-17 PROCEDURE — 1101F PR PT FALLS ASSESS DOC 0-1 FALLS W/OUT INJ PAST YR: ICD-10-PCS | Mod: CPTII,HCNC,S$GLB, | Performed by: PODIATRIST

## 2019-01-17 PROCEDURE — 3077F SYST BP >= 140 MM HG: CPT | Mod: CPTII,HCNC,S$GLB, | Performed by: PODIATRIST

## 2019-01-17 PROCEDURE — 3044F PR MOST RECENT HEMOGLOBIN A1C LEVEL <7.0%: ICD-10-PCS | Mod: CPTII,HCNC,S$GLB, | Performed by: PODIATRIST

## 2019-01-17 PROCEDURE — 3044F HG A1C LEVEL LT 7.0%: CPT | Mod: CPTII,HCNC,S$GLB, | Performed by: PODIATRIST

## 2019-01-17 PROCEDURE — 1101F PT FALLS ASSESS-DOCD LE1/YR: CPT | Mod: CPTII,HCNC,S$GLB, | Performed by: PODIATRIST

## 2019-01-17 PROCEDURE — 99999 PR PBB SHADOW E&M-EST. PATIENT-LVL III: ICD-10-PCS | Mod: PBBFAC,HCNC,, | Performed by: PODIATRIST

## 2019-01-17 RX ORDER — PRAVASTATIN SODIUM 40 MG/1
TABLET ORAL
Qty: 90 TABLET | Refills: 3 | Status: SHIPPED | OUTPATIENT
Start: 2019-01-17 | End: 2020-03-26 | Stop reason: SDUPTHER

## 2019-01-17 RX ORDER — CICLOPIROX 80 MG/ML
SOLUTION TOPICAL NIGHTLY
Qty: 6.6 ML | Refills: 11 | Status: SHIPPED | OUTPATIENT
Start: 2019-01-17 | End: 2019-11-27

## 2019-01-17 RX ORDER — POTASSIUM CHLORIDE 750 MG/1
TABLET, EXTENDED RELEASE ORAL
Qty: 90 TABLET | Refills: 3 | Status: SHIPPED | OUTPATIENT
Start: 2019-01-17 | End: 2019-10-30 | Stop reason: SDUPTHER

## 2019-01-17 NOTE — PROGRESS NOTES
Subjective:      Patient ID: Alisia Gusman is a 73 y.o. female.    Chief Complaint: Diabetes Mellitus (dr frias 10/17/2018) and Diabetic Foot Exam    Alisia is a 73 y.o. female who presents to the clinic upon referral from Dr. Lee  for evaluation and treatment of diabetic feet. Alisia has a past medical history of Adult bronchiectasis (7/26/2017), Allergy, Anemia, Arthritis, Asthma, Breast cancer (1993), Cancer (1993), Cataract, Chronic cervical radiculopathy (9/20/2012), Diabetes mellitus, Diabetes mellitus type II, Diabetes with neurologic complications, Diverticulosis, Dry eyes, Dysphagia, GERD (gastroesophageal reflux disease), Hyperlipidemia, Hypertension, Neuropathy, Scalp tenderness, Shortness of breath, and Ulcer. Patient relates no major problem with feet. Only complaints today consist of dark thick misshapen toenails .  Gradual onset, worsening over past several weeks, aggravated by increased weight bearing, shoe gear, pressure.  Periodic debridement helps symptoms.     Diabetes, increased risk amputation needing evaluation/management/optomization of foot care.     PCP: Selena Frias MD    Date Last Seen by PCP:   Chief Complaint   Patient presents with    Diabetes Mellitus     dr frias 10/17/2018    Diabetic Foot Exam         Current shoe gear: Casual shoes    Hemoglobin A1C   Date Value Ref Range Status   09/11/2018 6.9 (H) 4.0 - 5.6 % Final     Comment:     ADA Screening Guidelines:  5.7-6.4%  Consistent with prediabetes  >or=6.5%  Consistent with diabetes  High levels of fetal hemoglobin interfere with the HbA1C  assay. Heterozygous hemoglobin variants (HbS, HgC, etc)do  not significantly interfere with this assay.   However, presence of multiple variants may affect accuracy.     05/01/2018 7.9 (H) 4.0 - 5.6 % Final     Comment:     According to ADA guidelines, hemoglobin A1c <7.0% represents  optimal control in non-pregnant diabetic patients. Different  metrics may apply to specific patient populations.    Standards of Medical Care in Diabetes-2016.  For the purpose of screening for the presence of diabetes:  <5.7%     Consistent with the absence of diabetes  5.7-6.4%  Consistent with increasing risk for diabetes   (prediabetes)  >or=6.5%  Consistent with diabetes  Currently, no consensus exists for use of hemoglobin A1c  for diagnosis of diabetes for children.  This Hemoglobin A1c assay has significant interference with fetal   hemoglobin   (HbF). The results are invalid for patients with abnormal amounts of   HbF,   including those with known Hereditary Persistence   of Fetal Hemoglobin. Heterozygous hemoglobin variants (HbAS, HbAC,   HbAD, HbAE, HbA2) do not significantly interfere with this assay;   however, presence of multiple variants in a sample may impact the %   interference.     01/22/2018 7.6 (H) 4.0 - 5.6 % Final     Comment:     According to ADA guidelines, hemoglobin A1c <7.0% represents  optimal control in non-pregnant diabetic patients. Different  metrics may apply to specific patient populations.   Standards of Medical Care in Diabetes-2016.  For the purpose of screening for the presence of diabetes:  <5.7%     Consistent with the absence of diabetes  5.7-6.4%  Consistent with increasing risk for diabetes   (prediabetes)  >or=6.5%  Consistent with diabetes  Currently, no consensus exists for use of hemoglobin A1c  for diagnosis of diabetes for children.  This Hemoglobin A1c assay has significant interference with fetal   hemoglobin   (HbF). The results are invalid for patients with abnormal amounts of   HbF,   including those with known Hereditary Persistence   of Fetal Hemoglobin. Heterozygous hemoglobin variants (HbAS, HbAC,   HbAD, HbAE, HbA2) do not significantly interfere with this assay;   however, presence of multiple variants in a sample may impact the %   interference.             Review of Systems   Constitution: Negative for chills, diaphoresis, fever, malaise/fatigue and night sweats.    Cardiovascular: Negative for claudication, cyanosis, leg swelling and syncope.   Skin: Positive for nail changes. Negative for color change, dry skin, rash, suspicious lesions and unusual hair distribution.   Musculoskeletal: Negative for falls, joint pain, joint swelling, muscle cramps, muscle weakness and stiffness.   Gastrointestinal: Negative for constipation, diarrhea, nausea and vomiting.   Neurological: Positive for numbness, paresthesias and sensory change. Negative for brief paralysis, disturbances in coordination, focal weakness and tremors.           Objective:      Physical Exam   Constitutional: She is oriented to person, place, and time. She appears well-developed and well-nourished. She is cooperative.   Oriented to time, place, and person.   Cardiovascular:   Pulses:       Dorsalis pedis pulses are 1+ on the right side, and 1+ on the left side.        Posterior tibial pulses are 1+ on the right side, and 1+ on the left side.   Capillary fill time 3-5 seconds.  All toes warm to touch.      Negative lower extremity edema bilateral.    Negative elevational pallor and dependent rubor bilateral.     Musculoskeletal:   Normal angle, base, station of gait. Decreased stride length, early heel off, moderately propulsive toe off bilateral.    All ten toes without clubbing, cyanosis, or signs of ischemia.      No pain to palpation bilateral lower extremities.      Range of motion, stability, muscle strength, and muscle tone are age and health appropriate normal bilateral feet and legs.       Lymphadenopathy:   Negative lymphadenopathy bilateral popliteal fossa and tarsal tunnel.  Negative lymphangitic streaking bilateral foot/ankle bilateral.     Neurological: She is alert and oriented to person, place, and time. She has normal strength. She is not disoriented. She displays no atrophy and no tremor. A sensory deficit is present. She exhibits normal muscle tone.   Reflex Scores:       Patellar reflexes are 2+  on the right side and 2+ on the left side.       Achilles reflexes are 2+ on the right side and 2+ on the left side.  Decreased/absent vibratory sensation bilateral feet to 128Hz tuning fork.    Paresthesias, and burning bilateral feet with no clearly identified trigger or source.     Skin: Skin is warm, dry and intact. No abrasion, no bruising, no burn, no ecchymosis, no laceration, no lesion, no petechiae and no rash noted. She is not diaphoretic. No cyanosis or erythema. No pallor. Nails show no clubbing.   Skin thin, atrophic, with decreased density and distribution of pedal hair bilateral, but without hyperpigmentation, jennifer discoloration,  ulcers, masses, nodules or cords palpated bilateral feet and legs.      Toenails 1st, 2nd, 3rd, 4th, 5th  bilateral are hypertrophic thickened 2-3 mm, dystrophic, discolored tanish brown with tan, gray crumbly subungual debris.  Tender to distal nail plate pressure, without periungual skin abnormality of each.               Assessment:       Encounter Diagnoses   Name Primary?    Type II diabetes mellitus with neurological manifestations Yes    Numbness and tingling     Onychomycosis due to dermatophyte          Plan:       Alisia was seen today for diabetes mellitus and diabetic foot exam.    Diagnoses and all orders for this visit:    Type II diabetes mellitus with neurological manifestations    Numbness and tingling    Onychomycosis due to dermatophyte    Other orders  -     ciclopirox (PENLAC) 8 % Soln; Apply topically nightly.      I counseled the patient on her conditions, their implications and medical management.        - Shoe inspection. Diabetic Foot Education. Patient reminded of the importance of good nutrition and blood sugar control to help prevent podiatric complications of diabetes. Patient instructed on proper foot hygeine. We discussed wearing proper shoe gear, daily foot inspections, never walking without protective shoe gear, never putting sharp  instruments to feet, routine podiatric visits at least annually.      Discussed conservative treatment with shoes of adequate dimensions, material, and style to alleviate symptoms and delay or prevent surgical intervention.    Rx penlac      - With patient's permission, nails were aggressively reduced and debrided x 10 to their soft tissue attachment mechanically and with electric , removing all offending nail and debris. Patient relates relief following the procedure. She will continue to monitor the areas daily, inspect her feet, wear protective shoe gear when ambulatory, moisturizer to maintain skin integrity and follow in this office p.r.n.          Follow-up in about 1 year (around 1/17/2020), or if symptoms worsen or fail to improve.

## 2019-01-23 ENCOUNTER — OFFICE VISIT (OUTPATIENT)
Dept: DERMATOLOGY | Facility: CLINIC | Age: 74
End: 2019-01-23
Payer: MEDICARE

## 2019-01-23 DIAGNOSIS — L60.3 NAIL DYSTROPHY: ICD-10-CM

## 2019-01-23 DIAGNOSIS — L65.9 HAIR LOSS DISORDER: Primary | ICD-10-CM

## 2019-01-23 PROCEDURE — 1101F PT FALLS ASSESS-DOCD LE1/YR: CPT | Mod: HCNC,CPTII,S$GLB, | Performed by: NURSE PRACTITIONER

## 2019-01-23 PROCEDURE — 99213 PR OFFICE/OUTPT VISIT, EST, LEVL III, 20-29 MIN: ICD-10-PCS | Mod: 25,HCNC,S$GLB, | Performed by: NURSE PRACTITIONER

## 2019-01-23 PROCEDURE — 99999 PR PBB SHADOW E&M-EST. PATIENT-LVL II: CPT | Mod: PBBFAC,HCNC,, | Performed by: NURSE PRACTITIONER

## 2019-01-23 PROCEDURE — 99213 OFFICE O/P EST LOW 20 MIN: CPT | Mod: 25,HCNC,S$GLB, | Performed by: NURSE PRACTITIONER

## 2019-01-23 PROCEDURE — 1101F PR PT FALLS ASSESS DOC 0-1 FALLS W/OUT INJ PAST YR: ICD-10-PCS | Mod: HCNC,CPTII,S$GLB, | Performed by: NURSE PRACTITIONER

## 2019-01-23 PROCEDURE — 99999 PR PBB SHADOW E&M-EST. PATIENT-LVL II: ICD-10-PCS | Mod: PBBFAC,HCNC,, | Performed by: NURSE PRACTITIONER

## 2019-01-23 NOTE — PROGRESS NOTES
Subjective:       Patient ID:  Alisia Gusman is a 73 y.o. female who presents for   Chief Complaint   Patient presents with    Hair Loss     follow up     Hair Loss  - Follow-up  Symptom course: improving  Currently using: keto shampoo weekly, clobetasol daily xfirst 2 weeks, now using ~ every other day. burning sensation has improved. s/p ILk at  last visit.  Affected locations: scalp  Signs / symptoms: tender (improved but still there)  Severity: mild    Nail Problem  - Initial  Affected locations: right fingers  Signs and Symptoms: nail fungus.  Severity: mild  Timing: constant  Aggravated by: nothing  Treatments tried: previously seen Dr. Conn for toenail fungus, would like to know if can use that on finger nail.        Review of Systems   Constitutional: Negative for fever, chills, weight loss, weight gain, fatigue, night sweats and malaise.   Skin: Positive for itching, rash and dry skin. Negative for daily sunscreen use and activity-related sunscreen use.   Hematologic/Lymphatic: Does not bruise/bleed easily.   Allergic/Immunologic: Positive for environmental allergies.        Objective:    Physical Exam   Constitutional: She appears well-developed and well-nourished. No distress.   Neurological: She is alert and oriented to person, place, and time. She is not disoriented.   Psychiatric: She has a normal mood and affect.   Skin:   Areas Examined (abnormalities noted in diagram):   Scalp / Hair Palpated and Inspected  Head / Face Inspection Performed  Neck Inspection Performed  Nails and Digits Inspection Performed                  Diagram Legend     Erythematous scaling macule/papule c/w actinic keratosis       Vascular papule c/w angioma      Pigmented verrucoid papule/plaque c/w seborrheic keratosis      Yellow umbilicated papule c/w sebaceous hyperplasia      Irregularly shaped tan macule c/w lentigo     1-2 mm smooth white papules consistent with Milia      Movable subcutaneous cyst with punctum c/w  epidermal inclusion cyst      Subcutaneous movable cyst c/w pilar cyst      Firm pink to brown papule c/w dermatofibroma      Pedunculated fleshy papule(s) c/w skin tag(s)      Evenly pigmented macule c/w junctional nevus     Mildly variegated pigmented, slightly irregular-bordered macule c/w mildly atypical nevus      Flesh colored to evenly pigmented papule c/w intradermal nevus       Pink pearly papule/plaque c/w basal cell carcinoma      Erythematous hyperkeratotic cursted plaque c/w SCC      Surgical scar with no sign of skin cancer recurrence      Open and closed comedones      Inflammatory papules and pustules      Verrucoid papule consistent consistent with wart     Erythematous eczematous patches and plaques     Dystrophic onycholytic nail with subungual debris c/w onychomycosis     Umbilicated papule    Erythematous-base heme-crusted tan verrucoid plaque consistent with inflamed seborrheic keratosis     Erythematous Silvery Scaling Plaque c/w Psoriasis     See annotation      Assessment / Plan:        Hair loss disorder  -     triamcinolone acetonide injection 10 mg    Intralesional Kenalog 5mg/cc (1 cc total) injected into 1 lesions on the scalp today after obtaining verbal consent including risk of surrounding hypopigmentation. Patient tolerated procedure well.    Units: 1  NDC for Kenalog 10mg/cc:  5621-1590-09    Continue clobetasol prn itching  Continue keto shampoo    Nail dystrophy  Ok to use Penlac (currently using on toenails) for affected fingernail  Discussed adding weekly white vinegar soaks             Follow-up in about 2 months (around 3/23/2019).

## 2019-01-30 ENCOUNTER — PES CALL (OUTPATIENT)
Dept: ADMINISTRATIVE | Facility: CLINIC | Age: 74
End: 2019-01-30

## 2019-02-01 DIAGNOSIS — E11.9 TYPE 2 DIABETES MELLITUS WITHOUT COMPLICATION: ICD-10-CM

## 2019-02-08 ENCOUNTER — OFFICE VISIT (OUTPATIENT)
Dept: INTERNAL MEDICINE | Facility: CLINIC | Age: 74
End: 2019-02-08
Payer: MEDICARE

## 2019-02-08 VITALS
HEART RATE: 76 BPM | SYSTOLIC BLOOD PRESSURE: 122 MMHG | HEIGHT: 62 IN | WEIGHT: 184.94 LBS | BODY MASS INDEX: 34.03 KG/M2 | DIASTOLIC BLOOD PRESSURE: 70 MMHG | OXYGEN SATURATION: 99 %

## 2019-02-08 DIAGNOSIS — I70.0 AORTIC ATHEROSCLEROSIS: ICD-10-CM

## 2019-02-08 DIAGNOSIS — E78.5 HYPERLIPIDEMIA, UNSPECIFIED HYPERLIPIDEMIA TYPE: ICD-10-CM

## 2019-02-08 DIAGNOSIS — N18.2 TYPE 2 DIABETES MELLITUS WITH STAGE 2 CHRONIC KIDNEY DISEASE, WITH LONG-TERM CURRENT USE OF INSULIN: ICD-10-CM

## 2019-02-08 DIAGNOSIS — Z79.4 TYPE 2 DIABETES MELLITUS WITH STAGE 2 CHRONIC KIDNEY DISEASE, WITH LONG-TERM CURRENT USE OF INSULIN: ICD-10-CM

## 2019-02-08 DIAGNOSIS — I10 ESSENTIAL HYPERTENSION: ICD-10-CM

## 2019-02-08 DIAGNOSIS — M85.80 OSTEOPENIA, UNSPECIFIED LOCATION: ICD-10-CM

## 2019-02-08 DIAGNOSIS — Z00.00 ENCOUNTER FOR PREVENTIVE HEALTH EXAMINATION: Primary | ICD-10-CM

## 2019-02-08 DIAGNOSIS — E11.42 DIABETIC PERIPHERAL NEUROPATHY ASSOCIATED WITH TYPE 2 DIABETES MELLITUS: ICD-10-CM

## 2019-02-08 DIAGNOSIS — E66.9 OBESITY (BMI 30-39.9): ICD-10-CM

## 2019-02-08 DIAGNOSIS — M54.12 CHRONIC CERVICAL RADICULOPATHY: ICD-10-CM

## 2019-02-08 DIAGNOSIS — Z79.4 TYPE 2 DIABETES MELLITUS WITH HYPERGLYCEMIA, WITH LONG-TERM CURRENT USE OF INSULIN: ICD-10-CM

## 2019-02-08 DIAGNOSIS — J47.9 ADULT BRONCHIECTASIS: ICD-10-CM

## 2019-02-08 DIAGNOSIS — N18.2 CHRONIC KIDNEY DISEASE, STAGE II (MILD): ICD-10-CM

## 2019-02-08 DIAGNOSIS — I77.1 TORTUOUS AORTA: ICD-10-CM

## 2019-02-08 DIAGNOSIS — I77.819 ECTATIC AORTA: ICD-10-CM

## 2019-02-08 DIAGNOSIS — Z85.3 HISTORY OF BREAST CANCER IN FEMALE: ICD-10-CM

## 2019-02-08 DIAGNOSIS — E66.9 DIABETES MELLITUS TYPE 2 IN OBESE: ICD-10-CM

## 2019-02-08 DIAGNOSIS — Z12.11 COLON CANCER SCREENING: ICD-10-CM

## 2019-02-08 DIAGNOSIS — E11.22 TYPE 2 DIABETES MELLITUS WITH STAGE 2 CHRONIC KIDNEY DISEASE, WITH LONG-TERM CURRENT USE OF INSULIN: ICD-10-CM

## 2019-02-08 DIAGNOSIS — E11.65 TYPE 2 DIABETES MELLITUS WITH HYPERGLYCEMIA, WITH LONG-TERM CURRENT USE OF INSULIN: ICD-10-CM

## 2019-02-08 DIAGNOSIS — R09.89 PROMINENT AORTA: ICD-10-CM

## 2019-02-08 DIAGNOSIS — E11.69 DIABETES MELLITUS TYPE 2 IN OBESE: ICD-10-CM

## 2019-02-08 PROBLEM — M25.562 LEFT KNEE PAIN: Status: RESOLVED | Noted: 2017-05-01 | Resolved: 2019-02-08

## 2019-02-08 PROCEDURE — 99999 PR PBB SHADOW E&M-EST. PATIENT-LVL V: CPT | Mod: PBBFAC,HCNC,, | Performed by: NURSE PRACTITIONER

## 2019-02-08 PROCEDURE — 3044F PR MOST RECENT HEMOGLOBIN A1C LEVEL <7.0%: ICD-10-PCS | Mod: HCNC,CPTII,S$GLB, | Performed by: NURSE PRACTITIONER

## 2019-02-08 PROCEDURE — 3074F SYST BP LT 130 MM HG: CPT | Mod: HCNC,CPTII,S$GLB, | Performed by: NURSE PRACTITIONER

## 2019-02-08 PROCEDURE — 99999 PR PBB SHADOW E&M-EST. PATIENT-LVL V: ICD-10-PCS | Mod: PBBFAC,HCNC,, | Performed by: NURSE PRACTITIONER

## 2019-02-08 PROCEDURE — 99499 RISK ADDL DX/OHS AUDIT: ICD-10-PCS | Mod: HCNC,S$GLB,, | Performed by: NURSE PRACTITIONER

## 2019-02-08 PROCEDURE — G0439 PPPS, SUBSEQ VISIT: HCPCS | Mod: HCNC,S$GLB,, | Performed by: NURSE PRACTITIONER

## 2019-02-08 PROCEDURE — 3078F PR MOST RECENT DIASTOLIC BLOOD PRESSURE < 80 MM HG: ICD-10-PCS | Mod: HCNC,CPTII,S$GLB, | Performed by: NURSE PRACTITIONER

## 2019-02-08 PROCEDURE — 99499 UNLISTED E&M SERVICE: CPT | Mod: HCNC,S$GLB,, | Performed by: NURSE PRACTITIONER

## 2019-02-08 PROCEDURE — 3078F DIAST BP <80 MM HG: CPT | Mod: HCNC,CPTII,S$GLB, | Performed by: NURSE PRACTITIONER

## 2019-02-08 PROCEDURE — 3044F HG A1C LEVEL LT 7.0%: CPT | Mod: HCNC,CPTII,S$GLB, | Performed by: NURSE PRACTITIONER

## 2019-02-08 PROCEDURE — 3074F PR MOST RECENT SYSTOLIC BLOOD PRESSURE < 130 MM HG: ICD-10-PCS | Mod: HCNC,CPTII,S$GLB, | Performed by: NURSE PRACTITIONER

## 2019-02-08 PROCEDURE — G0439 PR MEDICARE ANNUAL WELLNESS SUBSEQUENT VISIT: ICD-10-PCS | Mod: HCNC,S$GLB,, | Performed by: NURSE PRACTITIONER

## 2019-02-08 NOTE — PATIENT INSTRUCTIONS
Counseling and Referral of Other Preventative  (Italic type indicates deductible and co-insurance are waived)    Patient Name: Alisia Gusman  Today's Date: 2/8/2019    Health Maintenance       Date Due Completion Date    Colonoscopy 11/06/2018 11/6/2015    Override on 9/16/2010: Done    Lipid Panel 01/22/2019 1/22/2018    Eye Exam 02/12/2019 2/12/2018    Override on 3/20/2017: Done ([facility]    Override on 3/16/2016: Done (seen by outside MD - Dr. Kike Butts)    Override on 6/1/2015: Done    Override on 2/7/2014: Done    Override on 1/29/2013: (N/S)    Hemoglobin A1c 03/18/2019 9/18/2018    Urine Microalbumin 05/08/2019 5/8/2018    Mammogram 12/10/2019 12/10/2018    Override on 11/17/2011: Done    Foot Exam 01/17/2020 1/17/2019 (Done)    Override on 1/17/2019: Done    Override on 9/18/2018: Done (Per MD note)    Override on 2/16/2017: Done    Override on 3/1/2016: Done    Override on 4/8/2015: Done    High Dose Statin 01/23/2020 1/23/2019    DEXA SCAN 04/11/2020 4/11/2017    Override on 9/6/2011: Done    TETANUS VACCINE 05/31/2026 5/31/2016 (N/S)    Override on 5/31/2016: (N/S) (presciption given)        No orders of the defined types were placed in this encounter.    The following information is provided to all patients.  This information is to help you find resources for any of the problems found today that may be affecting your health:                Living healthy guide: www.[facility].gov      Understanding Diabetes: www.diabetes.org      Eating healthy: www.cdc.gov/healthyweight      CDC home safety checklist: www.cdc.gov/steadi/patient.html      Agency on Aging: www.goea.[place].gov      Alcoholics anonymous (AA): www.aa.org      Physical Activity: www.osvaldo.nih.gov/zm4ciqz      Tobacco use: www.quitwithusla.org

## 2019-02-08 NOTE — PROGRESS NOTES
"Alisia Gusman presented for a  Medicare AWV and comprehensive Health Risk Assessment today. The following components were reviewed and updated:    · Medical history  · Family History  · Social history  · Allergies and Current Medications  · Health Risk Assessment  · Health Maintenance  · Care Team     ** See Completed Assessments for Annual Wellness Visit within the encounter summary.**       The following assessments were completed:  · Living Situation  · CAGE  · Depression Screening  · Timed Get Up and Go  · Whisper Test  · Cognitive Function Screening  ·   ·   · Nutrition Screening  · ADL Screening  · PAQ Screening    Vitals:    02/08/19 1005   BP: 122/70   Pulse: 76   SpO2: 99%   Weight: 83.9 kg (184 lb 15.5 oz)   Height: 5' 2.4" (1.585 m)     Body mass index is 33.4 kg/m².  Physical Exam   Constitutional: She is oriented to person, place, and time. She appears well-developed and well-nourished.   HENT:   Head: Normocephalic and atraumatic.   Nose: Nose normal.   Eyes: Conjunctivae and EOM are normal.   Cardiovascular: Normal rate, regular rhythm, normal heart sounds and intact distal pulses.   No murmur heard.  Pulmonary/Chest: Effort normal and breath sounds normal.   Musculoskeletal: Normal range of motion.   Neurological: She is alert and oriented to person, place, and time.   Skin: Skin is warm and dry.   Psychiatric: She has a normal mood and affect. Her behavior is normal. Judgment and thought content normal.   Nursing note and vitals reviewed.        Diagnoses and health risks identified today and associated recommendations/orders:    1. Encounter for preventive health examination  Assessment performed. Health maintenance updated. Chart review completed.    2. Type 2 diabetes mellitus with hyperglycemia, with long-term current use of insulin  Chronic. Continue on current regimen as instructed. Followed by PCP.  Component      Latest Ref Rng & Units 9/11/2018   Hemoglobin A1C External      4.0 - 5.6 % 6.9 " (H)   Estimated Avg Glucose      68 - 131 mg/dL 151 (H)     3. Type 2 diabetes mellitus with stage 2 chronic kidney disease, with long-term current use of insulin  Component      Latest Ref Rng & Units 9/11/2018   Hemoglobin A1C External      4.0 - 5.6 % 6.9 (H)   Estimated Avg Glucose      68 - 131 mg/dL 151 (H)     Component      Latest Ref Rng & Units 9/3/2018   Creatinine      0.5 - 1.4 mg/dL 0.9   Chronic. Continue on current regimen as instructed. Followed by PCP.    4. Diabetes mellitus type 2 in obese  Chronic. Continue on current regimen as instructed. Followed by PCP.  Continue active lifestyle.    5. Diabetic peripheral neuropathy associated with type 2 diabetes mellitus  Chronic. Continue on current regimen as instructed. Followed by PCP.    6. Ectatic aorta  Noted on imaging. Chronic. Continue current regimen. Followed by PCP.    7. Adult bronchiectasis  Chronic. Stable. Followed by PCP,    8. Prominent aorta  Noted on imaging. Chronic. Continue current regimen. Followed by PCP.    9. Tortuous aorta  Noted on imaging. Chronic. Continue current regimen. Followed by PCP.    10. Aortic atherosclerosis  Noted on imaging. Chronic. Continue current regimen. Followed by PCP.    11. Essential hypertension  Chronic. Stable on current regimen. Followed by PCP.    12. Hyperlipidemia, unspecified hyperlipidemia type  Chronic. Stable on current regimen. Followed by PCP.    13. Chronic kidney disease, stage II (mild)  Chronic. Stable on current regimen. Followed by PCP.  Component      Latest Ref Rng & Units 9/3/2018   Creatinine      0.5 - 1.4 mg/dL 0.9     14. History of breast cancer in female  Stable. Continue yearly mammograms.  Followed by PCP.    15. Obesity (BMI 30-39.9)  Chronic. Continue current exercise regimen. Followed by PCP.  Silver sneakers, water aerobics 4 days each week.    16. Colon cancer screening  - Case request GI: COLONOSCOPY    17. Osteopenia, unspecified location  Chronic. Stable on current  regimen. Followed by PCP.    18. Chronic cervical radiculopathy  Chronic. Stable. Followed by Orthopedics.      Provided Alisia with a 5-10 year written screening schedule and personal prevention plan. Recommendations were developed using the USPSTF age appropriate recommendations. Education, counseling, and referrals were provided as needed. After Visit Summary printed and given to patient which includes a list of additional screenings\tests needed.    Follow-up for Annual Wellness Visit in 1 year, follow up with Primary Care Provider as instructed, ;sooner if prob.    JUANA Zuniga

## 2019-02-15 DIAGNOSIS — E11.9 TYPE 2 DIABETES MELLITUS WITHOUT COMPLICATION, UNSPECIFIED WHETHER LONG TERM INSULIN USE: ICD-10-CM

## 2019-03-07 ENCOUNTER — TELEPHONE (OUTPATIENT)
Dept: INTERNAL MEDICINE | Facility: CLINIC | Age: 74
End: 2019-03-07

## 2019-03-07 DIAGNOSIS — E11.69 HYPERLIPIDEMIA ASSOCIATED WITH TYPE 2 DIABETES MELLITUS: Primary | ICD-10-CM

## 2019-03-07 DIAGNOSIS — D64.9 NORMOCYTIC ANEMIA: ICD-10-CM

## 2019-03-07 DIAGNOSIS — E78.5 HYPERLIPIDEMIA ASSOCIATED WITH TYPE 2 DIABETES MELLITUS: Primary | ICD-10-CM

## 2019-03-07 NOTE — TELEPHONE ENCOUNTER
----- Message from Silvana Baron sent at 3/7/2019 10:39 AM CST -----  Contact: self/[phone]  Type: Orders Request    What orders/ testing are being requested? Routine Labs    Is there a future appointment scheduled for the patient with PCP? Yes     When? 3/15/19    Would you prefer a response via [employer]? No     Comments: Please advise.      Thank You

## 2019-03-15 ENCOUNTER — OFFICE VISIT (OUTPATIENT)
Dept: INTERNAL MEDICINE | Facility: CLINIC | Age: 74
End: 2019-03-15
Payer: MEDICARE

## 2019-03-15 VITALS
HEART RATE: 77 BPM | HEIGHT: 65 IN | TEMPERATURE: 98 F | BODY MASS INDEX: 30.59 KG/M2 | WEIGHT: 183.63 LBS | SYSTOLIC BLOOD PRESSURE: 126 MMHG | DIASTOLIC BLOOD PRESSURE: 74 MMHG

## 2019-03-15 DIAGNOSIS — E66.9 DIABETES MELLITUS TYPE 2 IN OBESE: ICD-10-CM

## 2019-03-15 DIAGNOSIS — E11.69 DIABETES MELLITUS TYPE 2 IN OBESE: ICD-10-CM

## 2019-03-15 DIAGNOSIS — Z12.11 COLON CANCER SCREENING: ICD-10-CM

## 2019-03-15 DIAGNOSIS — E11.42 DIABETIC PERIPHERAL NEUROPATHY ASSOCIATED WITH TYPE 2 DIABETES MELLITUS: Primary | ICD-10-CM

## 2019-03-15 DIAGNOSIS — Z78.0 POSTMENOPAUSAL ESTROGEN DEFICIENCY: ICD-10-CM

## 2019-03-15 DIAGNOSIS — K21.9 LARYNGOPHARYNGEAL REFLUX (LPR): ICD-10-CM

## 2019-03-15 PROCEDURE — 3044F HG A1C LEVEL LT 7.0%: CPT | Mod: HCNC,CPTII,S$GLB, | Performed by: INTERNAL MEDICINE

## 2019-03-15 PROCEDURE — 3044F PR MOST RECENT HEMOGLOBIN A1C LEVEL <7.0%: ICD-10-PCS | Mod: HCNC,CPTII,S$GLB, | Performed by: INTERNAL MEDICINE

## 2019-03-15 PROCEDURE — 99999 PR PBB SHADOW E&M-EST. PATIENT-LVL V: ICD-10-PCS | Mod: PBBFAC,HCNC,, | Performed by: INTERNAL MEDICINE

## 2019-03-15 PROCEDURE — 3078F DIAST BP <80 MM HG: CPT | Mod: HCNC,CPTII,S$GLB, | Performed by: INTERNAL MEDICINE

## 2019-03-15 PROCEDURE — 99999 PR PBB SHADOW E&M-EST. PATIENT-LVL V: CPT | Mod: PBBFAC,HCNC,, | Performed by: INTERNAL MEDICINE

## 2019-03-15 PROCEDURE — 1101F PR PT FALLS ASSESS DOC 0-1 FALLS W/OUT INJ PAST YR: ICD-10-PCS | Mod: HCNC,CPTII,S$GLB, | Performed by: INTERNAL MEDICINE

## 2019-03-15 PROCEDURE — 99214 PR OFFICE/OUTPT VISIT, EST, LEVL IV, 30-39 MIN: ICD-10-PCS | Mod: HCNC,S$GLB,, | Performed by: INTERNAL MEDICINE

## 2019-03-15 PROCEDURE — 3074F SYST BP LT 130 MM HG: CPT | Mod: HCNC,CPTII,S$GLB, | Performed by: INTERNAL MEDICINE

## 2019-03-15 PROCEDURE — 3078F PR MOST RECENT DIASTOLIC BLOOD PRESSURE < 80 MM HG: ICD-10-PCS | Mod: HCNC,CPTII,S$GLB, | Performed by: INTERNAL MEDICINE

## 2019-03-15 PROCEDURE — 3074F PR MOST RECENT SYSTOLIC BLOOD PRESSURE < 130 MM HG: ICD-10-PCS | Mod: HCNC,CPTII,S$GLB, | Performed by: INTERNAL MEDICINE

## 2019-03-15 PROCEDURE — 1101F PT FALLS ASSESS-DOCD LE1/YR: CPT | Mod: HCNC,CPTII,S$GLB, | Performed by: INTERNAL MEDICINE

## 2019-03-15 PROCEDURE — 99214 OFFICE O/P EST MOD 30 MIN: CPT | Mod: HCNC,S$GLB,, | Performed by: INTERNAL MEDICINE

## 2019-03-15 RX ORDER — GLIMEPIRIDE 4 MG/1
TABLET ORAL
Qty: 90 TABLET | Refills: 3 | Status: SHIPPED | OUTPATIENT
Start: 2019-03-15 | End: 2020-02-05

## 2019-03-15 RX ORDER — PREGABALIN 75 MG/1
CAPSULE ORAL
Qty: 180 CAPSULE | Refills: 1 | Status: SHIPPED | OUTPATIENT
Start: 2019-03-15 | End: 2019-09-05 | Stop reason: SDUPTHER

## 2019-03-15 RX ORDER — FAMOTIDINE 20 MG/1
20 TABLET, FILM COATED ORAL NIGHTLY
Qty: 90 TABLET | Refills: 3 | Status: SHIPPED | OUTPATIENT
Start: 2019-03-15 | End: 2019-07-22 | Stop reason: SDUPTHER

## 2019-03-15 NOTE — PROGRESS NOTES
"Subjective:       Patient ID: Alisia Gusman is a 73 y.o. female.    Chief Complaint: FU for DM2    HPI   Pt had Medicare wellness done w/ Yanet Knight NP 2/8/19  Pt is well known to me.     C/o bad dandruff. Went to derm twice. Given steroid injections in the scalp.   Using clobetasol lotion and ketoconazole shampoo. Has upcoming appt on 3/21/19. Reports     DM2 - (off lantus 26 units and regular at bedtime). On 70/30 32 in AM and  units BID. Glimepiride 4mg before breakfast. Metformin xr 750mg daily.   a1c 6.9 9/11/18<--7.9    BLE peripheral neuropathy. EMG 12/31/18 w/ chronic peripheral neuropathy consistent w/ chronic diabetes. On gabapentin 300-300-600.  Reports the hands are burning despite the gabapentin.     HLD - pravastatin 40mg daily.     HTN - hctz 25mg daily.   Losartan caused cough.    R foot - underside - burning sensation sometimes. Reports was not addressed by podiatrist.     Review of Systems  Comprehensive review of systems otherwise negative. See history/subjective section for more details.    Objective:      Physical Exam    /74 (BP Location: Right arm, Patient Position: Sitting, BP Method: Large (Manual))   Pulse 77   Temp 97.9 °F (36.6 °C)   Ht 5' 5" (1.651 m)   Wt 83.3 kg (183 lb 10.3 oz)   LMP  (LMP Unknown)   BMI 30.56 kg/m²     GEN - A+OX4, NAD   HEENT - PERRL, EOMI, OP clear. MMM. TM dullness.  Neck - No thyromegaly or cervical LAD. No thyroid masses felt.  CV - RRR, no m/r   Chest - CTAB, no wheezing or rhonchi  Abd - S/NT/ND/+BS.   Ext - 1+ B dp and 2+B radial pulses. Trace BLE ankle edema. No open lesions of feet and no pain on dorsiflexion.  Neuro - 2+ DTRs. Normal gait.   Skin - no rash.    Labs reviewed.     Assessment/Plan     Alisia was seen today for annual exam.    Diagnoses and all orders for this visit:    Diabetic peripheral neuropathy associated with type 2 diabetes mellitus - stop gabapentin since it's not helping. Trial of lyrica. Refer to neurology. EMG " consistent w/ diabetic peripheral neuropathy.  -     pregabalin (LYRICA) 75 MG capsule; Take 1 pill daily x 7 days and then twice daily onwards.  -     glimepiride (AMARYL) 4 MG tablet; TAKE 1 TABLET EVERY DAY WITH BREAKFAST  -     Ambulatory Referral to Neurology    Type 2 diabetes, uncontrolled, with neuropathy  -     pregabalin (LYRICA) 75 MG capsule; Take 1 pill daily x 7 days and then twice daily onwards.  -     glimepiride (AMARYL) 4 MG tablet; TAKE 1 TABLET EVERY DAY WITH BREAKFAST  -     Ambulatory Referral to Neurology    Diabetes mellitus type 2 in obese - recheck A1C. F/U W/ Dr. Nelson. Home glucose log reviewed. Variable sugars.   -     pregabalin (LYRICA) 75 MG capsule; Take 1 pill daily x 7 days and then twice daily onwards.  -     glimepiride (AMARYL) 4 MG tablet; TAKE 1 TABLET EVERY DAY WITH BREAKFAST    Laryngopharyngeal reflux (LPR) - well controlled on daily pepcid.  -     famotidine (PEPCID) 20 MG tablet; Take 1 tablet (20 mg total) by mouth every evening.    Colon cancer screening  -     Case request GI: COLONOSCOPY    Postmenopausal estrogen deficiency  -     DXA Bone Density Spine And Hip; Future    Likely w/ h/o plantar fasciitis of the R foot. Discussed ice and stretches per pain under the R foot.     Follow-up in about 4 months (around 7/15/2019).      Selena Montemayor MD  [facility] Elliott Uma[facility]  11:08 AM

## 2019-03-21 ENCOUNTER — OFFICE VISIT (OUTPATIENT)
Dept: DERMATOLOGY | Facility: CLINIC | Age: 74
End: 2019-03-21
Payer: MEDICARE

## 2019-03-21 DIAGNOSIS — L66.8 CENTRAL CENTRIFUGAL SCARRING ALOPECIA: Primary | ICD-10-CM

## 2019-03-21 DIAGNOSIS — L82.1 SEBORRHEIC KERATOSES: ICD-10-CM

## 2019-03-21 PROCEDURE — 99999 PR PBB SHADOW E&M-EST. PATIENT-LVL II: CPT | Mod: PBBFAC,HCNC,, | Performed by: NURSE PRACTITIONER

## 2019-03-21 PROCEDURE — 1101F PT FALLS ASSESS-DOCD LE1/YR: CPT | Mod: HCNC,CPTII,S$GLB, | Performed by: NURSE PRACTITIONER

## 2019-03-21 PROCEDURE — 1101F PR PT FALLS ASSESS DOC 0-1 FALLS W/OUT INJ PAST YR: ICD-10-PCS | Mod: HCNC,CPTII,S$GLB, | Performed by: NURSE PRACTITIONER

## 2019-03-21 PROCEDURE — 99999 PR PBB SHADOW E&M-EST. PATIENT-LVL II: ICD-10-PCS | Mod: PBBFAC,HCNC,, | Performed by: NURSE PRACTITIONER

## 2019-03-21 PROCEDURE — 99213 OFFICE O/P EST LOW 20 MIN: CPT | Mod: HCNC,S$GLB,, | Performed by: NURSE PRACTITIONER

## 2019-03-21 PROCEDURE — 99213 PR OFFICE/OUTPT VISIT, EST, LEVL III, 20-29 MIN: ICD-10-PCS | Mod: HCNC,S$GLB,, | Performed by: NURSE PRACTITIONER

## 2019-03-21 RX ORDER — FLUOCINOLONE ACETONIDE 0.11 MG/ML
OIL TOPICAL
Qty: 1 BOTTLE | Refills: 3 | Status: SHIPPED | OUTPATIENT
Start: 2019-03-21 | End: 2020-05-19

## 2019-03-21 NOTE — PROGRESS NOTES
Subjective:       Patient ID:  Alisia Gusman is a 73 y.o. female who presents for   Chief Complaint   Patient presents with    Hair Loss     scalp blisters, no pain, itchy and burning      Hair Loss  - Follow-up  Symptom course: worsening (last seen 1/23/19)  Currently using: ketoconazole shampoos BIW. clobetasol QOHS. Biotin daily. s/p ILk   Affected locations: scalp  Signs / symptoms: itching, blistering and spreading  Severity: mild to moderate        Review of Systems   Constitutional: Negative for fever, chills and fatigue.   Skin: Positive for itching.        Objective:    Physical Exam   Constitutional: She appears well-developed and well-nourished. No distress.   Neurological: She is alert and oriented to person, place, and time. She is not disoriented.   Psychiatric: She has a normal mood and affect.   Skin:   Areas Examined (abnormalities noted in diagram):   Scalp / Hair Palpated and Inspected  Head / Face Inspection Performed  Neck Inspection Performed              Diagram Legend     Erythematous scaling macule/papule c/w actinic keratosis       Vascular papule c/w angioma      Pigmented verrucoid papule/plaque c/w seborrheic keratosis      Yellow umbilicated papule c/w sebaceous hyperplasia      Irregularly shaped tan macule c/w lentigo     1-2 mm smooth white papules consistent with Milia      Movable subcutaneous cyst with punctum c/w epidermal inclusion cyst      Subcutaneous movable cyst c/w pilar cyst      Firm pink to brown papule c/w dermatofibroma      Pedunculated fleshy papule(s) c/w skin tag(s)      Evenly pigmented macule c/w junctional nevus     Mildly variegated pigmented, slightly irregular-bordered macule c/w mildly atypical nevus      Flesh colored to evenly pigmented papule c/w intradermal nevus       Pink pearly papule/plaque c/w basal cell carcinoma      Erythematous hyperkeratotic cursted plaque c/w SCC      Surgical scar with no sign of skin cancer recurrence      Open and  closed comedones      Inflammatory papules and pustules      Verrucoid papule consistent consistent with wart     Erythematous eczematous patches and plaques     Dystrophic onycholytic nail with subungual debris c/w onychomycosis     Umbilicated papule    Erythematous-base heme-crusted tan verrucoid plaque consistent with inflamed seborrheic keratosis     Erythematous Silvery Scaling Plaque c/w Psoriasis     See annotation      Assessment / Plan:        Central centrifugal scarring alopecia  -     DERMA-SMOOTHE/FS SCALP OIL 0.01 % Oil; Apply oil to damp scalp nightly and cover with shower cap.  Dispense: 1 Bottle; Refill: 3    D/c clobetasol solution  Start Dermasmoothe/FS  Continue Biotin  Discussed ILK today, patient would like to defer at this time.    Continue- Encourage natural (chemical and heat-free) hair styles and limited styling products.     Seborrheic keratoses  These are benign inherited growths without a malignant potential. Reassurance given to patient. No treatment is necessary.              Follow-up in about 3 months (around 6/21/2019).

## 2019-03-25 ENCOUNTER — TELEPHONE (OUTPATIENT)
Dept: INTERNAL MEDICINE | Facility: CLINIC | Age: 74
End: 2019-03-25

## 2019-03-25 NOTE — TELEPHONE ENCOUNTER
----- Message from Avelinokrishan Tod sent at 3/22/2019  4:29 PM CDT -----  Contact: self/[phone] cell   Pt is calling to speak with someone in the office in regards to the medication she's supposed to take before she gets a colonoscopy. Pt states that it was supposed to be sent to the pharmacy when all other medications were sent on 3/15 but it never made it. Please advise.        Thank You

## 2019-03-26 DIAGNOSIS — Z12.11 SPECIAL SCREENING FOR MALIGNANT NEOPLASMS, COLON: Primary | ICD-10-CM

## 2019-03-26 RX ORDER — POLYETHYLENE GLYCOL 3350, SODIUM SULFATE ANHYDROUS, SODIUM BICARBONATE, SODIUM CHLORIDE, POTASSIUM CHLORIDE 236; 22.74; 6.74; 5.86; 2.97 G/4L; G/4L; G/4L; G/4L; G/4L
4 POWDER, FOR SOLUTION ORAL ONCE
Qty: 4000 ML | Refills: 0 | Status: SHIPPED | OUTPATIENT
Start: 2019-03-26 | End: 2019-03-26

## 2019-03-27 ENCOUNTER — LAB VISIT (OUTPATIENT)
Dept: LAB | Facility: HOSPITAL | Age: 74
End: 2019-03-27
Attending: INTERNAL MEDICINE
Payer: MEDICARE

## 2019-03-27 DIAGNOSIS — D64.9 NORMOCYTIC ANEMIA: ICD-10-CM

## 2019-03-27 DIAGNOSIS — E11.42 DIABETIC PERIPHERAL NEUROPATHY ASSOCIATED WITH TYPE 2 DIABETES MELLITUS: ICD-10-CM

## 2019-03-27 DIAGNOSIS — E11.9 TYPE 2 DIABETES MELLITUS WITHOUT COMPLICATION: ICD-10-CM

## 2019-03-27 DIAGNOSIS — E78.5 HYPERLIPIDEMIA ASSOCIATED WITH TYPE 2 DIABETES MELLITUS: ICD-10-CM

## 2019-03-27 DIAGNOSIS — Z79.4 TYPE 2 DIABETES MELLITUS WITH HYPERGLYCEMIA, WITH LONG-TERM CURRENT USE OF INSULIN: ICD-10-CM

## 2019-03-27 DIAGNOSIS — E66.9 OBESITY (BMI 30-39.9): ICD-10-CM

## 2019-03-27 DIAGNOSIS — E11.69 HYPERLIPIDEMIA ASSOCIATED WITH TYPE 2 DIABETES MELLITUS: ICD-10-CM

## 2019-03-27 DIAGNOSIS — E11.65 TYPE 2 DIABETES MELLITUS WITH HYPERGLYCEMIA, WITH LONG-TERM CURRENT USE OF INSULIN: ICD-10-CM

## 2019-03-27 LAB
ALBUMIN SERPL BCP-MCNC: 3.8 G/DL (ref 3.5–5.2)
ALP SERPL-CCNC: 58 U/L (ref 55–135)
ALT SERPL W/O P-5'-P-CCNC: 12 U/L (ref 10–44)
ANION GAP SERPL CALC-SCNC: 11 MMOL/L (ref 8–16)
AST SERPL-CCNC: 18 U/L (ref 10–40)
BASOPHILS # BLD AUTO: 0.03 K/UL (ref 0–0.2)
BASOPHILS NFR BLD: 0.5 % (ref 0–1.9)
BILIRUB SERPL-MCNC: 1.2 MG/DL (ref 0.1–1)
BUN SERPL-MCNC: 13 MG/DL (ref 8–23)
CALCIUM SERPL-MCNC: 10.5 MG/DL (ref 8.7–10.5)
CHLORIDE SERPL-SCNC: 103 MMOL/L (ref 95–110)
CHOLEST SERPL-MCNC: 179 MG/DL (ref 120–199)
CHOLEST/HDLC SERPL: 2.8 {RATIO} (ref 2–5)
CO2 SERPL-SCNC: 29 MMOL/L (ref 23–29)
CREAT SERPL-MCNC: 0.9 MG/DL (ref 0.5–1.4)
DIFFERENTIAL METHOD: ABNORMAL
EOSINOPHIL # BLD AUTO: 0.1 K/UL (ref 0–0.5)
EOSINOPHIL NFR BLD: 1.8 % (ref 0–8)
ERYTHROCYTE [DISTWIDTH] IN BLOOD BY AUTOMATED COUNT: 15 % (ref 11.5–14.5)
EST. GFR  (AFRICAN AMERICAN): >60 ML/MIN/1.73 M^2
EST. GFR  (NON AFRICAN AMERICAN): >60 ML/MIN/1.73 M^2
ESTIMATED AVG GLUCOSE: 163 MG/DL (ref 68–131)
GLUCOSE SERPL-MCNC: 149 MG/DL (ref 70–110)
HBA1C MFR BLD HPLC: 7.3 % (ref 4–5.6)
HCT VFR BLD AUTO: 42 % (ref 37–48.5)
HDLC SERPL-MCNC: 65 MG/DL (ref 40–75)
HDLC SERPL: 36.3 % (ref 20–50)
HGB BLD-MCNC: 13.7 G/DL (ref 12–16)
LDLC SERPL CALC-MCNC: 77.2 MG/DL (ref 63–159)
LYMPHOCYTES # BLD AUTO: 2.3 K/UL (ref 1–4.8)
LYMPHOCYTES NFR BLD: 42.1 % (ref 18–48)
MCH RBC QN AUTO: 28.8 PG (ref 27–31)
MCHC RBC AUTO-ENTMCNC: 32.6 G/DL (ref 32–36)
MCV RBC AUTO: 88 FL (ref 82–98)
MONOCYTES # BLD AUTO: 0.4 K/UL (ref 0.3–1)
MONOCYTES NFR BLD: 7.6 % (ref 4–15)
NEUTROPHILS # BLD AUTO: 2.7 K/UL (ref 1.8–7.7)
NEUTROPHILS NFR BLD: 47.8 % (ref 38–73)
NONHDLC SERPL-MCNC: 114 MG/DL
PLATELET # BLD AUTO: 179 K/UL (ref 150–350)
PMV BLD AUTO: 10.8 FL (ref 9.2–12.9)
POTASSIUM SERPL-SCNC: 3.9 MMOL/L (ref 3.5–5.1)
PROT SERPL-MCNC: 7.3 G/DL (ref 6–8.4)
RBC # BLD AUTO: 4.76 M/UL (ref 4–5.4)
SODIUM SERPL-SCNC: 143 MMOL/L (ref 136–145)
TRIGL SERPL-MCNC: 184 MG/DL (ref 30–150)
WBC # BLD AUTO: 5.54 K/UL (ref 3.9–12.7)

## 2019-03-27 PROCEDURE — 85025 COMPLETE CBC W/AUTO DIFF WBC: CPT | Mod: HCNC

## 2019-03-27 PROCEDURE — 83036 HEMOGLOBIN GLYCOSYLATED A1C: CPT | Mod: HCNC

## 2019-03-27 PROCEDURE — 80053 COMPREHEN METABOLIC PANEL: CPT | Mod: HCNC

## 2019-03-27 PROCEDURE — 80061 LIPID PANEL: CPT | Mod: HCNC

## 2019-03-27 PROCEDURE — 36415 COLL VENOUS BLD VENIPUNCTURE: CPT | Mod: HCNC

## 2019-03-28 ENCOUNTER — TELEPHONE (OUTPATIENT)
Dept: INTERNAL MEDICINE | Facility: CLINIC | Age: 74
End: 2019-03-28

## 2019-03-28 NOTE — TELEPHONE ENCOUNTER
----- Message from Selena Montemayor MD sent at 3/28/2019  1:47 PM CDT -----  Please call and notify pt:    Cholesterol is controlled. Triglycerides are still mildly high but that's ok. Continue pravastatin at current dose. Blood count, liver and kidney functions normal.

## 2019-03-28 NOTE — LETTER
[facility]  [address]  [facility] [address]  Phone: [phone]  Fax: [phone]         Dear Alisia Gusman,    Your Cholesterol is controlled. Your Triglycerides are still mildly high but Dr. Montemayor says that is ok. Please continue pravastatin at current dose. Your Blood count, liver and kidney functions are normal.    Let us know if you have any questions. Thanks!    ARTUR Thompson

## 2019-04-03 ENCOUNTER — OFFICE VISIT (OUTPATIENT)
Dept: ENDOCRINOLOGY | Facility: CLINIC | Age: 74
End: 2019-04-03
Payer: MEDICARE

## 2019-04-03 VITALS
SYSTOLIC BLOOD PRESSURE: 126 MMHG | BODY MASS INDEX: 31 KG/M2 | WEIGHT: 186.06 LBS | HEIGHT: 65 IN | DIASTOLIC BLOOD PRESSURE: 60 MMHG | HEART RATE: 65 BPM

## 2019-04-03 DIAGNOSIS — Z79.4 TYPE 2 DIABETES MELLITUS WITH DIABETIC POLYNEUROPATHY, WITH LONG-TERM CURRENT USE OF INSULIN: ICD-10-CM

## 2019-04-03 DIAGNOSIS — E11.22 TYPE 2 DIABETES MELLITUS WITH STAGE 2 CHRONIC KIDNEY DISEASE, WITH LONG-TERM CURRENT USE OF INSULIN: Primary | ICD-10-CM

## 2019-04-03 DIAGNOSIS — N18.2 TYPE 2 DIABETES MELLITUS WITH STAGE 2 CHRONIC KIDNEY DISEASE, WITH LONG-TERM CURRENT USE OF INSULIN: Primary | ICD-10-CM

## 2019-04-03 DIAGNOSIS — E11.42 TYPE 2 DIABETES MELLITUS WITH DIABETIC POLYNEUROPATHY, WITH LONG-TERM CURRENT USE OF INSULIN: ICD-10-CM

## 2019-04-03 DIAGNOSIS — Z79.4 TYPE 2 DIABETES MELLITUS WITH STAGE 2 CHRONIC KIDNEY DISEASE, WITH LONG-TERM CURRENT USE OF INSULIN: Primary | ICD-10-CM

## 2019-04-03 PROCEDURE — 99213 PR OFFICE/OUTPT VISIT, EST, LEVL III, 20-29 MIN: ICD-10-PCS | Mod: HCNC,S$GLB,, | Performed by: INTERNAL MEDICINE

## 2019-04-03 PROCEDURE — 3045F PR MOST RECENT HEMOGLOBIN A1C LEVEL 7.0-9.0%: CPT | Mod: HCNC,CPTII,S$GLB, | Performed by: INTERNAL MEDICINE

## 2019-04-03 PROCEDURE — 1101F PT FALLS ASSESS-DOCD LE1/YR: CPT | Mod: HCNC,CPTII,S$GLB, | Performed by: INTERNAL MEDICINE

## 2019-04-03 PROCEDURE — 3078F PR MOST RECENT DIASTOLIC BLOOD PRESSURE < 80 MM HG: ICD-10-PCS | Mod: HCNC,CPTII,S$GLB, | Performed by: INTERNAL MEDICINE

## 2019-04-03 PROCEDURE — 3074F SYST BP LT 130 MM HG: CPT | Mod: HCNC,CPTII,S$GLB, | Performed by: INTERNAL MEDICINE

## 2019-04-03 PROCEDURE — 99499 UNLISTED E&M SERVICE: CPT | Mod: HCNC,S$GLB,, | Performed by: INTERNAL MEDICINE

## 2019-04-03 PROCEDURE — 99499 RISK ADDL DX/OHS AUDIT: ICD-10-PCS | Mod: HCNC,S$GLB,, | Performed by: INTERNAL MEDICINE

## 2019-04-03 PROCEDURE — 3078F DIAST BP <80 MM HG: CPT | Mod: HCNC,CPTII,S$GLB, | Performed by: INTERNAL MEDICINE

## 2019-04-03 PROCEDURE — 99213 OFFICE O/P EST LOW 20 MIN: CPT | Mod: HCNC,S$GLB,, | Performed by: INTERNAL MEDICINE

## 2019-04-03 PROCEDURE — 99999 PR PBB SHADOW E&M-EST. PATIENT-LVL V: ICD-10-PCS | Mod: PBBFAC,HCNC,, | Performed by: INTERNAL MEDICINE

## 2019-04-03 PROCEDURE — 3045F PR MOST RECENT HEMOGLOBIN A1C LEVEL 7.0-9.0%: ICD-10-PCS | Mod: HCNC,CPTII,S$GLB, | Performed by: INTERNAL MEDICINE

## 2019-04-03 PROCEDURE — 1101F PR PT FALLS ASSESS DOC 0-1 FALLS W/OUT INJ PAST YR: ICD-10-PCS | Mod: HCNC,CPTII,S$GLB, | Performed by: INTERNAL MEDICINE

## 2019-04-03 PROCEDURE — 99999 PR PBB SHADOW E&M-EST. PATIENT-LVL V: CPT | Mod: PBBFAC,HCNC,, | Performed by: INTERNAL MEDICINE

## 2019-04-03 PROCEDURE — 3074F PR MOST RECENT SYSTOLIC BLOOD PRESSURE < 130 MM HG: ICD-10-PCS | Mod: HCNC,CPTII,S$GLB, | Performed by: INTERNAL MEDICINE

## 2019-04-03 NOTE — PROGRESS NOTES
Subjective:     Patient ID: Alisia Gusman is a 73 y.o. female.    Chief Complaint: No chief complaint on file.    HPI:   Ms. Gusman is a 73 y.o. female who is here for a follow-up visit for evaluation  of type 2 diabetes that is controlled complicated by peripheral neuropathy.   Today denies shortness of breath, palpitations, chest pain or pressure.  Has neuropathy that affects her feet bilaterally. Has numbness and burning. Does not interfere with sleep. Switched to lyrica 75 mg twice daily.    Silver sneakers, (/), fit and Fun, and water aerobics ( and ); also walks in the pool for one hour.        Diabetes regimen:   Metformin  mg nightly,   Glimepiride 4 mg daily with breakfast  NPH/R 70/30 - 32 units before breakfast and 20 units before dinner time.       No difficulties with injections. Denies hypoglycemic symptoms or bg less than 70.     Reviewed blood sugar logs. Checks four times a day every day.       Fastin, 145, 168, 161, 101, 179, 169, 157, 140, 142, 175, 188, 162, 161, 171, 179  Lunch: 131, 230, 163, 132, 177, 112, x, 190, 127, 140, 99, 122, 126, 137, 96  Dinner: 185, 151, 256, 143, 100, 139, 108, 202, 93, 158, 122, 199, 164, 273, 260  Bedtime: x, 128, 186, 171, 135, 87, 118, 166, 161, 187, 175, x, 207,x , 186     Most recent A1C is 7.3%.      ADA STANDARDS of CARE:        ACE inhibitor or angiotensin II receptor blocker:  No microalbuminuria, on HCTZ well controlled.         Statin drug:  Pravastatin 40 mg daily         Low dose ASA: 81 mg daily         Eye exam within last year:         Dental exam:         Flu shot:        Pneumonia vaccine:        Microalbumin: 2017 - normal      Review of Systems   Congestion  Post nasal drip  Cough  Denies fever, chills.   Good appetite, less exercise due toURI.   + sick contacts ()  ROS otherwise negative    Objective:     Physical Exam   Constitutional: She is oriented to person, place, and time. She appears well-developed and  "well-nourished. No distress.   HENT:   Head: Normocephalic and atraumatic.   Mouth/Throat: No oropharyngeal exudate.   Eyes: Pupils are equal, round, and reactive to light. Conjunctivae and EOM are normal. No scleral icterus.   Neck: Normal range of motion. Neck supple. No tracheal deviation present. No thyromegaly present.   Cardiovascular: Normal rate, regular rhythm, normal heart sounds and intact distal pulses.   Pulmonary/Chest: Effort normal and breath sounds normal. No breast discharge.   Abdominal: Soft. Bowel sounds are normal. She exhibits no distension. There is no tenderness.   Sites of insulin administration are normal appearing.   Genitourinary: No breast discharge.   Musculoskeletal: Normal range of motion. She exhibits no edema or tenderness.   Lymphadenopathy:     She has no cervical adenopathy.   Neurological: She is alert and oriented to person, place, and time. She has normal reflexes. No cranial nerve deficit.   Skin: Skin is warm and dry.   FOOT EXAM:  Visual inspection is normal, no abrasions, bruising or calluses.   Microfilament test is diminished b/l.   Vibratory sense is diminshed left > right.   Distal pulses are present b/l.    Psychiatric: She has a normal mood and affect.       Vitals:    04/03/19 0934   BP: 126/60   Pulse: 65   Weight: 84.4 kg (186 lb 1.1 oz)   Height: 5' 5" (1.651 m)         Assessment/Plan:     1. Type 2 diabetes mellitus with stage 2 chronic kidney disease, with long-term current use of insulin  No changes to regimen  A1C goal 7 - 7.5%  Still ill right now  Continue checking, exercise  No hypoglycemia on glimepiride    2. Neuropathy  Doing better on lyrica    3. Continue exercise.        "

## 2019-04-08 DIAGNOSIS — Z79.4 CURRENT USE OF INSULIN: ICD-10-CM

## 2019-04-08 DIAGNOSIS — E11.42 DIABETIC PERIPHERAL NEUROPATHY: ICD-10-CM

## 2019-04-08 DIAGNOSIS — E11.69 DIABETES MELLITUS TYPE 2 IN OBESE: ICD-10-CM

## 2019-04-08 DIAGNOSIS — E66.9 DIABETES MELLITUS TYPE 2 IN OBESE: ICD-10-CM

## 2019-04-09 RX ORDER — SYRING-NEEDL,DISP,INSUL,0.3 ML 31GX15/64"
SYRINGE, EMPTY DISPOSABLE MISCELLANEOUS
Qty: 200 SYRINGE | Refills: 5 | Status: SHIPPED | OUTPATIENT
Start: 2019-04-09 | End: 2020-08-17

## 2019-04-16 ENCOUNTER — OFFICE VISIT (OUTPATIENT)
Dept: NEUROLOGY | Facility: CLINIC | Age: 74
End: 2019-04-16
Payer: MEDICARE

## 2019-04-16 VITALS
HEART RATE: 74 BPM | WEIGHT: 186.5 LBS | HEIGHT: 65 IN | BODY MASS INDEX: 31.07 KG/M2 | SYSTOLIC BLOOD PRESSURE: 144 MMHG | DIASTOLIC BLOOD PRESSURE: 71 MMHG

## 2019-04-16 DIAGNOSIS — M54.2 CERVICALGIA: Primary | ICD-10-CM

## 2019-04-16 DIAGNOSIS — M54.12 CHRONIC CERVICAL RADICULOPATHY: ICD-10-CM

## 2019-04-16 DIAGNOSIS — E11.42 DIABETIC PERIPHERAL NEUROPATHY ASSOCIATED WITH TYPE 2 DIABETES MELLITUS: ICD-10-CM

## 2019-04-16 DIAGNOSIS — E11.65 TYPE 2 DIABETES MELLITUS WITH HYPERGLYCEMIA, WITH LONG-TERM CURRENT USE OF INSULIN: ICD-10-CM

## 2019-04-16 DIAGNOSIS — R05.3 CHRONIC COUGH: ICD-10-CM

## 2019-04-16 DIAGNOSIS — Z79.4 TYPE 2 DIABETES MELLITUS WITH HYPERGLYCEMIA, WITH LONG-TERM CURRENT USE OF INSULIN: ICD-10-CM

## 2019-04-16 PROCEDURE — 3045F PR MOST RECENT HEMOGLOBIN A1C LEVEL 7.0-9.0%: CPT | Mod: HCNC,CPTII,S$GLB, | Performed by: PSYCHIATRY & NEUROLOGY

## 2019-04-16 PROCEDURE — 99215 OFFICE O/P EST HI 40 MIN: CPT | Mod: HCNC,S$GLB,, | Performed by: PSYCHIATRY & NEUROLOGY

## 2019-04-16 PROCEDURE — 3077F PR MOST RECENT SYSTOLIC BLOOD PRESSURE >= 140 MM HG: ICD-10-PCS | Mod: HCNC,CPTII,S$GLB, | Performed by: PSYCHIATRY & NEUROLOGY

## 2019-04-16 PROCEDURE — 1101F PR PT FALLS ASSESS DOC 0-1 FALLS W/OUT INJ PAST YR: ICD-10-PCS | Mod: HCNC,CPTII,S$GLB, | Performed by: PSYCHIATRY & NEUROLOGY

## 2019-04-16 PROCEDURE — 1101F PT FALLS ASSESS-DOCD LE1/YR: CPT | Mod: HCNC,CPTII,S$GLB, | Performed by: PSYCHIATRY & NEUROLOGY

## 2019-04-16 PROCEDURE — 99215 PR OFFICE/OUTPT VISIT, EST, LEVL V, 40-54 MIN: ICD-10-PCS | Mod: HCNC,S$GLB,, | Performed by: PSYCHIATRY & NEUROLOGY

## 2019-04-16 PROCEDURE — 99999 PR PBB SHADOW E&M-EST. PATIENT-LVL III: CPT | Mod: PBBFAC,HCNC,, | Performed by: PSYCHIATRY & NEUROLOGY

## 2019-04-16 PROCEDURE — 99999 PR PBB SHADOW E&M-EST. PATIENT-LVL III: ICD-10-PCS | Mod: PBBFAC,HCNC,, | Performed by: PSYCHIATRY & NEUROLOGY

## 2019-04-16 PROCEDURE — 3045F PR MOST RECENT HEMOGLOBIN A1C LEVEL 7.0-9.0%: ICD-10-PCS | Mod: HCNC,CPTII,S$GLB, | Performed by: PSYCHIATRY & NEUROLOGY

## 2019-04-16 PROCEDURE — 3078F PR MOST RECENT DIASTOLIC BLOOD PRESSURE < 80 MM HG: ICD-10-PCS | Mod: HCNC,CPTII,S$GLB, | Performed by: PSYCHIATRY & NEUROLOGY

## 2019-04-16 PROCEDURE — 3077F SYST BP >= 140 MM HG: CPT | Mod: HCNC,CPTII,S$GLB, | Performed by: PSYCHIATRY & NEUROLOGY

## 2019-04-16 PROCEDURE — 3078F DIAST BP <80 MM HG: CPT | Mod: HCNC,CPTII,S$GLB, | Performed by: PSYCHIATRY & NEUROLOGY

## 2019-04-16 NOTE — PATIENT INSTRUCTIONS
Today we saw you in neurology clinic for numbness and tingling in your feet. The tingling seems to be well controlled by the lyrica 75mg BID. Please continue this medication at this dose. There is no medication for numbness. Please continue to keep tight control over your diabetes. Please continue to exercise and practice safe walking techniques including no clutter in the walkways, lights on at night, good shoes. You have reduced range of motion at the neck and the tone in your legs is a little high. I would like you to start wearing a soft cervical collar at night to protect your neck. Low part in front.     Come back to neurology clinic in 6 months.     Marine Voss MD PhD  Neurology-Epilepsy  [facility]  [facility]

## 2019-04-16 NOTE — LETTER
April 16, 2019      Selena Montemayor MD  [address]  [facility] [address]           [facility] [place] [address] [facility] [address]  [facility] [address]  Phone: [phone]  Fax: [phone]          Patient: Alisia Gusman   MR Number: [id number]   YOB: 1945   Date of Visit: 4/16/2019       Dear Dr. Selena Montemayor:    Thank you for referring Alisia Gusman to me for evaluation. Attached you will find relevant portions of my assessment and plan of care.    If you have questions, please do not hesitate to call me. I look forward to following Alisia Gusman along with you.    Sincerely,    Marine Voss MD PhD    Enclosure  CC:  No Recipients    If you would like to receive this communication electronically, please contact [email or web address] or [phone] to request more information on [employer] Link access.    For providers and/or their staff who would like to refer a patient to [facility], please contact us through our one-stop-shop provider referral line, [facility], at [phone].    If you feel you have received this communication in error or would no longer like to receive these types of communications, please e-mail [email or web address]

## 2019-04-16 NOTE — PROGRESS NOTES
"Name: Alisia Gusman  MRN:[id number]   CSN: [id number]  Date of service: 4/16/2019  Age:73 y.o.   Gender:female   Referring Physician/Service: Selena Montemayor MD  [id number] [place] [address]   The patient is here today with: self     Neurology Clinic: Initial Visit    CHIEF COMPLAINT:  Diabetic nerve numbness/pain     HPI:  73-year-old woman with diabetes who comes to Neurology Clinic for assessment of her diabetic neuropathy.  She reports the numbness is all day.  Occasionally it is associated with burning in the toes and mid feet most often in the early morning. Lyrica helps the sensations significantly.  Currently has toe nail overgrown on big toe causing a small lesion on the second toe.  Otherwise, wears her diabetic shoes all day long at home and when she goes out. [employer] for [employer] 4-5 hours a week. Silver sneaker, fit for fun programs.  No falls.  No urinary symptoms.  Denies positional lightheadedness.    ROS:  Dry mouth and cough with occasional difficulty swallowing, recently started famotidine for this.  Occasional radiating pain in both arms, no difficulty with weakness or problems grabbing things.  Otherwise, denies headache, loss of vision, blurred vision, diplopia, dysarthria, lightheadedness, vertigo, tinnitus or hearing difficulty. Denies difficulties producing or comprehending speech.  Denies focal weakness. No bowel or bladder incontinence or retention. Denies difficulty with gait.  No shortness of breath, no chest pain. Denies nausea, vomiting, diarrhea, constipation or abdominal pain.     EXAM:   - Vitals: BP (!) 144/71   Pulse 74   Ht 5' 5" (1.651 m)   Wt 84.6 kg (186 lb 8.2 oz)   LMP  (LMP Unknown)   BMI 31.04 kg/m²    - General: Awake, cooperative, NAD.  - HEENT: NC/AT  - Neck:  Significantly reduced range of motion, elevated tense shoulders  - Pulmonary: no increased WOB  - Cardiac: well perfused   - Abdomen: soft, nontender, nondistended  - Extremities: no edema, pulses " palpated  - Skin: no rashes or lesions noted.     NEURO EXAM:   - Mental Status: Awake, alert, oriented x 3. Able to relate history without difficulty. Language is fluent with intact repetition and comprehension. Normal prosody. There were no paraphasic errors. Able to name both high and low frequency objects.  Difficulty with repeating no ifs and or buts with several attempts.  Able to follow both midline and appendicular commands. Able to register 3 objects and recall 0/3 at 5 minutes, 1/3 with multiple choice, no memory of 2/3. There was no evidence of apraxia or neglect.    - Cranial Nerves:  PERRL 2.5-> 2mm irregular. VFF to confrontation. EOMI with saccadic intrusions.. Facial sensation intact to light touch. No facial droop. Hearing intact to finger-rub bilaterally.5/5 strength in trapezii and SCM bilaterally. Tongue protrudes in midline and to either side with no evidence of atrophy or weakness.    - Motor: Normal bulk.  Increased tone in the legs. No pronator drift bilaterally. No adventitious movements such as tremor or asterixis noted. 5/5 in all muscle groups including bilateral  Delt Bic Tri WrE WrF  FFl FE IO IP Quad Ham TA Gastroc EHL  - Sensory:  Stocking distribution of sensory loss to pinprick, full by mid shin.  Proprioception intact.  - DTRs:    Bi Tri Brach Pat Ach  L  2+  2+  2    knee surgery   0  R  2  2    2    1   0  Plantar response was equivocal bilaterally.  - Coordination: No dysmetria on FNF bilaterally.  - Gait:  Fast, hurried, some missteps with turns. Narrow-based.  Good stand up and go. Able to toe and heel walk. Romberg with sway.    LABS:  WBC 5.5, sodium 143, potassium 3.9, creatinine 0.9, HA1c running 6.9 -8.6     IMAGING:  None    PLAN:   73-year-old extremely pleasant woman with longstanding diabetes with diabetic neuropathy.  Lyrica seems to be controlling pain quite well.  Continue this medication at the current dose.  No treatment for numbness other than tight control of  blood sugar.  Continue exercise.  Increased tone in the legs with reduced range of motion of the neck, radiating pain down both arms, increased upper reflexes.  No weakness, no urinary complaints, no gait complaints or recent falls.  Soft collar at night, continue water exercises.  We reviewed fall precautions and safe swallowing techniques.  Return to clinic in 6 months for surveillance exam.    INSTRUCTIONS:  Today we saw you in neurology clinic for numbness and tingling in your feet. The tingling seems to be well controlled by the lyrica 75mg BID. Please continue this medication at this dose. There is no medication for numbness. Please continue to keep tight control over your diabetes. Please continue to exercise and practice safe walking techniques including no clutter in the walkways, lights on at night, good shoes. You have reduced range of motion at the neck and the tone in your legs is a little high. I would like you to start wearing a soft cervical collar at night to protect your neck. Low part in front.     Come back to neurology clinic in 6 months.     Marine Voss MD PhD  Neurology-Epilepsy  [facility].  [facility]    Problem List Items Addressed This Visit     Chronic cervical radiculopathy    Chronic cough    Type 2 diabetes mellitus with hyperglycemia, with long-term current use of insulin    Diabetic peripheral neuropathy associated with type 2 diabetes mellitus    Cervicalgia - Primary    Relevant Orders    E - OTHER            PAST MEDICAL HISTORY:   Active Ambulatory Problems     Diagnosis Date Noted    Seasonal allergies     GERD (gastroesophageal reflux disease)     Hyperlipidemia     Diverticulosis of large intestine 09/15/2010    Osteopenia 07/19/2012    Chronic cervical radiculopathy 09/20/2012    History of breast cancer in female 06/17/2014    Obesity (BMI 30-39.9) 07/21/2015    Insulin use (long-term) in type 2 diabetes 09/09/2015    Chronic kidney  disease, stage II (mild) 05/31/2016    Aortic atherosclerosis 05/31/2016    Status post total knee replacement, left 08/24/2016    Genu varum of right lower extremity 08/24/2016    Essential hypertension 03/02/2017    Internal derangement of left knee 05/01/2017    Traumatic arthritis of right knee 05/01/2017    Adult bronchiectasis 07/26/2017    Chronic cough 08/01/2017    Type 2 diabetes mellitus with hyperglycemia, with long-term current use of insulin 10/09/2017    Diabetic peripheral neuropathy associated with type 2 diabetes mellitus 05/08/2018    Tortuous aorta 02/08/2019    Prominent aorta 02/08/2019    Ectatic aorta 02/08/2019    Type 2 diabetes mellitus with stage 2 chronic kidney disease, with long-term current use of insulin 02/08/2019    Diabetes mellitus type 2 in obese 02/08/2019    Cervicalgia 04/16/2019     Resolved Ambulatory Problems     Diagnosis Date Noted    Type 2 diabetes, controlled, with neuropathy     Acute bronchitis 07/01/2011    Malignant neoplasm of other specified sites of female breast 08/09/2011    Shoulder pain 09/20/2012    AR (allergic rhinitis) 12/03/2012    Osteoarthritis of left knee 12/11/2012    Abnormal chest x-ray 07/26/2013    Dyspnea 07/29/2013    Type 2 diabetes mellitus, uncontrolled 02/28/2014    Genu varum (acquired) 05/05/2014    Primary localized osteoarthrosis, lower leg 05/05/2014    Traumatic arthritis 06/17/2014    S/P TKR (total knee replacement) using cement 06/30/2014    Hypoglycemia associated with diabetes 07/28/2014    Knee contracture 10/13/2014    Fibrosis of knee joint 10/24/2014    Knee internal derangement 10/24/2014    Nasal congestion 11/05/2014    S/P L knee surgery, anterior interval/lateral release, SHIRLEY, medial menisectomy, FILEMON 11/13/2014    Knee pain, left 04/20/2015    Diabetic polyneuropathy associated with type 2 diabetes mellitus 09/09/2015    Colon cancer screening 11/06/2015    Insulin dose changed  11/12/2015    Protein calorie malnutrition 05/31/2016    Type 2 diabetes mellitus with renal manifestations 05/31/2016    Knee pain 08/24/2016    Left knee pain 05/01/2017     Past Medical History:   Diagnosis Date    Adult bronchiectasis 7/26/2017    Allergy     Anemia     Arthritis     Asthma     Breast cancer 1993    Cancer 1993    Cataract     Chronic cervical radiculopathy 9/20/2012    Diabetes mellitus     Diabetes mellitus type II     Diabetes with neurologic complications     Diverticulosis     Dry eyes     Dysphagia     GERD (gastroesophageal reflux disease)     Hyperlipidemia     Hypertension     Neuropathy     Scalp tenderness     Shortness of breath     Ulcer         PAST SURGICAL HISTORY:   Past Surgical History:   Procedure Laterality Date    ARTHROSCOPY-KNEE Left 11/4/2014    [facility]    BREAST BIOPSY Left 1993    BREAST LUMPECTOMY Left 1993    CARPAL TUNNEL RELEASE Bilateral 2011    CATARACT EXTRACTION W/  INTRAOCULAR LENS IMPLANT Bilateral 2013 and 2014    CHOLECYSTECTOMY      COLONOSCOPY N/A 11/6/2015    [facility]    ESOPHAGOGASTRODUODENOSCOPY (EGD) N/A 4/22/2015    [facility] ([facility])    EYE SURGERY      GRAFT Left 6/17/2014    [facility]    HYSTERECTOMY      partial hyst    JOINT REPLACEMENT Left June 2014    knee    LATERAL RELEASE Left 11/4/2014    Per[facility]    LYSIS-ADHESION Left 11/4/2014    Performed [facility]    MANIPULATION-KNEE Left 11/4/2014    [facility]    REPLACEMENT-KNEE-TOTAL Left 6/17/2014    [facility] OR    uvuloplasty          ALLERGIES: Nubain [nalbuphine]; Grass pollen-tim grass standard; Losartan; and Sinus & allergy [chlorpheniramine-phenylephrine]   CURRENT MEDICATIONS:  "  Current Outpatient Medications   Medication Sig Dispense Refill    ACCU-CHEK DOMENICO PLUS TEST STRP Strp TEST THREE TIMES DAILY AS DIRECTED 300 strip 3    ACCU-CHEK SOFTCLIX LANCETS Misc 1 each by Misc.(Non-Drug; Combo Route) route 3 (three) times daily. 270 each 3    ALCOHOL ANTISEPTIC PADS (ALCOHOL PREP PADS TOP)       aspirin 81 MG chewable tablet Take 1 tablet by mouth At bedtime.      atenolol (TENORMIN) 25 MG tablet TAKE 1/2 TABLET EVERY DAY 45 tablet 3    BD ALCOHOL SWABS PadM 1 each 2 (two) times daily.      BD ULTRA-FINE SUSIE PEN NEEDLES 32 gauge x 5/32" Ndle USE  TO  INJECT  ONE  TIME  DAILY 90 each 3    BD VEO INSULIN SYRINGE UF 0.3 mL 31 gauge x 15/64" Syrg USE TWICE DAILY 200 Syringe 5    blood-glucose meter Misc 1 each by Misc.(Non-Drug; Combo Route) route once daily. 1 each 0    calcium carbonate-vitamin D3 (CALTRATE 600 + D) 600 mg(1,500mg) -400 unit Chew 1 tablet once daily.       ciclopirox (PENLAC) 8 % Soln Apply topically nightly. 6.6 mL 11    cyanocobalamin (VITAMIN B-12) 1000 MCG tablet Take 100 mcg by mouth once daily.      DERMA-SMOOTHE/FS SCALP OIL 0.01 % Oil Apply oil to damp scalp nightly and cover with shower cap. 1 Bottle 3    famotidine (PEPCID) 20 MG tablet Take 1 tablet (20 mg total) by mouth every evening. 90 tablet 3    FOLIC ACID/MULTIVIT-MIN/LUTEIN (CENTRUM SILVER ORAL) Take 1 tablet by mouth once daily.      glimepiride (AMARYL) 4 MG tablet TAKE 1 TABLET EVERY DAY WITH BREAKFAST 90 tablet 3    hydroCHLOROthiazide (HYDRODIURIL) 25 MG tablet Take 1 tablet (25 mg total) by mouth once daily. 90 tablet 3    ibuprofen (ADVIL,MOTRIN) 200 MG tablet Take 200 mg by mouth every 6 (six) hours as needed for Pain.      insulin NPH-insulin regular, 70/30, (NOVOLIN 70/30) 100 unit/mL (70-30) injection RELION BRAND 26 units thirty minutes before breakfast and 12 units thirty minutes before dinner. (Patient taking differently: 2 (two) times daily. RELION BRAND 26 units thirty " minutes before breakfast and 12 units thirty minutes before dinner.) 10 mL 11    insulin regular 100 unit/mL Inj injection RELION brand  Inject 8 units with lunch and 9 units with dinner. (Patient taking differently: RELION brand  Inject 7 units with lunch and 8 units with dinner.)      ketoconazole (NIZORAL) 2 % shampoo Wash hair with medicated shampoo at least 2x/week - let sit on scalp at least 5 minutes prior to rinsing 120 mL 5    metFORMIN (GLUCOPHAGE-XR) 750 MG 24 hr tablet One tablet with dinner. 90 tablet 3    potassium chloride (KLOR-CON) 10 MEQ TbSR TAKE 1 TABLET EVERY DAY 90 tablet 3    pravastatin (PRAVACHOL) 40 MG tablet TAKE 1 TABLET NIGHTLY. 90 tablet 3    pregabalin (LYRICA) 75 MG capsule Take 1 pill daily x 7 days and then twice daily onwards. 180 capsule 1     Current Facility-Administered Medications   Medication Dose Route Frequency Provider Last Rate Last Dose    triamcinolone acetonide injection 10 mg  10 mg Intradermal 1 time in Clinic/HOD Melany Willis NP        triamcinolone acetonide injection 10 mg  10 mg Intradermal 1 time in Clinic/HOD eMlany Willis NP            FAMILY HISTORY:   Family History   Problem Relation Age of Onset    Diabetes Mother     Diabetes Sister     Colon polyps Sister     Heart disease Father     No Known Problems Brother     No Known Problems Daughter     No Known Problems Son     Cancer Sister         breast and colon ca    Colon polyps Sister     Diabetes Sister     Cancer Sister         colon ca    Arthritis Sister     Psoriasis Sister     No Known Problems Daughter     Breast cancer Paternal Aunt     Stroke Sister     Seizures Sister     Diabetes Sister     Asthma Neg Hx     Emphysema Neg Hx     Lupus Neg Hx     Rheum arthritis Neg Hx     Melanoma Neg Hx     Colon cancer Neg Hx     Irritable bowel syndrome Neg Hx     Inflammatory bowel disease Neg Hx     Stomach cancer Neg Hx     Esophageal cancer Neg Hx           SOCIAL HISTORY:   Social History     Socioeconomic History    Marital status:      Spouse name: Not on file    Number of children: Not on file    Years of education: Not on file    Highest education level: Not on file   Occupational History    Occupation: Retired   Social Needs    Financial resource strain: Not on file    Food insecurity:     Worry: Not on file     Inability: Not on file    Transportation needs:     Medical: Not on file     Non-medical: Not on file   Tobacco Use    Smoking status: Never Smoker    Smokeless tobacco: Never Used   Substance and Sexual Activity    Alcohol use: No    Drug use: No    Sexual activity: Not Currently   Lifestyle    Physical activity:     Days per week: Not on file     Minutes per session: Not on file    Stress: Not on file   Relationships    Social connections:     Talks on phone: Not on file     Gets together: Not on file     Attends [employer] service: Not on file     Active member of club or organization: Not on file     Attends meetings of clubs or organizations: Not on file     Relationship status: Not on file   Other Topics Concern    Are you pregnant or think you may be? Not Asked    Breast-feeding Not Asked   Social History Narrative    No assistance w/ ADLs. 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 Lives with  at home. 3 children who live nearby.          More than 50% of the 60 minutes spent with the patient (as well as family/caregiver(s) was spent on face-to-face counseling.     Questions and concerns raised by the patient and family/care-giver(s) were addressed and they indicated understanding of everything discussed and agreed to plans as above.    Marine Voss MD PhD  Neurology-Epilepsy  [facility]. and [facility]

## 2019-04-24 ENCOUNTER — TELEPHONE (OUTPATIENT)
Dept: INTERNAL MEDICINE | Facility: CLINIC | Age: 74
End: 2019-04-24

## 2019-04-24 NOTE — TELEPHONE ENCOUNTER
Losartan caused a cough. But since we stopped that medicine, we had to put her on something else for blood pressure. That's why she's on the atenolol. The cough went away because she stopped losartan. Atenolol is not for cough.

## 2019-04-24 NOTE — TELEPHONE ENCOUNTER
Spoke with pt, advised of message below. Pt says she does not think she needs two medication for her BP (Atenolol and hctz). She would like to discuss medication. appt booked with Abida Hatfield to review. JEISON

## 2019-04-25 ENCOUNTER — OFFICE VISIT (OUTPATIENT)
Dept: INTERNAL MEDICINE | Facility: CLINIC | Age: 74
End: 2019-04-25
Payer: MEDICARE

## 2019-04-25 VITALS
SYSTOLIC BLOOD PRESSURE: 128 MMHG | HEART RATE: 74 BPM | DIASTOLIC BLOOD PRESSURE: 70 MMHG | WEIGHT: 186.31 LBS | HEIGHT: 65 IN | BODY MASS INDEX: 31.04 KG/M2

## 2019-04-25 DIAGNOSIS — Z79.4 TYPE 2 DIABETES MELLITUS WITH COMPLICATION, WITH LONG-TERM CURRENT USE OF INSULIN: ICD-10-CM

## 2019-04-25 DIAGNOSIS — I10 HYPERTENSION, ESSENTIAL: Primary | ICD-10-CM

## 2019-04-25 DIAGNOSIS — E11.8 TYPE 2 DIABETES MELLITUS WITH COMPLICATION, WITH LONG-TERM CURRENT USE OF INSULIN: ICD-10-CM

## 2019-04-25 DIAGNOSIS — E78.5 HYPERLIPIDEMIA, UNSPECIFIED HYPERLIPIDEMIA TYPE: ICD-10-CM

## 2019-04-25 PROCEDURE — 3045F PR MOST RECENT HEMOGLOBIN A1C LEVEL 7.0-9.0%: ICD-10-PCS | Mod: HCNC,CPTII,S$GLB, | Performed by: PHYSICIAN ASSISTANT

## 2019-04-25 PROCEDURE — 3074F SYST BP LT 130 MM HG: CPT | Mod: HCNC,CPTII,S$GLB, | Performed by: PHYSICIAN ASSISTANT

## 2019-04-25 PROCEDURE — 3074F PR MOST RECENT SYSTOLIC BLOOD PRESSURE < 130 MM HG: ICD-10-PCS | Mod: HCNC,CPTII,S$GLB, | Performed by: PHYSICIAN ASSISTANT

## 2019-04-25 PROCEDURE — 3078F PR MOST RECENT DIASTOLIC BLOOD PRESSURE < 80 MM HG: ICD-10-PCS | Mod: HCNC,CPTII,S$GLB, | Performed by: PHYSICIAN ASSISTANT

## 2019-04-25 PROCEDURE — 1101F PT FALLS ASSESS-DOCD LE1/YR: CPT | Mod: HCNC,CPTII,S$GLB, | Performed by: PHYSICIAN ASSISTANT

## 2019-04-25 PROCEDURE — 99213 PR OFFICE/OUTPT VISIT, EST, LEVL III, 20-29 MIN: ICD-10-PCS | Mod: HCNC,S$GLB,, | Performed by: PHYSICIAN ASSISTANT

## 2019-04-25 PROCEDURE — 99213 OFFICE O/P EST LOW 20 MIN: CPT | Mod: HCNC,S$GLB,, | Performed by: PHYSICIAN ASSISTANT

## 2019-04-25 PROCEDURE — 1101F PR PT FALLS ASSESS DOC 0-1 FALLS W/OUT INJ PAST YR: ICD-10-PCS | Mod: HCNC,CPTII,S$GLB, | Performed by: PHYSICIAN ASSISTANT

## 2019-04-25 PROCEDURE — 3078F DIAST BP <80 MM HG: CPT | Mod: HCNC,CPTII,S$GLB, | Performed by: PHYSICIAN ASSISTANT

## 2019-04-25 PROCEDURE — 99999 PR PBB SHADOW E&M-EST. PATIENT-LVL V: ICD-10-PCS | Mod: PBBFAC,HCNC,, | Performed by: PHYSICIAN ASSISTANT

## 2019-04-25 PROCEDURE — 99999 PR PBB SHADOW E&M-EST. PATIENT-LVL V: CPT | Mod: PBBFAC,HCNC,, | Performed by: PHYSICIAN ASSISTANT

## 2019-04-25 PROCEDURE — 3045F PR MOST RECENT HEMOGLOBIN A1C LEVEL 7.0-9.0%: CPT | Mod: HCNC,CPTII,S$GLB, | Performed by: PHYSICIAN ASSISTANT

## 2019-04-25 NOTE — PROGRESS NOTES
Subjective:       Patient ID: Alisia Gusman is a 73 y.o. female.        Chief Complaint: Medication Problem    Alisia Gusman is an established patient of Selena Montemayor MD here today for urgent care visit.    DM:  Blood sugar has been better of late, log reviewed.  Followed by Dr. Nelson.    HTN:  Previously on losartan hct --> higher dose of hctz 25 mg secondary to cough from losartan.  Atenolol 25 mg 1/2 tablet added secondary to palpitations.  BP acceptable today.  She has questions today regarding atenolol and thought it was for cough.  Clarified medications with patient today.    Lipids:  Taking pravastatin.  Lipid panel 3/2019 controlled.         Review of Systems   Constitutional: Negative for chills, diaphoresis, fatigue and fever.   HENT: Negative for congestion and sore throat.    Eyes: Negative for visual disturbance.   Respiratory: Negative for cough, chest tightness and shortness of breath.    Cardiovascular: Negative for chest pain, palpitations and leg swelling.   Gastrointestinal: Negative for abdominal pain, blood in stool, constipation, diarrhea, nausea and vomiting.   Genitourinary: Negative for dysuria, frequency, hematuria and urgency.   Musculoskeletal: Negative for arthralgias and back pain.   Skin: Negative for rash.   Neurological: Negative for dizziness, syncope, weakness and headaches.   Psychiatric/Behavioral: Negative for dysphoric mood and sleep disturbance. The patient is not nervous/anxious.        Objective:      Physical Exam   Constitutional: She appears well-developed and well-nourished.   HENT:   Head: Normocephalic.   Right Ear: External ear normal.   Left Ear: External ear normal.   Mouth/Throat: Oropharynx is clear and moist.   Eyes: Pupils are equal, round, and reactive to light.   Cardiovascular: Normal rate, regular rhythm and normal heart sounds. Exam reveals no gallop and no friction rub.   No murmur heard.  Pulmonary/Chest: Effort normal and breath sounds normal. No  "respiratory distress.   Abdominal: Soft. Normal appearance. There is no tenderness.   Musculoskeletal: She exhibits no edema.   Neurological: She is alert.   Skin: Skin is warm and dry.   Psychiatric: She has a normal mood and affect.   Nursing note and vitals reviewed.      Assessment:       1. Hypertension, essential    2. Hyperlipidemia, unspecified hyperlipidemia type    3. Type 2 diabetes mellitus with complication, with long-term current use of insulin        Plan:       Alisia was seen today for medication problem.    Diagnoses and all orders for this visit:    Hypertension, essential: stable and controlled, continue hctz and atenolol, clarified meds with patient today    Hyperlipidemia, unspecified hyperlipidemia type: stable and controlled, continue pravastatin    Type 2 diabetes mellitus with complication, with long-term current use of insulin: stable and controlled, hemoglobin A1C 7.3 3/2019, continue insulin and glimepiride     F/u as scheduled with Dr. Montemayor.    Pt has been given instructions populated from [employer] database and has verbalized understanding of the after visit summary and information contained wherein.    Follow up with a primary care provider. May go to ER for acute shortness of breath, lightheadedness, fever, or any other emergent complaints or changes in condition.    "This note will be shared with the patient"    Future Appointments   Date Time Provider Department Center   6/21/2019 10:40 AM Melany Willis NP [facility] DERM [address]   7/22/2019  9:00 AM Selena Montemayor MD [facility] IM [address]               "

## 2019-05-07 ENCOUNTER — TELEPHONE (OUTPATIENT)
Dept: ENDOCRINOLOGY | Facility: CLINIC | Age: 74
End: 2019-05-07

## 2019-05-07 NOTE — TELEPHONE ENCOUNTER
----- Message from Sarah Chirinos sent at 5/7/2019  8:24 AM CDT -----  Contact: self [phone]  .Needs Advice    Reason for call:        Communication Preference:phone    Additional Information:  Pt states her colon doctor told her to take 16 units of her insulin in the morning and 5 before dinner and states is that okay and should she take her glimepiride medication this morning please call back to discuss.

## 2019-05-08 ENCOUNTER — HOSPITAL ENCOUNTER (OUTPATIENT)
Facility: HOSPITAL | Age: 74
Discharge: HOME | End: 2019-05-08
Attending: INTERNAL MEDICINE | Admitting: INTERNAL MEDICINE
Payer: MEDICARE

## 2019-05-08 ENCOUNTER — ANESTHESIA EVENT (OUTPATIENT)
Dept: ENDOSCOPY | Facility: HOSPITAL | Age: 74
End: 2019-05-08
Payer: MEDICARE

## 2019-05-08 ENCOUNTER — ANESTHESIA (OUTPATIENT)
Dept: ENDOSCOPY | Facility: HOSPITAL | Age: 74
End: 2019-05-08
Payer: MEDICARE

## 2019-05-08 ENCOUNTER — TELEPHONE (OUTPATIENT)
Dept: ENDOCRINOLOGY | Facility: CLINIC | Age: 74
End: 2019-05-08

## 2019-05-08 VITALS
OXYGEN SATURATION: 99 % | RESPIRATION RATE: 18 BRPM | BODY MASS INDEX: 30.99 KG/M2 | SYSTOLIC BLOOD PRESSURE: 140 MMHG | TEMPERATURE: 98 F | DIASTOLIC BLOOD PRESSURE: 70 MMHG | HEIGHT: 65 IN | WEIGHT: 186 LBS | HEART RATE: 82 BPM

## 2019-05-08 DIAGNOSIS — Z86.010 HISTORY OF COLON POLYPS: Primary | ICD-10-CM

## 2019-05-08 PROBLEM — Z86.0100 HISTORY OF COLON POLYPS: Status: ACTIVE | Noted: 2019-05-08

## 2019-05-08 LAB — POCT GLUCOSE: 187 MG/DL (ref 70–110)

## 2019-05-08 PROCEDURE — E9220 PRA ENDO ANESTHESIA: ICD-10-PCS | Mod: HCNC,,, | Performed by: NURSE ANESTHETIST, CERTIFIED REGISTERED

## 2019-05-08 PROCEDURE — G0105 COLORECTAL SCRN; HI RISK IND: HCPCS | Performed by: INTERNAL MEDICINE

## 2019-05-08 PROCEDURE — G0105 COLORECTAL SCRN; HI RISK IND: ICD-10-PCS | Mod: GC,,, | Performed by: INTERNAL MEDICINE

## 2019-05-08 PROCEDURE — 82962 GLUCOSE BLOOD TEST: CPT | Performed by: INTERNAL MEDICINE

## 2019-05-08 PROCEDURE — E9220 PRA ENDO ANESTHESIA: HCPCS | Mod: HCNC,,, | Performed by: NURSE ANESTHETIST, CERTIFIED REGISTERED

## 2019-05-08 PROCEDURE — 25000003 PHARM REV CODE 250: Mod: HCNC | Performed by: INTERNAL MEDICINE

## 2019-05-08 PROCEDURE — 63600175 PHARM REV CODE 636 W HCPCS: Mod: HCNC | Performed by: NURSE ANESTHETIST, CERTIFIED REGISTERED

## 2019-05-08 PROCEDURE — 37000008 HC ANESTHESIA 1ST 15 MINUTES: Performed by: INTERNAL MEDICINE

## 2019-05-08 PROCEDURE — 37000009 HC ANESTHESIA EA ADD 15 MINS: Performed by: INTERNAL MEDICINE

## 2019-05-08 PROCEDURE — G0105 COLORECTAL SCRN; HI RISK IND: HCPCS | Mod: GC,,, | Performed by: INTERNAL MEDICINE

## 2019-05-08 RX ORDER — SODIUM CHLORIDE 9 MG/ML
INJECTION, SOLUTION INTRAVENOUS CONTINUOUS
Status: DISCONTINUED | OUTPATIENT
Start: 2019-05-08 | End: 2019-05-08 | Stop reason: HOSPADM

## 2019-05-08 RX ORDER — PHENYLEPHRINE HYDROCHLORIDE 10 MG/ML
INJECTION INTRAVENOUS
Status: DISCONTINUED | OUTPATIENT
Start: 2019-05-08 | End: 2019-05-08

## 2019-05-08 RX ORDER — LIDOCAINE HCL/PF 100 MG/5ML
SYRINGE (ML) INTRAVENOUS
Status: DISCONTINUED | OUTPATIENT
Start: 2019-05-08 | End: 2019-05-08

## 2019-05-08 RX ORDER — PROPOFOL 10 MG/ML
VIAL (ML) INTRAVENOUS
Status: DISCONTINUED | OUTPATIENT
Start: 2019-05-08 | End: 2019-05-08

## 2019-05-08 RX ADMIN — PHENYLEPHRINE HYDROCHLORIDE 100 MCG: 10 INJECTION INTRAVENOUS at 08:05

## 2019-05-08 RX ADMIN — PROPOFOL 40 MG: 10 INJECTION, EMULSION INTRAVENOUS at 08:05

## 2019-05-08 RX ADMIN — LIDOCAINE HYDROCHLORIDE 50 MG: 20 INJECTION, SOLUTION INTRAVENOUS at 08:05

## 2019-05-08 RX ADMIN — PROPOFOL 60 MG: 10 INJECTION, EMULSION INTRAVENOUS at 08:05

## 2019-05-08 RX ADMIN — SODIUM CHLORIDE: 0.9 INJECTION, SOLUTION INTRAVENOUS at 08:05

## 2019-05-08 NOTE — ANESTHESIA POSTPROCEDURE EVALUATION
Anesthesia Post Evaluation    Patient: Alisia Gusman    Procedure(s) Performed: Procedure(s) (LRB):  COLONOSCOPY (N/A)    Final Anesthesia Type: general  Patient location during evaluation: PACU  Patient participation: Yes- Able to Participate  Level of consciousness: awake and alert and oriented  Post-procedure vital signs: reviewed and stable  Pain management: adequate  Airway patency: patent  PONV status at discharge: No PONV  Anesthetic complications: no      Cardiovascular status: blood pressure returned to baseline  Respiratory status: unassisted  Hydration status: euvolemic  Follow-up not needed.          Vitals Value Taken Time   /70 5/8/2019  9:17 AM   Temp 36.5 °C (97.7 °F) 5/8/2019  8:57 AM   Pulse 82 5/8/2019  9:17 AM   Resp 18 5/8/2019  9:17 AM   SpO2 99 % 5/8/2019  9:17 AM         Event Time     Out of Recovery 09:27:28          Pain/Lorene Score: Lorene Score: 10 (5/8/2019  9:07 AM)

## 2019-05-08 NOTE — H&P
Short Stay Endoscopy History and Physical    PCP - Selena Montemayor MD    Procedure - Colonoscopy  ASA - per anesthesia  Mallampati - per anesthesia  History of Anesthesia problems - no  Family history Anesthesia problems - no   Plan of anesthesia - General    HPI:  This is a 73 y.o. female here for evaluation of : personal history of colon polyps.  Last exam was 2015, multiple adenomatous polyps.      ROS:  Constitutional: No fevers, chills, No weight loss  CV: No chest pain  Pulm: No cough, No shortness of breath  GI: see HPI  Derm: No rash    Medical History:  has a past medical history of Adult bronchiectasis (7/26/2017), Allergy, Anemia, Arthritis, Asthma, Breast cancer (1993), Cancer (1993), Cataract, Chronic cervical radiculopathy (9/20/2012), Diabetes mellitus, Diabetes mellitus type II, Diabetes with neurologic complications, Diverticulosis, Dry eyes, Dysphagia, GERD (gastroesophageal reflux disease), Hyperlipidemia, Hypertension, Neuropathy, Scalp tenderness, Shortness of breath, and Ulcer.    Surgical History:  has a past surgical history that includes Cholecystectomy; Carpal tunnel release (Bilateral, 2011); uvuloplasty; Hysterectomy; Joint replacement (Left, June 2014); Cataract extraction w/  intraocular lens implant (Bilateral, 2013 and 2014); Colonoscopy (N/A, 11/6/2015); Eye surgery; Breast lumpectomy (Left, 1993); and Breast biopsy (Left, 1993).    Family History: family history includes Arthritis in her sister; Breast cancer in her paternal aunt; Cancer in her sister and sister; Colon polyps in her sister and sister; Diabetes in her mother, sister, sister, and sister; Heart disease in her father; No Known Problems in her brother, daughter, daughter, and son; Psoriasis in her sister; Seizures in her sister; Stroke in her sister.. Otherwise no colon cancer, inflammatory bowel disease, or GI malignancies.    Social History:  reports that she has never smoked. She has never used smokeless tobacco. She  "reports that she does not drink alcohol or use drugs.    Review of patient's allergies indicates:   Allergen Reactions    Nubain [nalbuphine] Other (See Comments)     Other reaction(s): Hypotension    Grass pollen-june grass standard     Losartan      cough    Sinus & allergy [chlorpheniramine-phenylephrine]        Medications:   Facility-Administered Medications Prior to Admission   Medication Dose Route Frequency Provider Last Rate Last Dose    triamcinolone acetonide injection 10 mg  10 mg Intradermal 1 time in Clinic/HOD Melany Willis NP        triamcinolone acetonide injection 10 mg  10 mg Intradermal 1 time in Clinic/HOD Melany Willis NP         Medications Prior to Admission   Medication Sig Dispense Refill Last Dose    ACCU-CHEK DOMENICO PLUS TEST STRP Strp TEST THREE TIMES DAILY AS DIRECTED 300 strip 3 5/8/2019 at Unknown time    ACCU-CHEK SOFTCLIX LANCETS Misc 1 each by Misc.(Non-Drug; Combo Route) route 3 (three) times daily. 270 each 3 5/8/2019 at Unknown time    ALCOHOL ANTISEPTIC PADS (ALCOHOL PREP PADS TOP)    5/8/2019 at Unknown time    aspirin 81 MG chewable tablet Take 1 tablet by mouth At bedtime.   Past Week at Unknown time    atenolol (TENORMIN) 25 MG tablet TAKE 1/2 TABLET EVERY DAY 45 tablet 3 Past Week at Unknown time    BD ALCOHOL SWABS PadM 1 each 2 (two) times daily.   5/8/2019 at Unknown time    BD ULTRA-FINE SUSIE PEN NEEDLES 32 gauge x 5/32" Ndle USE  TO  INJECT  ONE  TIME  DAILY 90 each 3 5/8/2019 at Unknown time    BD VEO INSULIN SYRINGE UF 0.3 mL 31 gauge x 15/64" Syrg USE TWICE DAILY 200 Syringe 5 5/8/2019 at Unknown time    calcium carbonate-vitamin D3 (CALTRATE 600 + D) 600 mg(1,500mg) -400 unit Chew 1 tablet once daily.    5/8/2019 at Unknown time    ciclopirox (PENLAC) 8 % Soln Apply topically nightly. 6.6 mL 11 Past Week at Unknown time    cyanocobalamin (VITAMIN B-12) 1000 MCG tablet Take 100 mcg by mouth once daily.   Past Week at Unknown time    " DERMA-SMOOTHE/FS SCALP OIL 0.01 % Oil Apply oil to damp scalp nightly and cover with shower cap. 1 Bottle 3 Past Week at Unknown time    famotidine (PEPCID) 20 MG tablet Take 1 tablet (20 mg total) by mouth every evening. 90 tablet 3 Past Week at Unknown time    FOLIC ACID/MULTIVIT-MIN/LUTEIN (CENTRUM SILVER ORAL) Take 1 tablet by mouth once daily.   Past Week at Unknown time    glimepiride (AMARYL) 4 MG tablet TAKE 1 TABLET EVERY DAY WITH BREAKFAST 90 tablet 3 Past Week at Unknown time    hydroCHLOROthiazide (HYDRODIURIL) 25 MG tablet Take 1 tablet (25 mg total) by mouth once daily. 90 tablet 3 5/8/2019 at 0415    ibuprofen (ADVIL,MOTRIN) 200 MG tablet Take 200 mg by mouth every 6 (six) hours as needed for Pain.   Past Month at Unknown time    insulin NPH-insulin regular, 70/30, (NOVOLIN 70/30) 100 unit/mL (70-30) injection RELION BRAND 26 units thirty minutes before breakfast and 12 units thirty minutes before dinner. (Patient taking differently: 2 (two) times daily. RELION BRAND 26 units thirty minutes before breakfast and 12 units thirty minutes before dinner.) 10 mL 11 5/7/2019 at Unknown time    insulin regular 100 unit/mL Inj injection RELION brand  Inject 8 units with lunch and 9 units with dinner. (Patient taking differently: RELION brand  Inject 7 units with lunch and 8 units with dinner.)   Past Week at Unknown time    ketoconazole (NIZORAL) 2 % shampoo Wash hair with medicated shampoo at least 2x/week - let sit on scalp at least 5 minutes prior to rinsing 120 mL 5 Past Week at Unknown time    metFORMIN (GLUCOPHAGE-XR) 750 MG 24 hr tablet One tablet with dinner. 90 tablet 3 Past Week at Unknown time    potassium chloride (KLOR-CON) 10 MEQ TbSR TAKE 1 TABLET EVERY DAY 90 tablet 3 Past Week at Unknown time    pravastatin (PRAVACHOL) 40 MG tablet TAKE 1 TABLET NIGHTLY. 90 tablet 3 Past Week at Unknown time    pregabalin (LYRICA) 75 MG capsule Take 1 pill daily x 7 days and then twice daily  onwards. 180 capsule 1 Past Week at Unknown time    blood-glucose meter Misc 1 each by Misc.(Non-Drug; Combo Route) route once daily. 1 each 0 Taking         Physical Exam:    Vital Signs: There were no vitals filed for this visit.    General Appearance: Well appearing in no acute distress  Eyes:    No scleral icterus  ENT: Neck supple, Lips, mucosa, and tongue normal  Lungs: nonlabored respirations.  Heart:  Regular rate  Abdomen: Soft, non tender, non distended . No hepatosplenomegaly, ascites, or mass.  Extremities: 2+ pulses, no clubbing, cyanosis or edema  Skin: No rash      Labs:  Lab Results   Component Value Date    WBC 5.54 03/27/2019    HGB 13.7 03/27/2019    HCT 42.0 03/27/2019     03/27/2019    CHOL 179 03/27/2019    TRIG 184 (H) 03/27/2019    HDL 65 03/27/2019    ALT 12 03/27/2019    AST 18 03/27/2019     03/27/2019    K 3.9 03/27/2019     03/27/2019    CREATININE 0.9 03/27/2019    BUN 13 03/27/2019    CO2 29 03/27/2019    TSH 1.938 06/07/2017    INR 0.9 09/03/2018    HGBA1C 7.3 (H) 03/27/2019       I have explained the risks and benefits of endoscopy procedures to the patient including but not limited to bleeding, perforation, infection, and death.      Сергей Bryant MD

## 2019-05-08 NOTE — ANESTHESIA PREPROCEDURE EVALUATION
05/08/2019  Alisia Gusman is a 73 y.o., female with pmh of HTN and DM type 2 presenting for screening colonoscopy.     Past Medical History:   Diagnosis Date    Adult bronchiectasis 7/26/2017    Allergy     Anemia     Arthritis     Asthma     Breast cancer 1993    left w/ radiation     Cancer 1993    L eft breast    Cataract     Chronic cervical radiculopathy 9/20/2012    Diabetes mellitus     Diabetes mellitus type II     Diabetes with neurologic complications     Diverticulosis     Dry eyes     Dysphagia     after knee surgery June 2014    GERD (gastroesophageal reflux disease)     Hyperlipidemia     Hypertension     Neuropathy     feet    Scalp tenderness     Shortness of breath     Ulcer      Past Surgical History:   Procedure Laterality Date    ARTHROSCOPY-KNEE Left 11/4/2014    [facility] OR    BREAST BIOPSY Left 1993    BREAST LUMPECTOMY Left 1993    CARPAL TUNNEL RELEASE Bilateral 2011    CATARACT EXTRACTION W/  INTRAOCULAR LENS IMPLANT Bilateral 2013 and 2014    CHOLECYSTECTOMY      COLONOSCOPY N/A 11/6/2015    [facility]O ([facility]    ESOPHAGOGASTRODUODENOSCOPY (EGD) N/A 4/22/2015    [facility] ENDO (4TH FLR)    EYE SURGERY      GRAFT Left 6/17/2014    [facility] OR    HYSTERECTOMY      partial hyst    JOINT REPLACEMENT Left June 2014    knee    LATERAL RELEASE Left 11/4/2014    [facility] OR    LYSIS-ADHESION Left 11/4/2014    Performed [facility] at [facility] OR    MANIPULATION-KNEE Left 11/4/2014    [facility] at [facility] OR    REPLACEMENT-KNEE-TOTAL Left 6/17/2014    [facility] OR    uvuloplasty       Lab Results   Component Value Date    WBC 5.54  03/27/2019    HGB 13.7 03/27/2019    HCT 42.0 03/27/2019    MCV 88 03/27/2019     03/27/2019         Chemistry        Component Value Date/Time     03/27/2019 0747    K 3.9 03/27/2019 0747     03/27/2019 0747    CO2 29 03/27/2019 0747    BUN 13 03/27/2019 0747    CREATININE 0.9 03/27/2019 0747     (H) 03/27/2019 0747        Component Value Date/Time    CALCIUM 10.5 03/27/2019 0747    ALKPHOS 58 03/27/2019 0747    AST 18 03/27/2019 0747    ALT 12 03/27/2019 0747    BILITOT 1.2 (H) 03/27/2019 0747    ESTGFRAFRICA >60 03/27/2019 0747    EGFRNONAA >60 03/27/2019 0747          ECHO 2012:    CONCLUSIONS     1 - Normal left ventricular function (EF 65%).     2 - Normal diastolic function.     3 - Mild tricuspid regurgitation.     4 - Low central venous pressure.     No evidence of stress induced myocardial ischemia.     Anesthesia Evaluation    I have reviewed the Patient Summary Reports.     I have reviewed the Medications.     Review of Systems  Anesthesia Hx:  No problems with previous Anesthesia   Denies Personal Hx of Anesthesia complications.   Social:  Non-Smoker    Hematology/Oncology:         -- Cancer in past history: Breast left   EENT/Dental:EENT/Dental Normal   Cardiovascular:   Hypertension    Pulmonary:   Denies Asthma. (denies)  Denies Shortness of breath. (denies)    Renal/:   Chronic Renal Disease    Hepatic/GI:   GERD    Endocrine:   Diabetes, well controlled, type 2        Physical Exam  General:  Well nourished    Airway/Jaw/Neck:  Airway Findings: Mouth Opening: Normal Tongue: Normal  General Airway Assessment: Adult  Mallampati: II  Improves to I with phonation.  TM Distance: Normal, at least 6 cm  Jaw/Neck Findings:  Neck ROM: Normal ROM      Dental:  Dental Findings: In tact, Upper Dentures, Lower Dentures   Chest/Lungs:  Chest/Lungs Findings: Clear to auscultation, Normal Respiratory Rate     Heart/Vascular:  Heart Findings: Rate: Normal  Rhythm: Regular Rhythm  Sounds:  Normal        Mental Status:  Mental Status Findings: Normal        Anesthesia Plan  Type of Anesthesia, risks & benefits discussed:  Anesthesia Type:  general, MAC  Patient's Preference:   Intra-op Monitoring Plan: standard ASA monitors  Intra-op Monitoring Plan Comments:   Post Op Pain Control Plan: multimodal analgesia, IV/PO Opioids PRN and per primary service following discharge from PACU  Post Op Pain Control Plan Comments:   Induction:   IV  Beta Blocker:  Patient is on a Beta-Blocker and has received one dose within the past 24 hours (No further documentation required).       Informed Consent: Patient understands risks and agrees with Anesthesia plan.  Questions answered. Anesthesia consent signed with patient.  ASA Score: 3     Day of Surgery Review of History & Physical:    H&P update referred to the surgeon.         Ready For Surgery From Anesthesia Perspective.

## 2019-05-08 NOTE — [employer] PATIENT INSTRUCTIONS
Discharge Summary/Instructions after an Endoscopic Procedure  Patient Name: Alisia Gusman  Patient MRN: [id number]  Patient YOB: 1945  Wednesday, May 08, 2019  Major Michelle MD  RESTRICTIONS:  During your procedure today, you received medications for sedation.  These   medications may affect your judgment, balance and coordination.  Therefore,   for 24 hours, you have the following restrictions:   - DO NOT drive a car, operate machinery, make legal/financial decisions,   sign important papers or drink alcohol.    ACTIVITY:  Today: no heavy lifting, straining or running due to procedural   sedation/anesthesia.  The following day: return to full activity including work.  DIET:  Eat and drink normally unless instructed otherwise.     TREATMENT FOR COMMON SIDE EFFECTS:  - Mild abdominal pain, nausea, belching, bloating or excessive gas:  rest,   eat lightly and use a heating pad.  - Sore Throat: treat with throat lozenges and/or gargle with warm salt   water.  - Because air was used during the procedure, expelling large amounts of air   from your rectum or belching is normal.  - If a bowel prep was taken, you may not have a bowel movement for 1-3 days.    This is normal.  SYMPTOMS TO WATCH FOR AND REPORT TO YOUR PHYSICIAN:  1. Abdominal pain or bloating, other than gas cramps.  2. Chest pain.  3. Back pain.  4. Signs of infection such as: chills or fever occurring within 24 hours   after the procedure.  5. Rectal bleeding, which would show as bright red, maroon, or black stools.   (A tablespoon of blood from the rectum is not serious, especially if   hemorrhoids are present.)  6. Vomiting.  7. Weakness or dizziness.  GO DIRECTLY TO THE NEAREST EMERGENCY ROOM IF YOU HAVE ANY OF THE FOLLOWING:      Difficulty breathing              Chills and/or fever over 101 F   Persistent vomiting and/or vomiting blood   Severe abdominal pain   Severe chest pain   Black, tarry stools   Bleeding- more than one  tablespoon   Any other symptom or condition that you feel may need urgent attention  Your doctor recommends these additional instructions:  If any biopsies were taken, your doctors clinic will contact you in 1 to 2   weeks with any results.  - Discharge patient to home.   - Patient has a contact number available for emergencies.  The signs and   symptoms of potential delayed complications were discussed with the   patient.  Return to normal activities tomorrow.  Written discharge   instructions were provided to the patient.   - Resume previous diet.   - Continue present medications.   - Repeat colonoscopy in 5 years for surveillance.  For questions, problems or results please call your physician - Major Michelle MD at Work:  [phone].  [facility], EMERGENCY ROOM PHONE NUMBER: [phone]  IF A COMPLICATION OR EMERGENCY SITUATION ARISES AND YOU ARE UNABLE TO REACH   YOUR PHYSICIAN - GO DIRECTLY TO THE EMERGENCY ROOM.  Major Michelle MD  5/8/2019 9:51:18 AM  This report has been verified and signed electronically.  [employer]

## 2019-05-08 NOTE — TRANSFER OF CARE
"Anesthesia Transfer of Care Note    Patient: Alisia Gusman    Procedure(s) Performed: Procedure(s) (LRB):  COLONOSCOPY (N/A)    Patient location: PACU    Anesthesia Type: general    Transport from OR: Transported from OR on room air with adequate spontaneous ventilation    Post pain: adequate analgesia    Post assessment: no apparent anesthetic complications and tolerated procedure well    Post vital signs: stable    Level of consciousness: awake, oriented and alert    Nausea/Vomiting: no nausea/vomiting    Complications: none    Transfer of care protocol was followed      Last vitals:   Visit Vitals  BP (!) 170/79 (BP Location: Right arm, Patient Position: Sitting)   Pulse 88   Temp 36.6 °C (97.9 °F) (Temporal)   Resp 18   Ht 5' 5" (1.651 m)   Wt 84.4 kg (186 lb)   LMP  (LMP Unknown)   SpO2 98%   Breastfeeding? No   BMI 30.95 kg/m²     "

## 2019-05-09 ENCOUNTER — TELEPHONE (OUTPATIENT)
Dept: INTERNAL MEDICINE | Facility: CLINIC | Age: 74
End: 2019-05-09

## 2019-05-09 NOTE — TELEPHONE ENCOUNTER
There are some diverticulosis (no diverticulitis), outpouching of the colon, seen. Otherwise the colonoscopy is normal. Repeat in 5 yrs.

## 2019-05-09 NOTE — TELEPHONE ENCOUNTER
Called and spoke to pt letting her know the test results.    Pt verbalized understanding of this.

## 2019-05-09 NOTE — TELEPHONE ENCOUNTER
----- Message from Sandra John sent at 5/9/2019 10:14 AM CDT -----  Contact: Patient [phone]    Patient would like to get test results  Name of test (lab, mammo, etc.):   Colonoscopy  Date of test:  5/9/19  Ordering provider: Dr Montemayor  Where was the test performed:  [facility]  Would the patient rather a call back or a response via [facility]?:  Please call  Comments:    Please call and advise  Thank you

## 2019-05-15 ENCOUNTER — TELEPHONE (OUTPATIENT)
Dept: ENDOSCOPY | Facility: HOSPITAL | Age: 74
End: 2019-05-15

## 2019-06-07 ENCOUNTER — TELEPHONE (OUTPATIENT)
Dept: ENDOCRINOLOGY | Facility: CLINIC | Age: 74
End: 2019-06-07

## 2019-06-07 NOTE — TELEPHONE ENCOUNTER
----- Message from Shania Lora sent at 6/7/2019 11:06 AM CDT -----  Contact: Self [phone]  PT is requesting a call to schedule an appt in October. Added pt to waitlist.

## 2019-06-21 ENCOUNTER — OFFICE VISIT (OUTPATIENT)
Dept: DERMATOLOGY | Facility: CLINIC | Age: 74
End: 2019-06-21
Payer: MEDICARE

## 2019-06-21 DIAGNOSIS — L66.8 CENTRAL CENTRIFUGAL SCARRING ALOPECIA: Primary | ICD-10-CM

## 2019-06-21 PROCEDURE — 99999 PR PBB SHADOW E&M-EST. PATIENT-LVL II: ICD-10-PCS | Mod: PBBFAC,HCNC,, | Performed by: NURSE PRACTITIONER

## 2019-06-21 PROCEDURE — 1101F PT FALLS ASSESS-DOCD LE1/YR: CPT | Mod: HCNC,CPTII,S$GLB, | Performed by: NURSE PRACTITIONER

## 2019-06-21 PROCEDURE — 99213 OFFICE O/P EST LOW 20 MIN: CPT | Mod: HCNC,S$GLB,, | Performed by: NURSE PRACTITIONER

## 2019-06-21 PROCEDURE — 99999 PR PBB SHADOW E&M-EST. PATIENT-LVL II: CPT | Mod: PBBFAC,HCNC,, | Performed by: NURSE PRACTITIONER

## 2019-06-21 PROCEDURE — 1101F PR PT FALLS ASSESS DOC 0-1 FALLS W/OUT INJ PAST YR: ICD-10-PCS | Mod: HCNC,CPTII,S$GLB, | Performed by: NURSE PRACTITIONER

## 2019-06-21 PROCEDURE — 99213 PR OFFICE/OUTPT VISIT, EST, LEVL III, 20-29 MIN: ICD-10-PCS | Mod: HCNC,S$GLB,, | Performed by: NURSE PRACTITIONER

## 2019-06-21 NOTE — PROGRESS NOTES
Subjective:       Patient ID:  Alisia Gusman is a 73 y.o. female who presents for   Chief Complaint   Patient presents with    Hair Loss     f/u     Hair Loss  - Follow-up  Symptom course: improving (last seen 3/21/19)  Currently using: ketoconazole shampoos. Biotin daily. Dermasmoothe BIW.   Affected locations: scalp  Signs / symptoms: itching and spreading (itching significantly improved )  Severity: mild to moderate        Review of Systems   Constitutional: Negative for fever, chills, fatigue and malaise.   Skin: Negative for daily sunscreen use.   Hematologic/Lymphatic: Does not bruise/bleed easily.        Objective:    Physical Exam   Constitutional: She appears well-developed and well-nourished. No distress.   Neurological: She is alert and oriented to person, place, and time. She is not disoriented.   Psychiatric: She has a normal mood and affect.   Skin:   Areas Examined (abnormalities noted in diagram):   Scalp / Hair Palpated and Inspected  Head / Face Inspection Performed  Neck Inspection Performed              Diagram Legend     Erythematous scaling macule/papule c/w actinic keratosis       Vascular papule c/w angioma      Pigmented verrucoid papule/plaque c/w seborrheic keratosis      Yellow umbilicated papule c/w sebaceous hyperplasia      Irregularly shaped tan macule c/w lentigo     1-2 mm smooth white papules consistent with Milia      Movable subcutaneous cyst with punctum c/w epidermal inclusion cyst      Subcutaneous movable cyst c/w pilar cyst      Firm pink to brown papule c/w dermatofibroma      Pedunculated fleshy papule(s) c/w skin tag(s)      Evenly pigmented macule c/w junctional nevus     Mildly variegated pigmented, slightly irregular-bordered macule c/w mildly atypical nevus      Flesh colored to evenly pigmented papule c/w intradermal nevus       Pink pearly papule/plaque c/w basal cell carcinoma      Erythematous hyperkeratotic cursted plaque c/w SCC      Surgical scar with no  sign of skin cancer recurrence      Open and closed comedones      Inflammatory papules and pustules      Verrucoid papule consistent consistent with wart     Erythematous eczematous patches and plaques     Dystrophic onycholytic nail with subungual debris c/w onychomycosis     Umbilicated papule    Erythematous-base heme-crusted tan verrucoid plaque consistent with inflamed seborrheic keratosis     Erythematous Silvery Scaling Plaque c/w Psoriasis     See annotation      Assessment / Plan:        Central centrifugal scarring alopecia    improving  Continue Dermasmoothe BIW  Continue Keto shampoo once weekly  Continue using clobetasol solution as needed for itching  Continue w/ biotin         Follow up in about 6 months (around 12/21/2019), or if symptoms worsen or fail to improve.

## 2019-07-08 ENCOUNTER — PATIENT OUTREACH (OUTPATIENT)
Dept: ADMINISTRATIVE | Facility: HOSPITAL | Age: 74
End: 2019-07-08

## 2019-07-15 ENCOUNTER — TELEPHONE (OUTPATIENT)
Dept: INTERNAL MEDICINE | Facility: CLINIC | Age: 74
End: 2019-07-15

## 2019-07-15 NOTE — TELEPHONE ENCOUNTER
----- Message from Roxanne Gale sent at 7/15/2019 11:19 AM CDT -----  Contact: self  or   Patient is calling for lab orders for her A1C blood work . Please call & advise

## 2019-07-22 ENCOUNTER — OFFICE VISIT (OUTPATIENT)
Dept: INTERNAL MEDICINE | Facility: CLINIC | Age: 74
End: 2019-07-22
Payer: MEDICARE

## 2019-07-22 ENCOUNTER — LAB VISIT (OUTPATIENT)
Dept: LAB | Facility: HOSPITAL | Age: 74
End: 2019-07-22
Attending: INTERNAL MEDICINE
Payer: MEDICARE

## 2019-07-22 VITALS
BODY MASS INDEX: 31.17 KG/M2 | TEMPERATURE: 98 F | WEIGHT: 187.06 LBS | HEART RATE: 70 BPM | HEIGHT: 65 IN | OXYGEN SATURATION: 98 % | DIASTOLIC BLOOD PRESSURE: 78 MMHG | SYSTOLIC BLOOD PRESSURE: 130 MMHG

## 2019-07-22 DIAGNOSIS — E11.69 DIABETES MELLITUS TYPE 2 IN OBESE: Primary | ICD-10-CM

## 2019-07-22 DIAGNOSIS — R60.0 BILATERAL LOWER EXTREMITY EDEMA: ICD-10-CM

## 2019-07-22 DIAGNOSIS — I15.2 HYPERTENSION ASSOCIATED WITH DIABETES: ICD-10-CM

## 2019-07-22 DIAGNOSIS — R00.2 PALPITATIONS: ICD-10-CM

## 2019-07-22 DIAGNOSIS — E11.59 HYPERTENSION ASSOCIATED WITH DIABETES: ICD-10-CM

## 2019-07-22 DIAGNOSIS — K21.9 LARYNGOPHARYNGEAL REFLUX (LPR): ICD-10-CM

## 2019-07-22 DIAGNOSIS — E66.9 DIABETES MELLITUS TYPE 2 IN OBESE: Primary | ICD-10-CM

## 2019-07-22 DIAGNOSIS — R10.9 RIGHT FLANK PAIN: ICD-10-CM

## 2019-07-22 DIAGNOSIS — E66.9 DIABETES MELLITUS TYPE 2 IN OBESE: ICD-10-CM

## 2019-07-22 DIAGNOSIS — E11.69 DIABETES MELLITUS TYPE 2 IN OBESE: ICD-10-CM

## 2019-07-22 DIAGNOSIS — L81.9 DISCOLORATION OF SKIN OF TOE: ICD-10-CM

## 2019-07-22 DIAGNOSIS — B35.1 ONYCHOMYCOSIS: ICD-10-CM

## 2019-07-22 LAB
ALBUMIN SERPL BCP-MCNC: 3.5 G/DL (ref 3.5–5.2)
ALP SERPL-CCNC: 57 U/L (ref 55–135)
ALT SERPL W/O P-5'-P-CCNC: 11 U/L (ref 10–44)
ANION GAP SERPL CALC-SCNC: 10 MMOL/L (ref 8–16)
AST SERPL-CCNC: 17 U/L (ref 10–40)
BILIRUB SERPL-MCNC: 1 MG/DL (ref 0.1–1)
BNP SERPL-MCNC: <10 PG/ML (ref 0–99)
BUN SERPL-MCNC: 16 MG/DL (ref 8–23)
CALCIUM SERPL-MCNC: 10.3 MG/DL (ref 8.7–10.5)
CHLORIDE SERPL-SCNC: 103 MMOL/L (ref 95–110)
CO2 SERPL-SCNC: 29 MMOL/L (ref 23–29)
CREAT SERPL-MCNC: 1.1 MG/DL (ref 0.5–1.4)
EST. GFR  (AFRICAN AMERICAN): 57 ML/MIN/1.73 M^2
EST. GFR  (NON AFRICAN AMERICAN): 49 ML/MIN/1.73 M^2
ESTIMATED AVG GLUCOSE: 160 MG/DL (ref 68–131)
GLUCOSE SERPL-MCNC: 120 MG/DL (ref 70–110)
HBA1C MFR BLD HPLC: 7.2 % (ref 4–5.6)
POTASSIUM SERPL-SCNC: 3.4 MMOL/L (ref 3.5–5.1)
PROT SERPL-MCNC: 7.1 G/DL (ref 6–8.4)
SODIUM SERPL-SCNC: 142 MMOL/L (ref 136–145)

## 2019-07-22 PROCEDURE — 3075F SYST BP GE 130 - 139MM HG: CPT | Mod: HCNC,CPTII,S$GLB, | Performed by: INTERNAL MEDICINE

## 2019-07-22 PROCEDURE — 36415 COLL VENOUS BLD VENIPUNCTURE: CPT | Mod: HCNC

## 2019-07-22 PROCEDURE — 1101F PR PT FALLS ASSESS DOC 0-1 FALLS W/OUT INJ PAST YR: ICD-10-PCS | Mod: HCNC,CPTII,S$GLB, | Performed by: INTERNAL MEDICINE

## 2019-07-22 PROCEDURE — 3045F PR MOST RECENT HEMOGLOBIN A1C LEVEL 7.0-9.0%: ICD-10-PCS | Mod: HCNC,CPTII,S$GLB, | Performed by: INTERNAL MEDICINE

## 2019-07-22 PROCEDURE — 83880 ASSAY OF NATRIURETIC PEPTIDE: CPT | Mod: HCNC

## 2019-07-22 PROCEDURE — 99214 PR OFFICE/OUTPT VISIT, EST, LEVL IV, 30-39 MIN: ICD-10-PCS | Mod: HCNC,S$GLB,, | Performed by: INTERNAL MEDICINE

## 2019-07-22 PROCEDURE — 3045F PR MOST RECENT HEMOGLOBIN A1C LEVEL 7.0-9.0%: CPT | Mod: HCNC,CPTII,S$GLB, | Performed by: INTERNAL MEDICINE

## 2019-07-22 PROCEDURE — 83036 HEMOGLOBIN GLYCOSYLATED A1C: CPT | Mod: HCNC

## 2019-07-22 PROCEDURE — 3078F PR MOST RECENT DIASTOLIC BLOOD PRESSURE < 80 MM HG: ICD-10-PCS | Mod: HCNC,CPTII,S$GLB, | Performed by: INTERNAL MEDICINE

## 2019-07-22 PROCEDURE — 3075F PR MOST RECENT SYSTOLIC BLOOD PRESS GE 130-139MM HG: ICD-10-PCS | Mod: HCNC,CPTII,S$GLB, | Performed by: INTERNAL MEDICINE

## 2019-07-22 PROCEDURE — 3078F DIAST BP <80 MM HG: CPT | Mod: HCNC,CPTII,S$GLB, | Performed by: INTERNAL MEDICINE

## 2019-07-22 PROCEDURE — 99214 OFFICE O/P EST MOD 30 MIN: CPT | Mod: HCNC,S$GLB,, | Performed by: INTERNAL MEDICINE

## 2019-07-22 PROCEDURE — 99999 PR PBB SHADOW E&M-EST. PATIENT-LVL IV: ICD-10-PCS | Mod: PBBFAC,HCNC,, | Performed by: INTERNAL MEDICINE

## 2019-07-22 PROCEDURE — 99999 PR PBB SHADOW E&M-EST. PATIENT-LVL IV: CPT | Mod: PBBFAC,HCNC,, | Performed by: INTERNAL MEDICINE

## 2019-07-22 PROCEDURE — 80053 COMPREHEN METABOLIC PANEL: CPT | Mod: HCNC

## 2019-07-22 PROCEDURE — 1101F PT FALLS ASSESS-DOCD LE1/YR: CPT | Mod: HCNC,CPTII,S$GLB, | Performed by: INTERNAL MEDICINE

## 2019-07-22 RX ORDER — FAMOTIDINE 20 MG/1
20 TABLET, FILM COATED ORAL NIGHTLY
Qty: 90 TABLET | Refills: 3 | Status: SHIPPED | OUTPATIENT
Start: 2019-07-22 | End: 2019-07-22

## 2019-07-22 RX ORDER — HYDROCHLOROTHIAZIDE 25 MG/1
25 TABLET ORAL DAILY
Qty: 90 TABLET | Refills: 3 | Status: SHIPPED | OUTPATIENT
Start: 2019-07-22 | End: 2020-08-06

## 2019-07-22 RX ORDER — METFORMIN HYDROCHLORIDE 750 MG/1
TABLET, EXTENDED RELEASE ORAL
Qty: 90 TABLET | Refills: 3 | Status: SHIPPED | OUTPATIENT
Start: 2019-07-22 | End: 2019-11-27

## 2019-07-22 RX ORDER — ATENOLOL 25 MG/1
12.5 TABLET ORAL DAILY
Qty: 45 TABLET | Refills: 3 | Status: SHIPPED | OUTPATIENT
Start: 2019-07-22 | End: 2021-02-15 | Stop reason: SDUPTHER

## 2019-07-22 RX ORDER — OMEPRAZOLE 40 MG/1
40 CAPSULE, DELAYED RELEASE ORAL EVERY MORNING
Qty: 90 CAPSULE | Refills: 3 | Status: SHIPPED | OUTPATIENT
Start: 2019-07-22 | End: 2020-07-09

## 2019-07-22 NOTE — PROGRESS NOTES
"Subjective:       Patient ID: Alisia Gusman is a 73 y.o. female.    Chief Complaint: f/u for DM    HPI   Had HRA visit 2/8/19 w/ Yanet Knight NP    DM2 - glimepiride 4mg daily, 70/30 32 untis before breakfast and 20 units before dinner, metformin xr 750mg daily. Has upcoming appt w/ Dr. Nelson 11/13/19.  Last a1c was 7.3.  HTN - HCTZ 25MG QD, atenolol 1/2 pill daily.  Losartan-->cough  Eye - 10/15/18. Foot 1/17/19.     Reports BLE (R>L) swelling 1 week ago.   Last week went to [facility] for 4 days and lots of walking. Was not able to control salt intake.   Pt reports that this week, she noted bruising on the R big toe. Doesn't hurt.   H/o B feet and ankle swelling per note 10/2018.   BLE US 5/10/18 - no evidence of significant reflux. No DVT.   Does have burning in her feet and the lyrica helps. Currently on 75mg BID. Gabapentin did not help in the past.     Reports still having the chronic cough but better.  Seen 10/2018 Dr. Covarrubias. Thinks from [facility].   Takes famotidine 20mg qhs.   CXR 9/3/18 WNL.   evaluated w/ Dr. Villalba 8/1/17.  Reports cough was not present when she was in [place].     Review of Systems  Comprehensive review of systems otherwise negative. See history/subjective section for more details.    Objective:      Physical Exam    /78 (BP Location: Left arm, Patient Position: Sitting, BP Method: Medium (Manual))   Pulse 70   Temp 98.1 °F (36.7 °C) (Oral)   Ht 5' 5" (1.651 m)   Wt 84.8 kg (187 lb 1 oz)   LMP  (LMP Unknown)   SpO2 98%   BMI 31.13 kg/m²     GEN - A+OX4, NAD   HEENT - PERRL, EOMI, OP clear. MMM. TM dullness.  Neck - No thyromegaly or cervical LAD. No thyroid masses felt.  CV - RRR, no m/r   Chest - CTAB, no wheezing or rhonchi  Abd - S/NT/ND/+BS.   Ext - 1+ B dp and 2+B radial pulses. Trace BLE ankle edema. R big toenail and also the tip of toe w/ area of hyperpigmentation (looks like bruising).   Skin - as above.   MSK - no CVA tenderness    Previous labs reviewed.     Assessment/Plan "     Alisia was seen today for annual exam and joint swelling.    Diagnoses and all orders for this visit:    Diabetes mellitus type 2 in obese  -     Hemoglobin A1c; Future  -     Comprehensive metabolic panel; Future  -     metFORMIN (GLUCOPHAGE-XR) 750 MG 24 hr tablet; One tablet with dinner.    Hypertension associated with diabetes  -     Comprehensive metabolic panel; Future  -     hydroCHLOROthiazide (HYDRODIURIL) 25 MG tablet; Take 1 tablet (25 mg total) by mouth once daily.  -     atenolol (TENORMIN) 25 MG tablet; Take 0.5 tablets (12.5 mg total) by mouth once daily.    Bilateral lower extremity edema  -     COMPRESSION STOCKINGS  -     Brain natriuretic peptide; Future    Palpitations  -     atenolol (TENORMIN) 25 MG tablet; Take 0.5 tablets (12.5 mg total) by mouth once daily.    Laryngopharyngeal reflux (LPR) - trial of omeprazole instead of pepcid to see if it helps w/ chronic cough better.  -     omeprazole (PRILOSEC) 40 MG capsule; Take 1 capsule (40 mg total) by mouth every morning.    Type 2 diabetes, uncontrolled, with neuropathy  -     metFORMIN (GLUCOPHAGE-XR) 750 MG 24 hr tablet; One tablet with dinner.    Onychomycosis  -     Ambulatory Referral to Podiatry    Discoloration of skin of toe  -     Ambulatory Referral to Podiatry    Right flank pain  -     Urinalysis; Future  -     Urinalysis Microscopic; Future      Follow up if symptoms worsen or fail to improve.      Selena Montemayor MD  Department of Internal Medicine - [facility]  10:07 AM

## 2019-07-23 ENCOUNTER — TELEPHONE (OUTPATIENT)
Dept: INTERNAL MEDICINE | Facility: CLINIC | Age: 74
End: 2019-07-23

## 2019-07-23 DIAGNOSIS — E87.6 HYPOKALEMIA: Primary | ICD-10-CM

## 2019-07-23 NOTE — TELEPHONE ENCOUNTER
Please call and notify pt:    Screening test for heart failure is normal. Compression stockings as discussed. Diabetes controlled on current meds. Kidney function stable. Potassium very mildly low and likely due to BP med. Repeat in a week. If persistent will start potassium supplement.

## 2019-07-25 ENCOUNTER — OFFICE VISIT (OUTPATIENT)
Dept: PODIATRY | Facility: CLINIC | Age: 74
End: 2019-07-25
Payer: MEDICARE

## 2019-07-25 VITALS
RESPIRATION RATE: 18 BRPM | WEIGHT: 187 LBS | DIASTOLIC BLOOD PRESSURE: 79 MMHG | SYSTOLIC BLOOD PRESSURE: 151 MMHG | HEIGHT: 65 IN | HEART RATE: 79 BPM | BODY MASS INDEX: 31.16 KG/M2

## 2019-07-25 DIAGNOSIS — B35.1 ONYCHOMYCOSIS DUE TO DERMATOPHYTE: ICD-10-CM

## 2019-07-25 DIAGNOSIS — E11.49 TYPE II DIABETES MELLITUS WITH NEUROLOGICAL MANIFESTATIONS: Primary | ICD-10-CM

## 2019-07-25 DIAGNOSIS — L84 CORN OR CALLUS: ICD-10-CM

## 2019-07-25 PROCEDURE — 11056 PR TRIM BENIGN HYPERKERATOTIC SKIN LESION,2-4: ICD-10-PCS | Mod: Q9,HCNC,S$GLB, | Performed by: PODIATRIST

## 2019-07-25 PROCEDURE — 99999 PR PBB SHADOW E&M-EST. PATIENT-LVL III: ICD-10-PCS | Mod: PBBFAC,HCNC,, | Performed by: PODIATRIST

## 2019-07-25 PROCEDURE — 11721 DEBRIDE NAIL 6 OR MORE: CPT | Mod: Q9,59,HCNC,S$GLB | Performed by: PODIATRIST

## 2019-07-25 PROCEDURE — 99999 PR PBB SHADOW E&M-EST. PATIENT-LVL III: CPT | Mod: PBBFAC,HCNC,, | Performed by: PODIATRIST

## 2019-07-25 PROCEDURE — 99499 NO LOS: ICD-10-PCS | Mod: HCNC,S$GLB,, | Performed by: PODIATRIST

## 2019-07-25 PROCEDURE — 99499 UNLISTED E&M SERVICE: CPT | Mod: HCNC,S$GLB,, | Performed by: PODIATRIST

## 2019-07-25 PROCEDURE — 11721 PR DEBRIDEMENT OF NAILS, 6 OR MORE: ICD-10-PCS | Mod: Q9,59,HCNC,S$GLB | Performed by: PODIATRIST

## 2019-07-25 PROCEDURE — 11056 PARNG/CUTG B9 HYPRKR LES 2-4: CPT | Mod: Q9,HCNC,S$GLB, | Performed by: PODIATRIST

## 2019-07-25 NOTE — PROGRESS NOTES
Subjective:      Patient ID: Alisia Gusman is a 73 y.o. female.    Chief Complaint: PCP (Selena Montemayor MD 7/22/19); Diabetic Foot Exam; Nail Problem (ingrown nails both great toes, Rt great toe turning black  ); Toe Pain; and Foot Swelling (both feet )    Alisia is a 73 y.o. female who presents to the clinic for evaluation and treatment of high risk feet. Alisia has a past medical history of Adult bronchiectasis (7/26/2017), Allergy, Anemia, Arthritis, Asthma, Breast cancer (1993), Cancer (1993), Cataract, Chronic cervical radiculopathy (9/20/2012), Diabetes mellitus, Diabetes mellitus type II, Diabetes with neurologic complications, Diverticulosis, Dry eyes, Dysphagia, GERD (gastroesophageal reflux disease), Hyperlipidemia, Hypertension, Neuropathy, Scalp tenderness, Shortness of breath, and Ulcer. The patient's chief complaint is long, thick toenails. This patient has documented high risk feet requiring routine maintenance secondary to diabetes mellitis and those secondary complications of diabetes, as mentioned..    PCP: Selena Montemayor MD    Date Last Seen by PCP:   Chief Complaint   Patient presents with    PCP     Selena Montemayor MD 7/22/19    Diabetic Foot Exam    Nail Problem     ingrown nails both great toes, Rt great toe turning black      Toe Pain    Foot Swelling     both feet          Hemoglobin A1C   Date Value Ref Range Status   07/22/2019 7.2 (H) 4.0 - 5.6 % Final     Comment:     ADA Screening Guidelines:  5.7-6.4%  Consistent with prediabetes  >or=6.5%  Consistent with diabetes  High levels of fetal hemoglobin interfere with the HbA1C  assay. Heterozygous hemoglobin variants (HbS, HgC, etc)do  not significantly interfere with this assay.   However, presence of multiple variants may affect accuracy.     03/27/2019 7.3 (H) 4.0 - 5.6 % Final     Comment:     ADA Screening Guidelines:  5.7-6.4%  Consistent with prediabetes  >or=6.5%  Consistent with diabetes  High levels of fetal hemoglobin interfere with the  HbA1C  assay. Heterozygous hemoglobin variants (HbS, HgC, etc)do  not significantly interfere with this assay.   However, presence of multiple variants may affect accuracy.     09/11/2018 6.9 (H) 4.0 - 5.6 % Final     Comment:     ADA Screening Guidelines:  5.7-6.4%  Consistent with prediabetes  >or=6.5%  Consistent with diabetes  High levels of fetal hemoglobin interfere with the HbA1C  assay. Heterozygous hemoglobin variants (HbS, HgC, etc)do  not significantly interfere with this assay.   However, presence of multiple variants may affect accuracy.         Review of Systems   Constitution: Negative for chills, decreased appetite and fever.   Cardiovascular: Negative for leg swelling.   Skin: Positive for dry skin and nail changes.   Musculoskeletal: Negative for arthritis, joint pain, joint swelling and myalgias.   Gastrointestinal: Negative for nausea and vomiting.   Neurological: Negative for loss of balance, numbness and paresthesias.           Objective:      Physical Exam   Constitutional: She is oriented to person, place, and time. She appears well-developed and well-nourished.   Cardiovascular:   Pulses:       Dorsalis pedis pulses are 2+ on the right side, and 2+ on the left side.        Posterior tibial pulses are 1+ on the right side, and 1+ on the left side.   Dorsalis pedis and posterior tibial pulses are diminished Bilaterally. Toes are cool to touch. Feet are warm proximally.There is decreased digital hair. Skin is atrophic, slightly hyperpigmented, and mildly edematous       Musculoskeletal: Normal range of motion. She exhibits no edema or tenderness.        Right ankle: Normal.        Left ankle: Normal.        Right foot: There is no swelling, no crepitus and no deformity.        Left foot: There is no swelling, no crepitus and no deformity.   Adequate joint range of motion without pain, limitation, nor crepitation Bilateral feet and ankle joints. Muscle strength is 5/5 in all groups  bilaterally.         Lymphadenopathy:   No palpable lymph nodes   Neurological: She is alert and oriented to person, place, and time. She has normal strength.   [place] 5.07 monofilamant testing is diminished Vikash feet. Sharp/dull sensation diminished Bilaterally. Light touch absent Bilaterally.       Skin: Skin is warm, dry and intact. No ecchymosis, no lesion and no rash noted. No erythema. Nails show no clubbing.   Nails x 10 are elongated by  1-3mm's, thickened by 2-4 mm's, dystrophic, and are darkened in  coloration . Xerosis Bilaterally. No open lesions noted.      Hyperkeratotic tissue noted to distal hallux b/l      Psychiatric: She has a normal mood and affect. Her behavior is normal.   Vitals reviewed.            Assessment:       Encounter Diagnoses   Name Primary?    Type II diabetes mellitus with neurological manifestations Yes    Onychomycosis due to dermatophyte     Corn or callus          Plan:       Alisia was seen today for pcp, diabetic foot exam, nail problem, toe pain and foot swelling.    Diagnoses and all orders for this visit:    Type II diabetes mellitus with neurological manifestations    Onychomycosis due to dermatophyte    Corn or callus      I counseled the patient on her conditions, their implications and medical management.        - Shoe inspection. Diabetic Foot Education. Patient reminded of the importance of good nutrition and blood sugar control to help prevent podiatric complications of diabetes. Patient instructed on proper foot hygeine. We discussed wearing proper shoe gear, daily foot inspections, never walking without protective shoe gear, never putting sharp instruments to feet, routine podiatric nail visits every 2-3 months.      - With patient's permission, nails were aggressively reduced and debrided x 10 to their soft tissue attachment mechanically and with electric , removing all offending nail and debris. Patient relates relief following the procedure.  She will continue to monitor the areas daily, inspect her feet, wear protective shoe gear when ambulatory, moisturizer to maintain skin integrity and follow in this office in approximately 2-3 months, sooner p.r.n.    - After cleansing the  area w/ alcohol prep pad the above mentioned hyperkeratosis was trimmed utilizing No 15 scapel, to a smooth base with out incident. Patient tolerated this  well and reported comfort to the area of distal hallux b/l

## 2019-07-31 ENCOUNTER — LAB VISIT (OUTPATIENT)
Dept: LAB | Facility: HOSPITAL | Age: 74
End: 2019-07-31
Attending: INTERNAL MEDICINE
Payer: MEDICARE

## 2019-07-31 ENCOUNTER — TELEPHONE (OUTPATIENT)
Dept: INTERNAL MEDICINE | Facility: CLINIC | Age: 74
End: 2019-07-31

## 2019-07-31 DIAGNOSIS — E87.6 HYPOKALEMIA: ICD-10-CM

## 2019-07-31 LAB
ANION GAP SERPL CALC-SCNC: 10 MMOL/L (ref 8–16)
BUN SERPL-MCNC: 12 MG/DL (ref 8–23)
CALCIUM SERPL-MCNC: 9.7 MG/DL (ref 8.7–10.5)
CHLORIDE SERPL-SCNC: 104 MMOL/L (ref 95–110)
CO2 SERPL-SCNC: 28 MMOL/L (ref 23–29)
CREAT SERPL-MCNC: 0.8 MG/DL (ref 0.5–1.4)
EST. GFR  (AFRICAN AMERICAN): >60 ML/MIN/1.73 M^2
EST. GFR  (NON AFRICAN AMERICAN): >60 ML/MIN/1.73 M^2
GLUCOSE SERPL-MCNC: 162 MG/DL (ref 70–110)
MAGNESIUM SERPL-MCNC: 1.7 MG/DL (ref 1.6–2.6)
POTASSIUM SERPL-SCNC: 3.7 MMOL/L (ref 3.5–5.1)
SODIUM SERPL-SCNC: 142 MMOL/L (ref 136–145)

## 2019-07-31 PROCEDURE — 36415 COLL VENOUS BLD VENIPUNCTURE: CPT | Mod: HCNC

## 2019-07-31 PROCEDURE — 83735 ASSAY OF MAGNESIUM: CPT | Mod: HCNC

## 2019-07-31 PROCEDURE — 80048 BASIC METABOLIC PNL TOTAL CA: CPT | Mod: HCNC

## 2019-07-31 NOTE — TELEPHONE ENCOUNTER
Pt says can you see if they have any side effects on omeprazole.  -she seen podiatry but she thinks her toe is still not heal.

## 2019-07-31 NOTE — TELEPHONE ENCOUNTER
Omeprazole is stronger than pepcid so I want it to replace pepcid to see if it helps with the chronic cough, which may be from uncontrolled reflux.

## 2019-07-31 NOTE — TELEPHONE ENCOUNTER
----- Message from Becki Mejia sent at 7/31/2019  8:04 AM CDT -----  Pt want to know why you put her on Omeprazole.  She said she already has a medication from her ENT doctor for her reflux.  Please call to explain

## 2019-07-31 NOTE — TELEPHONE ENCOUNTER
As with all medications, there are potential for side effects. It can increase risk of aspiration, malabsorption of certain vitamins (D, B12, etc), long term use can increase risk of kidney disease. For the full list, recommend reading insert w/ medicine. However I'd like for her to try it to see if it helps w/ her chronic cough. If she's uncomfortable w/ taking it, then she doesn't have to switch but she may have to deal w/ the cough.    Donna, please also let her know that her potassium normalized. Can cont current meds.

## 2019-08-01 ENCOUNTER — TELEPHONE (OUTPATIENT)
Dept: INTERNAL MEDICINE | Facility: CLINIC | Age: 74
End: 2019-08-01

## 2019-08-01 NOTE — TELEPHONE ENCOUNTER
----- Message from Maxi Thacker sent at 8/1/2019  8:02 AM CDT -----  Contact: [phone]  Type: Returning a call    Who left a message? Donna    When did the practice call? 07/31    Comments: please call and advise, Thanks

## 2019-08-01 NOTE — TELEPHONE ENCOUNTER
Spoke with pt, notified of MD notes in separate encounter. She verbalized understating and says she started taking medication this am

## 2019-08-05 ENCOUNTER — TELEPHONE (OUTPATIENT)
Dept: PODIATRY | Facility: CLINIC | Age: 74
End: 2019-08-05

## 2019-08-05 NOTE — TELEPHONE ENCOUNTER
Called patient and offer her an appointment with Dr. Lemus for evaluation of toe, tomorrow at 8:45 am, patient doesn't want to see no one else but Dr. Blake. Schedule patient for 8/12/19 with Dr. Blake per her request and advise her to call an let us know if her condition doesn't improved.

## 2019-08-05 NOTE — TELEPHONE ENCOUNTER
----- Message from Rashel Miller sent at 8/5/2019  9:36 AM CDT -----  Contact: patient   Please call pt at [phone]    Diabetic patient is having discoloration and discharge (gotten worsen)    Thank you

## 2019-08-12 ENCOUNTER — OFFICE VISIT (OUTPATIENT)
Dept: PODIATRY | Facility: CLINIC | Age: 74
End: 2019-08-12
Payer: MEDICARE

## 2019-08-12 ENCOUNTER — HOSPITAL ENCOUNTER (OUTPATIENT)
Dept: RADIOLOGY | Facility: HOSPITAL | Age: 74
Discharge: HOME | End: 2019-08-12
Attending: PODIATRIST
Payer: MEDICARE

## 2019-08-12 VITALS
BODY MASS INDEX: 31.16 KG/M2 | WEIGHT: 187 LBS | HEIGHT: 65 IN | HEART RATE: 90 BPM | DIASTOLIC BLOOD PRESSURE: 77 MMHG | SYSTOLIC BLOOD PRESSURE: 143 MMHG

## 2019-08-12 DIAGNOSIS — Z79.4 TYPE 2 DIABETES MELLITUS WITH HYPERGLYCEMIA, WITH LONG-TERM CURRENT USE OF INSULIN: ICD-10-CM

## 2019-08-12 DIAGNOSIS — Z79.4 TYPE 2 DIABETES MELLITUS WITH HYPERGLYCEMIA, WITH LONG-TERM CURRENT USE OF INSULIN: Primary | ICD-10-CM

## 2019-08-12 DIAGNOSIS — E11.65 TYPE 2 DIABETES MELLITUS WITH HYPERGLYCEMIA, WITH LONG-TERM CURRENT USE OF INSULIN: Primary | ICD-10-CM

## 2019-08-12 DIAGNOSIS — R23.4 PEELING SKIN: ICD-10-CM

## 2019-08-12 DIAGNOSIS — E11.65 TYPE 2 DIABETES MELLITUS WITH HYPERGLYCEMIA, WITH LONG-TERM CURRENT USE OF INSULIN: ICD-10-CM

## 2019-08-12 PROCEDURE — 99999 PR PBB SHADOW E&M-EST. PATIENT-LVL III: CPT | Mod: PBBFAC,HCNC,, | Performed by: PODIATRIST

## 2019-08-12 PROCEDURE — 99214 OFFICE O/P EST MOD 30 MIN: CPT | Mod: HCNC,S$GLB,, | Performed by: PODIATRIST

## 2019-08-12 PROCEDURE — 99999 PR PBB SHADOW E&M-EST. PATIENT-LVL III: ICD-10-PCS | Mod: PBBFAC,HCNC,, | Performed by: PODIATRIST

## 2019-08-12 PROCEDURE — 73630 X-RAY EXAM OF FOOT: CPT | Mod: TC,HCNC,RT

## 2019-08-12 PROCEDURE — 3078F DIAST BP <80 MM HG: CPT | Mod: HCNC,CPTII,S$GLB, | Performed by: PODIATRIST

## 2019-08-12 PROCEDURE — 73630 XR FOOT COMPLETE 3 VIEW RIGHT: ICD-10-PCS | Mod: 26,HCNC,RT, | Performed by: RADIOLOGY

## 2019-08-12 PROCEDURE — 1101F PR PT FALLS ASSESS DOC 0-1 FALLS W/OUT INJ PAST YR: ICD-10-PCS | Mod: HCNC,CPTII,S$GLB, | Performed by: PODIATRIST

## 2019-08-12 PROCEDURE — 73630 X-RAY EXAM OF FOOT: CPT | Mod: 26,HCNC,RT, | Performed by: RADIOLOGY

## 2019-08-12 PROCEDURE — 3077F SYST BP >= 140 MM HG: CPT | Mod: HCNC,CPTII,S$GLB, | Performed by: PODIATRIST

## 2019-08-12 PROCEDURE — 3077F PR MOST RECENT SYSTOLIC BLOOD PRESSURE >= 140 MM HG: ICD-10-PCS | Mod: HCNC,CPTII,S$GLB, | Performed by: PODIATRIST

## 2019-08-12 PROCEDURE — 3078F PR MOST RECENT DIASTOLIC BLOOD PRESSURE < 80 MM HG: ICD-10-PCS | Mod: HCNC,CPTII,S$GLB, | Performed by: PODIATRIST

## 2019-08-12 PROCEDURE — 1101F PT FALLS ASSESS-DOCD LE1/YR: CPT | Mod: HCNC,CPTII,S$GLB, | Performed by: PODIATRIST

## 2019-08-12 PROCEDURE — 99214 PR OFFICE/OUTPT VISIT, EST, LEVL IV, 30-39 MIN: ICD-10-PCS | Mod: HCNC,S$GLB,, | Performed by: PODIATRIST

## 2019-08-12 NOTE — PROGRESS NOTES
Subjective:      Patient ID: Alisia Gusman is a 74 y.o. female.    Chief Complaint: Diabetes Mellitus (dr berta frias 7/22/19) and Nail Care    Alisia is a 74 y.o. female who presents to the clinic for evaluation and treatment of high risk feet. Alisia has a past medical history of Adult bronchiectasis (7/26/2017), Allergy, Anemia, Arthritis, Asthma, Breast cancer (1993), Cancer (1993), Cataract, Chronic cervical radiculopathy (9/20/2012), Diabetes mellitus, Diabetes mellitus type II, Diabetes with neurologic complications, Diverticulosis, Dry eyes, Dysphagia, GERD (gastroesophageal reflux disease), Hyperlipidemia, Hypertension, Neuropathy, Scalp tenderness, Shortness of breath, and Ulcer. The patient's chief complaint is dry flaking skin to r great toe  This patient has documented high risk feet requiring routine maintenance secondary to diabetes mellitis and those secondary complications of diabetes, as mentioned..    PCP: Berta Frias MD    Date Last Seen by PCP:   Chief Complaint   Patient presents with    Diabetes Mellitus     dr berta frias 7/22/19    Nail Care         Hemoglobin A1C   Date Value Ref Range Status   07/22/2019 7.2 (H) 4.0 - 5.6 % Final     Comment:     ADA Screening Guidelines:  5.7-6.4%  Consistent with prediabetes  >or=6.5%  Consistent with diabetes  High levels of fetal hemoglobin interfere with the HbA1C  assay. Heterozygous hemoglobin variants (HbS, HgC, etc)do  not significantly interfere with this assay.   However, presence of multiple variants may affect accuracy.     03/27/2019 7.3 (H) 4.0 - 5.6 % Final     Comment:     ADA Screening Guidelines:  5.7-6.4%  Consistent with prediabetes  >or=6.5%  Consistent with diabetes  High levels of fetal hemoglobin interfere with the HbA1C  assay. Heterozygous hemoglobin variants (HbS, HgC, etc)do  not significantly interfere with this assay.   However, presence of multiple variants may affect accuracy.     09/11/2018 6.9 (H) 4.0 - 5.6 % Final     Comment:      ADA Screening Guidelines:  5.7-6.4%  Consistent with prediabetes  >or=6.5%  Consistent with diabetes  High levels of fetal hemoglobin interfere with the HbA1C  assay. Heterozygous hemoglobin variants (HbS, HgC, etc)do  not significantly interfere with this assay.   However, presence of multiple variants may affect accuracy.         Review of Systems   Constitution: Negative for chills, decreased appetite and fever.   Cardiovascular: Negative for leg swelling.   Skin: Positive for dry skin and nail changes. Negative for color change, flushing and itching.   Musculoskeletal: Negative for arthritis, joint pain, joint swelling and myalgias.   Gastrointestinal: Negative for nausea and vomiting.   Neurological: Negative for loss of balance, numbness and paresthesias.           Objective:      Physical Exam   Constitutional: She is oriented to person, place, and time. She appears well-developed and well-nourished.   Cardiovascular:   Pulses:       Dorsalis pedis pulses are 2+ on the right side, and 2+ on the left side.        Posterior tibial pulses are 1+ on the right side, and 1+ on the left side.   Dorsalis pedis and posterior tibial pulses are diminished Bilaterally. Toes are cool to touch. Feet are warm proximally.There is decreased digital hair. Skin is atrophic, slightly hyperpigmented, and mildly edematous       Musculoskeletal: Normal range of motion. She exhibits no edema or tenderness.        Right ankle: Normal.        Left ankle: Normal.        Right foot: There is no swelling, no crepitus and no deformity.        Left foot: There is no swelling, no crepitus and no deformity.   Adequate joint range of motion without pain, limitation, nor crepitation Bilateral feet and ankle joints. Muscle strength is 5/5 in all groups bilaterally.         Lymphadenopathy:   No palpable lymph nodes   Neurological: She is alert and oriented to person, place, and time. She has normal strength.   [place] 5.07 monofilamant  testing is diminished Vikash feet. Sharp/dull sensation diminished Bilaterally. Light touch absent Bilaterally.       Skin: Skin is warm, dry and intact. No ecchymosis, no lesion and no rash noted. No erythema. No pallor. Nails show no clubbing.   Distal medial r hallux w/ peeling skin to medial aspect of nail bed. No surrounding erythema, edema, malodor, nor drainage noted      Psychiatric: She has a normal mood and affect. Her behavior is normal.   Vitals reviewed.            Assessment:       Encounter Diagnoses   Name Primary?    Type 2 diabetes mellitus with hyperglycemia, with long-term current use of insulin Yes    Peeling skin          Plan:       Alisia was seen today for diabetes mellitus and nail care.    Diagnoses and all orders for this visit:    Type 2 diabetes mellitus with hyperglycemia, with long-term current use of insulin  -     X-Ray Foot Complete Right; Future    Peeling skin      I counseled the patient on her conditions, their implications and medical management.      Peeling skin noted, likely sequelae of previous toe swelling post trimming of slight incurvated nail at last dpm visit   No open lesions   No skin break downs   rtc as scheduled   Radiographics independently reviewed in detail with patient. Findings  discussed. All questions in regards to radiographs were answered

## 2019-08-21 RX ORDER — BLOOD SUGAR DIAGNOSTIC
STRIP MISCELLANEOUS
Qty: 300 STRIP | Refills: 3 | Status: SHIPPED | OUTPATIENT
Start: 2019-08-21 | End: 2020-06-02

## 2019-09-05 DIAGNOSIS — E11.69 DIABETES MELLITUS TYPE 2 IN OBESE: ICD-10-CM

## 2019-09-05 DIAGNOSIS — E11.42 DIABETIC PERIPHERAL NEUROPATHY ASSOCIATED WITH TYPE 2 DIABETES MELLITUS: ICD-10-CM

## 2019-09-05 DIAGNOSIS — E66.9 DIABETES MELLITUS TYPE 2 IN OBESE: ICD-10-CM

## 2019-09-06 RX ORDER — PREGABALIN 75 MG/1
CAPSULE ORAL
Qty: 180 CAPSULE | Refills: 1 | Status: SHIPPED | OUTPATIENT
Start: 2019-09-06 | End: 2019-11-27 | Stop reason: SDUPTHER

## 2019-09-17 ENCOUNTER — TELEPHONE (OUTPATIENT)
Dept: ENDOCRINOLOGY | Facility: CLINIC | Age: 74
End: 2019-09-17

## 2019-09-17 RX ORDER — SYRINGE, DISPOSABLE, 3 ML
1 SYRINGE, EMPTY DISPOSABLE MISCELLANEOUS DAILY PRN
Refills: 0 | Status: CANCELLED | COMMUNITY
Start: 2019-09-17

## 2019-09-17 NOTE — TELEPHONE ENCOUNTER
Diabetes Blood Sugar Phone Call Screening  Glucose Level and time taken:  228 before breakfast     Blood sugar range at home over past few weeks: 100-200       Having any low blood sugar readings at home: 105     Time of last meal, snack or non-diet drink (juice, soft drinks, sweet tea): she do not remember     Time of last diabetes medication administration: in this morning at 9am.     Current diabetes medications and doses: Novolin 32 units in morning 30 mins and 20 units at night.     Have you missed any medication doses within past 48 hours?  No     Any new symptoms such as abdominal pain, nausea, weakness, frequent urination, increased thirst, or blurry vision? No energy until  She takes her insulin    Any new recent medications this week such as: steroids (injections or pills) or antibiotics?  No    Talk to pt I let her know that dr boyce is In clinic will call her at a timely manner.

## 2019-09-17 NOTE — TELEPHONE ENCOUNTER
----- Message from Shania Lora sent at 9/17/2019  8:40 AM CDT -----  Contact: Self [phone]  PT states she has been having high readings in the morning.   9/17 228   9/16 169  9/15 182  She can be reached at [phone].

## 2019-09-17 NOTE — TELEPHONE ENCOUNTER
T2DM in older adult   Normal kidney function   On glimepiride Fastin, 169, 182  Predinner: 142, 196, 253  Bedtime: 143, 195, 141    PLAN:  Keep Novolin 32 before breakfast and increase to 22 units before dinner  Continue checking three times daily   Unclear syringes prescribed ( 1 mL) should be sufficient for the amount of insulin she is using (she may be using 0.33 ML syringe)  Have called and left message to clarify.

## 2019-09-18 ENCOUNTER — OFFICE VISIT (OUTPATIENT)
Dept: INTERNAL MEDICINE | Facility: CLINIC | Age: 74
End: 2019-09-18
Payer: MEDICARE

## 2019-09-18 VITALS
WEIGHT: 187.63 LBS | HEART RATE: 92 BPM | OXYGEN SATURATION: 98 % | SYSTOLIC BLOOD PRESSURE: 128 MMHG | BODY MASS INDEX: 31.22 KG/M2 | DIASTOLIC BLOOD PRESSURE: 75 MMHG

## 2019-09-18 DIAGNOSIS — R05.9 COUGH: ICD-10-CM

## 2019-09-18 DIAGNOSIS — K21.9 LPRD (LARYNGOPHARYNGEAL REFLUX DISEASE): Primary | ICD-10-CM

## 2019-09-18 DIAGNOSIS — L72.0 EPIDERMAL CYST: ICD-10-CM

## 2019-09-18 PROCEDURE — 3078F PR MOST RECENT DIASTOLIC BLOOD PRESSURE < 80 MM HG: ICD-10-PCS | Mod: HCNC,CPTII,S$GLB, | Performed by: PHYSICIAN ASSISTANT

## 2019-09-18 PROCEDURE — 3078F DIAST BP <80 MM HG: CPT | Mod: HCNC,CPTII,S$GLB, | Performed by: PHYSICIAN ASSISTANT

## 2019-09-18 PROCEDURE — 3074F SYST BP LT 130 MM HG: CPT | Mod: HCNC,CPTII,S$GLB, | Performed by: PHYSICIAN ASSISTANT

## 2019-09-18 PROCEDURE — 99213 OFFICE O/P EST LOW 20 MIN: CPT | Mod: HCNC,S$GLB,, | Performed by: PHYSICIAN ASSISTANT

## 2019-09-18 PROCEDURE — 3074F PR MOST RECENT SYSTOLIC BLOOD PRESSURE < 130 MM HG: ICD-10-PCS | Mod: HCNC,CPTII,S$GLB, | Performed by: PHYSICIAN ASSISTANT

## 2019-09-18 PROCEDURE — 99999 PR PBB SHADOW E&M-EST. PATIENT-LVL V: ICD-10-PCS | Mod: PBBFAC,HCNC,, | Performed by: PHYSICIAN ASSISTANT

## 2019-09-18 PROCEDURE — 1101F PR PT FALLS ASSESS DOC 0-1 FALLS W/OUT INJ PAST YR: ICD-10-PCS | Mod: HCNC,CPTII,S$GLB, | Performed by: PHYSICIAN ASSISTANT

## 2019-09-18 PROCEDURE — 1101F PT FALLS ASSESS-DOCD LE1/YR: CPT | Mod: HCNC,CPTII,S$GLB, | Performed by: PHYSICIAN ASSISTANT

## 2019-09-18 PROCEDURE — 99999 PR PBB SHADOW E&M-EST. PATIENT-LVL V: CPT | Mod: PBBFAC,HCNC,, | Performed by: PHYSICIAN ASSISTANT

## 2019-09-18 PROCEDURE — 99213 PR OFFICE/OUTPT VISIT, EST, LEVL III, 20-29 MIN: ICD-10-PCS | Mod: HCNC,S$GLB,, | Performed by: PHYSICIAN ASSISTANT

## 2019-09-18 RX ORDER — SYRINGE,SAFETY WITH NEEDLE,1ML 25GX1"
SYRINGE (EA) MISCELLANEOUS
Qty: 90 EACH | Refills: 11 | Status: SHIPPED | OUTPATIENT
Start: 2019-09-18 | End: 2020-10-25

## 2019-09-18 NOTE — PATIENT INSTRUCTIONS
I WILL CALL YOU ON TOMORROW MORNING TO REVIEW YOUR MEDS.     I AM CONCERNED YOU MAY BE TAKING DUPLICATE MEDS.     AFTER WE REVIEW YOUR MEDS WE WILL MAKE A PLAN ABOUT THE REFLUX AND COUGH.

## 2019-09-18 NOTE — PROGRESS NOTES
"Subjective:       Patient ID: Alisia Gusman is a 74 y.o. female.    Chief Complaint: Cough and Mass (has black head on back causing pain)    HPI     Established pt of Selena Damon MD (new to me)    C/o "black head" to her upper back, present for some time but recently had a friend express its contents about 2 weeks ago. Since this she notes some mild soreness. She is using neosporin which she feels helps.     C/o chronic cough worse at night time. She relates to her LPRD. She has been seen in ENT for the same. At last visit with Dr. DAMON famotidine was discontinued and omeprazole 40mg qam was started. She feels like her symptoms have not improved much. On further interview it seems she is also taking Protonix twice a day (she has a notebook today in clinic were she writes down her meds and this is listed). She later states she may have stopped it but is unsure. She denies trouble swallowing or voice changes.     Past Medical History:   Diagnosis Date    Adult bronchiectasis 7/26/2017    Allergy     Anemia     Arthritis     Asthma     Breast cancer 1993    left w/ radiation     Cancer 1993    L eft breast    Cataract     Chronic cervical radiculopathy 9/20/2012    Diabetes mellitus     Diabetes mellitus type II     Diabetes with neurologic complications     Diverticulosis     Dry eyes     Dysphagia     after knee surgery June 2014    GERD (gastroesophageal reflux disease)     Hyperlipidemia     Hypertension     Neuropathy     feet    Scalp tenderness     Shortness of breath     Ulcer      Social History     Tobacco Use    Smoking status: Never Smoker    Smokeless tobacco: Never Used   Substance Use Topics    Alcohol use: No    Drug use: No     Review of patient's allergies indicates:   Allergen Reactions    Nubain [nalbuphine] Other (See Comments)     Other reaction(s): Hypotension    Grass pollen-tim grass standard     Losartan      cough    Sinus & allergy [chlorpheniramine-phenylephrine]  "         Review of Systems   Constitutional: Negative for chills and fever.   HENT: Negative for sore throat and voice change.    Respiratory: Positive for cough. Negative for shortness of breath.    Cardiovascular: Negative for chest pain and leg swelling.   Gastrointestinal: Negative for abdominal pain, nausea and vomiting.   Skin: Negative for rash.        As per HPI   Neurological: Negative for weakness and headaches.       Objective: /75   Pulse 92   Wt 85.1 kg (187 lb 9.8 oz)   LMP  (LMP Unknown)   SpO2 98%   BMI 31.22 kg/m²         Physical Exam   Constitutional: She appears well-developed and well-nourished. No distress.   HENT:   Head: Normocephalic and atraumatic.   Cardiovascular: Normal rate and regular rhythm. Exam reveals no friction rub.   No murmur heard.  Pulmonary/Chest: Effort normal and breath sounds normal. She has no wheezes. She has no rales.   Neurological: She is alert.   Skin: Skin is warm and dry. No rash noted.   approx 1cm cyst with central punctum. No surrounding erythema, induration or drainage noted.    Vitals reviewed.      Assessment:       1. LPRD (laryngopharyngeal reflux disease)    2. Cough    3. Epidermal cyst        Plan:         Alisia was seen today for cough and mass.    Diagnoses and all orders for this visit:    LPRD (laryngopharyngeal reflux disease)  Cough  Will perform med reconciliation by phone on tomorrow to determine what current reflux meds patient is currently taking  Likely will add [id number] at nighttime and continue AM PPI.   Consider ENT referral  Discussed decreasing chocolate and other GERD exacerbating foods.     Epidermal cyst  Continue to monitor  No soi      Ginette Sheth PA-C

## 2019-10-28 ENCOUNTER — PATIENT OUTREACH (OUTPATIENT)
Dept: ADMINISTRATIVE | Facility: OTHER | Age: 74
End: 2019-10-28

## 2019-10-30 DIAGNOSIS — T50.905A DRUG-INDUCED HYPOKALEMIA: ICD-10-CM

## 2019-10-30 DIAGNOSIS — E87.6 DRUG-INDUCED HYPOKALEMIA: ICD-10-CM

## 2019-10-30 RX ORDER — POTASSIUM CHLORIDE 750 MG/1
TABLET, EXTENDED RELEASE ORAL
Qty: 90 TABLET | Refills: 0 | Status: SHIPPED | OUTPATIENT
Start: 2019-10-30 | End: 2020-02-19 | Stop reason: SDUPTHER

## 2019-10-31 ENCOUNTER — OFFICE VISIT (OUTPATIENT)
Dept: PODIATRY | Facility: CLINIC | Age: 74
End: 2019-10-31
Payer: MEDICARE

## 2019-10-31 VITALS
HEIGHT: 65 IN | BODY MASS INDEX: 31.96 KG/M2 | DIASTOLIC BLOOD PRESSURE: 80 MMHG | SYSTOLIC BLOOD PRESSURE: 154 MMHG | HEART RATE: 81 BPM | RESPIRATION RATE: 18 BRPM | WEIGHT: 191.81 LBS

## 2019-10-31 DIAGNOSIS — L84 CORN OR CALLUS: ICD-10-CM

## 2019-10-31 DIAGNOSIS — E11.65 TYPE 2 DIABETES MELLITUS WITH HYPERGLYCEMIA, WITH LONG-TERM CURRENT USE OF INSULIN: Primary | ICD-10-CM

## 2019-10-31 DIAGNOSIS — B35.1 ONYCHOMYCOSIS DUE TO DERMATOPHYTE: ICD-10-CM

## 2019-10-31 DIAGNOSIS — Z79.4 TYPE 2 DIABETES MELLITUS WITH HYPERGLYCEMIA, WITH LONG-TERM CURRENT USE OF INSULIN: Primary | ICD-10-CM

## 2019-10-31 PROCEDURE — 99999 PR PBB SHADOW E&M-EST. PATIENT-LVL III: ICD-10-PCS | Mod: PBBFAC,HCNC,, | Performed by: PODIATRIST

## 2019-10-31 PROCEDURE — 11056 PARNG/CUTG B9 HYPRKR LES 2-4: CPT | Mod: Q9,HCNC,S$GLB, | Performed by: PODIATRIST

## 2019-10-31 PROCEDURE — 99499 NO LOS: ICD-10-PCS | Mod: HCNC,S$GLB,, | Performed by: PODIATRIST

## 2019-10-31 PROCEDURE — 99999 PR PBB SHADOW E&M-EST. PATIENT-LVL III: CPT | Mod: PBBFAC,HCNC,, | Performed by: PODIATRIST

## 2019-10-31 PROCEDURE — 11721 PR DEBRIDEMENT OF NAILS, 6 OR MORE: ICD-10-PCS | Mod: Q9,59,HCNC,S$GLB | Performed by: PODIATRIST

## 2019-10-31 PROCEDURE — 99499 UNLISTED E&M SERVICE: CPT | Mod: HCNC,S$GLB,, | Performed by: PODIATRIST

## 2019-10-31 PROCEDURE — 11721 DEBRIDE NAIL 6 OR MORE: CPT | Mod: Q9,59,HCNC,S$GLB | Performed by: PODIATRIST

## 2019-10-31 PROCEDURE — 11056 PR TRIM BENIGN HYPERKERATOTIC SKIN LESION,2-4: ICD-10-PCS | Mod: Q9,HCNC,S$GLB, | Performed by: PODIATRIST

## 2019-11-04 NOTE — PROGRESS NOTES
Subjective:      Patient ID: Alisia Gusman is a 74 y.o. female.    Chief Complaint: PCP (Ginette Sheth PA-C  9/18/19); Diabetic Foot Exam (Foot pain ); Nail Problem; Nail Care; and Numbness (burning, tingling )    Alisia is a 74 y.o. female who presents to the clinic for evaluation and treatment of high risk feet. Alisia has a past medical history of Adult bronchiectasis (7/26/2017), Allergy, Anemia, Arthritis, Asthma, Breast cancer (1993), Cancer (1993), Cataract, Chronic cervical radiculopathy (9/20/2012), Diabetes mellitus, Diabetes mellitus type II, Diabetes with neurologic complications, Diverticulosis, Dry eyes, Dysphagia, GERD (gastroesophageal reflux disease), Hyperlipidemia, Hypertension, Neuropathy, Scalp tenderness, Shortness of breath, and Ulcer. The patient's chief complaint is HRFC  This patient has documented high risk feet requiring routine maintenance secondary to diabetes mellitis and those secondary complications of diabetes, as mentioned..    PCP: Selena Montemayor MD    Date Last Seen by PCP:   Chief Complaint   Patient presents with    PCP     Ginette Sheth PA-C  9/18/19    Diabetic Foot Exam     Foot pain     Nail Problem    Nail Care    Numbness     burning, tingling          Hemoglobin A1C   Date Value Ref Range Status   07/22/2019 7.2 (H) 4.0 - 5.6 % Final     Comment:     ADA Screening Guidelines:  5.7-6.4%  Consistent with prediabetes  >or=6.5%  Consistent with diabetes  High levels of fetal hemoglobin interfere with the HbA1C  assay. Heterozygous hemoglobin variants (HbS, HgC, etc)do  not significantly interfere with this assay.   However, presence of multiple variants may affect accuracy.     03/27/2019 7.3 (H) 4.0 - 5.6 % Final     Comment:     ADA Screening Guidelines:  5.7-6.4%  Consistent with prediabetes  >or=6.5%  Consistent with diabetes  High levels of fetal hemoglobin interfere with the HbA1C  assay. Heterozygous hemoglobin variants (HbS, HgC, etc)do  not significantly  interfere with this assay.   However, presence of multiple variants may affect accuracy.     09/11/2018 6.9 (H) 4.0 - 5.6 % Final     Comment:     ADA Screening Guidelines:  5.7-6.4%  Consistent with prediabetes  >or=6.5%  Consistent with diabetes  High levels of fetal hemoglobin interfere with the HbA1C  assay. Heterozygous hemoglobin variants (HbS, HgC, etc)do  not significantly interfere with this assay.   However, presence of multiple variants may affect accuracy.         Review of Systems   Constitution: Negative for chills, decreased appetite and fever.   Cardiovascular: Negative for leg swelling.   Skin: Positive for dry skin and nail changes. Negative for color change, flushing, itching, poor wound healing and rash.   Musculoskeletal: Negative for arthritis, joint pain, joint swelling and myalgias.   Gastrointestinal: Negative for nausea and vomiting.   Neurological: Negative for loss of balance, numbness and paresthesias.           Objective:      Physical Exam   Constitutional: She is oriented to person, place, and time. She appears well-developed and well-nourished.   Cardiovascular:   Pulses:       Dorsalis pedis pulses are 2+ on the right side, and 2+ on the left side.        Posterior tibial pulses are 1+ on the right side, and 1+ on the left side.   Dorsalis pedis and posterior tibial pulses are diminished Bilaterally. Toes are cool to touch. Feet are warm proximally.There is decreased digital hair. Skin is atrophic, slightly hyperpigmented, and mildly edematous       Musculoskeletal: Normal range of motion. She exhibits no edema or tenderness.        Right ankle: Normal.        Left ankle: Normal.        Right foot: There is no swelling, no crepitus and no deformity.        Left foot: There is no swelling, no crepitus and no deformity.   Adequate joint range of motion without pain, limitation, nor crepitation Bilateral feet and ankle joints. Muscle strength is 5/5 in all groups bilaterally.          Lymphadenopathy:   No palpable lymph nodes   Neurological: She is alert and oriented to person, place, and time. She has normal strength.   Weott-Leonel 5.07 monofilamant testing is diminished Vikash feet. Sharp/dull sensation diminished Bilaterally. Light touch absent Bilaterally.       Skin: Skin is warm, dry and intact. No ecchymosis, no lesion and no rash noted. No erythema. No pallor. Nails show no clubbing.   Nails x10 are elongated by  2-3mm's, thickened by 2-4 mm's, dystrophic, and are darkened in  coloration . Xerosis Bilaterally. No open lesions, lacerations or wounds noted    Hyperkeratotic tissue noted to distal hallux b/l      Psychiatric: She has a normal mood and affect. Her behavior is normal.   Vitals reviewed.            Assessment:       Encounter Diagnoses   Name Primary?    Type 2 diabetes mellitus with hyperglycemia, with long-term current use of insulin Yes    Corn or callus     Onychomycosis due to dermatophyte          Plan:       Alisia was seen today for pcp, diabetic foot exam, nail problem, nail care and numbness.    Diagnoses and all orders for this visit:    Type 2 diabetes mellitus with hyperglycemia, with long-term current use of insulin    Corn or callus    Onychomycosis due to dermatophyte      I counseled the patient on her conditions, their implications and medical management.    Shoe inspection. Diabetic Foot Education. Patient reminded of the importance of good nutrition and blood sugar control to help prevent podiatric complications of diabetes. Patient instructed on proper foot hygeine. We discussed wearing proper shoe gear, daily foot inspections, never walking without protective shoe gear, never putting sharp instruments to feet    - With patient's permission, nails were aggressively reduced and debrided x 10 to their soft tissue attachment mechanically and with electric , removing all offending nail and debris. Patient relates relief following the procedure. She  will continue to monitor the areas daily, inspect her feet, wear protective shoe gear when ambulatory, moisturizer to maintain skin integrity and follow in this office in approximately 2-3 months, sooner p.r.n.    - After cleansing the  area w/ alcohol prep pad the above mentioned hyperkeratosis was trimmed utilizing No 15 scapel, to a smooth base with out incident. Patient tolerated this  well and reported comfort x 2     - Return to clinic in 3m or sooner if problems arise

## 2019-11-05 ENCOUNTER — LAB VISIT (OUTPATIENT)
Dept: LAB | Facility: HOSPITAL | Age: 74
End: 2019-11-05
Attending: INTERNAL MEDICINE
Payer: MEDICARE

## 2019-11-05 DIAGNOSIS — E11.22 TYPE 2 DIABETES MELLITUS WITH STAGE 2 CHRONIC KIDNEY DISEASE, WITH LONG-TERM CURRENT USE OF INSULIN: ICD-10-CM

## 2019-11-05 DIAGNOSIS — Z79.4 TYPE 2 DIABETES MELLITUS WITH STAGE 2 CHRONIC KIDNEY DISEASE, WITH LONG-TERM CURRENT USE OF INSULIN: ICD-10-CM

## 2019-11-05 DIAGNOSIS — N18.2 TYPE 2 DIABETES MELLITUS WITH STAGE 2 CHRONIC KIDNEY DISEASE, WITH LONG-TERM CURRENT USE OF INSULIN: ICD-10-CM

## 2019-11-05 LAB
ESTIMATED AVG GLUCOSE: 163 MG/DL (ref 68–131)
HBA1C MFR BLD HPLC: 7.3 % (ref 4–5.6)

## 2019-11-05 PROCEDURE — 83036 HEMOGLOBIN GLYCOSYLATED A1C: CPT | Mod: HCNC

## 2019-11-05 PROCEDURE — 36415 COLL VENOUS BLD VENIPUNCTURE: CPT | Mod: HCNC

## 2019-11-12 NOTE — PROGRESS NOTES
"Subjective:      Patient ID: Alisia Gusman is a 74 y.o. female.    Chief Complaint:  Diabetes      History of Present Illness  Ms. Gusman is a 74 y.o. female who is here for a follow-up visit for evaluation of type 2 diabetes that is controlled complicated by peripheral neuropathy.   Today denies shortness of breath, palpitations, chest pain or pressure.  Has neuropathy that affects her feet bilaterally. Has numbness and burning. Does not interfere with sleep. Switched to lyrica (which is now available as generic) 75 mg twice daily.      Silver sneakers, (), fit and Fun, and water aerobics ( and w); also walks in the pool for one hour.        Diabetes regimen:   Metformin  mg nightly, (two to three times a month has an episode of diarrhea 2 - 3 episodes in the morning)  Glimepiride 4 mg daily with breakfast  NPH/R 70/30 - 32 units before breakfast and 22 units before dinner time.       No difficulties with injections, rotates regularly. Denies hypoglycemic symptoms or bg less than 70.     Reviewed blood sugar logs. Checks three to four times a day every day.       Fastin, 132, 164, 152, 171, 200, 155, 156, 153, 179, 157, 169, 146  Lunch: x, 132, 65 (light breakfast and delayed lunch due to appointment), 106, 160, 152, 138  Dinner: 191, 241, 193, x, 203, 116, 179, 260, 148, 232, 250, 193  Bedtime: 184, 153, 193, 172, 239, 193, 177, 169, 136, 242, 208, 143     Most recent A1C is 7.3%.      ADA STANDARDS of CARE:        ACE inhibitor or angiotensin II receptor blocker:  No microalbuminuria, on HCTZ well controlled.         Statin drug:  Pravastatin 40 mg daily         Low dose ASA: 81 mg daily         Microalbumin: 2017 - normal        Eye exam: up to date        Flu shot and pneumonia vaccine - up to date      Review of Systems  No recent illness     Objective:   Physical Exam  Vitals:    19 0907   BP: 130/78   Pulse: 92   Weight: 86.3 kg (190 lb 2.4 oz)   Height: 5' 5" (1.651 m)       BP " Readings from Last 3 Encounters:   11/13/19 130/78   10/31/19 (!) 154/80   09/18/19 128/75     Wt Readings from Last 1 Encounters:   11/13/19 0907 86.3 kg (190 lb 2.4 oz)         Body mass index is 31.64 kg/m².    Lab Review:   Lab Results   Component Value Date    HGBA1C 7.3 (H) 11/05/2019     Lab Results   Component Value Date    CHOL 179 03/27/2019    HDL 65 03/27/2019    LDLCALC 77.2 03/27/2019    TRIG 184 (H) 03/27/2019    CHOLHDL 36.3 03/27/2019     Lab Results   Component Value Date     07/31/2019    K 3.7 07/31/2019     07/31/2019    CO2 28 07/31/2019     (H) 07/31/2019    BUN 12 07/31/2019    CREATININE 0.8 07/31/2019    CALCIUM 9.7 07/31/2019    PROT 7.1 07/22/2019    ALBUMIN 3.5 07/22/2019    BILITOT 1.0 07/22/2019    ALKPHOS 57 07/22/2019    AST 17 07/22/2019    ALT 11 07/22/2019    ANIONGAP 10 07/31/2019    ESTGFRAFRICA >60 07/31/2019    EGFRNONAA >60 07/31/2019    TSH 1.938 06/07/2017       Results for CINDA TATUM (MRN [id number]) as of 11/13/2019 09:41   Ref. Range 3/27/2019 07:41   Microalbum.,U,Random Latest Units: ug/mL 7.0   Creatinine, Random Ur Latest Ref Range: 15.0 - 325.0 mg/dL 153.0   MICROALB/CREAT RATIO Latest Ref Range: 0.0 - 30.0 ug/mg 4.6     Assessment and Plan     Type 2 diabetes mellitus with diabetic polyneuropathy, with long-term current use of insulin  Reasonably well controlled on 70/30 twice daily, glimepiride and metformin 750 mg XR one tab daily   Single episode of hypoglycemia after small meal.     Has two to three episodes of significant diarrhea. Will stop for a few weeks and see if makes a change.   Consider increasing glimepiride, but risk for hypoglycemia increases.   SGLT 2 inhibitors were not affordable.     Repeat A1C in three months        Essential hypertension  Well controlled on HCTZ  No microalbuminuria 3/2019

## 2019-11-13 ENCOUNTER — OFFICE VISIT (OUTPATIENT)
Dept: ENDOCRINOLOGY | Facility: CLINIC | Age: 74
End: 2019-11-13
Payer: MEDICARE

## 2019-11-13 VITALS
HEIGHT: 65 IN | BODY MASS INDEX: 31.68 KG/M2 | SYSTOLIC BLOOD PRESSURE: 130 MMHG | WEIGHT: 190.13 LBS | HEART RATE: 92 BPM | DIASTOLIC BLOOD PRESSURE: 78 MMHG

## 2019-11-13 DIAGNOSIS — Z79.4 TYPE 2 DIABETES MELLITUS WITH DIABETIC POLYNEUROPATHY, WITH LONG-TERM CURRENT USE OF INSULIN: ICD-10-CM

## 2019-11-13 DIAGNOSIS — E11.42 TYPE 2 DIABETES MELLITUS WITH DIABETIC POLYNEUROPATHY, WITH LONG-TERM CURRENT USE OF INSULIN: ICD-10-CM

## 2019-11-13 DIAGNOSIS — I10 ESSENTIAL HYPERTENSION: ICD-10-CM

## 2019-11-13 PROCEDURE — 1101F PT FALLS ASSESS-DOCD LE1/YR: CPT | Mod: HCNC,CPTII,S$GLB, | Performed by: INTERNAL MEDICINE

## 2019-11-13 PROCEDURE — 99499 RISK ADDL DX/OHS AUDIT: ICD-10-PCS | Mod: HCNC,S$GLB,, | Performed by: INTERNAL MEDICINE

## 2019-11-13 PROCEDURE — 99214 PR OFFICE/OUTPT VISIT, EST, LEVL IV, 30-39 MIN: ICD-10-PCS | Mod: HCNC,S$GLB,, | Performed by: INTERNAL MEDICINE

## 2019-11-13 PROCEDURE — 1101F PR PT FALLS ASSESS DOC 0-1 FALLS W/OUT INJ PAST YR: ICD-10-PCS | Mod: HCNC,CPTII,S$GLB, | Performed by: INTERNAL MEDICINE

## 2019-11-13 PROCEDURE — 99999 PR PBB SHADOW E&M-EST. PATIENT-LVL III: ICD-10-PCS | Mod: PBBFAC,HCNC,, | Performed by: INTERNAL MEDICINE

## 2019-11-13 PROCEDURE — 3078F DIAST BP <80 MM HG: CPT | Mod: HCNC,CPTII,S$GLB, | Performed by: INTERNAL MEDICINE

## 2019-11-13 PROCEDURE — 99214 OFFICE O/P EST MOD 30 MIN: CPT | Mod: HCNC,S$GLB,, | Performed by: INTERNAL MEDICINE

## 2019-11-13 PROCEDURE — 99499 UNLISTED E&M SERVICE: CPT | Mod: HCNC,S$GLB,, | Performed by: INTERNAL MEDICINE

## 2019-11-13 PROCEDURE — 3078F PR MOST RECENT DIASTOLIC BLOOD PRESSURE < 80 MM HG: ICD-10-PCS | Mod: HCNC,CPTII,S$GLB, | Performed by: INTERNAL MEDICINE

## 2019-11-13 PROCEDURE — 99999 PR PBB SHADOW E&M-EST. PATIENT-LVL III: CPT | Mod: PBBFAC,HCNC,, | Performed by: INTERNAL MEDICINE

## 2019-11-13 PROCEDURE — 3075F PR MOST RECENT SYSTOLIC BLOOD PRESS GE 130-139MM HG: ICD-10-PCS | Mod: HCNC,CPTII,S$GLB, | Performed by: INTERNAL MEDICINE

## 2019-11-13 PROCEDURE — 3075F SYST BP GE 130 - 139MM HG: CPT | Mod: HCNC,CPTII,S$GLB, | Performed by: INTERNAL MEDICINE

## 2019-11-13 NOTE — ASSESSMENT & PLAN NOTE
Reasonably well controlled on 70/30 twice daily, glimepiride and metformin 750 mg XR one tab daily   Single episode of hypoglycemia after small meal.     Has two to three episodes of significant diarrhea. Will stop for a few weeks and see if makes a change.   Consider increasing glimepiride, but risk for hypoglycemia increases.   SGLT 2 inhibitors were not affordable.     Repeat A1C in three months

## 2019-11-19 ENCOUNTER — TELEPHONE (OUTPATIENT)
Dept: ENDOCRINOLOGY | Facility: CLINIC | Age: 74
End: 2019-11-19

## 2019-11-19 NOTE — TELEPHONE ENCOUNTER
Pt said she was on metformin gave her diaherra. She stop metformin. Have been taking her diabetes medication regular     RELION BRAND 32 units thirty minutes before breakfast and 22  units thirty minutes before dinner.    glimepiride (AMARYL) 4 MG tablet        : TAKE 1 TABLET EVERY DAY WITH BREAKFAST     Blood sugar 11/14 fasting 129, lunch 92 dinner 219, she skip blood sugar   11 /15 fasting 170 lunch 176 dinner 159 199 bed time  11/16 fasting 172 breakfast 166 lunch  228 dinner  237   11/17 fasting 226 breakfast 123 lunch  266 dinner 193  11/18 fasting 187  Breakfast 304 lunch 180 dinner 189   11/19 fasting 196

## 2019-11-19 NOTE — TELEPHONE ENCOUNTER
----- Message from Camila Williamson sent at 11/19/2019  9:10 AM CST -----  Contact: Self  Pt is needing a call back regarding her diabetic reading levels    Pt can be reached @ [phone]

## 2019-11-27 ENCOUNTER — HOSPITAL ENCOUNTER (OUTPATIENT)
Dept: RADIOLOGY | Facility: HOSPITAL | Age: 74
Discharge: HOME | End: 2019-11-27
Attending: INTERNAL MEDICINE
Payer: MEDICARE

## 2019-11-27 ENCOUNTER — OFFICE VISIT (OUTPATIENT)
Dept: INTERNAL MEDICINE | Facility: CLINIC | Age: 74
End: 2019-11-27
Payer: MEDICARE

## 2019-11-27 VITALS
BODY MASS INDEX: 31.22 KG/M2 | WEIGHT: 187.38 LBS | HEART RATE: 88 BPM | TEMPERATURE: 98 F | DIASTOLIC BLOOD PRESSURE: 70 MMHG | SYSTOLIC BLOOD PRESSURE: 132 MMHG | HEIGHT: 65 IN

## 2019-11-27 DIAGNOSIS — G89.29 CHRONIC RIGHT SHOULDER PAIN: ICD-10-CM

## 2019-11-27 DIAGNOSIS — E11.69 DIABETES MELLITUS TYPE 2 IN OBESE: Primary | ICD-10-CM

## 2019-11-27 DIAGNOSIS — E53.8 VITAMIN B 12 DEFICIENCY: ICD-10-CM

## 2019-11-27 DIAGNOSIS — M25.511 CHRONIC RIGHT SHOULDER PAIN: ICD-10-CM

## 2019-11-27 DIAGNOSIS — E66.9 DIABETES MELLITUS TYPE 2 IN OBESE: Primary | ICD-10-CM

## 2019-11-27 DIAGNOSIS — E11.42 DIABETIC PERIPHERAL NEUROPATHY ASSOCIATED WITH TYPE 2 DIABETES MELLITUS: ICD-10-CM

## 2019-11-27 PROCEDURE — 3075F SYST BP GE 130 - 139MM HG: CPT | Mod: HCNC,CPTII,S$GLB, | Performed by: INTERNAL MEDICINE

## 2019-11-27 PROCEDURE — 1159F PR MEDICATION LIST DOCUMENTED IN MEDICAL RECORD: ICD-10-PCS | Mod: HCNC,S$GLB,, | Performed by: INTERNAL MEDICINE

## 2019-11-27 PROCEDURE — 3078F PR MOST RECENT DIASTOLIC BLOOD PRESSURE < 80 MM HG: ICD-10-PCS | Mod: HCNC,CPTII,S$GLB, | Performed by: INTERNAL MEDICINE

## 2019-11-27 PROCEDURE — 99999 PR PBB SHADOW E&M-EST. PATIENT-LVL IV: CPT | Mod: PBBFAC,HCNC,, | Performed by: INTERNAL MEDICINE

## 2019-11-27 PROCEDURE — 1126F PR PAIN SEVERITY QUANTIFIED, NO PAIN PRESENT: ICD-10-PCS | Mod: HCNC,S$GLB,, | Performed by: INTERNAL MEDICINE

## 2019-11-27 PROCEDURE — 99999 PR PBB SHADOW E&M-EST. PATIENT-LVL IV: ICD-10-PCS | Mod: PBBFAC,HCNC,, | Performed by: INTERNAL MEDICINE

## 2019-11-27 PROCEDURE — 1101F PR PT FALLS ASSESS DOC 0-1 FALLS W/OUT INJ PAST YR: ICD-10-PCS | Mod: HCNC,CPTII,S$GLB, | Performed by: INTERNAL MEDICINE

## 2019-11-27 PROCEDURE — 3078F DIAST BP <80 MM HG: CPT | Mod: HCNC,CPTII,S$GLB, | Performed by: INTERNAL MEDICINE

## 2019-11-27 PROCEDURE — 1159F MED LIST DOCD IN RCRD: CPT | Mod: HCNC,S$GLB,, | Performed by: INTERNAL MEDICINE

## 2019-11-27 PROCEDURE — 73030 X-RAY EXAM OF SHOULDER: CPT | Mod: TC,HCNC,RT

## 2019-11-27 PROCEDURE — 99214 PR OFFICE/OUTPT VISIT, EST, LEVL IV, 30-39 MIN: ICD-10-PCS | Mod: HCNC,S$GLB,, | Performed by: INTERNAL MEDICINE

## 2019-11-27 PROCEDURE — 73030 XR SHOULDER COMPLETE 2 OR MORE VIEWS RIGHT: ICD-10-PCS | Mod: 26,HCNC,RT, | Performed by: RADIOLOGY

## 2019-11-27 PROCEDURE — 1126F AMNT PAIN NOTED NONE PRSNT: CPT | Mod: HCNC,S$GLB,, | Performed by: INTERNAL MEDICINE

## 2019-11-27 PROCEDURE — 73030 X-RAY EXAM OF SHOULDER: CPT | Mod: 26,HCNC,RT, | Performed by: RADIOLOGY

## 2019-11-27 PROCEDURE — 1101F PT FALLS ASSESS-DOCD LE1/YR: CPT | Mod: HCNC,CPTII,S$GLB, | Performed by: INTERNAL MEDICINE

## 2019-11-27 PROCEDURE — 99214 OFFICE O/P EST MOD 30 MIN: CPT | Mod: HCNC,S$GLB,, | Performed by: INTERNAL MEDICINE

## 2019-11-27 PROCEDURE — 3075F PR MOST RECENT SYSTOLIC BLOOD PRESS GE 130-139MM HG: ICD-10-PCS | Mod: HCNC,CPTII,S$GLB, | Performed by: INTERNAL MEDICINE

## 2019-11-27 RX ORDER — PREGABALIN 75 MG/1
CAPSULE ORAL
Qty: 180 CAPSULE | Refills: 0 | Status: SHIPPED | OUTPATIENT
Start: 2019-11-27 | End: 2020-02-19 | Stop reason: SDUPTHER

## 2019-11-27 NOTE — PROGRESS NOTES
"Subjective:       Patient ID: Alisia Gusman is a 74 y.o. female.    Chief Complaint: Follow-up    HPI   Taking B complex and wanted to make sure there was B12 in it.     DM2 - 70/30 26 qam and 12 u qpm  Since off of metformin, sugars are more elevated. Has not been watching her diet as much so last week her dinner and prior to bed time sugars are in the 200s. Reports will do better.   a1c 11/5/19 7.3.   Eye exam - no retinopathy 4/2019.  Foot exam - 1/17/19    Mammo - 12/10/18. Scheduled 12/11/19.     C/o R shoulder all the way down to the R hand - chronic but gotten worse in Sept. Reports pain all day, worse in the morning. Once she gets up and starts doing things around the house, better. However if she does a lot, will take an ibuprofen now and then. No weakness. Does still have numbness/tingling in the hands.   No trauma.     Trace LE ankle edema. Exercises regularly during the week day. Reports present by Friday but will resolve by Monday. No pain.     Review of Systems  Comprehensive review of systems otherwise negative. See history/subjective section for more details.    Objective:      Physical Exam    /70 (BP Location: Left arm, Patient Position: Sitting, BP Method: Medium (Manual))   Pulse 88   Temp 98.2 °F (36.8 °C)   Ht 5' 5" (1.651 m)   Wt 85 kg (187 lb 6.3 oz)   LMP  (LMP Unknown)   BMI 31.18 kg/m²     GEN - A+OX4, NAD   HEENT - PERRL, EOMI, OP clear  Neck - No thyromegaly or cervical LAD. No thyroid masses felt.  CV - RRR, no m/r   Chest - CTAB, no wheezing or rhonchi  Abd - S/NT/ND/+BS.   Ext - 2+BDP and radial pulses. Trace R ankle edema and 1+ L ankle edema.   MSK - pain on palpation of the R shoulder joint. Pain limiting abduction of the R shoulder above 90 degrees.   Skin - No rash.    Labs reviewed.     Assessment/Plan     Alisia was seen today for follow-up.    Diagnoses and all orders for this visit:    Diabetes mellitus type 2 in obese - [employer] sent letter saying that Lyrica will " not be covered after Jan 1. Will refill for now. When time comes, will try generic pregabalin. Gabapentin did not help w/ neuropathy.  -     Comprehensive metabolic panel; Future  -     pregabalin (LYRICA) 75 MG capsule; TAKE 1 CAPSULE TWICE DAILY THEREAFTER    Vitamin B 12 deficiency  -     Vitamin B12; Future    Diabetic peripheral neuropathy associated with type 2 diabetes mellitus  -     pregabalin (LYRICA) 75 MG capsule; TAKE 1 CAPSULE TWICE DAILY THEREAFTER    Type 2 diabetes, uncontrolled, with neuropathy  -     pregabalin (LYRICA) 75 MG capsule; TAKE 1 CAPSULE TWICE DAILY THEREAFTER    Chronic right shoulder pain - depending on XR and how she progresses w/ PT, consider ortho.  -     X-ray Shoulder 2 or More Views Right; Future  -     Ambulatory consult to Physical Therapy      Follow up in about 4 months (around 3/27/2020).      Selena Montemayor MD  Department of Internal Medicine - [facility]  10:53 AM

## 2019-12-04 ENCOUNTER — TELEPHONE (OUTPATIENT)
Dept: ENDOCRINOLOGY | Facility: CLINIC | Age: 74
End: 2019-12-04

## 2019-12-04 NOTE — TELEPHONE ENCOUNTER
Left a message for pt to called back.    -renal diet  -f/u renal consult  -PT (OOB/IS)  -DVT ppx  -monitor UO  -trend coags and H/H  -f/u vascular consult

## 2019-12-04 NOTE — TELEPHONE ENCOUNTER
----- Message from Deidre Chisholm MA sent at 12/3/2019  4:19 PM CST -----  Contact: Patient       ----- Message -----  From: Kay Giron  Sent: 12/3/2019   3:05 PM CST  To: Omar DAVIS Staff    Needs Medical Advice    Who Called: Patient    Symptoms (please be specific): Patient states tnat she is calling to speak to nurse/provider regarding changing her dosage of metformin. Please contact pt to advise.     Best Call Back Number:  [phone] or [phone]

## 2019-12-04 NOTE — TELEPHONE ENCOUNTER
Talk to pt her sugar are still running high since she stop her metformin on 11/19 she said the high it been is 320. Pt wanted to know dr boyce said she was going to change her metformin from 750 to 1200. I explained to pt I will send a message to dr flood about started her back on metformin

## 2019-12-10 ENCOUNTER — TELEPHONE (OUTPATIENT)
Dept: ENDOCRINOLOGY | Facility: CLINIC | Age: 74
End: 2019-12-10

## 2019-12-10 NOTE — TELEPHONE ENCOUNTER
----- Message from Sharyn Guerrero sent at 12/10/2019  2:54 PM CST -----  Contact: pt  Please call pt about her readings    -    [facility]   [facility]      [facility] [phone]   Or   [phone]     Thanks

## 2019-12-10 NOTE — TELEPHONE ENCOUNTER
Restarted metformin after a few days off   Feeling OK  Discussed tolerance of metformin, offered other options.     For now wants to continue metformin

## 2019-12-11 ENCOUNTER — HOSPITAL ENCOUNTER (OUTPATIENT)
Dept: RADIOLOGY | Facility: HOSPITAL | Age: 74
Discharge: HOME | End: 2019-12-11
Attending: INTERNAL MEDICINE
Payer: MEDICARE

## 2019-12-11 DIAGNOSIS — Z12.31 ENCOUNTER FOR SCREENING MAMMOGRAM FOR BREAST CANCER: ICD-10-CM

## 2019-12-11 PROCEDURE — 77067 SCR MAMMO BI INCL CAD: CPT | Mod: 26,HCNC,, | Performed by: RADIOLOGY

## 2019-12-11 PROCEDURE — 77063 BREAST TOMOSYNTHESIS BI: CPT | Mod: 26,HCNC,, | Performed by: RADIOLOGY

## 2019-12-11 PROCEDURE — 77063 MAMMO DIGITAL SCREENING BILAT WITH TOMOSYNTHESIS_CAD: ICD-10-PCS | Mod: 26,HCNC,, | Performed by: RADIOLOGY

## 2019-12-11 PROCEDURE — 77067 MAMMO DIGITAL SCREENING BILAT WITH TOMOSYNTHESIS_CAD: ICD-10-PCS | Mod: 26,HCNC,, | Performed by: RADIOLOGY

## 2019-12-11 PROCEDURE — 77063 BREAST TOMOSYNTHESIS BI: CPT | Mod: TC,HCNC

## 2019-12-23 ENCOUNTER — TELEPHONE (OUTPATIENT)
Dept: RADIOLOGY | Facility: HOSPITAL | Age: 74
End: 2019-12-23

## 2019-12-23 ENCOUNTER — CLINICAL SUPPORT (OUTPATIENT)
Dept: REHABILITATION | Facility: HOSPITAL | Age: 74
End: 2019-12-23
Attending: INTERNAL MEDICINE
Payer: MEDICARE

## 2019-12-23 DIAGNOSIS — G89.29 CHRONIC RIGHT SHOULDER PAIN: ICD-10-CM

## 2019-12-23 DIAGNOSIS — M25.511 CHRONIC RIGHT SHOULDER PAIN: ICD-10-CM

## 2019-12-23 DIAGNOSIS — R26.89 DECREASED FUNCTIONAL MOBILITY: ICD-10-CM

## 2019-12-23 DIAGNOSIS — M25.611 DECREASED RANGE OF MOTION OF RIGHT SHOULDER: ICD-10-CM

## 2019-12-23 DIAGNOSIS — R29.3 POOR POSTURE: ICD-10-CM

## 2019-12-23 PROCEDURE — G8984 CARRY CURRENT STATUS: HCPCS | Mod: CK,HCNC,PO

## 2019-12-23 PROCEDURE — 97161 PT EVAL LOW COMPLEX 20 MIN: CPT | Mod: HCNC,PO

## 2019-12-23 PROCEDURE — G8985 CARRY GOAL STATUS: HCPCS | Mod: CJ,HCNC,PO

## 2019-12-23 PROCEDURE — 97110 THERAPEUTIC EXERCISES: CPT | Mod: HCNC,PO

## 2019-12-23 NOTE — TELEPHONE ENCOUNTER
Calling patient about mammogram results. Lost call to home # and mobile phone kept ringing with no voicemail..

## 2019-12-23 NOTE — PLAN OF CARE
"[facility]  Physical Therapy Initial Evaluation    Name: Alisia Gusman  Clinic Number: [id number]    Therapy Diagnosis:   Encounter Diagnoses   Name Primary?    Chronic right shoulder pain     Decreased range of motion of right shoulder     Poor posture     Decreased functional mobility      Physician: Selena Montemayor MD    Physician Orders: PT Eval and Treat   Medical Diagnosis from Referral: Chronic right shoulder pain  Evaluation Date: 12/23/2019  Authorization Period Expiration: 11/26/20  Plan of Care Expiration: 2/21/20  Visit # / Visits authorized: 1/ 1  G-Code: 1/10    Time In: 9:15 AM  Time Out: 10:00 AM  Total Billable Time: 45 minutes    Precautions: Standard, Diabetes, Fall and cancer    Subjective   Date of onset: About a year  History of current condition - Alisia reports: "my right shoulder hurts especially at night or when I raise my arm overhead. It's off and on, and it changes. I think it's arthritis." Pt states she also gets numbness down her arm, and burning in her R hand. Pt denies having any specific treatments for her shoulder. Pt denies hx of surgery or event that led to onset of symptoms.     Pt it R handed.     Medical History:   Past Medical History:   Diagnosis Date    Adult bronchiectasis 7/26/2017    Allergy     Anemia     Arthritis     Asthma     Breast cancer 1993    left w/ radiation     Cancer 1993    L eft breast    Cataract     Chronic cervical radiculopathy 9/20/2012    Diabetes mellitus     Diabetes mellitus type II     Diabetes with neurologic complications     Diverticulosis     Dry eyes     Dysphagia     after knee surgery June 2014    GERD (gastroesophageal reflux disease)     Hyperlipidemia     Hypertension     Neuropathy     feet    Scalp tenderness     Shortness of breath     Ulcer        Surgical History:   Alisia Gusman  has a past surgical history that includes Cholecystectomy; Carpal tunnel release (Bilateral, 2011); " uvuloplasty; Hysterectomy; Joint replacement (Left, June 2014); Cataract extraction w/  intraocular lens implant (Bilateral, 2013 and 2014); Colonoscopy (N/A, 11/6/2015); Eye surgery; Colonoscopy (N/A, 5/8/2019); Breast biopsy (Left, 1993); and Breast lumpectomy (Left, 1993).    Medications:   Alisia has a current medication list which includes the following prescription(s): accu-chek shakila plus test strp, accu-chek softclix lancets, alcohol antiseptic pads, aspirin, atenolol, bd alcohol swabs, bd ultra-fine leona pen needle, bd veo insulin syringe uf, blood-glucose meter, calcium carbonate-vitamin d3, cyanocobalamin, derma-smoothe/fs scalp oil, folic acid/multivit-min/lutein, glimepiride, hydrochlorothiazide, ibuprofen, insulin nph-insulin regular (70/30), insulin regular, insulin syringe-needle u-100, ketoconazole, omeprazole, potassium chloride, pravastatin, and pregabalin, and the following Facility-Administered Medications: triamcinolone acetonide and triamcinolone acetonide.    Allergies:   Review of patient's allergies indicates:   Allergen Reactions    Nubain [nalbuphine] Other (See Comments)     Other reaction(s): Hypotension    Grass pollen-tim grass standard     Losartan      cough    Sinus & allergy [chlorpheniramine-phenylephrine]         Imaging, bone scan films: Mild degenerative changes.  No acute bony abnormalities.    Prior Therapy: yes, for her knee and maybe for her back and arm in the past  Social History: pt lives with their spouse  Occupation: Retired. Likes to go to the spa/gym and do walking in the pool, water aerobics, and exercise classes  Exercise: She exercises in pool 2 days/week and 2 classes  Prior Level of Function: pt could zip her dress, reach overhead, and do more cleaning. Could food prep without seated rest  Current Level of Function: pt's  has to help with mopping, cleaning, vacuuming, dusting. He sometimes needs to help her with overhead reaching. Pt states when she  "is standing to do food prep, she will feel weakness in her back and sit down and rest.  Pt has difficulty reaching overhead. Can't zip her dress on her own. Pain wakes up her at night.    Pain:  Current 3/10, worst 10/10, best 2/10   Location: right shoulder (back of shoulder and upper arm into elbow, sometimes into fingers)  Description: Burning, Tingling and hurt  Aggravating Factors: Standing and reaching, cleaning, dressing  Easing Factors: pain medication and rest    Pts goals: pt would like to be more independent and complete household tasks pain-free. "it would be great if the pain didn't wake me up."    Objective     Observation: pt received ambulating independently. A+O x 3, pleasant, appropriate    Posture: significant rounded shoulders, forward head       Active Range of Motion:   Shoulder Left Right   Flexion 153 125   Abduction 138 95   ER at 0 66 54*   ER at 90 70 79   IR T7 T12   IR at 90 90 90     Upper Extremity Strength   (L) UE (R) UE   Shoulder flexion: 5/5 3-/5   Shoulder Abduction: 5/5 3-/5   Shoulder ER 5/5 5/5   Shoulder IR 5/5 5/5   Lower Trap TBA TBA   Middle Trap TBA TBA   Rhomboids TBA TBA   Biceps, triceps, wrist flex and ext are 5/5 B      Palpation: tender over biceps and biceps insertion, increased muscle tightness appreciated through B upper trpas    Sensation: light touch is intact but diminished on the R side        Scapular Control/Dyskinesis:    Normal / Subtle / Obvious  Comments    Left  Upper trap compensation Mild shoulder hiking    Right  obvious Shoulder hiking     R ulnar nerve tension test is questionably +      CMS Impairment/Limitation/Restriction for FOTO Shoulder Survey    Therapist reviewed FOTO scores for Alisia Gusman on 12/23/2019.   FOTO documents entered into [email or web address] - see Media section.    Limitation Score: 50%  Category: Carrying    Current : CK = at least 40% but < 60% impaired, limited or restricted  Goal: CJ = at least 20% but < 40% impaired, limited or " restricted           TREATMENT   Treatment Time In: 9:50  Treatment Time Out: 10:00  Total Treatment time separate from Evaluation: 10 minutes    Alisia received therapeutic exercises to develop strength, endurance, ROM, posture, flexibility, and core stabilization for 8 minutes including:  scap retractions x 10, moderate cues to avoid upper trap com  pec stretch in doorway 2 x 30s  Upper trap stretch, 30s/side    Alisia received the following manual therapy techniques: Joint mobilizations were applied to the: R shoulder for 2 minutes, including:  A-P and iferior joint mobs Grade I-II  Distraction   Pt states all interventions feel good  Home Exercises and Patient Education Provided    Education provided:   - HEP, exercise technique, avoiding shoulder hiking, postural awareness POC, scheduling    Written Home Exercises Provided: yes.  Exercises were reviewed and Alisia was able to demonstrate them prior to the end of the session.  Alisia demonstrated good  understanding of the education provided.     See EMR under Patient Instructions for exercises provided 12/23/2019.    Assessment   Alisia is a 74 y.o. female referred to outpatient Physical Therapy with a medical diagnosis of chronic right shoulder pain. Pt presents with R shoulder pain, decreased strength, poor posture, decreased ROM, impaired functional mobility/reach. Pt likely with R shoulder impingement. Pt with severe rounded shoulder and forward head posture that is contributing to symptoms. Pt tends to hike shoulders with overhead reach, so will need to re-educate shoulder dynamics.      Pt prognosis is Good.   Pt will benefit from skilled outpatient Physical Therapy to address the deficits stated above and in the chart below, provide pt/family education, and to maximize pt's level of independence.     Plan of care discussed with patient: Yes  Pt's spiritual, cultural and educational needs considered and patient is agreeable to the plan of care and goals as stated  below:     Anticipated Barriers for therapy: chronicity of symptoms    Medical Necessity is demonstrated by the following  History  Co-morbidities and personal factors that may impact the plan of care Co-morbidities:   diabetes, history of cancer and neuropathy    Personal Factors:   no deficits     moderate   Examination  Body Structures and Functions, activity limitations and participation restrictions that may impact the plan of care Body Regions:   back  upper extremities  trunk    Body Systems:    ROM  strength  transitions  posture, functional reach    Participation Restrictions:   None anticipated    Activity limitations:   Learning and applying knowledge  no deficits    General Tasks and Commands  no deficits    Communication  no deficits    Mobility  lifting and carrying objects  reaching overhead    Self care  dressing    Domestic Life  shopping  cooking  doing house work (cleaning house, washing dishes, laundry)    Interactions/Relationships  no deficits    Life Areas  no deficits    Community and Social Life  community life         high   Clinical Presentation stable and uncomplicated low   Decision Making/ Complexity Score: low     Goals:  Short Term Goals: 4 weeks   1. Pt will report reduced R shoulder pain to </= 6/10 at worst for improved functional mobility and ability to participate in functional activities/ADL's. (progressing, not met)  2. Pt will tolerate HEP for improved strength, functional mobility, ROM, posture, and endurance. (progressing, not met)  3. Pt will increase R shoulder flexion AROM to >/= 150 degrees for improved functional mobility and reach. (progressing, not met)  4. Pt will demo >/= 4-/5 strength in R shoulder for improved functional mobility, endurance, and posture. (progressing, not met)  5. Pt will increase R shoulder ABD AROM to >/= 115 degrees for improved functional mobility and reach. (progressing, not met)  6. Pt will report being able to zip her dress without assist  from her  for improved functional mobility (progressing, not met)        Long Term Goals: 8 weeks   1. Pt will report reduced R shoulder pain to </= 4/10 at worst for improved functional mobility and ability to participate in functional activities/ADL's. (progressing, not met)  2. Pt will be independent with updated HEP for improved strength, functional mobility, ROM, posture, and endurance. (progressing, not met)  3. Pt will increase R shoulder ER at 90 AROM to >/= 80degrees for improved functional mobility and reach. (progressing, not met)  4. Pt will demo 5/5 strength in R shoulder for improved functional mobility, endurance, and posture. (progressing, not met)  5. Pt will increase R shoulder ABD AROM to >/= 130 degrees for improved functional mobility and reach. (progressing, not met)  6. Pt will report being able to sleep through the night without R shoulder pain waking her up for improved functional mobility and quality of life (progressing, not met)    Plan   Plan of care Certification: 12/23/2019 to 2/21/20.    Outpatient Physical Therapy 2 times weekly for 8 weeks to include the following interventions: Manual Therapy, Moist Heat/ Ice, Neuromuscular Re-ed, Patient Education, Self Care, Therapeutic Activites, Therapeutic Exercise, Ultrasound and modalities and dry needling as appropriate/indicated.     Leslie Jerome, PT

## 2019-12-26 ENCOUNTER — TELEPHONE (OUTPATIENT)
Dept: RADIOLOGY | Facility: HOSPITAL | Age: 74
End: 2019-12-26

## 2019-12-26 NOTE — TELEPHONE ENCOUNTER
Spoke with patient and explained mammogram findings.Patient expressed understanding of results. Patient scheduled abnormal mammogram follow up appointment at [facility] on 1/3/2020.

## 2020-01-03 ENCOUNTER — HOSPITAL ENCOUNTER (OUTPATIENT)
Dept: RADIOLOGY | Facility: HOSPITAL | Age: 75
Discharge: HOME | End: 2020-01-03
Attending: INTERNAL MEDICINE
Payer: MEDICARE

## 2020-01-03 DIAGNOSIS — R92.8 ABNORMAL MAMMOGRAM OF LEFT BREAST: ICD-10-CM

## 2020-01-03 PROCEDURE — 77065 DX MAMMO INCL CAD UNI: CPT | Mod: TC,HCNC,PO,LT

## 2020-01-03 PROCEDURE — 76642 ULTRASOUND BREAST LIMITED: CPT | Mod: TC,HCNC,PO,LT

## 2020-01-03 PROCEDURE — 77061 BREAST TOMOSYNTHESIS UNI: CPT | Mod: TC,HCNC,PO,LT

## 2020-01-03 PROCEDURE — 77065 MAMMO DIGITAL DIAGNOSTIC LEFT WITH TOMOSYNTHESIS_CAD: ICD-10-PCS | Mod: 26,HCNC,LT, | Performed by: RADIOLOGY

## 2020-01-03 PROCEDURE — 77061 MAMMO DIGITAL DIAGNOSTIC LEFT WITH TOMOSYNTHESIS_CAD: ICD-10-PCS | Mod: 26,HCNC,LT, | Performed by: RADIOLOGY

## 2020-01-03 PROCEDURE — 76642 US BREAST LEFT LIMITED: ICD-10-PCS | Mod: 26,HCNC,LT, | Performed by: RADIOLOGY

## 2020-01-03 PROCEDURE — 76642 ULTRASOUND BREAST LIMITED: CPT | Mod: 26,HCNC,LT, | Performed by: RADIOLOGY

## 2020-01-03 PROCEDURE — 77065 DX MAMMO INCL CAD UNI: CPT | Mod: 26,HCNC,LT, | Performed by: RADIOLOGY

## 2020-01-03 PROCEDURE — 77061 BREAST TOMOSYNTHESIS UNI: CPT | Mod: 26,HCNC,LT, | Performed by: RADIOLOGY

## 2020-01-06 ENCOUNTER — TELEPHONE (OUTPATIENT)
Dept: RADIOLOGY | Facility: HOSPITAL | Age: 75
End: 2020-01-06

## 2020-01-06 ENCOUNTER — CLINICAL SUPPORT (OUTPATIENT)
Dept: REHABILITATION | Facility: HOSPITAL | Age: 75
End: 2020-01-06
Attending: INTERNAL MEDICINE
Payer: MEDICARE

## 2020-01-06 DIAGNOSIS — R26.89 DECREASED FUNCTIONAL MOBILITY: ICD-10-CM

## 2020-01-06 DIAGNOSIS — R29.3 POOR POSTURE: ICD-10-CM

## 2020-01-06 DIAGNOSIS — M25.511 CHRONIC RIGHT SHOULDER PAIN: ICD-10-CM

## 2020-01-06 DIAGNOSIS — G89.29 CHRONIC RIGHT SHOULDER PAIN: ICD-10-CM

## 2020-01-06 DIAGNOSIS — M25.611 DECREASED RANGE OF MOTION OF RIGHT SHOULDER: ICD-10-CM

## 2020-01-06 PROCEDURE — 97110 THERAPEUTIC EXERCISES: CPT | Mod: HCNC,PO

## 2020-01-06 NOTE — PROGRESS NOTES
Physical Therapy Daily Treatment Note     Name: Alisia Gusman  Clinic Number: [id number]    Therapy Diagnosis:   Encounter Diagnoses   Name Primary?    Chronic right shoulder pain     Decreased range of motion of right shoulder     Poor posture     Decreased functional mobility      Physician: Selena Montemayor MD    Visit Date: 1/6/2020    Physician Orders: PT Eval and Treat   Medical Diagnosis from Referral: Chronic right shoulder pain  Evaluation Date: 12/23/2019  Authorization Period Expiration: 12/31/2020  Plan of Care Expiration: 2/21/20  Visit # / Visits authorized: 1/ 1 (suzanne man)  G-Code: 2/10     Time In: 3:00 pm   Time Out: 3:55 pm   Total Billable Time: 30 minutes (TE-2)     Precautions: Standard, Diabetes, Fall and cancer    Subjective     Pt reports: no pain today , it usually flares up when there is cold weather. Pt report that she remains as active as she can and attends multiple water aerobic fitness classes at [facility] [employer] .   She was compliant with home exercise program.  Response to previous treatment: good- stretches felt good  Functional change: none sofar     Pain: 0/10  Location: right shoulder     Objective     Alisia received the following manual therapy techniques: Joint mobilizations were applied to the: R shoulder for 8 minutes, including:  A-P and iferior joint mobs Grade I-II  Distraction and oscillations     Alisia received therapeutic exercises to develop strength, endurance and ROM for 47 minutes including:    *Pt required Mod cues for all exercises to reduce upper trap compensations*    Pulleys: 3'   Supine wand flexion: 2 x 7   Supine wand ER: 2 x 7  Chest stretch over towel : 3'   Supine protraction and retraction over towel : 2 x 7  Seated scap retractions x 10  Pec stretch in doorway 2 x 30s  Upper trap stretch, 30s/side       Home Exercises Provided and Patient Education Provided     Education provided:   - Cues for form with all activities performed - encouraged to  perform exercises in front of a mirror at home.   - Updated HEP provided     Written Home Exercises Provided: yes.  Exercises were reviewed and Alisia was able to demonstrate them prior to the end of the session.  Alisia demonstrated good  understanding of the education provided.     See EMR under Patient Instructions for exercises provided 1/6/2020.      Assessment     Pt presents with no pain and with a good tolerance to session and progressions made. She exhibited moderate guarding with manual interventions and significant difficulty relaxing . She also demonstrated increased upper trap recruitment with all activities requiring constant verbal and tactile cueing for proper form . Will continue to progress as able.   Alisia is progressing well towards her goals.   Pt prognosis is Good.     Pt will continue to benefit from skilled outpatient physical therapy to address the deficits listed in the problem list box on initial evaluation, provide pt/family education and to maximize pt's level of independence in the home and community environment.   Pt's spiritual, cultural and educational needs considered and pt agreeable to plan of care and goals.    Anticipated barriers to physical therapy: chronicity of symtoms     Goals:  Short Term Goals: 4 weeks   1. Pt will report reduced R shoulder pain to </= 6/10 at worst for improved functional mobility and ability to participate in functional activities/ADL's. (progressing, not met)  2. Pt will tolerate HEP for improved strength, functional mobility, ROM, posture, and endurance. (progressing, not met)  3. Pt will increase R shoulder flexion AROM to >/= 150 degrees for improved functional mobility and reach. (progressing, not met)  4. Pt will demo >/= 4-/5 strength in R shoulder for improved functional mobility, endurance, and posture. (progressing, not met)  5. Pt will increase R shoulder ABD AROM to >/= 115 degrees for improved functional mobility and reach. (progressing, not  met)  6. Pt will report being able to zip her dress without assist from her  for improved functional mobility (progressing, not met)     Long Term Goals: 8 weeks   1. Pt will report reduced R shoulder pain to </= 4/10 at worst for improved functional mobility and ability to participate in functional activities/ADL's. (progressing, not met)  2. Pt will be independent with updated HEP for improved strength, functional mobility, ROM, posture, and endurance. (progressing, not met)  3. Pt will increase R shoulder ER at 90 AROM to >/= 80degrees for improved functional mobility and reach. (progressing, not met)  4. Pt will demo 5/5 strength in R shoulder for improved functional mobility, endurance, and posture. (progressing, not met)  5. Pt will increase R shoulder ABD AROM to >/= 130 degrees for improved functional mobility and reach. (progressing, not met)  6. Pt will report being able to sleep through the night without R shoulder pain waking her up for improved functional mobility and quality of life (progressing, not met)    Plan     Continue POC. Progress as tolerated.    Alka Porter, PTA

## 2020-01-06 NOTE — TELEPHONE ENCOUNTER
Spoke with patient. Reviewed breast biopsy procedure and reviewed instructions for breast biopsy. Patient expressed understanding and all questions were answered. Provided patient with my phone number to call for any further concerns or questions.   Patient scheduled breast biopsy at the [facility] on 1/15/2020.

## 2020-01-15 ENCOUNTER — HOSPITAL ENCOUNTER (OUTPATIENT)
Dept: RADIOLOGY | Facility: HOSPITAL | Age: 75
Discharge: HOME | End: 2020-01-15
Attending: INTERNAL MEDICINE
Payer: MEDICARE

## 2020-01-15 ENCOUNTER — CLINICAL SUPPORT (OUTPATIENT)
Dept: REHABILITATION | Facility: HOSPITAL | Age: 75
End: 2020-01-15
Attending: INTERNAL MEDICINE
Payer: MEDICARE

## 2020-01-15 DIAGNOSIS — G89.29 CHRONIC RIGHT SHOULDER PAIN: ICD-10-CM

## 2020-01-15 DIAGNOSIS — M25.611 DECREASED RANGE OF MOTION OF RIGHT SHOULDER: ICD-10-CM

## 2020-01-15 DIAGNOSIS — R29.3 POOR POSTURE: ICD-10-CM

## 2020-01-15 DIAGNOSIS — R92.8 ABNORMAL MAMMOGRAM: ICD-10-CM

## 2020-01-15 DIAGNOSIS — R26.89 DECREASED FUNCTIONAL MOBILITY: ICD-10-CM

## 2020-01-15 DIAGNOSIS — M25.511 CHRONIC RIGHT SHOULDER PAIN: ICD-10-CM

## 2020-01-15 PROCEDURE — 77065 MAMMO DIGITAL DIAGNOSTIC LEFT WITH CAD: ICD-10-PCS | Mod: 26,HCNC,LT, | Performed by: RADIOLOGY

## 2020-01-15 PROCEDURE — 97110 THERAPEUTIC EXERCISES: CPT | Mod: HCNC,PO,CQ

## 2020-01-15 PROCEDURE — 88305 TISSUE EXAM BY PATHOLOGIST: ICD-10-PCS | Mod: 26,HCNC,, | Performed by: PATHOLOGY

## 2020-01-15 PROCEDURE — 88360 TUMOR IMMUNOHISTOCHEM/MANUAL: CPT | Mod: HCNC | Performed by: PATHOLOGY

## 2020-01-15 PROCEDURE — 25000003 PHARM REV CODE 250: Mod: HCNC,PO | Performed by: INTERNAL MEDICINE

## 2020-01-15 PROCEDURE — 88305 TISSUE EXAM BY PATHOLOGIST: CPT | Mod: HCNC | Performed by: PATHOLOGY

## 2020-01-15 PROCEDURE — 19083 BX BREAST 1ST LESION US IMAG: CPT | Mod: HCNC,LT,, | Performed by: RADIOLOGY

## 2020-01-15 PROCEDURE — 88360 TUMOR IMMUNOHISTOCHEM/MANUAL: CPT | Mod: 26,HCNC,, | Performed by: PATHOLOGY

## 2020-01-15 PROCEDURE — 19083 US BREAST BIOPSY WITH IMAGING 1ST SITE LEFT: ICD-10-PCS | Mod: HCNC,LT,, | Performed by: RADIOLOGY

## 2020-01-15 PROCEDURE — 77065 DX MAMMO INCL CAD UNI: CPT | Mod: TC,HCNC,PO,LT

## 2020-01-15 PROCEDURE — 88360 PR  TUMOR IMMUNOHISTOCHEM/MANUAL: ICD-10-PCS | Mod: 26,HCNC,, | Performed by: PATHOLOGY

## 2020-01-15 PROCEDURE — 88377 M/PHMTRC ALYS ISHQUANT/SEMIQ: CPT | Mod: HCNC | Performed by: PATHOLOGY

## 2020-01-15 PROCEDURE — 88305 TISSUE EXAM BY PATHOLOGIST: CPT | Mod: 26,HCNC,, | Performed by: PATHOLOGY

## 2020-01-15 PROCEDURE — 77065 DX MAMMO INCL CAD UNI: CPT | Mod: 26,HCNC,LT, | Performed by: RADIOLOGY

## 2020-01-15 PROCEDURE — 27201068 US BREAST BIOPSY WITH IMAGING 1ST SITE LEFT: Mod: HCNC,PO

## 2020-01-15 RX ORDER — LIDOCAINE HYDROCHLORIDE 10 MG/ML
2 INJECTION, SOLUTION EPIDURAL; INFILTRATION; INTRACAUDAL; PERINEURAL ONCE
Status: COMPLETED | OUTPATIENT
Start: 2020-01-15 | End: 2020-01-15

## 2020-01-15 RX ORDER — LIDOCAINE HYDROCHLORIDE AND EPINEPHRINE 20; 10 MG/ML; UG/ML
7 INJECTION, SOLUTION INFILTRATION; PERINEURAL ONCE
Status: COMPLETED | OUTPATIENT
Start: 2020-01-15 | End: 2020-01-15

## 2020-01-15 RX ADMIN — LIDOCAINE HYDROCHLORIDE,EPINEPHRINE BITARTRATE 7 ML: 20; .01 INJECTION, SOLUTION INFILTRATION; PERINEURAL at 02:01

## 2020-01-15 RX ADMIN — LIDOCAINE HYDROCHLORIDE 2 ML: 10 INJECTION, SOLUTION EPIDURAL; INFILTRATION; INTRACAUDAL; PERINEURAL at 02:01

## 2020-01-15 NOTE — PROGRESS NOTES
Physical Therapy Daily Treatment Note     Name: Alisia Gusman  [facility] Number: [id number]    Therapy Diagnosis:   Encounter Diagnoses   Name Primary?    Chronic right shoulder pain     Decreased range of motion of right shoulder     Poor posture     Decreased functional mobility      Physician: Selena Montemayor MD    Visit Date: 1/15/2020    Physician Orders: PT Eval and Treat   Medical Diagnosis from Referral: Chronic right shoulder pain  Evaluation Date: 12/23/2019  Authorization Period Expiration: 12/31/2020  Plan of Care Expiration: 2/21/20  Visit # / Visits authorized: 1/ 1 (suzanne man)  G-Code: 3/10     Time In: 9:08 AM  Time Out: 9:59 AM  Total Billable Time: 15 minutes (TE-1)     Precautions: Standard, Diabetes, Fall and cancer    Subjective     Pt reports: her shoulder feels pretty good today.   She was compliant with home exercise program.  Response to previous treatment: good- stretches felt good  Functional change: none sofar     Pain: 1/10  Location: right shoulder     Objective     Alisia received the following manual therapy techniques: Joint mobilizations were applied to the: R shoulder for 5 minutes, including:  A-P and iferior joint mobs Grade I-II  Distraction and oscillations     Alisia received therapeutic exercises to develop strength, endurance and ROM for 45 minutes including:    Pulleys: 3' flexion and scap   Supine wand flexion: 3x10   Supine wand ER: 3x10   Chest stretch over towel : 3'   Supine protraction over towel : 2 x 10  Seated scap retractions x 20  Pec stretch in doorway 2 x 30s  Upper trap stretch, 30s/side  +Rows with OTB 2x10        Home Exercises Provided and Patient Education Provided     Education provided:   - continue HEP     Written Home Exercises Provided: Patient instructed to cont prior HEP.  Exercises were reviewed and Alisia was able to demonstrate them prior to the end of the session.  Alisia demonstrated good  understanding of the education provided.     See EMR under Patient  Instructions for exercises provided 1/6/2020.      Assessment     Patient tolerated all exercises well today including addition of theraband rows. Patient requires cues to decrease shoulder hike/upper trap compensation. Verbal and tactile cues needed with supine protraction to keeps elbows straight. Patient guards and has difficulty relaxing during manual therapy.   Alisia is progressing well towards her goals.   Pt prognosis is Good.     Pt will continue to benefit from skilled outpatient physical therapy to address the deficits listed in the problem list box on initial evaluation, provide pt/family education and to maximize pt's level of independence in the home and community environment.   Pt's spiritual, cultural and educational needs considered and pt agreeable to plan of care and goals.    Anticipated barriers to physical therapy: chronicity of symtoms     Goals:  Short Term Goals: 4 weeks   1. Pt will report reduced R shoulder pain to </= 6/10 at worst for improved functional mobility and ability to participate in functional activities/ADL's. (progressing, not met)  2. Pt will tolerate HEP for improved strength, functional mobility, ROM, posture, and endurance. (progressing, not met)  3. Pt will increase R shoulder flexion AROM to >/= 150 degrees for improved functional mobility and reach. (progressing, not met)  4. Pt will demo >/= 4-/5 strength in R shoulder for improved functional mobility, endurance, and posture. (progressing, not met)  5. Pt will increase R shoulder ABD AROM to >/= 115 degrees for improved functional mobility and reach. (progressing, not met)  6. Pt will report being able to zip her dress without assist from her  for improved functional mobility (progressing, not met)     Long Term Goals: 8 weeks   1. Pt will report reduced R shoulder pain to </= 4/10 at worst for improved functional mobility and ability to participate in functional activities/ADL's. (progressing, not met)  2. Pt  will be independent with updated HEP for improved strength, functional mobility, ROM, posture, and endurance. (progressing, not met)  3. Pt will increase R shoulder ER at 90 AROM to >/= 80degrees for improved functional mobility and reach. (progressing, not met)  4. Pt will demo 5/5 strength in R shoulder for improved functional mobility, endurance, and posture. (progressing, not met)  5. Pt will increase R shoulder ABD AROM to >/= 130 degrees for improved functional mobility and reach. (progressing, not met)  6. Pt will report being able to sleep through the night without R shoulder pain waking her up for improved functional mobility and quality of life (progressing, not met)    Plan     Continue POC. Progress as tolerated.    Ana Maria Harmon, PTA

## 2020-01-17 ENCOUNTER — DOCUMENTATION ONLY (OUTPATIENT)
Dept: SURGERY | Facility: CLINIC | Age: 75
End: 2020-01-17

## 2020-01-17 NOTE — PROGRESS NOTES
Called Patient with results of breast biopsy from 1/15/2020.  Explained that the biopsy showed Invasive ductal carcinoma . Discussed what this means and that the next step is to meet with a breast surgeon. An appt was made for 1/22/2020 with Dr. Nieves.  Reviewed location of breast center. Patient verbalized understanding.  Offered to e mail appt and educational information to patient, she stated she does not have an e mail address. Reassured her we will provide her with information at the appt.

## 2020-01-22 ENCOUNTER — OFFICE VISIT (OUTPATIENT)
Dept: SURGERY | Facility: CLINIC | Age: 75
End: 2020-01-22
Payer: MEDICARE

## 2020-01-22 VITALS
BODY MASS INDEX: 31.59 KG/M2 | WEIGHT: 189.63 LBS | HEART RATE: 89 BPM | DIASTOLIC BLOOD PRESSURE: 76 MMHG | TEMPERATURE: 98 F | HEIGHT: 65 IN | SYSTOLIC BLOOD PRESSURE: 137 MMHG

## 2020-01-22 DIAGNOSIS — Z17.0 MALIGNANT NEOPLASM OF UPPER-OUTER QUADRANT OF LEFT BREAST IN FEMALE, ESTROGEN RECEPTOR POSITIVE: Primary | ICD-10-CM

## 2020-01-22 DIAGNOSIS — C50.412 MALIGNANT NEOPLASM OF UPPER-OUTER QUADRANT OF LEFT BREAST IN FEMALE, ESTROGEN RECEPTOR POSITIVE: Primary | ICD-10-CM

## 2020-01-22 LAB
FINAL PATHOLOGIC DIAGNOSIS: NORMAL
GROSS: NORMAL
Lab: NORMAL
SUPPLEMENTAL DIAGNOSIS: NORMAL

## 2020-01-22 PROCEDURE — 1159F PR MEDICATION LIST DOCUMENTED IN MEDICAL RECORD: ICD-10-PCS | Mod: HCNC,S$GLB,, | Performed by: SURGERY

## 2020-01-22 PROCEDURE — 99499 RISK ADDL DX/OHS AUDIT: ICD-10-PCS | Mod: HCNC,S$GLB,, | Performed by: SURGERY

## 2020-01-22 PROCEDURE — 3078F DIAST BP <80 MM HG: CPT | Mod: HCNC,CPTII,S$GLB, | Performed by: SURGERY

## 2020-01-22 PROCEDURE — 3075F PR MOST RECENT SYSTOLIC BLOOD PRESS GE 130-139MM HG: ICD-10-PCS | Mod: HCNC,CPTII,S$GLB, | Performed by: SURGERY

## 2020-01-22 PROCEDURE — 99999 PR PBB SHADOW E&M-EST. PATIENT-LVL III: ICD-10-PCS | Mod: PBBFAC,HCNC,, | Performed by: SURGERY

## 2020-01-22 PROCEDURE — 99205 OFFICE O/P NEW HI 60 MIN: CPT | Mod: HCNC,S$GLB,, | Performed by: SURGERY

## 2020-01-22 PROCEDURE — 1101F PR PT FALLS ASSESS DOC 0-1 FALLS W/OUT INJ PAST YR: ICD-10-PCS | Mod: HCNC,CPTII,S$GLB, | Performed by: SURGERY

## 2020-01-22 PROCEDURE — 99499 UNLISTED E&M SERVICE: CPT | Mod: HCNC,S$GLB,, | Performed by: SURGERY

## 2020-01-22 PROCEDURE — 99205 PR OFFICE/OUTPT VISIT, NEW, LEVL V, 60-74 MIN: ICD-10-PCS | Mod: HCNC,S$GLB,, | Performed by: SURGERY

## 2020-01-22 PROCEDURE — 3078F PR MOST RECENT DIASTOLIC BLOOD PRESSURE < 80 MM HG: ICD-10-PCS | Mod: HCNC,CPTII,S$GLB, | Performed by: SURGERY

## 2020-01-22 PROCEDURE — 99999 PR PBB SHADOW E&M-EST. PATIENT-LVL III: CPT | Mod: PBBFAC,HCNC,, | Performed by: SURGERY

## 2020-01-22 PROCEDURE — 1101F PT FALLS ASSESS-DOCD LE1/YR: CPT | Mod: HCNC,CPTII,S$GLB, | Performed by: SURGERY

## 2020-01-22 PROCEDURE — 3075F SYST BP GE 130 - 139MM HG: CPT | Mod: HCNC,CPTII,S$GLB, | Performed by: SURGERY

## 2020-01-22 PROCEDURE — 1159F MED LIST DOCD IN RCRD: CPT | Mod: HCNC,S$GLB,, | Performed by: SURGERY

## 2020-01-22 NOTE — LETTER
January 23, 2020      Selena Montemayor MD  [address]  [facility] [address]           [facility]  [address]  [facility] [address]  Phone: [phone]  Fax: [phone]          Patient: Alisia Gusman   MR Number: [id number]   YOB: 1945   Date of Visit: 1/22/2020       Dear Dr. Selena Montemayor:    Thank you for referring Alisia Gusman to me for evaluation. Attached you will find relevant portions of my assessment and plan of care.    If you have questions, please do not hesitate to call me. I look forward to following Alisia Gusman along with you.    Sincerely,    Hamida Nieves MD    Enclosure  CC:  No Recipients    If you would like to receive this communication electronically, please contact [email or web address] or [phone] to request more information on [facility] [employer] access.    For providers and/or their staff who would like to refer a patient to [facility], please contact us through our one-stop-shop provider referral line, [facility], at [phone].    If you feel you have received this communication in error or would no longer like to receive these types of communications, please e-mail [email or web address]

## 2020-01-22 NOTE — Clinical Note
Davonte Montemayor,Ms. Gusman will obtain a PET scan since this is a recurrent breast cancer to assess for any concern of metastatic disease.  She is also undergoing genetic testing.  She will return next week to discuss the details of surgery further, but I have recommended a mastectomy to her since she has already had radiation of this same breast in the past. We prefer not to repeat radiation, but will finalize her decision next week.Thanks for sending her!Hamida Nieves [facility]

## 2020-01-22 NOTE — PROGRESS NOTES
New Breast Cancer  History and Physical  [facility]  Department of Surgery    REFERRING PROVIDER: Selena Montemayor MD  [id number] [place] [address]    CHIEF COMPLAINT: left breast cancer    Subjective:      Alisia Gusman is a 74 y.o. postmenopausal female referred for evaluation of recently diagnosed carcinoma of the left breast. The patient was initially referred for surgical evaluation of an abnormal mammogram first noted 20. Follow-up imaging showed mass at 12 o'clock in the posterior depth, 5 cm from the nipple. in the left breast. A ultrasound guided biopsy with placement of twirl marker was performed on 1/15/20 with pathology revealing infiltrating ductal carcinoma of the breast.     Patient does routinely do self breast exams.  Patient does not have noted a change on breast exam.  Patient denies nipple discharge. Patient admits to to previous breast biopsy. Patient admits to a personal history of breast cancer.  1993 underwent a lumpectomy with SNLB at [facility], patient was managed with radiation and endocrine therapy for 5 years. Denies chemo,     Findings at that time were the following:   Tumor size: 0.4 cm   Tumor ndgndrndanddndend:nd nd2nd estrogen receptor:  Positive; strong nuclear staining over 95% of tumor cells.   Progesterone receptor:  Negative; positive nuclear staining in less than 1%   of tumor cells.   Her2:  Equivocal  (Stain score = 2+).  This block will be sent for HER2   Lymph node status: negative      GYN History:  Age of menarche was 10. Age of menopause was 57.  Patient denies hormonal therapy. Patient is . Age of first live birth was 25. Patient did not breast feed.    FAMILY History:  Two sisters with breast cancer, early 55  Paternal aunt had breast cancer at 50      Past Medical History:   Diagnosis Date    Adult bronchiectasis 2017    Allergy     Anemia     Arthritis     Asthma     Breast cancer     left w/ radiation     Cancer     L eft breast     "Cataract     Chronic cervical radiculopathy 9/20/2012    Diabetes mellitus     Diabetes mellitus type II     Diabetes with neurologic complications     Diverticulosis     Dry eyes     Dysphagia     after knee surgery June 2014    GERD (gastroesophageal reflux disease)     Hyperlipidemia     Hypertension     Neuropathy     feet    Scalp tenderness     Shortness of breath     Ulcer      Past Surgical History:   Procedure Laterality Date    BREAST BIOPSY Left 1993    BREAST LUMPECTOMY Left 1993    CARPAL TUNNEL RELEASE Bilateral 2011    CATARACT EXTRACTION W/  INTRAOCULAR LENS IMPLANT Bilateral 2013 and 2014    CHOLECYSTECTOMY      COLONOSCOPY N/A 11/6/2015    Procedure: COLONOSCOPY;  Surgeon: Major Michelle MD;  [facility]  Service: Endoscopy;  Laterality: N/A;    COLONOSCOPY N/A 5/8/2019    Procedure: COLONOSCOPY;  Surgeon: Major Michelle MD;  Location: [facility] ENDO ([facility]);  Service: Endoscopy;  Laterality: N/A;    EYE SURGERY      HYSTERECTOMY      partial hyst    JOINT REPLACEMENT Left June 2014    knee    uvuloplasty       Current Outpatient Medications on File Prior to Visit   Medication Sig Dispense Refill    ACCU-CHEK DOMENICO PLUS TEST STRP Strp TEST THREE TIMES DAILY AS DIRECTED 300 strip 3    ACCU-CHEK SOFTCLIX LANCETS Misc 1 each by Misc.(Non-Drug; Combo Route) route 3 (three) times daily. 270 each 3    ALCOHOL ANTISEPTIC PADS (ALCOHOL PREP PADS TOP)       aspirin 81 MG chewable tablet Take 1 tablet by mouth At bedtime.      atenolol (TENORMIN) 25 MG tablet Take 0.5 tablets (12.5 mg total) by mouth once daily. 45 tablet 3    BD ALCOHOL SWABS PadM 1 each 2 (two) times daily.      BD ULTRA-FINE SUSIE PEN NEEDLES 32 gauge x 5/32" Ndle USE  TO  INJECT  ONE  TIME  DAILY 90 each 3    BD VEO INSULIN SYRINGE UF 0.3 mL 31 gauge x 15/64" Syrg USE TWICE DAILY 200 Syringe 5    blood-glucose meter Misc 1 each by Misc.(Non-Drug; Combo Route) route once daily. 1 each 0 "    calcium carbonate-vitamin D3 (CALTRATE 600 + D) 600 mg(1,500mg) -400 unit Chew 1 tablet once daily.       cyanocobalamin (VITAMIN B-12) 1000 MCG tablet Take 100 mcg by mouth once daily.      DERMA-SMOOTHE/FS SCALP OIL 0.01 % Oil Apply oil to damp scalp nightly and cover with shower cap. 1 Bottle 3    FOLIC ACID/MULTIVIT-MIN/LUTEIN (CENTRUM SILVER ORAL) Take 1 tablet by mouth once daily.      glimepiride (AMARYL) 4 MG tablet TAKE 1 TABLET EVERY DAY WITH BREAKFAST 90 tablet 3    hydroCHLOROthiazide (HYDRODIURIL) 25 MG tablet Take 1 tablet (25 mg total) by mouth once daily. 90 tablet 3    ibuprofen (ADVIL,MOTRIN) 200 MG tablet Take 200 mg by mouth every 6 (six) hours as needed for Pain.      insulin NPH-insulin regular, 70/30, (NOVOLIN 70/30) 100 unit/mL (70-30) injection RELION BRAND 26 units thirty minutes before breakfast and 12 units thirty minutes before dinner. (Patient taking differently: 2 (two) times daily. RELION BRAND 26 units thirty minutes before breakfast and 12 units thirty minutes before dinner.) 10 mL 11    insulin regular 100 unit/mL Inj injection RELION brand  Inject 8 units with lunch and 9 units with dinner.      insulin syringe-needle U-100 (BD INSULIN SYRINGE ULTRA-FINE) 1 mL 31 gauge x 5/16 Syrg To use 2 times daily with insulin 90 each 11    ketoconazole (NIZORAL) 2 % shampoo Wash hair with medicated shampoo at least 2x/week - let sit on scalp at least 5 minutes prior to rinsing 120 mL 5    omeprazole (PRILOSEC) 40 MG capsule Take 1 capsule (40 mg total) by mouth every morning. 90 capsule 3    potassium chloride (KLOR-CON) 10 MEQ TbSR TAKE 1 TABLET EVERY DAY 90 tablet 0    pravastatin (PRAVACHOL) 40 MG tablet TAKE 1 TABLET NIGHTLY. 90 tablet 3    pregabalin (LYRICA) 75 MG capsule TAKE 1 CAPSULE TWICE DAILY THEREAFTER 180 capsule 0     Current Facility-Administered Medications on File Prior to Visit   Medication Dose Route Frequency Provider Last Rate Last Dose     triamcinolone acetonide injection 10 mg  10 mg Intradermal 1 time in Clinic/HOD Melany Willis NP        triamcinolone acetonide injection 10 mg  10 mg Intradermal 1 time in Clinic/HOD Melany Willis NP         Social History     Socioeconomic History    Marital status:      Spouse name: Not on file    Number of children: Not on file    Years of education: Not on file    Highest education level: Not on file   Occupational History    Occupation: Retired   Social Needs    Financial resource strain: Not on file    Food insecurity:     Worry: Not on file     Inability: Not on file    Transportation needs:     Medical: Not on file     Non-medical: Not on file   Tobacco Use    Smoking status: Never Smoker    Smokeless tobacco: Never Used   Substance and Sexual Activity    Alcohol use: No    Drug use: No    Sexual activity: Not Currently   Lifestyle    Physical activity:     Days per week: Not on file     Minutes per session: Not on file    Stress: Not on file   Relationships    Social connections:     Talks on phone: Not on file     Gets together: Not on file     Attends Orthodoxy service: Not on file     Active member of club or organization: Not on file     Attends meetings of clubs or organizations: Not on file     Relationship status: Not on file   Other Topics Concern    Are you pregnant or think you may be? Not Asked    Breast-feeding Not Asked   Social History Narrative    No assistance w/ ADLs. 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 Lives with  at home. 3 children who live nearby.      Family History   Problem Relation Age of Onset    Diabetes Mother     Diabetes Sister     Colon polyps Sister     Heart disease Father     No Known Problems Brother     No Known Problems Daughter     No Known Problems Son     Cancer Sister         breast and colon ca    Colon polyps Sister     Diabetes Sister      "Cancer Sister         colon ca    Arthritis Sister     Psoriasis Sister     No Known Problems Daughter     Breast cancer Paternal Aunt     Stroke Sister     Seizures Sister     Diabetes Sister     Asthma Neg Hx     Emphysema Neg Hx     Lupus Neg Hx     Rheum arthritis Neg Hx     Melanoma Neg Hx     Colon cancer Neg Hx     Irritable bowel syndrome Neg Hx     Inflammatory bowel disease Neg Hx     Stomach cancer Neg Hx     Esophageal cancer Neg Hx         Review of Systems  Review of Systems   Constitutional: Negative for activity change, appetite change, chills, fever and unexpected weight change.   HENT: Negative for facial swelling, postnasal drip and sore throat.    Eyes: Negative for redness and itching.   Respiratory: Negative for apnea, chest tightness and shortness of breath.    Cardiovascular: Negative for chest pain and palpitations.   Gastrointestinal: Negative for blood in stool, diarrhea, nausea and vomiting.   Genitourinary: Negative for difficulty urinating and dysuria.   Musculoskeletal: Negative for arthralgias and joint swelling.   Skin: Negative for rash and wound.   Allergic/Immunologic: Negative for environmental allergies and food allergies.   Neurological: Negative for dizziness, syncope and headaches.   Hematological: Negative for adenopathy. Does not bruise/bleed easily.   Psychiatric/Behavioral: Negative for agitation. The patient is not nervous/anxious.         Objective:   PHYSICAL EXAM:  /76 (BP Location: Right arm, Patient Position: Sitting, BP Method: Large (Automatic))   Pulse 89   Temp 97.5 °F (36.4 °C) (Oral)   Ht 5' 5" (1.651 m)   Wt 86 kg (189 lb 9.5 oz)   LMP  (LMP Unknown) Comment: Partial  BMI 31.55 kg/m²     Physical Exam   Constitutional: She is oriented to person, place, and time. She appears well-developed.   HENT:   Head: Normocephalic.   Eyes: Pupils are equal, round, and reactive to light.   Neck: Normal range of motion. Neck supple. "   Cardiovascular: Normal rate and normal heart sounds.    Pulmonary/Chest: Effort normal. Right breast exhibits no inverted nipple, no mass, no nipple discharge and no skin change. Left breast exhibits skin change. Left breast exhibits no inverted nipple, no mass and no nipple discharge.       Abdominal: Soft. Bowel sounds are normal.   Neurological: She is alert and oriented to person, place, and time.         Radiology review: Images personally reviewed by me in the clinic.   US Breast Left Limited  There is a 4 mm oval, parallel, hypoechoic mass with circumscribed margins with no posterior features seen in the upper outer quadrant of the left breast at 12 o'clock in the posterior depth, 5 cm from the nipple.      Impression:  Left  Mass: Left breast mass at the upper outer posterior position. Assessment: 4 - Suspicious finding. Biopsy is recommended. This may represent a complex cyst.      BI-RADS Category:   Left: 4 - Suspicious  Overall: 4 - Suspicious    PATH:  Estrogen receptor: Positive; strong nuclear staining over 95% of tumor cells.  Progesterone receptor: Negative; positive nuclear staining in less than 1% of tumor cells.  Her2: Equivocal (Stain score = 2+). This block will be sent for HER2 analysis by FISH and results will  follow in a supplemental.  Ki-67: Positive nuclear staining in 50% of tumor cells.  All immunohistochemical stains have satisfactory positive and negative controls.  Supplemental (2):  HER2, BREAST TUMOR, FISH:  RESULT SUMMARY:  Negative    Left breast, mass, core biopsy:  Invasive ductal carcinoma, Grade 2 (T= 2, N=3], M=1).  Tumor measures 4 mm (0.4 cm) in greatest linear dimension within the core biopsy specimen.  Microcalcifications are present adjacent to invasive carcinoma.  Areas of fat necrosis are also present.  Immunohistochemical stains for hormonal receptors will be completed and results will follow in  a supplemental report.  Assessment:      Alisia Gusman is a 74 y.o.  postmenopausal female with recently diagnosed recurrent carcinoma of the left breast, estrogen positive, progesterone negative, HER2 negative.      Plan:    Options for management were discussed with the patient and her family. We reviewed the existing data noting the equivalency of breast conserving surgery with radiation therapy and mastectomy. We also reviewed the guidelines of the National Comprehensive Cancer Network for Stage 1 breast carcinoma. We discussed the need for lumpectomy margins to be negative for carcinoma, the necessity for postoperative radiation therapy after breast conservation in most cases, the possibility of a failed or false negative sentinel lymph node biopsy and the potential need for complete lymphadenectomy for a failed or positive sentinel lymph node biopsy were fully discussed. In the setting of mastectomy, delayed or immediate reconstruction options are available and were discussed.     In the setting of lumpectomy, radiation therapy would be recommended majority of the time.  The duration and treatment side effects were discussed with the patient.  This will coordinated with the radiation oncologist pending final pathology.    We also discussed the role of systemic therapy in the treatment of early stage breast cancer.  We discussed that this is based on tumor biology and nicolas status and will be determined based on final pathology.  We discussed that if the cancer is hormone positive, endocrine therapy would be recommended in most cases and its use can reduce the risk of recurrence as well as improve survival. Side effects of treatment were briefly discussed. We also discussed the potential role for chemotherapy based on a number of factors such as tumor phenotype (ER+ vs. triple negative vs. [id number]+) and this would be determined in coordination with the medical oncologist.    Patient will discuss her options with her family, in the meantime will get a PET scan and genetics.  Follow up with results next week in clinic. The operative risks of bleeding, infection, recurrence, scarring, and anesthetic complications and the possibility of requiring further surgery were all noted. Since is recurrent cancer, radiation is not recommended since the field has been radiated in the past.  We discussed that the best option will be perform a mastectomy and SLNB.  I did explain that in women over 70 we can consider lumpectomy without radiation and offered her to meet with Radiation oncologist to discuss this however she was not interested at this time.    Patient was educated on breast cancer, receptors, wire localization lumpectomy, mastectomy, sentinel lymph node mapping and biopsy, axillary lymph node dissection, reconstruction, breast prosthesis with post-mastectomy bra and radiation therapy. Patient was given patient information binder including [facility] breast cancer treatment brochure.  All her questions were answered.    Total time spent with the patient: 60 minutes.  45 minutes of face to face consultation and 15 minutes of chart review and coordination of care.

## 2020-01-23 ENCOUNTER — TELEPHONE (OUTPATIENT)
Dept: INTERNAL MEDICINE | Facility: CLINIC | Age: 75
End: 2020-01-23

## 2020-01-23 DIAGNOSIS — C50.412 MALIGNANT NEOPLASM OF UPPER-OUTER QUADRANT OF LEFT BREAST IN FEMALE, ESTROGEN RECEPTOR POSITIVE: Primary | ICD-10-CM

## 2020-01-23 DIAGNOSIS — Z17.0 MALIGNANT NEOPLASM OF UPPER-OUTER QUADRANT OF LEFT BREAST IN FEMALE, ESTROGEN RECEPTOR POSITIVE: Primary | ICD-10-CM

## 2020-01-23 NOTE — TELEPHONE ENCOUNTER
----- Message from Roxanne Gale sent at 1/23/2020 10:31 AM CST -----  Contact: self   Pt states she has been diagnosed with breast cancer and would like to speak with Dr Montemayor. Please call and advise.

## 2020-01-23 NOTE — TELEPHONE ENCOUNTER
Called and spoke w/ pt. Allie, can you schedule pt to see me on 2/19/20 at 11:30am to further discuss her breast CA? She says she can make it. If you don't mind, please also send her appt reminder once scheduled. Thanks!

## 2020-01-27 ENCOUNTER — CLINICAL SUPPORT (OUTPATIENT)
Dept: REHABILITATION | Facility: HOSPITAL | Age: 75
End: 2020-01-27
Attending: INTERNAL MEDICINE
Payer: MEDICARE

## 2020-01-27 DIAGNOSIS — M25.611 DECREASED RANGE OF MOTION OF RIGHT SHOULDER: ICD-10-CM

## 2020-01-27 DIAGNOSIS — M25.511 CHRONIC RIGHT SHOULDER PAIN: ICD-10-CM

## 2020-01-27 DIAGNOSIS — R26.89 DECREASED FUNCTIONAL MOBILITY: ICD-10-CM

## 2020-01-27 DIAGNOSIS — G89.29 CHRONIC RIGHT SHOULDER PAIN: ICD-10-CM

## 2020-01-27 DIAGNOSIS — R29.3 POOR POSTURE: ICD-10-CM

## 2020-01-27 PROCEDURE — 97110 THERAPEUTIC EXERCISES: CPT | Mod: HCNC,PO

## 2020-01-27 NOTE — PROGRESS NOTES
Physical Therapy Daily Treatment Note     Name: Alisia Gusman  Clinic Number: [id number]    Therapy Diagnosis:   Encounter Diagnoses   Name Primary?    Chronic right shoulder pain     Decreased range of motion of right shoulder     Poor posture     Decreased functional mobility      Physician: Selena Montemayor MD    Visit Date: 2020    Physician Orders: PT Eval and Treat   Medical Diagnosis from Referral: Chronic right shoulder pain  Evaluation Date: 2019  Authorization Period Expiration: 2020  Plan of Care Expiration: 20  Visit # / Visits authorized: 4       Time In: 9:05 AM  Time Out: 10:00 AM  Total Billable Time: 15 minutes     Precautions: Standard, Diabetes, Fall and cancer    Subjective     Pt reports: her shoulder feels pretty good today. Found out she has breast cancer and wants to stop PT at this time, as she will focus on her cancer treatment. Pain at worst has been 5/10 recently  She was compliant with home exercise program.  Response to previous treatment: good- stretches felt good  Functional change: improved overhead reach  Pain: 1/10  Location: right shoulder     Objective       Alisia received therapeutic exercises to develop strength, endurance and ROM for 50 minutes includin:1 for 15min     Active Range of Motion:   Shoulder Left Right   Flexion 153 162   Abduction 138 152   ER at 0 66 70   ER at 90 70 79   IR T7 T9   IR at 90 90 90      Upper Extremity Strength    (L) UE (R) UE   Shoulder flexion: 5/5 5/5   Shoulder Abduction: 5/5 4+/5   Shoulder ER 5/5 5/5   Shoulder IR 5/5 5/5   Lower Trap TBA TBA   Middle Trap TBA TBA   Rhomboids TBA TBA   Biceps, triceps, wrist flex and ext are 5/5 B         Pulleys: 3' flexion and scaption, standing   Supine wand flexion: 2x10   Supine wand ER: 3x10   Chest stretch over towel : 3'   Seated scap retractions x 20  Pec stretch in doorway 2 x 30s  Upper trap stretch, 2 x 30s  Levator stretch, 2 x 30s/side  Rows with orange band 2x10    Shoulder ext, 2 x 10 orange band  Seated breuggers in chair, 2 x 10         Home Exercises Provided and Patient Education Provided     Education provided:   - continue HEP, that pt can return to PT after she finishes her work-up for breast cancer, if pt finds she needs it. PT explained that pt may have PT for her breast cancer, depending on surgical plan    Written Home Exercises Provided: Patient instructed to cont prior HEP.  Exercises were reviewed and Alisia was able to demonstrate them prior to the end of the session.  Alisia demonstrated good  understanding of the education provided.     See EMR under Patient Instructions for exercises provided 1/6/2020.      Assessment     Patient tolerated all exercises well today including addition of theraband shoulder extension and breuggers. Pt reports she has recurrence of left breast cancer and wishes to stop PT at this time, so she can focus on her treatment. Pt understands she can return to PT with a new referral after her course for cancer care, if pt finds she needs it. Pt with good understanding. Pt has met goals as noted below. Pt to be discahrged from PT at this time.    Goals:  Short Term Goals: 4 weeks   1. Pt will report reduced R shoulder pain to </= 6/10 at worst for improved functional mobility and ability to participate in functional activities/ADL's. (Met, 1/27)  2. Pt will tolerate HEP for improved strength, functional mobility, ROM, posture, and endurance. (Met, 1/27)  3. Pt will increase R shoulder flexion AROM to >/= 150 degrees for improved functional mobility and reach. (Met, 1/27)  4. Pt will demo >/= 4-/5 strength in R shoulder for improved functional mobility, endurance, and posture. Exceeded, 1/27)  5. Pt will increase R shoulder ABD AROM to >/= 115 degrees for improved functional mobility and reach. (Exceeded, 1/27)  6. Pt will report being able to zip her dress without assist from her  for improved functional mobility (Not met)     Long  Term Goals: 8 weeks   1. Pt will report reduced R shoulder pain to </= 4/10 at worst for improved functional mobility and ability to participate in functional activities/ADL's. (progressing, not met)  2. Pt will be independent with updated HEP for improved strength, functional mobility, ROM, posture, and endurance. (progressing, not met)  3. Pt will increase R shoulder ER at 90 AROM to >/= 80degrees for improved functional mobility and reach. (progressing, not met)  4. Pt will demo 5/5 strength in R shoulder for improved functional mobility, endurance, and posture. (progressing, not met)  5. Pt will increase R shoulder ABD AROM to >/= 130 degrees for improved functional mobility and reach. (Exceeded, 1/27)  6. Pt will report being able to sleep through the night without R shoulder pain waking her up for improved functional mobility and quality of life (Met, 1/27    Plan     Discharge from PT    Leslie Jerome, PT

## 2020-01-28 ENCOUNTER — HOSPITAL ENCOUNTER (OUTPATIENT)
Dept: RADIOLOGY | Facility: HOSPITAL | Age: 75
Discharge: HOME | End: 2020-01-28
Attending: SURGERY
Payer: MEDICARE

## 2020-01-28 DIAGNOSIS — C50.412 MALIGNANT NEOPLASM OF UPPER-OUTER QUADRANT OF LEFT BREAST IN FEMALE, ESTROGEN RECEPTOR POSITIVE: ICD-10-CM

## 2020-01-28 DIAGNOSIS — Z17.0 MALIGNANT NEOPLASM OF UPPER-OUTER QUADRANT OF LEFT BREAST IN FEMALE, ESTROGEN RECEPTOR POSITIVE: ICD-10-CM

## 2020-01-28 LAB — POCT GLUCOSE: 67 MG/DL (ref 70–110)

## 2020-01-28 PROCEDURE — 78815 NM PET CT ROUTINE: ICD-10-PCS | Mod: 26,HCNC,PS, | Performed by: RADIOLOGY

## 2020-01-28 PROCEDURE — A9552 F18 FDG: HCPCS | Mod: HCNC

## 2020-01-28 PROCEDURE — 78815 PET IMAGE W/CT SKULL-THIGH: CPT | Mod: TC,HCNC

## 2020-01-28 PROCEDURE — 78815 PET IMAGE W/CT SKULL-THIGH: CPT | Mod: 26,HCNC,PS, | Performed by: RADIOLOGY

## 2020-01-29 ENCOUNTER — OFFICE VISIT (OUTPATIENT)
Dept: SURGERY | Facility: CLINIC | Age: 75
End: 2020-01-29
Payer: MEDICARE

## 2020-01-29 VITALS
BODY MASS INDEX: 31.59 KG/M2 | DIASTOLIC BLOOD PRESSURE: 75 MMHG | HEIGHT: 65 IN | HEART RATE: 92 BPM | WEIGHT: 189.63 LBS | SYSTOLIC BLOOD PRESSURE: 155 MMHG

## 2020-01-29 DIAGNOSIS — C50.912 RECURRENT BREAST CANCER, LEFT: Primary | ICD-10-CM

## 2020-01-29 DIAGNOSIS — Z01.818 PREOP TESTING: ICD-10-CM

## 2020-01-29 PROCEDURE — 1126F AMNT PAIN NOTED NONE PRSNT: CPT | Mod: HCNC,S$GLB,, | Performed by: SURGERY

## 2020-01-29 PROCEDURE — 99999 PR PBB SHADOW E&M-EST. PATIENT-LVL III: ICD-10-PCS | Mod: PBBFAC,HCNC,, | Performed by: SURGERY

## 2020-01-29 PROCEDURE — 3078F DIAST BP <80 MM HG: CPT | Mod: HCNC,CPTII,S$GLB, | Performed by: SURGERY

## 2020-01-29 PROCEDURE — 3077F SYST BP >= 140 MM HG: CPT | Mod: HCNC,CPTII,S$GLB, | Performed by: SURGERY

## 2020-01-29 PROCEDURE — 99214 PR OFFICE/OUTPT VISIT, EST, LEVL IV, 30-39 MIN: ICD-10-PCS | Mod: HCNC,S$GLB,, | Performed by: SURGERY

## 2020-01-29 PROCEDURE — 1101F PR PT FALLS ASSESS DOC 0-1 FALLS W/OUT INJ PAST YR: ICD-10-PCS | Mod: HCNC,CPTII,S$GLB, | Performed by: SURGERY

## 2020-01-29 PROCEDURE — 99999 PR PBB SHADOW E&M-EST. PATIENT-LVL III: CPT | Mod: PBBFAC,HCNC,, | Performed by: SURGERY

## 2020-01-29 PROCEDURE — 1101F PT FALLS ASSESS-DOCD LE1/YR: CPT | Mod: HCNC,CPTII,S$GLB, | Performed by: SURGERY

## 2020-01-29 PROCEDURE — 99214 OFFICE O/P EST MOD 30 MIN: CPT | Mod: HCNC,S$GLB,, | Performed by: SURGERY

## 2020-01-29 PROCEDURE — 1159F MED LIST DOCD IN RCRD: CPT | Mod: HCNC,S$GLB,, | Performed by: SURGERY

## 2020-01-29 PROCEDURE — 3077F PR MOST RECENT SYSTOLIC BLOOD PRESSURE >= 140 MM HG: ICD-10-PCS | Mod: HCNC,CPTII,S$GLB, | Performed by: SURGERY

## 2020-01-29 PROCEDURE — 1159F PR MEDICATION LIST DOCUMENTED IN MEDICAL RECORD: ICD-10-PCS | Mod: HCNC,S$GLB,, | Performed by: SURGERY

## 2020-01-29 PROCEDURE — 3078F PR MOST RECENT DIASTOLIC BLOOD PRESSURE < 80 MM HG: ICD-10-PCS | Mod: HCNC,CPTII,S$GLB, | Performed by: SURGERY

## 2020-01-29 PROCEDURE — 1126F PR PAIN SEVERITY QUANTIFIED, NO PAIN PRESENT: ICD-10-PCS | Mod: HCNC,S$GLB,, | Performed by: SURGERY

## 2020-01-29 NOTE — H&P (VIEW-ONLY)
New Breast Cancer  History and Physical  [facility]  Department of Surgery    REFERRING PROVIDER: Selena Montemayor MD  [address] [place] [address]    CHIEF COMPLAINT: left breast cancer    Subjective:      Alisia Gusman is a 74 y.o. postmenopausal female referred for evaluation of recently diagnosed carcinoma of the left breast. The patient was initially referred for surgical evaluation of an abnormal mammogram first noted 20. Follow-up imaging showed mass at 12 o'clock in the posterior depth, 5 cm from the nipple. in the left breast. A ultrasound guided biopsy with placement of twirl marker was performed on 1/15/20 with pathology revealing infiltrating ductal carcinoma of the breast.     Patient does routinely do self breast exams.  Patient does not have noted a change on breast exam.  Patient denies nipple discharge. Patient admits to to previous breast biopsy. Patient admits to a personal history of breast cancer.  1993 underwent a lumpectomy with SNLB at [facility], patient was managed with radiation and endocrine therapy for 5 years. Denies chemo,     Findings at that time were the following:   Tumor size: 0.4 cm   Tumor ndgndrndanddndend:nd nd2nd estrogen receptor:  Positive; strong nuclear staining over 95% of tumor cells.   Progesterone receptor:  Negative; positive nuclear staining in less than 1%   of tumor cells.   Her2:  Equivocal  (Stain score = 2+). Fish : negative  Lymph node status: negative      GYN History:  Age of menarche was 10. Age of menopause was 57.  Patient denies hormonal therapy. Patient is . Age of first live birth was 25. Patient did not breast feed.    FAMILY History:  Two sisters with breast cancer, early 55  Paternal aunt had breast cancer at 50    Interval History:   No changes in breast exam, denies nipple discharge, changes in skin, or new lumps.   Genetic testing still pending. She is still thinking about her surgical options.     PET SCAN FINDINGS:    There is physiologic  intracranial head and neck activity.  Heart mediastinum are normal.  There is no evidence axillary internal mammary or distant metastases.  There is physiologic liver, spleen, GI and  activity.  There is a right renal cyst.  There are cholecystectomy clips.  There is physiologic bowel activity.  No bone or lung lesions are seen.      Past Medical History:   Diagnosis Date    Adult bronchiectasis 7/26/2017    Allergy     Anemia     Arthritis     Asthma     Breast cancer 1993    left w/ radiation     Cancer 1993    L eft breast    Cataract     Chronic cervical radiculopathy 9/20/2012    Diabetes mellitus     Diabetes mellitus type II     Diabetes with neurologic complications     Diverticulosis     Dry eyes     Dysphagia     after knee surgery June 2014    GERD (gastroesophageal reflux disease)     Hyperlipidemia     Hypertension     Neuropathy     feet    Scalp tenderness     Shortness of breath     Ulcer      Past Surgical History:   Procedure Laterality Date    BREAST BIOPSY Left 1993    BREAST LUMPECTOMY Left 1993    CARPAL TUNNEL RELEASE Bilateral 2011    CATARACT EXTRACTION W/  INTRAOCULAR LENS IMPLANT Bilateral 2013 and 2014    CHOLECYSTECTOMY      COLONOSCOPY N/A 11/6/2015    Procedure: COLONOSCOPY;  Surgeon: Major Michelle MD;  Location: [facility] ([address] [place] [address]);  Service: Endoscopy;  Laterality: N/A;    COLONOSCOPY N/A 5/8/2019    Procedure: COLONOSCOPY;  Surgeon: Maojr Michelle MD;  Location: [facility] ([address] [place] [address]);  Service: Endoscopy;  Laterality: N/A;    EYE SURGERY      HYSTERECTOMY      partial hyst    JOINT REPLACEMENT Left June 2014    knee    uvuloplasty       Current Outpatient Medications on File Prior to Visit   Medication Sig Dispense Refill    ACCU-CHEK DOMENICO PLUS TEST STRP Strp TEST THREE TIMES DAILY AS DIRECTED 300 strip 3    ACCU-CHEK SOFTCLIX LANCETS Misc 1 each by Misc.(Non-Drug; Combo Route) route 3 (three) times daily. 270 each 3    ALCOHOL ANTISEPTIC  "PADS (ALCOHOL PREP PADS TOP)       aspirin 81 MG chewable tablet Take 1 tablet by mouth At bedtime.      atenolol (TENORMIN) 25 MG tablet Take 0.5 tablets (12.5 mg total) by mouth once daily. 45 tablet 3    BD ALCOHOL SWABS PadM 1 each 2 (two) times daily.      BD ULTRA-FINE SUSIE PEN NEEDLES 32 gauge x 5/32" Ndle USE  TO  INJECT  ONE  TIME  DAILY 90 each 3    BD VEO INSULIN SYRINGE UF 0.3 mL 31 gauge x 15/64" Syrg USE TWICE DAILY 200 Syringe 5    blood-glucose meter Misc 1 each by Misc.(Non-Drug; Combo Route) route once daily. 1 each 0    calcium carbonate-vitamin D3 (CALTRATE 600 + D) 600 mg(1,500mg) -400 unit Chew 1 tablet once daily.       cyanocobalamin (VITAMIN B-12) 1000 MCG tablet Take 100 mcg by mouth once daily.      DERMA-SMOOTHE/FS SCALP OIL 0.01 % Oil Apply oil to damp scalp nightly and cover with shower cap. (Patient not taking: Reported on 1/22/2020) 1 Bottle 3    FOLIC ACID/MULTIVIT-MIN/LUTEIN (CENTRUM SILVER ORAL) Take 1 tablet by mouth once daily.      glimepiride (AMARYL) 4 MG tablet TAKE 1 TABLET EVERY DAY WITH BREAKFAST 90 tablet 3    hydroCHLOROthiazide (HYDRODIURIL) 25 MG tablet Take 1 tablet (25 mg total) by mouth once daily. 90 tablet 3    ibuprofen (ADVIL,MOTRIN) 200 MG tablet Take 200 mg by mouth every 6 (six) hours as needed for Pain.      insulin NPH-insulin regular, 70/30, (NOVOLIN 70/30) 100 unit/mL (70-30) injection RELION BRAND 26 units thirty minutes before breakfast and 12 units thirty minutes before dinner. (Patient taking differently: 2 (two) times daily. RELION BRAND 26 units thirty minutes before breakfast and 12 units thirty minutes before dinner.) 10 mL 11    insulin regular 100 unit/mL Inj injection RELION brand  Inject 8 units with lunch and 9 units with dinner.      insulin syringe-needle U-100 (BD INSULIN SYRINGE ULTRA-FINE) 1 mL 31 gauge x 5/16 Syrg To use 2 times daily with insulin 90 each 11    ketoconazole (NIZORAL) 2 % shampoo Wash hair with medicated " shampoo at least 2x/week - let sit on scalp at least 5 minutes prior to rinsing (Patient not taking: Reported on 1/22/2020) 120 mL 5    omeprazole (PRILOSEC) 40 MG capsule Take 1 capsule (40 mg total) by mouth every morning. 90 capsule 3    potassium chloride (KLOR-CON) 10 MEQ TbSR TAKE 1 TABLET EVERY DAY 90 tablet 0    pravastatin (PRAVACHOL) 40 MG tablet TAKE 1 TABLET NIGHTLY. 90 tablet 3    pregabalin (LYRICA) 75 MG capsule TAKE 1 CAPSULE TWICE DAILY THEREAFTER 180 capsule 0     Current Facility-Administered Medications on File Prior to Visit   Medication Dose Route Frequency Provider Last Rate Last Dose    triamcinolone acetonide injection 10 mg  10 mg Intradermal 1 time in Clinic/HOD Melany Willis NP        triamcinolone acetonide injection 10 mg  10 mg Intradermal 1 time in Clinic/HOD Melany Willis NP         Social History     Socioeconomic History    Marital status:      Spouse name: Not on file    Number of children: Not on file    Years of education: Not on file    Highest education level: Not on file   Occupational History    Occupation: Retired   Social Needs    Financial resource strain: Not on file    Food insecurity:     Worry: Not on file     Inability: Not on file    Transportation needs:     Medical: Not on file     Non-medical: Not on file   Tobacco Use    Smoking status: Never Smoker    Smokeless tobacco: Never Used   Substance and Sexual Activity    Alcohol use: No    Drug use: No    Sexual activity: Not Currently   Lifestyle    Physical activity:     Days per week: Not on file     Minutes per session: Not on file    Stress: Not on file   Relationships    Social connections:     Talks on phone: Not on file     Gets together: Not on file     Attends [employer] service: Not on file     Active member of club or organization: Not on file     Attends meetings of clubs or organizations: Not on file     Relationship status: Not on file   Other Topics Concern     Are you pregnant or think you may be? Not Asked    Breast-feeding Not Asked   Social History Narrative    No assistance w/ ADLs. 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 Lives with  at home. 3 children who live nearby.      Family History   Problem Relation Age of Onset    Diabetes Mother     Diabetes Sister     Colon polyps Sister     Heart disease Father     No Known Problems Brother     No Known Problems Daughter     No Known Problems Son     Cancer Sister         breast and colon ca    Colon polyps Sister     Diabetes Sister     Cancer Sister         colon ca    Arthritis Sister     Psoriasis Sister     No Known Problems Daughter     Breast cancer Paternal Aunt     Stroke Sister     Seizures Sister     Diabetes Sister     Asthma Neg Hx     Emphysema Neg Hx     Lupus Neg Hx     Rheum arthritis Neg Hx     Melanoma Neg Hx     Colon cancer Neg Hx     Irritable bowel syndrome Neg Hx     Inflammatory bowel disease Neg Hx     Stomach cancer Neg Hx     Esophageal cancer Neg Hx         Review of Systems  Review of Systems   Constitutional: Negative for activity change, appetite change, chills, fever and unexpected weight change.   HENT: Negative for facial swelling, postnasal drip and sore throat.    Eyes: Negative for redness and itching.   Respiratory: Negative for apnea, chest tightness and shortness of breath.    Cardiovascular: Negative for chest pain and palpitations.   Gastrointestinal: Negative for blood in stool, diarrhea, nausea and vomiting.   Genitourinary: Negative for difficulty urinating and dysuria.   Musculoskeletal: Negative for arthralgias and joint swelling.   Skin: Negative for rash and wound.   Allergic/Immunologic: Negative for environmental allergies and food allergies.   Neurological: Negative for dizziness, syncope and headaches.   Hematological: Negative for adenopathy. Does not bruise/bleed  "easily.   Psychiatric/Behavioral: Negative for agitation. The patient is not nervous/anxious.         Objective:   PHYSICAL EXAM:  BP (!) 155/75 (BP Location: Right arm, Patient Position: Sitting, BP Method: Medium (Automatic))   Pulse 92   Ht 5' 5" (1.651 m)   Wt 86 kg (189 lb 9.5 oz)   LMP  (LMP Unknown) Comment: Partial  BMI 31.55 kg/m²     Physical Exam   Constitutional: She is oriented to person, place, and time. She appears well-developed.   HENT:   Head: Normocephalic.   Eyes: Pupils are equal, round, and reactive to light.   Neck: Normal range of motion. Neck supple.   Cardiovascular: Normal rate and normal heart sounds.    Pulmonary/Chest: Effort normal. Right breast exhibits no inverted nipple, no mass, no nipple discharge and no skin change. Left breast exhibits skin change. Left breast exhibits no inverted nipple, no mass and no nipple discharge.   Abdominal: Soft. Bowel sounds are normal.   Neurological: She is alert and oriented to person, place, and time.         Radiology review: Images personally reviewed by me in the clinic.   US Breast Left Limited  There is a 4 mm oval, parallel, hypoechoic mass with circumscribed margins with no posterior features seen in the upper outer quadrant of the left breast at 12 o'clock in the posterior depth, 5 cm from the nipple.      Impression:  Left  Mass: Left breast mass at the upper outer posterior position. Assessment: 4 - Suspicious finding. Biopsy is recommended. This may represent a complex cyst.      BI-RADS Category:   Left: 4 - Suspicious  Overall: 4 - Suspicious    PATH:  Estrogen receptor: Positive; strong nuclear staining over 95% of tumor cells.  Progesterone receptor: Negative; positive nuclear staining in less than 1% of tumor cells.  Her2: Equivocal (Stain score = 2+). This block will be sent for HER2 analysis by FISH and results will  follow in a supplemental.  Ki-67: Positive nuclear staining in 50% of tumor cells.  All immunohistochemical " stains have satisfactory positive and negative controls.  Supplemental (2):  HER2, BREAST TUMOR, FISH:  RESULT SUMMARY:  Negative    Left breast, mass, core biopsy:  Invasive ductal carcinoma, Grade 2 (T= 2, N=3], M=1).  Tumor measures 4 mm (0.4 cm) in greatest linear dimension within the core biopsy specimen.  Microcalcifications are present adjacent to invasive carcinoma.  Areas of fat necrosis are also present.  Immunohistochemical stains for hormonal receptors will be completed and results will follow in  a supplemental report.  Assessment:      Alisia Gusman is a 74 y.o. postmenopausal female with recently diagnosed recurrent carcinoma of the left breast, estrogen positive, progesterone negative, HER2 negative, PET negative for metastatic disease.      Plan:    Options for management were discussed with the patient and her family. We reviewed the existing data noting the equivalency of breast conserving surgery with radiation therapy and mastectomy. We also reviewed the guidelines of the National Comprehensive Cancer Network for Stage 1 breast carcinoma. We discussed the need for lumpectomy margins to be negative for carcinoma, the necessity for postoperative radiation therapy after breast conservation in most cases, the possibility of a failed or false negative sentinel lymph node biopsy and the potential need for complete lymphadenectomy for a failed or positive sentinel lymph node biopsy were fully discussed. In the setting of mastectomy, delayed or immediate reconstruction options are available and were discussed.     In the setting of lumpectomy, radiation therapy would be recommended majority of the time.  The duration and treatment side effects were discussed with the patient.  I explained that when it is at all avoidable, we try to eliminate radiation to the same tissue twice, therefore mastectomy has been recommended.    We also discussed the role of systemic therapy in the treatment of early stage  breast cancer.  We discussed that this is based on tumor biology and nicolas status and will be determined based on final pathology.  We discussed that if the cancer is hormone positive, endocrine therapy would be recommended in most cases and its use can reduce the risk of recurrence as well as improve survival. Side effects of treatment were briefly discussed. We also discussed the potential role for chemotherapy based on a number of factors such as tumor phenotype (ER+ vs. triple negative vs. [id number]+) and this would be determined in coordination with the medical oncologist.    Patient will discuss her options with her family, PET scan was negative, genetic testing is still pending. She is still hesitant but was comfortable to review and sign consent for mastectomy and sentinel node today.  The operative risks of bleeding, infection, recurrence, scarring, and anesthetic complications and the possibility of requiring further surgery were all noted. Since this is recurrent cancer, radiation is not recommended since the field has been radiated in the past.  We discussed that the best option will be a mastectomy and SLNB.    I did explain that in women over 70 we can consider lumpectomy without radiation and offered her to meet with Radiation oncologist to discuss this, however she was not interested at this time. She will call the office when she makes a decision.    Patient was educated on breast cancer, receptors, wire localization lumpectomy, mastectomy, sentinel lymph node mapping and biopsy, axillary lymph node dissection, reconstruction, breast prosthesis with post-mastectomy bra and radiation therapy. Patient was given patient information binder including [facility] breast cancer treatment brochure.  All her questions were answered.    Total time spent with the patient: 45 minutes.  30 minutes of face to face consultation and 15 minutes of chart review and coordination of care.

## 2020-01-29 NOTE — Clinical Note
Hi Dr. Montemayor,PET was negative, so will move forward with mastectomy and sentinel node biopsy.  She is taking a little time to discuss with family and to let things sink in, but she understands why mastectomy is recommended for her.Thanks for sending her!Hamida

## 2020-01-29 NOTE — PROGRESS NOTES
New Breast Cancer  History and Physical  [facility]  Department of Surgery    REFERRING PROVIDER: Selena Montemayor MD  [address] [place] [address]    CHIEF COMPLAINT: left breast cancer    Subjective:      Alisia Gusman is a 74 y.o. postmenopausal female referred for evaluation of recently diagnosed carcinoma of the left breast. The patient was initially referred for surgical evaluation of an abnormal mammogram first noted 20. Follow-up imaging showed mass at 12 o'clock in the posterior depth, 5 cm from the nipple. in the left breast. A ultrasound guided biopsy with placement of twirl marker was performed on 1/15/20 with pathology revealing infiltrating ductal carcinoma of the breast.     Patient does routinely do self breast exams.  Patient does not have noted a change on breast exam.  Patient denies nipple discharge. Patient admits to to previous breast biopsy. Patient admits to a personal history of breast cancer.  1993 underwent a lumpectomy with SNLB at [facility], patient was managed with radiation and endocrine therapy for 5 years. Denies chemo,     Findings at that time were the following:   Tumor size: 0.4 cm   Tumor ndgndrndanddndend:nd nd2nd estrogen receptor:  Positive; strong nuclear staining over 95% of tumor cells.   Progesterone receptor:  Negative; positive nuclear staining in less than 1%   of tumor cells.   Her2:  Equivocal  (Stain score = 2+). Fish : negative  Lymph node status: negative      GYN History:  Age of menarche was 10. Age of menopause was 57.  Patient denies hormonal therapy. Patient is . Age of first live birth was 25. Patient did not breast feed.    FAMILY History:  Two sisters with breast cancer, early 55  Paternal aunt had breast cancer at 50    Interval History:   No changes in breast exam, denies nipple discharge, changes in skin, or new lumps.   Genetic testing still pending. She is still thinking about her surgical options.     PET SCAN FINDINGS:    There is physiologic  intracranial head and neck activity.  Heart mediastinum are normal.  There is no evidence axillary internal mammary or distant metastases.  There is physiologic liver, spleen, GI and  activity.  There is a right renal cyst.  There are cholecystectomy clips.  There is physiologic bowel activity.  No bone or lung lesions are seen.      Past Medical History:   Diagnosis Date    Adult bronchiectasis 7/26/2017    Allergy     Anemia     Arthritis     Asthma     Breast cancer 1993    left w/ radiation     Cancer 1993    L eft breast    Cataract     Chronic cervical radiculopathy 9/20/2012    Diabetes mellitus     Diabetes mellitus type II     Diabetes with neurologic complications     Diverticulosis     Dry eyes     Dysphagia     after knee surgery June 2014    GERD (gastroesophageal reflux disease)     Hyperlipidemia     Hypertension     Neuropathy     feet    Scalp tenderness     Shortness of breath     Ulcer      Past Surgical History:   Procedure Laterality Date    BREAST BIOPSY Left 1993    BREAST LUMPECTOMY Left 1993    CARPAL TUNNEL RELEASE Bilateral 2011    CATARACT EXTRACTION W/  INTRAOCULAR LENS IMPLANT Bilateral 2013 and 2014    CHOLECYSTECTOMY      COLONOSCOPY N/A 11/6/2015    Procedure: COLONOSCOPY;  Surgeon: Major Michelle MD;  Location: [facility] ([address] [place] [address]);  Service: Endoscopy;  Laterality: N/A;    COLONOSCOPY N/A 5/8/2019    Procedure: COLONOSCOPY;  Surgeon: Major Michelle MD;  Location: [facility] ([address] [place] [address]);  Service: Endoscopy;  Laterality: N/A;    EYE SURGERY      HYSTERECTOMY      partial hyst    JOINT REPLACEMENT Left June 2014    knee    uvuloplasty       Current Outpatient Medications on File Prior to Visit   Medication Sig Dispense Refill    ACCU-CHEK DOMENICO PLUS TEST STRP Strp TEST THREE TIMES DAILY AS DIRECTED 300 strip 3    ACCU-CHEK SOFTCLIX LANCETS Misc 1 each by Misc.(Non-Drug; Combo Route) route 3 (three) times daily. 270 each 3    ALCOHOL ANTISEPTIC  "PADS (ALCOHOL PREP PADS TOP)       aspirin 81 MG chewable tablet Take 1 tablet by mouth At bedtime.      atenolol (TENORMIN) 25 MG tablet Take 0.5 tablets (12.5 mg total) by mouth once daily. 45 tablet 3    BD ALCOHOL SWABS PadM 1 each 2 (two) times daily.      BD ULTRA-FINE SUSIE PEN NEEDLES 32 gauge x 5/32" Ndle USE  TO  INJECT  ONE  TIME  DAILY 90 each 3    BD VEO INSULIN SYRINGE UF 0.3 mL 31 gauge x 15/64" Syrg USE TWICE DAILY 200 Syringe 5    blood-glucose meter Misc 1 each by Misc.(Non-Drug; Combo Route) route once daily. 1 each 0    calcium carbonate-vitamin D3 (CALTRATE 600 + D) 600 mg(1,500mg) -400 unit Chew 1 tablet once daily.       cyanocobalamin (VITAMIN B-12) 1000 MCG tablet Take 100 mcg by mouth once daily.      DERMA-SMOOTHE/FS SCALP OIL 0.01 % Oil Apply oil to damp scalp nightly and cover with shower cap. (Patient not taking: Reported on 1/22/2020) 1 Bottle 3    FOLIC ACID/MULTIVIT-MIN/LUTEIN (CENTRUM SILVER ORAL) Take 1 tablet by mouth once daily.      glimepiride (AMARYL) 4 MG tablet TAKE 1 TABLET EVERY DAY WITH BREAKFAST 90 tablet 3    hydroCHLOROthiazide (HYDRODIURIL) 25 MG tablet Take 1 tablet (25 mg total) by mouth once daily. 90 tablet 3    ibuprofen (ADVIL,MOTRIN) 200 MG tablet Take 200 mg by mouth every 6 (six) hours as needed for Pain.      insulin NPH-insulin regular, 70/30, (NOVOLIN 70/30) 100 unit/mL (70-30) injection RELION BRAND 26 units thirty minutes before breakfast and 12 units thirty minutes before dinner. (Patient taking differently: 2 (two) times daily. RELION BRAND 26 units thirty minutes before breakfast and 12 units thirty minutes before dinner.) 10 mL 11    insulin regular 100 unit/mL Inj injection RELION brand  Inject 8 units with lunch and 9 units with dinner.      insulin syringe-needle U-100 (BD INSULIN SYRINGE ULTRA-FINE) 1 mL 31 gauge x 5/16 Syrg To use 2 times daily with insulin 90 each 11    ketoconazole (NIZORAL) 2 % shampoo Wash hair with medicated " shampoo at least 2x/week - let sit on scalp at least 5 minutes prior to rinsing (Patient not taking: Reported on 1/22/2020) 120 mL 5    omeprazole (PRILOSEC) 40 MG capsule Take 1 capsule (40 mg total) by mouth every morning. 90 capsule 3    potassium chloride (KLOR-CON) 10 MEQ TbSR TAKE 1 TABLET EVERY DAY 90 tablet 0    pravastatin (PRAVACHOL) 40 MG tablet TAKE 1 TABLET NIGHTLY. 90 tablet 3    pregabalin (LYRICA) 75 MG capsule TAKE 1 CAPSULE TWICE DAILY THEREAFTER 180 capsule 0     Current Facility-Administered Medications on File Prior to Visit   Medication Dose Route Frequency Provider Last Rate Last Dose    triamcinolone acetonide injection 10 mg  10 mg Intradermal 1 time in Clinic/HOD Melany Willis NP        triamcinolone acetonide injection 10 mg  10 mg Intradermal 1 time in Clinic/HOD Melany Willis NP         Social History     Socioeconomic History    Marital status:      Spouse name: Not on file    Number of children: Not on file    Years of education: Not on file    Highest education level: Not on file   Occupational History    Occupation: Retired   Social Needs    Financial resource strain: Not on file    Food insecurity:     Worry: Not on file     Inability: Not on file    Transportation needs:     Medical: Not on file     Non-medical: Not on file   Tobacco Use    Smoking status: Never Smoker    Smokeless tobacco: Never Used   Substance and Sexual Activity    Alcohol use: No    Drug use: No    Sexual activity: Not Currently   Lifestyle    Physical activity:     Days per week: Not on file     Minutes per session: Not on file    Stress: Not on file   Relationships    Social connections:     Talks on phone: Not on file     Gets together: Not on file     Attends Christianity service: Not on file     Active member of club or organization: Not on file     Attends meetings of clubs or organizations: Not on file     Relationship status: Not on file   Other Topics Concern     Are you pregnant or think you may be? Not Asked    Breast-feeding Not Asked   Social History Narrative    No assistance w/ ADLs. 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 Lives with  at home. 3 children who live nearby.      Family History   Problem Relation Age of Onset    Diabetes Mother     Diabetes Sister     Colon polyps Sister     Heart disease Father     No Known Problems Brother     No Known Problems Daughter     No Known Problems Son     Cancer Sister         breast and colon ca    Colon polyps Sister     Diabetes Sister     Cancer Sister         colon ca    Arthritis Sister     Psoriasis Sister     No Known Problems Daughter     Breast cancer Paternal Aunt     Stroke Sister     Seizures Sister     Diabetes Sister     Asthma Neg Hx     Emphysema Neg Hx     Lupus Neg Hx     Rheum arthritis Neg Hx     Melanoma Neg Hx     Colon cancer Neg Hx     Irritable bowel syndrome Neg Hx     Inflammatory bowel disease Neg Hx     Stomach cancer Neg Hx     Esophageal cancer Neg Hx         Review of Systems  Review of Systems   Constitutional: Negative for activity change, appetite change, chills, fever and unexpected weight change.   HENT: Negative for facial swelling, postnasal drip and sore throat.    Eyes: Negative for redness and itching.   Respiratory: Negative for apnea, chest tightness and shortness of breath.    Cardiovascular: Negative for chest pain and palpitations.   Gastrointestinal: Negative for blood in stool, diarrhea, nausea and vomiting.   Genitourinary: Negative for difficulty urinating and dysuria.   Musculoskeletal: Negative for arthralgias and joint swelling.   Skin: Negative for rash and wound.   Allergic/Immunologic: Negative for environmental allergies and food allergies.   Neurological: Negative for dizziness, syncope and headaches.   Hematological: Negative for adenopathy. Does not bruise/bleed  "easily.   Psychiatric/Behavioral: Negative for agitation. The patient is not nervous/anxious.         Objective:   PHYSICAL EXAM:  BP (!) 155/75 (BP Location: Right arm, Patient Position: Sitting, BP Method: Medium (Automatic))   Pulse 92   Ht 5' 5" (1.651 m)   Wt 86 kg (189 lb 9.5 oz)   LMP  (LMP Unknown) Comment: Partial  BMI 31.55 kg/m²     Physical Exam   Constitutional: She is oriented to person, place, and time. She appears well-developed.   HENT:   Head: Normocephalic.   Eyes: Pupils are equal, round, and reactive to light.   Neck: Normal range of motion. Neck supple.   Cardiovascular: Normal rate and normal heart sounds.    Pulmonary/Chest: Effort normal. Right breast exhibits no inverted nipple, no mass, no nipple discharge and no skin change. Left breast exhibits skin change. Left breast exhibits no inverted nipple, no mass and no nipple discharge.   Abdominal: Soft. Bowel sounds are normal.   Neurological: She is alert and oriented to person, place, and time.         Radiology review: Images personally reviewed by me in the clinic.   US Breast Left Limited  There is a 4 mm oval, parallel, hypoechoic mass with circumscribed margins with no posterior features seen in the upper outer quadrant of the left breast at 12 o'clock in the posterior depth, 5 cm from the nipple.      Impression:  Left  Mass: Left breast mass at the upper outer posterior position. Assessment: 4 - Suspicious finding. Biopsy is recommended. This may represent a complex cyst.      BI-RADS Category:   Left: 4 - Suspicious  Overall: 4 - Suspicious    PATH:  Estrogen receptor: Positive; strong nuclear staining over 95% of tumor cells.  Progesterone receptor: Negative; positive nuclear staining in less than 1% of tumor cells.  Her2: Equivocal (Stain score = 2+). This block will be sent for HER2 analysis by FISH and results will  follow in a supplemental.  Ki-67: Positive nuclear staining in 50% of tumor cells.  All immunohistochemical " stains have satisfactory positive and negative controls.  Supplemental (2):  HER2, BREAST TUMOR, FISH:  RESULT SUMMARY:  Negative    Left breast, mass, core biopsy:  Invasive ductal carcinoma, Grade 2 (T= 2, N=3], M=1).  Tumor measures 4 mm (0.4 cm) in greatest linear dimension within the core biopsy specimen.  Microcalcifications are present adjacent to invasive carcinoma.  Areas of fat necrosis are also present.  Immunohistochemical stains for hormonal receptors will be completed and results will follow in  a supplemental report.  Assessment:      Alisia Gusman is a 74 y.o. postmenopausal female with recently diagnosed recurrent carcinoma of the left breast, estrogen positive, progesterone negative, HER2 negative, PET negative for metastatic disease.      Plan:    Options for management were discussed with the patient and her family. We reviewed the existing data noting the equivalency of breast conserving surgery with radiation therapy and mastectomy. We also reviewed the guidelines of the National Comprehensive Cancer Network for Stage 1 breast carcinoma. We discussed the need for lumpectomy margins to be negative for carcinoma, the necessity for postoperative radiation therapy after breast conservation in most cases, the possibility of a failed or false negative sentinel lymph node biopsy and the potential need for complete lymphadenectomy for a failed or positive sentinel lymph node biopsy were fully discussed. In the setting of mastectomy, delayed or immediate reconstruction options are available and were discussed.     In the setting of lumpectomy, radiation therapy would be recommended majority of the time.  The duration and treatment side effects were discussed with the patient.  I explained that when it is at all avoidable, we try to eliminate radiation to the same tissue twice, therefore mastectomy has been recommended.    We also discussed the role of systemic therapy in the treatment of early stage  breast cancer.  We discussed that this is based on tumor biology and nicolas status and will be determined based on final pathology.  We discussed that if the cancer is hormone positive, endocrine therapy would be recommended in most cases and its use can reduce the risk of recurrence as well as improve survival. Side effects of treatment were briefly discussed. We also discussed the potential role for chemotherapy based on a number of factors such as tumor phenotype (ER+ vs. triple negative vs. [id number]+) and this would be determined in coordination with the medical oncologist.    Patient will discuss her options with her family, PET scan was negative, genetic testing is still pending. She is still hesitant but was comfortable to review and sign consent for mastectomy and sentinel node today.  The operative risks of bleeding, infection, recurrence, scarring, and anesthetic complications and the possibility of requiring further surgery were all noted. Since this is recurrent cancer, radiation is not recommended since the field has been radiated in the past.  We discussed that the best option will be a mastectomy and SLNB.    I did explain that in women over 70 we can consider lumpectomy without radiation and offered her to meet with Radiation oncologist to discuss this, however she was not interested at this time. She will call the office when she makes a decision.    Patient was educated on breast cancer, receptors, wire localization lumpectomy, mastectomy, sentinel lymph node mapping and biopsy, axillary lymph node dissection, reconstruction, breast prosthesis with post-mastectomy bra and radiation therapy. Patient was given patient information binder including [facility] breast cancer treatment brochure.  All her questions were answered.    Total time spent with the patient: 45 minutes.  30 minutes of face to face consultation and 15 minutes of chart review and coordination of care.

## 2020-02-03 ENCOUNTER — TELEPHONE (OUTPATIENT)
Dept: SURGERY | Facility: CLINIC | Age: 75
End: 2020-02-03

## 2020-02-03 ENCOUNTER — OFFICE VISIT (OUTPATIENT)
Dept: PODIATRY | Facility: CLINIC | Age: 75
End: 2020-02-03
Payer: MEDICARE

## 2020-02-03 VITALS
HEIGHT: 65 IN | DIASTOLIC BLOOD PRESSURE: 86 MMHG | RESPIRATION RATE: 18 BRPM | HEART RATE: 86 BPM | SYSTOLIC BLOOD PRESSURE: 153 MMHG | WEIGHT: 184 LBS | BODY MASS INDEX: 30.66 KG/M2

## 2020-02-03 DIAGNOSIS — E11.65 TYPE 2 DIABETES MELLITUS WITH HYPERGLYCEMIA, WITH LONG-TERM CURRENT USE OF INSULIN: Primary | ICD-10-CM

## 2020-02-03 DIAGNOSIS — B35.1 ONYCHOMYCOSIS DUE TO DERMATOPHYTE: ICD-10-CM

## 2020-02-03 DIAGNOSIS — Z79.4 TYPE 2 DIABETES MELLITUS WITH HYPERGLYCEMIA, WITH LONG-TERM CURRENT USE OF INSULIN: Primary | ICD-10-CM

## 2020-02-03 DIAGNOSIS — Z17.0 MALIGNANT NEOPLASM OF UPPER-OUTER QUADRANT OF LEFT BREAST IN FEMALE, ESTROGEN RECEPTOR POSITIVE: Primary | ICD-10-CM

## 2020-02-03 DIAGNOSIS — L84 CORN OR CALLUS: ICD-10-CM

## 2020-02-03 DIAGNOSIS — C50.412 MALIGNANT NEOPLASM OF UPPER-OUTER QUADRANT OF LEFT BREAST IN FEMALE, ESTROGEN RECEPTOR POSITIVE: Primary | ICD-10-CM

## 2020-02-03 PROBLEM — C50.912 RECURRENT BREAST CANCER, LEFT: Status: ACTIVE | Noted: 2020-02-03

## 2020-02-03 PROCEDURE — 99999 PR PBB SHADOW E&M-EST. PATIENT-LVL III: CPT | Mod: PBBFAC,HCNC,, | Performed by: PODIATRIST

## 2020-02-03 PROCEDURE — 11721 PR DEBRIDEMENT OF NAILS, 6 OR MORE: ICD-10-PCS | Mod: Q9,59,HCNC,S$GLB | Performed by: PODIATRIST

## 2020-02-03 PROCEDURE — 11056 PR TRIM BENIGN HYPERKERATOTIC SKIN LESION,2-4: ICD-10-PCS | Mod: Q9,HCNC,S$GLB, | Performed by: PODIATRIST

## 2020-02-03 PROCEDURE — 99499 NO LOS: ICD-10-PCS | Mod: HCNC,S$GLB,, | Performed by: PODIATRIST

## 2020-02-03 PROCEDURE — 11056 PARNG/CUTG B9 HYPRKR LES 2-4: CPT | Mod: Q9,HCNC,S$GLB, | Performed by: PODIATRIST

## 2020-02-03 PROCEDURE — 99999 PR PBB SHADOW E&M-EST. PATIENT-LVL III: ICD-10-PCS | Mod: PBBFAC,HCNC,, | Performed by: PODIATRIST

## 2020-02-03 PROCEDURE — 11721 DEBRIDE NAIL 6 OR MORE: CPT | Mod: Q9,59,HCNC,S$GLB | Performed by: PODIATRIST

## 2020-02-03 PROCEDURE — 99499 UNLISTED E&M SERVICE: CPT | Mod: HCNC,S$GLB,, | Performed by: PODIATRIST

## 2020-02-03 NOTE — TELEPHONE ENCOUNTER
Return call to pt after receiving a VM message. Pt did not answer call. Message left on VM for pt to call back regarding a surgery date.

## 2020-02-03 NOTE — TELEPHONE ENCOUNTER
Pt returned my call. Wants to schedule surgery 2/20/2020. Pt scheduled for left mastectomy 2/20/2020. Pt states that she will be having blood work next week, so if any other labs need for surgery, to please add it to the ones she's having then. Will add BMP,and CBC. Pt will also need CXR and EKG preop. Will schedule while pt here 2/13/2020.

## 2020-02-03 NOTE — PROGRESS NOTES
Subjective:      Patient ID: Alisia Gusman is a 74 y.o. female.    Chief Complaint: PCP; Diabetic Foot Exam (numbness, burning, tingling ); Nail Problem (ingrown nails left great toe ); and Nail Care    Alisia is a 74 y.o. female who presents to the clinic for evaluation and treatment of high risk feet. Alisia has a past medical history of Adult bronchiectasis (7/26/2017), Allergy, Anemia, Arthritis, Asthma, Breast cancer (1993), Cancer (1993), Cataract, Chronic cervical radiculopathy (9/20/2012), Diabetes mellitus, Diabetes mellitus type II, Diabetes with neurologic complications, Diverticulosis, Dry eyes, Dysphagia, GERD (gastroesophageal reflux disease), Hyperlipidemia, Hypertension, Neuropathy, Scalp tenderness, Shortness of breath, and Ulcer. The patient's chief complaint is HRFC  This patient has documented high risk feet requiring routine maintenance secondary to diabetes mellitis and those secondary complications of diabetes, as mentioned..    PCP: Selena Montemayor MD    Date Last Seen by PCP:   Chief Complaint   Patient presents with    PCP    Diabetic Foot Exam     numbness, burning, tingling     Nail Problem     ingrown nails left great toe     Nail Care         Hemoglobin A1C   Date Value Ref Range Status   11/05/2019 7.3 (H) 4.0 - 5.6 % Final     Comment:     ADA Screening Guidelines:  5.7-6.4%  Consistent with prediabetes  >or=6.5%  Consistent with diabetes  High levels of fetal hemoglobin interfere with the HbA1C  assay. Heterozygous hemoglobin variants (HbS, HgC, etc)do  not significantly interfere with this assay.   However, presence of multiple variants may affect accuracy.     07/22/2019 7.2 (H) 4.0 - 5.6 % Final     Comment:     ADA Screening Guidelines:  5.7-6.4%  Consistent with prediabetes  >or=6.5%  Consistent with diabetes  High levels of fetal hemoglobin interfere with the HbA1C  assay. Heterozygous hemoglobin variants (HbS, HgC, etc)do  not significantly interfere with this assay.   However,  presence of multiple variants may affect accuracy.     03/27/2019 7.3 (H) 4.0 - 5.6 % Final     Comment:     ADA Screening Guidelines:  5.7-6.4%  Consistent with prediabetes  >or=6.5%  Consistent with diabetes  High levels of fetal hemoglobin interfere with the HbA1C  assay. Heterozygous hemoglobin variants (HbS, HgC, etc)do  not significantly interfere with this assay.   However, presence of multiple variants may affect accuracy.         Review of Systems   Constitution: Negative for chills, decreased appetite and fever.   Cardiovascular: Negative for leg swelling.   Skin: Positive for dry skin and nail changes. Negative for color change, flushing, itching, poor wound healing and rash.   Musculoskeletal: Negative for arthritis, joint pain, joint swelling and myalgias.   Gastrointestinal: Negative for nausea and vomiting.   Neurological: Negative for loss of balance, numbness and paresthesias.           Objective:      Physical Exam   Constitutional: She is oriented to person, place, and time. She appears well-developed and well-nourished.   Cardiovascular:   Pulses:       Dorsalis pedis pulses are 2+ on the right side, and 2+ on the left side.        Posterior tibial pulses are 1+ on the right side, and 1+ on the left side.   Dorsalis pedis and posterior tibial pulses are diminished Bilaterally. Toes are cool to touch. Feet are warm proximally.There is decreased digital hair. Skin is atrophic, slightly hyperpigmented, and mildly edematous       Musculoskeletal: Normal range of motion. She exhibits no edema or tenderness.        Right ankle: Normal.        Left ankle: Normal.        Right foot: There is no swelling, no crepitus and no deformity.        Left foot: There is no swelling, no crepitus and no deformity.   Adequate joint range of motion without pain, limitation, nor crepitation Bilateral feet and ankle joints. Muscle strength is 5/5 in all groups bilaterally.         Lymphadenopathy:   No palpable lymph  nodes   Neurological: She is alert and oriented to person, place, and time. She has normal strength.   Cubero-Leonel 5.07 monofilamant testing is diminished Vikash feet. Sharp/dull sensation diminished Bilaterally. Light touch absent Bilaterally.       Skin: Skin is warm, dry and intact. No ecchymosis, no lesion and no rash noted. No erythema. No pallor. Nails show no clubbing.   Nails x10 are elongated by  2-5mm's, thickened by 2-4 mm's, dystrophic, and are darkened in  coloration . Xerosis Bilaterally. No open lesions, lacerations or wounds noted    Hyperkeratotic tissue noted to distal hallux b/l      Psychiatric: She has a normal mood and affect. Her behavior is normal.   Vitals reviewed.            Assessment:       Encounter Diagnoses   Name Primary?    Type 2 diabetes mellitus with hyperglycemia, with long-term current use of insulin Yes    Onychomycosis due to dermatophyte     Corn or callus          Plan:       Alisia was seen today for pcp, diabetic foot exam, nail problem and nail care.    Diagnoses and all orders for this visit:    Type 2 diabetes mellitus with hyperglycemia, with long-term current use of insulin    Onychomycosis due to dermatophyte    Corn or callus      I counseled the patient on her conditions, their implications and medical management.    Shoe inspection. Diabetic Foot Education. Patient reminded of the importance of good nutrition and blood sugar control to help prevent podiatric complications of diabetes. Patient instructed on proper foot hygeine. We discussed wearing proper shoe gear, daily foot inspections, never walking without protective shoe gear, never putting sharp instruments to feet    - With patient's permission, nails were aggressively reduced and debrided x 10 to their soft tissue attachment mechanically and with electric , removing all offending nail and debris. Patient relates relief following the procedure. She will continue to monitor the areas daily,  inspect her feet, wear protective shoe gear when ambulatory, moisturizer to maintain skin integrity and follow in this office in approximately 2-3 months, sooner p.r.n.    - After cleansing the  area w/ alcohol prep pad the above mentioned hyperkeratosis was trimmed utilizing No 15 scapel, to a smooth base with out incident. Patient tolerated this  well and reported comfort x 2     - Return to clinic in 3m or sooner if problems arise

## 2020-02-05 DIAGNOSIS — E11.42 DIABETIC PERIPHERAL NEUROPATHY ASSOCIATED WITH TYPE 2 DIABETES MELLITUS: ICD-10-CM

## 2020-02-05 DIAGNOSIS — E66.9 DIABETES MELLITUS TYPE 2 IN OBESE: ICD-10-CM

## 2020-02-05 DIAGNOSIS — E11.69 DIABETES MELLITUS TYPE 2 IN OBESE: ICD-10-CM

## 2020-02-05 RX ORDER — GLIMEPIRIDE 4 MG/1
TABLET ORAL
Qty: 90 TABLET | Refills: 3 | Status: SHIPPED | OUTPATIENT
Start: 2020-02-05 | End: 2021-01-28 | Stop reason: SDUPTHER

## 2020-02-07 ENCOUNTER — TELEPHONE (OUTPATIENT)
Dept: SURGERY | Facility: CLINIC | Age: 75
End: 2020-02-07

## 2020-02-07 NOTE — TELEPHONE ENCOUNTER
Return call to pt after receiving a voice mail message. Pt states she received a letter stating that all has been approved for what Dr Nieves has asked for concerning surgery 2/20/2020. 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 Informed pt that there is currently insufficient data to determine if these variants cause an increased cancer risk related to the VUS.Copy of result mailed to pt toady. Pt instructed to call [place] at the number listed at the bottom of the report to get more information regarding the VUS. Pt voiced understanding.

## 2020-02-12 ENCOUNTER — TELEPHONE (OUTPATIENT)
Dept: SURGERY | Facility: CLINIC | Age: 75
End: 2020-02-12

## 2020-02-12 NOTE — TELEPHONE ENCOUNTER
----- Message from Marely Gamboa sent at 2/12/2020  8:36 AM CST -----  Contact: pt called   Calling to speak with someone in the office about her classes she has to take before her upcoming surgery.  Please contact pt at [phone]

## 2020-02-12 NOTE — TELEPHONE ENCOUNTER
Return call to pt. States she received a call from someone named Miky regarding a class she needs to take prior to surgery. Informed pt that this is an appt for PT eval, but that she does not have to do it prior to surgery if she does not want to. Per pt, has many appts already scheduled before surgery and not sure she can fit another one in before surgery.Informed pt that she can always see PT post surgery if needed. Pt voiced understanding. Will let the PTStephany know that pt will likely wait until after surgery for an appt.

## 2020-02-13 ENCOUNTER — HOSPITAL ENCOUNTER (OUTPATIENT)
Dept: CARDIOLOGY | Facility: CLINIC | Age: 75
Discharge: HOME | End: 2020-02-13
Payer: MEDICARE

## 2020-02-13 ENCOUNTER — HOSPITAL ENCOUNTER (OUTPATIENT)
Dept: RADIOLOGY | Facility: HOSPITAL | Age: 75
Discharge: HOME | End: 2020-02-13
Attending: SURGERY
Payer: MEDICARE

## 2020-02-13 DIAGNOSIS — Z01.818 PREOP TESTING: ICD-10-CM

## 2020-02-13 PROCEDURE — 71046 X-RAY EXAM CHEST 2 VIEWS: CPT | Mod: 26,HCNC,, | Performed by: RADIOLOGY

## 2020-02-13 PROCEDURE — 93010 ELECTROCARDIOGRAM REPORT: CPT | Mod: HCNC,S$GLB,, | Performed by: INTERNAL MEDICINE

## 2020-02-13 PROCEDURE — 93005 ELECTROCARDIOGRAM TRACING: CPT | Mod: HCNC,S$GLB,, | Performed by: SURGERY

## 2020-02-13 PROCEDURE — 71046 XR CHEST PA AND LATERAL: ICD-10-PCS | Mod: 26,HCNC,, | Performed by: RADIOLOGY

## 2020-02-13 PROCEDURE — 93010 EKG 12-LEAD: ICD-10-PCS | Mod: HCNC,S$GLB,, | Performed by: INTERNAL MEDICINE

## 2020-02-13 PROCEDURE — 71046 X-RAY EXAM CHEST 2 VIEWS: CPT | Mod: TC,HCNC,FY

## 2020-02-13 PROCEDURE — 93005 EKG 12-LEAD: ICD-10-PCS | Mod: HCNC,S$GLB,, | Performed by: SURGERY

## 2020-02-19 ENCOUNTER — TELEPHONE (OUTPATIENT)
Dept: INTERNAL MEDICINE | Facility: CLINIC | Age: 75
End: 2020-02-19

## 2020-02-19 ENCOUNTER — OFFICE VISIT (OUTPATIENT)
Dept: ENDOCRINOLOGY | Facility: CLINIC | Age: 75
End: 2020-02-19
Payer: MEDICARE

## 2020-02-19 ENCOUNTER — TELEPHONE (OUTPATIENT)
Dept: ENDOCRINOLOGY | Facility: CLINIC | Age: 75
End: 2020-02-19

## 2020-02-19 ENCOUNTER — OFFICE VISIT (OUTPATIENT)
Dept: INTERNAL MEDICINE | Facility: CLINIC | Age: 75
End: 2020-02-19
Payer: MEDICARE

## 2020-02-19 ENCOUNTER — TELEPHONE (OUTPATIENT)
Dept: SURGERY | Facility: CLINIC | Age: 75
End: 2020-02-19

## 2020-02-19 ENCOUNTER — ANESTHESIA EVENT (OUTPATIENT)
Dept: SURGERY | Facility: HOSPITAL | Age: 75
End: 2020-02-19
Payer: MEDICARE

## 2020-02-19 VITALS
DIASTOLIC BLOOD PRESSURE: 78 MMHG | OXYGEN SATURATION: 98 % | HEIGHT: 65 IN | BODY MASS INDEX: 28.76 KG/M2 | TEMPERATURE: 98 F | SYSTOLIC BLOOD PRESSURE: 136 MMHG | HEART RATE: 86 BPM | WEIGHT: 172.63 LBS

## 2020-02-19 VITALS
DIASTOLIC BLOOD PRESSURE: 82 MMHG | HEIGHT: 65 IN | BODY MASS INDEX: 31.17 KG/M2 | HEART RATE: 93 BPM | WEIGHT: 187.06 LBS | SYSTOLIC BLOOD PRESSURE: 118 MMHG

## 2020-02-19 DIAGNOSIS — I15.2 HYPERTENSION ASSOCIATED WITH DIABETES: ICD-10-CM

## 2020-02-19 DIAGNOSIS — E11.59 HYPERTENSION ASSOCIATED WITH DIABETES: ICD-10-CM

## 2020-02-19 DIAGNOSIS — E11.42 DIABETIC PERIPHERAL NEUROPATHY ASSOCIATED WITH TYPE 2 DIABETES MELLITUS: Primary | ICD-10-CM

## 2020-02-19 DIAGNOSIS — E11.69 HYPERLIPIDEMIA ASSOCIATED WITH TYPE 2 DIABETES MELLITUS: ICD-10-CM

## 2020-02-19 DIAGNOSIS — Z79.4 TYPE 2 DIABETES MELLITUS WITH DIABETIC POLYNEUROPATHY, WITH LONG-TERM CURRENT USE OF INSULIN: ICD-10-CM

## 2020-02-19 DIAGNOSIS — T50.905A DRUG-INDUCED HYPOKALEMIA: ICD-10-CM

## 2020-02-19 DIAGNOSIS — E11.42 TYPE 2 DIABETES MELLITUS WITH DIABETIC POLYNEUROPATHY, WITH LONG-TERM CURRENT USE OF INSULIN: ICD-10-CM

## 2020-02-19 DIAGNOSIS — Z17.0 MALIGNANT NEOPLASM OF UPPER-OUTER QUADRANT OF LEFT BREAST IN FEMALE, ESTROGEN RECEPTOR POSITIVE: ICD-10-CM

## 2020-02-19 DIAGNOSIS — K21.9 LARYNGOPHARYNGEAL REFLUX (LPR): ICD-10-CM

## 2020-02-19 DIAGNOSIS — I10 ESSENTIAL HYPERTENSION: ICD-10-CM

## 2020-02-19 DIAGNOSIS — C50.412 MALIGNANT NEOPLASM OF UPPER-OUTER QUADRANT OF LEFT BREAST IN FEMALE, ESTROGEN RECEPTOR POSITIVE: ICD-10-CM

## 2020-02-19 DIAGNOSIS — E87.6 DRUG-INDUCED HYPOKALEMIA: ICD-10-CM

## 2020-02-19 DIAGNOSIS — E78.5 HYPERLIPIDEMIA ASSOCIATED WITH TYPE 2 DIABETES MELLITUS: ICD-10-CM

## 2020-02-19 PROCEDURE — 99214 OFFICE O/P EST MOD 30 MIN: CPT | Mod: HCNC,S$GLB,, | Performed by: INTERNAL MEDICINE

## 2020-02-19 PROCEDURE — 99499 UNLISTED E&M SERVICE: CPT | Mod: HCNC,S$GLB,, | Performed by: INTERNAL MEDICINE

## 2020-02-19 PROCEDURE — 3078F DIAST BP <80 MM HG: CPT | Mod: HCNC,CPTII,S$GLB, | Performed by: INTERNAL MEDICINE

## 2020-02-19 PROCEDURE — 3051F PR MOST RECENT HEMOGLOBIN A1C LEVEL 7.0 - < 8.0%: ICD-10-PCS | Mod: HCNC,CPTII,S$GLB, | Performed by: INTERNAL MEDICINE

## 2020-02-19 PROCEDURE — 1159F PR MEDICATION LIST DOCUMENTED IN MEDICAL RECORD: ICD-10-PCS | Mod: HCNC,S$GLB,, | Performed by: INTERNAL MEDICINE

## 2020-02-19 PROCEDURE — 1126F PR PAIN SEVERITY QUANTIFIED, NO PAIN PRESENT: ICD-10-PCS | Mod: HCNC,S$GLB,, | Performed by: INTERNAL MEDICINE

## 2020-02-19 PROCEDURE — 3051F HG A1C>EQUAL 7.0%<8.0%: CPT | Mod: HCNC,CPTII,S$GLB, | Performed by: INTERNAL MEDICINE

## 2020-02-19 PROCEDURE — 1101F PT FALLS ASSESS-DOCD LE1/YR: CPT | Mod: HCNC,CPTII,S$GLB, | Performed by: INTERNAL MEDICINE

## 2020-02-19 PROCEDURE — 1101F PR PT FALLS ASSESS DOC 0-1 FALLS W/OUT INJ PAST YR: ICD-10-PCS | Mod: HCNC,CPTII,S$GLB, | Performed by: INTERNAL MEDICINE

## 2020-02-19 PROCEDURE — 3075F SYST BP GE 130 - 139MM HG: CPT | Mod: HCNC,CPTII,S$GLB, | Performed by: INTERNAL MEDICINE

## 2020-02-19 PROCEDURE — 1126F AMNT PAIN NOTED NONE PRSNT: CPT | Mod: HCNC,S$GLB,, | Performed by: INTERNAL MEDICINE

## 2020-02-19 PROCEDURE — 3079F PR MOST RECENT DIASTOLIC BLOOD PRESSURE 80-89 MM HG: ICD-10-PCS | Mod: HCNC,CPTII,S$GLB, | Performed by: INTERNAL MEDICINE

## 2020-02-19 PROCEDURE — 3074F SYST BP LT 130 MM HG: CPT | Mod: HCNC,CPTII,S$GLB, | Performed by: INTERNAL MEDICINE

## 2020-02-19 PROCEDURE — 99214 PR OFFICE/OUTPT VISIT, EST, LEVL IV, 30-39 MIN: ICD-10-PCS | Mod: HCNC,S$GLB,, | Performed by: INTERNAL MEDICINE

## 2020-02-19 PROCEDURE — 1159F MED LIST DOCD IN RCRD: CPT | Mod: HCNC,S$GLB,, | Performed by: INTERNAL MEDICINE

## 2020-02-19 PROCEDURE — 3078F PR MOST RECENT DIASTOLIC BLOOD PRESSURE < 80 MM HG: ICD-10-PCS | Mod: HCNC,CPTII,S$GLB, | Performed by: INTERNAL MEDICINE

## 2020-02-19 PROCEDURE — 3074F PR MOST RECENT SYSTOLIC BLOOD PRESSURE < 130 MM HG: ICD-10-PCS | Mod: HCNC,CPTII,S$GLB, | Performed by: INTERNAL MEDICINE

## 2020-02-19 PROCEDURE — 99999 PR PBB SHADOW E&M-EST. PATIENT-LVL III: ICD-10-PCS | Mod: PBBFAC,HCNC,, | Performed by: INTERNAL MEDICINE

## 2020-02-19 PROCEDURE — 3079F DIAST BP 80-89 MM HG: CPT | Mod: HCNC,CPTII,S$GLB, | Performed by: INTERNAL MEDICINE

## 2020-02-19 PROCEDURE — 99499 RISK ADDL DX/OHS AUDIT: ICD-10-PCS | Mod: HCNC,S$GLB,, | Performed by: INTERNAL MEDICINE

## 2020-02-19 PROCEDURE — 99999 PR PBB SHADOW E&M-EST. PATIENT-LVL III: CPT | Mod: PBBFAC,HCNC,, | Performed by: INTERNAL MEDICINE

## 2020-02-19 PROCEDURE — 3075F PR MOST RECENT SYSTOLIC BLOOD PRESS GE 130-139MM HG: ICD-10-PCS | Mod: HCNC,CPTII,S$GLB, | Performed by: INTERNAL MEDICINE

## 2020-02-19 RX ORDER — PREGABALIN 75 MG/1
CAPSULE ORAL
Qty: 180 CAPSULE | Refills: 1 | Status: SHIPPED | OUTPATIENT
Start: 2020-02-19 | End: 2020-08-20 | Stop reason: SDUPTHER

## 2020-02-19 RX ORDER — METFORMIN HYDROCHLORIDE 750 MG/1
TABLET, EXTENDED RELEASE ORAL
COMMUNITY
Start: 2020-02-05 | End: 2020-02-19

## 2020-02-19 RX ORDER — POTASSIUM CHLORIDE 750 MG/1
10 TABLET, EXTENDED RELEASE ORAL DAILY
Qty: 90 TABLET | Refills: 3 | Status: SHIPPED | OUTPATIENT
Start: 2020-02-19 | End: 2020-12-10 | Stop reason: SDUPTHER

## 2020-02-19 NOTE — TELEPHONE ENCOUNTER
----- Message from aMrielle Romeo sent at 2/19/2020  3:27 PM CST -----  Contact: Pt [phone]  Pt states that the Trulicity is a Tier 3 price  [phone]   Please call back

## 2020-02-19 NOTE — PROGRESS NOTES
"Subjective:       Patient ID: Alisia Gusman is a 74 y.o. female.    Chief Complaint: Follow-up    HPI accompanied by daughter, Estrellita.    DM - 70/30 32 qam and 20 u qpm, metformin xr 750mg daily (restarted in Dec although per pt never really stopped), glimepiride 4mg qam  Has appt w/ Dr. Nelson this afternoon.  A1C 7.3-->7.8 2/13/20  FOOT 2/3/20 - Dr. Dao.  Eye - 11/6/19  MAC 3/27/19  BLE peripheral neuropathy. EMG 12/31/18 w/ chronic peripheral neuropathy consistent w/ chronic diabetes.  Previously on Lyrica but w/ Jan 1, had to do pregabilin. Works. Affordable. Previously tried on gabapentin, which did not help the neuropathy.    Plan for unilateral mastectomy tomorrow with Dr. Nieves for L breast CA (infiltrating ductal carcinoma of the breast) dx 1/2020. Feels as ready as she can be for surgery tomorrow.   Did have h/o breast cancer s/p lumpectomy and radiation in the past.    Chronic cough. Seen Dr. Covarrubias last 10/2018. Stopped on PPI. Thinks cough due to LPR. On famotidine and reports still w/ cough but improved overall.    HTN - hctz 25mg daily.   Losartan caused cough.  Hypokalemia - microK 10mEq daily. Reports can see the pill in her stool afterwards. BMP 2/13/20 w/ K at 4.1.    Review of Systems  Comprehensive review of systems otherwise negative. See history/subjective section for more details.    Objective:      Physical Exam    /78 (BP Location: Right arm, Patient Position: Sitting, BP Method: Large (Manual))   Pulse 86   Temp 97.9 °F (36.6 °C)   Ht 5' 5" (1.651 m)   Wt 78.3 kg (172 lb 9.9 oz)   LMP  (LMP Unknown) Comment: Partial  SpO2 98%   BMI 28.73 kg/m²     GEN - A+OX4, NAD   HEENT - PERRL, EOMI, OP clear. MMM.  Neck - No thyromegaly or cervical LAD. No thyroid masses felt.  CV - RRR, no m/r   Chest - CTAB, no wheezing or rhonchi  Abd - S/NT/ND/+BS.   Ext - 2+BDP and radial pulses. Trace RLE edema but none on the L side.   MSK - no spinal tenderness on palpation. Normal " gait.   Skin - scar from scratch on the R dutta.    Dr. Nelson's labs reviewed w/ pt.    Assessment/Plan     Alisia was seen today for follow-up.    Diagnoses and all orders for this visit:    Diabetic peripheral neuropathy associated with type 2 diabetes mellitus - f/u w/ Dr. Nelson this afternoon.  -     pregabalin (LYRICA) 75 MG capsule; TAKE 1 CAPSULE TWICE DAILY THEREAFTER    Type 2 diabetes, uncontrolled, with neuropathy  -     pregabalin (LYRICA) 75 MG capsule; TAKE 1 CAPSULE TWICE DAILY THEREAFTER    Hypertension associated with diabetes - Stable and controlled. Continue current medications.    Drug-induced hypokalemia - K ok on current regimen.   -     potassium chloride (KLOR-CON) 10 MEQ TbSR; Take 1 tablet (10 mEq total) by mouth once daily.    Malignant neoplasm of upper-outer quadrant of left breast in female, estrogen receptor positive - plan for mastectomy tomorrow w/ Dr. Nieves.    Laryngopharyngeal reflux (LPR) - cont famotidine.     HLD assoc DM - cont pravastatin. FLP in 3 mo.    Follow up in about 3 months (around 5/19/2020). labs prior.       Selena Montemayor MD  Department of Internal Medicine - [facility]  11:37 AM

## 2020-02-19 NOTE — TELEPHONE ENCOUNTER
From: Mara Aranda  To: Bessy Taylor DO  Sent: 11/2/2019 11:35 AM CDT  Subject: Other    Just looking at my summary of test results, the time I had a , was down on weight, had good energy were my best numbers. I see how my body reacts to little to no exercise and no diet. I know what to do and thanks for the support and tips on where to go.   Tried to call pt, we will tried an alterative for truilcity.

## 2020-02-19 NOTE — PROGRESS NOTES
Subjective:      Patient ID: Alisia Gusman is a 74 y.o. female.    Chief Complaint:  Diabetes      History of Present Illness  Ms. Gusman is a 74 y.o. female who is here for a follow-up visit for evaluation of type 2 diabetes that is controlled complicated by peripheral neuropathy. Had abnormal mammogram and US in 2019, to have breast resection for breast cancer.     Cramping before diarrhea is hard and not pleasant. Has this symptom every day. Has a bowel movement and then within 10 - 15 minutes will have a second BM, that is watery BM.    Denies shortness of breath, palpitations, chest pain or pressure. Denies lower extremity swelling. Has neuropathy that affects her feet bilaterally switched to pregabalin 75 mg twice daily.      No change to exercise regimen.     Diabetes regimen:   Metformin  mg nightly, (stopped metformin due to diarrhea (2019) --> restarted lower dose but still reports above change to BMs  Glimepiride 4 mg daily with breakfast  NPH/R 70/30 - 32 units before breakfast and 22 units before dinner time.       No difficulties with injections, rotates regularly. Denies hypoglycemic symptoms or bg less than 70.     Reviewed blood sugar logs. Checks three to four times a day every day.  Lowest blood sugars      Fastin, 193, 148, 106, 148, 145, 140, [id number], 155, 151, 180  Lunch: 238, x, 100, 136, 177, x,x, 119, 114, 165, 87  Dinner: 232, 187, 214, 150, 174,236, 146, 234, 233, 239, x  Bedtime: 173, 170, 200, x, 204, 202, 167, 171, 177, x, 176     Most recent A1C is 7.8%.      Denies pancreatitis or family or personal history of MTC     ADA STANDARDS of CARE:        ACE inhibitor or angiotensin II receptor blocker:  No microalbuminuria, on HCTZ well controlled.         Statin drug:  Pravastatin 40 mg daily         Low dose ASA: 81 mg daily         Microalbumin: 3/2019 - normal  Results for ALISIA GUSMAN (MRN [id number]) as of 2020 14:03   Ref. Range 3/27/2019 07:41  "  Microalbum.,U,Random Latest Units: ug/mL 7.0   Creatinine, Random Ur Latest Ref Range: 15.0 - 325.0 mg/dL 153.0   MICROALB/CREAT RATIO Latest Ref Range: 0.0 - 30.0 ug/mg 4.6           Eye exam: up to date        Flu shot and pneumonia vaccine - up to date       Review of Systems   Constitutional: Negative for fever.   HENT: Negative for congestion.    Eyes: Negative for visual disturbance.   Respiratory: Negative for shortness of breath.    Cardiovascular: Negative for chest pain.   Gastrointestinal: Negative for abdominal pain.   Genitourinary: Negative for dysuria.   Musculoskeletal: Negative for arthralgias.   Skin: Negative for rash.   Neurological: Negative for weakness.   Hematological: Does not bruise/bleed easily.   Psychiatric/Behavioral: Negative for sleep disturbance.       Objective:   Physical Exam  Vitals:    02/19/20 1327   BP: 118/82   Pulse: 93   Weight: 84.8 kg (187 lb 1 oz)   Height: 5' 5" (1.651 m)       BP Readings from Last 3 Encounters:   02/19/20 118/82   02/19/20 136/78   02/03/20 (!) 153/86     Wt Readings from Last 1 Encounters:   02/19/20 1327 84.8 kg (187 lb 1 oz)         Body mass index is 31.13 kg/m².    Lab Review:   Lab Results   Component Value Date    HGBA1C 7.8 (H) 02/13/2020     Lab Results   Component Value Date    CHOL 179 03/27/2019    HDL 65 03/27/2019    LDLCALC 77.2 03/27/2019    TRIG 184 (H) 03/27/2019    CHOLHDL 36.3 03/27/2019     Lab Results   Component Value Date     02/13/2020    K 4.1 02/13/2020     02/13/2020    CO2 28 02/13/2020     (H) 02/13/2020    BUN 13 02/13/2020    CREATININE 0.9 02/13/2020    CALCIUM 9.6 02/13/2020    PROT 7.1 11/27/2019    ALBUMIN 3.8 11/27/2019    BILITOT 1.2 (H) 11/27/2019    ALKPHOS 67 11/27/2019    AST 20 11/27/2019    ALT 15 11/27/2019    ANIONGAP 10 02/13/2020    ESTGFRAFRICA >60.0 02/13/2020    EGFRNONAA >60.0 02/13/2020    TSH 1.938 06/07/2017       Results for CINDA TATUM (MRN [id number]) as of 2/19/2020 " 13:20   Ref. Range 7/22/2019 10:53 11/5/2019 08:45 2/13/2020 09:07   Hemoglobin A1C External Latest Ref Range: 4.0 - 5.6 % 7.2 (H) 7.3 (H) 7.8 (H)   Estimated Avg Glucose Latest Ref Range: 68 - 131 mg/dL 160 (H) 163 (H) 177 (H)     Assessment and Plan     Type 2 diabetes mellitus with diabetic polyneuropathy, with long-term current use of insulin  Metformin causing significant diarrhea -> plan to stop   Consider addition of GLP 1Agonist, try trulicity or ozempic depending on insurance coverage  No MTC or pancreatitis.     Continue glimepiride 4 mg daily for now and continue 70/30 NPH/R    Has excellent BG on days when she exercises. This motivates her to continue to exercise regularly.     A1C a little higher, but acceptable as we are transitioning medications.     Creat/eGFR normal.       Essential hypertension  Well controlled on HCTZ  No microalbuminuria  Needs updated urine studies

## 2020-02-19 NOTE — PATIENT INSTRUCTIONS
Stop metformin     Start trulicity 0.75 mg once weekly.     Continue insulin dose 32 units before breakfast 22 units before dinner time     Glimepiride 4 mg daily -- we may be able to decrease to 2 mg daily     F/u in two to three months.     A1C in two to three months     Drop off blood sugar 3/23/2020 so I can review

## 2020-02-19 NOTE — TELEPHONE ENCOUNTER
Allie, just saw pt today. Should be a 3 mo f/u so please confirm w/ pt to cancel March appt and reschedule for May. Thanks!

## 2020-02-20 ENCOUNTER — ANESTHESIA (OUTPATIENT)
Dept: SURGERY | Facility: HOSPITAL | Age: 75
End: 2020-02-20
Payer: MEDICARE

## 2020-02-20 ENCOUNTER — HOSPITAL ENCOUNTER (OUTPATIENT)
Dept: RADIOLOGY | Facility: HOSPITAL | Age: 75
Discharge: HOME | End: 2020-02-20
Attending: SURGERY | Admitting: SURGERY
Payer: MEDICARE

## 2020-02-20 ENCOUNTER — HOSPITAL ENCOUNTER (OUTPATIENT)
Facility: HOSPITAL | Age: 75
Discharge: HOME | End: 2020-02-21
Attending: SURGERY | Admitting: SURGERY
Payer: MEDICARE

## 2020-02-20 DIAGNOSIS — N18.2 TYPE 2 DIABETES MELLITUS WITH STAGE 2 CHRONIC KIDNEY DISEASE, WITH LONG-TERM CURRENT USE OF INSULIN: ICD-10-CM

## 2020-02-20 DIAGNOSIS — Z79.4 TYPE 2 DIABETES MELLITUS WITH STAGE 2 CHRONIC KIDNEY DISEASE, WITH LONG-TERM CURRENT USE OF INSULIN: ICD-10-CM

## 2020-02-20 DIAGNOSIS — E11.22 TYPE 2 DIABETES MELLITUS WITH STAGE 2 CHRONIC KIDNEY DISEASE, WITH LONG-TERM CURRENT USE OF INSULIN: ICD-10-CM

## 2020-02-20 DIAGNOSIS — N63.0 BREAST LUMP: ICD-10-CM

## 2020-02-20 DIAGNOSIS — C50.919 BREAST CANCER: Primary | ICD-10-CM

## 2020-02-20 DIAGNOSIS — C50.412 MALIGNANT NEOPLASM OF UPPER-OUTER QUADRANT OF LEFT BREAST IN FEMALE, ESTROGEN RECEPTOR POSITIVE: ICD-10-CM

## 2020-02-20 DIAGNOSIS — Z17.0 MALIGNANT NEOPLASM OF UPPER-OUTER QUADRANT OF LEFT BREAST IN FEMALE, ESTROGEN RECEPTOR POSITIVE: ICD-10-CM

## 2020-02-20 LAB
POCT GLUCOSE: 164 MG/DL (ref 70–110)
POCT GLUCOSE: 221 MG/DL (ref 70–110)
POCT GLUCOSE: 277 MG/DL (ref 70–110)
POCT GLUCOSE: 289 MG/DL (ref 70–110)

## 2020-02-20 PROCEDURE — 64461 PVB THORACIC SINGLE INJ SITE: CPT | Mod: 59,HCNC,LT, | Performed by: ANESTHESIOLOGY

## 2020-02-20 PROCEDURE — D9220A PRA ANESTHESIA: ICD-10-PCS | Mod: HCNC,ANES,, | Performed by: ANESTHESIOLOGY

## 2020-02-20 PROCEDURE — 36000706: Mod: HCNC | Performed by: SURGERY

## 2020-02-20 PROCEDURE — 38792 PR IDENTIFY SENTINEL 2DE: ICD-10-PCS | Mod: HCNC,LT,, | Performed by: SURGERY

## 2020-02-20 PROCEDURE — D9220A PRA ANESTHESIA: ICD-10-PCS | Mod: HCNC,CRNA,, | Performed by: NURSE ANESTHETIST, CERTIFIED REGISTERED

## 2020-02-20 PROCEDURE — 88341 PR IHC OR ICC EACH ADD'L SINGLE ANTIBODY  STAINPR: ICD-10-PCS | Mod: 26,HCNC,, | Performed by: PATHOLOGY

## 2020-02-20 PROCEDURE — 63600175 PHARM REV CODE 636 W HCPCS: Mod: HCNC | Performed by: NURSE ANESTHETIST, CERTIFIED REGISTERED

## 2020-02-20 PROCEDURE — C1729 CATH, DRAINAGE: HCPCS | Mod: HCNC | Performed by: SURGERY

## 2020-02-20 PROCEDURE — 63600175 PHARM REV CODE 636 W HCPCS: Mod: HCNC | Performed by: ANESTHESIOLOGY

## 2020-02-20 PROCEDURE — 64462 PVB THORACIC 2ND+ INJ SITE: CPT | Mod: 59,HCNC,LT, | Performed by: ANESTHESIOLOGY

## 2020-02-20 PROCEDURE — 94761 N-INVAS EAR/PLS OXIMETRY MLT: CPT | Mod: HCNC

## 2020-02-20 PROCEDURE — 64461 PARAVERTEBRAL SINGLE INJECTION BLOCK(S): ICD-10-PCS | Mod: 59,HCNC,LT, | Performed by: ANESTHESIOLOGY

## 2020-02-20 PROCEDURE — 88331 PATH CONSLTJ SURG 1 BLK 1SPC: CPT | Mod: HCNC | Performed by: PATHOLOGY

## 2020-02-20 PROCEDURE — 82962 GLUCOSE BLOOD TEST: CPT | Mod: HCNC | Performed by: SURGERY

## 2020-02-20 PROCEDURE — 19303 PR MASTECTOMY, SIMPLE, COMPLETE: ICD-10-PCS | Mod: HCNC,LT,, | Performed by: SURGERY

## 2020-02-20 PROCEDURE — 38525 PR BIOPSY/REM LYMPH NODES, AXILLARY: ICD-10-PCS | Mod: 51,HCNC,LT, | Performed by: SURGERY

## 2020-02-20 PROCEDURE — 38525 BIOPSY/REMOVAL LYMPH NODES: CPT | Mod: 51,HCNC,LT, | Performed by: SURGERY

## 2020-02-20 PROCEDURE — 63600175 PHARM REV CODE 636 W HCPCS: Mod: HCNC | Performed by: STUDENT IN AN ORGANIZED HEALTH CARE EDUCATION/TRAINING PROGRAM

## 2020-02-20 PROCEDURE — 88342 CHG IMMUNOCYTOCHEMISTRY: ICD-10-PCS | Mod: 26,HCNC,, | Performed by: PATHOLOGY

## 2020-02-20 PROCEDURE — 71000033 HC RECOVERY, INTIAL HOUR: Mod: HCNC | Performed by: SURGERY

## 2020-02-20 PROCEDURE — D9220A PRA ANESTHESIA: Mod: HCNC,ANES,, | Performed by: ANESTHESIOLOGY

## 2020-02-20 PROCEDURE — A9520 TC99 TILMANOCEPT DIAG 0.5MCI: HCPCS | Mod: HCNC

## 2020-02-20 PROCEDURE — 36000707: Mod: HCNC | Performed by: SURGERY

## 2020-02-20 PROCEDURE — 88307 PR  SURG PATH,LEVEL V: ICD-10-PCS | Mod: 26,HCNC,, | Performed by: PATHOLOGY

## 2020-02-20 PROCEDURE — D9220A PRA ANESTHESIA: Mod: HCNC,CRNA,, | Performed by: NURSE ANESTHETIST, CERTIFIED REGISTERED

## 2020-02-20 PROCEDURE — 25000003 PHARM REV CODE 250: Mod: HCNC | Performed by: STUDENT IN AN ORGANIZED HEALTH CARE EDUCATION/TRAINING PROGRAM

## 2020-02-20 PROCEDURE — 88307 TISSUE EXAM BY PATHOLOGIST: CPT | Mod: 26,HCNC,, | Performed by: PATHOLOGY

## 2020-02-20 PROCEDURE — 71000015 HC POSTOP RECOV 1ST HR: Mod: HCNC | Performed by: SURGERY

## 2020-02-20 PROCEDURE — 19303 MAST SIMPLE COMPLETE: CPT | Mod: HCNC,LT,, | Performed by: SURGERY

## 2020-02-20 PROCEDURE — 88342 IMHCHEM/IMCYTCHM 1ST ANTB: CPT | Mod: 26,HCNC,, | Performed by: PATHOLOGY

## 2020-02-20 PROCEDURE — 25000242 PHARM REV CODE 250 ALT 637 W/ HCPCS: Mod: HCNC | Performed by: NURSE ANESTHETIST, CERTIFIED REGISTERED

## 2020-02-20 PROCEDURE — 27201423 OPTIME MED/SURG SUP & DEVICES STERILE SUPPLY: Mod: HCNC | Performed by: SURGERY

## 2020-02-20 PROCEDURE — 37000009 HC ANESTHESIA EA ADD 15 MINS: Mod: HCNC | Performed by: SURGERY

## 2020-02-20 PROCEDURE — 25000003 PHARM REV CODE 250: Mod: HCNC | Performed by: NURSE ANESTHETIST, CERTIFIED REGISTERED

## 2020-02-20 PROCEDURE — 88307 TISSUE EXAM BY PATHOLOGIST: CPT | Mod: 59,HCNC | Performed by: PATHOLOGY

## 2020-02-20 PROCEDURE — 88341 IMHCHEM/IMCYTCHM EA ADD ANTB: CPT | Mod: HCNC | Performed by: PATHOLOGY

## 2020-02-20 PROCEDURE — 37000008 HC ANESTHESIA 1ST 15 MINUTES: Mod: HCNC | Performed by: SURGERY

## 2020-02-20 PROCEDURE — 88341 IMHCHEM/IMCYTCHM EA ADD ANTB: CPT | Mod: 26,HCNC,, | Performed by: PATHOLOGY

## 2020-02-20 PROCEDURE — 64461 PVB THORACIC SINGLE INJ SITE: CPT | Mod: HCNC | Performed by: STUDENT IN AN ORGANIZED HEALTH CARE EDUCATION/TRAINING PROGRAM

## 2020-02-20 PROCEDURE — 88331 PR  PATH CONSULT IN SURG,W FRZ SEC: ICD-10-PCS | Mod: 26,HCNC,, | Performed by: PATHOLOGY

## 2020-02-20 PROCEDURE — 88342 IMHCHEM/IMCYTCHM 1ST ANTB: CPT | Mod: HCNC | Performed by: PATHOLOGY

## 2020-02-20 PROCEDURE — 88331 PATH CONSLTJ SURG 1 BLK 1SPC: CPT | Mod: 26,HCNC,, | Performed by: PATHOLOGY

## 2020-02-20 PROCEDURE — 64462 PARAVERTEBRAL SINGLE INJECTION BLOCK(S): ICD-10-PCS | Mod: 59,HCNC,LT, | Performed by: ANESTHESIOLOGY

## 2020-02-20 PROCEDURE — 38792 RA TRACER ID OF SENTINL NODE: CPT | Mod: HCNC,LT,, | Performed by: SURGERY

## 2020-02-20 RX ORDER — ONDANSETRON 2 MG/ML
4 INJECTION INTRAMUSCULAR; INTRAVENOUS EVERY 6 HOURS PRN
Status: DISCONTINUED | OUTPATIENT
Start: 2020-02-20 | End: 2020-02-21 | Stop reason: HOSPADM

## 2020-02-20 RX ORDER — HYDROCODONE BITARTRATE AND ACETAMINOPHEN 10; 325 MG/1; MG/1
1 TABLET ORAL EVERY 4 HOURS PRN
Status: DISCONTINUED | OUTPATIENT
Start: 2020-02-20 | End: 2020-02-21 | Stop reason: HOSPADM

## 2020-02-20 RX ORDER — SODIUM CHLORIDE 0.9 % (FLUSH) 0.9 %
10 SYRINGE (ML) INJECTION
Status: DISCONTINUED | OUTPATIENT
Start: 2020-02-20 | End: 2020-02-20 | Stop reason: HOSPADM

## 2020-02-20 RX ORDER — INSULIN ASPART 100 [IU]/ML
0-5 INJECTION, SOLUTION INTRAVENOUS; SUBCUTANEOUS
Status: DISCONTINUED | OUTPATIENT
Start: 2020-02-20 | End: 2020-02-21 | Stop reason: HOSPADM

## 2020-02-20 RX ORDER — ROPIVACAINE HYDROCHLORIDE 5 MG/ML
INJECTION, SOLUTION EPIDURAL; INFILTRATION; PERINEURAL
Status: COMPLETED | OUTPATIENT
Start: 2020-02-20 | End: 2020-02-20

## 2020-02-20 RX ORDER — EPHEDRINE SULFATE 50 MG/ML
INJECTION, SOLUTION INTRAVENOUS
Status: DISCONTINUED | OUTPATIENT
Start: 2020-02-20 | End: 2020-02-20

## 2020-02-20 RX ORDER — FENTANYL CITRATE 50 UG/ML
25 INJECTION, SOLUTION INTRAMUSCULAR; INTRAVENOUS EVERY 5 MIN PRN
Status: DISCONTINUED | OUTPATIENT
Start: 2020-02-20 | End: 2020-02-20 | Stop reason: HOSPADM

## 2020-02-20 RX ORDER — PHENYLEPHRINE HYDROCHLORIDE 10 MG/ML
INJECTION INTRAVENOUS
Status: DISCONTINUED | OUTPATIENT
Start: 2020-02-20 | End: 2020-02-20

## 2020-02-20 RX ORDER — MIDAZOLAM HYDROCHLORIDE 1 MG/ML
0.5 INJECTION INTRAMUSCULAR; INTRAVENOUS
Status: DISCONTINUED | OUTPATIENT
Start: 2020-02-20 | End: 2020-02-20 | Stop reason: HOSPADM

## 2020-02-20 RX ORDER — SODIUM CHLORIDE 9 MG/ML
INJECTION, SOLUTION INTRAVENOUS CONTINUOUS
Status: DISCONTINUED | OUTPATIENT
Start: 2020-02-20 | End: 2020-02-21 | Stop reason: HOSPADM

## 2020-02-20 RX ORDER — DEXAMETHASONE SODIUM PHOSPHATE 4 MG/ML
INJECTION, SOLUTION INTRA-ARTICULAR; INTRALESIONAL; INTRAMUSCULAR; INTRAVENOUS; SOFT TISSUE
Status: DISCONTINUED | OUTPATIENT
Start: 2020-02-20 | End: 2020-02-20

## 2020-02-20 RX ORDER — CEFAZOLIN SODIUM 1 G/3ML
2 INJECTION, POWDER, FOR SOLUTION INTRAMUSCULAR; INTRAVENOUS
Status: COMPLETED | OUTPATIENT
Start: 2020-02-20 | End: 2020-02-20

## 2020-02-20 RX ORDER — SODIUM CHLORIDE 9 MG/ML
INJECTION, SOLUTION INTRAVENOUS CONTINUOUS
Status: DISCONTINUED | OUTPATIENT
Start: 2020-02-20 | End: 2020-02-20

## 2020-02-20 RX ORDER — IBUPROFEN 200 MG
24 TABLET ORAL
Status: DISCONTINUED | OUTPATIENT
Start: 2020-02-20 | End: 2020-02-21 | Stop reason: HOSPADM

## 2020-02-20 RX ORDER — ALBUTEROL SULFATE 90 UG/1
AEROSOL, METERED RESPIRATORY (INHALATION)
Status: DISCONTINUED | OUTPATIENT
Start: 2020-02-20 | End: 2020-02-20

## 2020-02-20 RX ORDER — SUCCINYLCHOLINE CHLORIDE 20 MG/ML
INJECTION INTRAMUSCULAR; INTRAVENOUS
Status: DISCONTINUED | OUTPATIENT
Start: 2020-02-20 | End: 2020-02-20

## 2020-02-20 RX ORDER — LIDOCAINE HYDROCHLORIDE 10 MG/ML
1 INJECTION, SOLUTION EPIDURAL; INFILTRATION; INTRACAUDAL; PERINEURAL ONCE
Status: COMPLETED | OUTPATIENT
Start: 2020-02-20 | End: 2020-02-20

## 2020-02-20 RX ORDER — GLUCAGON 1 MG
1 KIT INJECTION
Status: DISCONTINUED | OUTPATIENT
Start: 2020-02-20 | End: 2020-02-21 | Stop reason: HOSPADM

## 2020-02-20 RX ORDER — PROPOFOL 10 MG/ML
VIAL (ML) INTRAVENOUS
Status: DISCONTINUED | OUTPATIENT
Start: 2020-02-20 | End: 2020-02-20

## 2020-02-20 RX ORDER — IBUPROFEN 200 MG
16 TABLET ORAL
Status: DISCONTINUED | OUTPATIENT
Start: 2020-02-20 | End: 2020-02-21 | Stop reason: HOSPADM

## 2020-02-20 RX ORDER — TALC
6 POWDER (GRAM) TOPICAL NIGHTLY PRN
Status: DISCONTINUED | OUTPATIENT
Start: 2020-02-20 | End: 2020-02-21 | Stop reason: HOSPADM

## 2020-02-20 RX ORDER — LIDOCAINE HCL/PF 100 MG/5ML
SYRINGE (ML) INTRAVENOUS
Status: DISCONTINUED | OUTPATIENT
Start: 2020-02-20 | End: 2020-02-20

## 2020-02-20 RX ORDER — ONDANSETRON 2 MG/ML
INJECTION INTRAMUSCULAR; INTRAVENOUS
Status: DISCONTINUED | OUTPATIENT
Start: 2020-02-20 | End: 2020-02-20

## 2020-02-20 RX ORDER — FENTANYL CITRATE 50 UG/ML
INJECTION, SOLUTION INTRAMUSCULAR; INTRAVENOUS
Status: DISCONTINUED | OUTPATIENT
Start: 2020-02-20 | End: 2020-02-20

## 2020-02-20 RX ORDER — ACETAMINOPHEN 325 MG/1
650 TABLET ORAL EVERY 8 HOURS PRN
Status: DISCONTINUED | OUTPATIENT
Start: 2020-02-20 | End: 2020-02-21 | Stop reason: HOSPADM

## 2020-02-20 RX ORDER — HYDROCODONE BITARTRATE AND ACETAMINOPHEN 5; 325 MG/1; MG/1
1 TABLET ORAL EVERY 4 HOURS PRN
Status: DISCONTINUED | OUTPATIENT
Start: 2020-02-20 | End: 2020-02-21 | Stop reason: HOSPADM

## 2020-02-20 RX ADMIN — PHENYLEPHRINE HYDROCHLORIDE 100 MCG: 10 INJECTION INTRAVENOUS at 02:02

## 2020-02-20 RX ADMIN — MIDAZOLAM HYDROCHLORIDE 1 MG: 1 INJECTION, SOLUTION INTRAMUSCULAR; INTRAVENOUS at 01:02

## 2020-02-20 RX ADMIN — PROPOFOL 150 MG: 10 INJECTION, EMULSION INTRAVENOUS at 01:02

## 2020-02-20 RX ADMIN — INSULIN ASPART 2 UNITS: 100 INJECTION, SOLUTION INTRAVENOUS; SUBCUTANEOUS at 05:02

## 2020-02-20 RX ADMIN — SODIUM CHLORIDE: 0.9 INJECTION, SOLUTION INTRAVENOUS at 12:02

## 2020-02-20 RX ADMIN — FENTANYL CITRATE 50 MCG: 50 INJECTION INTRAMUSCULAR; INTRAVENOUS at 01:02

## 2020-02-20 RX ADMIN — EPHEDRINE SULFATE 5 MG: 50 INJECTION, SOLUTION INTRAMUSCULAR; INTRAVENOUS; SUBCUTANEOUS at 02:02

## 2020-02-20 RX ADMIN — HYDROCODONE BITARTRATE AND ACETAMINOPHEN 1 TABLET: 5; 325 TABLET ORAL at 05:02

## 2020-02-20 RX ADMIN — PROPOFOL 50 MG: 10 INJECTION, EMULSION INTRAVENOUS at 02:02

## 2020-02-20 RX ADMIN — CEFAZOLIN 2 G: 330 INJECTION, POWDER, FOR SOLUTION INTRAMUSCULAR; INTRAVENOUS at 02:02

## 2020-02-20 RX ADMIN — ROPIVACAINE HYDROCHLORIDE 30 ML: 5 INJECTION, SOLUTION EPIDURAL; INFILTRATION; PERINEURAL at 01:02

## 2020-02-20 RX ADMIN — PROPOFOL 50 MG: 10 INJECTION, EMULSION INTRAVENOUS at 01:02

## 2020-02-20 RX ADMIN — SODIUM CHLORIDE, SODIUM GLUCONATE, SODIUM ACETATE, POTASSIUM CHLORIDE, MAGNESIUM CHLORIDE, SODIUM PHOSPHATE, DIBASIC, AND POTASSIUM PHOSPHATE: .53; .5; .37; .037; .03; .012; .00082 INJECTION, SOLUTION INTRAVENOUS at 02:02

## 2020-02-20 RX ADMIN — ONDANSETRON 4 MG: 2 INJECTION INTRAMUSCULAR; INTRAVENOUS at 02:02

## 2020-02-20 RX ADMIN — INSULIN ASPART 1 UNITS: 100 INJECTION, SOLUTION INTRAVENOUS; SUBCUTANEOUS at 10:02

## 2020-02-20 RX ADMIN — FENTANYL CITRATE 25 MCG: 50 INJECTION INTRAMUSCULAR; INTRAVENOUS at 06:02

## 2020-02-20 RX ADMIN — FENTANYL CITRATE 50 MCG: 50 INJECTION, SOLUTION INTRAMUSCULAR; INTRAVENOUS at 02:02

## 2020-02-20 RX ADMIN — LIDOCAINE HYDROCHLORIDE 50 MG: 20 INJECTION, SOLUTION INTRAVENOUS at 04:02

## 2020-02-20 RX ADMIN — SODIUM CHLORIDE: 0.9 INJECTION, SOLUTION INTRAVENOUS at 05:02

## 2020-02-20 RX ADMIN — LIDOCAINE HYDROCHLORIDE 100 MG: 20 INJECTION, SOLUTION INTRAVENOUS at 01:02

## 2020-02-20 RX ADMIN — LIDOCAINE HYDROCHLORIDE 2 MG: 10 INJECTION, SOLUTION EPIDURAL; INFILTRATION; INTRACAUDAL; PERINEURAL at 12:02

## 2020-02-20 RX ADMIN — EPHEDRINE SULFATE 10 MG: 50 INJECTION, SOLUTION INTRAMUSCULAR; INTRAVENOUS; SUBCUTANEOUS at 02:02

## 2020-02-20 RX ADMIN — DEXAMETHASONE SODIUM PHOSPHATE 12 MG: 4 INJECTION, SOLUTION INTRAMUSCULAR; INTRAVENOUS at 02:02

## 2020-02-20 RX ADMIN — FENTANYL CITRATE 50 MCG: 50 INJECTION, SOLUTION INTRAMUSCULAR; INTRAVENOUS at 01:02

## 2020-02-20 RX ADMIN — PROMETHAZINE HYDROCHLORIDE 12.5 MG: 25 INJECTION INTRAMUSCULAR; INTRAVENOUS at 11:02

## 2020-02-20 RX ADMIN — ALBUTEROL SULFATE 2 PUFF: 90 AEROSOL, METERED RESPIRATORY (INHALATION) at 01:02

## 2020-02-20 RX ADMIN — SUCCINYLCHOLINE CHLORIDE 140 MG: 20 INJECTION, SOLUTION INTRAMUSCULAR; INTRAVENOUS at 01:02

## 2020-02-20 RX ADMIN — ONDANSETRON 4 MG: 2 INJECTION INTRAMUSCULAR; INTRAVENOUS at 05:02

## 2020-02-20 RX ADMIN — FENTANYL CITRATE 50 MCG: 50 INJECTION, SOLUTION INTRAMUSCULAR; INTRAVENOUS at 03:02

## 2020-02-20 NOTE — TRANSFER OF CARE
"Anesthesia Transfer of Care Note    Patient: Alisia Gusman    Procedure(s) Performed: Procedure(s) (LRB):  MASTECTOMY, UNILATERAL (Left)  INJECTION, FOR SENTINEL NODE IDENTIFICATION (Left)  BIOPSY, LYMPH NODE, SENTINEL (Left)    Patient location: PACU    Anesthesia Type: general    Transport from OR: Transported from OR on 6-10 L/min O2 by face mask with adequate spontaneous ventilation    Post pain: adequate analgesia    Post assessment: no apparent anesthetic complications and tolerated procedure well    Post vital signs: stable    Level of consciousness: awake, alert and oriented    Nausea/Vomiting: no nausea/vomiting    Complications: none    Transfer of care protocol was followed      Last vitals:   Visit Vitals  BP (!) 155/70 (BP Location: Right arm, Patient Position: Lying)   Pulse 99   Temp 37.1 °C (98.8 °F) (Oral)   Resp 18   Ht 5' 5" (1.651 m)   Wt 81.6 kg (180 lb)   LMP  (LMP Unknown)   SpO2 100%   Breastfeeding? No   BMI 29.95 kg/m²     "

## 2020-02-20 NOTE — ASSESSMENT & PLAN NOTE
Metformin causing significant diarrhea -> plan to stop   Consider addition of GLP 1Agonist, try trulicity or ozempic depending on insurance coverage  No MTC or pancreatitis.     Continue glimepiride 4 mg daily for now and continue 70/30 NPH/R    Has excellent BG on days when she exercises. This motivates her to continue to exercise regularly.     A1C a little higher, but acceptable as we are transitioning medications.     Creat/eGFR normal.

## 2020-02-20 NOTE — OP NOTE
Operative Note     2/20/2020    PRE-OP DIAGNOSIS: Malignant neoplasm of upper-outer quadrant of left breast in female, estrogen receptor positive [C50.412, Z17.0]      POST-OP DIAGNOSIS: Post-Op Diagnosis Codes:     * Malignant neoplasm of upper-outer quadrant of left breast in female, estrogen receptor positive [C50.412, Z17.0]    Procedure(s):  MASTECTOMY, UNILATERAL  INJECTION, FOR SENTINEL NODE IDENTIFICATION  BIOPSY, LYMPH NODE, SENTINEL       SURGEON: Surgeon(s) and Role:     * Hamida Nieves MD - Primary     * Pako Rodriguez MD - Resident - Assisting    ANESTHESIA: General     OPERATIVE FINDINGS: Healthy appearing skin flaps at the completion of the mastectomy.  One sentinel node identified and was negative on frozen section.  Skin taken over tumor.    INDICATION FOR PROCEDURE: This patient presents with a history of recurrent invasive carcinoma of the left breast    PROCEDURE IN DETAIL:  Alisia Gusman is a 74 y.o. female brought to the operating room for definitive surgery of recurrent invasive carcinoma of the left breast.  The patient has elected to undergo left simple mastectomy with sentinel lymph node biopsy for nicolas assessment. The patient was informed of the possible risks and complications of the procedure, including but not limited to anesthetic risks, bleeding, infection, and need for additional surgery.  The patient concurred with the proposed plan, and has given informed consent.  The site of surgery was properly noted/marked in the preoperative holding area.    The patient was then brought to the operating room and placed in the supine position with both upper extremities extended.  regional and general anesthesia was administered. Perioperative antibiotics were administered consisting of Ancef and a time out was performed confirming the patient, site, and procedure.   The patient's left breast was injected with technetium to facilitate sentinel lymph node identification.The left  chest and axilla was then prepped and draped in the usual sterile fashion.    We then turned our attention to the left breast where an elliptical incision was fashioned to incorporate the nipple areolar complex.  The incision was made with a 10-blade and extended through the subcutaneous tissues with Bovie electrocautery.  Skin flaps were raised to the clavicle superiorly.  We then  turned our attention to the left axilla.  The gamma probe was used to identify an area of increased radioactivity within the lower axilla. The clavipectoral sheath was sharply incised to reveal the level I axillary lymph nodes. The probe was used to identify a single node with increased radioactivity.  This node was brought into the operative field and carefully dissected free of the surrounding lymphovascular structures.  The highest ex vivo count of the node was 157.  The node was then sent to pathology for frozen section evaluation, labeled as sentinel node #1.  A total of 1 axillary sentinel nodes and 0 axillary non-sentinel nodes were identified, excised and submitted to pathology.  Bed counts were obtained to confirm that the 10% rule had not been violated.   The wound was irrigated with normal saline, and all bleeding points were secured with Bovie electrocautery.     We then proceeded to raise the remainder of the flaps to the lateral border of the sternum medially, to the inframammary fold inferiorly, and to the anterior border of the latissimus dorsi muscle laterally. The breast tissue was sharply excised off the chest wall taking care to incorporate the pectoralis fascia while leaving the serratus fascia behind.  The resulting mastectomy specimen was marked using a short stitch superiorly and long stitch laterally.  The breast was sent to pathology for permanent evaluation.      Frozen section nicolas evaluation revealed no evidence of metastatic disease.  Therefore, the operative field was irrigated with normal saline and all  bleeding points were secured with Bovie electrocautery.  A 19 Fr harjinder drain was placed under the mastectomy flap. The incision was closed using an interrupted 3-0 vicryl deep dermal stitch followed by a running 4-0 vicryl subcuticular.      Dermabond was applied. A post surgical bra was placed on the patient. At the end of the operation, all sponge, instrument, and needle counts x 2 were correct.    ESTIMATED BLOOD LOSS: less than 50 mL    COMPLICATIONS: none    DISPOSITION: PACU - hemodynamically stable.    ATTESTATION:   I was present and scrubbed for the entire procedure.

## 2020-02-20 NOTE — ANESTHESIA PREPROCEDURE EVALUATION
02/20/2020  Alisia Gusman is a 74 y.o., female.  Patient Active Problem List   Diagnosis    Seasonal allergies    GERD (gastroesophageal reflux disease)    Hyperlipidemia    Diverticulosis of large intestine    Malignant neoplasm of upper-outer quadrant of left breast in female, estrogen receptor positive    Osteopenia    Chronic cervical radiculopathy    History of breast cancer in female    Obesity (BMI 30-39.9)    Insulin use (long-term) in type 2 diabetes    Chronic kidney disease, stage II (mild)    Aortic atherosclerosis    Status post total knee replacement, left    Genu varum of right lower extremity    Essential hypertension    Internal derangement of left knee    Traumatic arthritis of right knee    Adult bronchiectasis    Chronic cough    Type 2 diabetes mellitus with diabetic polyneuropathy, with long-term current use of insulin    Diabetic peripheral neuropathy associated with type 2 diabetes mellitus    Tortuous aorta    Prominent aorta    Ectatic aorta    Type 2 diabetes mellitus with stage 2 chronic kidney disease, with long-term current use of insulin    Diabetes mellitus type 2 in obese    Cervicalgia    History of colon polyps    Chronic right shoulder pain    Decreased range of motion of right shoulder    Poor posture    Decreased functional mobility    Recurrent breast cancer, left       2-012    CONCLUSIONS     1 - Normal left ventricular function (EF 65%).     2 - Normal diastolic function.     3 - Mild tricuspid regurgitation.     4 - Low central venous pressure.     No evidence of stress induced myocardial ischemia      Past Surgical History:   Procedure Laterality Date    BREAST BIOPSY Left 1993    BREAST LUMPECTOMY Left 1993    CARPAL TUNNEL RELEASE Bilateral 2011    CATARACT EXTRACTION W/  INTRAOCULAR LENS IMPLANT Bilateral 2013 and 2014     CHOLECYSTECTOMY      COLONOSCOPY N/A 11/6/2015    Procedure: COLONOSCOPY;  Surgeon: Major Michelle MD;  [facility]  Service: Endoscopy;  Laterality: N/A;    COLONOSCOPY N/A 5/8/2019    Procedure: COLONOSCOPY;  Surgeon: Major Michelle MD;  [facility]  Service: Endoscopy;  Laterality: N/A;    EYE SURGERY      HYSTERECTOMY      partial hyst    JOINT REPLACEMENT Left June 2014    knee    uvuloplasty         Anesthesia Evaluation    I have reviewed the Patient Summary Reports.    I have reviewed the Nursing Notes.   I have reviewed the Medications.     Review of Systems      Physical Exam  General:  Well nourished    Airway/Jaw/Neck:  Airway Findings: Mouth Opening: Normal General Airway Assessment: Adult  Mallampati: II  Improves to II with phonation.  Jaw/Neck Findings:  Limited Ability to Prognath  Neck ROM: Normal ROM     Eyes/Ears/Nose:  Eyes/Ears/Nose Findings:    Dental:  Dental Findings: In tact   Chest/Lungs:  Chest/Lungs Findings: Clear to auscultation, Normal Respiratory Rate     Heart/Vascular:  Heart Findings: Rate: Normal  Rhythm: Regular Rhythm  Sounds: Normal     Abdomen:  Abdomen Findings:  Normal     Musculoskeletal:  Musculoskeletal Findings:    Skin:  Skin Findings:     Mental Status:  Mental Status Findings:  Cooperative, Alert and Oriented         Anesthesia Plan  Type of Anesthesia, risks & benefits discussed:  Anesthesia Type:  general, MAC, regional  Patient's Preference:   Intra-op Monitoring Plan:   Intra-op Monitoring Plan Comments:   Post Op Pain Control Plan:   Post Op Pain Control Plan Comments:   Induction:   IV  Beta Blocker:  Patient is not currently on a Beta-Blocker (No further documentation required).       Informed Consent: Patient understands risks and agrees with Anesthesia plan.  Questions answered. Anesthesia consent signed with patient.  ASA Score: 2     Day of Surgery Review of History & Physical:    H&P update referred to the  surgeon.         Ready For Surgery From Anesthesia Perspective.

## 2020-02-20 NOTE — ANESTHESIA PROCEDURE NOTES
Paravertebral Single Injection Block(s)    Patient location during procedure: pre-op   Block not for primary anesthetic.  Reason for block: at surgeon's request and post-op pain management   Post-op Pain Location: left chest pain  Start time: 2/20/2020 1:15 PM  Timeout: 2/20/2020 1:15 PM   End time: 2/20/2020 1:25 PM    Staffing  Authorizing Provider: Mohsen Rosales MD  Performing Provider: Lambert Trujillo MD    Preanesthetic Checklist  Completed: patient identified, site marked, surgical consent, pre-op evaluation, timeout performed, IV checked, risks and benefits discussed and monitors and equipment checked  Peripheral Block  Patient position: sitting  Prep: ChloraPrep  Patient monitoring: heart rate, cardiac monitor, continuous pulse ox, continuous capnometry and frequent blood pressure checks  Block type: paravertebral - thoracic  Laterality: left  Injection technique: single shot  Location: T2-3 and T4-5  Needle  Needle type: Tuohy   Needle gauge: 17 G  Needle length: 3.5 in  Needle localization: anatomical landmarks     Assessment  Injection assessment: negative aspiration and negative parasthesia  Paresthesia pain: none  Heart rate change: no  Slow fractionated injection: yes  Additional Notes  T2 os at 4 cm  T4 os at 3.5 cm  VSS.  DOS[facility] RN monitoring vitals throughout procedure.  Patient tolerated procedure well.    15 cc's of 0.5% ropi w/ epi injected at each level.

## 2020-02-20 NOTE — BRIEF OP NOTE
[facility]  Brief Operative Note    SUMMARY     Surgery Date: 2/20/2020     Surgeon(s) and Role:     * Hamida Nieves MD - Primary     * Pako Rodriguez MD - Resident - Assisting    Pre-op Diagnosis:  Malignant neoplasm of upper-outer quadrant of left breast in female, estrogen receptor positive [C50.412, Z17.0]    Post-op Diagnosis:  Post-Op Diagnosis Codes:     * Malignant neoplasm of upper-outer quadrant of left breast in female, estrogen receptor positive [C50.412, Z17.0]    Procedure(s) (LRB):  MASTECTOMY, UNILATERAL (Left)  INJECTION, FOR SENTINEL NODE IDENTIFICATION (Left)  BIOPSY, LYMPH NODE, SENTINEL (Left)    Anesthesia: General    Description of Procedure:   - Left mastectomy  - Hot Left sentinel lymph node biopsy (negative on frozen)    Estimated Blood Loss: * No values recorded between 2/20/2020  2:16 PM and 2/20/2020  4:53 PM *         Specimens:   Specimen (12h ago, onward)    None

## 2020-02-21 VITALS
TEMPERATURE: 97 F | BODY MASS INDEX: 28.82 KG/M2 | OXYGEN SATURATION: 94 % | WEIGHT: 173 LBS | DIASTOLIC BLOOD PRESSURE: 59 MMHG | HEIGHT: 65 IN | SYSTOLIC BLOOD PRESSURE: 124 MMHG | RESPIRATION RATE: 17 BRPM | HEART RATE: 87 BPM

## 2020-02-21 LAB
POCT GLUCOSE: 223 MG/DL (ref 70–110)
POCT GLUCOSE: 236 MG/DL (ref 70–110)

## 2020-02-21 PROCEDURE — 63600175 PHARM REV CODE 636 W HCPCS: Mod: HCNC | Performed by: STUDENT IN AN ORGANIZED HEALTH CARE EDUCATION/TRAINING PROGRAM

## 2020-02-21 PROCEDURE — 25000003 PHARM REV CODE 250: Mod: HCNC | Performed by: STUDENT IN AN ORGANIZED HEALTH CARE EDUCATION/TRAINING PROGRAM

## 2020-02-21 RX ORDER — PANTOPRAZOLE SODIUM 40 MG/1
40 TABLET, DELAYED RELEASE ORAL DAILY
Status: DISCONTINUED | OUTPATIENT
Start: 2020-02-21 | End: 2020-02-21 | Stop reason: HOSPADM

## 2020-02-21 RX ORDER — HYDROCODONE BITARTRATE AND ACETAMINOPHEN 5; 325 MG/1; MG/1
1 TABLET ORAL EVERY 6 HOURS PRN
Qty: 10 TABLET | Refills: 0 | Status: SHIPPED | OUTPATIENT
Start: 2020-02-21 | End: 2020-05-19

## 2020-02-21 RX ADMIN — INSULIN ASPART 2 UNITS: 100 INJECTION, SOLUTION INTRAVENOUS; SUBCUTANEOUS at 08:02

## 2020-02-21 RX ADMIN — PANTOPRAZOLE SODIUM 40 MG: 40 TABLET, DELAYED RELEASE ORAL at 08:02

## 2020-02-21 NOTE — NURSING
Received admit from pacu via stretcher 73 y/o bf  S/p left mastectomy. Aaox4, no distress noted. Family at bedside. Will continue to monitor.

## 2020-02-21 NOTE — ASSESSMENT & PLAN NOTE
74 y.o female s/p left mastectomy and SLNB on 2/20/20. Doing well.     - Diabetic diet  - PRN Zofran for nausea  - Encourage ambulation   - Dony Celaya.   - PRN Loa for pain control  - Ins/outs  - Dispo: If patient tolerated a diet.

## 2020-02-21 NOTE — HOSPITAL COURSE
Pt underwent left mastectomy with SLNB on 2/20/2020. See operative note for procedure details. Tolerated procedure well. Post-operative course was uncomplicated. All post-operative milestones were met without delay. She was tolerating diet, ambulating, voiding spontaneously, and her pain was well controlled. Patient was instructed and educated on discharge instructions

## 2020-02-21 NOTE — DISCHARGE SUMMARY
[facility]  General Surgery  Discharge Summary      Patient Name: Alisia Gusman  MRN: [id number]  Admission Date: 2/20/2020  Hospital Length of Stay: 0 days  Discharge Date and Time:  02/21/2020 10:34 AM  Attending Physician: Hamida Nieves MD   Discharging Provider: Pako Rodriguez MD  Primary Care Provider: Selena Montemayor MD    HPI:       Alisia Gusman is a 74 y.o. postmenopausal female referred for evaluation of recently diagnosed carcinoma of the left breast. The patient was initially referred for surgical evaluation of an abnormal mammogram first noted 01/03/20. Follow-up imaging showed mass at 12 o'clock in the posterior depth, 5 cm from the nipple. in the left breast. A ultrasound guided biopsy with placement of twirl marker was performed on 1/15/20 with pathology revealing infiltrating ductal carcinoma of the breast.      Patient does routinely do self breast exams.  Patient does not have noted a change on breast exam.  Patient denies nipple discharge. Patient admits to to previous breast biopsy. Patient admits to a personal history of breast cancer.  1993 underwent a lumpectomy with SNLB at [facility], patient was managed with radiation and endocrine therapy for 5 years. Denies chemo,      Findings at that time were the following:   Tumor size: 0.4 cm   Tumor ndgndrndanddndend:nd nd2nd estrogen receptor:  Positive; strong nuclear staining over 95% of tumor cells.   Progesterone receptor:  Negative; positive nuclear staining in less than 1%   of tumor cells.   Her2:  Equivocal  (Stain score = 2+). Fish : negative  Lymph node status: negative       Procedure(s) (LRB):  MASTECTOMY, UNILATERAL (Left)  INJECTION, FOR SENTINEL NODE IDENTIFICATION (Left)  BIOPSY, LYMPH NODE, SENTINEL (Left)      Indwelling Lines/Drains at time of discharge:   Lines/Drains/Airways     Drain                 Closed/Suction Drain 02/20/20 1540 Left Breast Bulb 15 Fr. less than 1 day              Hospital Course: Pt underwent left  mastectomy with SLNB on 2/20/2020. See operative note for procedure details. Tolerated procedure well. Post-operative course was uncomplicated. All post-operative milestones were met without delay. She was tolerating diet, ambulating, voiding spontaneously, and her pain was well controlled. Patient was instructed and educated on discharge instructions      *    Pending Diagnostic Studies:     Procedure Component Value Units Date/Time    Specimen to Pathology, Surgery General Surgery [[id number]] Collected:  02/20/20 1548    Order Status:  Sent Lab Status:  In process Updated:  02/20/20 1549        Final Active Diagnoses:    Diagnosis Date Noted POA    Breast cancer [C50.919] 02/20/2020 Yes      Problems Resolved During this Admission:      Discharged Condition: good    Disposition: Home or Self Care    Follow Up:  Follow-up Information     Hamida Nieves MD In 2 weeks.    Specialties:  General Surgery, Breast Surgery  Why:  For wound re-check, Appt scheduled for 3/4/20 @ 315PM  Contact information:  [address] [address]  [phone]             Adrianne Chu NP.    Specialty:  Internal Medicine  Why:  Appt scheduled for 3/23/20 @ 10AM  Contact information:  [address] [facility] [address]  [phone]                 Patient Instructions:      No driving until:   Scheduling Instructions: Off pain medications     Other restrictions (specify):   Scheduling Instructions: Do not wet incision for 24 hours after surgery, may shower after that time.  At that time, wash gently with warm soap and water at least once a day.  Do not scrub hard.  Do not soak incision in water for 2 weeks. Skin glue will fall off on its own (10-14 days).     Notify your health care provider if you experience any of the following:  increased confusion or weakness     Notify your health care provider if you experience any of the following:  persistent dizziness, light-headedness, or visual disturbances      Notify your health care provider if you experience any of the following:  worsening rash     Notify your health care provider if you experience any of the following:  severe persistent headache     Notify your health care provider if you experience any of the following:  difficulty breathing or increased cough     Notify your health care provider if you experience any of the following:  redness, tenderness, or signs of infection (pain, swelling, redness, odor or green/yellow discharge around incision site)     Notify your health care provider if you experience any of the following:  severe uncontrolled pain     Notify your health care provider if you experience any of the following:  persistent nausea and vomiting or diarrhea     Notify your health care provider if you experience any of the following:  temperature >100.4     Medications:  Reconciled Home Medications:      Medication List      START taking these medications    HYDROcodone-acetaminophen 5-325 mg per tablet  Commonly known as:  NORCO  Take 1 tablet by mouth every 6 (six) hours as needed for Pain.        CHANGE how you take these medications    insulin NPH-insulin regular (70/30) 100 unit/mL (70-30) injection  Commonly known as:  NOVOLIN 70/30  RELION BRAND 26 units thirty minutes before breakfast and 12 units thirty minutes before dinner.  What changed:  when to take this        CONTINUE taking these medications    Accu-Chek Shy Plus test strp Strp  Generic drug:  blood sugar diagnostic  TEST THREE TIMES DAILY AS DIRECTED     Accu-Chek Softclix Lancets Misc  Generic drug:  lancets  1 each by Misc.(Non-Drug; Combo Route) route 3 (three) times daily.     aspirin 81 MG Chew  Take 1 tablet by mouth At bedtime.     atenoloL 25 MG tablet  Commonly known as:  TENORMIN  Take 0.5 tablets (12.5 mg total) by mouth once daily.     BD Alcohol Swabs Padm  Generic drug:  alcohol swabs  1 each 2 (two) times daily.     BD Ultra-Fine Ashly Pen Needle 32 gauge x  "5/32" Ndle  Generic drug:  pen needle, diabetic  USE  TO  INJECT  ONE  TIME  DAILY     BD Veo Insulin Syringe UF 0.3 mL 31 gauge x 15/64" Syrg  Generic drug:  insulin syringe-needle U-100  USE TWICE DAILY     blood-glucose meter Misc  1 each by Misc.(Non-Drug; Combo Route) route once daily.     Caltrate 600 plus D 600 mg(1,500mg) -400 unit Chew  Generic drug:  calcium carbonate-vitamin D3  1 tablet once daily.     CENTRUM SILVER ORAL  Take 1 tablet by mouth once daily.     glimepiride 4 MG tablet  Commonly known as:  AMARYL  TAKE 1 TABLET EVERY DAY WITH BREAKFAST     hydroCHLOROthiazide 25 MG tablet  Commonly known as:  HYDRODIURIL  Take 1 tablet (25 mg total) by mouth once daily.     ibuprofen 200 MG tablet  Commonly known as:  ADVIL,MOTRIN  Take 200 mg by mouth every 6 (six) hours as needed for Pain.     insulin syringe-needle U-100 1 mL 31 gauge x 5/16 Syrg  Commonly known as:  BD Insulin Syringe Ultra-Fine  To use 2 times daily with insulin     ketoconazole 2 % shampoo  Commonly known as:  NIZORAL  Wash hair with medicated shampoo at least 2x/week - let sit on scalp at least 5 minutes prior to rinsing     omeprazole 40 MG capsule  Commonly known as:  PRILOSEC  Take 1 capsule (40 mg total) by mouth every morning.     potassium chloride 10 MEQ Tbsr  Commonly known as:  KLOR-CON  Take 1 tablet (10 mEq total) by mouth once daily.     pravastatin 40 MG tablet  Commonly known as:  PRAVACHOL  TAKE 1 TABLET NIGHTLY.     pregabalin 75 MG capsule  Commonly known as:  LYRICA  TAKE 1 CAPSULE TWICE DAILY THEREAFTER     Vitamin B-12 1000 MCG tablet  Generic drug:  cyanocobalamin  Take 100 mcg by mouth once daily.        ASK your doctor about these medications    Derma-Smoothe/FS Scalp Oil 0.01 % Oil  Generic drug:  fluocinolone and shower cap  Apply oil to damp scalp nightly and cover with shower cap.     insulin regular 100 unit/mL injection  RELION brand  Inject 8 units with lunch and 9 units with dinner.     semaglutide " 0.25 mg or 0.5 mg(2 mg/1.5 mL) Pnij  Commonly known as:  OZEMPIC  Inject 0.25 mg into the skin every 7 days. Take 0.25 mg for 4 weeks, then increase to 0.5 mg weekly          Time spent on the discharge of patient: 25 minutes    Pako Rodriguez MD  General Surgery  [facility]

## 2020-02-21 NOTE — PLAN OF CARE
Fair hours. Med for c/o nausea with relief. Slept well. Iv fluids infusing. Pain controlled with current meds. Vss. Alvin drain intact with small amt bloody drainage. Safety maintained.

## 2020-02-21 NOTE — PROGRESS NOTES
[facility]  General Surgery  Progress Note    Subjective:     History of Present Illness:  No notes on file    Post-Op Info:  Procedure(s) (LRB):  MASTECTOMY, UNILATERAL (Left)  INJECTION, FOR SENTINEL NODE IDENTIFICATION (Left)  BIOPSY, LYMPH NODE, SENTINEL (Left)   1 Day Post-Op     Interval History:   No acute events overnight. One episode of nausea and vomit relieved with medication. Adequate pain control. Ambulated.     Medications:  Continuous Infusions:   sodium chloride 0.9% 75 mL/hr at 02/20/20 1719     Scheduled Meds:   pantoprazole  40 mg Oral Daily     PRN Meds:acetaminophen, Dextrose 10% Bolus, Dextrose 10% Bolus, glucagon (human recombinant), glucose, glucose, HYDROcodone-acetaminophen, HYDROcodone-acetaminophen, insulin aspart U-100, melatonin, ondansetron, promethazine (PHENERGAN) IVPB     Review of patient's allergies indicates:   Allergen Reactions    Grass pollen-june grass standard Other (See Comments)     Causes sinus symptoms like coughing    Losartan      cough    Nubain [nalbuphine] Other (See Comments)     Other reaction(s): Hypotension    Sinus & allergy [chlorpheniramine-phenylephrine]      Objective:     Vital Signs (Most Recent):  Temp: 97.8 °F (36.6 °C) (02/21/20 0413)  Pulse: 84 (02/21/20 0413)  Resp: 16 (02/21/20 0413)  BP: 130/62 (02/21/20 0413)  SpO2: 99 % (02/21/20 0413) Vital Signs (24h Range):  Temp:  [97 °F (36.1 °C)-98.8 °F (37.1 °C)] 97.8 °F (36.6 °C)  Pulse:  [79-99] 84  Resp:  [14-20] 16  SpO2:  [93 %-100 %] 99 %  BP: (130-177)/(62-83) 130/62     Weight: 78.5 kg (173 lb)  Body mass index is 28.79 kg/m².    Intake/Output - Last 3 Shifts       02/19 0700 - 02/20 0659 02/20 0700 - 02/21 0659 02/21 0700 - 02/22 0659    P.O.  120     I.V. (mL/kg)  1801.3 (22.9)     IV Piggyback  50     Total Intake(mL/kg)  1971.3 (25.1)     Urine (mL/kg/hr)  0     Drains  20     Total Output  20     Net  +1951.3            Urine Occurrence  2 x           Physical Exam    Constitutional: She is oriented to person, place, and time. She appears well-developed.   Eyes: Pupils are equal, round, and reactive to light. Conjunctivae are normal.   Neck: Normal range of motion. Neck supple.   Cardiovascular: Normal rate, regular rhythm and intact distal pulses.   Pulmonary/Chest: Effort normal. No respiratory distress.   Incision c/d/i.   Drain with SS output    Abdominal: Soft. Bowel sounds are normal.   Musculoskeletal: Normal range of motion.   Neurological: She is alert and oriented to person, place, and time.   Skin: Skin is warm. Capillary refill takes less than 2 seconds.         Assessment/Plan:     Breast cancer  74 y.o female s/p left mastectomy and SLNB on 2/20/20. Doing well.     - Diabetic diet  - PRN Zofran for nausea  - Encourage ambulation   - Dony Celaya.   - PRN Rentz for pain control  - Ins/outs  - Dispo: If patient tolerates a diet.           Pako Rodriguez MD  General Surgery  [facility]

## 2020-02-21 NOTE — DISCHARGE INSTRUCTIONS
POSTOPERATIVE INSTRUCTIONS FOLLOWING   MASTECTOMY AND/OR AXILLARY LYMPH NODE DISSECTION    The following are post-operative instructions that will help you to recover from your surgery.  Please read over these instructions carefully and contact us if we can answer any of your questions or concerns.    Post-op care/Dressing/breast binder (surgi-bra)  A surgical bra may be placed around your chest after your surgery.  If you are given the bra, please wear it for the first 48 hours after surgery. After 48 hours you can remove your surgical bra and dressing to shower/cleanse the chest wall with antibacterial soap and warm water. Do not take a tub bath and do not soak the surgical site for at least 2 weeks.     The final pathology report will be available approximately 7-10 days after your surgery.  Our office will call you with your pathology report when it becomes available.    If blue dye was used to locate your sentinel lymph nodes, your urine and stool may be blue-green in color for 1 or 2 days.    Activity    You will be able to do much of your own personal care, such as bathing, dressing, preparing simple meals, etc.   A short walk each day will help with your recovery   You may find that you need to take rest breaks between activities, but you should not need to stay in bed for prolonged periods of time during the day. A good rule during this time is to listen to your body, do what is comfortable, and stop and rest when your feel tired.  If it hurts, dont do it.   Return to taking your daily medications as prescribed   Please avoid activities that require moderate to heavy lifting (grocery shopping) or pushing/pulling (vacuuming) and repetitive motions (such as washing windows). Do not lift anything heavier than a gallon of milk.   Following a lymph node dissection, dont avoid using your arm, but dont exercise your arm until after your first post-operative visit.  At your first post-op visit, you will be  given arm exercises to regain movement and flexibility.  You may be referred to physical therapy if needed.   You may restart driving when you are no longer on narcotics and you feel safe turning the wheel and stopping quickly.   You will need to be out of work approximately 2-6 weeks depending on your particular surgery and how well you are recovering.  We will evaluate how you are doing at the first post-op appointment.  This is a good time to ask when you may return to work and what activities you may do.    Medication for pain   You will be given a prescription for pain medication. You should not drive or operate machinery while taking these.  Please take prescription pain medication (narcotics) with food.  Narcotics can cause, or worsen, constipation.  You will need to increase your fluid intake, eat high fiber foods (such as fruits and bran) and make sure that you are up and walking. You may need to take an over the counter stool softener for constipation.   Short term use of an icepack may be helpful to decrease discomfort and swelling, particularly to the armpit after lymph node surgery.   A small pillow positioned in the armpit may also decrease discomfort after lymph node surgery.   If you are given a prescription for antibiotics, take them as prescribed.    How to care for your Drain(s)  1. Wash hands--STRIP or milk the drainage tube as it comes out of your body toward the bulb.   a. Beginning where the drain comes out of your body, hold drainage tubing with one hand and with the other, stretch and release tubing an inch at time while moving downward with both hands toward the bulb.  b. Do this 2-3 times before emptying the bulb.  2. Remove the stopper from the bulbs port  (drainage port)  3. Pour the drainage in the measuring cup provided by the nurse  4. Flatten/squeeze the bulb to create a vacuum and replace the stopper before letting go of the bulb.  5. Record the date, time and amount of  drainage in ccs (not ounces) each time bulb is emptied. If you have more than one drain, record each separately.  6. Discard the drainage into the toilet after measuring and then wash hands.  7. Empty bulbs 2-3 times/day or as needed if it fills up before 8 hours.  8. Remember to bring the output record with you to your doctors appointment.    Please report the following:   Temperature greater than 101 degrees   Discharge or bad odor from the wound   Excessive bleeding, such as saturated bloody dressing or extreme bruising   Redness at incision and/or drain sites   Swelling or buildup of fluid around incision   Persistent fevers, chills, nausea, vomiting, or diarrhea    Additional information  Your surgeon will see you approximately 2 weeks following your surgery.  If this follow-up appointment has not been made, please call the office.    If you have any questions or problems, please call my office or my nurse.    VENESSA Metzger Dr., Dr., Dr., RN Jamie Gambino, VENESSA Lazo RN  [phone]    Kay Clinton PA-C  [phone]  Becki Cuevas RN  [phone]    After hours and on weekends, you may call the main [facility] line at [phone] and ask to have the general surgery resident paged or have me paged      Lymphedema Risk Reduction    Lymphedema is a swelling of a part of the body, caused by an insufficient lymphatic system and an accumulation of fluid in the bodys tissues.  Lymphedema may occur when normal drainage of fluid is disrupted, such as an infection, injury, cancer, scar tissue, or removal of lymph nodes.    If you had a full axillary lymph node dissection procedure, you may be at greater risk for lymphedema.     For those patients having a sentinel lymph node biopsy, these risks may be smaller and the recommendations are provided for your review and  consideration.    The following list contains recommendations for reducing your risk of developing lymphedema.    I. Skin Care--avoid trauma/injury to reduce infection risk   Keep the hand and arm on the side of surgery clean and dry   Pay attention to nail care and do not cut cuticles   Avoid punctures, such as injections and blood draws from you on the side of your surgery   Wear gloves while doing activities that may cause skin injury (washing dishes, gardening, etc.)   If scratches or punctures occur, wash area with soap and water, and observe for signs of infections (redness, drainage, swelling)   If a rash, itching, redness, pain, increased skin temperatures, fever, or flu-like symptoms occur, contact your physician immediately for early treatment of a possible infection  II. Activity/Lifestyle   Gradually build up the duration and intensity of any activity or exercise   Take frequent rest periods during activity to allow for arm recovery   Monitor your arm and upper body during and after activity for any change in size, shape, tissue, texture, soreness, heaviness, or firmness  III. Avoid constriction of your arm on the side of your surgery   Avoid having blood pressure taken on the arm on the side of your surgery   Wear loose fitting jewelry and clothing   Be careful not to rest a heavy purse, luggage, or grocery bags on that arm   When you return to wearing a bra, make sure that it is well fitted and not too tight

## 2020-02-21 NOTE — HPI
Alisia Gusman is a 74 y.o. postmenopausal female referred for evaluation of recently diagnosed carcinoma of the left breast. The patient was initially referred for surgical evaluation of an abnormal mammogram first noted 01/03/20. Follow-up imaging showed mass at 12 o'clock in the posterior depth, 5 cm from the nipple. in the left breast. A ultrasound guided biopsy with placement of twirl marker was performed on 1/15/20 with pathology revealing infiltrating ductal carcinoma of the breast.      Patient does routinely do self breast exams.  Patient does not have noted a change on breast exam.  Patient denies nipple discharge. Patient admits to to previous breast biopsy. Patient admits to a personal history of breast cancer.  1993 underwent a lumpectomy with SNLB at [facility], patient was managed with radiation and endocrine therapy for 5 years. Denies chemo,      Findings at that time were the following:   Tumor size: 0.4 cm   Tumor ndgndrndanddndend:nd nd2nd estrogen receptor:  Positive; strong nuclear staining over 95% of tumor cells.   Progesterone receptor:  Negative; positive nuclear staining in less than 1%   of tumor cells.   Her2:  Equivocal  (Stain score = 2+). Fish : negative  Lymph node status: negative

## 2020-02-21 NOTE — NURSING TRANSFER
Nursing Transfer Note      2/20/2020     Transfer To: 504 From PACU    Transfer via stretcher    Transfer with IV pump andO2    Transported by PCT    Medicines sent: yes insulin and inhaler    Chart send with patient: Yes    Notified: spouse, daughter    Patient reassessed at: 2/20/2020 @ 1745     Upon arrival to floor:

## 2020-02-21 NOTE — PLAN OF CARE
Patient discharging home, self care. Tolerated breakfast with no n/v. Patient demonstrated return teaching of drain care. Drain chart and cup sent home with patient. Patient and family verbalize understanding of discharge instructions and follow up appointments.

## 2020-02-21 NOTE — SUBJECTIVE & OBJECTIVE
Interval History:   No acute events overnight. One episode of nausea and vomit relieved with medication. Adequate pain control. Ambulated.     Medications:  Continuous Infusions:   sodium chloride 0.9% 75 mL/hr at 02/20/20 1719     Scheduled Meds:   pantoprazole  40 mg Oral Daily     PRN Meds:acetaminophen, Dextrose 10% Bolus, Dextrose 10% Bolus, glucagon (human recombinant), glucose, glucose, HYDROcodone-acetaminophen, HYDROcodone-acetaminophen, insulin aspart U-100, melatonin, ondansetron, promethazine (PHENERGAN) IVPB     Review of patient's allergies indicates:   Allergen Reactions    Grass pollen-june grass standard Other (See Comments)     Causes sinus symptoms like coughing    Losartan      cough    Nubain [nalbuphine] Other (See Comments)     Other reaction(s): Hypotension    Sinus & allergy [chlorpheniramine-phenylephrine]      Objective:     Vital Signs (Most Recent):  Temp: 97.8 °F (36.6 °C) (02/21/20 0413)  Pulse: 84 (02/21/20 0413)  Resp: 16 (02/21/20 0413)  BP: 130/62 (02/21/20 0413)  SpO2: 99 % (02/21/20 0413) Vital Signs (24h Range):  Temp:  [97 °F (36.1 °C)-98.8 °F (37.1 °C)] 97.8 °F (36.6 °C)  Pulse:  [79-99] 84  Resp:  [14-20] 16  SpO2:  [93 %-100 %] 99 %  BP: (130-177)/(62-83) 130/62     Weight: 78.5 kg (173 lb)  Body mass index is 28.79 kg/m².    Intake/Output - Last 3 Shifts       02/19 0700 - 02/20 0659 02/20 0700 - 02/21 0659 02/21 0700 - 02/22 0659    P.O.  120     I.V. (mL/kg)  1801.3 (22.9)     IV Piggyback  50     Total Intake(mL/kg)  1971.3 (25.1)     Urine (mL/kg/hr)  0     Drains  20     Total Output  20     Net  +1951.3            Urine Occurrence  2 x           Physical Exam   Constitutional: She is oriented to person, place, and time. She appears well-developed.   Eyes: Pupils are equal, round, and reactive to light. Conjunctivae are normal.   Neck: Normal range of motion. Neck supple.   Cardiovascular: Normal rate, regular rhythm and intact distal pulses.   Pulmonary/Chest:  Effort normal. No respiratory distress.   Incision c/d/i.   Drain with SS output    Abdominal: Soft. Bowel sounds are normal.   Musculoskeletal: Normal range of motion.   Neurological: She is alert and oriented to person, place, and time.   Skin: Skin is warm. Capillary refill takes less than 2 seconds.

## 2020-02-24 ENCOUNTER — TELEPHONE (OUTPATIENT)
Dept: SURGERY | Facility: CLINIC | Age: 75
End: 2020-02-24

## 2020-02-24 NOTE — PLAN OF CARE
Patient discharged home to care of self on 2/21/20.     02/24/20 1628   Final Note   Assessment Type Final Discharge Note   Anticipated Discharge Disposition Home   What phone number can be called within the next 1-3 days to see how you are doing after discharge?   ([phone])   Hospital Follow Up  Appt(s) scheduled? Yes   Discharge plans and expectations educations in teach back method with documentation complete? Yes   Right Care Referral Info   Post Acute Recommendation No Care

## 2020-02-24 NOTE — TELEPHONE ENCOUNTER
Return call to pt after receiving a VM. Pt stated that [facility] needed a signature for a prescription that was sent to them. Informed pt that Dr Nieves only gave an Rx for pain meds post surgery. Pt states she did receive that Rx. Pt realized after she called that it was her diabetes MD that needed to provide a signature to [facility]. Pt states she only took 1 pain pill since surgery and doing well. Drains are emptying well. Advised pt to call if any questions or concerns. Pt has post op appt with Dr Nieves 3/4/2020.

## 2020-02-26 ENCOUNTER — TELEPHONE (OUTPATIENT)
Dept: ENDOCRINOLOGY | Facility: CLINIC | Age: 75
End: 2020-02-26

## 2020-02-26 NOTE — TELEPHONE ENCOUNTER
----- Message from Sarah Chirinos sent at 2/26/2020 11:21 AM CST -----  Contact: Self [phone]  Pt states this is [facility] pharmacy fax number [phone] states dr boyce can fax her prescription for her medication semaglutide (OZEMPIC) 0.25 mg or 0.5 mg(2 mg/1.5 mL) PnIj      states they faxed Dr. boyce some forms to fill out haven't heard anything please call back to discuss

## 2020-02-27 NOTE — ANESTHESIA POSTPROCEDURE EVALUATION
"Anesthesia Post Evaluation    Patient: Alisia Gusman    Procedure(s) Performed: Procedure(s) (LRB):  MASTECTOMY, UNILATERAL (Left)  INJECTION, FOR SENTINEL NODE IDENTIFICATION (Left)  BIOPSY, LYMPH NODE, SENTINEL (Left)    Final Anesthesia Type: general    Patient location during evaluation: PACU  Patient participation: Yes- Able to Participate  Level of consciousness: awake and alert and oriented  Post-procedure vital signs: reviewed and stable  Pain management: adequate  Airway patency: patent    PONV status at discharge: No PONV  Anesthetic complications: no      Cardiovascular status: hemodynamically stable  Respiratory status: unassisted, spontaneous ventilation and room air  Hydration status: euvolemic  Follow-up not needed.      BP (!) 155/70 (BP Location: Right arm, Patient Position: Lying)   Pulse 99   Temp 37.1 °C (98.8 °F) (Oral)   Resp 18   Ht 5' 5" (1.651 m)   Wt 81.6 kg (180 lb)   LMP  (LMP Unknown)   SpO2 100%         Event Time     Out of Recovery 17:45:00          Pain/Lorene Score: No data recorded      "

## 2020-02-28 LAB
FINAL PATHOLOGIC DIAGNOSIS: ABNORMAL
FROZEN SECTION DIAGNOSIS: ABNORMAL
FROZEN SECTION FOOTNOTE: ABNORMAL
GROSS: ABNORMAL
MICROSCOPIC EXAM: ABNORMAL

## 2020-03-02 NOTE — ADDENDUM NOTE
Addendum  created 03/02/20 1518 by Mohsen Rosales MD    Attestation recorded in Intraprocedure, Intraprocedure Attestations filed

## 2020-03-04 ENCOUNTER — OFFICE VISIT (OUTPATIENT)
Dept: SURGERY | Facility: CLINIC | Age: 75
End: 2020-03-04
Payer: MEDICARE

## 2020-03-04 VITALS
HEIGHT: 62 IN | HEART RATE: 100 BPM | SYSTOLIC BLOOD PRESSURE: 141 MMHG | BODY MASS INDEX: 34.26 KG/M2 | DIASTOLIC BLOOD PRESSURE: 76 MMHG | WEIGHT: 186.19 LBS

## 2020-03-04 DIAGNOSIS — Z17.0 MALIGNANT NEOPLASM OF UPPER-OUTER QUADRANT OF LEFT BREAST IN FEMALE, ESTROGEN RECEPTOR POSITIVE: Primary | ICD-10-CM

## 2020-03-04 DIAGNOSIS — C50.412 MALIGNANT NEOPLASM OF UPPER-OUTER QUADRANT OF LEFT BREAST IN FEMALE, ESTROGEN RECEPTOR POSITIVE: Primary | ICD-10-CM

## 2020-03-04 PROCEDURE — 99024 POSTOP FOLLOW-UP VISIT: CPT | Mod: HCNC,S$GLB,, | Performed by: SURGERY

## 2020-03-04 PROCEDURE — 99999 PR PBB SHADOW E&M-EST. PATIENT-LVL III: ICD-10-PCS | Mod: PBBFAC,HCNC,, | Performed by: SURGERY

## 2020-03-04 PROCEDURE — 99999 PR PBB SHADOW E&M-EST. PATIENT-LVL III: CPT | Mod: PBBFAC,HCNC,, | Performed by: SURGERY

## 2020-03-04 PROCEDURE — 99024 PR POST-OP FOLLOW-UP VISIT: ICD-10-PCS | Mod: HCNC,S$GLB,, | Performed by: SURGERY

## 2020-03-04 RX ORDER — DULAGLUTIDE 0.75 MG/.5ML
INJECTION, SOLUTION SUBCUTANEOUS
COMMUNITY
Start: 2020-02-20 | End: 2020-05-19

## 2020-03-04 RX ORDER — PEN NEEDLE, DIABETIC 30 GX3/16"
NEEDLE, DISPOSABLE MISCELLANEOUS
Qty: 90 EACH | Refills: 3 | Status: SHIPPED | OUTPATIENT
Start: 2020-03-04 | End: 2022-04-05 | Stop reason: SDUPTHER

## 2020-03-04 RX ORDER — FAMOTIDINE 20 MG/1
TABLET, FILM COATED ORAL
COMMUNITY
Start: 2020-02-19 | End: 2020-07-23

## 2020-03-04 NOTE — TELEPHONE ENCOUNTER
----- Message from Sharyn Guerrero sent at 3/4/2020 10:28 AM CST -----  Contact: pt  SCRIPT  NEEDED        DOCTOR ORDERED  semaglutide (OZEMPIC) 0.25     NEEDS  THE NEEDLES FOR THE [facility]IL [facility] - [place], [place] - [address]    Thanks

## 2020-03-04 NOTE — TELEPHONE ENCOUNTER
Talk to pt she need pen needles for her ozempic. Also wanted instruction for her relion I looked at her after visited summary for her 2/19 viisted dr boyce had details instruction for pt. Ask if she still hade her AVS she said yes. I told her under her instruction dr boyce left. Pt agreed thank me.                     ----- Message from Sharyn Guerrero sent at 3/4/2020 10:33 AM CST -----  Contact: pt  Pt would like  A call back     Has question about other insulin     Ph [phone] (M)    Thanks

## 2020-03-04 NOTE — PROGRESS NOTES
"REFERRING PHYSICIAN:  Selena Montemayor MD    MEDICAL ONCOLOGIST:    HAZEL  RADIATION ONCOLOGIST:   HAZEL    DIAGNOSIS:    This is a 74 y.o. female with a stage (r)pT1b (sn)pN0 M0 grade 2 ER + [place] - HER2 equivocal DCIS of the left breast.    TREATMENT SUMMARY:  The patient is status post left mastectomy and sentinel node biopsy on 2/20/20.  Final pathology showed Invasive Ductal Carcinoma with DCIS with clear margins.    INTERVAL HISTORY:   Alisia Gusman comes in for a post-op check.  She denies fever, chills, chest pain or shortness of breath.  Her pain is well controlled.      MEDICATIONS:  Current Outpatient Medications   Medication Sig Dispense Refill    ACCU-CHEK DOMENICO PLUS TEST STRP Strp TEST THREE TIMES DAILY AS DIRECTED 300 strip 3    ACCU-CHEK SOFTCLIX LANCETS Misc 1 each by Misc.(Non-Drug; Combo Route) route 3 (three) times daily. 270 each 3    aspirin 81 MG chewable tablet Take 1 tablet by mouth At bedtime.      atenolol (TENORMIN) 25 MG tablet Take 0.5 tablets (12.5 mg total) by mouth once daily. 45 tablet 3    BD ALCOHOL SWABS PadM 1 each 2 (two) times daily.      BD ULTRA-FINE SUSIE PEN NEEDLES 32 gauge x 5/32" Ndle USE  TO  INJECT  ONE  TIME  DAILY 90 each 3    BD VEO INSULIN SYRINGE UF 0.3 mL 31 gauge x 15/64" Syrg USE TWICE DAILY 200 Syringe 5    blood-glucose meter Misc 1 each by Misc.(Non-Drug; Combo Route) route once daily. 1 each 0    calcium carbonate-vitamin D3 (CALTRATE 600 + D) 600 mg(1,500mg) -400 unit Chew 1 tablet once daily.       cyanocobalamin (VITAMIN B-12) 1000 MCG tablet Take 100 mcg by mouth once daily.      DERMA-SMOOTHE/FS SCALP OIL 0.01 % Oil Apply oil to damp scalp nightly and cover with shower cap. 1 Bottle 3    FOLIC ACID/MULTIVIT-MIN/LUTEIN (CENTRUM SILVER ORAL) Take 1 tablet by mouth once daily.      glimepiride (AMARYL) 4 MG tablet TAKE 1 TABLET EVERY DAY WITH BREAKFAST 90 tablet 3    hydroCHLOROthiazide (HYDRODIURIL) 25 MG tablet Take 1 tablet (25 mg total) by mouth once " daily. 90 tablet 3    HYDROcodone-acetaminophen (NORCO) 5-325 mg per tablet Take 1 tablet by mouth every 6 (six) hours as needed for Pain. 10 tablet 0    ibuprofen (ADVIL,MOTRIN) 200 MG tablet Take 200 mg by mouth every 6 (six) hours as needed for Pain.      insulin NPH-insulin regular, 70/30, (NOVOLIN 70/30) 100 unit/mL (70-30) injection RELION BRAND 26 units thirty minutes before breakfast and 12 units thirty minutes before dinner. (Patient taking differently: 2 (two) times daily. RELION BRAND 26 units thirty minutes before breakfast and 12 units thirty minutes before dinner.) 10 mL 11    insulin regular 100 unit/mL Inj injection RELION brand  Inject 8 units with lunch and 9 units with dinner.      insulin syringe-needle U-100 (BD INSULIN SYRINGE ULTRA-FINE) 1 mL 31 gauge x 5/16 Syrg To use 2 times daily with insulin 90 each 11    ketoconazole (NIZORAL) 2 % shampoo Wash hair with medicated shampoo at least 2x/week - let sit on scalp at least 5 minutes prior to rinsing 120 mL 5    omeprazole (PRILOSEC) 40 MG capsule Take 1 capsule (40 mg total) by mouth every morning. 90 capsule 3    potassium chloride (KLOR-CON) 10 MEQ TbSR Take 1 tablet (10 mEq total) by mouth once daily. 90 tablet 3    pravastatin (PRAVACHOL) 40 MG tablet TAKE 1 TABLET NIGHTLY. 90 tablet 3    pregabalin (LYRICA) 75 MG capsule TAKE 1 CAPSULE TWICE DAILY THEREAFTER 180 capsule 1    semaglutide (OZEMPIC) 0.25 mg or 0.5 mg(2 mg/1.5 mL) PnIj Inject 0.25 mg into the skin every 7 days. Take 0.25 mg for 4 weeks, then increase to 0.5 mg weekly 2 Syringe 11     No current facility-administered medications for this visit.        ALLERGIES:   Review of patient's allergies indicates:   Allergen Reactions    Grass pollen-june grass standard Other (See Comments)     Causes sinus symptoms like coughing    Losartan      cough    Nubain [nalbuphine] Other (See Comments)     Other reaction(s): Hypotension    Sinus & allergy  [chlorpheniramine-phenylephrine]        PHYSICAL EXAMINATION:   General:  This is a well appearing female with appropriate speech, affect and gait.     Breast:  Incision clean, dry, and intact    IMPRESSION:   The patient has had an uneventful postoperative course.    PLAN:   1. return in 6 months for a follow up office visit and breast exam  2. right mammogram in 9 months in December  3. The patient is advised in continued exam of the breast chest wall and to report to this office sooner should she note any areas of abnormality or concern.   4.  She has been instructed to meet with med onc and rad onc for discussion of adjuvant treatment recommendations  5. I have instructed her to record her drain output only once each day for the next two days and call the office on Friday with the output. If it is less than 30cc per day we can remove her drain on Friday, otherwise we will make an appointment for her to have it removed Tuesday most likely after the output has decreased.

## 2020-03-09 NOTE — PROGRESS NOTES
"REFERRING PHYSICIAN:                 Selena Montemayor MD     MEDICAL ONCOLOGIST:                 HAZEL  RADIATION ONCOLOGIST:             HAZEL     DIAGNOSIS:    This is a 74 y.o. female with a stage (r)pT1b (sn)pN0 M0 grade 2 ER + OR - HER2 equivocal DCIS of the left breast.     TREATMENT SUMMARY:  The patient is status post left mastectomy and sentinel node biopsy on 2/20/20.  Final pathology showed Invasive Ductal Carcinoma.     INTERVAL HISTORY:   Alisia Gusman comes in for a post-op check and possible drain removal.  She denies fever, chills, chest pain or shortness of breath.  Her pain is well controlled.   drain output has diminished     MEDICATIONS:  Current Medications          Current Outpatient Medications   Medication Sig Dispense Refill    ACCU-CHEK DOMENICO PLUS TEST STRP Strp TEST THREE TIMES DAILY AS DIRECTED 300 strip 3    ACCU-CHEK SOFTCLIX LANCETS Misc 1 each by Misc.(Non-Drug; Combo Route) route 3 (three) times daily. 270 each 3    aspirin 81 MG chewable tablet Take 1 tablet by mouth At bedtime.        atenolol (TENORMIN) 25 MG tablet Take 0.5 tablets (12.5 mg total) by mouth once daily. 45 tablet 3    BD ALCOHOL SWABS PadM 1 each 2 (two) times daily.        BD ULTRA-FINE SUSIE PEN NEEDLES 32 gauge x 5/32" Ndle USE  TO  INJECT  ONE  TIME  DAILY 90 each 3    BD VEO INSULIN SYRINGE UF 0.3 mL 31 gauge x 15/64" Syrg USE TWICE DAILY 200 Syringe 5    blood-glucose meter Misc 1 each by Misc.(Non-Drug; Combo Route) route once daily. 1 each 0    calcium carbonate-vitamin D3 (CALTRATE 600 + D) 600 mg(1,500mg) -400 unit Chew 1 tablet once daily.         cyanocobalamin (VITAMIN B-12) 1000 MCG tablet Take 100 mcg by mouth once daily.        DERMA-SMOOTHE/FS SCALP OIL 0.01 % Oil Apply oil to damp scalp nightly and cover with shower cap. 1 Bottle 3    FOLIC ACID/MULTIVIT-MIN/LUTEIN (CENTRUM SILVER ORAL) Take 1 tablet by mouth once daily.        glimepiride (AMARYL) 4 MG tablet TAKE 1 TABLET EVERY DAY WITH " BREAKFAST 90 tablet 3    hydroCHLOROthiazide (HYDRODIURIL) 25 MG tablet Take 1 tablet (25 mg total) by mouth once daily. 90 tablet 3    HYDROcodone-acetaminophen (NORCO) 5-325 mg per tablet Take 1 tablet by mouth every 6 (six) hours as needed for Pain. 10 tablet 0    ibuprofen (ADVIL,MOTRIN) 200 MG tablet Take 200 mg by mouth every 6 (six) hours as needed for Pain.        insulin NPH-insulin regular, 70/30, (NOVOLIN 70/30) 100 unit/mL (70-30) injection RELION BRAND 26 units thirty minutes before breakfast and 12 units thirty minutes before dinner. (Patient taking differently: 2 (two) times daily. RELION BRAND 26 units thirty minutes before breakfast and 12 units thirty minutes before dinner.) 10 mL 11    insulin regular 100 unit/mL Inj injection RELION brand  Inject 8 units with lunch and 9 units with dinner.        insulin syringe-needle U-100 (BD INSULIN SYRINGE ULTRA-FINE) 1 mL 31 gauge x 5/16 Syrg To use 2 times daily with insulin 90 each 11    ketoconazole (NIZORAL) 2 % shampoo Wash hair with medicated shampoo at least 2x/week - let sit on scalp at least 5 minutes prior to rinsing 120 mL 5    omeprazole (PRILOSEC) 40 MG capsule Take 1 capsule (40 mg total) by mouth every morning. 90 capsule 3    potassium chloride (KLOR-CON) 10 MEQ TbSR Take 1 tablet (10 mEq total) by mouth once daily. 90 tablet 3    pravastatin (PRAVACHOL) 40 MG tablet TAKE 1 TABLET NIGHTLY. 90 tablet 3    pregabalin (LYRICA) 75 MG capsule TAKE 1 CAPSULE TWICE DAILY THEREAFTER 180 capsule 1    semaglutide (OZEMPIC) 0.25 mg or 0.5 mg(2 mg/1.5 mL) PnIj Inject 0.25 mg into the skin every 7 days. Take 0.25 mg for 4 weeks, then increase to 0.5 mg weekly 2 Syringe 11      No current facility-administered medications for this visit.             ALLERGIES:   Review of patient's allergies indicates:   Allergen Reactions    Grass pollen-tim grass standard Other (See Comments)       Causes sinus symptoms like coughing    Losartan          "cough    Nubain [nalbuphine] Other (See Comments)       Other reaction(s): Hypotension    Sinus & allergy [chlorpheniramine-phenylephrine]        Review of Systems   Constitutional: Negative for chills and fever.   HENT: Negative for hearing loss and nosebleeds.    Eyes: Negative for discharge and redness.   Respiratory: Negative for cough and wheezing.    Cardiovascular: Negative for chest pain and leg swelling.   Gastrointestinal: Negative for abdominal pain and vomiting.   Musculoskeletal: Negative for back pain and neck pain.   Skin: Negative for itching and rash.   Neurological: Negative for dizziness and speech change.   Psychiatric/Behavioral: Negative for hallucinations. The patient is not nervous/anxious.         PHYSICAL EXAMINATION:     BP (!) 152/82 (BP Location: Right arm, Patient Position: Sitting, BP Method: Medium (Automatic))   Pulse 102   Ht 5' 2" (1.575 m)   Wt 84.5 kg (186 lb 4.6 oz)   LMP  (LMP Unknown) Comment: Partial  BMI 34.07 kg/m²     Physical Exam   Constitutional: She is oriented to person, place, and time. She appears well-developed and well-nourished.   HENT:   Head: Normocephalic and atraumatic.   Eyes: EOM are normal. Pupils are equal, round, and reactive to light.   Neck: Normal range of motion. Neck supple.   Cardiovascular: Normal rate and regular rhythm.    Pulmonary/Chest: Effort normal. No respiratory distress.       Abdominal: There is no tenderness.   Musculoskeletal: Normal range of motion.   Neurological: She is alert and oriented to person, place, and time.          IMPRESSION:   The patient has had an uneventful postoperative course.     PLAN:   1. return in 6 months for a follow up office visit and breast exam  2. right mammogram in 9 months in December  3. The patient is advised in continued exam of the breast chest wall and to report to this office sooner should she note any areas of abnormality or concern.   4.  She has been instructed to meet with med onc for " discussion of adjuvant treatment recommendations  5. I removed her drain in clinic today.

## 2020-03-10 ENCOUNTER — OFFICE VISIT (OUTPATIENT)
Dept: SURGERY | Facility: CLINIC | Age: 75
End: 2020-03-10
Payer: MEDICARE

## 2020-03-10 VITALS
DIASTOLIC BLOOD PRESSURE: 82 MMHG | HEIGHT: 62 IN | BODY MASS INDEX: 34.29 KG/M2 | HEART RATE: 102 BPM | SYSTOLIC BLOOD PRESSURE: 152 MMHG | WEIGHT: 186.31 LBS

## 2020-03-10 DIAGNOSIS — Z17.0 MALIGNANT NEOPLASM OF UPPER-OUTER QUADRANT OF LEFT BREAST IN FEMALE, ESTROGEN RECEPTOR POSITIVE: Primary | ICD-10-CM

## 2020-03-10 DIAGNOSIS — C50.412 MALIGNANT NEOPLASM OF UPPER-OUTER QUADRANT OF LEFT BREAST IN FEMALE, ESTROGEN RECEPTOR POSITIVE: Primary | ICD-10-CM

## 2020-03-10 PROCEDURE — 99024 POSTOP FOLLOW-UP VISIT: CPT | Mod: HCNC,S$GLB,, | Performed by: SURGERY

## 2020-03-10 PROCEDURE — 99024 PR POST-OP FOLLOW-UP VISIT: ICD-10-PCS | Mod: HCNC,S$GLB,, | Performed by: SURGERY

## 2020-03-10 PROCEDURE — 99999 PR PBB SHADOW E&M-EST. PATIENT-LVL III: CPT | Mod: PBBFAC,HCNC,, | Performed by: SURGERY

## 2020-03-10 PROCEDURE — 99999 PR PBB SHADOW E&M-EST. PATIENT-LVL III: ICD-10-PCS | Mod: PBBFAC,HCNC,, | Performed by: SURGERY

## 2020-03-11 NOTE — ADDENDUM NOTE
Addendum  created 03/10/20 1936 by Mohsen Rosales MD    Attestation recorded in Intraprocedure, Diagnosis association updated, Intraprocedure Attestations filed, Intraprocedure Blocks edited, Sign clinical note

## 2020-03-17 PROBLEM — C50.919 BREAST CANCER: Status: RESOLVED | Noted: 2020-02-20 | Resolved: 2020-03-17

## 2020-03-18 ENCOUNTER — PES CALL (OUTPATIENT)
Dept: ADMINISTRATIVE | Facility: CLINIC | Age: 75
End: 2020-03-18

## 2020-03-20 ENCOUNTER — PES CALL (OUTPATIENT)
Dept: ADMINISTRATIVE | Facility: CLINIC | Age: 75
End: 2020-03-20

## 2020-03-23 ENCOUNTER — TELEPHONE (OUTPATIENT)
Dept: HEMATOLOGY/ONCOLOGY | Facility: CLINIC | Age: 75
End: 2020-03-23

## 2020-03-23 NOTE — TELEPHONE ENCOUNTER
Returned call to patient.  Unsure how appointment was cancelled.  Offered to reschedule for same time and date.  Explained to patient that at this time due to coronavirus covid-19 outbreak that we are restricting in clinic visits at this time.  Offered patient phone call visit, since no [facility] and no smart devices available.  She voiced appreciation.  No further questions/concerns.

## 2020-03-23 NOTE — TELEPHONE ENCOUNTER
----- Message from Layton Cole sent at 3/23/2020  2:54 PM CDT -----  Contact: Pt    The Pt states that her appt was cancelled and she would like a call back please.  The Pt states that she called twice last week about this.     Phone # [phone] or [phone]

## 2020-03-24 ENCOUNTER — OFFICE VISIT (OUTPATIENT)
Dept: SURGERY | Facility: CLINIC | Age: 75
End: 2020-03-24
Payer: MEDICARE

## 2020-03-24 DIAGNOSIS — T38.6X5A OSTEOPOROSIS DUE TO AROMATASE INHIBITOR: ICD-10-CM

## 2020-03-24 DIAGNOSIS — C50.912 RECURRENT BREAST CANCER, LEFT: Primary | ICD-10-CM

## 2020-03-24 DIAGNOSIS — M81.8 OSTEOPOROSIS DUE TO AROMATASE INHIBITOR: ICD-10-CM

## 2020-03-24 DIAGNOSIS — Z79.811 USE OF AROMATASE INHIBITORS: Primary | ICD-10-CM

## 2020-03-24 PROCEDURE — 99205 OFFICE O/P NEW HI 60 MIN: CPT | Mod: HCNC,S$GLB,, | Performed by: INTERNAL MEDICINE

## 2020-03-24 PROCEDURE — 1159F MED LIST DOCD IN RCRD: CPT | Mod: HCNC,S$GLB,, | Performed by: INTERNAL MEDICINE

## 2020-03-24 PROCEDURE — 1101F PR PT FALLS ASSESS DOC 0-1 FALLS W/OUT INJ PAST YR: ICD-10-PCS | Mod: HCNC,CPTII,S$GLB, | Performed by: INTERNAL MEDICINE

## 2020-03-24 PROCEDURE — 1159F PR MEDICATION LIST DOCUMENTED IN MEDICAL RECORD: ICD-10-PCS | Mod: HCNC,S$GLB,, | Performed by: INTERNAL MEDICINE

## 2020-03-24 PROCEDURE — 1101F PT FALLS ASSESS-DOCD LE1/YR: CPT | Mod: HCNC,CPTII,S$GLB, | Performed by: INTERNAL MEDICINE

## 2020-03-24 PROCEDURE — 99205 PR OFFICE/OUTPT VISIT, NEW, LEVL V, 60-74 MIN: ICD-10-PCS | Mod: HCNC,S$GLB,, | Performed by: INTERNAL MEDICINE

## 2020-03-24 RX ORDER — ANASTROZOLE 1 MG/1
1 TABLET ORAL DAILY
Qty: 90 TABLET | Refills: 3 | Status: SHIPPED | OUTPATIENT
Start: 2020-03-24 | End: 2021-01-06

## 2020-03-24 NOTE — PROGRESS NOTES
Subjective:       Patient ID: Alisia Gusman is a 74 y.o. female.    Chief Complaint: No chief complaint on file.    HPI     The patient location is: Home  The chief complaint leading to consultation is:  Recent diagnosis of breast cancer.  Visit type: Virtual visit with audio   Total time spent with patient: 30 min plus an additional 40 min reviewing her chart, and coordinating her care.  Each patient to whom he or she provides medical services by telemedicine is:  (1) informed of the relationship between the physician and patient and the respective role of any other health care provider with respect to management of the patient; and (2) notified that he or she may decline to receive medical services by telemedicine and may withdraw from such care at any time.    Mrs. Gusman is a 74-year-old  female with a remote history of breast cancer treated in 1993 with a lumpectomy, radiation therapy and 5 years of tamoxifen.  Apparently the size of her tumor in 1993 was 4 mm.  She completed her 5 years of tamoxifen and she was followed expectantly.      A recent mammogram on January 3, 2020 showed a 4 mm oval, parallel, hypoechoic mass with circumscribed margins with no posterior features in the upper outer quadrant of the left breast at 12 o'clock position, 5 cm from the nipple.  A core biopsy on 01/15/2020 showed an infiltrating ductal carcinoma that was ER positive ME negative and HER2 negative.  On 02/20/2020 she underwent a left mastectomy and a sentinel lymph node biopsy.  The pathology report from the procedure indicates that the sentinel lymph node was negative.  On the  mastectomy specimen there was a 2 mm area of DCIS and there was a 6 mm area of invasive cancer.  Resection margins were clear.  She has made an uneventful recovery and she was supposed to see me today to discuss adjuvant treatment options, however, given the current situation we decided to convert it to a virtual visit.  Past medical  history:  As above.  She has a remote history of breast cancer treated  with lumpectomy, radiation and tamoxifen for 5 years.  She also has a history of hypertension, type 2 diabetes, diabetic neuropathy, hyperlipidemia, diverticulosis, and peptic ulcer disease.  Past surgical history:  Significant for the prior lumpectomy , bilateral cataracts in  and , cholecystectomy, partial hysterectomy secondary to dysfunctional uterine bleeding, and left knee replacement in .  Also history of uvuloplasty.  In addition, she had bilateral carpal tunnel release operations in .  Family history:  2 sisters have been diagnosed with breast cancer in her 50s.  A paternal aunt had breast cancer at 50.  Gyn history:  Menarche was at 10 and menopause at 57.  She denies any hormonal therapy.  She is  with 1st live birth at 25.  She did not breast feed.  Social history:  She is  and lives with her .  She is retired.  She does not smoke and does not drink alcohol.      Review of Systems    Overall she feels well. She states that she has recovered from the surgery and she has no complaints.  She denies any anxiety, depression, easy bruising, fevers, chills, night  sweats, weight loss, nausea, vomiting, diarrhea, constipation, diplopia,     blurred vision, headache, chest pain, palpitations, shortness of breath, breast pain, abdominal pain, extremity pain, or difficulty ambulating.  The remainder of the ten-point ROS, including general, skin, lymph, H/N, cardiorespiratory, GI, , Neuro, Endocrine, and psychiatric is negative.     Objective:      Physical Exam  deferred.  This was a virtual visit.  Assessment:       1. Recurrent breast cancer, left        Plan:         I had a very long discussion with Mrs. Gusman, her , and her 2 daughters.  I explained to her that she had a very small tumor and she has an excellent prognosis.  Given the dimensions of the tumor and her age I am not in favor  of chemotherapy.  I see no reason to offer radiation therapy either.  I recommended that she consider adjuvant hormonal therapy with aromatase inhibitors.  The pros and cons of adjuvant hormonal therapy with anastrazole were discussed in detail; she stated that she would like to think about it and discuss with the family which I think is very reasonable.  A prescription for anastrazole 1 mg tablets was sent to her pharmacy.  If she decides to take it which is what I recommended, she will begin on April 1st and I will see her 1st week of May for either a physical or a virtual visit.  She will also need a bone density scan at the time of her next visit.    Her multiple questions were answered to her satisfaction.  I spent approximately 70 minutes reviewing the available records and evaluating the patient, out of which 25 min were spent on the phone with the patient.  The rest of the time was spent reviewing her notes and coordinating her care.

## 2020-03-26 DIAGNOSIS — E78.5 HYPERLIPIDEMIA ASSOCIATED WITH TYPE 2 DIABETES MELLITUS: ICD-10-CM

## 2020-03-26 DIAGNOSIS — E11.69 HYPERLIPIDEMIA ASSOCIATED WITH TYPE 2 DIABETES MELLITUS: ICD-10-CM

## 2020-03-26 RX ORDER — PRAVASTATIN SODIUM 40 MG/1
TABLET ORAL
Qty: 90 TABLET | Refills: 0 | Status: SHIPPED | OUTPATIENT
Start: 2020-03-26 | End: 2020-06-08 | Stop reason: SDUPTHER

## 2020-03-26 NOTE — TELEPHONE ENCOUNTER
----- Message from Payal Campbell sent at 3/26/2020  3:23 PM CDT -----  Contact: self/ [phone]  Requesting an RX refill or new RX.  Is this a refill or new RX: refill    RX name and strength: pravastatin (PRAVACHOL) 40 MG tablet  Directions (copy/paste from chart):  Sig: TAKE 1 TABLET NIGHTLY.  Is this a 30 day or 90 day RX:  90  Local pharmacy or mail order pharmacy:  Mail order  Pharmacy name and phone # (copy/paste from chart): [facility] - [facility] [address] [phone] (Phone)  [phone] (Fax)    Comments:

## 2020-04-14 ENCOUNTER — LAB VISIT (OUTPATIENT)
Dept: LAB | Facility: HOSPITAL | Age: 75
End: 2020-04-14
Attending: INTERNAL MEDICINE
Payer: MEDICARE

## 2020-04-14 DIAGNOSIS — Z79.4 TYPE 2 DIABETES MELLITUS WITH DIABETIC POLYNEUROPATHY, WITH LONG-TERM CURRENT USE OF INSULIN: ICD-10-CM

## 2020-04-14 DIAGNOSIS — E11.42 TYPE 2 DIABETES MELLITUS WITH DIABETIC POLYNEUROPATHY, WITH LONG-TERM CURRENT USE OF INSULIN: ICD-10-CM

## 2020-04-14 LAB
ESTIMATED AVG GLUCOSE: 189 MG/DL (ref 68–131)
HBA1C MFR BLD HPLC: 8.2 % (ref 4–5.6)

## 2020-04-14 PROCEDURE — 83036 HEMOGLOBIN GLYCOSYLATED A1C: CPT | Mod: HCNC

## 2020-04-14 PROCEDURE — 36415 COLL VENOUS BLD VENIPUNCTURE: CPT | Mod: HCNC

## 2020-04-20 ENCOUNTER — TELEPHONE (OUTPATIENT)
Dept: SURGERY | Facility: CLINIC | Age: 75
End: 2020-04-20

## 2020-04-20 DIAGNOSIS — C50.412 MALIGNANT NEOPLASM OF UPPER-OUTER QUADRANT OF LEFT BREAST IN FEMALE, ESTROGEN RECEPTOR POSITIVE: Primary | ICD-10-CM

## 2020-04-20 DIAGNOSIS — Z17.0 MALIGNANT NEOPLASM OF UPPER-OUTER QUADRANT OF LEFT BREAST IN FEMALE, ESTROGEN RECEPTOR POSITIVE: Primary | ICD-10-CM

## 2020-04-20 NOTE — TELEPHONE ENCOUNTER
----- Message from Sparkle Greco sent at 4/20/2020 10:26 AM CDT -----  Contact: [phone]  Calling in regards to missed call. Please call

## 2020-04-20 NOTE — TELEPHONE ENCOUNTER
Return call to pt. States she needs her order for breast prosthesis and bras. Order for both the prosthesis and bras with address to [facility] mailed to pt today. Instructions written for pt to call first to see if appts are being scheduled for fitting at this time. Pt vocied understanding.

## 2020-04-28 ENCOUNTER — TELEPHONE (OUTPATIENT)
Dept: ENDOCRINOLOGY | Facility: CLINIC | Age: 75
End: 2020-04-28

## 2020-04-28 ENCOUNTER — OFFICE VISIT (OUTPATIENT)
Dept: ENDOCRINOLOGY | Facility: CLINIC | Age: 75
End: 2020-04-28
Payer: MEDICARE

## 2020-04-28 DIAGNOSIS — M85.80 OSTEOPENIA, UNSPECIFIED LOCATION: Primary | ICD-10-CM

## 2020-04-28 DIAGNOSIS — E11.42 TYPE 2 DIABETES MELLITUS WITH DIABETIC POLYNEUROPATHY, WITH LONG-TERM CURRENT USE OF INSULIN: ICD-10-CM

## 2020-04-28 DIAGNOSIS — Z79.811 AROMATASE INHIBITOR USE: ICD-10-CM

## 2020-04-28 DIAGNOSIS — Z79.4 TYPE 2 DIABETES MELLITUS WITH DIABETIC POLYNEUROPATHY, WITH LONG-TERM CURRENT USE OF INSULIN: ICD-10-CM

## 2020-04-28 PROCEDURE — 99441 PR PHYSICIAN TELEPHONE EVALUATION 5-10 MIN: CPT | Mod: HCNC,95,, | Performed by: INTERNAL MEDICINE

## 2020-04-28 PROCEDURE — 3052F HG A1C>EQUAL 8.0%<EQUAL 9.0%: CPT | Mod: HCNC,CPTII,, | Performed by: INTERNAL MEDICINE

## 2020-04-28 PROCEDURE — 1101F PT FALLS ASSESS-DOCD LE1/YR: CPT | Mod: HCNC,CPTII,95, | Performed by: INTERNAL MEDICINE

## 2020-04-28 PROCEDURE — 1159F MED LIST DOCD IN RCRD: CPT | Mod: HCNC,95,, | Performed by: INTERNAL MEDICINE

## 2020-04-28 PROCEDURE — 1159F PR MEDICATION LIST DOCUMENTED IN MEDICAL RECORD: ICD-10-PCS | Mod: HCNC,95,, | Performed by: INTERNAL MEDICINE

## 2020-04-28 PROCEDURE — 99441 PR PHYSICIAN TELEPHONE EVALUATION 5-10 MIN: ICD-10-PCS | Mod: HCNC,95,, | Performed by: INTERNAL MEDICINE

## 2020-04-28 PROCEDURE — 3052F PR MOST RECENT HEMOGLOBIN A1C LEVEL 8.0 - < 9.0%: ICD-10-PCS | Mod: HCNC,CPTII,, | Performed by: INTERNAL MEDICINE

## 2020-04-28 PROCEDURE — 1101F PR PT FALLS ASSESS DOC 0-1 FALLS W/OUT INJ PAST YR: ICD-10-PCS | Mod: HCNC,CPTII,95, | Performed by: INTERNAL MEDICINE

## 2020-04-28 NOTE — PROGRESS NOTES
Established Patient - Audio Only Telehealth Visit     The patient location is: waiting room  The chief complaint leading to consultation is: T2DM  Visit type: Virtual visit with audio only (telephone)     The reason for the audio only service rather than synchronous audio and video virtual visit was related to technical difficulties or patient preference/necessity.     Each patient to whom I provide medical services by telemedicine is:  (1) informed of the relationship between the physician and patient and the respective role of any other health care provider with respect to management of the patient; and (2) notified that they may decline to receive medical services by telemedicine and may withdraw from such care at any time. Patient verbally consented to receive this service via voice-only telephone call.       HPI:    has dementia (late stages). T2DM diagnosed in her late 40s initially treated with pills.   Added insulin over ten years ago.     New regimen:   Started ozempic 4/7 --> 0.5 mg weekly. (added ozempic)  Tolerating well. Denies GI symptoms.  Stopped metformin due to diarrhea. Now has occasional constipation. Treats with MOM which resolves constipation  70/30 Novolin pen: 32 units before breakfast and 22 units before dinner   Glimepiride 4 mg (half a tablet in the morning)    Breakfast: 172, 170, 162, 175, 205, 188, 191, 168, 183, 197, 196  Lunch: 183, 163, 170, 186, 217, 148, 156, 178, 211, 239  Dinner: 150, 161, 155, 120, 133, 191, 152, 210 (easter candy), 146, 142  Bedtime: 98, 175, 217, 222, 176, 186, 192, 248 (easter candy), 211, 187    Started walking in the morning and afternoon  - fourth week. Recovering from breast surgery. Denies falls or fractures.     Other medication:  Anastrozole 1 mg daily - no side effects  Calcium and vitamin D daily       4/2017 BONE MINERAL DENSITY RESULTS:  Lumbar Spine: Lumbar bone mineral density L1-L4 is 0.942g/cm2, which is a t-score of -1.0. The z-score is  0.7.  Total Hip: The total hip bone mineral density is 0.777g/cm2.  The t-score is -1.3, and the z-score is -0.4.  Femoral neck BMD is 0.741g/cm2 and the t-score is -1.0. The estimated 10 year probability of hip fracture is 0.5% and of a major osteoporotic fracture 4%, respectively, using FRAX.    Assessment and plan:    T2DM uncontrolled and complicated  Low bone density  Aromatase inhibitor       Continue ozempic 0.5 mg weekly (not quite four doses)  Increase Novolin 70/30 to 24 units before dinner time  Continue glimepiride 2 mg daily (half of a 4 mg daily)    Has low bone density, last DXA 2017, now on anastrozole. Plan to repeat DXA once able to schedule in clinic.   Continue walking, calcium and vitamin D.     This service was not originating from a related E/M service provided within the previous 7 days nor will  to an E/M service or procedure within the next 24 hours or my soonest available appointment.  Prevailing standard of care was able to be met in this audio-only visit.

## 2020-05-05 ENCOUNTER — TELEPHONE (OUTPATIENT)
Dept: PODIATRY | Facility: CLINIC | Age: 75
End: 2020-05-05

## 2020-05-05 ENCOUNTER — TELEPHONE (OUTPATIENT)
Dept: HEMATOLOGY/ONCOLOGY | Facility: CLINIC | Age: 75
End: 2020-05-05

## 2020-05-05 NOTE — TELEPHONE ENCOUNTER
Call made to patient x2. No answer. VML informing patient was still available to complete her audio visit tomorrow with Dr Olvera. Informed she can still come in for her scan at 10 then go back home afterwards and Dr Olvera will call her at her scheduled appointment time. Advised to call back to clinic if she had any questions/concerns or needed to change this appointment time.

## 2020-05-05 NOTE — TELEPHONE ENCOUNTER
Attempted to reach pt in regard to in clinic visit tomorrow afternoon, no answer. Left voicemail reminding about visit and call back number to clinic.

## 2020-05-06 ENCOUNTER — HOSPITAL ENCOUNTER (OUTPATIENT)
Dept: RADIOLOGY | Facility: CLINIC | Age: 75
Discharge: HOME | End: 2020-05-06
Attending: INTERNAL MEDICINE
Payer: MEDICARE

## 2020-05-06 DIAGNOSIS — Z79.811 USE OF AROMATASE INHIBITORS: ICD-10-CM

## 2020-05-06 DIAGNOSIS — M81.8 OSTEOPOROSIS DUE TO AROMATASE INHIBITOR: ICD-10-CM

## 2020-05-06 DIAGNOSIS — T38.6X5A OSTEOPOROSIS DUE TO AROMATASE INHIBITOR: ICD-10-CM

## 2020-05-06 PROCEDURE — 77080 DXA BONE DENSITY AXIAL: CPT | Mod: TC,HCNC

## 2020-05-06 PROCEDURE — 77080 DXA BONE DENSITY AXIAL: CPT | Mod: 26,HCNC,, | Performed by: INTERNAL MEDICINE

## 2020-05-06 PROCEDURE — 77080 DEXA BONE DENSITY SPINE HIP: ICD-10-PCS | Mod: 26,HCNC,, | Performed by: INTERNAL MEDICINE

## 2020-05-14 ENCOUNTER — PES CALL (OUTPATIENT)
Dept: ADMINISTRATIVE | Facility: CLINIC | Age: 75
End: 2020-05-14

## 2020-05-19 ENCOUNTER — HOSPITAL ENCOUNTER (OUTPATIENT)
Dept: RADIOLOGY | Facility: HOSPITAL | Age: 75
Discharge: HOME | End: 2020-05-19
Attending: INTERNAL MEDICINE
Payer: MEDICARE

## 2020-05-19 ENCOUNTER — OFFICE VISIT (OUTPATIENT)
Dept: INTERNAL MEDICINE | Facility: CLINIC | Age: 75
End: 2020-05-19
Payer: MEDICARE

## 2020-05-19 ENCOUNTER — OFFICE VISIT (OUTPATIENT)
Dept: PODIATRY | Facility: CLINIC | Age: 75
End: 2020-05-19
Payer: MEDICARE

## 2020-05-19 VITALS
HEART RATE: 96 BPM | SYSTOLIC BLOOD PRESSURE: 124 MMHG | HEART RATE: 96 BPM | RESPIRATION RATE: 16 BRPM | SYSTOLIC BLOOD PRESSURE: 124 MMHG | WEIGHT: 184.75 LBS | HEIGHT: 62 IN | HEIGHT: 62 IN | BODY MASS INDEX: 34 KG/M2 | OXYGEN SATURATION: 99 % | DIASTOLIC BLOOD PRESSURE: 76 MMHG | OXYGEN SATURATION: 99 % | TEMPERATURE: 99 F | TEMPERATURE: 99 F | WEIGHT: 184.75 LBS | DIASTOLIC BLOOD PRESSURE: 76 MMHG | BODY MASS INDEX: 34 KG/M2

## 2020-05-19 VITALS — HEIGHT: 62 IN | RESPIRATION RATE: 18 BRPM | BODY MASS INDEX: 33.86 KG/M2 | WEIGHT: 184 LBS

## 2020-05-19 DIAGNOSIS — E11.42 DIABETIC PERIPHERAL NEUROPATHY ASSOCIATED WITH TYPE 2 DIABETES MELLITUS: ICD-10-CM

## 2020-05-19 DIAGNOSIS — E11.42 TYPE 2 DIABETES MELLITUS WITH DIABETIC POLYNEUROPATHY, WITH LONG-TERM CURRENT USE OF INSULIN: ICD-10-CM

## 2020-05-19 DIAGNOSIS — G89.29 CHRONIC LOW BACK PAIN, UNSPECIFIED BACK PAIN LATERALITY, UNSPECIFIED WHETHER SCIATICA PRESENT: ICD-10-CM

## 2020-05-19 DIAGNOSIS — E66.9 OBESITY (BMI 30-39.9): ICD-10-CM

## 2020-05-19 DIAGNOSIS — Z00.00 ENCOUNTER FOR PREVENTIVE HEALTH EXAMINATION: Primary | ICD-10-CM

## 2020-05-19 DIAGNOSIS — E11.69 DIABETES MELLITUS TYPE 2 IN OBESE: ICD-10-CM

## 2020-05-19 DIAGNOSIS — J47.9 ADULT BRONCHIECTASIS: ICD-10-CM

## 2020-05-19 DIAGNOSIS — L84 CORN OR CALLUS: ICD-10-CM

## 2020-05-19 DIAGNOSIS — C50.912 RECURRENT BREAST CANCER, LEFT: ICD-10-CM

## 2020-05-19 DIAGNOSIS — I15.2 HYPERTENSION ASSOCIATED WITH DIABETES: ICD-10-CM

## 2020-05-19 DIAGNOSIS — E11.59 HYPERTENSION ASSOCIATED WITH DIABETES: ICD-10-CM

## 2020-05-19 DIAGNOSIS — M54.50 CHRONIC LOW BACK PAIN, UNSPECIFIED BACK PAIN LATERALITY, UNSPECIFIED WHETHER SCIATICA PRESENT: ICD-10-CM

## 2020-05-19 DIAGNOSIS — E11.65 TYPE 2 DIABETES MELLITUS WITH HYPERGLYCEMIA, WITH LONG-TERM CURRENT USE OF INSULIN: Primary | ICD-10-CM

## 2020-05-19 DIAGNOSIS — B35.1 ONYCHOMYCOSIS DUE TO DERMATOPHYTE: ICD-10-CM

## 2020-05-19 DIAGNOSIS — E78.5 HYPERLIPIDEMIA ASSOCIATED WITH TYPE 2 DIABETES MELLITUS: Primary | ICD-10-CM

## 2020-05-19 DIAGNOSIS — E11.69 HYPERLIPIDEMIA ASSOCIATED WITH TYPE 2 DIABETES MELLITUS: ICD-10-CM

## 2020-05-19 DIAGNOSIS — E78.5 HYPERLIPIDEMIA ASSOCIATED WITH TYPE 2 DIABETES MELLITUS: ICD-10-CM

## 2020-05-19 DIAGNOSIS — R06.09 DOE (DYSPNEA ON EXERTION): ICD-10-CM

## 2020-05-19 DIAGNOSIS — I70.0 AORTIC ATHEROSCLEROSIS: ICD-10-CM

## 2020-05-19 DIAGNOSIS — Z79.4 TYPE 2 DIABETES MELLITUS WITH HYPERGLYCEMIA, WITH LONG-TERM CURRENT USE OF INSULIN: Primary | ICD-10-CM

## 2020-05-19 DIAGNOSIS — R09.89 PROMINENT AORTA: ICD-10-CM

## 2020-05-19 DIAGNOSIS — I77.819 ECTATIC AORTA: ICD-10-CM

## 2020-05-19 DIAGNOSIS — I77.1 TORTUOUS AORTA: ICD-10-CM

## 2020-05-19 DIAGNOSIS — M85.80 OSTEOPENIA, UNSPECIFIED LOCATION: ICD-10-CM

## 2020-05-19 DIAGNOSIS — N18.2 CHRONIC KIDNEY DISEASE, STAGE II (MILD): ICD-10-CM

## 2020-05-19 DIAGNOSIS — K21.9 GASTROESOPHAGEAL REFLUX DISEASE, ESOPHAGITIS PRESENCE NOT SPECIFIED: ICD-10-CM

## 2020-05-19 DIAGNOSIS — E11.69 HYPERLIPIDEMIA ASSOCIATED WITH TYPE 2 DIABETES MELLITUS: Primary | ICD-10-CM

## 2020-05-19 DIAGNOSIS — R60.0 LOWER EXTREMITY EDEMA: ICD-10-CM

## 2020-05-19 DIAGNOSIS — Z79.4 TYPE 2 DIABETES MELLITUS WITH DIABETIC POLYNEUROPATHY, WITH LONG-TERM CURRENT USE OF INSULIN: ICD-10-CM

## 2020-05-19 DIAGNOSIS — E66.9 DIABETES MELLITUS TYPE 2 IN OBESE: ICD-10-CM

## 2020-05-19 PROBLEM — R05.3 CHRONIC COUGH: Status: RESOLVED | Noted: 2017-08-01 | Resolved: 2020-05-19

## 2020-05-19 PROBLEM — M54.2 CERVICALGIA: Status: RESOLVED | Noted: 2019-04-16 | Resolved: 2020-05-19

## 2020-05-19 PROBLEM — R29.3 POOR POSTURE: Status: RESOLVED | Noted: 2019-12-23 | Resolved: 2020-05-19

## 2020-05-19 PROBLEM — M23.92 INTERNAL DERANGEMENT OF LEFT KNEE: Status: RESOLVED | Noted: 2017-05-01 | Resolved: 2020-05-19

## 2020-05-19 PROBLEM — M12.561 TRAUMATIC ARTHRITIS OF RIGHT KNEE: Status: RESOLVED | Noted: 2017-05-01 | Resolved: 2020-05-19

## 2020-05-19 PROBLEM — E11.22 TYPE 2 DIABETES MELLITUS WITH STAGE 2 CHRONIC KIDNEY DISEASE, WITH LONG-TERM CURRENT USE OF INSULIN: Status: RESOLVED | Noted: 2019-02-08 | Resolved: 2020-05-19

## 2020-05-19 PROCEDURE — 1101F PR PT FALLS ASSESS DOC 0-1 FALLS W/OUT INJ PAST YR: ICD-10-PCS | Mod: HCNC,CPTII,S$GLB, | Performed by: INTERNAL MEDICINE

## 2020-05-19 PROCEDURE — 3078F DIAST BP <80 MM HG: CPT | Mod: HCNC,CPTII,S$GLB, | Performed by: INTERNAL MEDICINE

## 2020-05-19 PROCEDURE — 3052F HG A1C>EQUAL 8.0%<EQUAL 9.0%: CPT | Mod: HCNC,CPTII,S$GLB, | Performed by: INTERNAL MEDICINE

## 2020-05-19 PROCEDURE — 71046 XR CHEST PA AND LATERAL: ICD-10-PCS | Mod: 26,HCNC,, | Performed by: RADIOLOGY

## 2020-05-19 PROCEDURE — 3078F PR MOST RECENT DIASTOLIC BLOOD PRESSURE < 80 MM HG: ICD-10-PCS | Mod: HCNC,CPTII,S$GLB, | Performed by: INTERNAL MEDICINE

## 2020-05-19 PROCEDURE — 11721 PR DEBRIDEMENT OF NAILS, 6 OR MORE: ICD-10-PCS | Mod: Q9,59,HCNC,S$GLB | Performed by: PODIATRIST

## 2020-05-19 PROCEDURE — 3074F PR MOST RECENT SYSTOLIC BLOOD PRESSURE < 130 MM HG: ICD-10-PCS | Mod: HCNC,CPTII,S$GLB, | Performed by: PHYSICIAN ASSISTANT

## 2020-05-19 PROCEDURE — 99499 UNLISTED E&M SERVICE: CPT | Mod: HCNC,S$GLB,, | Performed by: PODIATRIST

## 2020-05-19 PROCEDURE — 99499 UNLISTED E&M SERVICE: CPT | Mod: HCNC,S$GLB,, | Performed by: PHYSICIAN ASSISTANT

## 2020-05-19 PROCEDURE — 72100 X-RAY EXAM L-S SPINE 2/3 VWS: CPT | Mod: 26,HCNC,, | Performed by: RADIOLOGY

## 2020-05-19 PROCEDURE — 73521 X-RAY EXAM HIPS BI 2 VIEWS: CPT | Mod: TC,HCNC

## 2020-05-19 PROCEDURE — 1159F PR MEDICATION LIST DOCUMENTED IN MEDICAL RECORD: ICD-10-PCS | Mod: HCNC,S$GLB,, | Performed by: INTERNAL MEDICINE

## 2020-05-19 PROCEDURE — 99999 PR PBB SHADOW E&M-EST. PATIENT-LVL III: CPT | Mod: PBBFAC,HCNC,, | Performed by: INTERNAL MEDICINE

## 2020-05-19 PROCEDURE — G0439 PPPS, SUBSEQ VISIT: HCPCS | Mod: HCNC,S$GLB,, | Performed by: PHYSICIAN ASSISTANT

## 2020-05-19 PROCEDURE — 3078F DIAST BP <80 MM HG: CPT | Mod: HCNC,CPTII,S$GLB, | Performed by: PHYSICIAN ASSISTANT

## 2020-05-19 PROCEDURE — 3078F PR MOST RECENT DIASTOLIC BLOOD PRESSURE < 80 MM HG: ICD-10-PCS | Mod: HCNC,CPTII,S$GLB, | Performed by: PHYSICIAN ASSISTANT

## 2020-05-19 PROCEDURE — 99499 RISK ADDL DX/OHS AUDIT: ICD-10-PCS | Mod: HCNC,S$GLB,, | Performed by: PHYSICIAN ASSISTANT

## 2020-05-19 PROCEDURE — 99999 PR PBB SHADOW E&M-EST. PATIENT-LVL IV: ICD-10-PCS | Mod: PBBFAC,HCNC,, | Performed by: PHYSICIAN ASSISTANT

## 2020-05-19 PROCEDURE — 3074F SYST BP LT 130 MM HG: CPT | Mod: HCNC,CPTII,S$GLB, | Performed by: INTERNAL MEDICINE

## 2020-05-19 PROCEDURE — 99999 PR PBB SHADOW E&M-EST. PATIENT-LVL III: CPT | Mod: PBBFAC,HCNC,, | Performed by: PODIATRIST

## 2020-05-19 PROCEDURE — 71046 X-RAY EXAM CHEST 2 VIEWS: CPT | Mod: TC,HCNC

## 2020-05-19 PROCEDURE — 3074F SYST BP LT 130 MM HG: CPT | Mod: HCNC,CPTII,S$GLB, | Performed by: PHYSICIAN ASSISTANT

## 2020-05-19 PROCEDURE — 99214 OFFICE O/P EST MOD 30 MIN: CPT | Mod: HCNC,S$GLB,, | Performed by: INTERNAL MEDICINE

## 2020-05-19 PROCEDURE — 99499 NO LOS: ICD-10-PCS | Mod: HCNC,S$GLB,, | Performed by: PODIATRIST

## 2020-05-19 PROCEDURE — 3052F PR MOST RECENT HEMOGLOBIN A1C LEVEL 8.0 - < 9.0%: ICD-10-PCS | Mod: HCNC,CPTII,S$GLB, | Performed by: INTERNAL MEDICINE

## 2020-05-19 PROCEDURE — 3074F PR MOST RECENT SYSTOLIC BLOOD PRESSURE < 130 MM HG: ICD-10-PCS | Mod: HCNC,CPTII,S$GLB, | Performed by: INTERNAL MEDICINE

## 2020-05-19 PROCEDURE — 1101F PT FALLS ASSESS-DOCD LE1/YR: CPT | Mod: HCNC,CPTII,S$GLB, | Performed by: INTERNAL MEDICINE

## 2020-05-19 PROCEDURE — 71046 X-RAY EXAM CHEST 2 VIEWS: CPT | Mod: 26,HCNC,, | Performed by: RADIOLOGY

## 2020-05-19 PROCEDURE — 73521 XR HIPS BILATERAL 2 VIEW INCL AP PELVIS: ICD-10-PCS | Mod: 26,HCNC,, | Performed by: RADIOLOGY

## 2020-05-19 PROCEDURE — 3052F HG A1C>EQUAL 8.0%<EQUAL 9.0%: CPT | Mod: HCNC,CPTII,S$GLB, | Performed by: PHYSICIAN ASSISTANT

## 2020-05-19 PROCEDURE — 11056 PARNG/CUTG B9 HYPRKR LES 2-4: CPT | Mod: Q9,HCNC,S$GLB, | Performed by: PODIATRIST

## 2020-05-19 PROCEDURE — 11056 PR TRIM BENIGN HYPERKERATOTIC SKIN LESION,2-4: ICD-10-PCS | Mod: Q9,HCNC,S$GLB, | Performed by: PODIATRIST

## 2020-05-19 PROCEDURE — 99999 PR PBB SHADOW E&M-EST. PATIENT-LVL IV: CPT | Mod: PBBFAC,HCNC,, | Performed by: PHYSICIAN ASSISTANT

## 2020-05-19 PROCEDURE — 99999 PR PBB SHADOW E&M-EST. PATIENT-LVL III: ICD-10-PCS | Mod: PBBFAC,HCNC,, | Performed by: PODIATRIST

## 2020-05-19 PROCEDURE — G0439 PR MEDICARE ANNUAL WELLNESS SUBSEQUENT VISIT: ICD-10-PCS | Mod: HCNC,S$GLB,, | Performed by: PHYSICIAN ASSISTANT

## 2020-05-19 PROCEDURE — 11721 DEBRIDE NAIL 6 OR MORE: CPT | Mod: Q9,59,HCNC,S$GLB | Performed by: PODIATRIST

## 2020-05-19 PROCEDURE — 3052F PR MOST RECENT HEMOGLOBIN A1C LEVEL 8.0 - < 9.0%: ICD-10-PCS | Mod: HCNC,CPTII,S$GLB, | Performed by: PHYSICIAN ASSISTANT

## 2020-05-19 PROCEDURE — 73521 X-RAY EXAM HIPS BI 2 VIEWS: CPT | Mod: 26,HCNC,, | Performed by: RADIOLOGY

## 2020-05-19 PROCEDURE — 99999 PR PBB SHADOW E&M-EST. PATIENT-LVL III: ICD-10-PCS | Mod: PBBFAC,HCNC,, | Performed by: INTERNAL MEDICINE

## 2020-05-19 PROCEDURE — 72100 X-RAY EXAM L-S SPINE 2/3 VWS: CPT | Mod: TC,HCNC

## 2020-05-19 PROCEDURE — 99214 PR OFFICE/OUTPT VISIT, EST, LEVL IV, 30-39 MIN: ICD-10-PCS | Mod: HCNC,S$GLB,, | Performed by: INTERNAL MEDICINE

## 2020-05-19 PROCEDURE — 1159F MED LIST DOCD IN RCRD: CPT | Mod: HCNC,S$GLB,, | Performed by: INTERNAL MEDICINE

## 2020-05-19 PROCEDURE — 72100 XR LUMBAR SPINE AP AND LATERAL: ICD-10-PCS | Mod: 26,HCNC,, | Performed by: RADIOLOGY

## 2020-05-19 NOTE — PROGRESS NOTES
"Subjective:       Patient ID: Alisia Gusman is a 74 y.o. female.    Chief Complaint: diabetes follow up    HPI   a1c went up to 8.2. Saw Dr. eNlson 4/28/20. Started on ozempic weekly. Increased novolin 70/30 to 24 units prior to dinner. Continued on glimepiride 2mg daily. Off of metformin.  Doing better w/ diet. Decreased appetite. Reports previously w/ diarrhea on metformin but since being off occ some abdominal pain that's relieved w/ BM. Does not persist. No pain any other time. No n/v/d/c.  Reviewed her home glucose log - checking 4x/day. Sugars improved on this regimen. Still walking every morning.   Only urinates sometimes twice a day. Does not drink a lot of water.     BP controlled at home.     Hyperlipidemia - on pravastatin.     L recurrent breast CA s/p L mastectomy 2/2020.  Reports doing well from the surgery. No pain.  On arimidex 1mg daily.   Follows w/ Dr. Olvera. DEXA done 5/6 but no report and unable to pull up on screen yet.     Two dry spots (one by the L calf and one on the R calf). No pain. Using gold bond cream. No itching. Does have some swelling in the ankles and the top of the feet. This started about 3 mo ago. It'll come and go. Nothing makes it better or worse. This is new. Feels like some SOB - when doing ADLs. This is new. When she cooks or cleans, she'll get some lower, mid back pain that'll radiate to the L hip and some SOB. She'll have to stop. Occasional palpitations.     Review of Systems  Comprehensive review of systems otherwise negative. See history/subjective section for more details.      Objective:      Physical Exam    /76   Pulse 96   Temp 98.6 °F (37 °C)   Resp 16   Ht 5' 2" (1.575 m)   Wt 83.8 kg (184 lb 11.9 oz)   LMP  (LMP Unknown) Comment: Partial  SpO2 99%   BMI 33.79 kg/m²     GEN - A+OX4, NAD   HEENT - PERRL, EOMI, OP clear. MMM. TM normal.   Neck - No cervical LAD.   CV - RRR, no m/r   Chest - CTAB, no wheezing or rhonchi  Abd - S/NT/ND/+BS.   Ext - 2+BDP " and radial pulses. BLE pitting pedal edema  Neuro - 5/5 BUE and BLE strength. 2+ DTRs.  MSK - no spinal tenderness to palpation.   Skin - scabs at the L calf and the R calf - not tender, no drainage, not hot to touch.    Previous labs reviewed.    Assessment/Plan     Diagnoses and all orders for this visit:    Hyperlipidemia associated with type 2 diabetes mellitus - cont pravastatin. Fasting lab next week.   -     Lipid Panel; Future  -     Hemoglobin A1C; Future    Diabetes mellitus type 2 in obese - glucose logs seem to be improving. Cont current meds.   -     Hemoglobin A1C; Future    Hypertension associated with diabetes - Stable and controlled. Continue current medications.    Recurrent breast cancer, left - cont arimidex and f/u w/ Dr. Olvera.   -     X-Ray Chest PA And Lateral; Future  -     X-Ray Lumbar Spine AP And Lateral; Future  -     X-Ray Hips Bilateral 2 View Incl AP Pelvis; Future    Lower extremity edema - breast CA hx. Will get US to R/O DVT. BNP and CXR due to HICKS also.   -     US Lower Extremity Veins Bilateral; Future  -     Brain Natriuretic Peptide; Future    Chronic low back pain, unspecified back pain laterality, unspecified whether sciatica present - no point tenderness to palpation.   -     X-Ray Lumbar Spine AP And Lateral; Future  -     X-Ray Hips Bilateral 2 View Incl AP Pelvis; Future    HICKS (dyspnea on exertion)  -     Brain Natriuretic Peptide; Future  -     X-Ray Chest PA And Lateral; Future  -     Holter monitor - 48 hour; Future  -     Stress Echo Which stress agent will be used? Pharmacological; Future      Follow up in about 4 months (around 9/19/2020).      Selena Montemayor MD  Department of Internal Medicine - [facility]  10:23 AM

## 2020-05-19 NOTE — PROGRESS NOTES
"Alisia Gusman presented for a  Medicare AWV and comprehensive Health Risk Assessment today. The following components were reviewed and updated:    · Medical history  · Family History  · Social history  · Allergies and Current Medications  · Health Risk Assessment  · Health Maintenance  · Care Team     ** See Completed Assessments for Annual Wellness Visit within the encounter summary.**       The following assessments were completed:  · Living Situation  · CAGE  · Depression Screening  · Timed Get Up and Go  · Whisper Test  · Cognitive Function Screening  · Nutrition Screening  · ADL Screening  · PAQ Screening    Vitals:    05/19/20 1015   BP: 124/76   BP Location: Right arm   Patient Position: Sitting   BP Method: Medium (Manual)   Pulse: 96   Temp: 98.6 °F (37 °C)   TempSrc: Oral   SpO2: 99%   Weight: 83.8 kg (184 lb 11.9 oz)   Height: 5' 2" (1.575 m)     Body mass index is 33.79 kg/m².  Physical Exam   Constitutional: She is oriented to person, place, and time. She appears well-developed and well-nourished.   Pulmonary/Chest: Effort normal.   Neurological: She is alert and oriented to person, place, and time.   Psychiatric: She has a normal mood and affect. Her behavior is normal. Judgment and thought content normal.                Diagnoses and health risks identified today and associated recommendations/orders:    Alisia was seen today for health risk assessment.    Diagnoses and all orders for this visit:    Encounter for preventive health examination    Type 2 diabetes mellitus with diabetic polyneuropathy, with long-term current use of insulin  Comments:  Uncontrolled, patient to follow-up with PCP    Aortic atherosclerosis  Comments:  Stable, continue baby aspirin    Tortuous aorta  Comments:  Stable, continue baby aspirin    Recurrent breast cancer, left  Comments:  Stable, follow with Hematology-Oncology    Hypertension associated with diabetes  Comments:  Controlled, continue current " regimen    Hyperlipidemia associated with type 2 diabetes mellitus  Comments:  Stable, continue medication    Adult bronchiectasis  Comments:  Stable, followed by pulmonology    Prominent aorta  Comments:  Stable, continue baby aspirin    Ectatic aorta  Comments:  Stable, continue baby aspirin    Diabetic peripheral neuropathy associated with type 2 diabetes mellitus  Comments:  Stable, continue regimen    Gastroesophageal reflux disease, esophagitis presence not specified  Comments:  Controlled, continue current regimen    Osteopenia, unspecified location  Comments:  Controlled, continue current regimen    Obesity (BMI 30-39.9)  Comments:  Uncontrolled, encourage diet    Chronic kidney disease, stage II (mild)  Comments:  Stable, continue to monitor        Provided Alisia with a 5-10 year written screening schedule and personal prevention plan. Recommendations were developed using the USPSTF age appropriate recommendations. Education, counseling, and referrals were provided as needed. After Visit Summary printed and given to patient which includes a list of additional screenings\tests needed.    No follow-ups on file.    DEANGELO Gold  I offered to discuss end of life issues, including information on how to make advance directives that the patient could use to name someone who would make medical decisions on their behalf if they became too ill to make themselves.    ___Patient declined  _X_Patient is interested, I provided paper work and offered to discuss.

## 2020-05-19 NOTE — PATIENT INSTRUCTIONS
Counseling and Referral of Other Preventative  (Italic type indicates deductible and co-insurance are waived)    Patient Name: Alisia Gusman  Today's Date: 5/19/2020    Health Maintenance       Date Due Completion Date    Lipid Panel 03/27/2020 3/27/2019    DEXA SCAN 04/11/2020 4/11/2017    Override on 9/6/2011: Done    Shingles Vaccine (1 of 2) 07/22/2020 (Originally 7/27/1993) ---    Hemoglobin A1c 10/14/2020 4/14/2020    Eye Exam 11/06/2020 11/6/2019    Override on 6/11/2019: Done (Dr Butts on [facility])    Override on 4/10/2019: Done    Override on 3/20/2017: Done ([facility]    Override on 3/16/2016: Done (seen by outside MD - Dr. Kike Butts)    Override on 6/1/2015: Done    Override on 2/7/2014: Done    Override on 1/29/2013: (N/S)    Mammogram 01/15/2021 1/15/2020    Override on 11/17/2011: Done    Foot Exam 02/03/2021 2/3/2020 (Done)    Override on 2/3/2020: Done    Override on 1/17/2019: Done    Override on 9/18/2018: Done (Per MD note)    Override on 2/16/2017: Done    Override on 3/1/2016: Done    Override on 4/8/2015: Done    Urine Microalbumin 04/14/2021 4/14/2020    Colonoscopy 05/08/2024 5/8/2019    Override on 9/16/2010: Done    TETANUS VACCINE 05/31/2026 5/31/2016 (N/S)    Override on 5/31/2016: (N/S) (presciption given)        No orders of the defined types were placed in this encounter.    The following information is provided to all patients.  This information is to help you find resources for any of the problems found today that may be affecting your health:                Living healthy guide: www.[facility].[place].gov      Understanding Diabetes: www.diabetes.org      Eating healthy: www.cdc.gov/healthyweight      CDC home safety checklist: www.cdc.gov/steadi/patient.html      Agency on Aging: www.goea.[place].[facility]      Alcoholics anonymous (AA): www.aa.org      Physical Activity: www.osvaldo.nih.gov/sk6qyam      Tobacco use: www.quitwithusla.org

## 2020-05-20 ENCOUNTER — TELEPHONE (OUTPATIENT)
Dept: INTERNAL MEDICINE | Facility: CLINIC | Age: 75
End: 2020-05-20

## 2020-05-20 ENCOUNTER — TELEPHONE (OUTPATIENT)
Dept: SURGERY | Facility: CLINIC | Age: 75
End: 2020-05-20

## 2020-05-20 NOTE — TELEPHONE ENCOUNTER
Pt informed of results and to keep her stress test as scheduled on May 26th. Pt verbally understood.

## 2020-05-20 NOTE — TELEPHONE ENCOUNTER
Return call to pt at number provided. No answer and unable to leave a message. Call to pt's mobile number. No answer, message left for pt to call back.    Rx for breast prosthesis and bra mailed to pt 4/20/2020. If pt did not receive it, can resend it to her. Will await call back from pt to determine what is actually needed.

## 2020-05-20 NOTE — TELEPHONE ENCOUNTER
----- Message from Layton Cole sent at 5/20/2020  1:32 PM CDT -----  Contact: Pt    The Pt was trying to find out about her prosthetic bra that she would be able to get since she had the single mastectomy.      Please contact the Pt.    Phone # [phone]

## 2020-05-20 NOTE — TELEPHONE ENCOUNTER
Please call to let her know that her chest x-ray is normal.  Proceed with stress test as we discussed in clinic visit to workup the shortness of breath.  There are some arthritis changes of the lower back and hips, which likely accounts for her occasional back and hip pains.  No signs of metastasis to these areas.

## 2020-05-20 NOTE — PROGRESS NOTES
Subjective:      Patient ID: Alisia Gusman is a 74 y.o. female.    Chief Complaint: PCP (Selena Montemayor ); Diabetic Foot Exam; Numbness (tingling, burning  ); and Ingrown Toenail    Alisia is a 74 y.o. female who presents to the clinic for evaluation and treatment of high risk feet. Alisia has a past medical history of Adult bronchiectasis (7/26/2017), Allergy, Anemia, Arthritis, Asthma, Breast cancer (1993), Cancer (1993), Cataract, Chronic cervical radiculopathy (9/20/2012), Diabetes mellitus, Diabetes mellitus type II, Diabetes with neurologic complications, Diverticulosis, Dry eyes, Dysphagia, GERD (gastroesophageal reflux disease), Hyperlipidemia, Hypertension, Neuropathy, Scalp tenderness, Shortness of breath, and Ulcer. The patient's chief complaint is HRFC  This patient has documented high risk feet requiring routine maintenance secondary to diabetes mellitis and those secondary complications of diabetes, as mentioned..    PCP: Selena Montemayor MD    Date Last Seen by PCP:   Chief Complaint   Patient presents with    PCP     Selena Montemayor     Diabetic Foot Exam    Numbness     tingling, burning      Ingrown Toenail         Hemoglobin A1C   Date Value Ref Range Status   04/14/2020 8.2 (H) 4.0 - 5.6 % Final     Comment:     ADA Screening Guidelines:  5.7-6.4%  Consistent with prediabetes  >or=6.5%  Consistent with diabetes  High levels of fetal hemoglobin interfere with the HbA1C  assay. Heterozygous hemoglobin variants (HbS, HgC, etc)do  not significantly interfere with this assay.   However, presence of multiple variants may affect accuracy.     02/13/2020 7.8 (H) 4.0 - 5.6 % Final     Comment:     ADA Screening Guidelines:  5.7-6.4%  Consistent with prediabetes  >or=6.5%  Consistent with diabetes  High levels of fetal hemoglobin interfere with the HbA1C  assay. Heterozygous hemoglobin variants (HbS, HgC, etc)do  not significantly interfere with this assay.   However, presence of multiple variants may affect accuracy.      11/05/2019 7.3 (H) 4.0 - 5.6 % Final     Comment:     ADA Screening Guidelines:  5.7-6.4%  Consistent with prediabetes  >or=6.5%  Consistent with diabetes  High levels of fetal hemoglobin interfere with the HbA1C  assay. Heterozygous hemoglobin variants (HbS, HgC, etc)do  not significantly interfere with this assay.   However, presence of multiple variants may affect accuracy.         Review of Systems   Constitution: Negative for chills, decreased appetite and fever.   Cardiovascular: Negative for leg swelling.   Skin: Positive for dry skin and nail changes. Negative for color change, flushing, itching, poor wound healing and rash.   Musculoskeletal: Negative for arthritis, joint pain, joint swelling and myalgias.   Gastrointestinal: Negative for nausea and vomiting.   Neurological: Negative for loss of balance, numbness and paresthesias.           Objective:      Physical Exam   Constitutional: She is oriented to person, place, and time. She appears well-developed and well-nourished.   Cardiovascular:   Pulses:       Dorsalis pedis pulses are 2+ on the right side, and 2+ on the left side.        Posterior tibial pulses are 1+ on the right side, and 1+ on the left side.   Dorsalis pedis and posterior tibial pulses are diminished Bilaterally. Toes are cool to touch. Feet are warm proximally.There is decreased digital hair. Skin is atrophic, slightly hyperpigmented, and mildly edematous       Musculoskeletal: Normal range of motion. She exhibits no edema or tenderness.        Right ankle: Normal.        Left ankle: Normal.        Right foot: There is no swelling, no crepitus and no deformity.        Left foot: There is no swelling, no crepitus and no deformity.   Adequate joint range of motion without pain, limitation, nor crepitation Bilateral feet and ankle joints. Muscle strength is 5/5 in all groups bilaterally.         Lymphadenopathy:   No palpable lymph nodes   Neurological: She is alert and oriented to  person, place, and time. She has normal strength.   [place] 5.07 monofilamant testing is diminished Vikash feet. Sharp/dull sensation diminished Bilaterally. Light touch absent Bilaterally.       Skin: Skin is warm, dry and intact. No ecchymosis, no lesion and no rash noted. No erythema. No pallor. Nails show no clubbing.   Nails x10 are elongated by  2-6mm's, thickened by 2-4 mm's, dystrophic, and are darkened in  coloration . Xerosis Bilaterally. No open lesions, lacerations or wounds noted    Hyperkeratotic tissue noted to distal hallux b/l      Psychiatric: She has a normal mood and affect. Her behavior is normal.   Vitals reviewed.            Assessment:       Encounter Diagnoses   Name Primary?    Type 2 diabetes mellitus with hyperglycemia, with long-term current use of insulin Yes    Onychomycosis due to dermatophyte     Corn or callus          Plan:       Alisia was seen today for pcp, diabetic foot exam, numbness and ingrown toenail.    Diagnoses and all orders for this visit:    Type 2 diabetes mellitus with hyperglycemia, with long-term current use of insulin    Onychomycosis due to dermatophyte    Corn or callus      I counseled the patient on her conditions, their implications and medical management.    Shoe inspection. Diabetic Foot Education. Patient reminded of the importance of good nutrition and blood sugar control to help prevent podiatric complications of diabetes. Patient instructed on proper foot hygeine. We discussed wearing proper shoe gear, daily foot inspections, never walking without protective shoe gear, never putting sharp instruments to feet    - With patient's permission, nails were aggressively reduced and debrided x 10 to their soft tissue attachment mechanically and with electric , removing all offending nail and debris. Patient relates relief following the procedure. She will continue to monitor the areas daily, inspect her feet, wear protective shoe gear when  ambulatory, moisturizer to maintain skin integrity and follow in this office in approximately 2-3 months, sooner p.r.n.    - After cleansing the  area w/ alcohol prep pad the above mentioned hyperkeratosis was trimmed utilizing No 15 scapel, to a smooth base with out incident. Patient tolerated this  well and reported comfort x2     - Return to clinic in 3m or sooner if problems arise

## 2020-05-21 NOTE — TELEPHONE ENCOUNTER
----- Message from Kristen Knight sent at 5/21/2020  8:04 AM CDT -----  Contact: Pt  Type:  RX Refill Request    Who Called: Pt  semaglutide (OZEMPIC) 0.25 mg or 0.5 mg(2 mg/1.5 mL) PnIj  RX Name and Strength:     How is the patient currently taking it? (ex. 1XDay): 1x Day    Is this a 30 day or 90 day RX:     Preferred Pharmacy with phone number: [facility]  [address] [address], [phone]    Local or Mail Order: Local    Ordering Provider: Dr Nelson    Would the patient rather a call back or a response via [facility]? Call back    Best Call Back Number: [phone],     Additional Information: n/a

## 2020-05-25 NOTE — TELEPHONE ENCOUNTER
----- Message from Rashel Barraza sent at 5/25/2020 11:41 AM CDT -----  Contact: pt  Pt is trying to speak with nurse in regards to her refill . Pt has been calling since Friday . Please give pt a call back at [phone] .

## 2020-05-26 ENCOUNTER — HOSPITAL ENCOUNTER (OUTPATIENT)
Dept: RADIOLOGY | Facility: HOSPITAL | Age: 75
Discharge: HOME | End: 2020-05-26
Attending: INTERNAL MEDICINE
Payer: MEDICARE

## 2020-05-26 ENCOUNTER — HOSPITAL ENCOUNTER (OUTPATIENT)
Dept: CARDIOLOGY | Facility: CLINIC | Age: 75
Discharge: HOME | End: 2020-05-26
Attending: INTERNAL MEDICINE
Payer: MEDICARE

## 2020-05-26 VITALS
WEIGHT: 180 LBS | HEIGHT: 62 IN | HEART RATE: 88 BPM | SYSTOLIC BLOOD PRESSURE: 126 MMHG | DIASTOLIC BLOOD PRESSURE: 73 MMHG | BODY MASS INDEX: 33.13 KG/M2 | RESPIRATION RATE: 18 BRPM

## 2020-05-26 DIAGNOSIS — R06.09 DOE (DYSPNEA ON EXERTION): ICD-10-CM

## 2020-05-26 DIAGNOSIS — R60.0 LOWER EXTREMITY EDEMA: ICD-10-CM

## 2020-05-26 LAB
ASCENDING AORTA: 3.54 CM
BSA FOR ECHO PROCEDURE: 1.89 M2
CV ECHO LV RWT: 0.53 CM
CV STRESS BASE HR: 91 BPM
DIASTOLIC BLOOD PRESSURE: 73 MMHG
DOP CALC LVOT AREA: 3.1 CM2
DOP CALC LVOT DIAMETER: 1.98 CM
DOP CALC LVOT PEAK VEL: 0.81 M/S
DOP CALC LVOT STROKE VOLUME: 43.89 CM3
DOP CALCLVOT PEAK VEL VTI: 14.26 CM
E/E' RATIO: 19.14 M/S
ECHO LV POSTERIOR WALL: 1 CM (ref 0.6–1.1)
FRACTIONAL SHORTENING: 31 % (ref 28–44)
INTERVENTRICULAR SEPTUM: 0.96 CM (ref 0.6–1.1)
LA MAJOR: 4.04 CM
LA MINOR: 4.54 CM
LA WIDTH: 2.78 CM
LEFT ATRIUM SIZE: 3.15 CM
LEFT ATRIUM VOLUME INDEX: 17.4 ML/M2
LEFT ATRIUM VOLUME: 31.82 CM3
LEFT INTERNAL DIMENSION IN SYSTOLE: 2.6 CM (ref 2.1–4)
LEFT VENTRICLE DIASTOLIC VOLUME INDEX: 33.77 ML/M2
LEFT VENTRICLE DIASTOLIC VOLUME: 61.72 ML
LEFT VENTRICLE MASS INDEX: 62 G/M2
LEFT VENTRICLE SYSTOLIC VOLUME INDEX: 13.5 ML/M2
LEFT VENTRICLE SYSTOLIC VOLUME: 24.67 ML
LEFT VENTRICULAR INTERNAL DIMENSION IN DIASTOLE: 3.79 CM (ref 3.5–6)
LEFT VENTRICULAR MASS: 113.48 G
LV LATERAL E/E' RATIO: 19.14 M/S
LV SEPTAL E/E' RATIO: 19.14 M/S
MV PEAK E VEL: 1.34 M/S
OHS CV CPX 1 MINUTE RECOVERY HEART RATE: 113 BPM
OHS CV CPX 85 PERCENT MAX PREDICTED HEART RATE MALE: 118
OHS CV CPX MAX PREDICTED HEART RATE: 139
OHS CV CPX PATIENT IS FEMALE: 1
OHS CV CPX PATIENT IS MALE: 0
OHS CV CPX PEAK DIASTOLIC BLOOD PRESSURE: 48 MMHG
OHS CV CPX PEAK HEAR RATE: 122 BPM
OHS CV CPX PEAK RATE PRESSURE PRODUCT: NORMAL
OHS CV CPX PEAK SYSTOLIC BLOOD PRESSURE: 154 MMHG
OHS CV CPX PERCENT MAX PREDICTED HEART RATE ACHIEVED: 88
OHS CV CPX RATE PRESSURE PRODUCT PRESENTING: NORMAL
PISA TR MAX VEL: 2.44 M/S
PULM VEIN S/D RATIO: 1.06
PV PEAK D VEL: 0.33 M/S
PV PEAK S VEL: 0.35 M/S
RA MAJOR: 3.5 CM
RA PRESSURE: 3 MMHG
RA WIDTH: 2.69 CM
RETIRED EF AND QEF - SEE NOTES: 56 %
RIGHT VENTRICULAR END-DIASTOLIC DIMENSION: 3.02 CM
RV TISSUE DOPPLER FREE WALL SYSTOLIC VELOCITY 1 (APICAL 4 CHAMBER VIEW): 9.21 CM/S
SINUS: 3.23 CM
STJ: 3.16 CM
SYSTOLIC BLOOD PRESSURE: 126 MMHG
TDI LATERAL: 0.07 M/S
TDI SEPTAL: 0.07 M/S
TDI: 0.07 M/S
TR MAX PG: 24 MMHG
TRICUSPID ANNULAR PLANE SYSTOLIC EXCURSION: 2.05 CM
TV REST PULMONARY ARTERY PRESSURE: 27 MMHG

## 2020-05-26 PROCEDURE — 93351 STRESS ECHO (CUPID ONLY): ICD-10-PCS | Mod: 26,HCNC,, | Performed by: INTERNAL MEDICINE

## 2020-05-26 PROCEDURE — 93351 STRESS TTE COMPLETE: CPT | Mod: 26,HCNC,, | Performed by: INTERNAL MEDICINE

## 2020-05-26 PROCEDURE — 93970 EXTREMITY STUDY: CPT | Mod: 26,HCNC,, | Performed by: RADIOLOGY

## 2020-05-26 PROCEDURE — 93970 US LOWER EXTREMITY VEINS BILATERAL: ICD-10-PCS | Mod: 26,HCNC,, | Performed by: RADIOLOGY

## 2020-05-26 PROCEDURE — 93970 EXTREMITY STUDY: CPT | Mod: TC,HCNC

## 2020-05-26 PROCEDURE — 93351 STRESS TTE COMPLETE: CPT | Mod: HCNC

## 2020-05-26 RX ORDER — ATROPINE SULFATE 0.1 MG/ML
1 INJECTION INTRAVENOUS
Status: CANCELLED | OUTPATIENT
Start: 2020-05-26 | End: 2020-05-26

## 2020-05-26 RX ORDER — DOBUTAMINE HYDROCHLORIDE 200 MG/100ML
20 INJECTION INTRAVENOUS
Status: COMPLETED | OUTPATIENT
Start: 2020-05-26 | End: 2020-05-26

## 2020-05-26 RX ADMIN — DOBUTAMINE HYDROCHLORIDE 20 MCG/KG/MIN: 200 INJECTION INTRAVENOUS at 02:05

## 2020-05-27 ENCOUNTER — LAB VISIT (OUTPATIENT)
Dept: LAB | Facility: HOSPITAL | Age: 75
End: 2020-05-27
Attending: INTERNAL MEDICINE
Payer: MEDICARE

## 2020-05-27 ENCOUNTER — TELEPHONE (OUTPATIENT)
Dept: INTERNAL MEDICINE | Facility: CLINIC | Age: 75
End: 2020-05-27

## 2020-05-27 DIAGNOSIS — E78.5 HYPERLIPIDEMIA ASSOCIATED WITH TYPE 2 DIABETES MELLITUS: ICD-10-CM

## 2020-05-27 DIAGNOSIS — T50.905A DRUG-INDUCED HYPOKALEMIA: ICD-10-CM

## 2020-05-27 DIAGNOSIS — I15.2 HYPERTENSION ASSOCIATED WITH DIABETES: ICD-10-CM

## 2020-05-27 DIAGNOSIS — E11.59 HYPERTENSION ASSOCIATED WITH DIABETES: ICD-10-CM

## 2020-05-27 DIAGNOSIS — E11.69 HYPERLIPIDEMIA ASSOCIATED WITH TYPE 2 DIABETES MELLITUS: ICD-10-CM

## 2020-05-27 DIAGNOSIS — R60.0 LOWER EXTREMITY EDEMA: ICD-10-CM

## 2020-05-27 DIAGNOSIS — E11.69 DIABETES MELLITUS TYPE 2 IN OBESE: ICD-10-CM

## 2020-05-27 DIAGNOSIS — E11.42 DIABETIC PERIPHERAL NEUROPATHY ASSOCIATED WITH TYPE 2 DIABETES MELLITUS: ICD-10-CM

## 2020-05-27 DIAGNOSIS — E87.6 DRUG-INDUCED HYPOKALEMIA: ICD-10-CM

## 2020-05-27 DIAGNOSIS — R06.09 DOE (DYSPNEA ON EXERTION): ICD-10-CM

## 2020-05-27 DIAGNOSIS — E66.9 DIABETES MELLITUS TYPE 2 IN OBESE: ICD-10-CM

## 2020-05-27 LAB
ALBUMIN SERPL BCP-MCNC: 3.4 G/DL (ref 3.5–5.2)
ALP SERPL-CCNC: 66 U/L (ref 55–135)
ALT SERPL W/O P-5'-P-CCNC: 13 U/L (ref 10–44)
ANION GAP SERPL CALC-SCNC: 9 MMOL/L (ref 8–16)
AST SERPL-CCNC: 20 U/L (ref 10–40)
BILIRUB SERPL-MCNC: 1 MG/DL (ref 0.1–1)
BNP SERPL-MCNC: <10 PG/ML (ref 0–99)
BUN SERPL-MCNC: 13 MG/DL (ref 8–23)
CALCIUM SERPL-MCNC: 10 MG/DL (ref 8.7–10.5)
CHLORIDE SERPL-SCNC: 103 MMOL/L (ref 95–110)
CHOLEST SERPL-MCNC: 176 MG/DL (ref 120–199)
CHOLEST/HDLC SERPL: 3.2 {RATIO} (ref 2–5)
CO2 SERPL-SCNC: 29 MMOL/L (ref 23–29)
CREAT SERPL-MCNC: 0.9 MG/DL (ref 0.5–1.4)
EST. GFR  (AFRICAN AMERICAN): >60 ML/MIN/1.73 M^2
EST. GFR  (NON AFRICAN AMERICAN): >60 ML/MIN/1.73 M^2
ESTIMATED AVG GLUCOSE: 186 MG/DL (ref 68–131)
GLUCOSE SERPL-MCNC: 161 MG/DL (ref 70–110)
HBA1C MFR BLD HPLC: 8.1 % (ref 4–5.6)
HDLC SERPL-MCNC: 55 MG/DL (ref 40–75)
HDLC SERPL: 31.3 % (ref 20–50)
LDLC SERPL CALC-MCNC: 86 MG/DL (ref 63–159)
NONHDLC SERPL-MCNC: 121 MG/DL
POTASSIUM SERPL-SCNC: 3.6 MMOL/L (ref 3.5–5.1)
PROT SERPL-MCNC: 7 G/DL (ref 6–8.4)
SODIUM SERPL-SCNC: 141 MMOL/L (ref 136–145)
TRIGL SERPL-MCNC: 175 MG/DL (ref 30–150)

## 2020-05-27 PROCEDURE — 83036 HEMOGLOBIN GLYCOSYLATED A1C: CPT | Mod: HCNC

## 2020-05-27 PROCEDURE — 36415 COLL VENOUS BLD VENIPUNCTURE: CPT | Mod: HCNC

## 2020-05-27 PROCEDURE — 80061 LIPID PANEL: CPT | Mod: HCNC

## 2020-05-27 PROCEDURE — 80053 COMPREHEN METABOLIC PANEL: CPT | Mod: HCNC

## 2020-05-27 PROCEDURE — 83880 ASSAY OF NATRIURETIC PEPTIDE: CPT | Mod: HCNC

## 2020-05-27 NOTE — TELEPHONE ENCOUNTER
----- Message from Sandra John sent at 5/27/2020  4:05 PM CDT -----  Contact: patient [phone]  Patient is returning a phone call.  Who left a message for the patient: Judit SIDDIQUI  Does patient know what this is regarding:  results  Comments:

## 2020-05-27 NOTE — TELEPHONE ENCOUNTER
Please call and notify pt:    Labs does not show heart failure. Sugars are similar - a1c is still 8.1. F/u w/ . Dipp. Triglycerides are mildly elevated but LDL ok. Cont pravastatin.   Stress test is negative. There is some abnormal relaxation but main treatment is just keeping blood pressure controlled. There is no blood clots in the legs.

## 2020-06-03 ENCOUNTER — TELEPHONE (OUTPATIENT)
Dept: SURGERY | Facility: CLINIC | Age: 75
End: 2020-06-03

## 2020-06-03 NOTE — TELEPHONE ENCOUNTER
Return call to pt. Pt states she did not call our office. Pt still awaiting prosthesis fitting from [facility]. Spoke with Torrey at [facility] to see if pt needs appt for breast prosthesis fitting. Per Torrey no appt needed, pt can come in any day M-F 9:00 am-3:00 pm. Torrey stated that they do not have orders on pt. Orders faxed over to [facility].

## 2020-06-03 NOTE — TELEPHONE ENCOUNTER
----- Message from Layton Cole sent at 6/3/2020 10:07 AM CDT -----  Contact: Pt      The Pt states that she is trying to get an appt with you about her prosthetic bras,etc.  She states that she has called 3 times and would like to be scheduled since things are opening up now.    Phone # [phone]

## 2020-06-08 ENCOUNTER — TELEPHONE (OUTPATIENT)
Dept: ENDOCRINOLOGY | Facility: CLINIC | Age: 75
End: 2020-06-08

## 2020-06-08 DIAGNOSIS — E11.69 HYPERLIPIDEMIA ASSOCIATED WITH TYPE 2 DIABETES MELLITUS: ICD-10-CM

## 2020-06-08 DIAGNOSIS — E78.5 HYPERLIPIDEMIA ASSOCIATED WITH TYPE 2 DIABETES MELLITUS: ICD-10-CM

## 2020-06-08 RX ORDER — PRAVASTATIN SODIUM 40 MG/1
TABLET ORAL
Qty: 90 TABLET | Refills: 3 | Status: SHIPPED | OUTPATIENT
Start: 2020-06-08 | End: 2021-04-12

## 2020-06-08 NOTE — TELEPHONE ENCOUNTER
Talk to pt explained to her [facility] had [facility] RX since 5/25 they haven't refill because she need to pay 25% of her medication. Pt is aware with paying out of pocket. Pt is in the donut hole with her insurance.

## 2020-06-08 NOTE — TELEPHONE ENCOUNTER
----- Message from Sandra John sent at 6/8/2020  9:59 AM CDT -----  Contact: Patient [phone]  Requesting an RX refill or new RX.  Is this a refill or new RX:  refill  RX name and strength: pravastatin (PRAVACHOL) 40 MG tablet  Directions (copy/paste from chart): TAKE 1 TABLET NIGHTLY.   Is this a 30 day or 90 day RX:  90  Local pharmacy or mail order pharmacy:  mail  Pharmacy name and phone # ([facility] - [place], [place] - [address] [phone] (Phone) [phone] (Fax)

## 2020-06-10 ENCOUNTER — TELEPHONE (OUTPATIENT)
Dept: HEMATOLOGY/ONCOLOGY | Facility: CLINIC | Age: 75
End: 2020-06-10

## 2020-06-10 ENCOUNTER — OFFICE VISIT (OUTPATIENT)
Dept: HEMATOLOGY/ONCOLOGY | Facility: CLINIC | Age: 75
End: 2020-06-10
Payer: MEDICARE

## 2020-06-10 ENCOUNTER — CLINICAL SUPPORT (OUTPATIENT)
Dept: CARDIOLOGY | Facility: HOSPITAL | Age: 75
End: 2020-06-10
Attending: INTERNAL MEDICINE
Payer: MEDICARE

## 2020-06-10 VITALS
BODY MASS INDEX: 34.53 KG/M2 | TEMPERATURE: 98 F | DIASTOLIC BLOOD PRESSURE: 79 MMHG | SYSTOLIC BLOOD PRESSURE: 140 MMHG | HEIGHT: 62 IN | RESPIRATION RATE: 16 BRPM | OXYGEN SATURATION: 98 % | HEART RATE: 106 BPM | WEIGHT: 187.63 LBS

## 2020-06-10 DIAGNOSIS — R06.09 DOE (DYSPNEA ON EXERTION): ICD-10-CM

## 2020-06-10 DIAGNOSIS — C50.912 RECURRENT BREAST CANCER, LEFT: Primary | ICD-10-CM

## 2020-06-10 DIAGNOSIS — Z79.811 PROPHYLACTIC USE OF ANASTROZOLE (ARIMIDEX): ICD-10-CM

## 2020-06-10 PROCEDURE — 3078F DIAST BP <80 MM HG: CPT | Mod: HCNC,CPTII,S$GLB, | Performed by: INTERNAL MEDICINE

## 2020-06-10 PROCEDURE — 1159F MED LIST DOCD IN RCRD: CPT | Mod: HCNC,S$GLB,, | Performed by: INTERNAL MEDICINE

## 2020-06-10 PROCEDURE — 1101F PR PT FALLS ASSESS DOC 0-1 FALLS W/OUT INJ PAST YR: ICD-10-PCS | Mod: HCNC,CPTII,S$GLB, | Performed by: INTERNAL MEDICINE

## 2020-06-10 PROCEDURE — 1126F PR PAIN SEVERITY QUANTIFIED, NO PAIN PRESENT: ICD-10-PCS | Mod: HCNC,S$GLB,, | Performed by: INTERNAL MEDICINE

## 2020-06-10 PROCEDURE — 3077F PR MOST RECENT SYSTOLIC BLOOD PRESSURE >= 140 MM HG: ICD-10-PCS | Mod: HCNC,CPTII,S$GLB, | Performed by: INTERNAL MEDICINE

## 2020-06-10 PROCEDURE — 99499 UNLISTED E&M SERVICE: CPT | Mod: HCNC,S$GLB,, | Performed by: INTERNAL MEDICINE

## 2020-06-10 PROCEDURE — 99999 PR PBB SHADOW E&M-EST. PATIENT-LVL V: CPT | Mod: PBBFAC,HCNC,, | Performed by: INTERNAL MEDICINE

## 2020-06-10 PROCEDURE — 93225 XTRNL ECG REC<48 HRS REC: CPT | Mod: HCNC

## 2020-06-10 PROCEDURE — 99214 PR OFFICE/OUTPT VISIT, EST, LEVL IV, 30-39 MIN: ICD-10-PCS | Mod: HCNC,S$GLB,, | Performed by: INTERNAL MEDICINE

## 2020-06-10 PROCEDURE — 93227 HOLTER MONITOR - 48 HOUR (CUPID ONLY): ICD-10-PCS | Mod: HCNC,,, | Performed by: INTERNAL MEDICINE

## 2020-06-10 PROCEDURE — 1126F AMNT PAIN NOTED NONE PRSNT: CPT | Mod: HCNC,S$GLB,, | Performed by: INTERNAL MEDICINE

## 2020-06-10 PROCEDURE — 99999 PR PBB SHADOW E&M-EST. PATIENT-LVL V: ICD-10-PCS | Mod: PBBFAC,HCNC,, | Performed by: INTERNAL MEDICINE

## 2020-06-10 PROCEDURE — 93227 XTRNL ECG REC<48 HR R&I: CPT | Mod: HCNC,,, | Performed by: INTERNAL MEDICINE

## 2020-06-10 PROCEDURE — 99214 OFFICE O/P EST MOD 30 MIN: CPT | Mod: HCNC,S$GLB,, | Performed by: INTERNAL MEDICINE

## 2020-06-10 PROCEDURE — 1159F PR MEDICATION LIST DOCUMENTED IN MEDICAL RECORD: ICD-10-PCS | Mod: HCNC,S$GLB,, | Performed by: INTERNAL MEDICINE

## 2020-06-10 PROCEDURE — 1101F PT FALLS ASSESS-DOCD LE1/YR: CPT | Mod: HCNC,CPTII,S$GLB, | Performed by: INTERNAL MEDICINE

## 2020-06-10 PROCEDURE — 3077F SYST BP >= 140 MM HG: CPT | Mod: HCNC,CPTII,S$GLB, | Performed by: INTERNAL MEDICINE

## 2020-06-10 PROCEDURE — 3078F PR MOST RECENT DIASTOLIC BLOOD PRESSURE < 80 MM HG: ICD-10-PCS | Mod: HCNC,CPTII,S$GLB, | Performed by: INTERNAL MEDICINE

## 2020-06-10 PROCEDURE — 99499 RISK ADDL DX/OHS AUDIT: ICD-10-PCS | Mod: HCNC,S$GLB,, | Performed by: INTERNAL MEDICINE

## 2020-06-10 NOTE — TELEPHONE ENCOUNTER
----- Message from Mari Kirby sent at 6/10/2020  7:58 AM CDT -----  Please schedule f/u asap with Dr. Olvera or KATIE.   Thanks    ----- Message -----  From: Alexy Hines MD  Sent: 6/10/2020   7:55 AM CDT  To: Chris Wagner RN, Mari Kirby    Absolutely... Mari, please arrange for a PHYSICAL visit ASAP.  I have no idea what transpired and she missed her appointment.    Ct  ----- Message -----  From: Chris Wagner RN  Sent: 6/3/2020  10:37 AM CDT  To: MD Dr Wade Valencia,       Pt states she was to have an appt with you approximately 1 month ago that was to be a phone call visit and never got a call from you. She said she called that same day and spoke with a nurse who said she would still be able to have the visit. It was not done. The chart shows that she was a no show for the virtual phone call visit. Pt mentioned it to me when she called our office regarding something else.    Not sure what happened, but can you have your staff get her another appt??    Thanks,   Chris maatmoros/Dr Nieves

## 2020-06-10 NOTE — PROGRESS NOTES
Subjective:       Patient ID: Alisia Gusman is a 74 y.o. female.    Chief Complaint: No chief complaint on file.    HPI       Mrs. Gusman presents to the clinic for evaluation.  Briefly, she is a 74-year-old  female with a remote history of left sided breast cancer treated in 1993 with a lumpectomy, radiation therapy, and 5 years of tamoxifen.  Apparently the size of her tumor in 1993 was 4 mm.  She completed her 5 years of tamoxifen and she was followed expectantly.      A mammogram on January 3, 2020 showed a 4 mm oval, parallel, hypoechoic mass with circumscribed margins with no posterior features in the upper outer quadrant of the left breast at 12 o'clock position, 5 cm from the nipple.  A core biopsy on 01/15/2020 showed an infiltrating ductal carcinoma that was ER positive RI negative and HER2 negative.  On 02/20/2020 she underwent a left mastectomy and a sentinel lymph node biopsy.  The sentinel lymph node was negative.  On the  mastectomy specimen there was a 2 mm area of DCIS and there was a 6 mm area of invasive cancer.  Resection margins were clear.  She has made an uneventful recovery and was started on anastrozole in early April 2020.  Her DEXA scan shows osteopenia.    Review of Systems      Overall she feels well. She states that she has tolerated the anastrozole well so far and has not experienced any side effects.  ECOG PS is 1.  She denies any anxiety, depression, easy bruising, fevers, chills, night  sweats, weight loss, nausea, vomiting, diarrhea, constipation, diplopia,   blurred vision, headache, chest pain, palpitations, shortness of breath, breast pain, abdominal pain, extremity pain, or difficulty ambulating.  The remainder of the ten-point ROS, including general, skin, lymph, H/N, cardiorespiratory, GI, , Neuro, Endocrine, and psychiatric is negative.     Objective:      Physical Exam      She is alert, oriented to time, place, person, pleasant, well      nourished, in  no acute physical distress.                                    VITAL SIGNS:  Reviewed                                      HEENT:  Normal.  There are no nasal, oral, lip, gingival, auricular, lid,    or conjunctival lesions.  Mucosae are moist and pink, and there is no        thrush.  Pupils are equal, reactive to light and accommodation.              Extraocular muscle movements are intact.  Dentition is fair.  There is no frontal or maxillary tenderness.                                     NECK:  Supple without JVD, adenopathy, or thyromegaly.                       LUNGS:  Clear to auscultation without wheezing, rales, or rhonchi.           CARDIOVASCULAR:  Reveals an S1, S2, no murmurs, no rubs, no gallops.         ABDOMEN:  Soft, nontender, without organomegaly.  Bowel sounds are    present.                                                                     EXTREMITIES:  No cyanosis, clubbing, or edema.                               BREASTS:  She is status post left mastectomy with a well-healed incision.    There are no masses in either breast.                                        LYMPHATIC:  There is no cervical, axillary, or supraclavicular adenopathy.   SKIN:  Warm and moist, without petechiae, rashes, induration, or ecchymoses.           NEUROLOGIC:  DTRs are 0-1+ bilaterally, symmetrical, motor function is 5/5,  and cranial nerves are  within normal limits.    Assessment:       1. Recurrent breast cancer, left    2. Prophylactic use of anastrozole (Arimidex)      3.    Osteopenia  Plan:         I had a long discussion with Mrs. Gusman.  I recommended that she remain on anastrozole which she should take for a minimum of 5 years.  I will see her again in 3 months.  In regards to her osteopenia, I recommended that she take calcium a 1000 mg q.d. and vitamin D3 daily.  She voiced understanding.  Her multiple questions were answered to her satisfaction.  Duration of visit was 25 min.

## 2020-06-16 LAB
OHS CV EVENT MONITOR DAY: 0
OHS CV HOLTER LENGTH DECIMAL HOURS: 48
OHS CV HOLTER LENGTH HOURS: 48
OHS CV HOLTER LENGTH MINUTES: 0

## 2020-06-19 ENCOUNTER — OFFICE VISIT (OUTPATIENT)
Dept: INTERNAL MEDICINE | Facility: CLINIC | Age: 75
End: 2020-06-19
Payer: MEDICARE

## 2020-06-19 VITALS
SYSTOLIC BLOOD PRESSURE: 114 MMHG | HEIGHT: 62 IN | WEIGHT: 186.06 LBS | HEART RATE: 96 BPM | OXYGEN SATURATION: 99 % | BODY MASS INDEX: 34.24 KG/M2 | DIASTOLIC BLOOD PRESSURE: 64 MMHG

## 2020-06-19 DIAGNOSIS — S81.802S LEG WOUND, LEFT, SEQUELA: Primary | ICD-10-CM

## 2020-06-19 PROCEDURE — 1101F PT FALLS ASSESS-DOCD LE1/YR: CPT | Mod: HCNC,CPTII,S$GLB, | Performed by: PHYSICIAN ASSISTANT

## 2020-06-19 PROCEDURE — 3078F DIAST BP <80 MM HG: CPT | Mod: HCNC,CPTII,S$GLB, | Performed by: PHYSICIAN ASSISTANT

## 2020-06-19 PROCEDURE — 3078F PR MOST RECENT DIASTOLIC BLOOD PRESSURE < 80 MM HG: ICD-10-PCS | Mod: HCNC,CPTII,S$GLB, | Performed by: PHYSICIAN ASSISTANT

## 2020-06-19 PROCEDURE — 1125F AMNT PAIN NOTED PAIN PRSNT: CPT | Mod: HCNC,S$GLB,, | Performed by: PHYSICIAN ASSISTANT

## 2020-06-19 PROCEDURE — 1101F PR PT FALLS ASSESS DOC 0-1 FALLS W/OUT INJ PAST YR: ICD-10-PCS | Mod: HCNC,CPTII,S$GLB, | Performed by: PHYSICIAN ASSISTANT

## 2020-06-19 PROCEDURE — 3074F PR MOST RECENT SYSTOLIC BLOOD PRESSURE < 130 MM HG: ICD-10-PCS | Mod: HCNC,CPTII,S$GLB, | Performed by: PHYSICIAN ASSISTANT

## 2020-06-19 PROCEDURE — 1159F MED LIST DOCD IN RCRD: CPT | Mod: HCNC,S$GLB,, | Performed by: PHYSICIAN ASSISTANT

## 2020-06-19 PROCEDURE — 99999 PR PBB SHADOW E&M-EST. PATIENT-LVL IV: CPT | Mod: PBBFAC,HCNC,, | Performed by: PHYSICIAN ASSISTANT

## 2020-06-19 PROCEDURE — 1159F PR MEDICATION LIST DOCUMENTED IN MEDICAL RECORD: ICD-10-PCS | Mod: HCNC,S$GLB,, | Performed by: PHYSICIAN ASSISTANT

## 2020-06-19 PROCEDURE — 1125F PR PAIN SEVERITY QUANTIFIED, PAIN PRESENT: ICD-10-PCS | Mod: HCNC,S$GLB,, | Performed by: PHYSICIAN ASSISTANT

## 2020-06-19 PROCEDURE — 99213 OFFICE O/P EST LOW 20 MIN: CPT | Mod: HCNC,S$GLB,, | Performed by: PHYSICIAN ASSISTANT

## 2020-06-19 PROCEDURE — 3074F SYST BP LT 130 MM HG: CPT | Mod: HCNC,CPTII,S$GLB, | Performed by: PHYSICIAN ASSISTANT

## 2020-06-19 PROCEDURE — 99213 PR OFFICE/OUTPT VISIT, EST, LEVL III, 20-29 MIN: ICD-10-PCS | Mod: HCNC,S$GLB,, | Performed by: PHYSICIAN ASSISTANT

## 2020-06-19 PROCEDURE — 99999 PR PBB SHADOW E&M-EST. PATIENT-LVL IV: ICD-10-PCS | Mod: PBBFAC,HCNC,, | Performed by: PHYSICIAN ASSISTANT

## 2020-06-19 NOTE — PROGRESS NOTES
Subjective:       Patient ID: Alisia Gusman is a 74 y.o. female.    Chief Complaint: Rash    Patient presents to clinic with a wound that is on the left lateral leg that has been present for several weeks.  She states the area had drained at 1 point but she would just like it looked at to make sure it is healed.  She denies any recent drainage, pain in the area, skin redness or itching.  She notes that there is a scab and she has been applying diabetic gold Bond lotion to the area with no relief.    Review of Systems   Constitutional: Negative for activity change, appetite change, chills, fatigue and fever.   Musculoskeletal: Negative for myalgias.   Integumentary:  Positive for color change. Negative for pallor, rash and wound.         Objective:      Physical Exam  Constitutional:       Appearance: She is well-developed.   HENT:      Head: Normocephalic and atraumatic.   Eyes:      Conjunctiva/sclera: Conjunctivae normal.      Pupils: Pupils are equal, round, and reactive to light.   Neck:      Musculoskeletal: Normal range of motion and neck supple.      Vascular: No JVD.   Cardiovascular:      Rate and Rhythm: Normal rate and regular rhythm.      Heart sounds: No murmur. No friction rub. No gallop.    Pulmonary:      Effort: Pulmonary effort is normal. No respiratory distress.      Breath sounds: Normal breath sounds. No wheezing or rales.   Skin:     General: Skin is warm and dry.          Neurological:      Mental Status: She is alert and oriented to person, place, and time.   Psychiatric:         Behavior: Behavior normal.         Thought Content: Thought content normal.         Judgment: Judgment normal.         Assessment:       1. Leg wound, left, sequela        Plan:       Alisia was seen today for rash.    Diagnoses and all orders for this visit:    Leg wound, left, sequela  Comments:  Wound is healed.  Advised patient to use Eucerin Aquaphor on the area.     Patient is reassured and will call us if any  signs or symptoms of infection develop.

## 2020-06-29 ENCOUNTER — OFFICE VISIT (OUTPATIENT)
Dept: URGENT CARE | Facility: CLINIC | Age: 75
End: 2020-06-29
Payer: MEDICARE

## 2020-06-29 ENCOUNTER — TELEPHONE (OUTPATIENT)
Dept: ORTHOPEDICS | Facility: CLINIC | Age: 75
End: 2020-06-29

## 2020-06-29 VITALS
DIASTOLIC BLOOD PRESSURE: 68 MMHG | HEIGHT: 62 IN | SYSTOLIC BLOOD PRESSURE: 110 MMHG | HEART RATE: 106 BPM | WEIGHT: 180 LBS | OXYGEN SATURATION: 97 % | BODY MASS INDEX: 33.13 KG/M2 | TEMPERATURE: 98 F

## 2020-06-29 DIAGNOSIS — M54.42 ACUTE LEFT-SIDED LOW BACK PAIN WITH LEFT-SIDED SCIATICA: Primary | ICD-10-CM

## 2020-06-29 PROCEDURE — 96372 THER/PROPH/DIAG INJ SC/IM: CPT | Mod: S$GLB,,, | Performed by: EMERGENCY MEDICINE

## 2020-06-29 PROCEDURE — 99214 OFFICE O/P EST MOD 30 MIN: CPT | Mod: 25,S$GLB,, | Performed by: EMERGENCY MEDICINE

## 2020-06-29 PROCEDURE — 96372 PR INJECTION,THERAP/PROPH/DIAG2ST, IM OR SUBCUT: ICD-10-PCS | Mod: S$GLB,,, | Performed by: EMERGENCY MEDICINE

## 2020-06-29 PROCEDURE — 99214 PR OFFICE/OUTPT VISIT, EST, LEVL IV, 30-39 MIN: ICD-10-PCS | Mod: 25,S$GLB,, | Performed by: EMERGENCY MEDICINE

## 2020-06-29 RX ORDER — MELOXICAM 15 MG/1
15 TABLET ORAL DAILY
Qty: 21 TABLET | Refills: 0 | Status: SHIPPED | OUTPATIENT
Start: 2020-06-29 | End: 2020-07-20

## 2020-06-29 RX ORDER — KETOROLAC TROMETHAMINE 30 MG/ML
30 INJECTION, SOLUTION INTRAMUSCULAR; INTRAVENOUS
Status: COMPLETED | OUTPATIENT
Start: 2020-06-29 | End: 2020-06-29

## 2020-06-29 RX ORDER — METHOCARBAMOL 500 MG/1
500 TABLET, FILM COATED ORAL 4 TIMES DAILY
Qty: 40 TABLET | Refills: 0 | Status: SHIPPED | OUTPATIENT
Start: 2020-06-29 | End: 2020-07-09

## 2020-06-29 RX ADMIN — KETOROLAC TROMETHAMINE 30 MG: 30 INJECTION, SOLUTION INTRAMUSCULAR; INTRAVENOUS at 12:06

## 2020-06-29 NOTE — PATIENT INSTRUCTIONS
NO HEAVY LIFTING  ALTERNATE ICE AND HEAT  30 MG TORADOL SHOT GIVEN IN CLINIC  MOBIC RX FOR PAIN/INFLAMMATION  ROBAXIN RX FOR MUSCLE RELAXANT  SEE SCIATICA AND BACK PAIN SHEET  FOLLOW UP WITH PCP/ORTHOPEDICS IF NOT MUCH IMPROVED IN 1-2 WEEKS      Sciatica    Sciatica is a condition that causes pain in the lower back that spreads down into the buttock, hip, and leg. Sometimes the leg pain can happen without any back pain. Sciatica happens when a spinal nerve is irritated or has pressure put on it as comes out of the spinal canal in the lower back. This most often happens when a bulge or rupture of a nearby spinal disk presses on the nerve. Sciatica can also be caused by a narrowing of the spinal canal (spinal stenosis) or spasm of the muscle in the buttocks that the sciatic nerve passes through (pyriform muscle). Sciatica is also called lumbar radiculopathy.  Sciatica may begin after a sudden twisting or bending force, such as in a car accident. Or it can happen after a simple awkward movement. In either case, muscle spasm often also happens. Muscle spasm makes the pain worse.  A healthcare provider makes a diagnosis of sciatica from your symptoms and a physical exam. Unless you had an injury from a car accident or fall, you usually wont have X-rays taken at this time. This is because the nerves and disks in your back cant be seen on an X-ray. If the provider sees signs of a compressed nerve, you will need to schedule an MRI scan as an outpatient. Signs of a compressed nerve include loss of strength in a leg.  Most sciatica gets better with medicine, exercise, and physical therapy. If your symptoms continue after at least 3 months of medical treatment, you may need surgery or injections to your lower back.  Home care  Follow these tips when caring for yourself at home:  · You may need to stay in bed the first few days. But as soon as possible, begin sitting up or walking. This will help you avoid problems that come  from staying in bed for long periods.  · When in bed, try to find a position that is comfortable. A firm mattress is best. Try lying flat on your back with pillows under your knees. You can also try lying on your side with your knees bent up toward your chest and a pillow between your knees.  · Avoid sitting for long periods. This puts more stress on your lower back than standing or walking.  · Use heat from a hot shower, hot bath, or heating pad to help ease pain. Massage can also help. You can also try using an ice pack. You can make your own ice pack by putting ice cubes in a plastic bag. Wrap the bag in a thin towel. Try both heat and cold to see which works best. Use the method that feels best for 20 minutes several times a day.  · You may use acetaminophen or ibuprofen to ease pain, unless another pain medicine was prescribed. Note: If you have chronic liver or kidney disease, talk with your healthcare provider before taking these medicines. Also talk with your provider if youve had a stomach ulcer or gastrointestinal bleeding.  · Use safe lifting methods. Dont lift anything heavier than 15 pounds until all of the pain is gone.  Follow-up care  Follow up with your healthcare provider, or as advised. You may need physical therapy or additional tests.  If X-rays were taken, a radiologist will look at them. You will be told of any new findings that may affect your care.  When to seek medical advice  Call your healthcare provider right away if any of these occur:  · Pain gets worse even after taking prescribed medicine  · Weakness or numbness in 1 or both legs or hips  · Numbness in your groin or genital area  · You cant control your bowel or bladder  · Fever  · Redness or swelling over your back or spine   Date Last Reviewed: 8/1/2016  © 2199-6007 [employer]. [address] [place] [address], [place] [address]. All rights reserved. This information is not intended as a substitute for professional medical  care. Always follow your healthcare professional's instructions.        Back Pain (Acute or Chronic)    Back pain is one of the most common problems. The good news is that most people feel better in 1 to 2 weeks, and most of the rest in 1 to 2 months. Most people can remain active.  People experience and describe pain differently; not everyone is the same.  · The pain can be sharp, stabbing, shooting, aching, cramping or burning.  · Movement, standing, bending, lifting, sitting, or walking may worsen pain.  · It can be localized to one spot or area, or it can be more generalized.  · It can spread or radiate upwards, to the front, or go down your arms or legs (sciatica).  · It can cause muscle spasm.  Most of the time, mechanical problems with the muscles or spine cause the pain. Mechanical problems are usually caused by an injury to the muscles or ligaments. While illness can cause back pain, it is usually not caused by a serious illness. Mechanical problems include:   · Physical activity such as sports, exercise, work, or normal activity  · Overexertion, lifting, pushing, pulling incorrectly or too aggressively  · Sudden twisting, bending, or stretching from an accident, or accidental movement  · Poor posture  · Stretching or moving wrong, without noticing pain at the time  · Poor coordination, lack of regular exercise (check with your doctor about this)  · Spinal disc disease or arthritis  · Stress  Pain can also be related to pregnancy, or illness like appendicitis, bladder or kidney infections, pelvic infections, and many other things.  Acute back pain usually gets better in 1 to 2 weeks. Back pain related to disk disease, arthritis in the spinal joints or spinal stenosis (narrowing of the spinal canal) can become chronic and last for months or years.  Unless you had a physical injury (for example, a car accident or fall) X-rays are usually not needed for the initial evaluation of back pain. If pain continues  and does not respond to medical treatment, X-rays and other tests may be needed.  Home care  Try these home care recommendations:  · When in bed, try to find a position of comfort. A firm mattress is best. Try lying flat on your back with pillows under your knees. You can also try lying on your side with your knees bent up towards your chest and a pillow between your knees.  · At first, do not try to stretch out the sore spots. If there is a strain, it is not like the good soreness you get after exercising without an injury. In this case, stretching may make it worse.  · Avoid prolong sitting, long car rides, or travel. This puts more stress on the lower back than standing or walking.  · During the first 24 to 72 hours after an acute injury or flare up of chronic back pain, apply an ice pack to the painful area for 20 minutes and then remove it for 20 minutes. Do this over a period of 60 to 90 minutes or several times a day. This will reduce swelling and pain. Wrap the ice pack in a thin towel or plastic to protect your skin.  · You can start with ice, then switch to heat. Heat (hot shower, hot bath, or heating pad) reduces pain and works well for muscle spasms. Heat can be applied to the painful area for 20 minutes then remove it for 20 minutes. Do this over a period of 60 to 90 minutes or several times a day. Do not sleep on a heating pad. It can lead to skin burns or tissue damage.  · You can alternate ice and heat therapy. Talk with your doctor about the best treatment for your back pain.  · Therapeutic massage can help relax the back muscles without stretching them.  · Be aware of safe lifting methods and do not lift anything without stretching first.  Medicines  Talk to your doctor before using medicine, especially if you have other medical problems or are taking other medicines.  · You may use over-the-counter medicine as directed on the bottle to control pain, unless another pain medicine was prescribed. If  you have chronic conditions like diabetes, liver or kidney disease, stomach ulcers, or gastrointestinal bleeding, or are taking blood thinners, talk to your doctor before taking any medicine.  · Be careful if you are given a prescription medicines, narcotics, or medicine for muscle spasms. They can cause drowsiness, affect your coordination, reflexes, and judgement. Do not drive or operate heavy machinery.  Follow-up care  Follow up with your healthcare provider, or as advised.   A radiologist will review any X-rays that were taken. Your provide will notify you of any new findings that may affect your care.  Call 911  Call emergency services if any of the following occur:  · Trouble breathing  · Confusion  · Very drowsy or trouble awakening  · Fainting or loss of consciousness  · Rapid or very slow heart rate  · Loss of bowel or bladder control  When to seek medical advice  Call your healthcare provider right away if any of these occur:   · Pain becomes worse or spreads to your legs  · Weakness or numbness in one or both legs  · Numbness in the groin or genital area  Date Last Reviewed: 7/1/2016 © 2000-2017 [employer]. [address] [place] [address], [place] [address]. All rights reserved. This information is not intended as a substitute for professional medical care. Always follow your healthcare professional's instructions.

## 2020-06-29 NOTE — TELEPHONE ENCOUNTER
----- Message from Ben Thacker sent at 6/29/2020  8:50 AM CDT -----  Regarding: SAME DAY APPOINTMENT  Contact: Self  Same Day Appointment Request    Was an appointment with another provider offered?   YES    Reason for [facility]ST appt.: LOWER BACK PAIN W/ LEFT HIP PAIN    Communication Preference: [phone] (home)  or  [phone] (mobile)     Additional Information: Pt is new to Ortho and need to be seen today for Lower Back w/ Left Hip Pain Pt states she have been in pain approximately 3 days now Pt states it's hard to sit down due to the pain Pain level 10

## 2020-06-29 NOTE — PROGRESS NOTES
"Subjective:       Patient ID: Alisia Gusman is a 74 y.o. female.    Vitals:  height is 5' 2" (1.575 m) and weight is 81.6 kg (180 lb). Her tympanic temperature is 97.5 °F (36.4 °C). Her blood pressure is 110/68 and her pulse is 106. Her oxygen saturation is 97%.     Chief Complaint: Back Pain    74 year old female c/o lower back pain that shoots down to left hip that started 3 days ago.  She has tried Motrin, and states no relief to her pain.    NO TRAUMA, NO MIDLINE PAIN, NO FALLS, NO INJURY, NO FEVER, NO RASH, NO URINARY TROUBLE. HAS BEEN TRYING ICE AND HEAT WITH MINIMAL RELIEF.    Back Pain  This is a new problem. The current episode started in the past 7 days. The problem occurs constantly. The problem has been gradually worsening since onset. The pain is present in the lumbar spine. The quality of the pain is described as shooting and burning. The pain radiates to the left thigh. The pain is at a severity of 10/10. The pain is severe. The pain is the same all the time. The symptoms are aggravated by bending and sitting. Stiffness is present all day. Associated symptoms include leg pain. Pertinent negatives include no abdominal pain, bladder incontinence, bowel incontinence, chest pain, dysuria, fever, headaches, numbness, paresis, paresthesias, pelvic pain, perianal numbness, tingling, weakness or weight loss. She has tried NSAIDs for the symptoms. The treatment provided no relief.       Constitution: Negative for fatigue and fever.   Cardiovascular: Negative for chest pain.   Gastrointestinal: Negative for abdominal pain and bowel incontinence.   Genitourinary: Negative for dysuria, urgency, bladder incontinence, hematuria and pelvic pain.   Musculoskeletal: Positive for back pain. Negative for muscle cramps and history of spine disorder.   Skin: Negative for rash.   Neurological: Negative for coordination disturbances, headaches, numbness and tingling.       Objective:      Physical Exam   Constitutional: " She is oriented to person, place, and time. She appears well-developed. She is cooperative. No distress.   HENT:   Head: Normocephalic and atraumatic.   Nose: Nose normal.   Mouth/Throat: Oropharynx is clear and moist and mucous membranes are normal.   Eyes: Conjunctivae and lids are normal.   Neck: Trachea normal, normal range of motion, full passive range of motion without pain and phonation normal. Neck supple.   Cardiovascular: Normal rate, regular rhythm, normal heart sounds and normal pulses.   Pulmonary/Chest: Effort normal and breath sounds normal.   Abdominal: Soft. Normal appearance and bowel sounds are normal. She exhibits no abdominal bruit, no pulsatile midline mass and no mass.   Musculoskeletal:         General: Tenderness (TTP LEFT PARASPINOUS MUSCLES LEFT LOW BACK, AS WELL AS LEFT BUTTOCK. DISTALLY NV INTACT) present. No swelling, deformity or signs of injury.   Neurological: She is alert and oriented to person, place, and time. She has normal strength and normal reflexes. No sensory deficit.   Skin: Skin is warm, dry, intact and not diaphoretic.   Psychiatric: Her speech is normal and behavior is normal. Judgment and thought content normal.   Nursing note and vitals reviewed.        Assessment:       1. Acute left-sided low back pain with left-sided sciatica        Plan:         Acute left-sided low back pain with left-sided sciatica    Other orders  -     ketorolac injection 30 mg  -     meloxicam (MOBIC) 15 MG tablet; Take 1 tablet (15 mg total) by mouth once daily. for 21 days  Dispense: 21 tablet; Refill: 0  -     methocarbamoL (ROBAXIN) 500 MG Tab; Take 1 tablet (500 mg total) by mouth 4 (four) times daily. for 10 days  Dispense: 40 tablet; Refill: 0         Patient Instructions   NO HEAVY LIFTING  ALTERNATE ICE AND HEAT  30 MG TORADOL SHOT GIVEN IN CLINIC  MOBIC RX FOR PAIN/INFLAMMATION  ROBAXIN RX FOR MUSCLE RELAXANT  SEE SCIATICA AND BACK PAIN SHEET  FOLLOW UP WITH PCP/ORTHOPEDICS IF NOT  MUCH IMPROVED IN 1-2 WEEKS      Sciatica    Sciatica is a condition that causes pain in the lower back that spreads down into the buttock, hip, and leg. Sometimes the leg pain can happen without any back pain. Sciatica happens when a spinal nerve is irritated or has pressure put on it as comes out of the spinal canal in the lower back. This most often happens when a bulge or rupture of a nearby spinal disk presses on the nerve. Sciatica can also be caused by a narrowing of the spinal canal (spinal stenosis) or spasm of the muscle in the buttocks that the sciatic nerve passes through (pyriform muscle). Sciatica is also called lumbar radiculopathy.  Sciatica may begin after a sudden twisting or bending force, such as in a car accident. Or it can happen after a simple awkward movement. In either case, muscle spasm often also happens. Muscle spasm makes the pain worse.  A healthcare provider makes a diagnosis of sciatica from your symptoms and a physical exam. Unless you had an injury from a car accident or fall, you usually wont have X-rays taken at this time. This is because the nerves and disks in your back cant be seen on an X-ray. If the provider sees signs of a compressed nerve, you will need to schedule an MRI scan as an outpatient. Signs of a compressed nerve include loss of strength in a leg.  Most sciatica gets better with medicine, exercise, and physical therapy. If your symptoms continue after at least 3 months of medical treatment, you may need surgery or injections to your lower back.  Home care  Follow these tips when caring for yourself at home:  · You may need to stay in bed the first few days. But as soon as possible, begin sitting up or walking. This will help you avoid problems that come from staying in bed for long periods.  · When in bed, try to find a position that is comfortable. A firm mattress is best. Try lying flat on your back with pillows under your knees. You can also try lying on your  side with your knees bent up toward your chest and a pillow between your knees.  · Avoid sitting for long periods. This puts more stress on your lower back than standing or walking.  · Use heat from a hot shower, hot bath, or heating pad to help ease pain. Massage can also help. You can also try using an ice pack. You can make your own ice pack by putting ice cubes in a plastic bag. Wrap the bag in a thin towel. Try both heat and cold to see which works best. Use the method that feels best for 20 minutes several times a day.  · You may use acetaminophen or ibuprofen to ease pain, unless another pain medicine was prescribed. Note: If you have chronic liver or kidney disease, talk with your healthcare provider before taking these medicines. Also talk with your provider if youve had a stomach ulcer or gastrointestinal bleeding.  · Use safe lifting methods. Dont lift anything heavier than 15 pounds until all of the pain is gone.  Follow-up care  Follow up with your healthcare provider, or as advised. You may need physical therapy or additional tests.  If X-rays were taken, a radiologist will look at them. You will be told of any new findings that may affect your care.  When to seek medical advice  Call your healthcare provider right away if any of these occur:  · Pain gets worse even after taking prescribed medicine  · Weakness or numbness in 1 or both legs or hips  · Numbness in your groin or genital area  · You cant control your bowel or bladder  · Fever  · Redness or swelling over your back or spine   Date Last Reviewed: 8/1/2016  © 5340-6575 [employer], [employer]. [address] [place] [address], [place] [address]. All rights reserved. This information is not intended as a substitute for professional medical care. Always follow your healthcare professional's instructions.        Back Pain (Acute or Chronic)    Back pain is one of the most common problems. The good news is that most people feel better in 1 to 2 weeks,  and most of the rest in 1 to 2 months. Most people can remain active.  People experience and describe pain differently; not everyone is the same.  · The pain can be sharp, stabbing, shooting, aching, cramping or burning.  · Movement, standing, bending, lifting, sitting, or walking may worsen pain.  · It can be localized to one spot or area, or it can be more generalized.  · It can spread or radiate upwards, to the front, or go down your arms or legs (sciatica).  · It can cause muscle spasm.  Most of the time, mechanical problems with the muscles or spine cause the pain. Mechanical problems are usually caused by an injury to the muscles or ligaments. While illness can cause back pain, it is usually not caused by a serious illness. Mechanical problems include:   · Physical activity such as sports, exercise, work, or normal activity  · Overexertion, lifting, pushing, pulling incorrectly or too aggressively  · Sudden twisting, bending, or stretching from an accident, or accidental movement  · Poor posture  · Stretching or moving wrong, without noticing pain at the time  · Poor coordination, lack of regular exercise (check with your doctor about this)  · Spinal disc disease or arthritis  · Stress  Pain can also be related to pregnancy, or illness like appendicitis, bladder or kidney infections, pelvic infections, and many other things.  Acute back pain usually gets better in 1 to 2 weeks. Back pain related to disk disease, arthritis in the spinal joints or spinal stenosis (narrowing of the spinal canal) can become chronic and last for months or years.  Unless you had a physical injury (for example, a car accident or fall) X-rays are usually not needed for the initial evaluation of back pain. If pain continues and does not respond to medical treatment, X-rays and other tests may be needed.  Home care  Try these home care recommendations:  · When in bed, try to find a position of comfort. A firm mattress is best. Try lying  flat on your back with pillows under your knees. You can also try lying on your side with your knees bent up towards your chest and a pillow between your knees.  · At first, do not try to stretch out the sore spots. If there is a strain, it is not like the good soreness you get after exercising without an injury. In this case, stretching may make it worse.  · Avoid prolong sitting, long car rides, or travel. This puts more stress on the lower back than standing or walking.  · During the first 24 to 72 hours after an acute injury or flare up of chronic back pain, apply an ice pack to the painful area for 20 minutes and then remove it for 20 minutes. Do this over a period of 60 to 90 minutes or several times a day. This will reduce swelling and pain. Wrap the ice pack in a thin towel or plastic to protect your skin.  · You can start with ice, then switch to heat. Heat (hot shower, hot bath, or heating pad) reduces pain and works well for muscle spasms. Heat can be applied to the painful area for 20 minutes then remove it for 20 minutes. Do this over a period of 60 to 90 minutes or several times a day. Do not sleep on a heating pad. It can lead to skin burns or tissue damage.  · You can alternate ice and heat therapy. Talk with your doctor about the best treatment for your back pain.  · Therapeutic massage can help relax the back muscles without stretching them.  · Be aware of safe lifting methods and do not lift anything without stretching first.  Medicines  Talk to your doctor before using medicine, especially if you have other medical problems or are taking other medicines.  · You may use over-the-counter medicine as directed on the bottle to control pain, unless another pain medicine was prescribed. If you have chronic conditions like diabetes, liver or kidney disease, stomach ulcers, or gastrointestinal bleeding, or are taking blood thinners, talk to your doctor before taking any medicine.  · Be careful if you are  given a prescription medicines, narcotics, or medicine for muscle spasms. They can cause drowsiness, affect your coordination, reflexes, and judgement. Do not drive or operate heavy machinery.  Follow-up care  Follow up with your healthcare provider, or as advised.   A radiologist will review any X-rays that were taken. Your provide will notify you of any new findings that may affect your care.  Call 911  Call emergency services if any of the following occur:  · Trouble breathing  · Confusion  · Very drowsy or trouble awakening  · Fainting or loss of consciousness  · Rapid or very slow heart rate  · Loss of bowel or bladder control  When to seek medical advice  Call your healthcare provider right away if any of these occur:   · Pain becomes worse or spreads to your legs  · Weakness or numbness in one or both legs  · Numbness in the groin or genital area  Date Last Reviewed: 7/1/2016  © 7957-9916 [employer]. [address] [place] [address], [place] [address]. All rights reserved. This information is not intended as a substitute for professional medical care. Always follow your healthcare professional's instructions.

## 2020-06-29 NOTE — TELEPHONE ENCOUNTER
Ortho Telephone Triage Message  [phone]  Patient C/O:LBP/L lateral hip pain X 3 days. No known injury and thinks r/t arthritis. Requests same day Ortho appt. Declines same day appt at [facility].   Triage Advice:Advised pt that no appts are,presently, available at [facility] and that pt consider going to PCP or local UC for exam/xrays which will be available to Orthopedics via EMR. Fall precautions.   Resolution: Pt states understanding and states will go to [facility]. Confirms location.

## 2020-07-02 ENCOUNTER — TELEPHONE (OUTPATIENT)
Dept: INTERNAL MEDICINE | Facility: CLINIC | Age: 75
End: 2020-07-02

## 2020-07-21 ENCOUNTER — OFFICE VISIT (OUTPATIENT)
Dept: PODIATRY | Facility: CLINIC | Age: 75
End: 2020-07-21
Payer: MEDICARE

## 2020-07-21 ENCOUNTER — TELEPHONE (OUTPATIENT)
Dept: ENDOCRINOLOGY | Facility: CLINIC | Age: 75
End: 2020-07-21

## 2020-07-21 VITALS
BODY MASS INDEX: 33.13 KG/M2 | DIASTOLIC BLOOD PRESSURE: 80 MMHG | HEART RATE: 105 BPM | HEIGHT: 62 IN | SYSTOLIC BLOOD PRESSURE: 148 MMHG | WEIGHT: 180 LBS

## 2020-07-21 DIAGNOSIS — E11.65 TYPE 2 DIABETES MELLITUS WITH HYPERGLYCEMIA, WITH LONG-TERM CURRENT USE OF INSULIN: Primary | ICD-10-CM

## 2020-07-21 DIAGNOSIS — B35.1 ONYCHOMYCOSIS DUE TO DERMATOPHYTE: ICD-10-CM

## 2020-07-21 DIAGNOSIS — Z79.4 TYPE 2 DIABETES MELLITUS WITH HYPERGLYCEMIA, WITH LONG-TERM CURRENT USE OF INSULIN: Primary | ICD-10-CM

## 2020-07-21 PROCEDURE — 11721 PR DEBRIDEMENT OF NAILS, 6 OR MORE: ICD-10-PCS | Mod: Q9,59,HCNC,S$GLB | Performed by: PODIATRIST

## 2020-07-21 PROCEDURE — 99999 PR PBB SHADOW E&M-EST. PATIENT-LVL IV: ICD-10-PCS | Mod: PBBFAC,HCNC,, | Performed by: PODIATRIST

## 2020-07-21 PROCEDURE — 99999 PR PBB SHADOW E&M-EST. PATIENT-LVL IV: CPT | Mod: PBBFAC,HCNC,, | Performed by: PODIATRIST

## 2020-07-21 PROCEDURE — 11056 PR TRIM BENIGN HYPERKERATOTIC SKIN LESION,2-4: ICD-10-PCS | Mod: Q9,HCNC,S$GLB, | Performed by: PODIATRIST

## 2020-07-21 PROCEDURE — 99499 UNLISTED E&M SERVICE: CPT | Mod: HCNC,S$GLB,, | Performed by: PODIATRIST

## 2020-07-21 PROCEDURE — 11721 DEBRIDE NAIL 6 OR MORE: CPT | Mod: Q9,59,HCNC,S$GLB | Performed by: PODIATRIST

## 2020-07-21 PROCEDURE — 11056 PARNG/CUTG B9 HYPRKR LES 2-4: CPT | Mod: Q9,HCNC,S$GLB, | Performed by: PODIATRIST

## 2020-07-21 PROCEDURE — 99499 NO LOS: ICD-10-PCS | Mod: HCNC,S$GLB,, | Performed by: PODIATRIST

## 2020-07-21 NOTE — TELEPHONE ENCOUNTER
----- Message from Laura Foster sent at 7/21/2020  1:14 PM CDT -----  Pt is waiting on a call back to be scheduled for the upcoming books. Please call back.    Contact Info [phone] (home) [phone]

## 2020-07-21 NOTE — PROGRESS NOTES
Subjective:      Patient ID: Alisia Gusman is a 74 y.o. female.    Chief Complaint: Diabetes Mellitus (5 19 20 dr berta frias) and Nail Care    Alisia is a 74 y.o. female who presents to the clinic for evaluation and treatment of high risk feet. Alisia has a past medical history of Adult bronchiectasis (7/26/2017), Allergy, Anemia, Arthritis, Asthma, Breast cancer (1993), Cancer (1993), Cataract, Chronic cervical radiculopathy (9/20/2012), Diabetes mellitus, Diabetes mellitus type II, Diabetes with neurologic complications, Diverticulosis, Dry eyes, Dysphagia, GERD (gastroesophageal reflux disease), Hyperlipidemia, Hypertension, Neuropathy, Scalp tenderness, Shortness of breath, and Ulcer. The patient's chief complaint is HRFC  This patient has documented high risk feet requiring routine maintenance secondary to diabetes mellitis and those secondary complications of diabetes, as mentioned..    PCP: Berta Frias MD    Date Last Seen by PCP:   Chief Complaint   Patient presents with    Diabetes Mellitus     5 19 20 dr berta frias    Nail Care         Hemoglobin A1C   Date Value Ref Range Status   05/27/2020 8.1 (H) 4.0 - 5.6 % Final     Comment:     ADA Screening Guidelines:  5.7-6.4%  Consistent with prediabetes  >or=6.5%  Consistent with diabetes  High levels of fetal hemoglobin interfere with the HbA1C  assay. Heterozygous hemoglobin variants (HbS, HgC, etc)do  not significantly interfere with this assay.   However, presence of multiple variants may affect accuracy.     04/14/2020 8.2 (H) 4.0 - 5.6 % Final     Comment:     ADA Screening Guidelines:  5.7-6.4%  Consistent with prediabetes  >or=6.5%  Consistent with diabetes  High levels of fetal hemoglobin interfere with the HbA1C  assay. Heterozygous hemoglobin variants (HbS, HgC, etc)do  not significantly interfere with this assay.   However, presence of multiple variants may affect accuracy.     02/13/2020 7.8 (H) 4.0 - 5.6 % Final     Comment:     ADA Screening  Guidelines:  5.7-6.4%  Consistent with prediabetes  >or=6.5%  Consistent with diabetes  High levels of fetal hemoglobin interfere with the HbA1C  assay. Heterozygous hemoglobin variants (HbS, HgC, etc)do  not significantly interfere with this assay.   However, presence of multiple variants may affect accuracy.         Review of Systems   Constitution: Negative for chills, decreased appetite and fever.   Cardiovascular: Negative for leg swelling.   Skin: Positive for dry skin and nail changes. Negative for color change, flushing, itching, poor wound healing and rash.   Musculoskeletal: Negative for arthritis, joint pain, joint swelling and myalgias.   Gastrointestinal: Negative for nausea and vomiting.   Neurological: Negative for loss of balance, numbness and paresthesias.           Objective:      Physical Exam  Vitals signs and nursing note reviewed.   Constitutional:       Appearance: She is well-developed.   Cardiovascular:      Pulses:           Dorsalis pedis pulses are 2+ on the right side and 2+ on the left side.        Posterior tibial pulses are 1+ on the right side and 1+ on the left side.      Comments: Dorsalis pedis and posterior tibial pulses are diminished Bilaterally. Toes are cool to touch. Feet are warm proximally.There is decreased digital hair. Skin is atrophic, slightly hyperpigmented, and mildly edematous      Musculoskeletal: Normal range of motion.         General: No tenderness, deformity or signs of injury.      Right ankle: Normal.      Left ankle: Normal.      Right foot: No swelling, crepitus or deformity.      Left foot: No swelling, crepitus or deformity.      Comments: Adequate joint range of motion without pain, limitation, nor crepitation Bilateral feet and ankle joints. Muscle strength is 5/5 in all groups bilaterally.         Lymphadenopathy:      Comments: No palpable lymph nodes   Skin:     General: Skin is warm and dry.      Coloration: Skin is not pale.      Findings: No  ecchymosis, erythema, lesion or rash.      Nails: There is no clubbing.        Comments: Nails x10 are elongated by  2-4mm's, thickened by 2-4 mm's, dystrophic, and are darkened in  coloration . Xerosis Bilaterally. No open lesions, lacerations or wounds noted    Hyperkeratotic tissue noted to x 2 distal hallux b/l       Neurological:      Mental Status: She is alert and oriented to person, place, and time.      Comments: [place] 5.07 monofilamant testing is diminished Vikash feet. Sharp/dull sensation diminished Bilaterally. Light touch absent Bilaterally.       Psychiatric:         Behavior: Behavior normal.               Assessment:       Encounter Diagnoses   Name Primary?    Type 2 diabetes mellitus with hyperglycemia, with long-term current use of insulin Yes    Onychomycosis due to dermatophyte          Plan:       Alisia was seen today for diabetes mellitus and nail care.    Diagnoses and all orders for this visit:    Type 2 diabetes mellitus with hyperglycemia, with long-term current use of insulin    Onychomycosis due to dermatophyte      I counseled the patient on her conditions, their implications and medical management.    Shoe inspection. Diabetic Foot Education. Patient reminded of the importance of good nutrition and blood sugar control to help prevent podiatric complications of diabetes. Patient instructed on proper foot hygeine. We discussed wearing proper shoe gear, daily foot inspections, never walking without protective shoe gear, never putting sharp instruments to feet    - With patient's permission, nails were aggressively reduced and debrided x 10 to their soft tissue attachment mechanically and with electric , removing all offending nail and debris. Patient relates relief following the procedure. She will continue to monitor the areas daily, inspect her feet, wear protective shoe gear when ambulatory, moisturizer to maintain skin integrity and follow in this office in  approximately 2-3 months, sooner p.r.n.    - After cleansing the  area w/ alcohol prep pad the above mentioned hyperkeratosis was trimmed utilizing No 15 scapel, to a smooth base with out incident. Patient tolerated this  well and reported comfort x2     - Return to clinic in 3m or sooner if problems arise

## 2020-07-21 NOTE — TELEPHONE ENCOUNTER
Talk to pt explained that dr boyce is booked until December her last visited was 4/2020 this year. She can come back Aug 3rd to scheduled appointment with dr boyce. Will put her in the wait list. Pt said she will call back at that time. Pt understood our conversation.

## 2020-07-30 ENCOUNTER — TELEPHONE (OUTPATIENT)
Dept: INTERNAL MEDICINE | Facility: CLINIC | Age: 75
End: 2020-07-30

## 2020-07-30 NOTE — TELEPHONE ENCOUNTER
----- Message from Zoie Lira sent at 7/29/2020  5:22 PM CDT -----  Regarding: pt advice  Pt called and would like to see about getting an appointment pt is having issues with her blood pressure and swollen ankles and feet and urine issue burns please advise pt    Pt can be reached at [phone]

## 2020-07-31 ENCOUNTER — LAB VISIT (OUTPATIENT)
Dept: LAB | Facility: HOSPITAL | Age: 75
End: 2020-07-31
Attending: INTERNAL MEDICINE
Payer: MEDICARE

## 2020-07-31 ENCOUNTER — OFFICE VISIT (OUTPATIENT)
Dept: INTERNAL MEDICINE | Facility: CLINIC | Age: 75
End: 2020-07-31
Payer: MEDICARE

## 2020-07-31 ENCOUNTER — TELEPHONE (OUTPATIENT)
Dept: ORTHOPEDICS | Facility: CLINIC | Age: 75
End: 2020-07-31

## 2020-07-31 VITALS
BODY MASS INDEX: 34.45 KG/M2 | OXYGEN SATURATION: 98 % | HEIGHT: 62 IN | SYSTOLIC BLOOD PRESSURE: 154 MMHG | WEIGHT: 187.19 LBS | DIASTOLIC BLOOD PRESSURE: 80 MMHG | HEART RATE: 103 BPM

## 2020-07-31 DIAGNOSIS — N39.0 URINARY TRACT INFECTION WITHOUT HEMATURIA, SITE UNSPECIFIED: Primary | ICD-10-CM

## 2020-07-31 DIAGNOSIS — R60.0 PEDAL EDEMA: ICD-10-CM

## 2020-07-31 DIAGNOSIS — I10 BENIGN ESSENTIAL HYPERTENSION: ICD-10-CM

## 2020-07-31 DIAGNOSIS — E11.69 DIABETES MELLITUS TYPE 2 IN OBESE: ICD-10-CM

## 2020-07-31 DIAGNOSIS — E66.9 DIABETES MELLITUS TYPE 2 IN OBESE: ICD-10-CM

## 2020-07-31 DIAGNOSIS — M51.36 DDD (DEGENERATIVE DISC DISEASE), LUMBAR: Primary | ICD-10-CM

## 2020-07-31 LAB
ALBUMIN SERPL BCP-MCNC: 3.6 G/DL (ref 3.5–5.2)
ALP SERPL-CCNC: 62 U/L (ref 55–135)
ALT SERPL W/O P-5'-P-CCNC: 15 U/L (ref 10–44)
ANION GAP SERPL CALC-SCNC: 9 MMOL/L (ref 8–16)
AST SERPL-CCNC: 20 U/L (ref 10–40)
BILIRUB SERPL-MCNC: 1 MG/DL (ref 0.1–1)
BUN SERPL-MCNC: 14 MG/DL (ref 8–23)
CALCIUM SERPL-MCNC: 10.2 MG/DL (ref 8.7–10.5)
CHLORIDE SERPL-SCNC: 102 MMOL/L (ref 95–110)
CO2 SERPL-SCNC: 30 MMOL/L (ref 23–29)
CREAT SERPL-MCNC: 1 MG/DL (ref 0.5–1.4)
EST. GFR  (AFRICAN AMERICAN): >60 ML/MIN/1.73 M^2
EST. GFR  (NON AFRICAN AMERICAN): 54.5 ML/MIN/1.73 M^2
GLUCOSE SERPL-MCNC: 149 MG/DL (ref 70–110)
POTASSIUM SERPL-SCNC: 3.5 MMOL/L (ref 3.5–5.1)
PROT SERPL-MCNC: 7 G/DL (ref 6–8.4)
SODIUM SERPL-SCNC: 141 MMOL/L (ref 136–145)

## 2020-07-31 PROCEDURE — 80053 COMPREHEN METABOLIC PANEL: CPT | Mod: HCNC

## 2020-07-31 PROCEDURE — 3052F PR MOST RECENT HEMOGLOBIN A1C LEVEL 8.0 - < 9.0%: ICD-10-PCS | Mod: HCNC,CPTII,S$GLB, | Performed by: INTERNAL MEDICINE

## 2020-07-31 PROCEDURE — 3077F PR MOST RECENT SYSTOLIC BLOOD PRESSURE >= 140 MM HG: ICD-10-PCS | Mod: HCNC,CPTII,S$GLB, | Performed by: INTERNAL MEDICINE

## 2020-07-31 PROCEDURE — 3079F PR MOST RECENT DIASTOLIC BLOOD PRESSURE 80-89 MM HG: ICD-10-PCS | Mod: HCNC,CPTII,S$GLB, | Performed by: INTERNAL MEDICINE

## 2020-07-31 PROCEDURE — 1125F PR PAIN SEVERITY QUANTIFIED, PAIN PRESENT: ICD-10-PCS | Mod: HCNC,S$GLB,, | Performed by: INTERNAL MEDICINE

## 2020-07-31 PROCEDURE — 99214 OFFICE O/P EST MOD 30 MIN: CPT | Mod: HCNC,S$GLB,, | Performed by: INTERNAL MEDICINE

## 2020-07-31 PROCEDURE — 1101F PR PT FALLS ASSESS DOC 0-1 FALLS W/OUT INJ PAST YR: ICD-10-PCS | Mod: HCNC,CPTII,S$GLB, | Performed by: INTERNAL MEDICINE

## 2020-07-31 PROCEDURE — 1159F PR MEDICATION LIST DOCUMENTED IN MEDICAL RECORD: ICD-10-PCS | Mod: HCNC,S$GLB,, | Performed by: INTERNAL MEDICINE

## 2020-07-31 PROCEDURE — 3052F HG A1C>EQUAL 8.0%<EQUAL 9.0%: CPT | Mod: HCNC,CPTII,S$GLB, | Performed by: INTERNAL MEDICINE

## 2020-07-31 PROCEDURE — 3077F SYST BP >= 140 MM HG: CPT | Mod: HCNC,CPTII,S$GLB, | Performed by: INTERNAL MEDICINE

## 2020-07-31 PROCEDURE — 1101F PT FALLS ASSESS-DOCD LE1/YR: CPT | Mod: HCNC,CPTII,S$GLB, | Performed by: INTERNAL MEDICINE

## 2020-07-31 PROCEDURE — 99214 PR OFFICE/OUTPT VISIT, EST, LEVL IV, 30-39 MIN: ICD-10-PCS | Mod: HCNC,S$GLB,, | Performed by: INTERNAL MEDICINE

## 2020-07-31 PROCEDURE — 99999 PR PBB SHADOW E&M-EST. PATIENT-LVL IV: ICD-10-PCS | Mod: PBBFAC,HCNC,, | Performed by: INTERNAL MEDICINE

## 2020-07-31 PROCEDURE — 99999 PR PBB SHADOW E&M-EST. PATIENT-LVL IV: CPT | Mod: PBBFAC,HCNC,, | Performed by: INTERNAL MEDICINE

## 2020-07-31 PROCEDURE — 1125F AMNT PAIN NOTED PAIN PRSNT: CPT | Mod: HCNC,S$GLB,, | Performed by: INTERNAL MEDICINE

## 2020-07-31 PROCEDURE — 36415 COLL VENOUS BLD VENIPUNCTURE: CPT | Mod: HCNC

## 2020-07-31 PROCEDURE — 3079F DIAST BP 80-89 MM HG: CPT | Mod: HCNC,CPTII,S$GLB, | Performed by: INTERNAL MEDICINE

## 2020-07-31 PROCEDURE — 1159F MED LIST DOCD IN RCRD: CPT | Mod: HCNC,S$GLB,, | Performed by: INTERNAL MEDICINE

## 2020-07-31 RX ORDER — CIPROFLOXACIN 500 MG/1
500 TABLET ORAL EVERY 12 HOURS
Qty: 14 TABLET | Refills: 0 | Status: SHIPPED | OUTPATIENT
Start: 2020-07-31 | End: 2020-08-07

## 2020-07-31 NOTE — PROGRESS NOTES
"Subjective:       Patient ID: Alisia Gusman is a 75 y.o. female.    Chief Complaint: Urinary Tract Infection    HPI   Dysuria and urinary frequency starting 7/27/20. Drank a lot of water but did not help. No fevers/chills. No CVA tenderness. Does have some chronic L hip pain. No nausea/vomiting. No blood in the urine.     Previously bp was normal.   On 7/29/20 bp started going up 159/105 twice in that day.   7/30/20 - 230/135 in the AM. It went down to 188/104 yesterday evening.   7/31/20 - 161/105.   Still taking HCTZ 25mg daily, atenolol 1/2 of 25mg daily.   Not taking NSAIDs.   Cooked large pot of white beans and put a lot of seasonings w/ pickled meat/smoked sausage in it.   SOB unchanged. No blurry vision/weakness.   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     Occasional pedal edema (chronic) but a little worse over the last few days. BNP and US WNL and no DVT.   Holter 48 hrs WNL except for some PACs/PVCs and 1 run of 4 beats of AT.   STRESS NEG 5/26/20 - LV remodeling. EF 50% and some LV diastolic dysfunction.     Pt was started on ozempic 5/2020 w/ Dr. Nelson. Reports doing ok. Not sure if it's really helping the sugars that much.     Review of Systems  Comprehensive review of systems otherwise negative. See history/subjective section for more details.    Objective:      Physical Exam    BP (!) 154/80   Pulse 103   Ht 5' 2" (1.575 m)   Wt 84.9 kg (187 lb 2.7 oz)   LMP  (LMP Unknown) Comment: Partial  SpO2 98%   BMI 34.23 kg/m²     GEN - A+OX4, NAD   HEENT - PERRL, EOMI, OP clear. MMM.   Neck - No thyromegaly or cervical LAD. No thyroid masses felt.  CV - RRR, no m/r   Chest - CTAB, no wheezing or rhonchi  Abd - S/NT/ND/+BS.   Ext - 2+BDP and radial pulses. Trace/1+ BLE pitting edema  Skin - No rash. Scab over L lateral leg wound.    Previous labs and heart studies reviewed.     Assessment/Plan     Alisia was seen today for urinary tract infection.    Diagnoses and all orders for this " visit:    Urinary tract infection without hematuria, site unspecified  -     Urinalysis; Future  -     Urinalysis Microscopic; Future  -     Urine culture; Future  -     ciprofloxacin HCl (CIPRO) 500 MG tablet; Take 1 tablet (500 mg total) by mouth every 12 (twelve) hours. for 7 days    Benign essential hypertension - BP is not at goal. Likely due to dietary discretion this past week. Cont current meds and BP check w/ nurse next week.     Pedal edema - work up as above. Recommend compression stockings.  -     Comprehensive metabolic panel; Future  -     COMPRESSION STOCKINGS    Diabetes mellitus type 2 in obese - ozempic added in May. F/u w/ endo.       F/u as scheduled in Nov.       Selena Montemayor MD  Department of Internal Medicine - [facility]  9:54 AM

## 2020-08-03 ENCOUNTER — TELEPHONE (OUTPATIENT)
Dept: INTERNAL MEDICINE | Facility: CLINIC | Age: 75
End: 2020-08-03

## 2020-08-03 NOTE — TELEPHONE ENCOUNTER
Please call to let patient know that her urine is contaminated.  Continue antibiotic until.  If persistent symptoms, patient to let me we will repeat a urine culture.

## 2020-08-04 NOTE — TELEPHONE ENCOUNTER
Pt informed and also to continue her ABT until gone. Pt verbally understood and stated the symptoms are gone.

## 2020-08-06 ENCOUNTER — TELEPHONE (OUTPATIENT)
Dept: ENDOCRINOLOGY | Facility: CLINIC | Age: 75
End: 2020-08-06

## 2020-08-06 NOTE — TELEPHONE ENCOUNTER
----- Message from Laura Foster sent at 8/6/2020  9:52 AM CDT -----  Pt is requesting a call back, she stated she was told  would call and squeeze her in. Please call back to further discuss. Pt is not too happy with the December appt. I did notify the pt of no availability, but she still would like a call from the office.     Contact Info

## 2020-08-12 ENCOUNTER — OFFICE VISIT (OUTPATIENT)
Dept: ORTHOPEDICS | Facility: CLINIC | Age: 75
End: 2020-08-12
Payer: MEDICARE

## 2020-08-12 ENCOUNTER — HOSPITAL ENCOUNTER (OUTPATIENT)
Dept: RADIOLOGY | Facility: HOSPITAL | Age: 75
Discharge: HOME | End: 2020-08-12
Attending: PHYSICIAN ASSISTANT
Payer: MEDICARE

## 2020-08-12 VITALS — WEIGHT: 186.5 LBS | BODY MASS INDEX: 34.32 KG/M2 | HEIGHT: 62 IN

## 2020-08-12 DIAGNOSIS — M51.36 DDD (DEGENERATIVE DISC DISEASE), LUMBAR: ICD-10-CM

## 2020-08-12 DIAGNOSIS — M43.16 SPONDYLOLISTHESIS OF LUMBAR REGION: Primary | ICD-10-CM

## 2020-08-12 PROCEDURE — 99204 OFFICE O/P NEW MOD 45 MIN: CPT | Mod: HCNC,S$GLB,, | Performed by: PHYSICIAN ASSISTANT

## 2020-08-12 PROCEDURE — 99999 PR PBB SHADOW E&M-EST. PATIENT-LVL III: CPT | Mod: PBBFAC,HCNC,, | Performed by: PHYSICIAN ASSISTANT

## 2020-08-12 PROCEDURE — 99999 PR PBB SHADOW E&M-EST. PATIENT-LVL III: ICD-10-PCS | Mod: PBBFAC,HCNC,, | Performed by: PHYSICIAN ASSISTANT

## 2020-08-12 PROCEDURE — 1159F PR MEDICATION LIST DOCUMENTED IN MEDICAL RECORD: ICD-10-PCS | Mod: HCNC,S$GLB,, | Performed by: PHYSICIAN ASSISTANT

## 2020-08-12 PROCEDURE — 99204 PR OFFICE/OUTPT VISIT, NEW, LEVL IV, 45-59 MIN: ICD-10-PCS | Mod: HCNC,S$GLB,, | Performed by: PHYSICIAN ASSISTANT

## 2020-08-12 PROCEDURE — 1101F PR PT FALLS ASSESS DOC 0-1 FALLS W/OUT INJ PAST YR: ICD-10-PCS | Mod: HCNC,CPTII,S$GLB, | Performed by: PHYSICIAN ASSISTANT

## 2020-08-12 PROCEDURE — 72120 X-RAY BEND ONLY L-S SPINE: CPT | Mod: 26,HCNC,, | Performed by: RADIOLOGY

## 2020-08-12 PROCEDURE — 72120 X-RAY BEND ONLY L-S SPINE: CPT | Mod: TC,HCNC

## 2020-08-12 PROCEDURE — 1101F PT FALLS ASSESS-DOCD LE1/YR: CPT | Mod: HCNC,CPTII,S$GLB, | Performed by: PHYSICIAN ASSISTANT

## 2020-08-12 PROCEDURE — 72120 XR LUMBAR SPINE FLEXION AND EXTENSION ONLY: ICD-10-PCS | Mod: 26,HCNC,, | Performed by: RADIOLOGY

## 2020-08-12 PROCEDURE — 1159F MED LIST DOCD IN RCRD: CPT | Mod: HCNC,S$GLB,, | Performed by: PHYSICIAN ASSISTANT

## 2020-08-12 NOTE — PROGRESS NOTES
DATE: 8/12/2020  PATIENT: Alisia Gusman    Supervising Physician: Harrison Jacome M.D.    CHIEF COMPLAINT: low back pain    HISTORY:  Alisia Gusman is a 75 y.o. female with PMH of breast cancer in 1993 and 2020, s/p left mastectomy in February 2020 here for initial evaluation of low back pain (Back - 1, Leg - 0).  The pain has been present for several years. The patient describes the pain as burning.  The pain is worse with sitting and getting up from sitting and improved by exercise.  She regularly exercises 3-4 days a week but hasn't been going because of COVID.  There is no associated numbness and tingling. There is no subjective weakness. Prior treatments have included exercise, but no medications, physical therapy, ESIs or surgery.    The patient denies myelopathic symptoms such as handwriting changes or difficulty with buttons/coins/keys. Denies perineal paresthesias, bowel/bladder dysfunction.    PAST MEDICAL/SURGICAL HISTORY:  Past Medical History:   Diagnosis Date    Adult bronchiectasis 7/26/2017    Allergy     Anemia     Arthritis     Asthma     Breast cancer 1993    left w/ radiation     Cancer 1993    L eft breast    Cataract     Chronic cervical radiculopathy 9/20/2012    Diabetes mellitus     Diabetes mellitus type II     Diabetes with neurologic complications     Diverticulosis     Dry eyes     Dysphagia     after knee surgery June 2014    GERD (gastroesophageal reflux disease)     Hyperlipidemia     Hypertension     Neuropathy     feet    Scalp tenderness     Shortness of breath     Ulcer      Past Surgical History:   Procedure Laterality Date    BREAST BIOPSY Left 1993    BREAST LUMPECTOMY Left 1993    CARPAL TUNNEL RELEASE Bilateral 2011    CATARACT EXTRACTION W/  INTRAOCULAR LENS IMPLANT Bilateral 2013 and 2014    CHOLECYSTECTOMY      COLONOSCOPY N/A 11/6/2015    Procedure: COLONOSCOPY;  Surgeon: Major Michelle MD;  Location: [facility] ([address] [place] [address]);  Service:  "Endoscopy;  Laterality: N/A;    COLONOSCOPY N/A 5/8/2019    Procedure: COLONOSCOPY;  Surgeon: Major Michelle MD;  Location: [facility] (4TH FLR);  Service: Endoscopy;  Laterality: N/A;    EYE SURGERY      HYSTERECTOMY      partial hyst    INJECTION FOR SENTINEL NODE IDENTIFICATION Left 2/20/2020    Procedure: INJECTION, FOR SENTINEL NODE IDENTIFICATION;  Surgeon: Hamida Nieves MD;  Location: [facility] OR 2ND FLR;  Service: General;  Laterality: Left;    JOINT REPLACEMENT Left June 2014    knee    SENTINEL LYMPH NODE BIOPSY Left 2/20/2020    Procedure: BIOPSY, LYMPH NODE, SENTINEL;  Surgeon: Hamida Nieves MD;  [facility];  Service: General;  Laterality: Left;    UNILATERAL MASTECTOMY Left 2/20/2020    Procedure: MASTECTOMY, UNILATERAL;  Surgeon: Hamida Nieves MD;  [facility];  Service: General;  Laterality: Left;    uvuloplasty         Medications:   Current Outpatient Medications on File Prior to Visit   Medication Sig Dispense Refill    ACCU-CHEK DOMENICO PLUS TEST STRP Strp TEST THREE TIMES DAILY AS DIRECTED 300 strip 3    ACCU-CHEK SOFTCLIX LANCETS Misc 1 each by Misc.(Non-Drug; Combo Route) route 3 (three) times daily. 270 each 3    anastrozole (ARIMIDEX) 1 mg Tab Take 1 tablet (1 mg total) by mouth once daily. 90 tablet 3    aspirin 81 MG chewable tablet Take 1 tablet by mouth At bedtime.      atenolol (TENORMIN) 25 MG tablet Take 0.5 tablets (12.5 mg total) by mouth once daily. 45 tablet 3    BD ALCOHOL SWABS PadM 1 each 2 (two) times daily.      BD VEO INSULIN SYRINGE UF 0.3 mL 31 gauge x 15/64" Syrg USE TWICE DAILY 200 Syringe 5    blood-glucose meter Misc 1 each by Misc.(Non-Drug; Combo Route) route once daily. 1 each 0    calcium carbonate-vitamin D3 (CALTRATE 600 + D) 600 mg(1,500mg) -400 unit Chew 1 tablet once daily.       cyanocobalamin (VITAMIN B-12) 1000 MCG tablet Take 100 mcg by mouth once daily.      famotidine (PEPCID) 20 MG tablet TAKE 1 TABLET (20 MG " "TOTAL) BY MOUTH EVERY EVENING. 90 tablet 3    FOLIC ACID/MULTIVIT-MIN/LUTEIN (CENTRUM SILVER ORAL) Take 1 tablet by mouth once daily.      glimepiride (AMARYL) 4 MG tablet TAKE 1 TABLET EVERY DAY WITH BREAKFAST 90 tablet 3    hydroCHLOROthiazide (HYDRODIURIL) 25 MG tablet TAKE 1 TABLET (25 MG TOTAL) BY MOUTH ONCE DAILY. 90 tablet 3    ibuprofen (ADVIL,MOTRIN) 200 MG tablet Take 200 mg by mouth every 6 (six) hours as needed for Pain.      insulin NPH-insulin regular, 70/30, (NOVOLIN 70/30) 100 unit/mL (70-30) injection RELION BRAND 26 units thirty minutes before breakfast and 12 units thirty minutes before dinner. (Patient taking differently: 2 (two) times daily. RELION BRAND 26 units thirty minutes before breakfast and 12 units thirty minutes before dinner.) 10 mL 11    insulin regular 100 unit/mL Inj injection RELION brand  Inject 8 units with lunch and 9 units with dinner.      insulin syringe-needle U-100 (BD INSULIN SYRINGE ULTRA-FINE) 1 mL 31 gauge x 5/16 Syrg To use 2 times daily with insulin 90 each 11    omeprazole (PRILOSEC) 40 MG capsule TAKE 1 CAPSULE (40 MG TOTAL) BY MOUTH EVERY MORNING. 90 capsule 3    pen needle, diabetic (BD ULTRA-FINE SUSIE PEN NEEDLE) 32 gauge x 5/32" Ndle USE  TO  INJECT  Weekly 90 each 3    potassium chloride (KLOR-CON) 10 MEQ TbSR Take 1 tablet (10 mEq total) by mouth once daily. 90 tablet 3    pravastatin (PRAVACHOL) 40 MG tablet TAKE 1 TABLET NIGHTLY. 90 tablet 3    pregabalin (LYRICA) 75 MG capsule TAKE 1 CAPSULE TWICE DAILY THEREAFTER 180 capsule 1    semaglutide (OZEMPIC) 0.25 mg or 0.5 mg(2 mg/1.5 mL) PnIj Inject 0.25 mg into the skin every 7 days. Take 0.25 mg for 4 weeks, then increase to 0.5 mg weekly 2 Syringe 11     No current facility-administered medications on file prior to visit.        Social History:   Social History     Socioeconomic History    Marital status:      Spouse name: Not on file    Number of children: Not on file    Years of " education: Not on file    Highest education level: Not on file   Occupational History    Occupation: Retired   Social Needs    Financial resource strain: Not on file    Food insecurity     Worry: Not on file     Inability: Not on file    Transportation needs     Medical: Not on file     Non-medical: Not on file   Tobacco Use    Smoking status: Never Smoker    Smokeless tobacco: Never Used   Substance and Sexual Activity    Alcohol use: No    Drug use: No    Sexual activity: Not Currently   Lifestyle    Physical activity     Days per week: Not on file     Minutes per session: Not on file    Stress: Not on file   Relationships    Social connections     Talks on phone: Not on file     Gets together: Not on file     Attends Orthodox service: Not on file     Active member of club or organization: Not on file     Attends meetings of clubs or organizations: Not on file     Relationship status: Not on file   Other Topics Concern    Are you pregnant or think you may be? Not Asked    Breast-feeding Not Asked   Social History Narrative    No assistance w/ ADLs. 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 Lives with  at home. 3 children who live nearby.        REVIEW OF SYSTEMS:  Constitution: Negative. Negative for chills, fever and night sweats.   Cardiovascular: Negative for chest pain and syncope.   Respiratory: Negative for cough and shortness of breath.   Gastrointestinal: See HPI. Negative for nausea/vomiting. Negative for abdominal pain.  Genitourinary: See HPI. Negative for discoloration or dysuria.  Skin: Negative for dry skin, itching and rash.   Hematologic/Lymphatic: Negative for bleeding problem. Does not bruise/bleed easily.   Musculoskeletal: Negative for falls and muscle weakness.   Neurological: See HPI. No seizures.   Endocrine: Negative for polydipsia, polyphagia and polyuria.   Allergic/Immunologic: Negative for hives and  "persistent infections.     EXAM:  Ht 5' 2" (1.575 m)   Wt 84.6 kg (186 lb 8.2 oz)   LMP  (LMP Unknown) Comment: Partial  BMI 34.11 kg/m²     General: The patient is a very pleasant 75 y.o. female in no apparent distress, the patient is oriented to person, place and time.  Psych: Normal mood and affect  HEENT: Vision grossly intact, hearing intact to the spoken word.  Lungs: Respirations unlabored.  Gait: Antalgic station and gait, no difficulty with toe or heel walk.   Skin: Dorsal lumbar skin negative for rashes, lesions, hairy patches and surgical scars. There is minimal lumbar tenderness to palpation.  Range of motion: Lumbar range of motion is acceptable.  Spinal Balance: Global saggital and coronal spinal balance acceptable, not significant for scoliosis and kyphosis.  Musculoskeletal: No pain with the range of motion of the bilateral hips. No trochanteric tenderness to palpation.  Vascular: Bilateral lower extremities warm and well perfused, dorsalis pedis pulses 2+ bilaterally.  Neurological: Normal strength and tone in all major motor groups in the bilateral lower extremities. Normal sensation to light touch in the L2-S1 dermatomes bilaterally.  Deep tendon reflexes symmetric 1+ in the bilateral lower extremities.  Negative Babinski bilaterally. Straight leg raise negative bilaterally.    IMAGING:      Today I personally reviewed AP, Lat and Flex/Ex  upright L-spine films that demonstrate grade I anterolisthesis at L4/5.      Body mass index is 34.11 kg/m².    Hemoglobin A1C   Date Value Ref Range Status   05/27/2020 8.1 (H) 4.0 - 5.6 % Final     Comment:     ADA Screening Guidelines:  5.7-6.4%  Consistent with prediabetes  >or=6.5%  Consistent with diabetes  High levels of fetal hemoglobin interfere with the HbA1C  assay. Heterozygous hemoglobin variants (HbS, HgC, etc)do  not significantly interfere with this assay.   However, presence of multiple variants may affect accuracy.     04/14/2020 8.2 (H) 4.0 " - 5.6 % Final     Comment:     ADA Screening Guidelines:  5.7-6.4%  Consistent with prediabetes  >or=6.5%  Consistent with diabetes  High levels of fetal hemoglobin interfere with the HbA1C  assay. Heterozygous hemoglobin variants (HbS, HgC, etc)do  not significantly interfere with this assay.   However, presence of multiple variants may affect accuracy.     02/13/2020 7.8 (H) 4.0 - 5.6 % Final     Comment:     ADA Screening Guidelines:  5.7-6.4%  Consistent with prediabetes  >or=6.5%  Consistent with diabetes  High levels of fetal hemoglobin interfere with the HbA1C  assay. Heterozygous hemoglobin variants (HbS, HgC, etc)do  not significantly interfere with this assay.   However, presence of multiple variants may affect accuracy.             ASSESSMENT/PLAN:    Alisia was seen today for low-back pain and hip pain.    Diagnoses and all orders for this visit:    Spondylolisthesis of lumbar region        Today we discussed at length all of the different treatment options including anti-inflammatories, acetaminophen, rest, ice, heat, physical therapy including strengthening and stretching exercises, home exercises, ROM, aerobic conditioning, aqua therapy, other modalities including ultrasound, massage, and dry needling, epidural steroid injections and finally surgical intervention.      The patient's pain is usually controlled by regular exercise but she is not able to go to the gym right now because of COVID.  She says PT was too expensive in the past.  She will try taking tylenol as needed.  She will call her insurance to see how much PT would cost her and let me know if she decides to go.

## 2020-08-17 ENCOUNTER — CLINICAL SUPPORT (OUTPATIENT)
Dept: REHABILITATION | Facility: HOSPITAL | Age: 75
End: 2020-08-17
Attending: ORTHOPAEDIC SURGERY
Payer: MEDICARE

## 2020-08-17 DIAGNOSIS — M54.50 LUMBAR PAIN: ICD-10-CM

## 2020-08-17 DIAGNOSIS — R26.9 ABNORMALITY OF GAIT AND MOBILITY: ICD-10-CM

## 2020-08-17 DIAGNOSIS — R29.898 WEAKNESS OF BOTH LOWER EXTREMITIES: ICD-10-CM

## 2020-08-17 DIAGNOSIS — M43.10 SPONDYLOLISTHESIS, UNSPECIFIED SPINAL REGION: ICD-10-CM

## 2020-08-17 PROCEDURE — 97161 PT EVAL LOW COMPLEX 20 MIN: CPT | Mod: HCNC

## 2020-08-17 NOTE — PLAN OF CARE
[facility]  Physical Therapy Initial Evaluation    Name: Alisia CARPENTER Satmatt  [facility] Number: [id number]    Therapy Diagnosis:   Encounter Diagnoses   Name Primary?    Spondylolisthesis, unspecified spinal region     Abnormality of gait and mobility     Lumbar pain     Weakness of both lower extremities      Physician: Harrison Jacome MD    Physician Orders: PT Eval and Treat   Medical Diagnosis from Referral: M43.10 (ICD-10-CM) - Spondylolisthesis, unspecified spinal region  Evaluation Date: 8/17/2020  Authorization Period Expiration: 9/17/2020  Plan of Care Expiration: 11/17/2020  Visit # / Visits authorized: 1/ 1  FOTO: 1/5      Visit:  88  Total: 88    Time In: 12:15am  Time Out: 1:00pm  Total Billable Time: 45 minutes     Precautions: Standard, Diabetes and h/o breast CA, osteopenia    Subjective   Date of onset: 8/13/2020  History of current condition - Alisia reports: central lumbar pain for a long while, but has worsened in the past 4 months. She used to get relief with aqua aerobics and water walking. She was also doing silver sneakers twice weekly. She hasn't been able to return to the pool  or group exercise due to social distancing restrictions.  Her pain radiated to L hip with burning sensation. She was using a SPC 2 months ago due to the pain, but does not use it anymore.      Medical History:   Past Medical History:   Diagnosis Date    Adult bronchiectasis 7/26/2017    Allergy     Anemia     Arthritis     Asthma     Breast cancer 1993    left w/ radiation     Cancer 1993    L eft breast    Cataract     Chronic cervical radiculopathy 9/20/2012    Diabetes mellitus     Diabetes mellitus type II     Diabetes with neurologic complications     Diverticulosis     Dry eyes     Dysphagia     after knee surgery June 2014    GERD (gastroesophageal reflux disease)     Hyperlipidemia     Hypertension     Neuropathy     feet    Scalp tenderness     Shortness of breath      Ulcer        Surgical History:   Alisia Gusman  has a past surgical history that includes Cholecystectomy; Carpal tunnel release (Bilateral, 2011); uvuloplasty; Hysterectomy; Joint replacement (Left, June 2014); Cataract extraction w/  intraocular lens implant (Bilateral, 2013 and 2014); Colonoscopy (N/A, 11/6/2015); Eye surgery; Colonoscopy (N/A, 5/8/2019); Breast biopsy (Left, 1993); Breast lumpectomy (Left, 1993); Unilateral mastectomy (Left, 2/20/2020); Injection for sentinel node identification (Left, 2/20/2020); and Huntington lymph node biopsy (Left, 2/20/2020).    Medications:   Alisia has a current medication list which includes the following prescription(s): accu-chek shakila plus test strp, accu-chek softclix lancets, anastrozole, aspirin, atenolol, bd alcohol swabs, bd veo insulin syringe uf, blood-glucose meter, calcium carbonate-vitamin d3, cyanocobalamin, famotidine, folic acid/multivit-min/lutein, glimepiride, hydrochlorothiazide, ibuprofen, insulin nph-insulin regular (70/30), insulin regular, insulin syringe-needle u-100, omeprazole, pen needle, diabetic, potassium chloride, pravastatin, pregabalin, and semaglutide.    Allergies:   Review of patient's allergies indicates:   Allergen Reactions    Grass pollen-june grass standard Other (See Comments)     Causes sinus symptoms like coughing    Losartan      cough    Nubain [nalbuphine] Other (See Comments)     Other reaction(s): Hypotension    Sinus & allergy [chlorpheniramine-phenylephrine]         Imaging, Xray: Two views: There is grade 1 anterolisthesis of L4 on L5 and there is mild DJD and DISH.  No fracture dislocation bone destruction or instability seen    Prior Therapy: PT prior   Social History:  lives with their spouse  Occupation: n/a  Prior Level of Function: (I)  Current Level of Function: (I)    Pain:  Current 0/10, worst 1/10, best 0/10   Location: left lumbar & hip    Description: Aching and Burning  Aggravating Factors: Walking,  "getting up from chair   Easing Factors: ice    Pts goals: to get back to exercising     Objective     POSTURE  Posture Alignment :slouched posture  Postural examination/scapula alignment: Slouched posture and Decreased lordosis  Sitting: Fair  Standing: Fair       Hip Right MMT Left MMT Pain/Dysfunction with Movement (pain=!)   AROM        flexion  WFL 3+/5  WFL 3+/5    extension  WFL   WFL  Able to perform good bridge against gravity without pain    abduction  WFL 3+/5  WFL 3+/5    Internal rotation  WFL 3+/5  WFL 3+/5    External rotation  WFL 3+/5  WFL 3+/5      Knee Right MMT Left MMT Pain/Dysfunction with Movement (pain=!)   AROM        flexion  WFL 3+/5  WFL 4-/5    extension  WFL 4-/5  WFL 4-/5      Ankle Right MMT Left MMT Pain/Dysfunction with Movement (pain=!)   AROM        dorsiflexion  WFL 3/5  WFL 3/5          Gait Analysis:Without AD Deviations: antalgic, shuffled gait, decreased B stride length and toe clearance      30 second sit-to-stand test (without U/E support): 7 ( w/o UE support)  30" sit to stand Cutoff Scores:13          CMS Impairment/Limitation/Restriction for FOTO Lumbar SPine Survey    Therapist reviewed FOTO scores for Alisia Gusman on 8/17/2020.   FOTO documents entered into [employer] - see Media section.    Limitation Score: 43%  Category: Mobility           TREATMENT     Total Treatment time separate from Evaluation: 0 minutes    Alisia received therapeutic exercises to develop strength, endurance, ROM, flexibility, posture and core stabilization for 0 minutes including:  Bridge  Seated HS stretch  Clamshells  LTR          Home Exercises Provided and Patient Education Provided     Education provided:   - purpose of PT  -HEP    Written Home Exercises Provided: yes.  Exercises were reviewed and Alisia was able to demonstrate them prior to the end of the session.  Alisia demonstrated good  understanding of the education provided.     See EMR under Patient Instructions for exercises provided " 8/17/2020.      Assessment   Alisia is a 75 y.o. female referred to outpatient Physical Therapy with a medical diagnosis of spondylolisthesis. Pt presents with core and B hip weakness, impaired gait and balance and impaired functional endurance with lumbar pain affecting her functional mobility.     Pt prognosis is Good.   Pt will benefit from skilled outpatient Physical Therapy to address the deficits stated above and in the chart below, provide pt/family education, and to maximize pt's level of independence.     Plan of care discussed with patient: Yes  Pt's spiritual, cultural and educational needs considered and patient is agreeable to the plan of care and goals as stated below:     Anticipated Barriers for therapy: chronicity of pain, co morbidities    Medical Necessity is demonstrated by the following  History  Co-morbidities and personal factors that may impact the plan of care Co-morbidities:   CKD stage II, diabetes, history of cancer, HTN and osteopenia    Personal Factors:   age  lifestyle     high   Examination  Body Structures and Functions, activity limitations and participation restrictions that may impact the plan of care Body Regions:   back  lower extremities  trunk    Body Systems:    gross symmetry  ROM  strength  gross coordinated movement  balance  gait  transitions  blood pressure  edema    Participation Restrictions:   none    Activity limitations:   Learning and applying knowledge  no deficits    General Tasks and Commands  no deficits    Communication  no deficits    Mobility  walking  moving around using equipment (WC)  driving (bike, car, motorcycle)    Self care  washing oneself (bathing, drying, washing hands)  caring for body parts (brushing teeth, shaving, grooming)  dressing  looking after one's health    Domestic Life  shopping  cooking  doing house work (cleaning house, washing dishes, laundry)    Interactions/Relationships  no deficits    Life Areas  no deficits    Community and  Social Life  recreation and leisure         high   Clinical Presentation stable and uncomplicated low   Decision Making/ Complexity Score: low     Goals:  Short Term Goals: 6 visits  1. Pt will be compliant /c HEP to supplement PT in order to improve functional tasks  2. Pt will improve B ankle DF MMTs to 4-/5 grossly to improve strength for functional gait & reduced fall risk    Long Term Goals: 12 visits   1. Pt will improve B hips MMTs to 4-/5 grossly to improve strength for functional activities  2. Pt will improve FOTO score to </= 39% limitation to reduce perceived pain with mobility   3. Pt will improve 30 sec sit<>Stand test to at least 10 reps for increased BLE strength and endurance for functional activities    Plan   Plan of care Certification: 8/17/2020 to 11/17/2020    Outpatient Physical Therapy 1 times weekly for 12 visits to include the following interventions: Gait Training, Manual Therapy, Moist Heat/ Ice, Neuromuscular Re-ed, Patient Education, Therapeutic Activites and Therapeutic Exercise, ASTYM, Kinesiotaping PRN, Functional Dry Needling & modalities PRN    Selena Cavazos, PT, DPT

## 2020-08-20 DIAGNOSIS — E11.42 DIABETIC PERIPHERAL NEUROPATHY ASSOCIATED WITH TYPE 2 DIABETES MELLITUS: ICD-10-CM

## 2020-08-20 RX ORDER — PREGABALIN 75 MG/1
CAPSULE ORAL
Qty: 180 CAPSULE | Refills: 1 | Status: SHIPPED | OUTPATIENT
Start: 2020-08-20 | End: 2021-02-01 | Stop reason: SDUPTHER

## 2020-08-20 NOTE — TELEPHONE ENCOUNTER
----- Message from Lena Mcallister sent at 8/20/2020 11:37 AM CDT -----  Regarding: Rx  Contact: Patient @ [phone]  Requesting an RX refill or new RX.  Is this a refill or new RX:    RX name and strength: pregabalin (LYRICA) 75 MG capsule  Directions (copy/paste from chart):    Is this a 30 day or 90 day RX:  90 days  Local pharmacy or mail order pharmacy:  Mail  Pharmacy name and phone # [facility] - [place], [place] - [address]  Comments:  [phone] to call to order [facility]

## 2020-08-27 ENCOUNTER — TELEPHONE (OUTPATIENT)
Dept: OBSTETRICS AND GYNECOLOGY | Facility: CLINIC | Age: 75
End: 2020-08-27

## 2020-08-27 NOTE — TELEPHONE ENCOUNTER
Left detailed message for patient. Patient was offered a survivorship appointment with DEANGELO Connors to discuss cancer care plan options.

## 2020-09-01 NOTE — PROGRESS NOTES
"    Physical Therapy Daily Treatment Note     Name: Alisia Gusman  Clinic Number: [id number]    Therapy Diagnosis:   Encounter Diagnoses   Name Primary?    Abnormality of gait and mobility     Lumbar pain     Weakness of both lower extremities      Physician: Harrison Jacome MD    Visit Date: 9/2/2020    Physician Orders: PT Eval and Treat   Medical Diagnosis from Referral: M43.10 (ICD-10-CM) - Spondylolisthesis, unspecified spinal region  Evaluation Date: 8/17/2020  Authorization Period Expiration: 9/17/2020  Plan of Care Expiration: 11/17/2020  Visit # / Visits authorized: 1/ 1, 1/tbd  FOTO: 2/5        Visit:  91  Total: 179     Time In: 1:00pm  Time Out: 1:45pm  Total Billable Time: 45 minutes      Precautions: Standard, Diabetes and h/o breast CA, osteopenia      Subjective     Pt reports: her back started really hurting this morning and is radiating to her L hip. She had no pain with HEP  She was compliant with home exercise program.  Response to previous treatment: last session was initial eval   Functional change: none    Pain: 7/10  Location: left back  and hip      Objective       Alisia received therapeutic exercises to develop strength, endurance, ROM and flexibility for 45 minutes including:    Recumbent bike 5'   Bridge 5" x10  Seated HS stretch 30" x3 B  Clamshells 3" x10 B  LTR 5" x10  Piriformis stretch 30" x2 B       Home Exercises Provided and Patient Education Provided     Education provided:   - HEP    Written Home Exercises Provided: yes.  Exercises were reviewed and Alisia was able to demonstrate them prior to the end of the session.  Alisia demonstrated good  understanding of the education provided.     See EMR under Patient Instructions for exercises provided 9/2/2020.      Assessment     Pt required max verbal/tactile cues for correct technique with therex today. She reported mild L hip pain during bridge and LTR, but was able to tolerate. Pt reported feeling "looser" after bike and better " overall after session. Pt demonstrated less antalgic gait after session.     Alisia is progressing well towards her goals.   Pt prognosis is Good.     Pt will continue to benefit from skilled outpatient physical therapy to address the deficits listed in the problem list box on initial evaluation, provide pt/family education and to maximize pt's level of independence in the home and community environment.     Pt's spiritual, cultural and educational needs considered and pt agreeable to plan of care and goals.    Anticipated barriers to physical therapy: chronicity of pain, co morbidities       Goals: Short Term Goals: 6 visits  1. Pt will be compliant /c HEP to supplement PT in order to improve functional tasks  2. Pt will improve B ankle DF MMTs to 4-/5 grossly to improve strength for functional gait & reduced fall risk     Long Term Goals: 12 visits   1. Pt will improve B hips MMTs to 4-/5 grossly to improve strength for functional activities  2. Pt will improve FOTO score to </= 39% limitation to reduce perceived pain with mobility   3. Pt will improve 30 sec sit<>Stand test to at least 10 reps for increased BLE strength and endurance for functional activities      Plan     Continue with established plan of care towards PT goals.       Selena Cavazos, PT

## 2020-09-02 ENCOUNTER — CLINICAL SUPPORT (OUTPATIENT)
Dept: REHABILITATION | Facility: HOSPITAL | Age: 75
End: 2020-09-02
Attending: ORTHOPAEDIC SURGERY
Payer: MEDICARE

## 2020-09-02 DIAGNOSIS — R26.9 ABNORMALITY OF GAIT AND MOBILITY: ICD-10-CM

## 2020-09-02 DIAGNOSIS — R29.898 WEAKNESS OF BOTH LOWER EXTREMITIES: ICD-10-CM

## 2020-09-02 DIAGNOSIS — M54.50 LUMBAR PAIN: ICD-10-CM

## 2020-09-02 PROCEDURE — 97110 THERAPEUTIC EXERCISES: CPT | Mod: HCNC

## 2020-09-03 ENCOUNTER — OFFICE VISIT (OUTPATIENT)
Dept: URGENT CARE | Facility: CLINIC | Age: 75
End: 2020-09-03
Payer: MEDICARE

## 2020-09-03 VITALS
RESPIRATION RATE: 18 BRPM | TEMPERATURE: 100 F | HEIGHT: 62 IN | DIASTOLIC BLOOD PRESSURE: 72 MMHG | HEART RATE: 115 BPM | BODY MASS INDEX: 34.23 KG/M2 | SYSTOLIC BLOOD PRESSURE: 142 MMHG | OXYGEN SATURATION: 98 % | WEIGHT: 186 LBS

## 2020-09-03 DIAGNOSIS — G89.29 CHRONIC LEFT-SIDED LOW BACK PAIN WITH LEFT-SIDED SCIATICA: Primary | ICD-10-CM

## 2020-09-03 DIAGNOSIS — M54.42 CHRONIC LEFT-SIDED LOW BACK PAIN WITH LEFT-SIDED SCIATICA: Primary | ICD-10-CM

## 2020-09-03 PROCEDURE — 96372 PR INJECTION,THERAP/PROPH/DIAG2ST, IM OR SUBCUT: ICD-10-PCS | Mod: S$GLB,,, | Performed by: NURSE PRACTITIONER

## 2020-09-03 PROCEDURE — 96372 THER/PROPH/DIAG INJ SC/IM: CPT | Mod: S$GLB,,, | Performed by: NURSE PRACTITIONER

## 2020-09-03 PROCEDURE — 99214 OFFICE O/P EST MOD 30 MIN: CPT | Mod: 25,S$GLB,, | Performed by: NURSE PRACTITIONER

## 2020-09-03 PROCEDURE — 99214 PR OFFICE/OUTPT VISIT, EST, LEVL IV, 30-39 MIN: ICD-10-PCS | Mod: 25,S$GLB,, | Performed by: NURSE PRACTITIONER

## 2020-09-03 RX ORDER — KETOROLAC TROMETHAMINE 30 MG/ML
30 INJECTION, SOLUTION INTRAMUSCULAR; INTRAVENOUS
Status: COMPLETED | OUTPATIENT
Start: 2020-09-03 | End: 2020-09-03

## 2020-09-03 RX ADMIN — KETOROLAC TROMETHAMINE 30 MG: 30 INJECTION, SOLUTION INTRAMUSCULAR; INTRAVENOUS at 03:09

## 2020-09-03 NOTE — PROGRESS NOTES
"Subjective:       Patient ID: Alisia Gusman is a 75 y.o. female.    Vitals:  height is 5' 2" (1.575 m) and weight is 84.4 kg (186 lb). Her temperature is 99.5 °F (37.5 °C). Her blood pressure is 142/72 (abnormal) and her pulse is 115 (abnormal). Her respiration is 18 and oxygen saturation is 98%.     Chief Complaint: Back Pain    Back Pain  This is a recurrent problem. The current episode started yesterday. The problem occurs constantly. The problem is unchanged. The pain is present in the sacro-iliac. The quality of the pain is described as burning, shooting and stabbing. The pain radiates to the left thigh. The pain is at a severity of 5/10. The pain is moderate. The pain is the same all the time. The symptoms are aggravated by position. Stiffness is present all day. Pertinent negatives include no abdominal pain, bladder incontinence, bowel incontinence, dysuria or numbness. She has tried ice for the symptoms. The treatment provided mild relief.       Constitution: Negative for fatigue.   Gastrointestinal: Negative for abdominal pain and bowel incontinence.   Genitourinary: Negative for dysuria, urgency, bladder incontinence and hematuria.   Musculoskeletal: Positive for pain and back pain. Negative for muscle cramps and history of spine disorder.   Skin: Negative for rash.   Neurological: Negative for coordination disturbances, numbness and tingling.       Objective:      Physical Exam   Musculoskeletal:      Lumbar back: She exhibits decreased range of motion and pain. She exhibits no bony tenderness and no swelling.        Back:          Assessment:       1. Chronic left-sided low back pain with left-sided sciatica        Plan:         Patient is currently in physical therapy. Patient saw ortho on 8/12 for the same back pain and hip pain. Patient to follow up with ortho or pain management.     Patient fell in parking lot. Did not hit her head. No LOC. Refused to come back in for re evaluation.     Chronic " left-sided low back pain with left-sided sciatica      Patient Instructions   Alternate heat and ice for comfort.     Take tylenol as directed for pain.     Continue physical therapy as directed by orthopedics.     Patient to follow up with Ortho or pain management if symptoms persist or worsen.     You must understand that you've received an Urgent Care treatment only and that you may be released before all your medical problems are known or treated. You, the patient, will arrange for follow up care as instructed.  Follow up with your PCP or specialty clinic as directed in the next 1-2 weeks if not improved or as needed.  You can call [phone] to schedule an appointment with the appropriate provider.  If your condition worsens we recommend that you receive another evaluation at the emergency room immediately or contact your primary medical clinics after hours call service to discuss your concerns.  Please return here or go to the Emergency Department for any concerns or worsening of condition.      Back Pain (Acute or Chronic)    Back pain is one of the most common problems. The good news is that most people feel better in 1 to 2 weeks, and most of the rest in 1 to 2 months. Most people can remain active.  People experience and describe pain differently; not everyone is the same.  · The pain can be sharp, stabbing, shooting, aching, cramping or burning.  · Movement, standing, bending, lifting, sitting, or walking may worsen pain.  · It can be localized to one spot or area, or it can be more generalized.  · It can spread or radiate upwards, to the front, or go down your arms or legs (sciatica).  · It can cause muscle spasm.  Most of the time, mechanical problems with the muscles or spine cause the pain. Mechanical problems are usually caused by an injury to the muscles or ligaments. While illness can cause back pain, it is usually not caused by a serious illness. Mechanical problems include:   · Physical  activity such as sports, exercise, work, or normal activity  · Overexertion, lifting, pushing, pulling incorrectly or too aggressively  · Sudden twisting, bending, or stretching from an accident, or accidental movement  · Poor posture  · Stretching or moving wrong, without noticing pain at the time  · Poor coordination, lack of regular exercise (check with your doctor about this)  · Spinal disc disease or arthritis  · Stress  Pain can also be related to pregnancy, or illness like appendicitis, bladder or kidney infections, pelvic infections, and many other things.  Acute back pain usually gets better in 1 to 2 weeks. Back pain related to disk disease, arthritis in the spinal joints or spinal stenosis (narrowing of the spinal canal) can become chronic and last for months or years.  Unless you had a physical injury (for example, a car accident or fall) X-rays are usually not needed for the initial evaluation of back pain. If pain continues and does not respond to medical treatment, X-rays and other tests may be needed.  Home care  Try these home care recommendations:  · When in bed, try to find a position of comfort. A firm mattress is best. Try lying flat on your back with pillows under your knees. You can also try lying on your side with your knees bent up towards your chest and a pillow between your knees.  · At first, do not try to stretch out the sore spots. If there is a strain, it is not like the good soreness you get after exercising without an injury. In this case, stretching may make it worse.  · Avoid prolong sitting, long car rides, or travel. This puts more stress on the lower back than standing or walking.  · During the first 24 to 72 hours after an acute injury or flare up of chronic back pain, apply an ice pack to the painful area for 20 minutes and then remove it for 20 minutes. Do this over a period of 60 to 90 minutes or several times a day. This will reduce swelling and pain. Wrap the ice pack in a  thin towel or plastic to protect your skin.  · You can start with ice, then switch to heat. Heat (hot shower, hot bath, or heating pad) reduces pain and works well for muscle spasms. Heat can be applied to the painful area for 20 minutes then remove it for 20 minutes. Do this over a period of 60 to 90 minutes or several times a day. Do not sleep on a heating pad. It can lead to skin burns or tissue damage.  · You can alternate ice and heat therapy. Talk with your doctor about the best treatment for your back pain.  · Therapeutic massage can help relax the back muscles without stretching them.  · Be aware of safe lifting methods and do not lift anything without stretching first.  Medicines  Talk to your doctor before using medicine, especially if you have other medical problems or are taking other medicines.  · You may use over-the-counter medicine as directed on the bottle to control pain, unless another pain medicine was prescribed. If you have chronic conditions like diabetes, liver or kidney disease, stomach ulcers, or gastrointestinal bleeding, or are taking blood thinners, talk to your doctor before taking any medicine.  · Be careful if you are given a prescription medicines, narcotics, or medicine for muscle spasms. They can cause drowsiness, affect your coordination, reflexes, and judgement. Do not drive or operate heavy machinery.  Follow-up care  Follow up with your healthcare provider, or as advised.   A radiologist will review any X-rays that were taken. Your provide will notify you of any new findings that may affect your care.  Call 911  Call emergency services if any of the following occur:  · Trouble breathing  · Confusion  · Very drowsy or trouble awakening  · Fainting or loss of consciousness  · Rapid or very slow heart rate  · Loss of bowel or bladder control  When to seek medical advice  Call your healthcare provider right away if any of these occur:   · Pain becomes worse or spreads to your  legs  · Weakness or numbness in one or both legs  · Numbness in the groin or genital area  Date Last Reviewed: 7/1/2016  © 0605-9780 [employer]. [address] [place] [address], [place] [address]. All rights reserved. This information is not intended as a substitute for professional medical care. Always follow your healthcare professional's instructions.

## 2020-09-03 NOTE — PATIENT INSTRUCTIONS
Alternate heat and ice for comfort.     Take tylenol as directed for pain.     Continue physical therapy as directed by orthopedics.     Patient to follow up with Ortho or pain management if symptoms persist or worsen.     You must understand that you've received an Urgent Care treatment only and that you may be released before all your medical problems are known or treated. You, the patient, will arrange for follow up care as instructed.  Follow up with your PCP or specialty clinic as directed in the next 1-2 weeks if not improved or as needed.  You can call [phone] to schedule an appointment with the appropriate provider.  If your condition worsens we recommend that you receive another evaluation at the emergency room immediately or contact your primary medical clinics after hours call service to discuss your concerns.  Please return here or go to the Emergency Department for any concerns or worsening of condition.      Back Pain (Acute or Chronic)    Back pain is one of the most common problems. The good news is that most people feel better in 1 to 2 weeks, and most of the rest in 1 to 2 months. Most people can remain active.  People experience and describe pain differently; not everyone is the same.  · The pain can be sharp, stabbing, shooting, aching, cramping or burning.  · Movement, standing, bending, lifting, sitting, or walking may worsen pain.  · It can be localized to one spot or area, or it can be more generalized.  · It can spread or radiate upwards, to the front, or go down your arms or legs (sciatica).  · It can cause muscle spasm.  Most of the time, mechanical problems with the muscles or spine cause the pain. Mechanical problems are usually caused by an injury to the muscles or ligaments. While illness can cause back pain, it is usually not caused by a serious illness. Mechanical problems include:   · Physical activity such as sports, exercise, work, or normal activity  · Overexertion,  lifting, pushing, pulling incorrectly or too aggressively  · Sudden twisting, bending, or stretching from an accident, or accidental movement  · Poor posture  · Stretching or moving wrong, without noticing pain at the time  · Poor coordination, lack of regular exercise (check with your doctor about this)  · Spinal disc disease or arthritis  · Stress  Pain can also be related to pregnancy, or illness like appendicitis, bladder or kidney infections, pelvic infections, and many other things.  Acute back pain usually gets better in 1 to 2 weeks. Back pain related to disk disease, arthritis in the spinal joints or spinal stenosis (narrowing of the spinal canal) can become chronic and last for months or years.  Unless you had a physical injury (for example, a car accident or fall) X-rays are usually not needed for the initial evaluation of back pain. If pain continues and does not respond to medical treatment, X-rays and other tests may be needed.  Home care  Try these home care recommendations:  · When in bed, try to find a position of comfort. A firm mattress is best. Try lying flat on your back with pillows under your knees. You can also try lying on your side with your knees bent up towards your chest and a pillow between your knees.  · At first, do not try to stretch out the sore spots. If there is a strain, it is not like the good soreness you get after exercising without an injury. In this case, stretching may make it worse.  · Avoid prolong sitting, long car rides, or travel. This puts more stress on the lower back than standing or walking.  · During the first 24 to 72 hours after an acute injury or flare up of chronic back pain, apply an ice pack to the painful area for 20 minutes and then remove it for 20 minutes. Do this over a period of 60 to 90 minutes or several times a day. This will reduce swelling and pain. Wrap the ice pack in a thin towel or plastic to protect your skin.  · You can start with ice, then  switch to heat. Heat (hot shower, hot bath, or heating pad) reduces pain and works well for muscle spasms. Heat can be applied to the painful area for 20 minutes then remove it for 20 minutes. Do this over a period of 60 to 90 minutes or several times a day. Do not sleep on a heating pad. It can lead to skin burns or tissue damage.  · You can alternate ice and heat therapy. Talk with your doctor about the best treatment for your back pain.  · Therapeutic massage can help relax the back muscles without stretching them.  · Be aware of safe lifting methods and do not lift anything without stretching first.  Medicines  Talk to your doctor before using medicine, especially if you have other medical problems or are taking other medicines.  · You may use over-the-counter medicine as directed on the bottle to control pain, unless another pain medicine was prescribed. If you have chronic conditions like diabetes, liver or kidney disease, stomach ulcers, or gastrointestinal bleeding, or are taking blood thinners, talk to your doctor before taking any medicine.  · Be careful if you are given a prescription medicines, narcotics, or medicine for muscle spasms. They can cause drowsiness, affect your coordination, reflexes, and judgement. Do not drive or operate heavy machinery.  Follow-up care  Follow up with your healthcare provider, or as advised.   A radiologist will review any X-rays that were taken. Your provide will notify you of any new findings that may affect your care.  Call 911  Call emergency services if any of the following occur:  · Trouble breathing  · Confusion  · Very drowsy or trouble awakening  · Fainting or loss of consciousness  · Rapid or very slow heart rate  · Loss of bowel or bladder control  When to seek medical advice  Call your healthcare provider right away if any of these occur:   · Pain becomes worse or spreads to your legs  · Weakness or numbness in one or both legs  · Numbness in the groin or  genital area  Date Last Reviewed: 7/1/2016  © 0217-6066 [employer]. [address] [place] [address], [place] [address]. All rights reserved. This information is not intended as a substitute for professional medical care. Always follow your healthcare professional's instructions.

## 2020-09-08 ENCOUNTER — PATIENT OUTREACH (OUTPATIENT)
Dept: ADMINISTRATIVE | Facility: OTHER | Age: 75
End: 2020-09-08

## 2020-09-08 NOTE — PROGRESS NOTES
LINKS immunization registry updated  Care Everywhere updated  Health Maintenance updated  Chart reviewed for overdue Proactive [facility] Encounters ([facility]) health maintenance testing (CRS, Breast Ca, Diabetic Eye Exam)   Orders entered:N/A

## 2020-09-08 NOTE — PROGRESS NOTES
"REFERRING PHYSICIAN:                 Selena Montemayor MD     MEDICAL ONCOLOGIST:                 Dr. Olvera  RADIATION ONCOLOGIST:             HAZEL     DIAGNOSIS:    This is a 74 y.o. 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     TREATMENT SUMMARY:  The patient is status post left mastectomy and sentinel node biopsy on 2/20/20.  Final pathology showed Invasive Ductal Carcinoma.     INTERVAL HISTORY:   Alisia Gusman comes in for a follow-up. She denies fever, chills, chest pain or shortness of breath. Her pain is well controlled. She started on anastrozole April 2020. Back pain present long term but no SOB, no new bony pain, no HA or vision change.     MEDICATIONS:  Current Medications          Current Outpatient Medications   Medication Sig Dispense Refill    ACCU-CHEK DOMENICO PLUS TEST STRP Strp TEST THREE TIMES DAILY AS DIRECTED 300 strip 3    ACCU-CHEK SOFTCLIX LANCETS Misc 1 each by Misc.(Non-Drug; Combo Route) route 3 (three) times daily. 270 each 3    aspirin 81 MG chewable tablet Take 1 tablet by mouth At bedtime.        atenolol (TENORMIN) 25 MG tablet Take 0.5 tablets (12.5 mg total) by mouth once daily. 45 tablet 3    BD ALCOHOL SWABS PadM 1 each 2 (two) times daily.        BD ULTRA-FINE SUSIE PEN NEEDLES 32 gauge x 5/32" Ndle USE  TO  INJECT  ONE  TIME  DAILY 90 each 3    BD VEO INSULIN SYRINGE UF 0.3 mL 31 gauge x 15/64" Syrg USE TWICE DAILY 200 Syringe 5    blood-glucose meter Misc 1 each by Misc.(Non-Drug; Combo Route) route once daily. 1 each 0    calcium carbonate-vitamin D3 (CALTRATE 600 + D) 600 mg(1,500mg) -400 unit Chew 1 tablet once daily.         cyanocobalamin (VITAMIN B-12) 1000 MCG tablet Take 100 mcg by mouth once daily.        DERMA-SMOOTHE/FS SCALP OIL 0.01 % Oil Apply oil to damp scalp nightly and cover with shower cap. 1 Bottle 3    FOLIC ACID/MULTIVIT-MIN/LUTEIN (CENTRUM SILVER ORAL) Take 1 tablet by mouth once daily.        glimepiride " (AMARYL) 4 MG tablet TAKE 1 TABLET EVERY DAY WITH BREAKFAST 90 tablet 3    hydroCHLOROthiazide (HYDRODIURIL) 25 MG tablet Take 1 tablet (25 mg total) by mouth once daily. 90 tablet 3    HYDROcodone-acetaminophen (NORCO) 5-325 mg per tablet Take 1 tablet by mouth every 6 (six) hours as needed for Pain. 10 tablet 0    ibuprofen (ADVIL,MOTRIN) 200 MG tablet Take 200 mg by mouth every 6 (six) hours as needed for Pain.        insulin NPH-insulin regular, 70/30, (NOVOLIN 70/30) 100 unit/mL (70-30) injection RELION BRAND 26 units thirty minutes before breakfast and 12 units thirty minutes before dinner. (Patient taking differently: 2 (two) times daily. RELION BRAND 26 units thirty minutes before breakfast and 12 units thirty minutes before dinner.) 10 mL 11    insulin regular 100 unit/mL Inj injection RELION brand  Inject 8 units with lunch and 9 units with dinner.        insulin syringe-needle U-100 (BD INSULIN SYRINGE ULTRA-FINE) 1 mL 31 gauge x 5/16 Syrg To use 2 times daily with insulin 90 each 11    ketoconazole (NIZORAL) 2 % shampoo Wash hair with medicated shampoo at least 2x/week - let sit on scalp at least 5 minutes prior to rinsing 120 mL 5    omeprazole (PRILOSEC) 40 MG capsule Take 1 capsule (40 mg total) by mouth every morning. 90 capsule 3    potassium chloride (KLOR-CON) 10 MEQ TbSR Take 1 tablet (10 mEq total) by mouth once daily. 90 tablet 3    pravastatin (PRAVACHOL) 40 MG tablet TAKE 1 TABLET NIGHTLY. 90 tablet 3    pregabalin (LYRICA) 75 MG capsule TAKE 1 CAPSULE TWICE DAILY THEREAFTER 180 capsule 1    semaglutide (OZEMPIC) 0.25 mg or 0.5 mg(2 mg/1.5 mL) PnIj Inject 0.25 mg into the skin every 7 days. Take 0.25 mg for 4 weeks, then increase to 0.5 mg weekly 2 Syringe 11      No current facility-administered medications for this visit.             ALLERGIES:         Review of patient's allergies indicates:   Allergen Reactions    Grass pollen-tim grass standard Other (See Comments)        "Causes sinus symptoms like coughing    Losartan         cough    Nubain [nalbuphine] Other (See Comments)       Other reaction(s): Hypotension    Sinus & allergy [chlorpheniramine-phenylephrine]        Review of Systems   Constitutional: Negative for chills and fever.   HENT: Negative for hearing loss and nosebleeds.    Eyes: Negative for discharge and redness.   Respiratory: Negative for cough and wheezing.    Cardiovascular: Negative for chest pain and leg swelling.   Gastrointestinal: Negative for abdominal pain and vomiting.   Musculoskeletal: Negative for back pain and neck pain.   Skin: Negative for itching and rash.   Neurological: Negative for dizziness and speech change.   Psychiatric/Behavioral: Negative for hallucinations. The patient is not nervous/anxious.         PHYSICAL EXAMINATION:     BP (!) 144/66 (BP Location: Right arm, Patient Position: Sitting, BP Method: Medium (Automatic))   Pulse 89   Temp 98.7 °F (37.1 °C) (Oral)   Ht 5' 2" (1.575 m)   Wt 84.8 kg (187 lb)   LMP  (LMP Unknown) Comment: Partial  BMI 34.20 kg/m²     Physical Exam   Constitutional: She is oriented to person, place, and time. She appears well-developed and well-nourished.   HENT:   Head: Normocephalic and atraumatic.   Eyes: EOM are normal. Pupils are equal, round, and reactive to light. No scleral icterus.   Neck: Normal range of motion. Neck supple. No tracheal deviation present.   Cardiovascular: Normal rate and regular rhythm.    Pulmonary/Chest: Effort normal and breath sounds normal. No respiratory distress. Right breast exhibits no inverted nipple, no mass, no nipple discharge and no skin change. Left breast exhibits no inverted nipple, no mass, no nipple discharge and no skin change.       Abdominal: Soft. She exhibits no mass. There is no abdominal tenderness.   Musculoskeletal: Normal range of motion. No edema.   Lymphadenopathy:     She has no cervical adenopathy.   Neurological: She is alert and oriented to " person, place, and time.   Skin: No rash noted. No erythema.     Psychiatric: She has a normal mood and affect.          IMPRESSION:   74 y.o. female with a stage (r)pT1b (sn)pN0 M0 grade 2 ER + CA - HER2 - DCIS of the left breast.     PLAN:   1. return in June 2021 for a follow up office visit and breast exam with KATIE.  I will see her back for her following visit  2. right mammogram in December 2020  3. The patient is advised in continued exam of the breast chest wall and to report to this office sooner should she note any areas of abnormality or concern.   4.  Continue follow-up with med onc with Dr. Olvera for management of her Anastrozole.

## 2020-09-09 ENCOUNTER — OFFICE VISIT (OUTPATIENT)
Dept: SURGERY | Facility: CLINIC | Age: 75
End: 2020-09-09
Payer: MEDICARE

## 2020-09-09 VITALS
DIASTOLIC BLOOD PRESSURE: 66 MMHG | HEART RATE: 89 BPM | TEMPERATURE: 99 F | SYSTOLIC BLOOD PRESSURE: 144 MMHG | WEIGHT: 187 LBS | BODY MASS INDEX: 34.41 KG/M2 | HEIGHT: 62 IN

## 2020-09-09 DIAGNOSIS — Z12.39 SCREENING FOR MALIGNANT NEOPLASM OF BREAST: Primary | ICD-10-CM

## 2020-09-09 DIAGNOSIS — Z12.31 ENCOUNTER FOR SCREENING MAMMOGRAM FOR MALIGNANT NEOPLASM OF BREAST: ICD-10-CM

## 2020-09-09 DIAGNOSIS — C50.912 RECURRENT BREAST CANCER, LEFT: ICD-10-CM

## 2020-09-09 PROCEDURE — 1126F PR PAIN SEVERITY QUANTIFIED, NO PAIN PRESENT: ICD-10-PCS | Mod: HCNC,S$GLB,, | Performed by: SURGERY

## 2020-09-09 PROCEDURE — 1101F PR PT FALLS ASSESS DOC 0-1 FALLS W/OUT INJ PAST YR: ICD-10-PCS | Mod: HCNC,CPTII,S$GLB, | Performed by: SURGERY

## 2020-09-09 PROCEDURE — 99213 PR OFFICE/OUTPT VISIT, EST, LEVL III, 20-29 MIN: ICD-10-PCS | Mod: HCNC,S$GLB,, | Performed by: SURGERY

## 2020-09-09 PROCEDURE — 1159F PR MEDICATION LIST DOCUMENTED IN MEDICAL RECORD: ICD-10-PCS | Mod: HCNC,S$GLB,, | Performed by: SURGERY

## 2020-09-09 PROCEDURE — 99999 PR PBB SHADOW E&M-EST. PATIENT-LVL V: ICD-10-PCS | Mod: PBBFAC,HCNC,, | Performed by: SURGERY

## 2020-09-09 PROCEDURE — 99213 OFFICE O/P EST LOW 20 MIN: CPT | Mod: HCNC,S$GLB,, | Performed by: SURGERY

## 2020-09-09 PROCEDURE — 1159F MED LIST DOCD IN RCRD: CPT | Mod: HCNC,S$GLB,, | Performed by: SURGERY

## 2020-09-09 PROCEDURE — 1101F PT FALLS ASSESS-DOCD LE1/YR: CPT | Mod: HCNC,CPTII,S$GLB, | Performed by: SURGERY

## 2020-09-09 PROCEDURE — 3077F PR MOST RECENT SYSTOLIC BLOOD PRESSURE >= 140 MM HG: ICD-10-PCS | Mod: HCNC,CPTII,S$GLB, | Performed by: SURGERY

## 2020-09-09 PROCEDURE — 3077F SYST BP >= 140 MM HG: CPT | Mod: HCNC,CPTII,S$GLB, | Performed by: SURGERY

## 2020-09-09 PROCEDURE — 99999 PR PBB SHADOW E&M-EST. PATIENT-LVL V: CPT | Mod: PBBFAC,HCNC,, | Performed by: SURGERY

## 2020-09-09 PROCEDURE — 3078F DIAST BP <80 MM HG: CPT | Mod: HCNC,CPTII,S$GLB, | Performed by: SURGERY

## 2020-09-09 PROCEDURE — 3078F PR MOST RECENT DIASTOLIC BLOOD PRESSURE < 80 MM HG: ICD-10-PCS | Mod: HCNC,CPTII,S$GLB, | Performed by: SURGERY

## 2020-09-09 PROCEDURE — 1126F AMNT PAIN NOTED NONE PRSNT: CPT | Mod: HCNC,S$GLB,, | Performed by: SURGERY

## 2020-09-10 ENCOUNTER — OFFICE VISIT (OUTPATIENT)
Dept: HEMATOLOGY/ONCOLOGY | Facility: CLINIC | Age: 75
End: 2020-09-10
Payer: MEDICARE

## 2020-09-10 VITALS
BODY MASS INDEX: 34.48 KG/M2 | SYSTOLIC BLOOD PRESSURE: 154 MMHG | DIASTOLIC BLOOD PRESSURE: 75 MMHG | HEART RATE: 94 BPM | OXYGEN SATURATION: 96 % | RESPIRATION RATE: 16 BRPM | HEIGHT: 62 IN | TEMPERATURE: 98 F | WEIGHT: 187.38 LBS

## 2020-09-10 DIAGNOSIS — C50.912 RECURRENT BREAST CANCER, LEFT: Primary | ICD-10-CM

## 2020-09-10 DIAGNOSIS — Z79.811 USE OF AROMATASE INHIBITORS: ICD-10-CM

## 2020-09-10 PROCEDURE — 3077F SYST BP >= 140 MM HG: CPT | Mod: HCNC,CPTII,S$GLB, | Performed by: INTERNAL MEDICINE

## 2020-09-10 PROCEDURE — 3078F PR MOST RECENT DIASTOLIC BLOOD PRESSURE < 80 MM HG: ICD-10-PCS | Mod: HCNC,CPTII,S$GLB, | Performed by: INTERNAL MEDICINE

## 2020-09-10 PROCEDURE — 99213 OFFICE O/P EST LOW 20 MIN: CPT | Mod: HCNC,S$GLB,, | Performed by: INTERNAL MEDICINE

## 2020-09-10 PROCEDURE — 1101F PR PT FALLS ASSESS DOC 0-1 FALLS W/OUT INJ PAST YR: ICD-10-PCS | Mod: HCNC,CPTII,S$GLB, | Performed by: INTERNAL MEDICINE

## 2020-09-10 PROCEDURE — 1159F MED LIST DOCD IN RCRD: CPT | Mod: HCNC,S$GLB,, | Performed by: INTERNAL MEDICINE

## 2020-09-10 PROCEDURE — 99213 PR OFFICE/OUTPT VISIT, EST, LEVL III, 20-29 MIN: ICD-10-PCS | Mod: HCNC,S$GLB,, | Performed by: INTERNAL MEDICINE

## 2020-09-10 PROCEDURE — 99999 PR PBB SHADOW E&M-EST. PATIENT-LVL V: ICD-10-PCS | Mod: PBBFAC,HCNC,, | Performed by: INTERNAL MEDICINE

## 2020-09-10 PROCEDURE — 1126F PR PAIN SEVERITY QUANTIFIED, NO PAIN PRESENT: ICD-10-PCS | Mod: HCNC,S$GLB,, | Performed by: INTERNAL MEDICINE

## 2020-09-10 PROCEDURE — 1126F AMNT PAIN NOTED NONE PRSNT: CPT | Mod: HCNC,S$GLB,, | Performed by: INTERNAL MEDICINE

## 2020-09-10 PROCEDURE — 3078F DIAST BP <80 MM HG: CPT | Mod: HCNC,CPTII,S$GLB, | Performed by: INTERNAL MEDICINE

## 2020-09-10 PROCEDURE — 1101F PT FALLS ASSESS-DOCD LE1/YR: CPT | Mod: HCNC,CPTII,S$GLB, | Performed by: INTERNAL MEDICINE

## 2020-09-10 PROCEDURE — 99999 PR PBB SHADOW E&M-EST. PATIENT-LVL V: CPT | Mod: PBBFAC,HCNC,, | Performed by: INTERNAL MEDICINE

## 2020-09-10 PROCEDURE — 3077F PR MOST RECENT SYSTOLIC BLOOD PRESSURE >= 140 MM HG: ICD-10-PCS | Mod: HCNC,CPTII,S$GLB, | Performed by: INTERNAL MEDICINE

## 2020-09-10 PROCEDURE — 1159F PR MEDICATION LIST DOCUMENTED IN MEDICAL RECORD: ICD-10-PCS | Mod: HCNC,S$GLB,, | Performed by: INTERNAL MEDICINE

## 2020-09-10 NOTE — PROGRESS NOTES
Subjective:       Patient ID: Alisia Gusman is a 75 y.o. female.    Chief Complaint: No chief complaint on file.    HPI     Mrs. Gusman presents to the clinic for evaluation.  Briefly, she is a 75-year-old  female with a remote history of left sided breast cancer treated in 1993 with a lumpectomy, radiation therapy, and 5 years of tamoxifen.  Apparently the size of her tumor in 1993 was 4 mm.  She completed her 5 years of tamoxifen and she was followed expectantly.      An in breast recurrence was noted in January 2020.  A core biopsy on 01/15/2020 showed an infiltrating ductal carcinoma that was ER positive RI negative and HER 2 negative.  On 02/20/2020 she underwent a left mastectomy and a sentinel lymph node biopsy.  The sentinel lymph node was negative.  On the  mastectomy specimen there was a 2 mm area of DCIS and there was a 6 mm area of invasive cancer.  Resection margins were clear.  She has made an uneventful recovery and was started on anastrozole in early April 2020.      Review of Systems    Overall she feels well. She states that she has tolerated the anastrozole well so far and has not experienced any side effects.  ECOG PS is 1.  She denies any anxiety, depression, easy bruising, fevers, chills, night  sweats, weight loss, nausea, vomiting, diarrhea, constipation, diplopia,   blurred vision, headache, chest pain, palpitations, shortness of breath, breast pain, abdominal pain, extremity pain, or difficulty ambulating.  The remainder of the ten-point ROS, including general, skin, lymph, H/N, cardiorespiratory, GI, , Neuro, Endocrine, and psychiatric is negative.     Objective:      Physical Exam    She is alert, oriented to time, place, person, pleasant, well nourished, in no acute physical distress.                                    VITAL SIGNS:  Reviewed                                      HEENT:  Normal.  There are no nasal, oral, lip, gingival, auricular, lid,    or  conjunctival lesions.  Mucosae are moist and pink, and there is no        thrush.  Pupils are equal, reactive to light and accommodation.              Extraocular muscle movements are intact.  Dentition is fair.  There is no frontal or maxillary tenderness.                                     NECK:  Supple without JVD, adenopathy, or thyromegaly.                       LUNGS:  Clear to auscultation without wheezing, rales, or rhonchi.           CARDIOVASCULAR:  Reveals an S1, S2, no murmurs, no rubs, no gallops.         ABDOMEN:  Soft, nontender, without organomegaly.  Bowel sounds are    present.                                                                     EXTREMITIES:  No cyanosis, clubbing, or edema.                               BREASTS:  She is status post left mastectomy with a well-healed incision.    There are no masses in either breast.                                        LYMPHATIC:  There is no cervical, axillary, or supraclavicular adenopathy.   SKIN:  Warm and moist, without petechiae, rashes, induration, or ecchymoses.           NEUROLOGIC:  DTRs are 0-1+ bilaterally, symmetrical, motor function is 5/5,  and cranial nerves are  within normal limits.    Assessment:       1. Recurrent breast cancer, left    2. Use of aromatase inhibitors      Plan:         I had a long discussion with Mrs. Gusman.  I recommended that she remain on anastrozole which she should take for a minimum of 5 years.  I will see her again in 3 months.    Her multiple questions were answered to her satisfaction.

## 2020-09-22 NOTE — PROGRESS NOTES
"    Physical Therapy Daily Treatment Note     Name: Alisia Gusman  Clinic Number: [id number]    Therapy Diagnosis:   Encounter Diagnoses   Name Primary?    Abnormality of gait and mobility     Lumbar pain     Weakness of both lower extremities      Physician: Harrison Jacome MD    Visit Date: 9/23/2020    Physician Orders: PT Eval and Treat   Medical Diagnosis from Referral: M43.10 (ICD-10-CM) - Spondylolisthesis, unspecified spinal region  Evaluation Date: 8/17/2020  Authorization Period Expiration: 9/17/2020  Plan of Care Expiration: 11/17/2020  Visit # / Visits authorized: 1/ 1, 2/tbd  FOTO: 3/5        Visit:  98  Total: 277     Time In: 1:00pm  Time Out: 1:45pm  Total Billable Time: 45 minutes      Precautions: Standard, Diabetes and h/o breast CA, osteopenia      Subjective     Pt reports: her back is not hurting much today, but her knees feel stiff after sitting a while. She missed her last two appts due to other MD appt and bad weather   She was compliant with home exercise program.  Response to previous treatment: increased pain with piriformis stretch   Functional change: none    Pain: 7/10  Location: left back  and hip      Objective       Alisia received therapeutic exercises to develop strength, endurance, ROM and flexibility for 30  minutes including:    Recumbent bike 5'   gastroc wedge stretch 30" x3  Bridge 3" 2x10  Seated HS stretch 30" x3 B -not performed   Clamshells 3" x15 B  LTR 5" x10 -not performed   Piriformis stretch 30" x2 B -not performed (held due to pain)      Alisia participated in dynamic functional therapeutic activities to improve functional performance for 15  minutes, including:  Mini-squats x15  Standing hip abd x15       Home Exercises Provided and Patient Education Provided     Education provided:   - HEP    Written Home Exercises Provided: yes.  Exercises were reviewed and Alisia was able to demonstrate them prior to the end of the session.  Alisia demonstrated good  understanding " of the education provided.     See EMR under Patient Instructions for exercises provided 9/2/2020.      Assessment     Pt was able to progress functional, closed chain, strength training today without increased pain. She required postural corrections with standing exercises. Pt demonstrated rapid fatigue rate with all therex, requiring rest breaks between all exercise today. She reduced knee pain after bike and no onset of LBP during session.     Alisia is progressing well towards her goals.   Pt prognosis is Good.     Pt will continue to benefit from skilled outpatient physical therapy to address the deficits listed in the problem list box on initial evaluation, provide pt/family education and to maximize pt's level of independence in the home and community environment.     Pt's spiritual, cultural and educational needs considered and pt agreeable to plan of care and goals.    Anticipated barriers to physical therapy: chronicity of pain, co morbidities       Goals: Short Term Goals: 6 visits  1. Pt will be compliant /c HEP to supplement PT in order to improve functional tasks  2. Pt will improve B ankle DF MMTs to 4-/5 grossly to improve strength for functional gait & reduced fall risk     Long Term Goals: 12 visits   1. Pt will improve B hips MMTs to 4-/5 grossly to improve strength for functional activities  2. Pt will improve FOTO score to </= 39% limitation to reduce perceived pain with mobility   3. Pt will improve 30 sec sit<>Stand test to at least 10 reps for increased BLE strength and endurance for functional activities      Plan     Continue with established plan of care towards PT goals.       Selena Cavazos, PT

## 2020-09-23 ENCOUNTER — CLINICAL SUPPORT (OUTPATIENT)
Dept: REHABILITATION | Facility: HOSPITAL | Age: 75
End: 2020-09-23
Attending: ORTHOPAEDIC SURGERY
Payer: MEDICARE

## 2020-09-23 DIAGNOSIS — R29.898 WEAKNESS OF BOTH LOWER EXTREMITIES: ICD-10-CM

## 2020-09-23 DIAGNOSIS — R26.9 ABNORMALITY OF GAIT AND MOBILITY: ICD-10-CM

## 2020-09-23 DIAGNOSIS — M54.50 LUMBAR PAIN: ICD-10-CM

## 2020-09-23 PROCEDURE — 97530 THERAPEUTIC ACTIVITIES: CPT | Mod: HCNC

## 2020-09-23 PROCEDURE — 97110 THERAPEUTIC EXERCISES: CPT | Mod: HCNC

## 2020-09-29 NOTE — PROGRESS NOTES
"    Physical Therapy Daily Treatment Note     Name: Alisia Gusman  Clinic Number: [id number]    Therapy Diagnosis:   Encounter Diagnoses   Name Primary?    Abnormality of gait and mobility     Lumbar pain     Weakness of both lower extremities      Physician: Harrison Jacome MD    Visit Date: 9/30/2020    Physician Orders: PT Eval and Treat   Medical Diagnosis from Referral: M43.10 (ICD-10-CM) - Spondylolisthesis, unspecified spinal region  Evaluation Date: 8/17/2020  Authorization Period Expiration: 9/17/2020  Plan of Care Expiration: 11/17/2020  Visit # / Visits authorized: 1/ 1, 3/tbd  FOTO: 4/5        Visit:  94  Total: 371     Time In: 1:00pm  Time Out: 1:45pm  Total Billable Time: 45 minutes      Precautions: Standard, Diabetes and h/o breast CA, osteopenia      Subjective     Pt reports: she feels good today, no pain. She has been getting back into the pool for exercise   She was compliant with home exercise program.  Response to previous treatment: no adverse effects   Functional change: none    Pain: 0/10  Location: left back  and hip      Objective       Alisia received therapeutic exercises to develop strength, endurance, ROM and flexibility for 20 minutes including:    Recumbent bike 5'   gastroc wedge stretch 30" x3  Bridge 3" 3x10  Seated HS stretch 30" x3 B -not performed   Clamshells 3" x15 B -not performed   Reverse clams 2x10 B  LTR 5" x10 -not performed   Piriformis stretch 30" x2 B -not performed (held due to pain)      Alisia participated in dynamic functional therapeutic activities to improve functional performance for 25 minutes, including:    Sit<>stand with adductor ball squeeze 2x10  Mini-squats x15 -not performed   Standing hip abd 2x10 B  Standing hip Ext 2x10 B      Home Exercises Provided and Patient Education Provided     Education provided:   - HEP    Written Home Exercises Provided: yes.  Exercises were reviewed and Alisia was able to demonstrate them prior to the end of the session.  " Alisia demonstrated good  understanding of the education provided.     See EMR under Patient Instructions for exercises provided 9/2/2020.      Assessment     Pt tolerated session well; she had no c/o pain throughout. She was able to progress functional BLE strength training with standing hip Ext & ABd and sit<>stand exercises. She required cues to maintain erect posture with standing exercises.     Alisia is progressing well towards her goals.   Pt prognosis is Good.     Pt will continue to benefit from skilled outpatient physical therapy to address the deficits listed in the problem list box on initial evaluation, provide pt/family education and to maximize pt's level of independence in the home and community environment.     Pt's spiritual, cultural and educational needs considered and pt agreeable to plan of care and goals.    Anticipated barriers to physical therapy: chronicity of pain, co morbidities       Goals: Short Term Goals: 6 visits  1. Pt will be compliant /c HEP to supplement PT in order to improve functional tasks  2. Pt will improve B ankle DF MMTs to 4-/5 grossly to improve strength for functional gait & reduced fall risk     Long Term Goals: 12 visits   1. Pt will improve B hips MMTs to 4-/5 grossly to improve strength for functional activities  2. Pt will improve FOTO score to </= 39% limitation to reduce perceived pain with mobility   3. Pt will improve 30 sec sit<>Stand test to at least 10 reps for increased BLE strength and endurance for functional activities      Plan     Continue with established plan of care towards PT goals.       Selena Cavazos, PT

## 2020-09-30 ENCOUNTER — CLINICAL SUPPORT (OUTPATIENT)
Dept: REHABILITATION | Facility: HOSPITAL | Age: 75
End: 2020-09-30
Attending: ORTHOPAEDIC SURGERY
Payer: MEDICARE

## 2020-09-30 DIAGNOSIS — M54.50 LUMBAR PAIN: ICD-10-CM

## 2020-09-30 DIAGNOSIS — R26.9 ABNORMALITY OF GAIT AND MOBILITY: ICD-10-CM

## 2020-09-30 DIAGNOSIS — R29.898 WEAKNESS OF BOTH LOWER EXTREMITIES: ICD-10-CM

## 2020-09-30 PROCEDURE — 97530 THERAPEUTIC ACTIVITIES: CPT | Mod: HCNC

## 2020-09-30 PROCEDURE — 97110 THERAPEUTIC EXERCISES: CPT | Mod: HCNC

## 2020-10-02 ENCOUNTER — LAB VISIT (OUTPATIENT)
Dept: LAB | Facility: HOSPITAL | Age: 75
End: 2020-10-02
Attending: INTERNAL MEDICINE
Payer: MEDICARE

## 2020-10-02 DIAGNOSIS — M85.80 OSTEOPENIA, UNSPECIFIED LOCATION: ICD-10-CM

## 2020-10-02 DIAGNOSIS — E11.42 TYPE 2 DIABETES MELLITUS WITH DIABETIC POLYNEUROPATHY, WITH LONG-TERM CURRENT USE OF INSULIN: ICD-10-CM

## 2020-10-02 DIAGNOSIS — Z79.4 TYPE 2 DIABETES MELLITUS WITH DIABETIC POLYNEUROPATHY, WITH LONG-TERM CURRENT USE OF INSULIN: ICD-10-CM

## 2020-10-02 DIAGNOSIS — Z79.811 AROMATASE INHIBITOR USE: ICD-10-CM

## 2020-10-02 LAB
25(OH)D3+25(OH)D2 SERPL-MCNC: 30 NG/ML (ref 30–96)
CHOLEST SERPL-MCNC: 154 MG/DL (ref 120–199)
CHOLEST/HDLC SERPL: 2.8 {RATIO} (ref 2–5)
ESTIMATED AVG GLUCOSE: 177 MG/DL (ref 68–131)
HBA1C MFR BLD HPLC: 7.8 % (ref 4–5.6)
HDLC SERPL-MCNC: 56 MG/DL (ref 40–75)
HDLC SERPL: 36.4 % (ref 20–50)
LDLC SERPL CALC-MCNC: 66.6 MG/DL (ref 63–159)
NONHDLC SERPL-MCNC: 98 MG/DL
TRIGL SERPL-MCNC: 157 MG/DL (ref 30–150)
TSH SERPL DL<=0.005 MIU/L-ACNC: 1.46 UIU/ML (ref 0.4–4)

## 2020-10-02 PROCEDURE — 83036 HEMOGLOBIN GLYCOSYLATED A1C: CPT | Mod: HCNC

## 2020-10-02 PROCEDURE — 80061 LIPID PANEL: CPT | Mod: HCNC

## 2020-10-02 PROCEDURE — 36415 COLL VENOUS BLD VENIPUNCTURE: CPT | Mod: HCNC

## 2020-10-02 PROCEDURE — 84443 ASSAY THYROID STIM HORMONE: CPT | Mod: HCNC

## 2020-10-02 PROCEDURE — 82306 VITAMIN D 25 HYDROXY: CPT | Mod: HCNC

## 2020-10-06 ENCOUNTER — DOCUMENTATION ONLY (OUTPATIENT)
Dept: REHABILITATION | Facility: HOSPITAL | Age: 75
End: 2020-10-06

## 2020-10-06 NOTE — PROGRESS NOTES
Outpatient Therapy Discharge Summary     Name: Alisia Gusman  [facility] Number: [id number]    Therapy Diagnosis: No diagnosis found.  Physician: No ref. provider found    Physician Orders: eval and treat  Medical Diagnosis: M43.10 (ICD-10-CM) - Spondylolisthesis, unspecified spinal region  Evaluation Date: 8/17/2020      Date of Last visit: 9/30/2020  Total Visits Received: 4    Cancelled Visits: 0  No Show Visits: 0      Assessment    Unable to assess goals due to pt requesting DC via phone. She reported that she is now asymptomatic and plans to continue HEP (I).   Goals: Short Term Goals: 6 visits  1. Pt will be compliant /c HEP to supplement PT in order to improve functional tasks  2. Pt will improve B ankle DF MMTs to 4-/5 grossly to improve strength for functional gait & reduced fall risk     Long Term Goals: 12 visits   1. Pt will improve B hips MMTs to 4-/5 grossly to improve strength for functional activities  2. Pt will improve FOTO score to </= 39% limitation to reduce perceived pain with mobility   3. Pt will improve 30 sec sit<>Stand test to at least 10 reps for increased BLE strength and endurance for functional activities    Discharge reason: Patient is now asymptomatic and Patient requested discharge    Plan   This patient is discharged from Physical Therapy    Selena Cavazos, PT

## 2020-10-07 ENCOUNTER — OFFICE VISIT (OUTPATIENT)
Dept: ENDOCRINOLOGY | Facility: CLINIC | Age: 75
End: 2020-10-07
Payer: MEDICARE

## 2020-10-07 VITALS
BODY MASS INDEX: 33.45 KG/M2 | OXYGEN SATURATION: 96 % | RESPIRATION RATE: 16 BRPM | WEIGHT: 181.75 LBS | HEART RATE: 105 BPM | HEIGHT: 62 IN | DIASTOLIC BLOOD PRESSURE: 74 MMHG | SYSTOLIC BLOOD PRESSURE: 130 MMHG

## 2020-10-07 DIAGNOSIS — E11.42 TYPE 2 DIABETES MELLITUS WITH DIABETIC POLYNEUROPATHY, WITH LONG-TERM CURRENT USE OF INSULIN: ICD-10-CM

## 2020-10-07 DIAGNOSIS — Z79.4 TYPE 2 DIABETES MELLITUS WITH DIABETIC POLYNEUROPATHY, WITH LONG-TERM CURRENT USE OF INSULIN: ICD-10-CM

## 2020-10-07 PROCEDURE — 99213 OFFICE O/P EST LOW 20 MIN: CPT | Mod: HCNC,S$GLB,, | Performed by: INTERNAL MEDICINE

## 2020-10-07 PROCEDURE — 1101F PR PT FALLS ASSESS DOC 0-1 FALLS W/OUT INJ PAST YR: ICD-10-PCS | Mod: HCNC,CPTII,S$GLB, | Performed by: INTERNAL MEDICINE

## 2020-10-07 PROCEDURE — 1126F AMNT PAIN NOTED NONE PRSNT: CPT | Mod: HCNC,S$GLB,, | Performed by: INTERNAL MEDICINE

## 2020-10-07 PROCEDURE — 1101F PT FALLS ASSESS-DOCD LE1/YR: CPT | Mod: HCNC,CPTII,S$GLB, | Performed by: INTERNAL MEDICINE

## 2020-10-07 PROCEDURE — 99999 PR PBB SHADOW E&M-EST. PATIENT-LVL V: ICD-10-PCS | Mod: PBBFAC,HCNC,, | Performed by: INTERNAL MEDICINE

## 2020-10-07 PROCEDURE — 99213 PR OFFICE/OUTPT VISIT, EST, LEVL III, 20-29 MIN: ICD-10-PCS | Mod: HCNC,S$GLB,, | Performed by: INTERNAL MEDICINE

## 2020-10-07 PROCEDURE — 1159F PR MEDICATION LIST DOCUMENTED IN MEDICAL RECORD: ICD-10-PCS | Mod: HCNC,S$GLB,, | Performed by: INTERNAL MEDICINE

## 2020-10-07 PROCEDURE — 3075F SYST BP GE 130 - 139MM HG: CPT | Mod: HCNC,CPTII,S$GLB, | Performed by: INTERNAL MEDICINE

## 2020-10-07 PROCEDURE — 3051F PR MOST RECENT HEMOGLOBIN A1C LEVEL 7.0 - < 8.0%: ICD-10-PCS | Mod: HCNC,CPTII,S$GLB, | Performed by: INTERNAL MEDICINE

## 2020-10-07 PROCEDURE — 3078F DIAST BP <80 MM HG: CPT | Mod: HCNC,CPTII,S$GLB, | Performed by: INTERNAL MEDICINE

## 2020-10-07 PROCEDURE — 1159F MED LIST DOCD IN RCRD: CPT | Mod: HCNC,S$GLB,, | Performed by: INTERNAL MEDICINE

## 2020-10-07 PROCEDURE — 3051F HG A1C>EQUAL 7.0%<8.0%: CPT | Mod: HCNC,CPTII,S$GLB, | Performed by: INTERNAL MEDICINE

## 2020-10-07 PROCEDURE — 99999 PR PBB SHADOW E&M-EST. PATIENT-LVL V: CPT | Mod: PBBFAC,HCNC,, | Performed by: INTERNAL MEDICINE

## 2020-10-07 PROCEDURE — 3078F PR MOST RECENT DIASTOLIC BLOOD PRESSURE < 80 MM HG: ICD-10-PCS | Mod: HCNC,CPTII,S$GLB, | Performed by: INTERNAL MEDICINE

## 2020-10-07 PROCEDURE — 1126F PR PAIN SEVERITY QUANTIFIED, NO PAIN PRESENT: ICD-10-PCS | Mod: HCNC,S$GLB,, | Performed by: INTERNAL MEDICINE

## 2020-10-07 PROCEDURE — 3075F PR MOST RECENT SYSTOLIC BLOOD PRESS GE 130-139MM HG: ICD-10-PCS | Mod: HCNC,CPTII,S$GLB, | Performed by: INTERNAL MEDICINE

## 2020-10-07 RX ORDER — INFLUENZA A VIRUS A/MICHIGAN/45/2015 X-275 (H1N1) ANTIGEN (FORMALDEHYDE INACTIVATED), INFLUENZA A VIRUS A/SINGAPORE/INFIMH-16-0019/2016 IVR-186 (H3N2) ANTIGEN (FORMALDEHYDE INACTIVATED), INFLUENZA B VIRUS B/PHUKET/3073/2013 ANTIGEN (FORMALDEHYDE INACTIVATED), AND INFLUENZA B VIRUS B/MARYLAND/15/2016 BX-69A ANTIGEN (FORMALDEHYDE INACTIVATED) 60; 60; 60; 60 UG/.7ML; UG/.7ML; UG/.7ML; UG/.7ML
INJECTION, SUSPENSION INTRAMUSCULAR
COMMUNITY
Start: 2020-09-28 | End: 2021-02-15

## 2020-10-07 RX ORDER — SEMAGLUTIDE 1.34 MG/ML
0.75 INJECTION, SOLUTION SUBCUTANEOUS
Qty: 9 ML | Refills: 3 | Status: SHIPPED | OUTPATIENT
Start: 2020-10-07 | End: 2022-03-30

## 2020-10-07 NOTE — ASSESSMENT & PLAN NOTE
Doing well on ozempic, plan to increase and stop glimepiride due to low blood sugar  Continue 70/30

## 2020-10-07 NOTE — PROGRESS NOTES
Subjective:      Patient ID: Alisia Gusman is a 75 y.o. female.    Chief Complaint:  Diabetes      History of Present Illness  Ms. Gusman is a 75year old woman who is here for T2DM that is better controlled.    has dementia (late stages). T2DM diagnosed in her late 40s initially treated with pills.   Added insulin over ten years ago.      Today reports:  Swelling over feet. Swelling over feet is worse during the day, better in the early morning. Sometimes can't put tennis shoes on.   Reports low salt diet and does not eat prepared foods.   Reports low blood sugars on sundays when she delays her lunch.     New regimen:   Ozempic 4/7 --> 0.5 mg weekly. (added ozempic) - tolerating well  70/30 Novolin pen: 32 units before breakfast and 24 units before dinner   Glimepiride 4 mg (half a tablet in the morning)    Previously tried   Metformin for many years, but due to diarrhea that was persistent and interfering with daily life, she stopped.      Started walking in the morning and afternoon  - fourth week. Recovering from breast surgery. Denies falls or fractures.      Other medication:  Anastrozole 1 mg daily - no side effects  Calcium and vitamin D daily        Review of Systems    Objective:   Physical Exam  Constitutional:       General: She is not in acute distress.     Appearance: She is well-developed.   HENT:      Head: Normocephalic and atraumatic.      Mouth/Throat:      Pharynx: No oropharyngeal exudate.   Eyes:      General: No scleral icterus.     Conjunctiva/sclera: Conjunctivae normal.      Pupils: Pupils are equal, round, and reactive to light.   Neck:      Musculoskeletal: Normal range of motion and neck supple.      Thyroid: No thyromegaly.      Trachea: No tracheal deviation.   Cardiovascular:      Rate and Rhythm: Normal rate and regular rhythm.      Heart sounds: Normal heart sounds.   Pulmonary:      Effort: Pulmonary effort is normal.      Breath sounds: Normal breath sounds.   Abdominal:  "     General: Bowel sounds are normal. There is no distension.      Palpations: Abdomen is soft.      Tenderness: There is no abdominal tenderness.      Comments: Sites of insulin administration are normal appearing.   Musculoskeletal: Normal range of motion.         General: Swelling present. No tenderness.   Lymphadenopathy:      Cervical: No cervical adenopathy.   Skin:     General: Skin is warm and dry.      Comments: FOOT EXAM:  Visual inspection is normal, no abrasions, bruising or calluses.   Bilateral edema of feet/ankles   Onychomycosis   Microfilament test is intact absent  Vibratory sense is intact b/l.   Distal pulses are present b/l.    Neurological:      Mental Status: She is alert and oriented to person, place, and time.      Cranial Nerves: No cranial nerve deficit.      Deep Tendon Reflexes: Reflexes are normal and symmetric.       Vitals:    10/07/20 1147   BP: 130/74   Pulse: 105   Resp: 16   SpO2: 96%   Weight: 82.5 kg (181 lb 12.3 oz)   Height: 5' 2" (1.575 m)       BP Readings from Last 3 Encounters:   10/07/20 130/74   09/10/20 (!) 154/75   09/09/20 (!) 144/66     Wt Readings from Last 1 Encounters:   10/07/20 1147 82.5 kg (181 lb 12.3 oz)         Body mass index is 33.25 kg/m².    Lab Review:   Lab Results   Component Value Date    HGBA1C 7.8 (H) 10/02/2020     Lab Results   Component Value Date    CHOL 154 10/02/2020    HDL 56 10/02/2020    LDLCALC 66.6 10/02/2020    TRIG 157 (H) 10/02/2020    CHOLHDL 36.4 10/02/2020     Lab Results   Component Value Date     07/31/2020    K 3.5 07/31/2020     07/31/2020    CO2 30 (H) 07/31/2020     (H) 07/31/2020    BUN 14 07/31/2020    CREATININE 1.0 07/31/2020    CALCIUM 10.2 07/31/2020    PROT 7.0 07/31/2020    ALBUMIN 3.6 07/31/2020    BILITOT 1.0 07/31/2020    ALKPHOS 62 07/31/2020    AST 20 07/31/2020    ALT 15 07/31/2020    ANIONGAP 9 07/31/2020    ESTGFRAFRICA >60.0 07/31/2020    EGFRNONAA 54.5 (A) 07/31/2020    TSH 1.458 " 10/02/2020         Assessment and Plan     Type 2 diabetes mellitus with diabetic polyneuropathy, with long-term current use of insulin  Doing well on ozempic, plan to increase and stop glimepiride due to low blood sugar  Continue 70/30

## 2020-10-11 ENCOUNTER — OFFICE VISIT (OUTPATIENT)
Dept: URGENT CARE | Facility: CLINIC | Age: 75
End: 2020-10-11
Payer: MEDICARE

## 2020-10-11 VITALS
HEIGHT: 62 IN | OXYGEN SATURATION: 97 % | WEIGHT: 180 LBS | RESPIRATION RATE: 16 BRPM | TEMPERATURE: 98 F | BODY MASS INDEX: 33.13 KG/M2 | HEART RATE: 94 BPM | DIASTOLIC BLOOD PRESSURE: 71 MMHG | SYSTOLIC BLOOD PRESSURE: 123 MMHG

## 2020-10-11 DIAGNOSIS — B02.9 HERPES ZOSTER WITHOUT COMPLICATION: Primary | ICD-10-CM

## 2020-10-11 PROCEDURE — 99214 PR OFFICE/OUTPT VISIT, EST, LEVL IV, 30-39 MIN: ICD-10-PCS | Mod: S$GLB,,, | Performed by: NURSE PRACTITIONER

## 2020-10-11 PROCEDURE — 99214 OFFICE O/P EST MOD 30 MIN: CPT | Mod: S$GLB,,, | Performed by: NURSE PRACTITIONER

## 2020-10-11 RX ORDER — TRIAMCINOLONE ACETONIDE 1 MG/G
CREAM TOPICAL 2 TIMES DAILY
Qty: 1 TUBE | Refills: 0 | Status: ON HOLD | OUTPATIENT
Start: 2020-10-11 | End: 2021-11-24 | Stop reason: HOSPADM

## 2020-10-11 RX ORDER — VALACYCLOVIR HYDROCHLORIDE 1 G/1
1000 TABLET, FILM COATED ORAL 3 TIMES DAILY
Qty: 21 TABLET | Refills: 0 | Status: SHIPPED | OUTPATIENT
Start: 2020-10-11 | End: 2021-04-09 | Stop reason: ALTCHOICE

## 2020-10-11 NOTE — PATIENT INSTRUCTIONS
Use cool compresses to help with itching.     Apply steroid cream twice daily.     Take valtrex three times a day for 7 days.     Follow up immediately if symptoms persist or worsen.     You must understand that you've received an Urgent Care treatment only and that you may be released before all your medical problems are known or treated. You, the patient, will arrange for follow up care as instructed.  Follow up with your PCP or specialty clinic as directed in the next 1-2 weeks if not improved or as needed.  You can call [phone] to schedule an appointment with the appropriate provider.  If your condition worsens we recommend that you receive another evaluation at the emergency room immediately or contact your primary medical clinics after hours call service to discuss your concerns.  Please return here or go to the Emergency Department for any concerns or worsening of condition.      Shingles (Herpes Zoster)     Talk to your healthcare provider about the shingles vaccine.     Shingles is also called herpes zoster. It is a painful skin rash caused by the herpes zoster virus. This is the same virus that causes chickenpox. After a person has chickenpox, the virus remains inactive in the nerve cells. Years later, the virus can become active again and travel to the skin. Most people have shingles only once, but it is possible to have it more than once.  What are the risk factors for shingles?  Anyone who has ever had chickenpox can develop shingles. But your risk is greater if you:  · Are 50 years of age or older  · Have an illness that weakens your immune system, such as HIV/AIDS  · Have cancer, especially Hodgkin disease or lymphoma  · Take medicines that weaken your immune system  What are the symptoms of shingles?  · The first sign of shingles is usually pain, burning, tingling, or itching on one part of your face or body. You may also feel as if you have the flu, with fever and chills.  · A red rash with  small blisters appears within a few days. The rash may appear as follows:   ¨ The blisters can occur anywhere, but theyre most common on the back, chest, or abdomen.  ¨ They usually appear on only one side of the body, spreading along the nerve pathway where the virus was inactive.   ¨ The rash can also form around an eye, along one side of the face or neck, or in the mouth.  ¨ In a few people, usually those with weakened immune systems, shingles appear on more than one part of the body at once.  · After a few days, the blisters become dry and form a crust. The crust falls off in days to weeks. The blisters generally do not leave scars.  How is shingles treated?  For most people, shingles heals on its own in a few weeks. But treatment is recommended to help relieve pain, speed healing, and reduce the risk of complications. Antiviral medicines are prescribed within the first 72 hours of the appearance of the rash. To lessen symptoms:  · Apply ice packs (wrapped in a thin towel) or cool compresses, or soak in a cool bath.  · Use calamine lotion to calm itchy skin.  · Ask your healthcare provider about over-the-counter pain relievers. If your pain is severe, your healthcare provider may prescribe stronger pain medicines.  What are the complications of shingles?  Shingles often goes away with no lasting effects. But some people have serious problems long after the blisters have healed:  · Postherpetic neuralgia. This is the most common complication. It is severe nerve pain at the place where the rash used to be. It can last for months, or even years after you have had shingles. Medicines can be prescribed to help relieve the pain and improve quality of life.  · Bacterial infection. Shingles blisters may become infected with bacteria. Antibiotic medicine is used to treat the infection.  · Eye problems. A person with shingles on the face should see his or her healthcare provider right away. Shingles can cause serious  problems with vision, and even blindness.  Very rarely shingles can also lead to pneumonia, hearing problems, brain inflammation, or even death.   When to seek medical care  Contact your healthcare provider if you experience any of the following:  · Symptoms that dont go away with treatment  · A rash or blisters near your eye  · Increased drainage, fever, or rash after treatment, or severe pain that doesnt go away   How can shingles be prevented?  You can only get shingles if you have had chicken pox in the past. Those who have never had chickenpox can get the virus from you. Although instead of developing shingles, the person may get chickenpox. Until your blisters form scabs, avoid contact with others, especially the following:  · Pregnant women who have never had chickenpox or the vaccine  · Infants who were born early (prematurely) or who had low weight at birth  · People with weak immune system (for example, people receiving chemotherapy for cancer, people who have had organ transplants, or people with HIV infections)     The shingles vaccine  If youre 60 years of age or older, ask your healthcare provider if you should receive the shingles vaccine. The vaccine makes it less likely that you will develop shingles. If you do develop shingles, your symptoms will likely be milder than if you hadnt been vaccinated. Note: Although the vaccine is licensed for people 50 years of age or older, the CDC does not recommend the vaccine for those who are 50 to 59 years old.   Date Last Reviewed: 10/1/2016  © 2635-4666 [employer]. [address] [place] [address], [place] [address]. All rights reserved. This information is not intended as a substitute for professional medical care. Always follow your healthcare professional's instructions.

## 2020-10-21 ENCOUNTER — OFFICE VISIT (OUTPATIENT)
Dept: PODIATRY | Facility: CLINIC | Age: 75
End: 2020-10-21
Payer: MEDICARE

## 2020-10-21 VITALS
BODY MASS INDEX: 34.41 KG/M2 | RESPIRATION RATE: 18 BRPM | WEIGHT: 187 LBS | HEART RATE: 87 BPM | DIASTOLIC BLOOD PRESSURE: 85 MMHG | SYSTOLIC BLOOD PRESSURE: 150 MMHG | HEIGHT: 62 IN

## 2020-10-21 DIAGNOSIS — B35.1 ONYCHOMYCOSIS DUE TO DERMATOPHYTE: ICD-10-CM

## 2020-10-21 DIAGNOSIS — Z79.4 TYPE 2 DIABETES MELLITUS WITH HYPERGLYCEMIA, WITH LONG-TERM CURRENT USE OF INSULIN: Primary | ICD-10-CM

## 2020-10-21 DIAGNOSIS — L84 CORN OR CALLUS: ICD-10-CM

## 2020-10-21 DIAGNOSIS — E11.65 TYPE 2 DIABETES MELLITUS WITH HYPERGLYCEMIA, WITH LONG-TERM CURRENT USE OF INSULIN: Primary | ICD-10-CM

## 2020-10-21 PROCEDURE — 99499 NO LOS: ICD-10-PCS | Mod: HCNC,S$GLB,, | Performed by: PODIATRIST

## 2020-10-21 PROCEDURE — 11721 PR DEBRIDEMENT OF NAILS, 6 OR MORE: ICD-10-PCS | Mod: 59,Q9,HCNC,S$GLB | Performed by: PODIATRIST

## 2020-10-21 PROCEDURE — 11056 PR TRIM BENIGN HYPERKERATOTIC SKIN LESION,2-4: ICD-10-PCS | Mod: Q9,HCNC,S$GLB, | Performed by: PODIATRIST

## 2020-10-21 PROCEDURE — 99999 PR PBB SHADOW E&M-EST. PATIENT-LVL IV: CPT | Mod: PBBFAC,HCNC,, | Performed by: PODIATRIST

## 2020-10-21 PROCEDURE — 99499 UNLISTED E&M SERVICE: CPT | Mod: HCNC,S$GLB,, | Performed by: PODIATRIST

## 2020-10-21 PROCEDURE — 99999 PR PBB SHADOW E&M-EST. PATIENT-LVL IV: ICD-10-PCS | Mod: PBBFAC,HCNC,, | Performed by: PODIATRIST

## 2020-10-21 PROCEDURE — 11056 PARNG/CUTG B9 HYPRKR LES 2-4: CPT | Mod: Q9,HCNC,S$GLB, | Performed by: PODIATRIST

## 2020-10-21 PROCEDURE — 11721 DEBRIDE NAIL 6 OR MORE: CPT | Mod: 59,Q9,HCNC,S$GLB | Performed by: PODIATRIST

## 2020-10-27 NOTE — PROGRESS NOTES
Subjective:      Patient ID: Alisia Gusman is a 75 y.o. female.    Chief Complaint: PCP (Selena Montemayor 7/31/20), Diabetic Foot Exam (numbnees,burning, swelling on top and ankle ), Nail Problem (nail fungus, thick damage nails ), and Nail Care    Alisia is a 75 y.o. female who presents to the clinic for evaluation and treatment of high risk feet. Alisia has a past medical history of Adult bronchiectasis (7/26/2017), Allergy, Anemia, Arthritis, Asthma, Breast cancer (1993), Cancer (1993), Cataract, Chronic cervical radiculopathy (9/20/2012), Diabetes mellitus, Diabetes mellitus type II, Diabetes with neurologic complications, Diverticulosis, Dry eyes, Dysphagia, GERD (gastroesophageal reflux disease), Hyperlipidemia, Hypertension, Neuropathy, Scalp tenderness, Shortness of breath, and Ulcer. The patient's chief complaint is HRFC  This patient has documented high risk feet requiring routine maintenance secondary to diabetes mellitis and those secondary complications of diabetes, as mentioned..    PCP: Selena Montemayor MD    Date Last Seen by PCP:   Chief Complaint   Patient presents with    PCP     Selena Montemayor 7/31/20    Diabetic Foot Exam     numbnees,burning, swelling on top and ankle     Nail Problem     nail fungus, thick damage nails     Nail Care         Hemoglobin A1C   Date Value Ref Range Status   10/02/2020 7.8 (H) 4.0 - 5.6 % Final     Comment:     ADA Screening Guidelines:  5.7-6.4%  Consistent with prediabetes  >or=6.5%  Consistent with diabetes  High levels of fetal hemoglobin interfere with the HbA1C  assay. Heterozygous hemoglobin variants (HbS, HgC, etc)do  not significantly interfere with this assay.   However, presence of multiple variants may affect accuracy.     05/27/2020 8.1 (H) 4.0 - 5.6 % Final     Comment:     ADA Screening Guidelines:  5.7-6.4%  Consistent with prediabetes  >or=6.5%  Consistent with diabetes  High levels of fetal hemoglobin interfere with the HbA1C  assay. Heterozygous hemoglobin variants  "(HbS, HgC, etc)do  not significantly interfere with this assay.   However, presence of multiple variants may affect accuracy.     04/14/2020 8.2 (H) 4.0 - 5.6 % Final     Comment:     ADA Screening Guidelines:  5.7-6.4%  Consistent with prediabetes  >or=6.5%  Consistent with diabetes  High levels of fetal hemoglobin interfere with the HbA1C  assay. Heterozygous hemoglobin variants (HbS, HgC, etc)do  not significantly interfere with this assay.   However, presence of multiple variants may affect accuracy.         Review of Systems   Constitution: Negative for chills, decreased appetite and fever.   Cardiovascular: Negative for leg swelling.   Respiratory: Negative for cough and shortness of breath.    Skin: Positive for dry skin and nail changes. Negative for color change, flushing, itching, poor wound healing and rash.   Musculoskeletal: Negative for arthritis, joint pain, joint swelling and myalgias.   Gastrointestinal: Negative for nausea and vomiting.   Neurological: Negative for loss of balance, numbness and paresthesias.   All other systems reviewed and are negative.          Objective:       Vitals:    10/21/20 1322   BP: (!) 150/85   Pulse: 87   Resp: 18   Weight: 84.8 kg (187 lb)   Height: 5' 2" (1.575 m)   PainSc: 0-No pain        Physical Exam  Vitals signs and nursing note reviewed.   Constitutional:       Appearance: She is well-developed.   Cardiovascular:      Pulses:           Dorsalis pedis pulses are 2+ on the right side and 2+ on the left side.        Posterior tibial pulses are 1+ on the right side and 1+ on the left side.      Comments: Dorsalis pedis and posterior tibial pulses are diminished Bilaterally. Toes are cool to touch. Feet are warm proximally.There is decreased digital hair. Skin is atrophic, slightly hyperpigmented, and mildly edematous      Musculoskeletal: Normal range of motion.         General: No tenderness, deformity or signs of injury.      Right ankle: Normal.      Left " ankle: Normal.      Right foot: No swelling, crepitus or deformity.      Left foot: No swelling, crepitus or deformity.      Comments: Adequate joint range of motion without pain, limitation, nor crepitation Bilateral feet and ankle joints. Muscle strength is 5/5 in all groups bilaterally.         Lymphadenopathy:      Comments: No palpable lymph nodes   Skin:     General: Skin is warm and dry.      Coloration: Skin is not pale.      Findings: No ecchymosis, erythema, lesion or rash.      Nails: There is no clubbing.        Comments: Nails x10 are elongated by  2-4mm's, thickened by 2-4 mm's, dystrophic, and are darkened in  coloration . Xerosis Bilaterally. No open lesions, lacerations or wounds noted    Hyperkeratotic tissue noted to x 2 distal hallux b/l       Neurological:      Mental Status: She is alert and oriented to person, place, and time.      Comments: [place] 5.07 monofilamant testing is diminished Vikash feet. Sharp/dull sensation diminished Bilaterally. Light touch absent Bilaterally.       Psychiatric:         Behavior: Behavior normal.               Assessment:       Encounter Diagnoses   Name Primary?    Type 2 diabetes mellitus with hyperglycemia, with long-term current use of insulin Yes    Onychomycosis due to dermatophyte     Corn or callus          Plan:       Alisia was seen today for pcp, diabetic foot exam, nail problem and nail care.    Diagnoses and all orders for this visit:    Type 2 diabetes mellitus with hyperglycemia, with long-term current use of insulin    Onychomycosis due to dermatophyte    Corn or callus      I counseled the patient on her conditions, their implications and medical management.    Shoe inspection. Diabetic Foot Education. Patient reminded of the importance of good nutrition and blood sugar control to help prevent podiatric complications of diabetes. Patient instructed on proper foot hygeine. We discussed wearing proper shoe gear, daily foot inspections,  never walking without protective shoe gear, never putting sharp instruments to feet    - With patient's permission, nails were aggressively reduced and debrided x 10 to their soft tissue attachment mechanically and with electric , removing all offending nail and debris. Patient relates relief following the procedure. She will continue to monitor the areas daily, inspect her feet, wear protective shoe gear when ambulatory, moisturizer to maintain skin integrity and follow in this office in approximately 2-3 months, sooner p.r.n.    - After cleansing the  area w/ alcohol prep pad the above mentioned hyperkeratosis was trimmed utilizing No 15 scapel, to a smooth base with out incident. Patient tolerated this  well and reported comfort x2     - Return to clinic in 3m or sooner if problems arise

## 2020-11-11 ENCOUNTER — PATIENT OUTREACH (OUTPATIENT)
Dept: ADMINISTRATIVE | Facility: HOSPITAL | Age: 75
End: 2020-11-11

## 2020-11-11 NOTE — PROGRESS NOTES
Pt to bring Eye Exam report to her PCP visit once completed.- Vaccines Updated- Vaccine Due to be addresed by PCP. MMG Scheduled

## 2020-11-19 ENCOUNTER — TELEPHONE (OUTPATIENT)
Dept: INTERNAL MEDICINE | Facility: CLINIC | Age: 75
End: 2020-11-19

## 2020-11-19 ENCOUNTER — OFFICE VISIT (OUTPATIENT)
Dept: INTERNAL MEDICINE | Facility: CLINIC | Age: 75
End: 2020-11-19
Payer: MEDICARE

## 2020-11-19 VITALS
BODY MASS INDEX: 34.29 KG/M2 | DIASTOLIC BLOOD PRESSURE: 74 MMHG | OXYGEN SATURATION: 98 % | TEMPERATURE: 99 F | WEIGHT: 186.31 LBS | SYSTOLIC BLOOD PRESSURE: 132 MMHG | HEART RATE: 89 BPM | HEIGHT: 62 IN

## 2020-11-19 DIAGNOSIS — M48.10 DISH (DIFFUSE IDIOPATHIC SKELETAL HYPEROSTOSIS): ICD-10-CM

## 2020-11-19 DIAGNOSIS — E66.9 DIABETES MELLITUS TYPE 2 IN OBESE: ICD-10-CM

## 2020-11-19 DIAGNOSIS — R31.29 MICROSCOPIC HEMATURIA: ICD-10-CM

## 2020-11-19 DIAGNOSIS — E11.59 HYPERTENSION ASSOCIATED WITH DIABETES: Primary | ICD-10-CM

## 2020-11-19 DIAGNOSIS — E11.69 DIABETES MELLITUS TYPE 2 IN OBESE: ICD-10-CM

## 2020-11-19 DIAGNOSIS — E11.69 HYPERLIPIDEMIA ASSOCIATED WITH TYPE 2 DIABETES MELLITUS: ICD-10-CM

## 2020-11-19 DIAGNOSIS — E78.5 HYPERLIPIDEMIA ASSOCIATED WITH TYPE 2 DIABETES MELLITUS: ICD-10-CM

## 2020-11-19 DIAGNOSIS — K62.5 BRBPR (BRIGHT RED BLOOD PER RECTUM): ICD-10-CM

## 2020-11-19 DIAGNOSIS — I15.2 HYPERTENSION ASSOCIATED WITH DIABETES: Primary | ICD-10-CM

## 2020-11-19 DIAGNOSIS — C50.912 RECURRENT BREAST CANCER, LEFT: ICD-10-CM

## 2020-11-19 DIAGNOSIS — R60.0 LOWER EXTREMITY EDEMA: ICD-10-CM

## 2020-11-19 DIAGNOSIS — I70.0 AORTIC ATHEROSCLEROSIS: ICD-10-CM

## 2020-11-19 DIAGNOSIS — E11.42 DIABETIC PERIPHERAL NEUROPATHY: ICD-10-CM

## 2020-11-19 PROCEDURE — 1126F PR PAIN SEVERITY QUANTIFIED, NO PAIN PRESENT: ICD-10-PCS | Mod: HCNC,S$GLB,, | Performed by: INTERNAL MEDICINE

## 2020-11-19 PROCEDURE — 3051F PR MOST RECENT HEMOGLOBIN A1C LEVEL 7.0 - < 8.0%: ICD-10-PCS | Mod: HCNC,CPTII,S$GLB, | Performed by: INTERNAL MEDICINE

## 2020-11-19 PROCEDURE — 3288F FALL RISK ASSESSMENT DOCD: CPT | Mod: HCNC,CPTII,S$GLB, | Performed by: INTERNAL MEDICINE

## 2020-11-19 PROCEDURE — 1126F AMNT PAIN NOTED NONE PRSNT: CPT | Mod: HCNC,S$GLB,, | Performed by: INTERNAL MEDICINE

## 2020-11-19 PROCEDURE — 1101F PT FALLS ASSESS-DOCD LE1/YR: CPT | Mod: HCNC,CPTII,S$GLB, | Performed by: INTERNAL MEDICINE

## 2020-11-19 PROCEDURE — 3078F DIAST BP <80 MM HG: CPT | Mod: HCNC,CPTII,S$GLB, | Performed by: INTERNAL MEDICINE

## 2020-11-19 PROCEDURE — 1159F PR MEDICATION LIST DOCUMENTED IN MEDICAL RECORD: ICD-10-PCS | Mod: HCNC,S$GLB,, | Performed by: INTERNAL MEDICINE

## 2020-11-19 PROCEDURE — 1159F MED LIST DOCD IN RCRD: CPT | Mod: HCNC,S$GLB,, | Performed by: INTERNAL MEDICINE

## 2020-11-19 PROCEDURE — 3288F PR FALLS RISK ASSESSMENT DOCUMENTED: ICD-10-PCS | Mod: HCNC,CPTII,S$GLB, | Performed by: INTERNAL MEDICINE

## 2020-11-19 PROCEDURE — 3051F HG A1C>EQUAL 7.0%<8.0%: CPT | Mod: HCNC,CPTII,S$GLB, | Performed by: INTERNAL MEDICINE

## 2020-11-19 PROCEDURE — 3075F PR MOST RECENT SYSTOLIC BLOOD PRESS GE 130-139MM HG: ICD-10-PCS | Mod: HCNC,CPTII,S$GLB, | Performed by: INTERNAL MEDICINE

## 2020-11-19 PROCEDURE — 99215 PR OFFICE/OUTPT VISIT, EST, LEVL V, 40-54 MIN: ICD-10-PCS | Mod: HCNC,S$GLB,, | Performed by: INTERNAL MEDICINE

## 2020-11-19 PROCEDURE — 99215 OFFICE O/P EST HI 40 MIN: CPT | Mod: HCNC,S$GLB,, | Performed by: INTERNAL MEDICINE

## 2020-11-19 PROCEDURE — 99999 PR PBB SHADOW E&M-EST. PATIENT-LVL V: ICD-10-PCS | Mod: PBBFAC,HCNC,, | Performed by: INTERNAL MEDICINE

## 2020-11-19 PROCEDURE — 1101F PR PT FALLS ASSESS DOC 0-1 FALLS W/OUT INJ PAST YR: ICD-10-PCS | Mod: HCNC,CPTII,S$GLB, | Performed by: INTERNAL MEDICINE

## 2020-11-19 PROCEDURE — 3075F SYST BP GE 130 - 139MM HG: CPT | Mod: HCNC,CPTII,S$GLB, | Performed by: INTERNAL MEDICINE

## 2020-11-19 PROCEDURE — 99999 PR PBB SHADOW E&M-EST. PATIENT-LVL V: CPT | Mod: PBBFAC,HCNC,, | Performed by: INTERNAL MEDICINE

## 2020-11-19 PROCEDURE — 3078F PR MOST RECENT DIASTOLIC BLOOD PRESSURE < 80 MM HG: ICD-10-PCS | Mod: HCNC,CPTII,S$GLB, | Performed by: INTERNAL MEDICINE

## 2020-11-19 RX ORDER — CHLORTHALIDONE 50 MG/1
50 TABLET ORAL DAILY
Qty: 90 TABLET | Refills: 2 | Status: SHIPPED | OUTPATIENT
Start: 2020-11-19 | End: 2021-02-08

## 2020-11-19 NOTE — PATIENT INSTRUCTIONS
Stop hydrochlorothiazide 25mg daily. Start chlorthalidone 50mg daily. Try to keep on a low sodium diet.   Get new batteries for your blood pressure machine and check blood pressure every day and write it down.   Nurse's visit to recheck blood pressure in 2 weeks. At that time, we'll also repeat your blood work.

## 2020-11-19 NOTE — TELEPHONE ENCOUNTER
----- Message from Selena Montemayor MD sent at 11/19/2020 12:14 PM CST -----  Allie, can you make sure she has a nurse's visit in 2-3 weeks to recheck her BP and BMP?

## 2020-11-19 NOTE — PROGRESS NOTES
Subjective:       Patient ID: Alisia Gusman is a 75 y.o. female.    Chief Complaint: follow up    HPI   Had shingles 10/11/20.    Reports B ankle and feet swelling, L worse than R. This is chronic.   Saw Dr. Dao 10/27/20. Using Vicks salve on the toes for the fungus in the toes. Using it consistently.   BNP WNL 5/27/20.    BP was high even after our last visit. BP machine is broke at home however when she does check BP, SBP in the 150s.   On atenolol 1/2 of 25mg daily, hctz 25mg daily.  On Klor-Con 10mEq daily.    CT A/P 9/2012 w/ aortic atherosclerosis.    HLD - on pravastatin 40mg daily. LDL 10/2/20 66.6.     Takes calcium and vitamin D. Vit D 30 10/2/20. Also taking B12 supplement.     DM2 - on zomepic, 70/30 26 units before breakfast and 12 units before dinner plus Relion 8 units before lunch and 9 units before dinner. Plan is to get off of glimepiride. No hypoglycemia per pt. Follows w/ Dr. Nelson. Last a1c was 7.8.   Has eye exam on next Monday. Plan on laser therapy in the L eye.     UTI back in July w/ microscopic hematuria. Needs repeat UA/micro.    Reports stool was reddish like at the beginning of the month - assoc w/ painful BM. Not hard stool. Also w/ some diarrhea.   Cscope 5/8/2019 - diverticulosis. Repeat in 5 yrs. Tubular adenomas in 2015 Cscope.     C/o lower back pain that can radiate to the L hip - occurs about 2-3x/month. So painful that she can't walk w/o a cane. No falls/trauma.   Saw DEANGELO Calvillo 8/12/20 - grade 1 anterolisthesis of L4 on L5 and mild DJD and DISH.   PT was too expensive. Started back on water exercises in October - one day a week. Helps immensely w/ back.     Recurrent breast CA. Follows w/ Dr. Olvera and Dr. Nieves.  On anastrozaole 1mg daily.  MMG scheduled for Dec 2020.     DM neuropathy - on pregabalin 75mg BID. Still gets numbness/tingling in the hands and feet. No lesions. Reports Vaseline helps.     Had flu vaccine 9/28/20.     Review of Systems   "Comprehensive review of systems otherwise negative. See history/subjective section for more details.    Objective:      Physical Exam    /74   Pulse 89   Temp 98.5 °F (36.9 °C)   Ht 5' 2" (1.575 m)   Wt 84.5 kg (186 lb 4.6 oz)   LMP  (LMP Unknown) Comment: Partial  SpO2 98%   BMI 34.07 kg/m²     Gen - A+OX4, NAD  HEENT - PERRL, OP clear. MMM. TM ok.   Neck - no LAD  CV -R RR, no m/r  Chest - CTAB, no wheezing/rhonchi. Decreased BS on the L chest.   Abd - S/NT/ND/+BS  Ext -2 + B radial and DP pulses. Trace BLE edema.   Feet - no open lesions. Onychomycosis. Decrease/no sensation to monofilament up to mid shin.   Skin - no rash.   MSK - no spinal tenderness to palpation. 5/5 BUE and BLE m strength.     Previous labs reviewed.    Assessment/Plan     Alisia was seen today for annual exam.    Diagnoses and all orders for this visit:    Hypertension associated with diabetes - stop hctz. Change to chlorthalidone 50mg daily.   Get new batteries for your blood pressure machine and check blood pressure every day and write it down.   Nurse's visit to recheck blood pressure in 2 weeks. At that time, we'll also repeat your blood work.   -     chlorthalidone (HYGROTEN) 50 MG Tab; Take 1 tablet (50 mg total) by mouth once daily.    Diabetes mellitus type 2 in obese - cont current meds. F/u w/ Dr. Nelson.     Microscopic hematuria - during UTI. Repeat UA now that she does not have any symptoms. If persistent, needs uro.   -     Urinalysis; Future  -     Urinalysis Microscopic; Future    Hyperlipidemia associated with type 2 diabetes mellitus - cont pravastatin.     Lower extremity edema - trace. chlorthalidone as above.     Aortic atherosclerosis - cont asa and pravastatin.    BRBPR (bright red blood per rectum)  -     Occult blood x 1, stool; Future  -     Occult blood x 1, stool; Future  -     Occult blood x 1, stool; Future  -     CBC Auto Differential; Future    DISH (diffuse idiopathic skeletal hyperostosis) - feels " that water aerobics has helped.     Recurrent breast cancer, left - f/u w/ Dr. Nieves and Dr. Olvera. MMG in Dec as scheduled. Cont anastrozole.     Diabetic peripheral neuropathy - cont lyrica.   -     Protein Electrophoresis, Serum; Future    Pt will ask about shingrix at local pharmacy.   Pt will get eye exam w/ outside physician and will have them fax me the report.     45 minutes was spent on patient with over half the time was spent in coordination of care and/or counseling.  Follow up in about 4 months (around 3/19/2021).      Selena Montemayor MD  Department of Internal Medicine - [facility]  8:46 AM

## 2020-11-24 ENCOUNTER — TELEPHONE (OUTPATIENT)
Dept: INTERNAL MEDICINE | Facility: CLINIC | Age: 75
End: 2020-11-24

## 2020-12-01 ENCOUNTER — PATIENT OUTREACH (OUTPATIENT)
Dept: ADMINISTRATIVE | Facility: OTHER | Age: 75
End: 2020-12-01

## 2020-12-01 NOTE — LETTER
AUTHORIZATION FOR RELEASE OF   CONFIDENTIAL INFORMATION    Dear Kike Cardoza III, OD,    We are seeing Alisia Gusman, date of birth 1945, in the clinic at [facility]. Selena Montemayor MD is the patient's PCP. Alisia Gusman has an outstanding lab/procedure at the time we reviewed her chart. In order to help keep her health information updated, she has authorized us to request the following medical record(s):        (  )  MAMMOGRAM                                      (  )  COLONOSCOPY      (  )  PAP SMEAR                                          (  )  OUTSIDE LAB RESULTS     (  )  DEXA SCAN                                          ( x )  EYE EXAM for 2020            (  )  FOOT EXAM                                          (  )  ENTIRE RECORD     (  )  OUTSIDE IMMUNIZATIONS                 (  )  _______________         Please fax records to [facility], Mary Yu, MD, [phone]     If you have any questions, please contact Stefani aGle at [phone].           Patient Name: Alisia Gusman  : 1945  Patient Phone #: [phone]      Alexis Coyne was admitted to [facility] from ED via cart accompanied by Other friend.   Reason for hospitalization is phlebitis.   Upon arrival, patient is stable. Patient has history significant for PONV, RAD  Patient oriented to bed, call light, , room and unit.  Patient provided with the following educational materials upon admission:safety, infection control and pain.   Level of understanding patient verbalized understanding.   Admission orders received at this time.   CNA notified of patient arrival.   See Epic documentation for patient individualized nursing care plan.

## 2020-12-02 ENCOUNTER — OFFICE VISIT (OUTPATIENT)
Dept: ENDOCRINOLOGY | Facility: CLINIC | Age: 75
End: 2020-12-02
Payer: MEDICARE

## 2020-12-02 VITALS
WEIGHT: 178.56 LBS | DIASTOLIC BLOOD PRESSURE: 78 MMHG | BODY MASS INDEX: 32.86 KG/M2 | HEIGHT: 62 IN | OXYGEN SATURATION: 98 % | RESPIRATION RATE: 18 BRPM | HEART RATE: 91 BPM | SYSTOLIC BLOOD PRESSURE: 130 MMHG

## 2020-12-02 DIAGNOSIS — E11.59 HYPERTENSION ASSOCIATED WITH DIABETES: ICD-10-CM

## 2020-12-02 DIAGNOSIS — Z79.4 TYPE 2 DIABETES MELLITUS WITH DIABETIC POLYNEUROPATHY, WITH LONG-TERM CURRENT USE OF INSULIN: ICD-10-CM

## 2020-12-02 DIAGNOSIS — E11.42 TYPE 2 DIABETES MELLITUS WITH DIABETIC POLYNEUROPATHY, WITH LONG-TERM CURRENT USE OF INSULIN: ICD-10-CM

## 2020-12-02 DIAGNOSIS — Z79.811 USE OF AROMATASE INHIBITORS: ICD-10-CM

## 2020-12-02 DIAGNOSIS — I15.2 HYPERTENSION ASSOCIATED WITH DIABETES: ICD-10-CM

## 2020-12-02 DIAGNOSIS — M85.852 OSTEOPENIA OF NECK OF LEFT FEMUR: ICD-10-CM

## 2020-12-02 PROCEDURE — 3051F PR MOST RECENT HEMOGLOBIN A1C LEVEL 7.0 - < 8.0%: ICD-10-PCS | Mod: HCNC,CPTII,S$GLB, | Performed by: INTERNAL MEDICINE

## 2020-12-02 PROCEDURE — 3075F SYST BP GE 130 - 139MM HG: CPT | Mod: HCNC,CPTII,S$GLB, | Performed by: INTERNAL MEDICINE

## 2020-12-02 PROCEDURE — 1159F MED LIST DOCD IN RCRD: CPT | Mod: HCNC,S$GLB,, | Performed by: INTERNAL MEDICINE

## 2020-12-02 PROCEDURE — 3078F PR MOST RECENT DIASTOLIC BLOOD PRESSURE < 80 MM HG: ICD-10-PCS | Mod: HCNC,CPTII,S$GLB, | Performed by: INTERNAL MEDICINE

## 2020-12-02 PROCEDURE — 3288F PR FALLS RISK ASSESSMENT DOCUMENTED: ICD-10-PCS | Mod: HCNC,CPTII,S$GLB, | Performed by: INTERNAL MEDICINE

## 2020-12-02 PROCEDURE — 3075F PR MOST RECENT SYSTOLIC BLOOD PRESS GE 130-139MM HG: ICD-10-PCS | Mod: HCNC,CPTII,S$GLB, | Performed by: INTERNAL MEDICINE

## 2020-12-02 PROCEDURE — 3078F DIAST BP <80 MM HG: CPT | Mod: HCNC,CPTII,S$GLB, | Performed by: INTERNAL MEDICINE

## 2020-12-02 PROCEDURE — 99999 PR PBB SHADOW E&M-EST. PATIENT-LVL V: CPT | Mod: PBBFAC,HCNC,, | Performed by: INTERNAL MEDICINE

## 2020-12-02 PROCEDURE — 1101F PT FALLS ASSESS-DOCD LE1/YR: CPT | Mod: HCNC,CPTII,S$GLB, | Performed by: INTERNAL MEDICINE

## 2020-12-02 PROCEDURE — 99214 PR OFFICE/OUTPT VISIT, EST, LEVL IV, 30-39 MIN: ICD-10-PCS | Mod: HCNC,S$GLB,, | Performed by: INTERNAL MEDICINE

## 2020-12-02 PROCEDURE — 3051F HG A1C>EQUAL 7.0%<8.0%: CPT | Mod: HCNC,CPTII,S$GLB, | Performed by: INTERNAL MEDICINE

## 2020-12-02 PROCEDURE — 3288F FALL RISK ASSESSMENT DOCD: CPT | Mod: HCNC,CPTII,S$GLB, | Performed by: INTERNAL MEDICINE

## 2020-12-02 PROCEDURE — 1126F AMNT PAIN NOTED NONE PRSNT: CPT | Mod: HCNC,S$GLB,, | Performed by: INTERNAL MEDICINE

## 2020-12-02 PROCEDURE — 99999 PR PBB SHADOW E&M-EST. PATIENT-LVL V: ICD-10-PCS | Mod: PBBFAC,HCNC,, | Performed by: INTERNAL MEDICINE

## 2020-12-02 PROCEDURE — 1159F PR MEDICATION LIST DOCUMENTED IN MEDICAL RECORD: ICD-10-PCS | Mod: HCNC,S$GLB,, | Performed by: INTERNAL MEDICINE

## 2020-12-02 PROCEDURE — 99214 OFFICE O/P EST MOD 30 MIN: CPT | Mod: HCNC,S$GLB,, | Performed by: INTERNAL MEDICINE

## 2020-12-02 PROCEDURE — 1126F PR PAIN SEVERITY QUANTIFIED, NO PAIN PRESENT: ICD-10-PCS | Mod: HCNC,S$GLB,, | Performed by: INTERNAL MEDICINE

## 2020-12-02 PROCEDURE — 1101F PR PT FALLS ASSESS DOC 0-1 FALLS W/OUT INJ PAST YR: ICD-10-PCS | Mod: HCNC,CPTII,S$GLB, | Performed by: INTERNAL MEDICINE

## 2020-12-02 NOTE — PROGRESS NOTES
Care Everywhere: updated  Immunization: updated  Health Maintenance: updated  Media Review: review for outside eye exam report  Legacy Review:   Order placed:   Upcoming appts:mammogram 12.21  efax sent to Dr. Cardoza office to obtain eye exam report

## 2020-12-02 NOTE — ASSESSMENT & PLAN NOTE
Given age and comorbidities A1C should be 7 - 7.5%.   Continue relion 70/30 twice daily, glimepiride and ozempic 1 mg weekly  - had recommended to stop VIEIRA but did not remember. Denies hypoglycemia and no hypoglycemia on review of extensive blood sugars logs.     Today will repeat A1C.

## 2020-12-02 NOTE — PROGRESS NOTES
Subjective:      Patient ID: Alisia Gusman is a 75 y.o. female.    Chief Complaint:  Diabetes      History of Present Illness  Ms. Gusman is a 75 year old woman who is here for T2DM that is well controlled and complicated by neuropathy. T2DM was diagnosed almost forty years ago. Has used insulin for at least ten years.   She was recently diagnosed with breast cancer recurrence for which she underwent mastectomy and is currently on anastrozole.      has dementia (late stages), she is primary care giver. Since her last visit her blood pressure medications were adjusted due to lower extremity swelling.     Blood sugar logs:  Fastin, 187, 147, 148, 144, 158, 144, 176, 165, 156, 162, 157, 139  Lunch: x, x, 205, 214, x, 192, 193, x, x, 140, 141  Dinner: 131, x, x 214, 149, 165, 225, 188, 119, 194  Bedtime: 144, 274, 220, 178, x, 279, x, 208, 159, 212    Blood pressure is wnl    New regimen:   Ozempic --> 1 mg weekly - tolerating well (Tuesday)  70/30 Relion insulin: 32 units before breakfast and 24 units before dinner   Glimepiride -- patient does not recall that I had asked her to stop the medication.     Previously tried   Metformin     ADA STANDARDS of CARE:        ACE inhibitor or angiotensin II receptor blocker:  No microalbuminuria, on chlorthalidone and K supplements         Statin drug:  Pravastatin 40 mg daily         Low dose ASA: 81 mg daily         Microalbumin: 2020 - normal        Eye exam: up to date        Flu shot and pneumonia vaccine - up to date    Other medication:  Anastrozole 1 mg daily - no side effects  Calcium and vitamin D daily     Review of Systems   Constitutional: Negative for fever.   HENT: Negative for congestion.    Eyes: Negative for visual disturbance.   Respiratory: Negative for shortness of breath.    Cardiovascular: Negative for chest pain.   Gastrointestinal: Negative for abdominal pain.   Genitourinary: Negative for dysuria.   Musculoskeletal: Negative for  "arthralgias.   Skin: Negative for rash.   Neurological: Negative for weakness.   Hematological: Does not bruise/bleed easily.   Psychiatric/Behavioral: Negative for sleep disturbance.       Objective:   Physical Exam  Vitals:    12/02/20 0854   BP: 130/78   Pulse: 91   Resp: 18   SpO2: 98%   Weight: 81 kg (178 lb 9.2 oz)   Height: 5' 2" (1.575 m)       BP Readings from Last 3 Encounters:   12/02/20 130/78   11/19/20 132/74   10/21/20 (!) 150/85     Wt Readings from Last 1 Encounters:   12/02/20 0854 81 kg (178 lb 9.2 oz)         Body mass index is 32.66 kg/m².    Lab Review:   Lab Results   Component Value Date    HGBA1C 7.8 (H) 10/02/2020     Lab Results   Component Value Date    CHOL 154 10/02/2020    HDL 56 10/02/2020    LDLCALC 66.6 10/02/2020    TRIG 157 (H) 10/02/2020    CHOLHDL 36.4 10/02/2020     Lab Results   Component Value Date     07/31/2020    K 3.5 07/31/2020     07/31/2020    CO2 30 (H) 07/31/2020     (H) 07/31/2020    BUN 14 07/31/2020    CREATININE 1.0 07/31/2020    CALCIUM 10.2 07/31/2020    PROT 7.0 07/31/2020    ALBUMIN 3.6 07/31/2020    BILITOT 1.0 07/31/2020    ALKPHOS 62 07/31/2020    AST 20 07/31/2020    ALT 15 07/31/2020    ANIONGAP 9 07/31/2020    ESTGFRAFRICA >60.0 07/31/2020    EGFRNONAA 54.5 (A) 07/31/2020    TSH 1.458 10/02/2020     Results for CINDA TATUM (MRN [id number]) as of 12/2/2020 08:58   Ref. Range 10/2/2020 09:17   Cholesterol Latest Ref Range: 120 - 199 mg/dL 154   HDL Latest Ref Range: 40 - 75 mg/dL 56   HDL/Cholesterol Ratio Latest Ref Range: 20.0 - 50.0 % 36.4   LDL Cholesterol External Latest Ref Range: 63.0 - 159.0 mg/dL 66.6   Non-HDL Cholesterol Latest Units: mg/dL 98   Total Cholesterol/HDL Ratio Latest Ref Range: 2.0 - 5.0  2.8   Triglycerides Latest Ref Range: 30 - 150 mg/dL 157 (H)     Results for CINDA TATUM (MRN [id number]) as of 12/2/2020 08:58   Ref. Range 10/2/2020 09:17   Vit D, 25-Hydroxy Latest Ref Range: 30 - 96 ng/mL 30     DXA " 5/2020  FINDINGS:  Lumbar spine (L1-L4):   BMD is 0.930 g/cm2, T-score is -1.1, and Z-score is 0.7. The TBS T-score (L1-L4) is -3.1.     Total hip:                    BMD is 0.841 g/cm2, T-score is -0.8, and Z-score is 0.1.     Femoral neck:             BMD is 0.691 g/cm2, T-score is -1.4, and Z-score is -0.2.     There is a 7.3% risk of a major osteoporotic fracture and a 1.4% risk of hip fracture in the next 10 years (FRAX adjusted for TBS).     Compared with previous DXA, BMD at the lumbar spine has remained stable, and the BMD at the total hip has increased, this reflects a difference in imaging technique and not a true change in BMD.    Assessment and Plan     Type 2 diabetes mellitus with diabetic polyneuropathy, with long-term current use of insulin  Given age and comorbidities A1C should be 7 - 7.5%.   Continue relion 70/30 twice daily, glimepiride and ozempic 1 mg weekly  - had recommended to stop VIEIRA but did not remember. Denies hypoglycemia and no hypoglycemia on review of extensive blood sugars logs.     Today will repeat A1C.          Hypertension associated with diabetes  microalbumin is normal.   BG in good control       Osteopenia of neck of femur  Anastrozole with osteopenia   Currently on vitamin D and calcium   DXA in 2022.     Use of aromatase inhibitors  F/u DXA  No falls or fractures.

## 2020-12-04 ENCOUNTER — DOCUMENTATION ONLY (OUTPATIENT)
Dept: INTERNAL MEDICINE | Facility: CLINIC | Age: 75
End: 2020-12-04

## 2020-12-04 ENCOUNTER — LAB VISIT (OUTPATIENT)
Dept: LAB | Facility: HOSPITAL | Age: 75
End: 2020-12-04
Attending: INTERNAL MEDICINE
Payer: MEDICARE

## 2020-12-04 DIAGNOSIS — K62.5 BRBPR (BRIGHT RED BLOOD PER RECTUM): ICD-10-CM

## 2020-12-04 DIAGNOSIS — E11.59 HYPERTENSION ASSOCIATED WITH DIABETES: ICD-10-CM

## 2020-12-04 DIAGNOSIS — I15.2 HYPERTENSION ASSOCIATED WITH DIABETES: ICD-10-CM

## 2020-12-04 PROCEDURE — 82272 OCCULT BLD FECES 1-3 TESTS: CPT | Mod: HCNC

## 2020-12-05 LAB
OB PNL STL: NEGATIVE
OB PNL STL: NEGATIVE

## 2020-12-07 ENCOUNTER — TELEPHONE (OUTPATIENT)
Dept: INTERNAL MEDICINE | Facility: CLINIC | Age: 75
End: 2020-12-07

## 2020-12-07 DIAGNOSIS — E11.69 HYPERLIPIDEMIA ASSOCIATED WITH TYPE 2 DIABETES MELLITUS: Primary | ICD-10-CM

## 2020-12-07 DIAGNOSIS — E78.5 HYPERLIPIDEMIA ASSOCIATED WITH TYPE 2 DIABETES MELLITUS: Primary | ICD-10-CM

## 2020-12-07 NOTE — TELEPHONE ENCOUNTER
----- Message from Racheal Serrano sent at 12/7/2020  8:25 AM CST -----  Contact: Patient [phone]  Patient stated that they missed a call from you    Please call and advise.    Thank You

## 2020-12-07 NOTE — TELEPHONE ENCOUNTER
No but looks like the lab had some issue w/ her labs? Please call lab to make sure. If I need to put back in the orders I can. Urine shows a few white blood cells but doesn't look like a urinary tract infection. The blood that was previously seen is resolved now. There is no blood in the stool also.

## 2020-12-07 NOTE — TELEPHONE ENCOUNTER
Spoke to lab     BMP canceled as specimen not received in the lab - was drawn but never made it to the lab.     Occult blood - only two samples were received not three.

## 2020-12-08 NOTE — TELEPHONE ENCOUNTER
Please let her know about the blood work. Let her know I apologize for the confusion! Reschedule her CBC, CMP, SPEP. Thanks! Also let her know the previous blood in the urine has resolved and there's no blood seen in the stool.

## 2020-12-09 ENCOUNTER — LAB VISIT (OUTPATIENT)
Dept: LAB | Facility: HOSPITAL | Age: 75
End: 2020-12-09
Attending: INTERNAL MEDICINE
Payer: MEDICARE

## 2020-12-09 DIAGNOSIS — E11.69 HYPERLIPIDEMIA ASSOCIATED WITH TYPE 2 DIABETES MELLITUS: ICD-10-CM

## 2020-12-09 DIAGNOSIS — E78.5 HYPERLIPIDEMIA ASSOCIATED WITH TYPE 2 DIABETES MELLITUS: ICD-10-CM

## 2020-12-09 LAB
ALBUMIN SERPL BCP-MCNC: 3.5 G/DL (ref 3.5–5.2)
ALP SERPL-CCNC: 73 U/L (ref 55–135)
ALT SERPL W/O P-5'-P-CCNC: 14 U/L (ref 10–44)
ANION GAP SERPL CALC-SCNC: 11 MMOL/L (ref 8–16)
AST SERPL-CCNC: 15 U/L (ref 10–40)
BILIRUB SERPL-MCNC: 0.9 MG/DL (ref 0.1–1)
BUN SERPL-MCNC: 14 MG/DL (ref 8–23)
CALCIUM SERPL-MCNC: 9.7 MG/DL (ref 8.7–10.5)
CHLORIDE SERPL-SCNC: 98 MMOL/L (ref 95–110)
CO2 SERPL-SCNC: 33 MMOL/L (ref 23–29)
CREAT SERPL-MCNC: 0.9 MG/DL (ref 0.5–1.4)
EST. GFR  (AFRICAN AMERICAN): >60 ML/MIN/1.73 M^2
EST. GFR  (NON AFRICAN AMERICAN): >60 ML/MIN/1.73 M^2
GLUCOSE SERPL-MCNC: 193 MG/DL (ref 70–110)
POTASSIUM SERPL-SCNC: 3 MMOL/L (ref 3.5–5.1)
PROT SERPL-MCNC: 7.3 G/DL (ref 6–8.4)
SODIUM SERPL-SCNC: 142 MMOL/L (ref 136–145)

## 2020-12-09 PROCEDURE — 36415 COLL VENOUS BLD VENIPUNCTURE: CPT | Mod: HCNC

## 2020-12-09 PROCEDURE — 80053 COMPREHEN METABOLIC PANEL: CPT | Mod: HCNC

## 2020-12-10 ENCOUNTER — TELEPHONE (OUTPATIENT)
Dept: INTERNAL MEDICINE | Facility: CLINIC | Age: 75
End: 2020-12-10

## 2020-12-10 ENCOUNTER — OFFICE VISIT (OUTPATIENT)
Dept: HEMATOLOGY/ONCOLOGY | Facility: CLINIC | Age: 75
End: 2020-12-10
Payer: MEDICARE

## 2020-12-10 VITALS
HEART RATE: 88 BPM | OXYGEN SATURATION: 97 % | RESPIRATION RATE: 16 BRPM | BODY MASS INDEX: 33.47 KG/M2 | DIASTOLIC BLOOD PRESSURE: 73 MMHG | SYSTOLIC BLOOD PRESSURE: 149 MMHG | TEMPERATURE: 98 F | WEIGHT: 181.88 LBS | HEIGHT: 62 IN

## 2020-12-10 DIAGNOSIS — E87.6 DRUG-INDUCED HYPOKALEMIA: ICD-10-CM

## 2020-12-10 DIAGNOSIS — T50.905A DRUG-INDUCED HYPOKALEMIA: ICD-10-CM

## 2020-12-10 DIAGNOSIS — C50.912 RECURRENT BREAST CANCER, LEFT: Primary | ICD-10-CM

## 2020-12-10 DIAGNOSIS — Z79.811 PROPHYLACTIC USE OF ANASTROZOLE (ARIMIDEX): ICD-10-CM

## 2020-12-10 PROCEDURE — 3078F DIAST BP <80 MM HG: CPT | Mod: HCNC,CPTII,S$GLB, | Performed by: INTERNAL MEDICINE

## 2020-12-10 PROCEDURE — 3288F PR FALLS RISK ASSESSMENT DOCUMENTED: ICD-10-PCS | Mod: HCNC,CPTII,S$GLB, | Performed by: INTERNAL MEDICINE

## 2020-12-10 PROCEDURE — 1159F PR MEDICATION LIST DOCUMENTED IN MEDICAL RECORD: ICD-10-PCS | Mod: HCNC,S$GLB,, | Performed by: INTERNAL MEDICINE

## 2020-12-10 PROCEDURE — 99999 PR PBB SHADOW E&M-EST. PATIENT-LVL III: CPT | Mod: PBBFAC,HCNC,, | Performed by: INTERNAL MEDICINE

## 2020-12-10 PROCEDURE — 3077F SYST BP >= 140 MM HG: CPT | Mod: HCNC,CPTII,S$GLB, | Performed by: INTERNAL MEDICINE

## 2020-12-10 PROCEDURE — 1101F PT FALLS ASSESS-DOCD LE1/YR: CPT | Mod: HCNC,CPTII,S$GLB, | Performed by: INTERNAL MEDICINE

## 2020-12-10 PROCEDURE — 1101F PR PT FALLS ASSESS DOC 0-1 FALLS W/OUT INJ PAST YR: ICD-10-PCS | Mod: HCNC,CPTII,S$GLB, | Performed by: INTERNAL MEDICINE

## 2020-12-10 PROCEDURE — 3288F FALL RISK ASSESSMENT DOCD: CPT | Mod: HCNC,CPTII,S$GLB, | Performed by: INTERNAL MEDICINE

## 2020-12-10 PROCEDURE — 99999 PR PBB SHADOW E&M-EST. PATIENT-LVL III: ICD-10-PCS | Mod: PBBFAC,HCNC,, | Performed by: INTERNAL MEDICINE

## 2020-12-10 PROCEDURE — 3077F PR MOST RECENT SYSTOLIC BLOOD PRESSURE >= 140 MM HG: ICD-10-PCS | Mod: HCNC,CPTII,S$GLB, | Performed by: INTERNAL MEDICINE

## 2020-12-10 PROCEDURE — 1126F AMNT PAIN NOTED NONE PRSNT: CPT | Mod: HCNC,S$GLB,, | Performed by: INTERNAL MEDICINE

## 2020-12-10 PROCEDURE — 1126F PR PAIN SEVERITY QUANTIFIED, NO PAIN PRESENT: ICD-10-PCS | Mod: HCNC,S$GLB,, | Performed by: INTERNAL MEDICINE

## 2020-12-10 PROCEDURE — 1159F MED LIST DOCD IN RCRD: CPT | Mod: HCNC,S$GLB,, | Performed by: INTERNAL MEDICINE

## 2020-12-10 PROCEDURE — 3078F PR MOST RECENT DIASTOLIC BLOOD PRESSURE < 80 MM HG: ICD-10-PCS | Mod: HCNC,CPTII,S$GLB, | Performed by: INTERNAL MEDICINE

## 2020-12-10 PROCEDURE — 99213 PR OFFICE/OUTPT VISIT, EST, LEVL III, 20-29 MIN: ICD-10-PCS | Mod: HCNC,S$GLB,, | Performed by: INTERNAL MEDICINE

## 2020-12-10 PROCEDURE — 99213 OFFICE O/P EST LOW 20 MIN: CPT | Mod: HCNC,S$GLB,, | Performed by: INTERNAL MEDICINE

## 2020-12-10 RX ORDER — POTASSIUM CHLORIDE 750 MG/1
20 TABLET, EXTENDED RELEASE ORAL DAILY
Qty: 180 TABLET | Refills: 3 | Status: SHIPPED | OUTPATIENT
Start: 2020-12-10 | End: 2021-01-29

## 2020-12-10 NOTE — TELEPHONE ENCOUNTER
Please call and notify pt:    Potassium is mildly low - likely due to blood pressure medicine/fluid pill. Recommend to inc to potassium 20mEq daily. Repeat BMP in 2 weeks.

## 2020-12-10 NOTE — PROGRESS NOTES
Subjective:       Patient ID: Alisia Gusman is a 75 y.o. female.    Chief Complaint: No chief complaint on file.    HPI     Mrs. Gusman presents to the clinic for evaluation.  Briefly, she is a 75-year-old  female with a remote history of left sided breast cancer treated in 1993 with a lumpectomy, radiation therapy, and 5 years of tamoxifen.  Apparently the size of her tumor in 1993 was 4 mm.  She completed her 5 years of tamoxifen and she was followed expectantly.      An in breast recurrence was noted in January 2020.  A core biopsy on 01/15/2020 showed an infiltrating ductal carcinoma that was ER positive MA negative and HER 2 negative.  On 02/20/2020 she underwent a left mastectomy and a sentinel lymph node biopsy.  The sentinel lymph node was negative.  On the  mastectomy specimen there was a 2 mm area of DCIS and there was a 6 mm area of invasive cancer.  Resection margins were clear.  She has made an uneventful recovery and was started on anastrozole in early April 2020.      Review of Systems    Overall she feels well. She states that she has tolerated the anastrozole well so far and has not experienced any side effects.  ECOG PS is 1.  She denies any anxiety, depression, easy bruising, fevers, chills, night  sweats, weight loss, nausea, vomiting, diarrhea, constipation, diplopia,   blurred vision, headache, chest pain, palpitations, shortness of breath, breast pain, abdominal pain, extremity pain, or difficulty ambulating.  The remainder of the ten-point ROS, including general, skin, lymph, H/N, cardiorespiratory, GI, , Neuro, Endocrine, and psychiatric is negative.     Objective:      Physical Exam    She is alert, oriented to time, place, person, pleasant, well nourished, in no acute physical distress.                                    VITAL SIGNS:  Reviewed                                      HEENT:  Normal.  There are no nasal, oral, lip, gingival, auricular, lid,    or  conjunctival lesions.  Mucosae are moist and pink, and there is no        thrush.  Pupils are equal, reactive to light and accommodation.              Extraocular muscle movements are intact.  Dentition is fair.  There is no frontal or maxillary tenderness.                                     NECK:  Supple without JVD, adenopathy, or thyromegaly.                       LUNGS:  Clear to auscultation without wheezing, rales, or rhonchi.           CARDIOVASCULAR:  Reveals an S1, S2, no murmurs, no rubs, no gallops.         ABDOMEN:  Soft, nontender, without organomegaly.  Bowel sounds are    present.                                                                     EXTREMITIES:  No cyanosis, clubbing, or edema.                               BREASTS:  She is status post left mastectomy with a well-healed incision.    There are no masses in either breast.                                        LYMPHATIC:  There is no cervical, axillary, or supraclavicular adenopathy.   SKIN:  Warm and moist, without petechiae, rashes, induration, or ecchymoses.           NEUROLOGIC:  DTRs are 0-1+ bilaterally, symmetrical, motor function is 5/5,  and cranial nerves are  within normal limits.    Assessment:       1. Recurrent breast cancer, left    2. Prophylactic use of anastrozole (Arimidex)      Plan:         I had a long discussion with Mrs. Gusman.  I recommended that she remain on anastrozole which she should take for a minimum of 5 years.  I will see her again in March 2021.    Her multiple questions were answered to her satisfaction.

## 2020-12-10 NOTE — TELEPHONE ENCOUNTER
Pt informed that her potassium is mildly low and to increase her potassium supplement to 20meq daily. Pt verbally understood. Labs sheduled for December 28th.

## 2020-12-21 ENCOUNTER — HOSPITAL ENCOUNTER (OUTPATIENT)
Dept: RADIOLOGY | Facility: HOSPITAL | Age: 75
Discharge: HOME | End: 2020-12-21
Attending: SURGERY
Payer: MEDICARE

## 2020-12-21 VITALS — HEIGHT: 62 IN | WEIGHT: 188.5 LBS | BODY MASS INDEX: 34.69 KG/M2

## 2020-12-21 DIAGNOSIS — Z12.31 ENCOUNTER FOR SCREENING MAMMOGRAM FOR MALIGNANT NEOPLASM OF BREAST: ICD-10-CM

## 2020-12-21 DIAGNOSIS — Z12.39 SCREENING FOR MALIGNANT NEOPLASM OF BREAST: ICD-10-CM

## 2020-12-21 PROCEDURE — 77067 SCR MAMMO BI INCL CAD: CPT | Mod: TC,HCNC

## 2020-12-21 PROCEDURE — 77063 MAMMO DIGITAL SCREENING RIGHT WITH TOMOSYNTHESIS_CAD: ICD-10-PCS | Mod: 26,HCNC,, | Performed by: RADIOLOGY

## 2020-12-21 PROCEDURE — 77063 BREAST TOMOSYNTHESIS BI: CPT | Mod: 26,HCNC,, | Performed by: RADIOLOGY

## 2020-12-21 PROCEDURE — 77067 SCR MAMMO BI INCL CAD: CPT | Mod: 26,HCNC,, | Performed by: RADIOLOGY

## 2020-12-21 PROCEDURE — 77067 MAMMO DIGITAL SCREENING RIGHT WITH TOMOSYNTHESIS_CAD: ICD-10-PCS | Mod: 26,HCNC,, | Performed by: RADIOLOGY

## 2020-12-28 ENCOUNTER — PES CALL (OUTPATIENT)
Dept: ADMINISTRATIVE | Facility: CLINIC | Age: 75
End: 2020-12-28

## 2021-01-04 ENCOUNTER — TELEPHONE (OUTPATIENT)
Dept: INTERNAL MEDICINE | Facility: CLINIC | Age: 76
End: 2021-01-04

## 2021-01-04 ENCOUNTER — LAB VISIT (OUTPATIENT)
Dept: LAB | Facility: HOSPITAL | Age: 76
End: 2021-01-04
Attending: INTERNAL MEDICINE
Payer: MEDICARE

## 2021-01-04 DIAGNOSIS — E83.42 HYPOMAGNESEMIA: Primary | ICD-10-CM

## 2021-01-04 DIAGNOSIS — K62.5 BRBPR (BRIGHT RED BLOOD PER RECTUM): ICD-10-CM

## 2021-01-04 DIAGNOSIS — E87.6 DRUG-INDUCED HYPOKALEMIA: ICD-10-CM

## 2021-01-04 DIAGNOSIS — E87.6 HYPOKALEMIA: ICD-10-CM

## 2021-01-04 DIAGNOSIS — Z79.4 TYPE 2 DIABETES MELLITUS WITH DIABETIC POLYNEUROPATHY, WITH LONG-TERM CURRENT USE OF INSULIN: ICD-10-CM

## 2021-01-04 DIAGNOSIS — E11.42 TYPE 2 DIABETES MELLITUS WITH DIABETIC POLYNEUROPATHY, WITH LONG-TERM CURRENT USE OF INSULIN: ICD-10-CM

## 2021-01-04 DIAGNOSIS — E11.42 DIABETIC PERIPHERAL NEUROPATHY: ICD-10-CM

## 2021-01-04 DIAGNOSIS — T50.905A DRUG-INDUCED HYPOKALEMIA: ICD-10-CM

## 2021-01-04 LAB
ANION GAP SERPL CALC-SCNC: 12 MMOL/L (ref 8–16)
BASOPHILS # BLD AUTO: 0.04 K/UL (ref 0–0.2)
BASOPHILS NFR BLD: 0.5 % (ref 0–1.9)
BUN SERPL-MCNC: 17 MG/DL (ref 8–23)
CALCIUM SERPL-MCNC: 9.8 MG/DL (ref 8.7–10.5)
CHLORIDE SERPL-SCNC: 99 MMOL/L (ref 95–110)
CO2 SERPL-SCNC: 30 MMOL/L (ref 23–29)
CREAT SERPL-MCNC: 0.9 MG/DL (ref 0.5–1.4)
DIFFERENTIAL METHOD: ABNORMAL
EOSINOPHIL # BLD AUTO: 0.1 K/UL (ref 0–0.5)
EOSINOPHIL NFR BLD: 0.8 % (ref 0–8)
ERYTHROCYTE [DISTWIDTH] IN BLOOD BY AUTOMATED COUNT: 14.6 % (ref 11.5–14.5)
EST. GFR  (AFRICAN AMERICAN): >60 ML/MIN/1.73 M^2
EST. GFR  (NON AFRICAN AMERICAN): >60 ML/MIN/1.73 M^2
ESTIMATED AVG GLUCOSE: 171 MG/DL (ref 68–131)
GLUCOSE SERPL-MCNC: 204 MG/DL (ref 70–110)
HBA1C MFR BLD HPLC: 7.6 % (ref 4–5.6)
HCT VFR BLD AUTO: 42.2 % (ref 37–48.5)
HGB BLD-MCNC: 13.5 G/DL (ref 12–16)
IMM GRANULOCYTES # BLD AUTO: 0.04 K/UL (ref 0–0.04)
IMM GRANULOCYTES NFR BLD AUTO: 0.5 % (ref 0–0.5)
LYMPHOCYTES # BLD AUTO: 2.4 K/UL (ref 1–4.8)
LYMPHOCYTES NFR BLD: 32.5 % (ref 18–48)
MAGNESIUM SERPL-MCNC: 1.4 MG/DL (ref 1.6–2.6)
MCH RBC QN AUTO: 28.1 PG (ref 27–31)
MCHC RBC AUTO-ENTMCNC: 32 G/DL (ref 32–36)
MCV RBC AUTO: 88 FL (ref 82–98)
MONOCYTES # BLD AUTO: 0.6 K/UL (ref 0.3–1)
MONOCYTES NFR BLD: 8 % (ref 4–15)
NEUTROPHILS # BLD AUTO: 4.2 K/UL (ref 1.8–7.7)
NEUTROPHILS NFR BLD: 57.7 % (ref 38–73)
NRBC BLD-RTO: 0 /100 WBC
PLATELET # BLD AUTO: 176 K/UL (ref 150–350)
PMV BLD AUTO: 11.7 FL (ref 9.2–12.9)
POTASSIUM SERPL-SCNC: 3.2 MMOL/L (ref 3.5–5.1)
RBC # BLD AUTO: 4.8 M/UL (ref 4–5.4)
SODIUM SERPL-SCNC: 141 MMOL/L (ref 136–145)
WBC # BLD AUTO: 7.36 K/UL (ref 3.9–12.7)

## 2021-01-04 PROCEDURE — 83735 ASSAY OF MAGNESIUM: CPT | Mod: HCNC

## 2021-01-04 PROCEDURE — 83036 HEMOGLOBIN GLYCOSYLATED A1C: CPT | Mod: HCNC

## 2021-01-04 PROCEDURE — 80048 BASIC METABOLIC PNL TOTAL CA: CPT | Mod: HCNC

## 2021-01-04 PROCEDURE — 85025 COMPLETE CBC W/AUTO DIFF WBC: CPT | Mod: HCNC

## 2021-01-04 PROCEDURE — 84165 PATHOLOGIST INTERPRETATION SPE: ICD-10-PCS | Mod: 26,HCNC,, | Performed by: PATHOLOGY

## 2021-01-04 PROCEDURE — 36415 COLL VENOUS BLD VENIPUNCTURE: CPT | Mod: HCNC

## 2021-01-04 PROCEDURE — 84165 PROTEIN E-PHORESIS SERUM: CPT | Mod: 26,HCNC,, | Performed by: PATHOLOGY

## 2021-01-04 PROCEDURE — 84165 PROTEIN E-PHORESIS SERUM: CPT | Mod: HCNC

## 2021-01-04 RX ORDER — MAGNESIUM 200 MG
1 TABLET ORAL DAILY
Qty: 90 EACH | Refills: 3 | Status: SHIPPED | OUTPATIENT
Start: 2021-01-04 | End: 2021-01-29 | Stop reason: SDUPTHER

## 2021-01-05 ENCOUNTER — TELEPHONE (OUTPATIENT)
Dept: INTERNAL MEDICINE | Facility: CLINIC | Age: 76
End: 2021-01-05

## 2021-01-05 LAB
ALBUMIN SERPL ELPH-MCNC: 3.54 G/DL (ref 3.35–5.55)
ALPHA1 GLOB SERPL ELPH-MCNC: 0.41 G/DL (ref 0.17–0.41)
ALPHA2 GLOB SERPL ELPH-MCNC: 0.86 G/DL (ref 0.43–0.99)
B-GLOBULIN SERPL ELPH-MCNC: 0.78 G/DL (ref 0.5–1.1)
GAMMA GLOB SERPL ELPH-MCNC: 0.91 G/DL (ref 0.67–1.58)
PROT SERPL-MCNC: 6.5 G/DL (ref 6–8.4)

## 2021-01-06 LAB — PATHOLOGIST INTERPRETATION SPE: NORMAL

## 2021-01-11 ENCOUNTER — OFFICE VISIT (OUTPATIENT)
Dept: INTERNAL MEDICINE | Facility: CLINIC | Age: 76
End: 2021-01-11
Payer: MEDICARE

## 2021-01-11 VITALS
OXYGEN SATURATION: 97 % | HEART RATE: 107 BPM | HEIGHT: 62 IN | SYSTOLIC BLOOD PRESSURE: 144 MMHG | DIASTOLIC BLOOD PRESSURE: 70 MMHG | WEIGHT: 182.13 LBS | BODY MASS INDEX: 33.51 KG/M2

## 2021-01-11 DIAGNOSIS — E66.9 DIABETES MELLITUS TYPE 2 IN OBESE: ICD-10-CM

## 2021-01-11 DIAGNOSIS — N39.0 URINARY TRACT INFECTION WITHOUT HEMATURIA, SITE UNSPECIFIED: Primary | ICD-10-CM

## 2021-01-11 DIAGNOSIS — E11.69 DIABETES MELLITUS TYPE 2 IN OBESE: ICD-10-CM

## 2021-01-11 PROCEDURE — 1126F PR PAIN SEVERITY QUANTIFIED, NO PAIN PRESENT: ICD-10-PCS | Mod: HCNC,S$GLB,, | Performed by: INTERNAL MEDICINE

## 2021-01-11 PROCEDURE — 99213 OFFICE O/P EST LOW 20 MIN: CPT | Mod: HCNC,S$GLB,, | Performed by: INTERNAL MEDICINE

## 2021-01-11 PROCEDURE — 3077F PR MOST RECENT SYSTOLIC BLOOD PRESSURE >= 140 MM HG: ICD-10-PCS | Mod: HCNC,CPTII,S$GLB, | Performed by: INTERNAL MEDICINE

## 2021-01-11 PROCEDURE — 3078F PR MOST RECENT DIASTOLIC BLOOD PRESSURE < 80 MM HG: ICD-10-PCS | Mod: HCNC,CPTII,S$GLB, | Performed by: INTERNAL MEDICINE

## 2021-01-11 PROCEDURE — 1159F PR MEDICATION LIST DOCUMENTED IN MEDICAL RECORD: ICD-10-PCS | Mod: HCNC,S$GLB,, | Performed by: INTERNAL MEDICINE

## 2021-01-11 PROCEDURE — 3078F DIAST BP <80 MM HG: CPT | Mod: HCNC,CPTII,S$GLB, | Performed by: INTERNAL MEDICINE

## 2021-01-11 PROCEDURE — 3288F PR FALLS RISK ASSESSMENT DOCUMENTED: ICD-10-PCS | Mod: HCNC,CPTII,S$GLB, | Performed by: INTERNAL MEDICINE

## 2021-01-11 PROCEDURE — 99213 PR OFFICE/OUTPT VISIT, EST, LEVL III, 20-29 MIN: ICD-10-PCS | Mod: HCNC,S$GLB,, | Performed by: INTERNAL MEDICINE

## 2021-01-11 PROCEDURE — 3051F PR MOST RECENT HEMOGLOBIN A1C LEVEL 7.0 - < 8.0%: ICD-10-PCS | Mod: HCNC,CPTII,S$GLB, | Performed by: INTERNAL MEDICINE

## 2021-01-11 PROCEDURE — 1101F PR PT FALLS ASSESS DOC 0-1 FALLS W/OUT INJ PAST YR: ICD-10-PCS | Mod: HCNC,CPTII,S$GLB, | Performed by: INTERNAL MEDICINE

## 2021-01-11 PROCEDURE — 3051F HG A1C>EQUAL 7.0%<8.0%: CPT | Mod: HCNC,CPTII,S$GLB, | Performed by: INTERNAL MEDICINE

## 2021-01-11 PROCEDURE — 3288F FALL RISK ASSESSMENT DOCD: CPT | Mod: HCNC,CPTII,S$GLB, | Performed by: INTERNAL MEDICINE

## 2021-01-11 PROCEDURE — 1101F PT FALLS ASSESS-DOCD LE1/YR: CPT | Mod: HCNC,CPTII,S$GLB, | Performed by: INTERNAL MEDICINE

## 2021-01-11 PROCEDURE — 1159F MED LIST DOCD IN RCRD: CPT | Mod: HCNC,S$GLB,, | Performed by: INTERNAL MEDICINE

## 2021-01-11 PROCEDURE — 1126F AMNT PAIN NOTED NONE PRSNT: CPT | Mod: HCNC,S$GLB,, | Performed by: INTERNAL MEDICINE

## 2021-01-11 PROCEDURE — 99999 PR PBB SHADOW E&M-EST. PATIENT-LVL III: CPT | Mod: PBBFAC,HCNC,, | Performed by: INTERNAL MEDICINE

## 2021-01-11 PROCEDURE — 99999 PR PBB SHADOW E&M-EST. PATIENT-LVL III: ICD-10-PCS | Mod: PBBFAC,HCNC,, | Performed by: INTERNAL MEDICINE

## 2021-01-11 PROCEDURE — 3077F SYST BP >= 140 MM HG: CPT | Mod: HCNC,CPTII,S$GLB, | Performed by: INTERNAL MEDICINE

## 2021-01-11 RX ORDER — NITROFURANTOIN 25; 75 MG/1; MG/1
100 CAPSULE ORAL 2 TIMES DAILY
Qty: 14 CAPSULE | Refills: 0 | Status: SHIPPED | OUTPATIENT
Start: 2021-01-11 | End: 2021-01-18

## 2021-01-12 ENCOUNTER — TELEPHONE (OUTPATIENT)
Dept: INTERNAL MEDICINE | Facility: CLINIC | Age: 76
End: 2021-01-12

## 2021-01-13 ENCOUNTER — TELEPHONE (OUTPATIENT)
Dept: INTERNAL MEDICINE | Facility: CLINIC | Age: 76
End: 2021-01-13

## 2021-01-19 ENCOUNTER — LAB VISIT (OUTPATIENT)
Dept: LAB | Facility: HOSPITAL | Age: 76
End: 2021-01-19
Attending: INTERNAL MEDICINE
Payer: MEDICARE

## 2021-01-19 DIAGNOSIS — E87.6 HYPOKALEMIA: ICD-10-CM

## 2021-01-19 DIAGNOSIS — E83.42 HYPOMAGNESEMIA: ICD-10-CM

## 2021-01-19 LAB
ANION GAP SERPL CALC-SCNC: 12 MMOL/L (ref 8–16)
BUN SERPL-MCNC: 19 MG/DL (ref 8–23)
CALCIUM SERPL-MCNC: 9.5 MG/DL (ref 8.7–10.5)
CHLORIDE SERPL-SCNC: 98 MMOL/L (ref 95–110)
CO2 SERPL-SCNC: 29 MMOL/L (ref 23–29)
CREAT SERPL-MCNC: 0.9 MG/DL (ref 0.5–1.4)
EST. GFR  (AFRICAN AMERICAN): >60 ML/MIN/1.73 M^2
EST. GFR  (NON AFRICAN AMERICAN): >60 ML/MIN/1.73 M^2
GLUCOSE SERPL-MCNC: 281 MG/DL (ref 70–110)
MAGNESIUM SERPL-MCNC: 1.5 MG/DL (ref 1.6–2.6)
POTASSIUM SERPL-SCNC: 2.8 MMOL/L (ref 3.5–5.1)
SODIUM SERPL-SCNC: 139 MMOL/L (ref 136–145)

## 2021-01-19 PROCEDURE — 83735 ASSAY OF MAGNESIUM: CPT | Mod: HCNC

## 2021-01-19 PROCEDURE — 36415 COLL VENOUS BLD VENIPUNCTURE: CPT | Mod: HCNC

## 2021-01-19 PROCEDURE — 80048 BASIC METABOLIC PNL TOTAL CA: CPT | Mod: HCNC

## 2021-01-20 ENCOUNTER — TELEPHONE (OUTPATIENT)
Dept: INTERNAL MEDICINE | Facility: CLINIC | Age: 76
End: 2021-01-20

## 2021-01-20 ENCOUNTER — LAB VISIT (OUTPATIENT)
Dept: LAB | Facility: HOSPITAL | Age: 76
End: 2021-01-20
Attending: INTERNAL MEDICINE
Payer: MEDICARE

## 2021-01-20 DIAGNOSIS — E87.6 HYPOKALEMIA: Primary | ICD-10-CM

## 2021-01-20 DIAGNOSIS — E87.6 HYPOKALEMIA: ICD-10-CM

## 2021-01-20 LAB
MAGNESIUM SERPL-MCNC: 1.6 MG/DL (ref 1.6–2.6)
POTASSIUM SERPL-SCNC: 3.1 MMOL/L (ref 3.5–5.1)

## 2021-01-20 PROCEDURE — 84132 ASSAY OF SERUM POTASSIUM: CPT | Mod: HCNC

## 2021-01-20 PROCEDURE — 83735 ASSAY OF MAGNESIUM: CPT | Mod: HCNC

## 2021-01-20 PROCEDURE — 36415 COLL VENOUS BLD VENIPUNCTURE: CPT | Mod: HCNC

## 2021-01-21 ENCOUNTER — TELEPHONE (OUTPATIENT)
Dept: INTERNAL MEDICINE | Facility: CLINIC | Age: 76
End: 2021-01-21

## 2021-01-21 DIAGNOSIS — E87.6 HYPOKALEMIA: Primary | ICD-10-CM

## 2021-01-28 ENCOUNTER — LAB VISIT (OUTPATIENT)
Dept: LAB | Facility: HOSPITAL | Age: 76
End: 2021-01-28
Attending: INTERNAL MEDICINE
Payer: MEDICARE

## 2021-01-28 DIAGNOSIS — E66.9 DIABETES MELLITUS TYPE 2 IN OBESE: ICD-10-CM

## 2021-01-28 DIAGNOSIS — E87.6 HYPOKALEMIA: ICD-10-CM

## 2021-01-28 DIAGNOSIS — E83.42 HYPOMAGNESEMIA: ICD-10-CM

## 2021-01-28 DIAGNOSIS — T50.905A DRUG-INDUCED HYPOKALEMIA: ICD-10-CM

## 2021-01-28 DIAGNOSIS — E11.42 DIABETIC PERIPHERAL NEUROPATHY ASSOCIATED WITH TYPE 2 DIABETES MELLITUS: ICD-10-CM

## 2021-01-28 DIAGNOSIS — E87.6 DRUG-INDUCED HYPOKALEMIA: ICD-10-CM

## 2021-01-28 DIAGNOSIS — E11.69 DIABETES MELLITUS TYPE 2 IN OBESE: ICD-10-CM

## 2021-01-28 LAB
MAGNESIUM SERPL-MCNC: 1.3 MG/DL (ref 1.6–2.6)
POTASSIUM SERPL-SCNC: 3.4 MMOL/L (ref 3.5–5.1)

## 2021-01-28 PROCEDURE — 84132 ASSAY OF SERUM POTASSIUM: CPT

## 2021-01-28 PROCEDURE — 36415 COLL VENOUS BLD VENIPUNCTURE: CPT

## 2021-01-28 PROCEDURE — 83735 ASSAY OF MAGNESIUM: CPT

## 2021-01-28 RX ORDER — GLIMEPIRIDE 4 MG/1
TABLET ORAL
Qty: 90 TABLET | Refills: 3 | Status: SHIPPED | OUTPATIENT
Start: 2021-01-28 | End: 2021-11-01

## 2021-01-29 RX ORDER — POTASSIUM CHLORIDE 750 MG/1
20 TABLET, EXTENDED RELEASE ORAL 2 TIMES DAILY
Qty: 360 TABLET | Refills: 3 | Status: SHIPPED | OUTPATIENT
Start: 2021-01-29 | End: 2021-02-01 | Stop reason: SDUPTHER

## 2021-01-29 RX ORDER — MAGNESIUM 200 MG
2 TABLET ORAL DAILY
Qty: 180 EACH | Refills: 3 | Status: SHIPPED | OUTPATIENT
Start: 2021-01-29 | End: 2021-02-10

## 2021-02-01 ENCOUNTER — TELEPHONE (OUTPATIENT)
Dept: INTERNAL MEDICINE | Facility: CLINIC | Age: 76
End: 2021-02-01

## 2021-02-01 DIAGNOSIS — T50.905A DRUG-INDUCED HYPOKALEMIA: ICD-10-CM

## 2021-02-01 DIAGNOSIS — E87.6 DRUG-INDUCED HYPOKALEMIA: ICD-10-CM

## 2021-02-01 DIAGNOSIS — E11.42 DIABETIC PERIPHERAL NEUROPATHY ASSOCIATED WITH TYPE 2 DIABETES MELLITUS: ICD-10-CM

## 2021-02-01 RX ORDER — PREGABALIN 75 MG/1
CAPSULE ORAL
Qty: 180 CAPSULE | Refills: 1 | Status: SHIPPED | OUTPATIENT
Start: 2021-02-01 | End: 2021-09-19 | Stop reason: SDUPTHER

## 2021-02-01 RX ORDER — POTASSIUM CHLORIDE 750 MG/1
30 TABLET, EXTENDED RELEASE ORAL 2 TIMES DAILY
Qty: 540 TABLET | Refills: 3 | Status: SHIPPED | OUTPATIENT
Start: 2021-02-01 | End: 2021-02-08 | Stop reason: SDUPTHER

## 2021-02-08 ENCOUNTER — TELEPHONE (OUTPATIENT)
Dept: INTERNAL MEDICINE | Facility: CLINIC | Age: 76
End: 2021-02-08

## 2021-02-08 ENCOUNTER — LAB VISIT (OUTPATIENT)
Dept: LAB | Facility: HOSPITAL | Age: 76
End: 2021-02-08
Attending: INTERNAL MEDICINE
Payer: MEDICARE

## 2021-02-08 DIAGNOSIS — E83.42 HYPOMAGNESEMIA: ICD-10-CM

## 2021-02-08 DIAGNOSIS — T50.905A DRUG-INDUCED HYPOKALEMIA: ICD-10-CM

## 2021-02-08 DIAGNOSIS — E87.6 DRUG-INDUCED HYPOKALEMIA: ICD-10-CM

## 2021-02-08 LAB
MAGNESIUM SERPL-MCNC: 1.4 MG/DL (ref 1.6–2.6)
POTASSIUM SERPL-SCNC: 3.2 MMOL/L (ref 3.5–5.1)

## 2021-02-08 PROCEDURE — 36415 COLL VENOUS BLD VENIPUNCTURE: CPT

## 2021-02-08 PROCEDURE — 83735 ASSAY OF MAGNESIUM: CPT

## 2021-02-08 PROCEDURE — 84132 ASSAY OF SERUM POTASSIUM: CPT

## 2021-02-08 RX ORDER — SPIRONOLACTONE 50 MG/1
50 TABLET, FILM COATED ORAL DAILY
Qty: 30 TABLET | Refills: 3 | Status: SHIPPED | OUTPATIENT
Start: 2021-02-08 | End: 2021-02-17

## 2021-02-08 RX ORDER — POTASSIUM CHLORIDE 750 MG/1
30 TABLET, EXTENDED RELEASE ORAL DAILY
Qty: 540 TABLET | Refills: 3
Start: 2021-02-08 | End: 2021-02-17

## 2021-02-09 ENCOUNTER — TELEPHONE (OUTPATIENT)
Dept: INTERNAL MEDICINE | Facility: CLINIC | Age: 76
End: 2021-02-09

## 2021-02-10 RX ORDER — LANOLIN ALCOHOL/MO/W.PET/CERES
400 CREAM (GRAM) TOPICAL DAILY
Qty: 90 TABLET | Refills: 3 | COMMUNITY
Start: 2021-02-10 | End: 2021-03-08

## 2021-02-15 ENCOUNTER — LAB VISIT (OUTPATIENT)
Dept: LAB | Facility: HOSPITAL | Age: 76
End: 2021-02-15
Attending: INTERNAL MEDICINE
Payer: MEDICARE

## 2021-02-15 ENCOUNTER — OFFICE VISIT (OUTPATIENT)
Dept: INTERNAL MEDICINE | Facility: CLINIC | Age: 76
End: 2021-02-15
Payer: MEDICARE

## 2021-02-15 VITALS
BODY MASS INDEX: 33.39 KG/M2 | HEART RATE: 92 BPM | RESPIRATION RATE: 16 BRPM | TEMPERATURE: 98 F | OXYGEN SATURATION: 98 % | DIASTOLIC BLOOD PRESSURE: 75 MMHG | WEIGHT: 181.44 LBS | SYSTOLIC BLOOD PRESSURE: 138 MMHG | HEIGHT: 62 IN

## 2021-02-15 DIAGNOSIS — I10 BENIGN ESSENTIAL HYPERTENSION: ICD-10-CM

## 2021-02-15 DIAGNOSIS — E87.6 HYPOKALEMIA: ICD-10-CM

## 2021-02-15 DIAGNOSIS — E53.8 B12 DEFICIENCY: ICD-10-CM

## 2021-02-15 DIAGNOSIS — E83.42 HYPOMAGNESEMIA: ICD-10-CM

## 2021-02-15 DIAGNOSIS — R00.2 PALPITATIONS: ICD-10-CM

## 2021-02-15 DIAGNOSIS — C50.912 RECURRENT BREAST CANCER, LEFT: ICD-10-CM

## 2021-02-15 DIAGNOSIS — I70.0 AORTIC ATHEROSCLEROSIS: ICD-10-CM

## 2021-02-15 DIAGNOSIS — E11.59 HYPERTENSION ASSOCIATED WITH DIABETES: Primary | ICD-10-CM

## 2021-02-15 DIAGNOSIS — I15.2 HYPERTENSION ASSOCIATED WITH DIABETES: Primary | ICD-10-CM

## 2021-02-15 LAB
ALBUMIN SERPL BCP-MCNC: 3.6 G/DL (ref 3.5–5.2)
ALP SERPL-CCNC: 75 U/L (ref 55–135)
ALT SERPL W/O P-5'-P-CCNC: 12 U/L (ref 10–44)
ANION GAP SERPL CALC-SCNC: 9 MMOL/L (ref 8–16)
AST SERPL-CCNC: 16 U/L (ref 10–40)
BILIRUB SERPL-MCNC: 0.9 MG/DL (ref 0.1–1)
BUN SERPL-MCNC: 13 MG/DL (ref 8–23)
CALCIUM SERPL-MCNC: 10.1 MG/DL (ref 8.7–10.5)
CHLORIDE SERPL-SCNC: 101 MMOL/L (ref 95–110)
CO2 SERPL-SCNC: 34 MMOL/L (ref 23–29)
CREAT SERPL-MCNC: 0.9 MG/DL (ref 0.5–1.4)
EST. GFR  (AFRICAN AMERICAN): >60 ML/MIN/1.73 M^2
EST. GFR  (NON AFRICAN AMERICAN): >60 ML/MIN/1.73 M^2
GLUCOSE SERPL-MCNC: 147 MG/DL (ref 70–110)
MAGNESIUM SERPL-MCNC: 1.6 MG/DL (ref 1.6–2.6)
POTASSIUM SERPL-SCNC: 3.7 MMOL/L (ref 3.5–5.1)
PROT SERPL-MCNC: 7.3 G/DL (ref 6–8.4)
SODIUM SERPL-SCNC: 144 MMOL/L (ref 136–145)

## 2021-02-15 PROCEDURE — 99499 UNLISTED E&M SERVICE: CPT | Mod: S$GLB,,, | Performed by: INTERNAL MEDICINE

## 2021-02-15 PROCEDURE — 3051F PR MOST RECENT HEMOGLOBIN A1C LEVEL 7.0 - < 8.0%: ICD-10-PCS | Mod: CPTII,S$GLB,, | Performed by: INTERNAL MEDICINE

## 2021-02-15 PROCEDURE — 99214 OFFICE O/P EST MOD 30 MIN: CPT | Mod: S$GLB,,, | Performed by: INTERNAL MEDICINE

## 2021-02-15 PROCEDURE — 99999 PR PBB SHADOW E&M-EST. PATIENT-LVL III: CPT | Mod: PBBFAC,,, | Performed by: INTERNAL MEDICINE

## 2021-02-15 PROCEDURE — 3075F SYST BP GE 130 - 139MM HG: CPT | Mod: CPTII,S$GLB,, | Performed by: INTERNAL MEDICINE

## 2021-02-15 PROCEDURE — 83735 ASSAY OF MAGNESIUM: CPT

## 2021-02-15 PROCEDURE — 3078F DIAST BP <80 MM HG: CPT | Mod: CPTII,S$GLB,, | Performed by: INTERNAL MEDICINE

## 2021-02-15 PROCEDURE — 99999 PR PBB SHADOW E&M-EST. PATIENT-LVL III: ICD-10-PCS | Mod: PBBFAC,,, | Performed by: INTERNAL MEDICINE

## 2021-02-15 PROCEDURE — 1159F PR MEDICATION LIST DOCUMENTED IN MEDICAL RECORD: ICD-10-PCS | Mod: S$GLB,,, | Performed by: INTERNAL MEDICINE

## 2021-02-15 PROCEDURE — 3051F HG A1C>EQUAL 7.0%<8.0%: CPT | Mod: CPTII,S$GLB,, | Performed by: INTERNAL MEDICINE

## 2021-02-15 PROCEDURE — 99214 PR OFFICE/OUTPT VISIT, EST, LEVL IV, 30-39 MIN: ICD-10-PCS | Mod: S$GLB,,, | Performed by: INTERNAL MEDICINE

## 2021-02-15 PROCEDURE — 3075F PR MOST RECENT SYSTOLIC BLOOD PRESS GE 130-139MM HG: ICD-10-PCS | Mod: CPTII,S$GLB,, | Performed by: INTERNAL MEDICINE

## 2021-02-15 PROCEDURE — 99499 RISK ADDL DX/OHS AUDIT: ICD-10-PCS | Mod: S$GLB,,, | Performed by: INTERNAL MEDICINE

## 2021-02-15 PROCEDURE — 1159F MED LIST DOCD IN RCRD: CPT | Mod: S$GLB,,, | Performed by: INTERNAL MEDICINE

## 2021-02-15 PROCEDURE — 36415 COLL VENOUS BLD VENIPUNCTURE: CPT

## 2021-02-15 PROCEDURE — 3078F PR MOST RECENT DIASTOLIC BLOOD PRESSURE < 80 MM HG: ICD-10-PCS | Mod: CPTII,S$GLB,, | Performed by: INTERNAL MEDICINE

## 2021-02-15 PROCEDURE — 80053 COMPREHEN METABOLIC PANEL: CPT

## 2021-02-15 RX ORDER — ACETAMINOPHEN AND PHENYLEPHRINE HCL 325; 5 MG/1; MG/1
TABLET ORAL
Qty: 90 TABLET | Refills: 3
Start: 2021-02-15

## 2021-02-15 RX ORDER — ATENOLOL 25 MG/1
25 TABLET ORAL DAILY
Qty: 90 TABLET | Refills: 3 | Status: SHIPPED | OUTPATIENT
Start: 2021-02-15 | End: 2021-03-30 | Stop reason: SDUPTHER

## 2021-02-16 DIAGNOSIS — T50.905A DRUG-INDUCED HYPOKALEMIA: ICD-10-CM

## 2021-02-16 DIAGNOSIS — E87.6 DRUG-INDUCED HYPOKALEMIA: ICD-10-CM

## 2021-02-17 RX ORDER — CHLORTHALIDONE 50 MG/1
50 TABLET ORAL DAILY
Qty: 90 TABLET | Refills: 3 | Status: SHIPPED | OUTPATIENT
Start: 2021-02-17 | End: 2021-03-30

## 2021-02-17 RX ORDER — POTASSIUM CHLORIDE 750 MG/1
30 TABLET, EXTENDED RELEASE ORAL DAILY
Qty: 270 TABLET | Refills: 3 | Status: SHIPPED | OUTPATIENT
Start: 2021-02-17 | End: 2021-03-30

## 2021-02-19 ENCOUNTER — IMMUNIZATION (OUTPATIENT)
Dept: INTERNAL MEDICINE | Facility: CLINIC | Age: 76
End: 2021-02-19
Payer: MEDICARE

## 2021-02-19 ENCOUNTER — PES CALL (OUTPATIENT)
Dept: ADMINISTRATIVE | Facility: CLINIC | Age: 76
End: 2021-02-19

## 2021-02-19 DIAGNOSIS — Z23 NEED FOR VACCINATION: Primary | ICD-10-CM

## 2021-02-19 PROCEDURE — 91300 COVID-19, MRNA, LNP-S, PF, 30 MCG/0.3 ML DOSE VACCINE: CPT | Mod: PBBFAC | Performed by: INTERNAL MEDICINE

## 2021-02-24 ENCOUNTER — TELEPHONE (OUTPATIENT)
Dept: INTERNAL MEDICINE | Facility: CLINIC | Age: 76
End: 2021-02-24

## 2021-03-01 ENCOUNTER — TELEPHONE (OUTPATIENT)
Dept: INTERNAL MEDICINE | Facility: CLINIC | Age: 76
End: 2021-03-01

## 2021-03-08 DIAGNOSIS — E83.42 HYPOMAGNESEMIA: Primary | ICD-10-CM

## 2021-03-08 RX ORDER — LANOLIN ALCOHOL/MO/W.PET/CERES
400 CREAM (GRAM) TOPICAL DAILY
Qty: 90 TABLET | Refills: 3 | Status: CANCELLED | COMMUNITY
Start: 2021-03-08

## 2021-03-08 RX ORDER — MAGNESIUM 250 MG
250 TABLET ORAL DAILY
Qty: 90 TABLET | Refills: 3 | Status: SHIPPED | OUTPATIENT
Start: 2021-03-08 | End: 2021-03-30

## 2021-03-12 ENCOUNTER — IMMUNIZATION (OUTPATIENT)
Dept: INTERNAL MEDICINE | Facility: CLINIC | Age: 76
End: 2021-03-12
Payer: MEDICARE

## 2021-03-12 DIAGNOSIS — Z23 NEED FOR VACCINATION: Primary | ICD-10-CM

## 2021-03-12 PROCEDURE — 91300 COVID-19, MRNA, LNP-S, PF, 30 MCG/0.3 ML DOSE VACCINE: CPT | Mod: HCNC,PBBFAC | Performed by: INTERNAL MEDICINE

## 2021-03-12 PROCEDURE — 0002A COVID-19, MRNA, LNP-S, PF, 30 MCG/0.3 ML DOSE VACCINE: CPT | Mod: HCNC,PBBFAC | Performed by: INTERNAL MEDICINE

## 2021-03-25 ENCOUNTER — TELEPHONE (OUTPATIENT)
Dept: PODIATRY | Facility: CLINIC | Age: 76
End: 2021-03-25

## 2021-03-25 ENCOUNTER — TELEPHONE (OUTPATIENT)
Dept: HEMATOLOGY/ONCOLOGY | Facility: CLINIC | Age: 76
End: 2021-03-25

## 2021-03-30 ENCOUNTER — LAB VISIT (OUTPATIENT)
Dept: LAB | Facility: HOSPITAL | Age: 76
End: 2021-03-30
Attending: INTERNAL MEDICINE
Payer: MEDICARE

## 2021-03-30 ENCOUNTER — OFFICE VISIT (OUTPATIENT)
Dept: INTERNAL MEDICINE | Facility: CLINIC | Age: 76
End: 2021-03-30
Payer: MEDICARE

## 2021-03-30 VITALS
HEIGHT: 62 IN | OXYGEN SATURATION: 98 % | DIASTOLIC BLOOD PRESSURE: 84 MMHG | BODY MASS INDEX: 33.18 KG/M2 | SYSTOLIC BLOOD PRESSURE: 130 MMHG | HEART RATE: 88 BPM | WEIGHT: 180.31 LBS

## 2021-03-30 DIAGNOSIS — I15.2 HYPERTENSION ASSOCIATED WITH DIABETES: Primary | ICD-10-CM

## 2021-03-30 DIAGNOSIS — E11.59 HYPERTENSION ASSOCIATED WITH DIABETES: Primary | ICD-10-CM

## 2021-03-30 DIAGNOSIS — E11.65 UNCONTROLLED TYPE 2 DIABETES MELLITUS WITH HYPERGLYCEMIA: ICD-10-CM

## 2021-03-30 DIAGNOSIS — E11.59 HYPERTENSION ASSOCIATED WITH DIABETES: ICD-10-CM

## 2021-03-30 DIAGNOSIS — I15.2 HYPERTENSION ASSOCIATED WITH DIABETES: ICD-10-CM

## 2021-03-30 DIAGNOSIS — R00.2 PALPITATIONS: ICD-10-CM

## 2021-03-30 LAB
ALBUMIN SERPL BCP-MCNC: 3.5 G/DL (ref 3.5–5.2)
ALP SERPL-CCNC: 69 U/L (ref 55–135)
ALT SERPL W/O P-5'-P-CCNC: 10 U/L (ref 10–44)
ANION GAP SERPL CALC-SCNC: 10 MMOL/L (ref 8–16)
AST SERPL-CCNC: 15 U/L (ref 10–40)
BILIRUB SERPL-MCNC: 0.7 MG/DL (ref 0.1–1)
BUN SERPL-MCNC: 16 MG/DL (ref 8–23)
CALCIUM SERPL-MCNC: 9.7 MG/DL (ref 8.7–10.5)
CHLORIDE SERPL-SCNC: 103 MMOL/L (ref 95–110)
CO2 SERPL-SCNC: 25 MMOL/L (ref 23–29)
CREAT SERPL-MCNC: 1.1 MG/DL (ref 0.5–1.4)
EST. GFR  (AFRICAN AMERICAN): 56 ML/MIN/1.73 M^2
EST. GFR  (NON AFRICAN AMERICAN): 48.5 ML/MIN/1.73 M^2
ESTIMATED AVG GLUCOSE: 183 MG/DL (ref 68–131)
GLUCOSE SERPL-MCNC: 248 MG/DL (ref 70–110)
HBA1C MFR BLD: 8 % (ref 4–5.6)
POTASSIUM SERPL-SCNC: 4.2 MMOL/L (ref 3.5–5.1)
PROT SERPL-MCNC: 7.2 G/DL (ref 6–8.4)
SODIUM SERPL-SCNC: 138 MMOL/L (ref 136–145)

## 2021-03-30 PROCEDURE — 3075F SYST BP GE 130 - 139MM HG: CPT | Mod: CPTII,S$GLB,, | Performed by: INTERNAL MEDICINE

## 2021-03-30 PROCEDURE — 1101F PT FALLS ASSESS-DOCD LE1/YR: CPT | Mod: CPTII,S$GLB,, | Performed by: INTERNAL MEDICINE

## 2021-03-30 PROCEDURE — 36415 COLL VENOUS BLD VENIPUNCTURE: CPT | Performed by: INTERNAL MEDICINE

## 2021-03-30 PROCEDURE — 1159F MED LIST DOCD IN RCRD: CPT | Mod: S$GLB,,, | Performed by: INTERNAL MEDICINE

## 2021-03-30 PROCEDURE — 1101F PR PT FALLS ASSESS DOC 0-1 FALLS W/OUT INJ PAST YR: ICD-10-PCS | Mod: CPTII,S$GLB,, | Performed by: INTERNAL MEDICINE

## 2021-03-30 PROCEDURE — 3288F FALL RISK ASSESSMENT DOCD: CPT | Mod: CPTII,S$GLB,, | Performed by: INTERNAL MEDICINE

## 2021-03-30 PROCEDURE — 3051F PR MOST RECENT HEMOGLOBIN A1C LEVEL 7.0 - < 8.0%: ICD-10-PCS | Mod: CPTII,S$GLB,, | Performed by: INTERNAL MEDICINE

## 2021-03-30 PROCEDURE — 83036 HEMOGLOBIN GLYCOSYLATED A1C: CPT | Performed by: INTERNAL MEDICINE

## 2021-03-30 PROCEDURE — 1159F PR MEDICATION LIST DOCUMENTED IN MEDICAL RECORD: ICD-10-PCS | Mod: S$GLB,,, | Performed by: INTERNAL MEDICINE

## 2021-03-30 PROCEDURE — 99999 PR PBB SHADOW E&M-EST. PATIENT-LVL V: CPT | Mod: PBBFAC,,, | Performed by: INTERNAL MEDICINE

## 2021-03-30 PROCEDURE — 3288F PR FALLS RISK ASSESSMENT DOCUMENTED: ICD-10-PCS | Mod: CPTII,S$GLB,, | Performed by: INTERNAL MEDICINE

## 2021-03-30 PROCEDURE — 80053 COMPREHEN METABOLIC PANEL: CPT | Performed by: INTERNAL MEDICINE

## 2021-03-30 PROCEDURE — 3051F HG A1C>EQUAL 7.0%<8.0%: CPT | Mod: CPTII,S$GLB,, | Performed by: INTERNAL MEDICINE

## 2021-03-30 PROCEDURE — 3079F DIAST BP 80-89 MM HG: CPT | Mod: CPTII,S$GLB,, | Performed by: INTERNAL MEDICINE

## 2021-03-30 PROCEDURE — 1126F PR PAIN SEVERITY QUANTIFIED, NO PAIN PRESENT: ICD-10-PCS | Mod: S$GLB,,, | Performed by: INTERNAL MEDICINE

## 2021-03-30 PROCEDURE — 99214 OFFICE O/P EST MOD 30 MIN: CPT | Mod: S$GLB,,, | Performed by: INTERNAL MEDICINE

## 2021-03-30 PROCEDURE — 1126F AMNT PAIN NOTED NONE PRSNT: CPT | Mod: S$GLB,,, | Performed by: INTERNAL MEDICINE

## 2021-03-30 PROCEDURE — 3075F PR MOST RECENT SYSTOLIC BLOOD PRESS GE 130-139MM HG: ICD-10-PCS | Mod: CPTII,S$GLB,, | Performed by: INTERNAL MEDICINE

## 2021-03-30 PROCEDURE — 99214 PR OFFICE/OUTPT VISIT, EST, LEVL IV, 30-39 MIN: ICD-10-PCS | Mod: S$GLB,,, | Performed by: INTERNAL MEDICINE

## 2021-03-30 PROCEDURE — 99999 PR PBB SHADOW E&M-EST. PATIENT-LVL V: ICD-10-PCS | Mod: PBBFAC,,, | Performed by: INTERNAL MEDICINE

## 2021-03-30 PROCEDURE — 3079F PR MOST RECENT DIASTOLIC BLOOD PRESSURE 80-89 MM HG: ICD-10-PCS | Mod: CPTII,S$GLB,, | Performed by: INTERNAL MEDICINE

## 2021-03-30 RX ORDER — ATENOLOL 25 MG/1
25 TABLET ORAL DAILY
Qty: 90 TABLET | Refills: 3 | Status: ON HOLD
Start: 2021-03-30 | End: 2021-11-24 | Stop reason: HOSPADM

## 2021-03-30 RX ORDER — BLOOD SUGAR DIAGNOSTIC
STRIP MISCELLANEOUS
Qty: 300 STRIP | Refills: 3 | Status: SHIPPED | OUTPATIENT
Start: 2021-03-30 | End: 2022-02-22

## 2021-04-01 ENCOUNTER — TELEPHONE (OUTPATIENT)
Dept: INTERNAL MEDICINE | Facility: CLINIC | Age: 76
End: 2021-04-01

## 2021-04-06 ENCOUNTER — PES CALL (OUTPATIENT)
Dept: ADMINISTRATIVE | Facility: CLINIC | Age: 76
End: 2021-04-06

## 2021-04-07 ENCOUNTER — TELEPHONE (OUTPATIENT)
Dept: ADMINISTRATIVE | Facility: CLINIC | Age: 76
End: 2021-04-07

## 2021-04-07 ENCOUNTER — PES CALL (OUTPATIENT)
Dept: ADMINISTRATIVE | Facility: CLINIC | Age: 76
End: 2021-04-07

## 2021-04-07 ENCOUNTER — TELEPHONE (OUTPATIENT)
Dept: INTERNAL MEDICINE | Facility: CLINIC | Age: 76
End: 2021-04-07

## 2021-04-09 ENCOUNTER — OFFICE VISIT (OUTPATIENT)
Dept: INTERNAL MEDICINE | Facility: CLINIC | Age: 76
End: 2021-04-09
Payer: MEDICARE

## 2021-04-09 ENCOUNTER — TELEPHONE (OUTPATIENT)
Dept: INTERNAL MEDICINE | Facility: CLINIC | Age: 76
End: 2021-04-09

## 2021-04-09 VITALS
HEART RATE: 96 BPM | WEIGHT: 180.75 LBS | DIASTOLIC BLOOD PRESSURE: 74 MMHG | BODY MASS INDEX: 33.26 KG/M2 | SYSTOLIC BLOOD PRESSURE: 118 MMHG | HEIGHT: 62 IN | OXYGEN SATURATION: 98 %

## 2021-04-09 DIAGNOSIS — I77.1 TORTUOUS AORTA: ICD-10-CM

## 2021-04-09 DIAGNOSIS — R26.9 ABNORMALITY OF GAIT AND MOBILITY: ICD-10-CM

## 2021-04-09 DIAGNOSIS — E11.59 HYPERTENSION ASSOCIATED WITH DIABETES: ICD-10-CM

## 2021-04-09 DIAGNOSIS — K21.9 GASTROESOPHAGEAL REFLUX DISEASE, UNSPECIFIED WHETHER ESOPHAGITIS PRESENT: ICD-10-CM

## 2021-04-09 DIAGNOSIS — E11.69 HYPERLIPIDEMIA ASSOCIATED WITH TYPE 2 DIABETES MELLITUS: ICD-10-CM

## 2021-04-09 DIAGNOSIS — E78.5 HYPERLIPIDEMIA ASSOCIATED WITH TYPE 2 DIABETES MELLITUS: ICD-10-CM

## 2021-04-09 DIAGNOSIS — E11.42 TYPE 2 DIABETES MELLITUS WITH DIABETIC POLYNEUROPATHY, WITH LONG-TERM CURRENT USE OF INSULIN: ICD-10-CM

## 2021-04-09 DIAGNOSIS — Z79.4 TYPE 2 DIABETES MELLITUS WITH DIABETIC POLYNEUROPATHY, WITH LONG-TERM CURRENT USE OF INSULIN: ICD-10-CM

## 2021-04-09 DIAGNOSIS — C50.912 RECURRENT BREAST CANCER, LEFT: ICD-10-CM

## 2021-04-09 DIAGNOSIS — I77.819 ECTATIC AORTA: ICD-10-CM

## 2021-04-09 DIAGNOSIS — I15.2 HYPERTENSION ASSOCIATED WITH DIABETES: ICD-10-CM

## 2021-04-09 DIAGNOSIS — Z00.00 ENCOUNTER FOR PREVENTIVE HEALTH EXAMINATION: Primary | ICD-10-CM

## 2021-04-09 DIAGNOSIS — Z79.811 USE OF AROMATASE INHIBITORS: ICD-10-CM

## 2021-04-09 DIAGNOSIS — J47.9 ADULT BRONCHIECTASIS: ICD-10-CM

## 2021-04-09 DIAGNOSIS — R41.3 MEMORY LOSS: ICD-10-CM

## 2021-04-09 DIAGNOSIS — I70.0 AORTIC ATHEROSCLEROSIS: ICD-10-CM

## 2021-04-09 DIAGNOSIS — E11.42 DIABETIC PERIPHERAL NEUROPATHY ASSOCIATED WITH TYPE 2 DIABETES MELLITUS: ICD-10-CM

## 2021-04-09 DIAGNOSIS — R09.89 PROMINENT AORTA: ICD-10-CM

## 2021-04-09 DIAGNOSIS — N18.30 STAGE 3 CHRONIC KIDNEY DISEASE, UNSPECIFIED WHETHER STAGE 3A OR 3B CKD: ICD-10-CM

## 2021-04-09 PROCEDURE — 99499 UNLISTED E&M SERVICE: CPT | Mod: HCNC,S$GLB,, | Performed by: NURSE PRACTITIONER

## 2021-04-09 PROCEDURE — G0439 PR MEDICARE ANNUAL WELLNESS SUBSEQUENT VISIT: ICD-10-PCS | Mod: S$GLB,,, | Performed by: NURSE PRACTITIONER

## 2021-04-09 PROCEDURE — 1126F AMNT PAIN NOTED NONE PRSNT: CPT | Mod: S$GLB,,, | Performed by: NURSE PRACTITIONER

## 2021-04-09 PROCEDURE — 1101F PT FALLS ASSESS-DOCD LE1/YR: CPT | Mod: CPTII,S$GLB,, | Performed by: NURSE PRACTITIONER

## 2021-04-09 PROCEDURE — 3052F HG A1C>EQUAL 8.0%<EQUAL 9.0%: CPT | Mod: CPTII,S$GLB,, | Performed by: NURSE PRACTITIONER

## 2021-04-09 PROCEDURE — 1101F PR PT FALLS ASSESS DOC 0-1 FALLS W/OUT INJ PAST YR: ICD-10-PCS | Mod: CPTII,S$GLB,, | Performed by: NURSE PRACTITIONER

## 2021-04-09 PROCEDURE — 3052F PR MOST RECENT HEMOGLOBIN A1C LEVEL 8.0 - < 9.0%: ICD-10-PCS | Mod: CPTII,S$GLB,, | Performed by: NURSE PRACTITIONER

## 2021-04-09 PROCEDURE — 99999 PR PBB SHADOW E&M-EST. PATIENT-LVL V: ICD-10-PCS | Mod: PBBFAC,,, | Performed by: NURSE PRACTITIONER

## 2021-04-09 PROCEDURE — 3288F FALL RISK ASSESSMENT DOCD: CPT | Mod: CPTII,S$GLB,, | Performed by: NURSE PRACTITIONER

## 2021-04-09 PROCEDURE — 99499 RISK ADDL DX/OHS AUDIT: ICD-10-PCS | Mod: HCNC,S$GLB,, | Performed by: NURSE PRACTITIONER

## 2021-04-09 PROCEDURE — 3288F PR FALLS RISK ASSESSMENT DOCUMENTED: ICD-10-PCS | Mod: CPTII,S$GLB,, | Performed by: NURSE PRACTITIONER

## 2021-04-09 PROCEDURE — 1126F PR PAIN SEVERITY QUANTIFIED, NO PAIN PRESENT: ICD-10-PCS | Mod: S$GLB,,, | Performed by: NURSE PRACTITIONER

## 2021-04-09 PROCEDURE — G0439 PPPS, SUBSEQ VISIT: HCPCS | Mod: S$GLB,,, | Performed by: NURSE PRACTITIONER

## 2021-04-09 PROCEDURE — 99999 PR PBB SHADOW E&M-EST. PATIENT-LVL V: CPT | Mod: PBBFAC,,, | Performed by: NURSE PRACTITIONER

## 2021-04-12 ENCOUNTER — TELEPHONE (OUTPATIENT)
Dept: HEMATOLOGY/ONCOLOGY | Facility: CLINIC | Age: 76
End: 2021-04-12

## 2021-04-12 ENCOUNTER — OFFICE VISIT (OUTPATIENT)
Dept: HEMATOLOGY/ONCOLOGY | Facility: CLINIC | Age: 76
End: 2021-04-12
Payer: MEDICARE

## 2021-04-12 VITALS
SYSTOLIC BLOOD PRESSURE: 135 MMHG | HEIGHT: 62 IN | BODY MASS INDEX: 33.18 KG/M2 | WEIGHT: 180.31 LBS | HEART RATE: 93 BPM | RESPIRATION RATE: 18 BRPM | TEMPERATURE: 98 F | DIASTOLIC BLOOD PRESSURE: 74 MMHG | OXYGEN SATURATION: 99 %

## 2021-04-12 DIAGNOSIS — Z79.811 USE OF AROMATASE INHIBITORS: ICD-10-CM

## 2021-04-12 DIAGNOSIS — C50.912 RECURRENT BREAST CANCER, LEFT: Primary | ICD-10-CM

## 2021-04-12 PROCEDURE — 1101F PR PT FALLS ASSESS DOC 0-1 FALLS W/OUT INJ PAST YR: ICD-10-PCS | Mod: CPTII,S$GLB,, | Performed by: INTERNAL MEDICINE

## 2021-04-12 PROCEDURE — 99999 PR PBB SHADOW E&M-EST. PATIENT-LVL V: ICD-10-PCS | Mod: PBBFAC,,, | Performed by: INTERNAL MEDICINE

## 2021-04-12 PROCEDURE — 1126F PR PAIN SEVERITY QUANTIFIED, NO PAIN PRESENT: ICD-10-PCS | Mod: S$GLB,,, | Performed by: INTERNAL MEDICINE

## 2021-04-12 PROCEDURE — 1126F AMNT PAIN NOTED NONE PRSNT: CPT | Mod: S$GLB,,, | Performed by: INTERNAL MEDICINE

## 2021-04-12 PROCEDURE — 1101F PT FALLS ASSESS-DOCD LE1/YR: CPT | Mod: CPTII,S$GLB,, | Performed by: INTERNAL MEDICINE

## 2021-04-12 PROCEDURE — 3288F PR FALLS RISK ASSESSMENT DOCUMENTED: ICD-10-PCS | Mod: CPTII,S$GLB,, | Performed by: INTERNAL MEDICINE

## 2021-04-12 PROCEDURE — 3288F FALL RISK ASSESSMENT DOCD: CPT | Mod: CPTII,S$GLB,, | Performed by: INTERNAL MEDICINE

## 2021-04-12 PROCEDURE — 99213 OFFICE O/P EST LOW 20 MIN: CPT | Mod: S$GLB,,, | Performed by: INTERNAL MEDICINE

## 2021-04-12 PROCEDURE — 99999 PR PBB SHADOW E&M-EST. PATIENT-LVL V: CPT | Mod: PBBFAC,,, | Performed by: INTERNAL MEDICINE

## 2021-04-12 PROCEDURE — 1159F PR MEDICATION LIST DOCUMENTED IN MEDICAL RECORD: ICD-10-PCS | Mod: S$GLB,,, | Performed by: INTERNAL MEDICINE

## 2021-04-12 PROCEDURE — 1159F MED LIST DOCD IN RCRD: CPT | Mod: S$GLB,,, | Performed by: INTERNAL MEDICINE

## 2021-04-12 PROCEDURE — 99213 PR OFFICE/OUTPT VISIT, EST, LEVL III, 20-29 MIN: ICD-10-PCS | Mod: S$GLB,,, | Performed by: INTERNAL MEDICINE

## 2021-04-12 RX ORDER — CHLORTHALIDONE 50 MG/1
TABLET ORAL
Status: ON HOLD | COMMUNITY
Start: 2021-04-05 | End: 2021-11-24 | Stop reason: HOSPADM

## 2021-04-13 ENCOUNTER — OFFICE VISIT (OUTPATIENT)
Dept: ENDOCRINOLOGY | Facility: CLINIC | Age: 76
End: 2021-04-13
Payer: MEDICARE

## 2021-04-13 VITALS
HEART RATE: 86 BPM | HEIGHT: 62 IN | SYSTOLIC BLOOD PRESSURE: 110 MMHG | WEIGHT: 180.69 LBS | OXYGEN SATURATION: 99 % | BODY MASS INDEX: 33.25 KG/M2 | DIASTOLIC BLOOD PRESSURE: 73 MMHG

## 2021-04-13 DIAGNOSIS — N18.30 STAGE 3 CHRONIC KIDNEY DISEASE, UNSPECIFIED WHETHER STAGE 3A OR 3B CKD: ICD-10-CM

## 2021-04-13 DIAGNOSIS — E11.42 TYPE 2 DIABETES MELLITUS WITH DIABETIC POLYNEUROPATHY, WITH LONG-TERM CURRENT USE OF INSULIN: ICD-10-CM

## 2021-04-13 DIAGNOSIS — M85.852 OSTEOPENIA OF NECK OF LEFT FEMUR: ICD-10-CM

## 2021-04-13 DIAGNOSIS — Z79.4 TYPE 2 DIABETES MELLITUS WITH DIABETIC POLYNEUROPATHY, WITH LONG-TERM CURRENT USE OF INSULIN: ICD-10-CM

## 2021-04-13 PROCEDURE — 1101F PT FALLS ASSESS-DOCD LE1/YR: CPT | Mod: CPTII,S$GLB,, | Performed by: INTERNAL MEDICINE

## 2021-04-13 PROCEDURE — 1126F PR PAIN SEVERITY QUANTIFIED, NO PAIN PRESENT: ICD-10-PCS | Mod: S$GLB,,, | Performed by: INTERNAL MEDICINE

## 2021-04-13 PROCEDURE — 1126F AMNT PAIN NOTED NONE PRSNT: CPT | Mod: S$GLB,,, | Performed by: INTERNAL MEDICINE

## 2021-04-13 PROCEDURE — 1101F PR PT FALLS ASSESS DOC 0-1 FALLS W/OUT INJ PAST YR: ICD-10-PCS | Mod: CPTII,S$GLB,, | Performed by: INTERNAL MEDICINE

## 2021-04-13 PROCEDURE — 1159F MED LIST DOCD IN RCRD: CPT | Mod: S$GLB,,, | Performed by: INTERNAL MEDICINE

## 2021-04-13 PROCEDURE — 99999 PR PBB SHADOW E&M-EST. PATIENT-LVL V: ICD-10-PCS | Mod: PBBFAC,,, | Performed by: INTERNAL MEDICINE

## 2021-04-13 PROCEDURE — 3052F HG A1C>EQUAL 8.0%<EQUAL 9.0%: CPT | Mod: CPTII,S$GLB,, | Performed by: INTERNAL MEDICINE

## 2021-04-13 PROCEDURE — 3288F FALL RISK ASSESSMENT DOCD: CPT | Mod: CPTII,S$GLB,, | Performed by: INTERNAL MEDICINE

## 2021-04-13 PROCEDURE — 99214 PR OFFICE/OUTPT VISIT, EST, LEVL IV, 30-39 MIN: ICD-10-PCS | Mod: S$GLB,,, | Performed by: INTERNAL MEDICINE

## 2021-04-13 PROCEDURE — 99999 PR PBB SHADOW E&M-EST. PATIENT-LVL V: CPT | Mod: PBBFAC,,, | Performed by: INTERNAL MEDICINE

## 2021-04-13 PROCEDURE — 1159F PR MEDICATION LIST DOCUMENTED IN MEDICAL RECORD: ICD-10-PCS | Mod: S$GLB,,, | Performed by: INTERNAL MEDICINE

## 2021-04-13 PROCEDURE — 99214 OFFICE O/P EST MOD 30 MIN: CPT | Mod: S$GLB,,, | Performed by: INTERNAL MEDICINE

## 2021-04-13 PROCEDURE — 3288F PR FALLS RISK ASSESSMENT DOCUMENTED: ICD-10-PCS | Mod: CPTII,S$GLB,, | Performed by: INTERNAL MEDICINE

## 2021-04-13 PROCEDURE — 3052F PR MOST RECENT HEMOGLOBIN A1C LEVEL 8.0 - < 9.0%: ICD-10-PCS | Mod: CPTII,S$GLB,, | Performed by: INTERNAL MEDICINE

## 2021-04-21 ENCOUNTER — PATIENT OUTREACH (OUTPATIENT)
Dept: ADMINISTRATIVE | Facility: HOSPITAL | Age: 76
End: 2021-04-21

## 2021-04-26 ENCOUNTER — CLINICAL SUPPORT (OUTPATIENT)
Dept: INTERNAL MEDICINE | Facility: CLINIC | Age: 76
End: 2021-04-26
Payer: MEDICARE

## 2021-04-26 ENCOUNTER — LAB VISIT (OUTPATIENT)
Dept: LAB | Facility: HOSPITAL | Age: 76
End: 2021-04-26
Attending: INTERNAL MEDICINE
Payer: MEDICARE

## 2021-04-26 ENCOUNTER — TELEPHONE (OUTPATIENT)
Dept: INTERNAL MEDICINE | Facility: CLINIC | Age: 76
End: 2021-04-26

## 2021-04-26 VITALS — HEART RATE: 87 BPM | DIASTOLIC BLOOD PRESSURE: 76 MMHG | SYSTOLIC BLOOD PRESSURE: 120 MMHG

## 2021-04-26 DIAGNOSIS — N39.0 URINARY TRACT INFECTION WITHOUT HEMATURIA, SITE UNSPECIFIED: ICD-10-CM

## 2021-04-26 DIAGNOSIS — N39.0 URINARY TRACT INFECTION WITHOUT HEMATURIA, SITE UNSPECIFIED: Primary | ICD-10-CM

## 2021-04-26 LAB
BACTERIA #/AREA URNS AUTO: ABNORMAL /HPF
BILIRUB UR QL STRIP: NEGATIVE
CLARITY UR REFRACT.AUTO: ABNORMAL
COLOR UR AUTO: YELLOW
GLUCOSE UR QL STRIP: NEGATIVE
HGB UR QL STRIP: NEGATIVE
HYALINE CASTS UR QL AUTO: 0 /LPF
KETONES UR QL STRIP: NEGATIVE
LEUKOCYTE ESTERASE UR QL STRIP: ABNORMAL
MICROSCOPIC COMMENT: ABNORMAL
NITRITE UR QL STRIP: POSITIVE
PH UR STRIP: 5 [PH] (ref 5–8)
PROT UR QL STRIP: ABNORMAL
RBC #/AREA URNS AUTO: 0 /HPF (ref 0–4)
SP GR UR STRIP: 1.01 (ref 1–1.03)
SQUAMOUS #/AREA URNS AUTO: 6 /HPF
URN SPEC COLLECT METH UR: ABNORMAL
WBC #/AREA URNS AUTO: >100 /HPF (ref 0–5)
WBC CLUMPS UR QL AUTO: ABNORMAL

## 2021-04-26 PROCEDURE — 81001 URINALYSIS AUTO W/SCOPE: CPT | Performed by: INTERNAL MEDICINE

## 2021-04-26 PROCEDURE — 99999 PR PBB SHADOW E&M-EST. PATIENT-LVL I: ICD-10-PCS | Mod: PBBFAC,,,

## 2021-04-26 PROCEDURE — 87186 SC STD MICRODIL/AGAR DIL: CPT | Performed by: INTERNAL MEDICINE

## 2021-04-26 PROCEDURE — 99999 PR PBB SHADOW E&M-EST. PATIENT-LVL I: CPT | Mod: PBBFAC,,,

## 2021-04-26 PROCEDURE — 87077 CULTURE AEROBIC IDENTIFY: CPT | Performed by: INTERNAL MEDICINE

## 2021-04-26 PROCEDURE — 87086 URINE CULTURE/COLONY COUNT: CPT | Performed by: INTERNAL MEDICINE

## 2021-04-26 PROCEDURE — 87088 URINE BACTERIA CULTURE: CPT | Performed by: INTERNAL MEDICINE

## 2021-04-27 ENCOUNTER — TELEPHONE (OUTPATIENT)
Dept: INTERNAL MEDICINE | Facility: CLINIC | Age: 76
End: 2021-04-27

## 2021-04-27 RX ORDER — NITROFURANTOIN 25; 75 MG/1; MG/1
100 CAPSULE ORAL 2 TIMES DAILY
Qty: 14 CAPSULE | Refills: 0 | Status: SHIPPED | OUTPATIENT
Start: 2021-04-27 | End: 2021-04-27 | Stop reason: SDUPTHER

## 2021-04-27 RX ORDER — NITROFURANTOIN 25; 75 MG/1; MG/1
100 CAPSULE ORAL 2 TIMES DAILY
Qty: 14 CAPSULE | Refills: 0 | Status: SHIPPED | OUTPATIENT
Start: 2021-04-27 | End: 2021-05-04

## 2021-04-28 ENCOUNTER — TELEPHONE (OUTPATIENT)
Dept: INTERNAL MEDICINE | Facility: CLINIC | Age: 76
End: 2021-04-28

## 2021-04-28 LAB — BACTERIA UR CULT: ABNORMAL

## 2021-05-04 ENCOUNTER — TELEPHONE (OUTPATIENT)
Dept: ENDOCRINOLOGY | Facility: CLINIC | Age: 76
End: 2021-05-04

## 2021-05-05 ENCOUNTER — OFFICE VISIT (OUTPATIENT)
Dept: PODIATRY | Facility: CLINIC | Age: 76
End: 2021-05-05
Payer: MEDICARE

## 2021-05-05 VITALS
SYSTOLIC BLOOD PRESSURE: 152 MMHG | DIASTOLIC BLOOD PRESSURE: 84 MMHG | WEIGHT: 180.56 LBS | HEIGHT: 62 IN | BODY MASS INDEX: 33.23 KG/M2 | HEART RATE: 90 BPM

## 2021-05-05 DIAGNOSIS — Z79.4 TYPE 2 DIABETES MELLITUS WITH HYPERGLYCEMIA, WITH LONG-TERM CURRENT USE OF INSULIN: Primary | ICD-10-CM

## 2021-05-05 DIAGNOSIS — L84 CORN OR CALLUS: ICD-10-CM

## 2021-05-05 DIAGNOSIS — B35.1 ONYCHOMYCOSIS DUE TO DERMATOPHYTE: ICD-10-CM

## 2021-05-05 DIAGNOSIS — E11.65 TYPE 2 DIABETES MELLITUS WITH HYPERGLYCEMIA, WITH LONG-TERM CURRENT USE OF INSULIN: Primary | ICD-10-CM

## 2021-05-05 PROCEDURE — 1126F PR PAIN SEVERITY QUANTIFIED, NO PAIN PRESENT: ICD-10-PCS | Mod: S$GLB,,, | Performed by: PODIATRIST

## 2021-05-05 PROCEDURE — 99999 PR PBB SHADOW E&M-EST. PATIENT-LVL IV: CPT | Mod: PBBFAC,,, | Performed by: PODIATRIST

## 2021-05-05 PROCEDURE — 99499 UNLISTED E&M SERVICE: CPT | Mod: S$GLB,,, | Performed by: PODIATRIST

## 2021-05-05 PROCEDURE — 11056 PARNG/CUTG B9 HYPRKR LES 2-4: CPT | Mod: Q9,S$GLB,, | Performed by: PODIATRIST

## 2021-05-05 PROCEDURE — 1126F AMNT PAIN NOTED NONE PRSNT: CPT | Mod: S$GLB,,, | Performed by: PODIATRIST

## 2021-05-05 PROCEDURE — 11721 PR DEBRIDEMENT OF NAILS, 6 OR MORE: ICD-10-PCS | Mod: Q9,59,S$GLB, | Performed by: PODIATRIST

## 2021-05-05 PROCEDURE — 99499 NO LOS: ICD-10-PCS | Mod: S$GLB,,, | Performed by: PODIATRIST

## 2021-05-05 PROCEDURE — 11721 DEBRIDE NAIL 6 OR MORE: CPT | Mod: Q9,59,S$GLB, | Performed by: PODIATRIST

## 2021-05-05 PROCEDURE — 99999 PR PBB SHADOW E&M-EST. PATIENT-LVL IV: ICD-10-PCS | Mod: PBBFAC,,, | Performed by: PODIATRIST

## 2021-05-05 PROCEDURE — 11056 PR TRIM BENIGN HYPERKERATOTIC SKIN LESION,2-4: ICD-10-PCS | Mod: Q9,S$GLB,, | Performed by: PODIATRIST

## 2021-05-06 ENCOUNTER — TELEPHONE (OUTPATIENT)
Dept: INTERNAL MEDICINE | Facility: CLINIC | Age: 76
End: 2021-05-06
Payer: MEDICARE

## 2021-05-06 ENCOUNTER — TELEPHONE (OUTPATIENT)
Dept: ENDOCRINOLOGY | Facility: CLINIC | Age: 76
End: 2021-05-06

## 2021-05-06 ENCOUNTER — TELEPHONE (OUTPATIENT)
Dept: NEUROLOGY | Facility: CLINIC | Age: 76
End: 2021-05-06

## 2021-06-17 ENCOUNTER — TELEPHONE (OUTPATIENT)
Dept: SURGERY | Facility: CLINIC | Age: 76
End: 2021-06-17

## 2021-06-25 ENCOUNTER — TELEPHONE (OUTPATIENT)
Dept: SURGERY | Facility: CLINIC | Age: 76
End: 2021-06-25

## 2021-07-15 ENCOUNTER — TELEPHONE (OUTPATIENT)
Dept: ENDOCRINOLOGY | Facility: CLINIC | Age: 76
End: 2021-07-15

## 2021-07-20 ENCOUNTER — TELEPHONE (OUTPATIENT)
Dept: ENDOCRINOLOGY | Facility: CLINIC | Age: 76
End: 2021-07-20

## 2021-08-05 ENCOUNTER — OFFICE VISIT (OUTPATIENT)
Dept: PODIATRY | Facility: CLINIC | Age: 76
End: 2021-08-05
Payer: MEDICARE

## 2021-08-05 VITALS
SYSTOLIC BLOOD PRESSURE: 152 MMHG | HEIGHT: 62 IN | RESPIRATION RATE: 18 BRPM | BODY MASS INDEX: 34.04 KG/M2 | DIASTOLIC BLOOD PRESSURE: 79 MMHG | HEART RATE: 78 BPM | WEIGHT: 185 LBS

## 2021-08-05 DIAGNOSIS — E11.65 TYPE 2 DIABETES MELLITUS WITH HYPERGLYCEMIA, WITH LONG-TERM CURRENT USE OF INSULIN: Primary | ICD-10-CM

## 2021-08-05 DIAGNOSIS — B35.1 ONYCHOMYCOSIS DUE TO DERMATOPHYTE: ICD-10-CM

## 2021-08-05 DIAGNOSIS — L84 CORN OR CALLUS: ICD-10-CM

## 2021-08-05 DIAGNOSIS — Z79.4 TYPE 2 DIABETES MELLITUS WITH HYPERGLYCEMIA, WITH LONG-TERM CURRENT USE OF INSULIN: Primary | ICD-10-CM

## 2021-08-05 DIAGNOSIS — M79.89 LEG SWELLING: ICD-10-CM

## 2021-08-05 PROCEDURE — 11721 PR DEBRIDEMENT OF NAILS, 6 OR MORE: ICD-10-PCS | Mod: Q9,59,S$GLB, | Performed by: PODIATRIST

## 2021-08-05 PROCEDURE — 99499 RISK ADDL DX/OHS AUDIT: ICD-10-PCS | Mod: HCNC,S$GLB,, | Performed by: PODIATRIST

## 2021-08-05 PROCEDURE — 99999 PR PBB SHADOW E&M-EST. PATIENT-LVL IV: CPT | Mod: PBBFAC,,, | Performed by: PODIATRIST

## 2021-08-05 PROCEDURE — 1159F MED LIST DOCD IN RCRD: CPT | Mod: CPTII,S$GLB,, | Performed by: PODIATRIST

## 2021-08-05 PROCEDURE — 3077F PR MOST RECENT SYSTOLIC BLOOD PRESSURE >= 140 MM HG: ICD-10-PCS | Mod: CPTII,S$GLB,, | Performed by: PODIATRIST

## 2021-08-05 PROCEDURE — 11056 PARNG/CUTG B9 HYPRKR LES 2-4: CPT | Mod: Q9,S$GLB,, | Performed by: PODIATRIST

## 2021-08-05 PROCEDURE — 99213 PR OFFICE/OUTPT VISIT, EST, LEVL III, 20-29 MIN: ICD-10-PCS | Mod: 25,S$GLB,, | Performed by: PODIATRIST

## 2021-08-05 PROCEDURE — 99213 OFFICE O/P EST LOW 20 MIN: CPT | Mod: 25,S$GLB,, | Performed by: PODIATRIST

## 2021-08-05 PROCEDURE — 3078F PR MOST RECENT DIASTOLIC BLOOD PRESSURE < 80 MM HG: ICD-10-PCS | Mod: CPTII,S$GLB,, | Performed by: PODIATRIST

## 2021-08-05 PROCEDURE — 1126F PR PAIN SEVERITY QUANTIFIED, NO PAIN PRESENT: ICD-10-PCS | Mod: CPTII,S$GLB,, | Performed by: PODIATRIST

## 2021-08-05 PROCEDURE — 1159F PR MEDICATION LIST DOCUMENTED IN MEDICAL RECORD: ICD-10-PCS | Mod: CPTII,S$GLB,, | Performed by: PODIATRIST

## 2021-08-05 PROCEDURE — 3077F SYST BP >= 140 MM HG: CPT | Mod: CPTII,S$GLB,, | Performed by: PODIATRIST

## 2021-08-05 PROCEDURE — 3052F HG A1C>EQUAL 8.0%<EQUAL 9.0%: CPT | Mod: CPTII,S$GLB,, | Performed by: PODIATRIST

## 2021-08-05 PROCEDURE — 11056 PR TRIM BENIGN HYPERKERATOTIC SKIN LESION,2-4: ICD-10-PCS | Mod: Q9,S$GLB,, | Performed by: PODIATRIST

## 2021-08-05 PROCEDURE — 99499 UNLISTED E&M SERVICE: CPT | Mod: HCNC,S$GLB,, | Performed by: PODIATRIST

## 2021-08-05 PROCEDURE — 3052F PR MOST RECENT HEMOGLOBIN A1C LEVEL 8.0 - < 9.0%: ICD-10-PCS | Mod: CPTII,S$GLB,, | Performed by: PODIATRIST

## 2021-08-05 PROCEDURE — 3078F DIAST BP <80 MM HG: CPT | Mod: CPTII,S$GLB,, | Performed by: PODIATRIST

## 2021-08-05 PROCEDURE — 1126F AMNT PAIN NOTED NONE PRSNT: CPT | Mod: CPTII,S$GLB,, | Performed by: PODIATRIST

## 2021-08-05 PROCEDURE — 99999 PR PBB SHADOW E&M-EST. PATIENT-LVL IV: ICD-10-PCS | Mod: PBBFAC,,, | Performed by: PODIATRIST

## 2021-08-05 PROCEDURE — 11721 DEBRIDE NAIL 6 OR MORE: CPT | Mod: Q9,59,S$GLB, | Performed by: PODIATRIST

## 2021-08-05 RX ORDER — POTASSIUM CHLORIDE 750 MG/1
TABLET, EXTENDED RELEASE ORAL
COMMUNITY
Start: 2021-02-18 | End: 2021-12-27

## 2021-08-06 ENCOUNTER — TELEPHONE (OUTPATIENT)
Dept: SURGERY | Facility: CLINIC | Age: 76
End: 2021-08-06

## 2021-08-11 ENCOUNTER — OFFICE VISIT (OUTPATIENT)
Dept: HEMATOLOGY/ONCOLOGY | Facility: CLINIC | Age: 76
End: 2021-08-11
Payer: MEDICARE

## 2021-08-11 ENCOUNTER — OFFICE VISIT (OUTPATIENT)
Dept: ENDOCRINOLOGY | Facility: CLINIC | Age: 76
End: 2021-08-11
Payer: MEDICARE

## 2021-08-11 ENCOUNTER — TELEPHONE (OUTPATIENT)
Dept: INTERNAL MEDICINE | Facility: CLINIC | Age: 76
End: 2021-08-11

## 2021-08-11 ENCOUNTER — LAB VISIT (OUTPATIENT)
Dept: LAB | Facility: HOSPITAL | Age: 76
End: 2021-08-11
Attending: INTERNAL MEDICINE
Payer: MEDICARE

## 2021-08-11 VITALS
OXYGEN SATURATION: 99 % | WEIGHT: 180.25 LBS | DIASTOLIC BLOOD PRESSURE: 78 MMHG | HEIGHT: 62 IN | HEART RATE: 77 BPM | BODY MASS INDEX: 33.17 KG/M2 | SYSTOLIC BLOOD PRESSURE: 137 MMHG

## 2021-08-11 VITALS
WEIGHT: 180.56 LBS | TEMPERATURE: 99 F | HEIGHT: 62 IN | HEART RATE: 86 BPM | DIASTOLIC BLOOD PRESSURE: 75 MMHG | OXYGEN SATURATION: 96 % | SYSTOLIC BLOOD PRESSURE: 160 MMHG | BODY MASS INDEX: 33.23 KG/M2 | RESPIRATION RATE: 16 BRPM

## 2021-08-11 DIAGNOSIS — Z79.4 TYPE 2 DIABETES MELLITUS WITH DIABETIC POLYNEUROPATHY, WITH LONG-TERM CURRENT USE OF INSULIN: ICD-10-CM

## 2021-08-11 DIAGNOSIS — Z91.81 AT RISK FOR FALLS: ICD-10-CM

## 2021-08-11 DIAGNOSIS — Z79.811 USE OF AROMATASE INHIBITORS: ICD-10-CM

## 2021-08-11 DIAGNOSIS — E11.42 TYPE 2 DIABETES MELLITUS WITH DIABETIC POLYNEUROPATHY, WITH LONG-TERM CURRENT USE OF INSULIN: ICD-10-CM

## 2021-08-11 DIAGNOSIS — R53.81 DEBILITY: Primary | ICD-10-CM

## 2021-08-11 DIAGNOSIS — C50.912 RECURRENT BREAST CANCER, LEFT: Primary | ICD-10-CM

## 2021-08-11 DIAGNOSIS — R26.81 UNSTEADY GAIT: ICD-10-CM

## 2021-08-11 LAB
ESTIMATED AVG GLUCOSE: 143 MG/DL (ref 68–131)
HBA1C MFR BLD: 6.6 % (ref 4–5.6)

## 2021-08-11 PROCEDURE — 1159F MED LIST DOCD IN RCRD: CPT | Mod: CPTII,S$GLB,, | Performed by: INTERNAL MEDICINE

## 2021-08-11 PROCEDURE — 3078F DIAST BP <80 MM HG: CPT | Mod: CPTII,S$GLB,, | Performed by: INTERNAL MEDICINE

## 2021-08-11 PROCEDURE — 1160F RVW MEDS BY RX/DR IN RCRD: CPT | Mod: CPTII,S$GLB,, | Performed by: INTERNAL MEDICINE

## 2021-08-11 PROCEDURE — 1126F PR PAIN SEVERITY QUANTIFIED, NO PAIN PRESENT: ICD-10-PCS | Mod: CPTII,S$GLB,, | Performed by: INTERNAL MEDICINE

## 2021-08-11 PROCEDURE — 1101F PR PT FALLS ASSESS DOC 0-1 FALLS W/OUT INJ PAST YR: ICD-10-PCS | Mod: CPTII,S$GLB,, | Performed by: INTERNAL MEDICINE

## 2021-08-11 PROCEDURE — 99999 PR PBB SHADOW E&M-EST. PATIENT-LVL V: ICD-10-PCS | Mod: PBBFAC,,, | Performed by: INTERNAL MEDICINE

## 2021-08-11 PROCEDURE — 1101F PT FALLS ASSESS-DOCD LE1/YR: CPT | Mod: CPTII,S$GLB,, | Performed by: INTERNAL MEDICINE

## 2021-08-11 PROCEDURE — 1160F PR REVIEW ALL MEDS BY PRESCRIBER/CLIN PHARMACIST DOCUMENTED: ICD-10-PCS | Mod: CPTII,S$GLB,, | Performed by: INTERNAL MEDICINE

## 2021-08-11 PROCEDURE — 3078F PR MOST RECENT DIASTOLIC BLOOD PRESSURE < 80 MM HG: ICD-10-PCS | Mod: CPTII,S$GLB,, | Performed by: INTERNAL MEDICINE

## 2021-08-11 PROCEDURE — 99999 PR PBB SHADOW E&M-EST. PATIENT-LVL V: CPT | Mod: PBBFAC,,, | Performed by: INTERNAL MEDICINE

## 2021-08-11 PROCEDURE — 3077F PR MOST RECENT SYSTOLIC BLOOD PRESSURE >= 140 MM HG: ICD-10-PCS | Mod: CPTII,S$GLB,, | Performed by: INTERNAL MEDICINE

## 2021-08-11 PROCEDURE — 99214 OFFICE O/P EST MOD 30 MIN: CPT | Mod: S$GLB,,, | Performed by: INTERNAL MEDICINE

## 2021-08-11 PROCEDURE — 3075F PR MOST RECENT SYSTOLIC BLOOD PRESS GE 130-139MM HG: ICD-10-PCS | Mod: CPTII,S$GLB,, | Performed by: INTERNAL MEDICINE

## 2021-08-11 PROCEDURE — 1159F PR MEDICATION LIST DOCUMENTED IN MEDICAL RECORD: ICD-10-PCS | Mod: CPTII,S$GLB,, | Performed by: INTERNAL MEDICINE

## 2021-08-11 PROCEDURE — 3077F SYST BP >= 140 MM HG: CPT | Mod: CPTII,S$GLB,, | Performed by: INTERNAL MEDICINE

## 2021-08-11 PROCEDURE — 3288F FALL RISK ASSESSMENT DOCD: CPT | Mod: CPTII,S$GLB,, | Performed by: INTERNAL MEDICINE

## 2021-08-11 PROCEDURE — 99214 PR OFFICE/OUTPT VISIT, EST, LEVL IV, 30-39 MIN: ICD-10-PCS | Mod: S$GLB,,, | Performed by: INTERNAL MEDICINE

## 2021-08-11 PROCEDURE — 1126F AMNT PAIN NOTED NONE PRSNT: CPT | Mod: CPTII,S$GLB,, | Performed by: INTERNAL MEDICINE

## 2021-08-11 PROCEDURE — 99213 OFFICE O/P EST LOW 20 MIN: CPT | Mod: S$GLB,,, | Performed by: INTERNAL MEDICINE

## 2021-08-11 PROCEDURE — 3052F HG A1C>EQUAL 8.0%<EQUAL 9.0%: CPT | Mod: CPTII,S$GLB,, | Performed by: INTERNAL MEDICINE

## 2021-08-11 PROCEDURE — 3288F PR FALLS RISK ASSESSMENT DOCUMENTED: ICD-10-PCS | Mod: CPTII,S$GLB,, | Performed by: INTERNAL MEDICINE

## 2021-08-11 PROCEDURE — 3052F PR MOST RECENT HEMOGLOBIN A1C LEVEL 8.0 - < 9.0%: ICD-10-PCS | Mod: CPTII,S$GLB,, | Performed by: INTERNAL MEDICINE

## 2021-08-11 PROCEDURE — 83036 HEMOGLOBIN GLYCOSYLATED A1C: CPT | Performed by: INTERNAL MEDICINE

## 2021-08-11 PROCEDURE — 36415 COLL VENOUS BLD VENIPUNCTURE: CPT | Performed by: INTERNAL MEDICINE

## 2021-08-11 PROCEDURE — 3075F SYST BP GE 130 - 139MM HG: CPT | Mod: CPTII,S$GLB,, | Performed by: INTERNAL MEDICINE

## 2021-08-11 PROCEDURE — 99213 PR OFFICE/OUTPT VISIT, EST, LEVL III, 20-29 MIN: ICD-10-PCS | Mod: S$GLB,,, | Performed by: INTERNAL MEDICINE

## 2021-08-12 ENCOUNTER — TELEPHONE (OUTPATIENT)
Dept: ENDOCRINOLOGY | Facility: CLINIC | Age: 76
End: 2021-08-12

## 2021-08-12 DIAGNOSIS — Z79.4 TYPE 2 DIABETES MELLITUS WITH DIABETIC POLYNEUROPATHY, WITH LONG-TERM CURRENT USE OF INSULIN: Primary | ICD-10-CM

## 2021-08-12 DIAGNOSIS — E11.42 TYPE 2 DIABETES MELLITUS WITH DIABETIC POLYNEUROPATHY, WITH LONG-TERM CURRENT USE OF INSULIN: Primary | ICD-10-CM

## 2021-08-12 RX ORDER — FLASH GLUCOSE SENSOR
2 KIT MISCELLANEOUS
Qty: 2 KIT | Refills: 11 | Status: SHIPPED | OUTPATIENT
Start: 2021-08-12 | End: 2021-09-28 | Stop reason: SDUPTHER

## 2021-08-12 RX ORDER — FLASH GLUCOSE SCANNING READER
1 EACH MISCELLANEOUS DAILY
Qty: 1 EACH | Refills: 0 | Status: SHIPPED | OUTPATIENT
Start: 2021-08-12 | End: 2021-09-28 | Stop reason: SDUPTHER

## 2021-08-16 ENCOUNTER — TELEPHONE (OUTPATIENT)
Dept: DIABETES | Facility: CLINIC | Age: 76
End: 2021-08-16

## 2021-08-17 ENCOUNTER — TELEPHONE (OUTPATIENT)
Dept: ENDOCRINOLOGY | Facility: CLINIC | Age: 76
End: 2021-08-17

## 2021-08-24 DIAGNOSIS — Z17.0 MALIGNANT NEOPLASM OF UPPER-OUTER QUADRANT OF LEFT BREAST IN FEMALE, ESTROGEN RECEPTOR POSITIVE: Primary | ICD-10-CM

## 2021-08-24 DIAGNOSIS — C50.412 MALIGNANT NEOPLASM OF UPPER-OUTER QUADRANT OF LEFT BREAST IN FEMALE, ESTROGEN RECEPTOR POSITIVE: Primary | ICD-10-CM

## 2021-09-13 ENCOUNTER — CLINICAL SUPPORT (OUTPATIENT)
Dept: DIABETES | Facility: CLINIC | Age: 76
End: 2021-09-13
Payer: MEDICARE

## 2021-09-13 DIAGNOSIS — E11.42 TYPE 2 DIABETES MELLITUS WITH DIABETIC POLYNEUROPATHY, WITH LONG-TERM CURRENT USE OF INSULIN: ICD-10-CM

## 2021-09-13 DIAGNOSIS — Z79.4 TYPE 2 DIABETES MELLITUS WITH DIABETIC POLYNEUROPATHY, WITH LONG-TERM CURRENT USE OF INSULIN: ICD-10-CM

## 2021-09-13 PROCEDURE — G0108 DIAB MANAGE TRN  PER INDIV: HCPCS | Mod: HCNC,S$GLB,, | Performed by: DIETITIAN, REGISTERED

## 2021-09-13 PROCEDURE — 95249 PR GLUCOSE MONITORING, 72 HRS, SUB-Q SENSOR, PATIENT PROVIDED: ICD-10-PCS | Mod: HCNC,S$GLB,, | Performed by: DIETITIAN, REGISTERED

## 2021-09-13 PROCEDURE — 95249 CONT GLUC MNTR PT PROV EQP: CPT | Mod: HCNC,S$GLB,, | Performed by: DIETITIAN, REGISTERED

## 2021-09-13 PROCEDURE — 99999 PR PBB SHADOW E&M-EST. PATIENT-LVL I: ICD-10-PCS | Mod: PBBFAC,HCNC,,

## 2021-09-13 PROCEDURE — G0108 PR DIAB MANAGE TRN  PER INDIV: ICD-10-PCS | Mod: HCNC,S$GLB,, | Performed by: DIETITIAN, REGISTERED

## 2021-09-13 PROCEDURE — 99999 PR PBB SHADOW E&M-EST. PATIENT-LVL I: CPT | Mod: PBBFAC,HCNC,,

## 2021-09-19 ENCOUNTER — TELEPHONE (OUTPATIENT)
Dept: ENDOCRINOLOGY | Facility: CLINIC | Age: 76
End: 2021-09-19

## 2021-09-19 DIAGNOSIS — E11.42 DIABETIC PERIPHERAL NEUROPATHY ASSOCIATED WITH TYPE 2 DIABETES MELLITUS: ICD-10-CM

## 2021-09-19 RX ORDER — PREGABALIN 75 MG/1
CAPSULE ORAL
Qty: 180 CAPSULE | Refills: 3 | Status: SHIPPED | OUTPATIENT
Start: 2021-09-19 | End: 2021-09-20 | Stop reason: SDUPTHER

## 2021-09-20 DIAGNOSIS — E11.42 DIABETIC PERIPHERAL NEUROPATHY ASSOCIATED WITH TYPE 2 DIABETES MELLITUS: ICD-10-CM

## 2021-09-20 RX ORDER — PREGABALIN 75 MG/1
CAPSULE ORAL
Qty: 180 CAPSULE | Refills: 3 | Status: SHIPPED | OUTPATIENT
Start: 2021-09-20 | End: 2021-09-21 | Stop reason: SDUPTHER

## 2021-09-21 ENCOUNTER — TELEPHONE (OUTPATIENT)
Dept: ENDOCRINOLOGY | Facility: CLINIC | Age: 76
End: 2021-09-21

## 2021-09-21 DIAGNOSIS — E11.42 DIABETIC PERIPHERAL NEUROPATHY ASSOCIATED WITH TYPE 2 DIABETES MELLITUS: ICD-10-CM

## 2021-09-21 RX ORDER — PREGABALIN 75 MG/1
CAPSULE ORAL
Qty: 180 CAPSULE | Refills: 3 | Status: SHIPPED | OUTPATIENT
Start: 2021-09-21 | End: 2022-02-01

## 2021-09-22 ENCOUNTER — CLINICAL SUPPORT (OUTPATIENT)
Dept: DIABETES | Facility: CLINIC | Age: 76
End: 2021-09-22
Payer: MEDICARE

## 2021-09-22 DIAGNOSIS — Z79.4 TYPE 2 DIABETES MELLITUS WITH DIABETIC POLYNEUROPATHY, WITH LONG-TERM CURRENT USE OF INSULIN: ICD-10-CM

## 2021-09-22 DIAGNOSIS — E11.42 TYPE 2 DIABETES MELLITUS WITH DIABETIC POLYNEUROPATHY, WITH LONG-TERM CURRENT USE OF INSULIN: ICD-10-CM

## 2021-09-22 PROCEDURE — G0108 DIAB MANAGE TRN  PER INDIV: HCPCS | Mod: HCNC,S$GLB,, | Performed by: INTERNAL MEDICINE

## 2021-09-22 PROCEDURE — G0108 PR DIAB MANAGE TRN  PER INDIV: ICD-10-PCS | Mod: HCNC,S$GLB,, | Performed by: INTERNAL MEDICINE

## 2021-09-28 ENCOUNTER — CLINICAL SUPPORT (OUTPATIENT)
Dept: DIABETES | Facility: CLINIC | Age: 76
End: 2021-09-28
Payer: MEDICARE

## 2021-09-28 DIAGNOSIS — E11.42 TYPE 2 DIABETES MELLITUS WITH DIABETIC POLYNEUROPATHY, WITH LONG-TERM CURRENT USE OF INSULIN: ICD-10-CM

## 2021-09-28 DIAGNOSIS — Z79.4 TYPE 2 DIABETES MELLITUS WITH DIABETIC POLYNEUROPATHY, WITH LONG-TERM CURRENT USE OF INSULIN: ICD-10-CM

## 2021-09-28 PROCEDURE — G0108 PR DIAB MANAGE TRN  PER INDIV: ICD-10-PCS | Mod: HCNC,S$GLB,, | Performed by: INTERNAL MEDICINE

## 2021-09-28 PROCEDURE — G0108 DIAB MANAGE TRN  PER INDIV: HCPCS | Mod: HCNC,S$GLB,, | Performed by: INTERNAL MEDICINE

## 2021-09-28 RX ORDER — FLASH GLUCOSE SENSOR
2 KIT MISCELLANEOUS
Qty: 2 KIT | Refills: 11 | Status: SHIPPED | OUTPATIENT
Start: 2021-09-28 | End: 2021-10-11 | Stop reason: SDUPTHER

## 2021-09-28 RX ORDER — FLASH GLUCOSE SCANNING READER
1 EACH MISCELLANEOUS DAILY
Qty: 1 EACH | Refills: 0 | Status: SHIPPED | OUTPATIENT
Start: 2021-09-28 | End: 2021-10-11 | Stop reason: SDUPTHER

## 2021-09-29 ENCOUNTER — TELEPHONE (OUTPATIENT)
Dept: INTERNAL MEDICINE | Facility: CLINIC | Age: 76
End: 2021-09-29

## 2021-09-30 ENCOUNTER — TELEPHONE (OUTPATIENT)
Dept: ENDOCRINOLOGY | Facility: CLINIC | Age: 76
End: 2021-09-30

## 2021-10-04 ENCOUNTER — TELEPHONE (OUTPATIENT)
Dept: INTERNAL MEDICINE | Facility: CLINIC | Age: 76
End: 2021-10-04

## 2021-10-05 ENCOUNTER — TELEPHONE (OUTPATIENT)
Dept: SURGERY | Facility: CLINIC | Age: 76
End: 2021-10-05

## 2021-10-05 ENCOUNTER — PATIENT MESSAGE (OUTPATIENT)
Dept: SURGERY | Facility: CLINIC | Age: 76
End: 2021-10-05

## 2021-10-06 ENCOUNTER — TELEPHONE (OUTPATIENT)
Dept: INTERNAL MEDICINE | Facility: CLINIC | Age: 76
End: 2021-10-06

## 2021-10-06 DIAGNOSIS — E11.69 HYPERLIPIDEMIA ASSOCIATED WITH TYPE 2 DIABETES MELLITUS: Primary | ICD-10-CM

## 2021-10-06 DIAGNOSIS — I15.2 HYPERTENSION ASSOCIATED WITH DIABETES: ICD-10-CM

## 2021-10-06 DIAGNOSIS — E66.9 OBESITY (BMI 30-39.9): ICD-10-CM

## 2021-10-06 DIAGNOSIS — E78.5 HYPERLIPIDEMIA ASSOCIATED WITH TYPE 2 DIABETES MELLITUS: Primary | ICD-10-CM

## 2021-10-06 DIAGNOSIS — E11.59 HYPERTENSION ASSOCIATED WITH DIABETES: ICD-10-CM

## 2021-10-11 RX ORDER — FLASH GLUCOSE SCANNING READER
1 EACH MISCELLANEOUS DAILY
Qty: 1 EACH | Refills: 0 | Status: SHIPPED | OUTPATIENT
Start: 2021-10-11 | End: 2022-04-19

## 2021-10-11 RX ORDER — FLASH GLUCOSE SENSOR
2 KIT MISCELLANEOUS
Qty: 2 KIT | Refills: 11 | Status: SHIPPED | OUTPATIENT
Start: 2021-10-11 | End: 2022-04-19

## 2021-10-12 ENCOUNTER — TELEPHONE (OUTPATIENT)
Dept: ENDOCRINOLOGY | Facility: CLINIC | Age: 76
End: 2021-10-12

## 2021-10-13 ENCOUNTER — CLINICAL SUPPORT (OUTPATIENT)
Dept: DIABETES | Facility: CLINIC | Age: 76
End: 2021-10-13
Payer: MEDICARE

## 2021-10-13 DIAGNOSIS — E11.42 TYPE 2 DIABETES MELLITUS WITH DIABETIC POLYNEUROPATHY, WITH LONG-TERM CURRENT USE OF INSULIN: ICD-10-CM

## 2021-10-13 DIAGNOSIS — Z79.4 TYPE 2 DIABETES MELLITUS WITH DIABETIC POLYNEUROPATHY, WITH LONG-TERM CURRENT USE OF INSULIN: ICD-10-CM

## 2021-10-13 PROCEDURE — G0108 DIAB MANAGE TRN  PER INDIV: HCPCS | Mod: HCNC,S$GLB,, | Performed by: DIETITIAN, REGISTERED

## 2021-10-13 PROCEDURE — G0108 PR DIAB MANAGE TRN  PER INDIV: ICD-10-PCS | Mod: HCNC,S$GLB,, | Performed by: DIETITIAN, REGISTERED

## 2021-10-20 ENCOUNTER — TELEPHONE (OUTPATIENT)
Dept: SURGERY | Facility: CLINIC | Age: 76
End: 2021-10-20

## 2021-10-20 DIAGNOSIS — Z17.0 MALIGNANT NEOPLASM OF UPPER-OUTER QUADRANT OF LEFT BREAST IN FEMALE, ESTROGEN RECEPTOR POSITIVE: Primary | ICD-10-CM

## 2021-10-20 DIAGNOSIS — C50.412 MALIGNANT NEOPLASM OF UPPER-OUTER QUADRANT OF LEFT BREAST IN FEMALE, ESTROGEN RECEPTOR POSITIVE: Primary | ICD-10-CM

## 2021-10-26 ENCOUNTER — TELEPHONE (OUTPATIENT)
Dept: ENDOCRINOLOGY | Facility: CLINIC | Age: 76
End: 2021-10-26
Payer: MEDICARE

## 2021-10-29 ENCOUNTER — LAB VISIT (OUTPATIENT)
Dept: LAB | Facility: HOSPITAL | Age: 76
End: 2021-10-29
Attending: INTERNAL MEDICINE
Payer: MEDICARE

## 2021-10-29 DIAGNOSIS — I15.2 HYPERTENSION ASSOCIATED WITH DIABETES: ICD-10-CM

## 2021-10-29 DIAGNOSIS — E78.5 HYPERLIPIDEMIA ASSOCIATED WITH TYPE 2 DIABETES MELLITUS: ICD-10-CM

## 2021-10-29 DIAGNOSIS — E11.69 HYPERLIPIDEMIA ASSOCIATED WITH TYPE 2 DIABETES MELLITUS: ICD-10-CM

## 2021-10-29 DIAGNOSIS — E66.9 OBESITY (BMI 30-39.9): ICD-10-CM

## 2021-10-29 DIAGNOSIS — E11.59 HYPERTENSION ASSOCIATED WITH DIABETES: ICD-10-CM

## 2021-10-29 LAB
ALBUMIN SERPL BCP-MCNC: 3.2 G/DL (ref 3.5–5.2)
ALP SERPL-CCNC: 71 U/L (ref 55–135)
ALT SERPL W/O P-5'-P-CCNC: 5 U/L (ref 10–44)
ANION GAP SERPL CALC-SCNC: 9 MMOL/L (ref 8–16)
AST SERPL-CCNC: 11 U/L (ref 10–40)
BASOPHILS # BLD AUTO: 0.04 K/UL (ref 0–0.2)
BASOPHILS NFR BLD: 0.7 % (ref 0–1.9)
BILIRUB SERPL-MCNC: 1 MG/DL (ref 0.1–1)
BUN SERPL-MCNC: 12 MG/DL (ref 8–23)
CALCIUM SERPL-MCNC: 10.2 MG/DL (ref 8.7–10.5)
CHLORIDE SERPL-SCNC: 106 MMOL/L (ref 95–110)
CHOLEST SERPL-MCNC: 171 MG/DL (ref 120–199)
CHOLEST/HDLC SERPL: 3.1 {RATIO} (ref 2–5)
CO2 SERPL-SCNC: 28 MMOL/L (ref 23–29)
CREAT SERPL-MCNC: 0.8 MG/DL (ref 0.5–1.4)
DIFFERENTIAL METHOD: ABNORMAL
EOSINOPHIL # BLD AUTO: 0.1 K/UL (ref 0–0.5)
EOSINOPHIL NFR BLD: 1.1 % (ref 0–8)
ERYTHROCYTE [DISTWIDTH] IN BLOOD BY AUTOMATED COUNT: 14.3 % (ref 11.5–14.5)
EST. GFR  (AFRICAN AMERICAN): >60 ML/MIN/1.73 M^2
EST. GFR  (NON AFRICAN AMERICAN): >60 ML/MIN/1.73 M^2
ESTIMATED AVG GLUCOSE: 169 MG/DL (ref 68–131)
GLUCOSE SERPL-MCNC: 128 MG/DL (ref 70–110)
HBA1C MFR BLD: 7.5 % (ref 4–5.6)
HCT VFR BLD AUTO: 42.4 % (ref 37–48.5)
HDLC SERPL-MCNC: 55 MG/DL (ref 40–75)
HDLC SERPL: 32.2 % (ref 20–50)
HGB BLD-MCNC: 13.5 G/DL (ref 12–16)
IMM GRANULOCYTES # BLD AUTO: 0.02 K/UL (ref 0–0.04)
IMM GRANULOCYTES NFR BLD AUTO: 0.4 % (ref 0–0.5)
LDLC SERPL CALC-MCNC: 91.4 MG/DL (ref 63–159)
LYMPHOCYTES # BLD AUTO: 1.8 K/UL (ref 1–4.8)
LYMPHOCYTES NFR BLD: 31.2 % (ref 18–48)
MCH RBC QN AUTO: 28 PG (ref 27–31)
MCHC RBC AUTO-ENTMCNC: 31.8 G/DL (ref 32–36)
MCV RBC AUTO: 88 FL (ref 82–98)
MONOCYTES # BLD AUTO: 0.5 K/UL (ref 0.3–1)
MONOCYTES NFR BLD: 8.3 % (ref 4–15)
NEUTROPHILS # BLD AUTO: 3.3 K/UL (ref 1.8–7.7)
NEUTROPHILS NFR BLD: 58.3 % (ref 38–73)
NONHDLC SERPL-MCNC: 116 MG/DL
NRBC BLD-RTO: 0 /100 WBC
PLATELET # BLD AUTO: 177 K/UL (ref 150–450)
PMV BLD AUTO: 11 FL (ref 9.2–12.9)
POTASSIUM SERPL-SCNC: 4.1 MMOL/L (ref 3.5–5.1)
PROT SERPL-MCNC: 6.8 G/DL (ref 6–8.4)
RBC # BLD AUTO: 4.82 M/UL (ref 4–5.4)
SODIUM SERPL-SCNC: 143 MMOL/L (ref 136–145)
TRIGL SERPL-MCNC: 123 MG/DL (ref 30–150)
TSH SERPL DL<=0.005 MIU/L-ACNC: 1.18 UIU/ML (ref 0.4–4)
WBC # BLD AUTO: 5.65 K/UL (ref 3.9–12.7)

## 2021-10-29 PROCEDURE — 80061 LIPID PANEL: CPT | Mod: HCNC | Performed by: INTERNAL MEDICINE

## 2021-10-29 PROCEDURE — 36415 COLL VENOUS BLD VENIPUNCTURE: CPT | Mod: HCNC | Performed by: INTERNAL MEDICINE

## 2021-10-29 PROCEDURE — 84443 ASSAY THYROID STIM HORMONE: CPT | Mod: HCNC | Performed by: INTERNAL MEDICINE

## 2021-10-29 PROCEDURE — 83036 HEMOGLOBIN GLYCOSYLATED A1C: CPT | Mod: HCNC | Performed by: INTERNAL MEDICINE

## 2021-10-29 PROCEDURE — 85025 COMPLETE CBC W/AUTO DIFF WBC: CPT | Mod: HCNC | Performed by: INTERNAL MEDICINE

## 2021-10-29 PROCEDURE — 80053 COMPREHEN METABOLIC PANEL: CPT | Mod: HCNC | Performed by: INTERNAL MEDICINE

## 2021-11-01 ENCOUNTER — HOSPITAL ENCOUNTER (OUTPATIENT)
Dept: RADIOLOGY | Facility: HOSPITAL | Age: 76
Discharge: HOME | End: 2021-11-01
Attending: INTERNAL MEDICINE
Payer: MEDICARE

## 2021-11-01 ENCOUNTER — OFFICE VISIT (OUTPATIENT)
Dept: INTERNAL MEDICINE | Facility: CLINIC | Age: 76
End: 2021-11-01
Payer: MEDICARE

## 2021-11-01 VITALS
DIASTOLIC BLOOD PRESSURE: 70 MMHG | HEIGHT: 62 IN | SYSTOLIC BLOOD PRESSURE: 130 MMHG | BODY MASS INDEX: 32.14 KG/M2 | TEMPERATURE: 99 F | HEART RATE: 86 BPM | OXYGEN SATURATION: 97 % | WEIGHT: 174.63 LBS

## 2021-11-01 DIAGNOSIS — Z79.4 DIABETES MELLITUS TYPE 2, INSULIN DEPENDENT: Primary | ICD-10-CM

## 2021-11-01 DIAGNOSIS — R41.3 MEMORY CHANGE: ICD-10-CM

## 2021-11-01 DIAGNOSIS — I15.2 HYPERTENSION ASSOCIATED WITH DIABETES: ICD-10-CM

## 2021-11-01 DIAGNOSIS — E11.59 HYPERTENSION ASSOCIATED WITH DIABETES: ICD-10-CM

## 2021-11-01 DIAGNOSIS — R60.0 LOWER EXTREMITY EDEMA: ICD-10-CM

## 2021-11-01 DIAGNOSIS — E78.5 HYPERLIPIDEMIA ASSOCIATED WITH TYPE 2 DIABETES MELLITUS: ICD-10-CM

## 2021-11-01 DIAGNOSIS — E11.69 HYPERLIPIDEMIA ASSOCIATED WITH TYPE 2 DIABETES MELLITUS: ICD-10-CM

## 2021-11-01 DIAGNOSIS — E11.9 DIABETES MELLITUS TYPE 2, INSULIN DEPENDENT: Primary | ICD-10-CM

## 2021-11-01 PROCEDURE — 93970 EXTREMITY STUDY: CPT | Mod: 26,HCNC,, | Performed by: INTERNAL MEDICINE

## 2021-11-01 PROCEDURE — 3051F PR MOST RECENT HEMOGLOBIN A1C LEVEL 7.0 - < 8.0%: ICD-10-PCS | Mod: HCNC,CPTII,S$GLB, | Performed by: INTERNAL MEDICINE

## 2021-11-01 PROCEDURE — 3288F PR FALLS RISK ASSESSMENT DOCUMENTED: ICD-10-PCS | Mod: HCNC,CPTII,S$GLB, | Performed by: INTERNAL MEDICINE

## 2021-11-01 PROCEDURE — 1159F MED LIST DOCD IN RCRD: CPT | Mod: HCNC,CPTII,S$GLB, | Performed by: INTERNAL MEDICINE

## 2021-11-01 PROCEDURE — 99214 PR OFFICE/OUTPT VISIT, EST, LEVL IV, 30-39 MIN: ICD-10-PCS | Mod: HCNC,S$GLB,, | Performed by: INTERNAL MEDICINE

## 2021-11-01 PROCEDURE — 3078F DIAST BP <80 MM HG: CPT | Mod: HCNC,CPTII,S$GLB, | Performed by: INTERNAL MEDICINE

## 2021-11-01 PROCEDURE — 99499 RISK ADDL DX/OHS AUDIT: ICD-10-PCS | Mod: HCNC,S$GLB,, | Performed by: INTERNAL MEDICINE

## 2021-11-01 PROCEDURE — 99499 UNLISTED E&M SERVICE: CPT | Mod: HCNC,S$GLB,, | Performed by: INTERNAL MEDICINE

## 2021-11-01 PROCEDURE — 3075F SYST BP GE 130 - 139MM HG: CPT | Mod: HCNC,CPTII,S$GLB, | Performed by: INTERNAL MEDICINE

## 2021-11-01 PROCEDURE — 1101F PR PT FALLS ASSESS DOC 0-1 FALLS W/OUT INJ PAST YR: ICD-10-PCS | Mod: HCNC,CPTII,S$GLB, | Performed by: INTERNAL MEDICINE

## 2021-11-01 PROCEDURE — 3051F HG A1C>EQUAL 7.0%<8.0%: CPT | Mod: HCNC,CPTII,S$GLB, | Performed by: INTERNAL MEDICINE

## 2021-11-01 PROCEDURE — 1160F PR REVIEW ALL MEDS BY PRESCRIBER/CLIN PHARMACIST DOCUMENTED: ICD-10-PCS | Mod: HCNC,CPTII,S$GLB, | Performed by: INTERNAL MEDICINE

## 2021-11-01 PROCEDURE — 99214 OFFICE O/P EST MOD 30 MIN: CPT | Mod: HCNC,S$GLB,, | Performed by: INTERNAL MEDICINE

## 2021-11-01 PROCEDURE — 3078F PR MOST RECENT DIASTOLIC BLOOD PRESSURE < 80 MM HG: ICD-10-PCS | Mod: HCNC,CPTII,S$GLB, | Performed by: INTERNAL MEDICINE

## 2021-11-01 PROCEDURE — 1125F PR PAIN SEVERITY QUANTIFIED, PAIN PRESENT: ICD-10-PCS | Mod: HCNC,CPTII,S$GLB, | Performed by: INTERNAL MEDICINE

## 2021-11-01 PROCEDURE — 1101F PT FALLS ASSESS-DOCD LE1/YR: CPT | Mod: HCNC,CPTII,S$GLB, | Performed by: INTERNAL MEDICINE

## 2021-11-01 PROCEDURE — 99999 PR PBB SHADOW E&M-EST. PATIENT-LVL III: ICD-10-PCS | Mod: PBBFAC,HCNC,, | Performed by: INTERNAL MEDICINE

## 2021-11-01 PROCEDURE — 1125F AMNT PAIN NOTED PAIN PRSNT: CPT | Mod: HCNC,CPTII,S$GLB, | Performed by: INTERNAL MEDICINE

## 2021-11-01 PROCEDURE — 1160F RVW MEDS BY RX/DR IN RCRD: CPT | Mod: HCNC,CPTII,S$GLB, | Performed by: INTERNAL MEDICINE

## 2021-11-01 PROCEDURE — 93970 US LOWER EXTREMITY VEINS BILATERAL INSUFFICIENCY: ICD-10-PCS | Mod: 26,HCNC,, | Performed by: INTERNAL MEDICINE

## 2021-11-01 PROCEDURE — 3288F FALL RISK ASSESSMENT DOCD: CPT | Mod: HCNC,CPTII,S$GLB, | Performed by: INTERNAL MEDICINE

## 2021-11-01 PROCEDURE — 1159F PR MEDICATION LIST DOCUMENTED IN MEDICAL RECORD: ICD-10-PCS | Mod: HCNC,CPTII,S$GLB, | Performed by: INTERNAL MEDICINE

## 2021-11-01 PROCEDURE — 3075F PR MOST RECENT SYSTOLIC BLOOD PRESS GE 130-139MM HG: ICD-10-PCS | Mod: HCNC,CPTII,S$GLB, | Performed by: INTERNAL MEDICINE

## 2021-11-01 PROCEDURE — 99999 PR PBB SHADOW E&M-EST. PATIENT-LVL III: CPT | Mod: PBBFAC,HCNC,, | Performed by: INTERNAL MEDICINE

## 2021-11-01 PROCEDURE — 93970 EXTREMITY STUDY: CPT | Mod: TC,HCNC

## 2021-11-02 ENCOUNTER — TELEPHONE (OUTPATIENT)
Dept: INTERNAL MEDICINE | Facility: CLINIC | Age: 76
End: 2021-11-02
Payer: MEDICARE

## 2021-11-02 DIAGNOSIS — I87.2 CHRONIC VENOUS INSUFFICIENCY: Primary | ICD-10-CM

## 2021-11-04 ENCOUNTER — OFFICE VISIT (OUTPATIENT)
Dept: PODIATRY | Facility: CLINIC | Age: 76
End: 2021-11-04
Payer: MEDICARE

## 2021-11-04 VITALS
SYSTOLIC BLOOD PRESSURE: 152 MMHG | HEART RATE: 85 BPM | WEIGHT: 176 LBS | RESPIRATION RATE: 18 BRPM | DIASTOLIC BLOOD PRESSURE: 84 MMHG | BODY MASS INDEX: 32.39 KG/M2 | HEIGHT: 62 IN

## 2021-11-04 DIAGNOSIS — M79.89 LEG SWELLING: ICD-10-CM

## 2021-11-04 DIAGNOSIS — Z79.4 TYPE 2 DIABETES MELLITUS WITH HYPERGLYCEMIA, WITH LONG-TERM CURRENT USE OF INSULIN: Primary | ICD-10-CM

## 2021-11-04 DIAGNOSIS — L84 CORN OR CALLUS: ICD-10-CM

## 2021-11-04 DIAGNOSIS — E11.65 TYPE 2 DIABETES MELLITUS WITH HYPERGLYCEMIA, WITH LONG-TERM CURRENT USE OF INSULIN: Primary | ICD-10-CM

## 2021-11-04 DIAGNOSIS — B35.1 ONYCHOMYCOSIS DUE TO DERMATOPHYTE: ICD-10-CM

## 2021-11-04 PROCEDURE — 1159F PR MEDICATION LIST DOCUMENTED IN MEDICAL RECORD: ICD-10-PCS | Mod: HCNC,CPTII,S$GLB, | Performed by: PODIATRIST

## 2021-11-04 PROCEDURE — 3079F PR MOST RECENT DIASTOLIC BLOOD PRESSURE 80-89 MM HG: ICD-10-PCS | Mod: HCNC,CPTII,S$GLB, | Performed by: PODIATRIST

## 2021-11-04 PROCEDURE — 1159F MED LIST DOCD IN RCRD: CPT | Mod: HCNC,CPTII,S$GLB, | Performed by: PODIATRIST

## 2021-11-04 PROCEDURE — 11721 PR DEBRIDEMENT OF NAILS, 6 OR MORE: ICD-10-PCS | Mod: Q9,59,HCNC,S$GLB | Performed by: PODIATRIST

## 2021-11-04 PROCEDURE — 3077F PR MOST RECENT SYSTOLIC BLOOD PRESSURE >= 140 MM HG: ICD-10-PCS | Mod: HCNC,CPTII,S$GLB, | Performed by: PODIATRIST

## 2021-11-04 PROCEDURE — 99499 UNLISTED E&M SERVICE: CPT | Mod: HCNC,S$GLB,, | Performed by: PODIATRIST

## 2021-11-04 PROCEDURE — 1126F PR PAIN SEVERITY QUANTIFIED, NO PAIN PRESENT: ICD-10-PCS | Mod: HCNC,CPTII,S$GLB, | Performed by: PODIATRIST

## 2021-11-04 PROCEDURE — 3077F SYST BP >= 140 MM HG: CPT | Mod: HCNC,CPTII,S$GLB, | Performed by: PODIATRIST

## 2021-11-04 PROCEDURE — 99499 NO LOS: ICD-10-PCS | Mod: HCNC,S$GLB,, | Performed by: PODIATRIST

## 2021-11-04 PROCEDURE — 99999 PR PBB SHADOW E&M-EST. PATIENT-LVL II: CPT | Mod: PBBFAC,HCNC,, | Performed by: PODIATRIST

## 2021-11-04 PROCEDURE — 11056 PARNG/CUTG B9 HYPRKR LES 2-4: CPT | Mod: Q9,HCNC,S$GLB, | Performed by: PODIATRIST

## 2021-11-04 PROCEDURE — 3079F DIAST BP 80-89 MM HG: CPT | Mod: HCNC,CPTII,S$GLB, | Performed by: PODIATRIST

## 2021-11-04 PROCEDURE — 11056 PR TRIM BENIGN HYPERKERATOTIC SKIN LESION,2-4: ICD-10-PCS | Mod: Q9,HCNC,S$GLB, | Performed by: PODIATRIST

## 2021-11-04 PROCEDURE — 99999 PR PBB SHADOW E&M-EST. PATIENT-LVL II: ICD-10-PCS | Mod: PBBFAC,HCNC,, | Performed by: PODIATRIST

## 2021-11-04 PROCEDURE — 1126F AMNT PAIN NOTED NONE PRSNT: CPT | Mod: HCNC,CPTII,S$GLB, | Performed by: PODIATRIST

## 2021-11-04 PROCEDURE — 11721 DEBRIDE NAIL 6 OR MORE: CPT | Mod: Q9,59,HCNC,S$GLB | Performed by: PODIATRIST

## 2021-11-06 ENCOUNTER — HOSPITAL ENCOUNTER (INPATIENT)
Facility: HOSPITAL | Age: 76
LOS: 18 days | Discharge: NURSING, REHAB OR LONG-TERM CARE | DRG: 025 | End: 2021-11-24
Attending: EMERGENCY MEDICINE | Admitting: PSYCHIATRY & NEUROLOGY
Payer: MEDICARE

## 2021-11-06 DIAGNOSIS — N39.0 URINARY TRACT INFECTION WITHOUT HEMATURIA, SITE UNSPECIFIED: ICD-10-CM

## 2021-11-06 DIAGNOSIS — Z79.4 TYPE 2 DIABETES MELLITUS WITH DIABETIC POLYNEUROPATHY, WITH LONG-TERM CURRENT USE OF INSULIN: ICD-10-CM

## 2021-11-06 DIAGNOSIS — I26.99 OTHER ACUTE PULMONARY EMBOLISM WITHOUT ACUTE COR PULMONALE: ICD-10-CM

## 2021-11-06 DIAGNOSIS — E66.9 OBESITY (BMI 30-39.9): ICD-10-CM

## 2021-11-06 DIAGNOSIS — E11.42 TYPE 2 DIABETES MELLITUS WITH DIABETIC POLYNEUROPATHY, WITH LONG-TERM CURRENT USE OF INSULIN: ICD-10-CM

## 2021-11-06 DIAGNOSIS — R41.82 ALTERED MENTAL STATUS: ICD-10-CM

## 2021-11-06 DIAGNOSIS — G93.89 BRAIN MASS: ICD-10-CM

## 2021-11-06 DIAGNOSIS — D49.6 BRAIN TUMOR: ICD-10-CM

## 2021-11-06 DIAGNOSIS — Z74.09 IMPAIRED MOBILITY AND ADLS: ICD-10-CM

## 2021-11-06 DIAGNOSIS — Z78.9 IMPAIRED MOBILITY AND ADLS: ICD-10-CM

## 2021-11-06 DIAGNOSIS — I26.99 PULMONARY EMBOLISM: ICD-10-CM

## 2021-11-06 DIAGNOSIS — R78.81 BACTEREMIA: ICD-10-CM

## 2021-11-06 DIAGNOSIS — R90.0 INTRACRANIAL MASS: Primary | ICD-10-CM

## 2021-11-06 DIAGNOSIS — D32.0 MENINGIOMA, CEREBRAL: ICD-10-CM

## 2021-11-06 PROBLEM — E78.5 HYPERLIPIDEMIA: Status: ACTIVE | Noted: 2021-11-06

## 2021-11-06 PROBLEM — E87.6 HYPOKALEMIA: Status: ACTIVE | Noted: 2021-11-06

## 2021-11-06 PROBLEM — R74.8 ABNORMAL CPK: Status: ACTIVE | Noted: 2021-11-06

## 2021-11-06 PROBLEM — R79.89 ELEVATED LACTIC ACID LEVEL: Status: ACTIVE | Noted: 2021-11-06

## 2021-11-06 LAB
ABO + RH BLD: NORMAL
ALBUMIN SERPL BCP-MCNC: 2.7 G/DL (ref 3.5–5.2)
ALP SERPL-CCNC: 95 U/L (ref 55–135)
ALT SERPL W/O P-5'-P-CCNC: 12 U/L (ref 10–44)
ANION GAP SERPL CALC-SCNC: 14 MMOL/L (ref 8–16)
ANISOCYTOSIS BLD QL SMEAR: SLIGHT
APTT BLDCRRT: 22.4 SEC (ref 21–32)
APTT BLDCRRT: 24.9 SEC (ref 21–32)
AST SERPL-CCNC: 31 U/L (ref 10–40)
BACTERIA #/AREA URNS AUTO: ABNORMAL /HPF
BACTERIA #/AREA URNS AUTO: ABNORMAL /HPF
BASOPHILS # BLD AUTO: 0.03 K/UL (ref 0–0.2)
BASOPHILS NFR BLD: 0.3 % (ref 0–1.9)
BILIRUB SERPL-MCNC: 2.4 MG/DL (ref 0.1–1)
BILIRUB UR QL STRIP: NEGATIVE
BILIRUB UR QL STRIP: NEGATIVE
BLD GP AB SCN CELLS X3 SERPL QL: NORMAL
BUN SERPL-MCNC: 14 MG/DL (ref 8–23)
BUN SERPL-MCNC: 20 MG/DL (ref 6–30)
CALCIUM SERPL-MCNC: 9.1 MG/DL (ref 8.7–10.5)
CHLORIDE SERPL-SCNC: 100 MMOL/L (ref 95–110)
CHLORIDE SERPL-SCNC: 102 MMOL/L (ref 95–110)
CHOLEST SERPL-MCNC: 126 MG/DL (ref 120–199)
CHOLEST/HDLC SERPL: 2.7 {RATIO} (ref 2–5)
CK SERPL-CCNC: 1339 U/L (ref 20–180)
CLARITY UR REFRACT.AUTO: ABNORMAL
CLARITY UR REFRACT.AUTO: ABNORMAL
CO2 SERPL-SCNC: 21 MMOL/L (ref 23–29)
COLOR UR AUTO: YELLOW
COLOR UR AUTO: YELLOW
CREAT SERPL-MCNC: 1 MG/DL (ref 0.5–1.4)
CREAT SERPL-MCNC: 1 MG/DL (ref 0.5–1.4)
CTP QC/QA: YES
DIFFERENTIAL METHOD: ABNORMAL
EOSINOPHIL # BLD AUTO: 0 K/UL (ref 0–0.5)
EOSINOPHIL NFR BLD: 0 % (ref 0–8)
ERYTHROCYTE [DISTWIDTH] IN BLOOD BY AUTOMATED COUNT: 14.3 % (ref 11.5–14.5)
EST. GFR  (AFRICAN AMERICAN): >60 ML/MIN/1.73 M^2
EST. GFR  (NON AFRICAN AMERICAN): 54.1 ML/MIN/1.73 M^2
ESTIMATED AVG GLUCOSE: 169 MG/DL (ref 68–131)
GIANT PLATELETS BLD QL SMEAR: PRESENT
GLUCOSE SERPL-MCNC: 359 MG/DL (ref 70–110)
GLUCOSE SERPL-MCNC: 438 MG/DL (ref 70–110)
GLUCOSE UR QL STRIP: ABNORMAL
GLUCOSE UR QL STRIP: ABNORMAL
HBA1C MFR BLD: 7.5 % (ref 4–5.6)
HCT VFR BLD AUTO: 45.7 % (ref 37–48.5)
HCT VFR BLD CALC: 46 %PCV (ref 36–54)
HDLC SERPL-MCNC: 47 MG/DL (ref 40–75)
HDLC SERPL: 37.3 % (ref 20–50)
HGB BLD-MCNC: 14.7 G/DL (ref 12–16)
HGB UR QL STRIP: ABNORMAL
HGB UR QL STRIP: ABNORMAL
HYALINE CASTS UR QL AUTO: 1 /LPF
HYALINE CASTS UR QL AUTO: 1 /LPF
HYPOCHROMIA BLD QL SMEAR: ABNORMAL
IMM GRANULOCYTES # BLD AUTO: 0.13 K/UL (ref 0–0.04)
IMM GRANULOCYTES NFR BLD AUTO: 1.4 % (ref 0–0.5)
INR PPP: 1 (ref 0.8–1.2)
INR PPP: 1.1 (ref 0.8–1.2)
KETONES UR QL STRIP: ABNORMAL
KETONES UR QL STRIP: ABNORMAL
LACTATE SERPL-SCNC: 4.2 MMOL/L (ref 0.5–2.2)
LACTATE SERPL-SCNC: 6.4 MMOL/L (ref 0.5–2.2)
LDLC SERPL CALC-MCNC: 59.4 MG/DL (ref 63–159)
LEUKOCYTE ESTERASE UR QL STRIP: ABNORMAL
LEUKOCYTE ESTERASE UR QL STRIP: ABNORMAL
LYMPHOCYTES # BLD AUTO: 0.3 K/UL (ref 1–4.8)
LYMPHOCYTES NFR BLD: 2.9 % (ref 18–48)
MCH RBC QN AUTO: 28.2 PG (ref 27–31)
MCHC RBC AUTO-ENTMCNC: 32.2 G/DL (ref 32–36)
MCV RBC AUTO: 88 FL (ref 82–98)
MICROSCOPIC COMMENT: ABNORMAL
MICROSCOPIC COMMENT: ABNORMAL
MONOCYTES # BLD AUTO: 0.1 K/UL (ref 0.3–1)
MONOCYTES NFR BLD: 0.9 % (ref 4–15)
NEUTROPHILS # BLD AUTO: 8.8 K/UL (ref 1.8–7.7)
NEUTROPHILS NFR BLD: 94.5 % (ref 38–73)
NITRITE UR QL STRIP: POSITIVE
NITRITE UR QL STRIP: POSITIVE
NON-SQ EPI CELLS #/AREA URNS AUTO: 3 /HPF
NONHDLC SERPL-MCNC: 79 MG/DL
NRBC BLD-RTO: 0 /100 WBC
OVALOCYTES BLD QL SMEAR: ABNORMAL
PH UR STRIP: 5 [PH] (ref 5–8)
PH UR STRIP: 5 [PH] (ref 5–8)
PLATELET # BLD AUTO: 137 K/UL (ref 150–450)
PLATELET BLD QL SMEAR: ABNORMAL
PMV BLD AUTO: 10.6 FL (ref 9.2–12.9)
POC IONIZED CALCIUM: 1.06 MMOL/L (ref 1.06–1.42)
POC TCO2 (MEASURED): 22 MMOL/L (ref 23–29)
POCT GLUCOSE: 395 MG/DL (ref 70–110)
POIKILOCYTOSIS BLD QL SMEAR: SLIGHT
POLYCHROMASIA BLD QL SMEAR: ABNORMAL
POTASSIUM BLD-SCNC: 4.6 MMOL/L (ref 3.5–5.1)
POTASSIUM SERPL-SCNC: 3 MMOL/L (ref 3.5–5.1)
PROT SERPL-MCNC: 5.9 G/DL (ref 6–8.4)
PROT UR QL STRIP: NEGATIVE
PROT UR QL STRIP: NEGATIVE
PROTHROMBIN TIME: 11.4 SEC (ref 9–12.5)
PROTHROMBIN TIME: 11.7 SEC (ref 9–12.5)
RBC # BLD AUTO: 5.22 M/UL (ref 4–5.4)
RBC #/AREA URNS AUTO: 10 /HPF (ref 0–4)
RBC #/AREA URNS AUTO: 2 /HPF (ref 0–4)
SAMPLE: ABNORMAL
SARS-COV-2 RDRP RESP QL NAA+PROBE: NEGATIVE
SODIUM BLD-SCNC: 137 MMOL/L (ref 136–145)
SODIUM SERPL-SCNC: 135 MMOL/L (ref 136–145)
SP GR UR STRIP: 1.02 (ref 1–1.03)
SP GR UR STRIP: >=1.03 (ref 1–1.03)
SQUAMOUS #/AREA URNS AUTO: 1 /HPF
SQUAMOUS #/AREA URNS AUTO: 5 /HPF
TARGETS BLD QL SMEAR: ABNORMAL
TRIGL SERPL-MCNC: 98 MG/DL (ref 30–150)
TSH SERPL DL<=0.005 MIU/L-ACNC: 1.14 UIU/ML (ref 0.4–4)
URN SPEC COLLECT METH UR: ABNORMAL
URN SPEC COLLECT METH UR: ABNORMAL
WBC # BLD AUTO: 9.35 K/UL (ref 3.9–12.7)
WBC #/AREA URNS AUTO: 26 /HPF (ref 0–5)
WBC #/AREA URNS AUTO: 7 /HPF (ref 0–5)
YEAST UR QL AUTO: ABNORMAL
YEAST UR QL AUTO: ABNORMAL

## 2021-11-06 PROCEDURE — 93005 ELECTROCARDIOGRAM TRACING: CPT | Mod: HCNC

## 2021-11-06 PROCEDURE — 86900 BLOOD TYPING SEROLOGIC ABO: CPT | Mod: HCNC | Performed by: NURSE PRACTITIONER

## 2021-11-06 PROCEDURE — 83605 ASSAY OF LACTIC ACID: CPT | Mod: 91,HCNC | Performed by: STUDENT IN AN ORGANIZED HEALTH CARE EDUCATION/TRAINING PROGRAM

## 2021-11-06 PROCEDURE — 25000003 PHARM REV CODE 250: Mod: HCNC | Performed by: STUDENT IN AN ORGANIZED HEALTH CARE EDUCATION/TRAINING PROGRAM

## 2021-11-06 PROCEDURE — 80053 COMPREHEN METABOLIC PANEL: CPT | Mod: HCNC | Performed by: EMERGENCY MEDICINE

## 2021-11-06 PROCEDURE — 82550 ASSAY OF CK (CPK): CPT | Mod: 91,HCNC | Performed by: EMERGENCY MEDICINE

## 2021-11-06 PROCEDURE — U0002 COVID-19 LAB TEST NON-CDC: HCPCS | Mod: HCNC | Performed by: NURSE PRACTITIONER

## 2021-11-06 PROCEDURE — 99285 EMERGENCY DEPT VISIT HI MDM: CPT | Mod: HCNC,CS,, | Performed by: EMERGENCY MEDICINE

## 2021-11-06 PROCEDURE — 81001 URINALYSIS AUTO W/SCOPE: CPT | Mod: 91,HCNC | Performed by: NURSE PRACTITIONER

## 2021-11-06 PROCEDURE — 87086 URINE CULTURE/COLONY COUNT: CPT | Mod: HCNC | Performed by: STUDENT IN AN ORGANIZED HEALTH CARE EDUCATION/TRAINING PROGRAM

## 2021-11-06 PROCEDURE — 99285 EMERGENCY DEPT VISIT HI MDM: CPT | Mod: 25,HCNC

## 2021-11-06 PROCEDURE — 85730 THROMBOPLASTIN TIME PARTIAL: CPT | Mod: HCNC | Performed by: STUDENT IN AN ORGANIZED HEALTH CARE EDUCATION/TRAINING PROGRAM

## 2021-11-06 PROCEDURE — 96375 TX/PRO/DX INJ NEW DRUG ADDON: CPT | Mod: HCNC

## 2021-11-06 PROCEDURE — 85610 PROTHROMBIN TIME: CPT | Mod: 91,HCNC | Performed by: STUDENT IN AN ORGANIZED HEALTH CARE EDUCATION/TRAINING PROGRAM

## 2021-11-06 PROCEDURE — 25500020 PHARM REV CODE 255: Mod: HCNC | Performed by: EMERGENCY MEDICINE

## 2021-11-06 PROCEDURE — 83036 HEMOGLOBIN GLYCOSYLATED A1C: CPT | Mod: HCNC | Performed by: NURSE PRACTITIONER

## 2021-11-06 PROCEDURE — 87077 CULTURE AEROBIC IDENTIFY: CPT | Mod: 59,HCNC | Performed by: STUDENT IN AN ORGANIZED HEALTH CARE EDUCATION/TRAINING PROGRAM

## 2021-11-06 PROCEDURE — 85730 THROMBOPLASTIN TIME PARTIAL: CPT | Mod: 91,HCNC | Performed by: STUDENT IN AN ORGANIZED HEALTH CARE EDUCATION/TRAINING PROGRAM

## 2021-11-06 PROCEDURE — 93010 ELECTROCARDIOGRAM REPORT: CPT | Mod: HCNC,,, | Performed by: INTERNAL MEDICINE

## 2021-11-06 PROCEDURE — 84443 ASSAY THYROID STIM HORMONE: CPT | Mod: HCNC | Performed by: NURSE PRACTITIONER

## 2021-11-06 PROCEDURE — 85025 COMPLETE CBC W/AUTO DIFF WBC: CPT | Mod: HCNC | Performed by: STUDENT IN AN ORGANIZED HEALTH CARE EDUCATION/TRAINING PROGRAM

## 2021-11-06 PROCEDURE — 80061 LIPID PANEL: CPT | Mod: HCNC | Performed by: NURSE PRACTITIONER

## 2021-11-06 PROCEDURE — 80047 BASIC METABLC PNL IONIZED CA: CPT | Mod: HCNC

## 2021-11-06 PROCEDURE — 99291 CRITICAL CARE FIRST HOUR: CPT | Mod: HCNC,,, | Performed by: NURSE PRACTITIONER

## 2021-11-06 PROCEDURE — 63600175 PHARM REV CODE 636 W HCPCS: Mod: HCNC | Performed by: STUDENT IN AN ORGANIZED HEALTH CARE EDUCATION/TRAINING PROGRAM

## 2021-11-06 PROCEDURE — 82550 ASSAY OF CK (CPK): CPT | Mod: HCNC | Performed by: NURSE PRACTITIONER

## 2021-11-06 PROCEDURE — 87186 SC STD MICRODIL/AGAR DIL: CPT | Mod: HCNC | Performed by: STUDENT IN AN ORGANIZED HEALTH CARE EDUCATION/TRAINING PROGRAM

## 2021-11-06 PROCEDURE — 87184 SC STD DISK METHOD PER PLATE: CPT | Mod: HCNC | Performed by: STUDENT IN AN ORGANIZED HEALTH CARE EDUCATION/TRAINING PROGRAM

## 2021-11-06 PROCEDURE — 96374 THER/PROPH/DIAG INJ IV PUSH: CPT | Mod: 59,HCNC

## 2021-11-06 PROCEDURE — 63600175 PHARM REV CODE 636 W HCPCS: Mod: HCNC | Performed by: NURSE PRACTITIONER

## 2021-11-06 PROCEDURE — 87040 BLOOD CULTURE FOR BACTERIA: CPT | Mod: HCNC | Performed by: STUDENT IN AN ORGANIZED HEALTH CARE EDUCATION/TRAINING PROGRAM

## 2021-11-06 PROCEDURE — 87088 URINE BACTERIA CULTURE: CPT | Mod: HCNC | Performed by: STUDENT IN AN ORGANIZED HEALTH CARE EDUCATION/TRAINING PROGRAM

## 2021-11-06 PROCEDURE — 93010 EKG 12-LEAD: ICD-10-PCS | Mod: HCNC,,, | Performed by: INTERNAL MEDICINE

## 2021-11-06 PROCEDURE — 25000003 PHARM REV CODE 250: Mod: HCNC | Performed by: NURSE PRACTITIONER

## 2021-11-06 PROCEDURE — 96365 THER/PROPH/DIAG IV INF INIT: CPT | Mod: HCNC

## 2021-11-06 PROCEDURE — 12000002 HC ACUTE/MED SURGE SEMI-PRIVATE ROOM: Mod: HCNC

## 2021-11-06 PROCEDURE — 99291 PR CRITICAL CARE, E/M 30-74 MINUTES: ICD-10-PCS | Mod: HCNC,,, | Performed by: NURSE PRACTITIONER

## 2021-11-06 PROCEDURE — 99285 PR EMERGENCY DEPT VISIT,LEVEL V: ICD-10-PCS | Mod: HCNC,CS,, | Performed by: EMERGENCY MEDICINE

## 2021-11-06 PROCEDURE — A9585 GADOBUTROL INJECTION: HCPCS | Mod: HCNC | Performed by: EMERGENCY MEDICINE

## 2021-11-06 PROCEDURE — 85610 PROTHROMBIN TIME: CPT | Mod: HCNC | Performed by: STUDENT IN AN ORGANIZED HEALTH CARE EDUCATION/TRAINING PROGRAM

## 2021-11-06 PROCEDURE — 83605 ASSAY OF LACTIC ACID: CPT | Mod: 91,HCNC | Performed by: NURSE PRACTITIONER

## 2021-11-06 PROCEDURE — 20000000 HC ICU ROOM: Mod: HCNC

## 2021-11-06 PROCEDURE — 93010 ELECTROCARDIOGRAM REPORT: CPT | Mod: HCNC,76,, | Performed by: INTERNAL MEDICINE

## 2021-11-06 PROCEDURE — 86803 HEPATITIS C AB TEST: CPT | Mod: HCNC | Performed by: EMERGENCY MEDICINE

## 2021-11-06 PROCEDURE — 81001 URINALYSIS AUTO W/SCOPE: CPT | Mod: HCNC | Performed by: STUDENT IN AN ORGANIZED HEALTH CARE EDUCATION/TRAINING PROGRAM

## 2021-11-06 RX ORDER — DEXAMETHASONE SODIUM PHOSPHATE 4 MG/ML
8 INJECTION, SOLUTION INTRA-ARTICULAR; INTRALESIONAL; INTRAMUSCULAR; INTRAVENOUS; SOFT TISSUE
Status: COMPLETED | OUTPATIENT
Start: 2021-11-06 | End: 2021-11-06

## 2021-11-06 RX ORDER — LANOLIN ALCOHOL/MO/W.PET/CERES
800 CREAM (GRAM) TOPICAL
Status: DISCONTINUED | OUTPATIENT
Start: 2021-11-06 | End: 2021-11-11 | Stop reason: HOSPADM

## 2021-11-06 RX ORDER — AMOXICILLIN 250 MG
1 CAPSULE ORAL 2 TIMES DAILY
Status: DISCONTINUED | OUTPATIENT
Start: 2021-11-06 | End: 2021-11-24 | Stop reason: HOSPADM

## 2021-11-06 RX ORDER — GADOBUTROL 604.72 MG/ML
9 INJECTION INTRAVENOUS
Status: COMPLETED | OUTPATIENT
Start: 2021-11-06 | End: 2021-11-06

## 2021-11-06 RX ORDER — SODIUM CHLORIDE 0.9 % (FLUSH) 0.9 %
10 SYRINGE (ML) INJECTION
Status: DISCONTINUED | OUTPATIENT
Start: 2021-11-06 | End: 2021-11-24 | Stop reason: HOSPADM

## 2021-11-06 RX ORDER — ACETAMINOPHEN 325 MG/1
650 TABLET ORAL EVERY 6 HOURS PRN
Status: DISCONTINUED | OUTPATIENT
Start: 2021-11-06 | End: 2021-11-24

## 2021-11-06 RX ORDER — LABETALOL HCL 20 MG/4 ML
10 SYRINGE (ML) INTRAVENOUS EVERY 4 HOURS PRN
Status: DISCONTINUED | OUTPATIENT
Start: 2021-11-06 | End: 2021-11-08

## 2021-11-06 RX ORDER — LEVETIRACETAM 500 MG/5ML
1000 INJECTION, SOLUTION, CONCENTRATE INTRAVENOUS
Status: COMPLETED | OUTPATIENT
Start: 2021-11-06 | End: 2021-11-06

## 2021-11-06 RX ORDER — CEFTRIAXONE 2 G/1
INJECTION, POWDER, FOR SOLUTION INTRAMUSCULAR; INTRAVENOUS
Status: COMPLETED
Start: 2021-11-06 | End: 2021-11-06

## 2021-11-06 RX ORDER — SODIUM,POTASSIUM PHOSPHATES 280-250MG
2 POWDER IN PACKET (EA) ORAL
Status: DISCONTINUED | OUTPATIENT
Start: 2021-11-06 | End: 2021-11-11 | Stop reason: HOSPADM

## 2021-11-06 RX ORDER — LEVETIRACETAM 500 MG/5ML
500 INJECTION, SOLUTION, CONCENTRATE INTRAVENOUS EVERY 12 HOURS
Status: DISCONTINUED | OUTPATIENT
Start: 2021-11-06 | End: 2021-11-08

## 2021-11-06 RX ORDER — LEVETIRACETAM 500 MG/5ML
INJECTION, SOLUTION, CONCENTRATE INTRAVENOUS
Status: COMPLETED
Start: 2021-11-06 | End: 2021-11-06

## 2021-11-06 RX ORDER — GLUCAGON 1 MG
1 KIT INJECTION
Status: DISCONTINUED | OUTPATIENT
Start: 2021-11-06 | End: 2021-11-07

## 2021-11-06 RX ORDER — ONDANSETRON 2 MG/ML
4 INJECTION INTRAMUSCULAR; INTRAVENOUS EVERY 8 HOURS PRN
Status: DISCONTINUED | OUTPATIENT
Start: 2021-11-06 | End: 2021-11-07

## 2021-11-06 RX ORDER — INSULIN ASPART 100 [IU]/ML
1-10 INJECTION, SOLUTION INTRAVENOUS; SUBCUTANEOUS EVERY 6 HOURS PRN
Status: DISCONTINUED | OUTPATIENT
Start: 2021-11-06 | End: 2021-11-07

## 2021-11-06 RX ORDER — ATORVASTATIN CALCIUM 20 MG/1
40 TABLET, FILM COATED ORAL DAILY
Status: DISCONTINUED | OUTPATIENT
Start: 2021-11-07 | End: 2021-11-24 | Stop reason: HOSPADM

## 2021-11-06 RX ORDER — LEVETIRACETAM 5 MG/ML
INJECTION INTRAVASCULAR
Status: DISCONTINUED
Start: 2021-11-06 | End: 2021-11-06 | Stop reason: WASHOUT

## 2021-11-06 RX ORDER — LEVETIRACETAM 10 MG/ML
INJECTION INTRAVASCULAR
Status: DISPENSED
Start: 2021-11-06 | End: 2021-11-07

## 2021-11-06 RX ORDER — DEXAMETHASONE SODIUM PHOSPHATE 4 MG/ML
4 INJECTION, SOLUTION INTRA-ARTICULAR; INTRALESIONAL; INTRAMUSCULAR; INTRAVENOUS; SOFT TISSUE EVERY 6 HOURS
Status: DISCONTINUED | OUTPATIENT
Start: 2021-11-07 | End: 2021-11-14

## 2021-11-06 RX ADMIN — VANCOMYCIN HYDROCHLORIDE 2000 MG: 10 INJECTION, POWDER, LYOPHILIZED, FOR SOLUTION INTRAVENOUS at 05:11

## 2021-11-06 RX ADMIN — IOHEXOL 100 ML: 350 INJECTION, SOLUTION INTRAVENOUS at 06:11

## 2021-11-06 RX ADMIN — LEVETIRACETAM 1000 MG: 100 INJECTION, SOLUTION, CONCENTRATE INTRAVENOUS at 06:11

## 2021-11-06 RX ADMIN — ONDANSETRON 4 MG: 2 INJECTION INTRAMUSCULAR; INTRAVENOUS at 10:11

## 2021-11-06 RX ADMIN — CEFTRIAXONE 2 G: 2 INJECTION, POWDER, FOR SOLUTION INTRAMUSCULAR; INTRAVENOUS at 09:11

## 2021-11-06 RX ADMIN — SODIUM CHLORIDE, SODIUM LACTATE, POTASSIUM CHLORIDE, AND CALCIUM CHLORIDE 2394 ML: .6; .31; .03; .02 INJECTION, SOLUTION INTRAVENOUS at 05:11

## 2021-11-06 RX ADMIN — PIPERACILLIN AND TAZOBACTAM 4.5 G: 4; .5 INJECTION, POWDER, LYOPHILIZED, FOR SOLUTION INTRAVENOUS; PARENTERAL at 05:11

## 2021-11-06 RX ADMIN — DEXAMETHASONE SODIUM PHOSPHATE 4 MG: 4 INJECTION INTRA-ARTICULAR; INTRALESIONAL; INTRAMUSCULAR; INTRAVENOUS; SOFT TISSUE at 11:11

## 2021-11-06 RX ADMIN — LEVETIRACETAM 500 MG: 100 INJECTION, SOLUTION INTRAVENOUS at 09:11

## 2021-11-06 RX ADMIN — SODIUM CHLORIDE 1000 ML: 0.9 INJECTION, SOLUTION INTRAVENOUS at 09:11

## 2021-11-06 RX ADMIN — DEXAMETHASONE SODIUM PHOSPHATE 8 MG: 4 INJECTION INTRA-ARTICULAR; INTRALESIONAL; INTRAMUSCULAR; INTRAVENOUS; SOFT TISSUE at 06:11

## 2021-11-06 RX ADMIN — GADOBUTROL 9 ML: 604.72 INJECTION INTRAVENOUS at 09:11

## 2021-11-07 LAB
ALBUMIN SERPL BCP-MCNC: 2.6 G/DL (ref 3.5–5.2)
ALP SERPL-CCNC: 79 U/L (ref 55–135)
ALT SERPL W/O P-5'-P-CCNC: 17 U/L (ref 10–44)
ANION GAP SERPL CALC-SCNC: 15 MMOL/L (ref 8–16)
ANISOCYTOSIS BLD QL SMEAR: SLIGHT
AST SERPL-CCNC: 40 U/L (ref 10–40)
BASO STIPL BLD QL SMEAR: ABNORMAL
BASOPHILS # BLD AUTO: ABNORMAL K/UL (ref 0–0.2)
BASOPHILS NFR BLD: 0 % (ref 0–1.9)
BILIRUB SERPL-MCNC: 1.8 MG/DL (ref 0.1–1)
BUN SERPL-MCNC: 11 MG/DL (ref 8–23)
CALCIUM SERPL-MCNC: 8.8 MG/DL (ref 8.7–10.5)
CHLORIDE SERPL-SCNC: 104 MMOL/L (ref 95–110)
CK SERPL-CCNC: 1358 U/L (ref 20–180)
CK SERPL-CCNC: 1492 U/L (ref 20–180)
CO2 SERPL-SCNC: 20 MMOL/L (ref 23–29)
CREAT SERPL-MCNC: 0.9 MG/DL (ref 0.5–1.4)
DIFFERENTIAL METHOD: ABNORMAL
DOHLE BOD BLD QL SMEAR: PRESENT
EOSINOPHIL # BLD AUTO: ABNORMAL K/UL (ref 0–0.5)
EOSINOPHIL NFR BLD: 0 % (ref 0–8)
ERYTHROCYTE [DISTWIDTH] IN BLOOD BY AUTOMATED COUNT: 14 % (ref 11.5–14.5)
EST. GFR  (AFRICAN AMERICAN): >60 ML/MIN/1.73 M^2
EST. GFR  (NON AFRICAN AMERICAN): >60 ML/MIN/1.73 M^2
GLUCOSE SERPL-MCNC: 402 MG/DL (ref 70–110)
HCT VFR BLD AUTO: 37.8 % (ref 37–48.5)
HGB BLD-MCNC: 12.2 G/DL (ref 12–16)
HYPOCHROMIA BLD QL SMEAR: ABNORMAL
IMM GRANULOCYTES # BLD AUTO: ABNORMAL K/UL (ref 0–0.04)
IMM GRANULOCYTES NFR BLD AUTO: ABNORMAL % (ref 0–0.5)
LACTATE SERPL-SCNC: 2.1 MMOL/L (ref 0.5–2.2)
LACTATE SERPL-SCNC: 3 MMOL/L (ref 0.5–2.2)
LYMPHOCYTES # BLD AUTO: ABNORMAL K/UL (ref 1–4.8)
LYMPHOCYTES NFR BLD: 2 % (ref 18–48)
MAGNESIUM SERPL-MCNC: 1.6 MG/DL (ref 1.6–2.6)
MAGNESIUM SERPL-MCNC: 1.8 MG/DL (ref 1.6–2.6)
MCH RBC QN AUTO: 27.7 PG (ref 27–31)
MCHC RBC AUTO-ENTMCNC: 32.3 G/DL (ref 32–36)
MCV RBC AUTO: 86 FL (ref 82–98)
METAMYELOCYTES NFR BLD MANUAL: 1 %
MONOCYTES # BLD AUTO: ABNORMAL K/UL (ref 0.3–1)
MONOCYTES NFR BLD: 3 % (ref 4–15)
NEUTROPHILS NFR BLD: 77 % (ref 38–73)
NEUTS BAND NFR BLD MANUAL: 17 %
NRBC BLD-RTO: 0 /100 WBC
OVALOCYTES BLD QL SMEAR: ABNORMAL
PHOSPHATE SERPL-MCNC: 3.1 MG/DL (ref 2.7–4.5)
PLATELET # BLD AUTO: 118 K/UL (ref 150–450)
PLATELET BLD QL SMEAR: ABNORMAL
PMV BLD AUTO: 11.1 FL (ref 9.2–12.9)
POCT GLUCOSE: 128 MG/DL (ref 70–110)
POCT GLUCOSE: 131 MG/DL (ref 70–110)
POCT GLUCOSE: 149 MG/DL (ref 70–110)
POCT GLUCOSE: 155 MG/DL (ref 70–110)
POCT GLUCOSE: 156 MG/DL (ref 70–110)
POCT GLUCOSE: 160 MG/DL (ref 70–110)
POCT GLUCOSE: 164 MG/DL (ref 70–110)
POCT GLUCOSE: 166 MG/DL (ref 70–110)
POCT GLUCOSE: 171 MG/DL (ref 70–110)
POCT GLUCOSE: 175 MG/DL (ref 70–110)
POCT GLUCOSE: 184 MG/DL (ref 70–110)
POCT GLUCOSE: 188 MG/DL (ref 70–110)
POCT GLUCOSE: 211 MG/DL (ref 70–110)
POCT GLUCOSE: 215 MG/DL (ref 70–110)
POCT GLUCOSE: 221 MG/DL (ref 70–110)
POCT GLUCOSE: 280 MG/DL (ref 70–110)
POCT GLUCOSE: 314 MG/DL (ref 70–110)
POCT GLUCOSE: 353 MG/DL (ref 70–110)
POCT GLUCOSE: 384 MG/DL (ref 70–110)
POCT GLUCOSE: 384 MG/DL (ref 70–110)
POCT GLUCOSE: 400 MG/DL (ref 70–110)
POCT GLUCOSE: 408 MG/DL (ref 70–110)
POCT GLUCOSE: 416 MG/DL (ref 70–110)
POIKILOCYTOSIS BLD QL SMEAR: SLIGHT
POLYCHROMASIA BLD QL SMEAR: ABNORMAL
POTASSIUM SERPL-SCNC: 3.3 MMOL/L (ref 3.5–5.1)
POTASSIUM SERPL-SCNC: 3.8 MMOL/L (ref 3.5–5.1)
PROT SERPL-MCNC: 6 G/DL (ref 6–8.4)
RBC # BLD AUTO: 4.4 M/UL (ref 4–5.4)
SODIUM SERPL-SCNC: 139 MMOL/L (ref 136–145)
TOXIC GRANULES BLD QL SMEAR: PRESENT
WBC # BLD AUTO: 23.61 K/UL (ref 3.9–12.7)

## 2021-11-07 PROCEDURE — 99233 PR SUBSEQUENT HOSPITAL CARE,LEVL III: ICD-10-PCS | Mod: HCNC,,, | Performed by: NURSE PRACTITIONER

## 2021-11-07 PROCEDURE — 84100 ASSAY OF PHOSPHORUS: CPT | Mod: HCNC | Performed by: PSYCHIATRY & NEUROLOGY

## 2021-11-07 PROCEDURE — 97161 PT EVAL LOW COMPLEX 20 MIN: CPT | Mod: HCNC

## 2021-11-07 PROCEDURE — 84132 ASSAY OF SERUM POTASSIUM: CPT | Mod: HCNC | Performed by: PSYCHIATRY & NEUROLOGY

## 2021-11-07 PROCEDURE — 63600175 PHARM REV CODE 636 W HCPCS: Mod: HCNC | Performed by: PHYSICIAN ASSISTANT

## 2021-11-07 PROCEDURE — 83735 ASSAY OF MAGNESIUM: CPT | Mod: 91,HCNC | Performed by: PSYCHIATRY & NEUROLOGY

## 2021-11-07 PROCEDURE — 92610 EVALUATE SWALLOWING FUNCTION: CPT | Mod: HCNC

## 2021-11-07 PROCEDURE — 97116 GAIT TRAINING THERAPY: CPT | Mod: HCNC

## 2021-11-07 PROCEDURE — 80053 COMPREHEN METABOLIC PANEL: CPT | Mod: HCNC | Performed by: PSYCHIATRY & NEUROLOGY

## 2021-11-07 PROCEDURE — 20000000 HC ICU ROOM: Mod: HCNC

## 2021-11-07 PROCEDURE — 63600175 PHARM REV CODE 636 W HCPCS: Mod: HCNC | Performed by: NURSE PRACTITIONER

## 2021-11-07 PROCEDURE — 83605 ASSAY OF LACTIC ACID: CPT | Mod: HCNC | Performed by: NURSE PRACTITIONER

## 2021-11-07 PROCEDURE — 94761 N-INVAS EAR/PLS OXIMETRY MLT: CPT | Mod: HCNC

## 2021-11-07 PROCEDURE — 99900035 HC TECH TIME PER 15 MIN (STAT): Mod: HCNC

## 2021-11-07 PROCEDURE — 99223 PR INITIAL HOSPITAL CARE,LEVL III: ICD-10-PCS | Mod: HCNC,GC,, | Performed by: NEUROLOGICAL SURGERY

## 2021-11-07 PROCEDURE — 85007 BL SMEAR W/DIFF WBC COUNT: CPT | Mod: HCNC | Performed by: PSYCHIATRY & NEUROLOGY

## 2021-11-07 PROCEDURE — 85027 COMPLETE CBC AUTOMATED: CPT | Mod: HCNC | Performed by: PSYCHIATRY & NEUROLOGY

## 2021-11-07 PROCEDURE — 99223 1ST HOSP IP/OBS HIGH 75: CPT | Mod: HCNC,GC,, | Performed by: NEUROLOGICAL SURGERY

## 2021-11-07 PROCEDURE — 83735 ASSAY OF MAGNESIUM: CPT | Mod: HCNC | Performed by: PSYCHIATRY & NEUROLOGY

## 2021-11-07 PROCEDURE — 97165 OT EVAL LOW COMPLEX 30 MIN: CPT | Mod: HCNC

## 2021-11-07 PROCEDURE — 97535 SELF CARE MNGMENT TRAINING: CPT | Mod: HCNC

## 2021-11-07 PROCEDURE — 25000003 PHARM REV CODE 250: Mod: HCNC | Performed by: NURSE PRACTITIONER

## 2021-11-07 PROCEDURE — 25000003 PHARM REV CODE 250: Mod: HCNC | Performed by: PHYSICIAN ASSISTANT

## 2021-11-07 PROCEDURE — 99233 SBSQ HOSP IP/OBS HIGH 50: CPT | Mod: HCNC,,, | Performed by: NURSE PRACTITIONER

## 2021-11-07 PROCEDURE — 82550 ASSAY OF CK (CPK): CPT | Mod: HCNC | Performed by: NURSE PRACTITIONER

## 2021-11-07 RX ORDER — ONDANSETRON 2 MG/ML
8 INJECTION INTRAMUSCULAR; INTRAVENOUS EVERY 8 HOURS PRN
Status: DISCONTINUED | OUTPATIENT
Start: 2021-11-07 | End: 2021-11-24 | Stop reason: HOSPADM

## 2021-11-07 RX ORDER — SODIUM CHLORIDE 9 MG/ML
INJECTION, SOLUTION INTRAVENOUS CONTINUOUS
Status: DISCONTINUED | OUTPATIENT
Start: 2021-11-07 | End: 2021-11-12

## 2021-11-07 RX ORDER — HEPARIN SODIUM 5000 [USP'U]/ML
5000 INJECTION, SOLUTION INTRAVENOUS; SUBCUTANEOUS EVERY 8 HOURS
Status: DISCONTINUED | OUTPATIENT
Start: 2021-11-07 | End: 2021-11-08

## 2021-11-07 RX ORDER — CEFEPIME HYDROCHLORIDE 2 G/1
2 INJECTION, POWDER, FOR SOLUTION INTRAVENOUS
Status: DISCONTINUED | OUTPATIENT
Start: 2021-11-07 | End: 2021-11-08

## 2021-11-07 RX ADMIN — ONDANSETRON 4 MG: 2 INJECTION INTRAMUSCULAR; INTRAVENOUS at 06:11

## 2021-11-07 RX ADMIN — ONDANSETRON 8 MG: 2 INJECTION INTRAMUSCULAR; INTRAVENOUS at 10:11

## 2021-11-07 RX ADMIN — SENNOSIDES AND DOCUSATE SODIUM 1 TABLET: 50; 8.6 TABLET ORAL at 09:11

## 2021-11-07 RX ADMIN — INSULIN HUMAN 4.25 UNITS/HR: 1 INJECTION, SOLUTION INTRAVENOUS at 06:11

## 2021-11-07 RX ADMIN — INSULIN HUMAN 3 UNITS/HR: 1 INJECTION, SOLUTION INTRAVENOUS at 01:11

## 2021-11-07 RX ADMIN — DEXAMETHASONE SODIUM PHOSPHATE 4 MG: 4 INJECTION INTRA-ARTICULAR; INTRALESIONAL; INTRAMUSCULAR; INTRAVENOUS; SOFT TISSUE at 11:11

## 2021-11-07 RX ADMIN — POTASSIUM BICARBONATE 35 MEQ: 391 TABLET, EFFERVESCENT ORAL at 09:11

## 2021-11-07 RX ADMIN — DEXAMETHASONE SODIUM PHOSPHATE 4 MG: 4 INJECTION INTRA-ARTICULAR; INTRALESIONAL; INTRAMUSCULAR; INTRAVENOUS; SOFT TISSUE at 05:11

## 2021-11-07 RX ADMIN — CEFEPIME 2 G: 2 INJECTION, POWDER, FOR SOLUTION INTRAVENOUS at 05:11

## 2021-11-07 RX ADMIN — LEVETIRACETAM 500 MG: 100 INJECTION, SOLUTION INTRAVENOUS at 08:11

## 2021-11-07 RX ADMIN — ATORVASTATIN CALCIUM 40 MG: 20 TABLET, FILM COATED ORAL at 08:11

## 2021-11-07 RX ADMIN — HEPARIN SODIUM 5000 UNITS: 5000 INJECTION INTRAVENOUS; SUBCUTANEOUS at 10:11

## 2021-11-07 RX ADMIN — POTASSIUM BICARBONATE 35 MEQ: 391 TABLET, EFFERVESCENT ORAL at 05:11

## 2021-11-07 RX ADMIN — HEPARIN SODIUM 5000 UNITS: 5000 INJECTION INTRAVENOUS; SUBCUTANEOUS at 01:11

## 2021-11-07 RX ADMIN — SENNOSIDES AND DOCUSATE SODIUM 1 TABLET: 50; 8.6 TABLET ORAL at 08:11

## 2021-11-07 RX ADMIN — Medication 800 MG: at 09:11

## 2021-11-07 RX ADMIN — Medication 800 MG: at 05:11

## 2021-11-07 RX ADMIN — DEXAMETHASONE SODIUM PHOSPHATE 4 MG: 4 INJECTION INTRA-ARTICULAR; INTRALESIONAL; INTRAMUSCULAR; INTRAVENOUS; SOFT TISSUE at 12:11

## 2021-11-07 RX ADMIN — SODIUM CHLORIDE: 0.9 INJECTION, SOLUTION INTRAVENOUS at 10:11

## 2021-11-08 PROBLEM — T38.0X5A ADVERSE EFFECT OF CORTICOSTEROIDS: Status: ACTIVE | Noted: 2021-11-08

## 2021-11-08 LAB
ALBUMIN SERPL BCP-MCNC: 2.3 G/DL (ref 3.5–5.2)
ALBUMIN SERPL BCP-MCNC: 2.3 G/DL (ref 3.5–5.2)
ALP SERPL-CCNC: 72 U/L (ref 55–135)
ALP SERPL-CCNC: 72 U/L (ref 55–135)
ALT SERPL W/O P-5'-P-CCNC: 18 U/L (ref 10–44)
ALT SERPL W/O P-5'-P-CCNC: 18 U/L (ref 10–44)
ANION GAP SERPL CALC-SCNC: 9 MMOL/L (ref 8–16)
ANION GAP SERPL CALC-SCNC: 9 MMOL/L (ref 8–16)
ANISOCYTOSIS BLD QL SMEAR: SLIGHT
ANISOCYTOSIS BLD QL SMEAR: SLIGHT
AST SERPL-CCNC: 42 U/L (ref 10–40)
AST SERPL-CCNC: 42 U/L (ref 10–40)
BASOPHILS # BLD AUTO: 0.02 K/UL (ref 0–0.2)
BASOPHILS # BLD AUTO: ABNORMAL K/UL (ref 0–0.2)
BASOPHILS # BLD AUTO: ABNORMAL K/UL (ref 0–0.2)
BASOPHILS NFR BLD: 0 % (ref 0–1.9)
BASOPHILS NFR BLD: 0 % (ref 0–1.9)
BASOPHILS NFR BLD: 0.1 % (ref 0–1.9)
BILIRUB SERPL-MCNC: 1.2 MG/DL (ref 0.1–1)
BILIRUB SERPL-MCNC: 1.2 MG/DL (ref 0.1–1)
BUN SERPL-MCNC: 16 MG/DL (ref 8–23)
BUN SERPL-MCNC: 16 MG/DL (ref 8–23)
BURR CELLS BLD QL SMEAR: ABNORMAL
BURR CELLS BLD QL SMEAR: ABNORMAL
CALCIUM SERPL-MCNC: 9.2 MG/DL (ref 8.7–10.5)
CALCIUM SERPL-MCNC: 9.2 MG/DL (ref 8.7–10.5)
CHLORIDE SERPL-SCNC: 107 MMOL/L (ref 95–110)
CHLORIDE SERPL-SCNC: 107 MMOL/L (ref 95–110)
CK SERPL-CCNC: 861 U/L (ref 20–180)
CO2 SERPL-SCNC: 22 MMOL/L (ref 23–29)
CO2 SERPL-SCNC: 22 MMOL/L (ref 23–29)
CREAT SERPL-MCNC: 0.7 MG/DL (ref 0.5–1.4)
CREAT SERPL-MCNC: 0.7 MG/DL (ref 0.5–1.4)
DIFFERENTIAL METHOD: ABNORMAL
EOSINOPHIL # BLD AUTO: 0 K/UL (ref 0–0.5)
EOSINOPHIL # BLD AUTO: ABNORMAL K/UL (ref 0–0.5)
EOSINOPHIL # BLD AUTO: ABNORMAL K/UL (ref 0–0.5)
EOSINOPHIL NFR BLD: 0 % (ref 0–8)
ERYTHROCYTE [DISTWIDTH] IN BLOOD BY AUTOMATED COUNT: 14.4 % (ref 11.5–14.5)
EST. GFR  (AFRICAN AMERICAN): >60 ML/MIN/1.73 M^2
EST. GFR  (AFRICAN AMERICAN): >60 ML/MIN/1.73 M^2
EST. GFR  (NON AFRICAN AMERICAN): >60 ML/MIN/1.73 M^2
EST. GFR  (NON AFRICAN AMERICAN): >60 ML/MIN/1.73 M^2
GLUCOSE SERPL-MCNC: 189 MG/DL (ref 70–110)
GLUCOSE SERPL-MCNC: 189 MG/DL (ref 70–110)
HCT VFR BLD AUTO: 36.3 % (ref 37–48.5)
HCT VFR BLD AUTO: 36.3 % (ref 37–48.5)
HCT VFR BLD AUTO: 38.2 % (ref 37–48.5)
HCV AB SERPL QL IA: NEGATIVE
HGB BLD-MCNC: 11.9 G/DL (ref 12–16)
HGB BLD-MCNC: 11.9 G/DL (ref 12–16)
HGB BLD-MCNC: 12.4 G/DL (ref 12–16)
HYPOCHROMIA BLD QL SMEAR: ABNORMAL
HYPOCHROMIA BLD QL SMEAR: ABNORMAL
IMM GRANULOCYTES # BLD AUTO: 0.7 K/UL (ref 0–0.04)
IMM GRANULOCYTES # BLD AUTO: ABNORMAL K/UL (ref 0–0.04)
IMM GRANULOCYTES # BLD AUTO: ABNORMAL K/UL (ref 0–0.04)
IMM GRANULOCYTES NFR BLD AUTO: 3.7 % (ref 0–0.5)
IMM GRANULOCYTES NFR BLD AUTO: ABNORMAL % (ref 0–0.5)
IMM GRANULOCYTES NFR BLD AUTO: ABNORMAL % (ref 0–0.5)
LYMPHOCYTES # BLD AUTO: 0.6 K/UL (ref 1–4.8)
LYMPHOCYTES # BLD AUTO: ABNORMAL K/UL (ref 1–4.8)
LYMPHOCYTES # BLD AUTO: ABNORMAL K/UL (ref 1–4.8)
LYMPHOCYTES NFR BLD: 0 % (ref 18–48)
LYMPHOCYTES NFR BLD: 0 % (ref 18–48)
LYMPHOCYTES NFR BLD: 3.1 % (ref 18–48)
MCH RBC QN AUTO: 27.7 PG (ref 27–31)
MCH RBC QN AUTO: 27.7 PG (ref 27–31)
MCH RBC QN AUTO: 27.9 PG (ref 27–31)
MCHC RBC AUTO-ENTMCNC: 32.5 G/DL (ref 32–36)
MCHC RBC AUTO-ENTMCNC: 32.8 G/DL (ref 32–36)
MCHC RBC AUTO-ENTMCNC: 32.8 G/DL (ref 32–36)
MCV RBC AUTO: 84 FL (ref 82–98)
MCV RBC AUTO: 84 FL (ref 82–98)
MCV RBC AUTO: 86 FL (ref 82–98)
MONOCYTES # BLD AUTO: 0.8 K/UL (ref 0.3–1)
MONOCYTES # BLD AUTO: ABNORMAL K/UL (ref 0.3–1)
MONOCYTES # BLD AUTO: ABNORMAL K/UL (ref 0.3–1)
MONOCYTES NFR BLD: 1 % (ref 4–15)
MONOCYTES NFR BLD: 1 % (ref 4–15)
MONOCYTES NFR BLD: 4.1 % (ref 4–15)
NEUTROPHILS # BLD AUTO: 16.9 K/UL (ref 1.8–7.7)
NEUTROPHILS NFR BLD: 89 % (ref 38–73)
NEUTROPHILS NFR BLD: 99 % (ref 38–73)
NEUTROPHILS NFR BLD: 99 % (ref 38–73)
NRBC BLD-RTO: 0 /100 WBC
PLATELET # BLD AUTO: 105 K/UL (ref 150–450)
PLATELET # BLD AUTO: 89 K/UL (ref 150–450)
PLATELET # BLD AUTO: 89 K/UL (ref 150–450)
PLATELET BLD QL SMEAR: ABNORMAL
PLATELET BLD QL SMEAR: ABNORMAL
PMV BLD AUTO: 11.9 FL (ref 9.2–12.9)
PMV BLD AUTO: 11.9 FL (ref 9.2–12.9)
PMV BLD AUTO: 12 FL (ref 9.2–12.9)
POCT GLUCOSE: 146 MG/DL (ref 70–110)
POCT GLUCOSE: 160 MG/DL (ref 70–110)
POCT GLUCOSE: 164 MG/DL (ref 70–110)
POCT GLUCOSE: 164 MG/DL (ref 70–110)
POCT GLUCOSE: 169 MG/DL (ref 70–110)
POCT GLUCOSE: 176 MG/DL (ref 70–110)
POCT GLUCOSE: 177 MG/DL (ref 70–110)
POCT GLUCOSE: 177 MG/DL (ref 70–110)
POCT GLUCOSE: 181 MG/DL (ref 70–110)
POCT GLUCOSE: 185 MG/DL (ref 70–110)
POCT GLUCOSE: 187 MG/DL (ref 70–110)
POCT GLUCOSE: 190 MG/DL (ref 70–110)
POCT GLUCOSE: 197 MG/DL (ref 70–110)
POCT GLUCOSE: 203 MG/DL (ref 70–110)
POCT GLUCOSE: 209 MG/DL (ref 70–110)
POIKILOCYTOSIS BLD QL SMEAR: SLIGHT
POIKILOCYTOSIS BLD QL SMEAR: SLIGHT
POTASSIUM SERPL-SCNC: 3.8 MMOL/L (ref 3.5–5.1)
POTASSIUM SERPL-SCNC: 3.8 MMOL/L (ref 3.5–5.1)
PROT SERPL-MCNC: 5.7 G/DL (ref 6–8.4)
PROT SERPL-MCNC: 5.7 G/DL (ref 6–8.4)
RBC # BLD AUTO: 4.3 M/UL (ref 4–5.4)
RBC # BLD AUTO: 4.3 M/UL (ref 4–5.4)
RBC # BLD AUTO: 4.44 M/UL (ref 4–5.4)
SODIUM SERPL-SCNC: 138 MMOL/L (ref 136–145)
SODIUM SERPL-SCNC: 138 MMOL/L (ref 136–145)
WBC # BLD AUTO: 18.46 K/UL (ref 3.9–12.7)
WBC # BLD AUTO: 18.46 K/UL (ref 3.9–12.7)
WBC # BLD AUTO: 18.98 K/UL (ref 3.9–12.7)

## 2021-11-08 PROCEDURE — 94761 N-INVAS EAR/PLS OXIMETRY MLT: CPT | Mod: HCNC

## 2021-11-08 PROCEDURE — 63600175 PHARM REV CODE 636 W HCPCS: Mod: HCNC | Performed by: NURSE PRACTITIONER

## 2021-11-08 PROCEDURE — 80053 COMPREHEN METABOLIC PANEL: CPT | Mod: HCNC | Performed by: NURSE PRACTITIONER

## 2021-11-08 PROCEDURE — 99233 SBSQ HOSP IP/OBS HIGH 50: CPT | Mod: HCNC,,, | Performed by: NURSE PRACTITIONER

## 2021-11-08 PROCEDURE — 99232 PR SUBSEQUENT HOSPITAL CARE,LEVL II: ICD-10-PCS | Mod: HCNC,GC,, | Performed by: NEUROLOGICAL SURGERY

## 2021-11-08 PROCEDURE — 87077 CULTURE AEROBIC IDENTIFY: CPT | Mod: HCNC | Performed by: NURSE PRACTITIONER

## 2021-11-08 PROCEDURE — 25000003 PHARM REV CODE 250: Mod: HCNC | Performed by: NURSE PRACTITIONER

## 2021-11-08 PROCEDURE — 25000003 PHARM REV CODE 250: Mod: HCNC | Performed by: PSYCHIATRY & NEUROLOGY

## 2021-11-08 PROCEDURE — 87186 SC STD MICRODIL/AGAR DIL: CPT | Mod: HCNC | Performed by: NURSE PRACTITIONER

## 2021-11-08 PROCEDURE — 99232 SBSQ HOSP IP/OBS MODERATE 35: CPT | Mod: HCNC,GC,, | Performed by: NEUROLOGICAL SURGERY

## 2021-11-08 PROCEDURE — 20000000 HC ICU ROOM: Mod: HCNC

## 2021-11-08 PROCEDURE — 99233 PR SUBSEQUENT HOSPITAL CARE,LEVL III: ICD-10-PCS | Mod: HCNC,,, | Performed by: NURSE PRACTITIONER

## 2021-11-08 PROCEDURE — 87076 CULTURE ANAEROBE IDENT EACH: CPT | Mod: HCNC | Performed by: NURSE PRACTITIONER

## 2021-11-08 PROCEDURE — 97535 SELF CARE MNGMENT TRAINING: CPT | Mod: HCNC

## 2021-11-08 PROCEDURE — 85027 COMPLETE CBC AUTOMATED: CPT | Mod: HCNC | Performed by: NURSE PRACTITIONER

## 2021-11-08 PROCEDURE — 85007 BL SMEAR W/DIFF WBC COUNT: CPT | Mod: HCNC | Performed by: NURSE PRACTITIONER

## 2021-11-08 PROCEDURE — 87040 BLOOD CULTURE FOR BACTERIA: CPT | Mod: HCNC | Performed by: NURSE PRACTITIONER

## 2021-11-08 PROCEDURE — 99223 1ST HOSP IP/OBS HIGH 75: CPT | Mod: HCNC,,, | Performed by: NURSE PRACTITIONER

## 2021-11-08 PROCEDURE — 82550 ASSAY OF CK (CPK): CPT | Mod: HCNC | Performed by: NURSE PRACTITIONER

## 2021-11-08 PROCEDURE — 92523 SPEECH SOUND LANG COMPREHEN: CPT | Mod: HCNC

## 2021-11-08 PROCEDURE — 99900035 HC TECH TIME PER 15 MIN (STAT): Mod: HCNC

## 2021-11-08 PROCEDURE — 99223 PR INITIAL HOSPITAL CARE,LEVL III: ICD-10-PCS | Mod: HCNC,,, | Performed by: NURSE PRACTITIONER

## 2021-11-08 PROCEDURE — 63600175 PHARM REV CODE 636 W HCPCS: Mod: HCNC | Performed by: PSYCHIATRY & NEUROLOGY

## 2021-11-08 PROCEDURE — 63600175 PHARM REV CODE 636 W HCPCS: Mod: HCNC | Performed by: PHYSICIAN ASSISTANT

## 2021-11-08 PROCEDURE — 85025 COMPLETE CBC W/AUTO DIFF WBC: CPT | Mod: HCNC | Performed by: STUDENT IN AN ORGANIZED HEALTH CARE EDUCATION/TRAINING PROGRAM

## 2021-11-08 RX ORDER — LABETALOL HCL 20 MG/4 ML
10 SYRINGE (ML) INTRAVENOUS EVERY 4 HOURS PRN
Status: DISCONTINUED | OUTPATIENT
Start: 2021-11-08 | End: 2021-11-09

## 2021-11-08 RX ORDER — INSULIN ASPART 100 [IU]/ML
10 INJECTION, SOLUTION INTRAVENOUS; SUBCUTANEOUS
Status: DISCONTINUED | OUTPATIENT
Start: 2021-11-08 | End: 2021-11-09

## 2021-11-08 RX ORDER — INSULIN ASPART 100 [IU]/ML
10 INJECTION, SOLUTION INTRAVENOUS; SUBCUTANEOUS
Status: DISCONTINUED | OUTPATIENT
Start: 2021-11-09 | End: 2021-11-08

## 2021-11-08 RX ORDER — IBUPROFEN 200 MG
24 TABLET ORAL
Status: DISCONTINUED | OUTPATIENT
Start: 2021-11-08 | End: 2021-11-14

## 2021-11-08 RX ORDER — LEVETIRACETAM 500 MG/1
500 TABLET ORAL 2 TIMES DAILY
Status: DISCONTINUED | OUTPATIENT
Start: 2021-11-08 | End: 2021-11-09

## 2021-11-08 RX ORDER — FAMOTIDINE 20 MG/1
20 TABLET, FILM COATED ORAL 2 TIMES DAILY
Status: DISCONTINUED | OUTPATIENT
Start: 2021-11-08 | End: 2021-11-10

## 2021-11-08 RX ORDER — LEVETIRACETAM 500 MG/1
500 TABLET ORAL 2 TIMES DAILY
Status: DISCONTINUED | OUTPATIENT
Start: 2021-11-08 | End: 2021-11-08

## 2021-11-08 RX ORDER — IBUPROFEN 200 MG
16 TABLET ORAL
Status: DISCONTINUED | OUTPATIENT
Start: 2021-11-08 | End: 2021-11-14

## 2021-11-08 RX ORDER — GLUCAGON 1 MG
1 KIT INJECTION
Status: DISCONTINUED | OUTPATIENT
Start: 2021-11-08 | End: 2021-11-14

## 2021-11-08 RX ORDER — INSULIN ASPART 100 [IU]/ML
0-10 INJECTION, SOLUTION INTRAVENOUS; SUBCUTANEOUS
Status: DISCONTINUED | OUTPATIENT
Start: 2021-11-08 | End: 2021-11-14

## 2021-11-08 RX ORDER — HYDRALAZINE HYDROCHLORIDE 20 MG/ML
10 INJECTION INTRAMUSCULAR; INTRAVENOUS EVERY 4 HOURS PRN
Status: DISCONTINUED | OUTPATIENT
Start: 2021-11-08 | End: 2021-11-09

## 2021-11-08 RX ORDER — CEFEPIME HYDROCHLORIDE 2 G/1
2 INJECTION, POWDER, FOR SOLUTION INTRAVENOUS
Status: DISCONTINUED | OUTPATIENT
Start: 2021-11-08 | End: 2021-11-09

## 2021-11-08 RX ORDER — MUPIROCIN 20 MG/G
OINTMENT TOPICAL 2 TIMES DAILY
Status: COMPLETED | OUTPATIENT
Start: 2021-11-08 | End: 2021-11-13

## 2021-11-08 RX ADMIN — CEFEPIME 2 G: 2 INJECTION, POWDER, FOR SOLUTION INTRAVENOUS at 04:11

## 2021-11-08 RX ADMIN — SODIUM CHLORIDE: 0.9 INJECTION, SOLUTION INTRAVENOUS at 03:11

## 2021-11-08 RX ADMIN — DEXAMETHASONE SODIUM PHOSPHATE 4 MG: 4 INJECTION INTRA-ARTICULAR; INTRALESIONAL; INTRAMUSCULAR; INTRAVENOUS; SOFT TISSUE at 12:11

## 2021-11-08 RX ADMIN — LABETALOL HYDROCHLORIDE 10 MG: 5 INJECTION, SOLUTION INTRAVENOUS at 04:11

## 2021-11-08 RX ADMIN — CEFEPIME 2 G: 2 INJECTION, POWDER, FOR SOLUTION INTRAVENOUS at 08:11

## 2021-11-08 RX ADMIN — SODIUM CHLORIDE: 0.9 INJECTION, SOLUTION INTRAVENOUS at 08:11

## 2021-11-08 RX ADMIN — FAMOTIDINE 20 MG: 20 TABLET ORAL at 08:11

## 2021-11-08 RX ADMIN — SENNOSIDES AND DOCUSATE SODIUM 1 TABLET: 50; 8.6 TABLET ORAL at 09:11

## 2021-11-08 RX ADMIN — LEVETIRACETAM 500 MG: 100 INJECTION, SOLUTION INTRAVENOUS at 09:11

## 2021-11-08 RX ADMIN — MUPIROCIN: 20 OINTMENT TOPICAL at 09:11

## 2021-11-08 RX ADMIN — HYDRALAZINE HYDROCHLORIDE 10 MG: 20 INJECTION INTRAMUSCULAR; INTRAVENOUS at 09:11

## 2021-11-08 RX ADMIN — DEXAMETHASONE SODIUM PHOSPHATE 4 MG: 4 INJECTION INTRA-ARTICULAR; INTRALESIONAL; INTRAMUSCULAR; INTRAVENOUS; SOFT TISSUE at 11:11

## 2021-11-08 RX ADMIN — INSULIN ASPART 10 UNITS: 100 INJECTION, SOLUTION INTRAVENOUS; SUBCUTANEOUS at 05:11

## 2021-11-08 RX ADMIN — CEFEPIME 2 G: 2 INJECTION, POWDER, FOR SOLUTION INTRAVENOUS at 01:11

## 2021-11-08 RX ADMIN — MUPIROCIN: 20 OINTMENT TOPICAL at 08:11

## 2021-11-08 RX ADMIN — DEXAMETHASONE SODIUM PHOSPHATE 4 MG: 4 INJECTION INTRA-ARTICULAR; INTRALESIONAL; INTRAMUSCULAR; INTRAVENOUS; SOFT TISSUE at 05:11

## 2021-11-08 RX ADMIN — LEVETIRACETAM 500 MG: 500 TABLET, FILM COATED ORAL at 08:11

## 2021-11-08 RX ADMIN — INSULIN HUMAN 2 UNITS/HR: 1 INJECTION, SOLUTION INTRAVENOUS at 05:11

## 2021-11-08 RX ADMIN — ATORVASTATIN CALCIUM 40 MG: 20 TABLET, FILM COATED ORAL at 09:11

## 2021-11-09 PROBLEM — R78.81 BACTEREMIA: Status: ACTIVE | Noted: 2021-11-09

## 2021-11-09 LAB
ALBUMIN SERPL BCP-MCNC: 2.2 G/DL (ref 3.5–5.2)
ALP SERPL-CCNC: 74 U/L (ref 55–135)
ALT SERPL W/O P-5'-P-CCNC: 19 U/L (ref 10–44)
ANION GAP SERPL CALC-SCNC: 8 MMOL/L (ref 8–16)
AST SERPL-CCNC: 29 U/L (ref 10–40)
BASOPHILS # BLD AUTO: 0.02 K/UL (ref 0–0.2)
BASOPHILS NFR BLD: 0.1 % (ref 0–1.9)
BILIRUB SERPL-MCNC: 1 MG/DL (ref 0.1–1)
BUN SERPL-MCNC: 15 MG/DL (ref 8–23)
CALCIUM SERPL-MCNC: 9 MG/DL (ref 8.7–10.5)
CHLORIDE SERPL-SCNC: 109 MMOL/L (ref 95–110)
CK SERPL-CCNC: 282 U/L (ref 20–180)
CO2 SERPL-SCNC: 23 MMOL/L (ref 23–29)
CREAT SERPL-MCNC: 0.6 MG/DL (ref 0.5–1.4)
DIFFERENTIAL METHOD: ABNORMAL
EOSINOPHIL # BLD AUTO: 0 K/UL (ref 0–0.5)
EOSINOPHIL NFR BLD: 0 % (ref 0–8)
ERYTHROCYTE [DISTWIDTH] IN BLOOD BY AUTOMATED COUNT: 14.2 % (ref 11.5–14.5)
EST. GFR  (AFRICAN AMERICAN): >60 ML/MIN/1.73 M^2
EST. GFR  (NON AFRICAN AMERICAN): >60 ML/MIN/1.73 M^2
GLUCOSE SERPL-MCNC: 140 MG/DL (ref 70–110)
HCT VFR BLD AUTO: 36.2 % (ref 37–48.5)
HGB BLD-MCNC: 11.9 G/DL (ref 12–16)
IMM GRANULOCYTES # BLD AUTO: 0.18 K/UL (ref 0–0.04)
IMM GRANULOCYTES NFR BLD AUTO: 1 % (ref 0–0.5)
LYMPHOCYTES # BLD AUTO: 0.5 K/UL (ref 1–4.8)
LYMPHOCYTES NFR BLD: 2.8 % (ref 18–48)
MAGNESIUM SERPL-MCNC: 2.1 MG/DL (ref 1.6–2.6)
MCH RBC QN AUTO: 27.9 PG (ref 27–31)
MCHC RBC AUTO-ENTMCNC: 32.9 G/DL (ref 32–36)
MCV RBC AUTO: 85 FL (ref 82–98)
MONOCYTES # BLD AUTO: 0.8 K/UL (ref 0.3–1)
MONOCYTES NFR BLD: 4.2 % (ref 4–15)
NEUTROPHILS # BLD AUTO: 16.3 K/UL (ref 1.8–7.7)
NEUTROPHILS NFR BLD: 91.9 % (ref 38–73)
NRBC BLD-RTO: 0 /100 WBC
PHOSPHATE SERPL-MCNC: 1.7 MG/DL (ref 2.7–4.5)
PLATELET # BLD AUTO: 103 K/UL (ref 150–450)
PMV BLD AUTO: 12 FL (ref 9.2–12.9)
POCT GLUCOSE: 105 MG/DL (ref 70–110)
POCT GLUCOSE: 124 MG/DL (ref 70–110)
POCT GLUCOSE: 139 MG/DL (ref 70–110)
POCT GLUCOSE: 145 MG/DL (ref 70–110)
POCT GLUCOSE: 220 MG/DL (ref 70–110)
POCT GLUCOSE: 94 MG/DL (ref 70–110)
POTASSIUM SERPL-SCNC: 3.6 MMOL/L (ref 3.5–5.1)
PROT SERPL-MCNC: 5.6 G/DL (ref 6–8.4)
RBC # BLD AUTO: 4.27 M/UL (ref 4–5.4)
SODIUM SERPL-SCNC: 140 MMOL/L (ref 136–145)
WBC # BLD AUTO: 17.69 K/UL (ref 3.9–12.7)

## 2021-11-09 PROCEDURE — 11000001 HC ACUTE MED/SURG PRIVATE ROOM: Mod: HCNC

## 2021-11-09 PROCEDURE — 80053 COMPREHEN METABOLIC PANEL: CPT | Mod: HCNC | Performed by: NURSE PRACTITIONER

## 2021-11-09 PROCEDURE — 63600175 PHARM REV CODE 636 W HCPCS: Mod: HCNC | Performed by: PSYCHIATRY & NEUROLOGY

## 2021-11-09 PROCEDURE — 99232 PR SUBSEQUENT HOSPITAL CARE,LEVL II: ICD-10-PCS | Mod: HCNC,,, | Performed by: NURSE PRACTITIONER

## 2021-11-09 PROCEDURE — 92507 TX SP LANG VOICE COMM INDIV: CPT | Mod: HCNC

## 2021-11-09 PROCEDURE — 99232 SBSQ HOSP IP/OBS MODERATE 35: CPT | Mod: HCNC,,, | Performed by: NURSE PRACTITIONER

## 2021-11-09 PROCEDURE — 25000003 PHARM REV CODE 250: Mod: HCNC | Performed by: PSYCHIATRY & NEUROLOGY

## 2021-11-09 PROCEDURE — 25000003 PHARM REV CODE 250: Mod: HCNC | Performed by: NURSE PRACTITIONER

## 2021-11-09 PROCEDURE — 63600175 PHARM REV CODE 636 W HCPCS: Mod: HCNC | Performed by: NURSE PRACTITIONER

## 2021-11-09 PROCEDURE — 97116 GAIT TRAINING THERAPY: CPT | Mod: HCNC,CQ

## 2021-11-09 PROCEDURE — 97530 THERAPEUTIC ACTIVITIES: CPT | Mod: HCNC,CQ

## 2021-11-09 PROCEDURE — 84100 ASSAY OF PHOSPHORUS: CPT | Mod: HCNC | Performed by: PHYSICIAN ASSISTANT

## 2021-11-09 PROCEDURE — 99233 PR SUBSEQUENT HOSPITAL CARE,LEVL III: ICD-10-PCS | Mod: HCNC,GC,, | Performed by: PSYCHIATRY & NEUROLOGY

## 2021-11-09 PROCEDURE — 25000003 PHARM REV CODE 250: Mod: HCNC | Performed by: STUDENT IN AN ORGANIZED HEALTH CARE EDUCATION/TRAINING PROGRAM

## 2021-11-09 PROCEDURE — 63600175 PHARM REV CODE 636 W HCPCS: Mod: HCNC | Performed by: PHYSICIAN ASSISTANT

## 2021-11-09 PROCEDURE — 83735 ASSAY OF MAGNESIUM: CPT | Mod: HCNC | Performed by: PHYSICIAN ASSISTANT

## 2021-11-09 PROCEDURE — 99232 SBSQ HOSP IP/OBS MODERATE 35: CPT | Mod: HCNC,GC,, | Performed by: NEUROLOGICAL SURGERY

## 2021-11-09 PROCEDURE — 99232 PR SUBSEQUENT HOSPITAL CARE,LEVL II: ICD-10-PCS | Mod: HCNC,GC,, | Performed by: NEUROLOGICAL SURGERY

## 2021-11-09 PROCEDURE — 99233 SBSQ HOSP IP/OBS HIGH 50: CPT | Mod: HCNC,GC,, | Performed by: PSYCHIATRY & NEUROLOGY

## 2021-11-09 PROCEDURE — 82550 ASSAY OF CK (CPK): CPT | Mod: HCNC | Performed by: NURSE PRACTITIONER

## 2021-11-09 PROCEDURE — 85025 COMPLETE CBC W/AUTO DIFF WBC: CPT | Mod: HCNC | Performed by: NURSE PRACTITIONER

## 2021-11-09 RX ORDER — HEPARIN SODIUM 5000 [USP'U]/ML
5000 INJECTION, SOLUTION INTRAVENOUS; SUBCUTANEOUS EVERY 8 HOURS
Status: DISPENSED | OUTPATIENT
Start: 2021-11-09 | End: 2021-11-14

## 2021-11-09 RX ORDER — CEFTRIAXONE 1 G/1
1 INJECTION, POWDER, FOR SOLUTION INTRAMUSCULAR; INTRAVENOUS
Status: DISCONTINUED | OUTPATIENT
Start: 2021-11-09 | End: 2021-11-09

## 2021-11-09 RX ORDER — HYDRALAZINE HYDROCHLORIDE 20 MG/ML
10 INJECTION INTRAMUSCULAR; INTRAVENOUS EVERY 4 HOURS PRN
Status: DISCONTINUED | OUTPATIENT
Start: 2021-11-09 | End: 2021-11-24 | Stop reason: HOSPADM

## 2021-11-09 RX ORDER — CEFTRIAXONE 1 G/1
1 INJECTION, POWDER, FOR SOLUTION INTRAMUSCULAR; INTRAVENOUS
Status: DISCONTINUED | OUTPATIENT
Start: 2021-11-09 | End: 2021-11-13

## 2021-11-09 RX ORDER — LABETALOL HCL 20 MG/4 ML
10 SYRINGE (ML) INTRAVENOUS EVERY 4 HOURS PRN
Status: DISCONTINUED | OUTPATIENT
Start: 2021-11-09 | End: 2021-11-24 | Stop reason: HOSPADM

## 2021-11-09 RX ORDER — CEFEPIME HYDROCHLORIDE 1 G/1
1 INJECTION, POWDER, FOR SOLUTION INTRAMUSCULAR; INTRAVENOUS
Status: DISCONTINUED | OUTPATIENT
Start: 2021-11-09 | End: 2021-11-09

## 2021-11-09 RX ORDER — INSULIN ASPART 100 [IU]/ML
5-10 INJECTION, SOLUTION INTRAVENOUS; SUBCUTANEOUS
Status: DISCONTINUED | OUTPATIENT
Start: 2021-11-09 | End: 2021-11-14

## 2021-11-09 RX ADMIN — HEPARIN SODIUM 5000 UNITS: 5000 INJECTION INTRAVENOUS; SUBCUTANEOUS at 01:11

## 2021-11-09 RX ADMIN — ATORVASTATIN CALCIUM 40 MG: 20 TABLET, FILM COATED ORAL at 10:11

## 2021-11-09 RX ADMIN — ATENOLOL 12.5 MG: 25 TABLET ORAL at 06:11

## 2021-11-09 RX ADMIN — INSULIN ASPART 5 UNITS: 100 INJECTION, SOLUTION INTRAVENOUS; SUBCUTANEOUS at 05:11

## 2021-11-09 RX ADMIN — DEXAMETHASONE SODIUM PHOSPHATE 4 MG: 4 INJECTION INTRA-ARTICULAR; INTRALESIONAL; INTRAMUSCULAR; INTRAVENOUS; SOFT TISSUE at 01:11

## 2021-11-09 RX ADMIN — INSULIN ASPART 10 UNITS: 100 INJECTION, SOLUTION INTRAVENOUS; SUBCUTANEOUS at 11:11

## 2021-11-09 RX ADMIN — ACETAMINOPHEN 650 MG: 325 TABLET ORAL at 07:11

## 2021-11-09 RX ADMIN — HEPARIN SODIUM 5000 UNITS: 5000 INJECTION INTRAVENOUS; SUBCUTANEOUS at 09:11

## 2021-11-09 RX ADMIN — MUPIROCIN: 20 OINTMENT TOPICAL at 09:11

## 2021-11-09 RX ADMIN — CEFTRIAXONE 1 G: 1 INJECTION, POWDER, FOR SOLUTION INTRAMUSCULAR; INTRAVENOUS at 01:11

## 2021-11-09 RX ADMIN — POTASSIUM & SODIUM PHOSPHATES POWDER PACK 280-160-250 MG 2 PACKET: 280-160-250 PACK at 04:11

## 2021-11-09 RX ADMIN — DEXAMETHASONE SODIUM PHOSPHATE 4 MG: 4 INJECTION INTRA-ARTICULAR; INTRALESIONAL; INTRAMUSCULAR; INTRAVENOUS; SOFT TISSUE at 06:11

## 2021-11-09 RX ADMIN — INSULIN ASPART 2 UNITS: 100 INJECTION, SOLUTION INTRAVENOUS; SUBCUTANEOUS at 11:11

## 2021-11-09 RX ADMIN — MUPIROCIN: 20 OINTMENT TOPICAL at 10:11

## 2021-11-09 RX ADMIN — FAMOTIDINE 20 MG: 20 TABLET ORAL at 09:11

## 2021-11-09 RX ADMIN — CEFEPIME 2 G: 2 INJECTION, POWDER, FOR SOLUTION INTRAVENOUS at 04:11

## 2021-11-09 RX ADMIN — INSULIN HUMAN 2 UNITS/HR: 1 INJECTION, SOLUTION INTRAVENOUS at 02:11

## 2021-11-09 RX ADMIN — FAMOTIDINE 20 MG: 20 TABLET ORAL at 10:11

## 2021-11-09 RX ADMIN — POTASSIUM & SODIUM PHOSPHATES POWDER PACK 280-160-250 MG 2 PACKET: 280-160-250 PACK at 07:11

## 2021-11-09 RX ADMIN — INSULIN HUMAN 1.4 UNITS/HR: 1 INJECTION, SOLUTION INTRAVENOUS at 06:11

## 2021-11-09 RX ADMIN — POTASSIUM & SODIUM PHOSPHATES POWDER PACK 280-160-250 MG 2 PACKET: 280-160-250 PACK at 11:11

## 2021-11-09 RX ADMIN — POTASSIUM BICARBONATE 50 MEQ: 978 TABLET, EFFERVESCENT ORAL at 08:11

## 2021-11-09 RX ADMIN — HYDRALAZINE HYDROCHLORIDE 10 MG: 20 INJECTION INTRAMUSCULAR; INTRAVENOUS at 04:11

## 2021-11-09 RX ADMIN — INSULIN ASPART 10 UNITS: 100 INJECTION, SOLUTION INTRAVENOUS; SUBCUTANEOUS at 07:11

## 2021-11-09 RX ADMIN — LEVETIRACETAM 500 MG: 500 TABLET, FILM COATED ORAL at 10:11

## 2021-11-09 RX ADMIN — SENNOSIDES AND DOCUSATE SODIUM 1 TABLET: 50; 8.6 TABLET ORAL at 09:11

## 2021-11-09 RX ADMIN — DEXAMETHASONE SODIUM PHOSPHATE 4 MG: 4 INJECTION INTRA-ARTICULAR; INTRALESIONAL; INTRAMUSCULAR; INTRAVENOUS; SOFT TISSUE at 05:11

## 2021-11-10 PROBLEM — Z78.9 IMPAIRED MOBILITY AND ADLS: Status: ACTIVE | Noted: 2021-11-10

## 2021-11-10 PROBLEM — Z01.818 PREOPERATIVE CLEARANCE: Status: ACTIVE | Noted: 2021-11-10

## 2021-11-10 PROBLEM — Z74.09 IMPAIRED MOBILITY AND ADLS: Status: ACTIVE | Noted: 2021-11-10

## 2021-11-10 PROBLEM — D69.6 THROMBOCYTOPENIA: Status: ACTIVE | Noted: 2021-11-10

## 2021-11-10 LAB
ALBUMIN SERPL BCP-MCNC: 2.3 G/DL (ref 3.5–5.2)
ALP SERPL-CCNC: 87 U/L (ref 55–135)
ALT SERPL W/O P-5'-P-CCNC: 20 U/L (ref 10–44)
ANION GAP SERPL CALC-SCNC: 12 MMOL/L (ref 8–16)
ANISOCYTOSIS BLD QL SMEAR: SLIGHT
ANISOCYTOSIS BLD QL SMEAR: SLIGHT
AST SERPL-CCNC: 28 U/L (ref 10–40)
BACTERIA BLD CULT: ABNORMAL
BACTERIA UR CULT: ABNORMAL
BASOPHILS # BLD AUTO: 0.02 K/UL (ref 0–0.2)
BASOPHILS # BLD AUTO: ABNORMAL K/UL (ref 0–0.2)
BASOPHILS NFR BLD: 0 % (ref 0–1.9)
BASOPHILS NFR BLD: 0.2 % (ref 0–1.9)
BILIRUB SERPL-MCNC: 0.8 MG/DL (ref 0.1–1)
BUN SERPL-MCNC: 14 MG/DL (ref 8–23)
BURR CELLS BLD QL SMEAR: ABNORMAL
CALCIUM SERPL-MCNC: 9.3 MG/DL (ref 8.7–10.5)
CHLORIDE SERPL-SCNC: 110 MMOL/L (ref 95–110)
CK SERPL-CCNC: 143 U/L (ref 20–180)
CO2 SERPL-SCNC: 17 MMOL/L (ref 23–29)
CREAT SERPL-MCNC: 0.8 MG/DL (ref 0.5–1.4)
DACRYOCYTES BLD QL SMEAR: ABNORMAL
DACRYOCYTES BLD QL SMEAR: ABNORMAL
DIFFERENTIAL METHOD: ABNORMAL
DIFFERENTIAL METHOD: ABNORMAL
EOSINOPHIL # BLD AUTO: 0 K/UL (ref 0–0.5)
EOSINOPHIL # BLD AUTO: ABNORMAL K/UL (ref 0–0.5)
EOSINOPHIL NFR BLD: 0 % (ref 0–8)
EOSINOPHIL NFR BLD: 0 % (ref 0–8)
ERYTHROCYTE [DISTWIDTH] IN BLOOD BY AUTOMATED COUNT: 14.4 % (ref 11.5–14.5)
ERYTHROCYTE [DISTWIDTH] IN BLOOD BY AUTOMATED COUNT: 14.7 % (ref 11.5–14.5)
EST. GFR  (AFRICAN AMERICAN): >60 ML/MIN/1.73 M^2
EST. GFR  (NON AFRICAN AMERICAN): >60 ML/MIN/1.73 M^2
GLUCOSE SERPL-MCNC: 152 MG/DL (ref 70–110)
HCT VFR BLD AUTO: 42.9 % (ref 37–48.5)
HCT VFR BLD AUTO: 43.4 % (ref 37–48.5)
HGB BLD-MCNC: 13.3 G/DL (ref 12–16)
HGB BLD-MCNC: 14.1 G/DL (ref 12–16)
HYPOCHROMIA BLD QL SMEAR: ABNORMAL
HYPOCHROMIA BLD QL SMEAR: ABNORMAL
IMM GRANULOCYTES # BLD AUTO: 0.23 K/UL (ref 0–0.04)
IMM GRANULOCYTES # BLD AUTO: ABNORMAL K/UL (ref 0–0.04)
IMM GRANULOCYTES NFR BLD AUTO: 2.4 % (ref 0–0.5)
IMM GRANULOCYTES NFR BLD AUTO: ABNORMAL % (ref 0–0.5)
LYMPHOCYTES # BLD AUTO: 0.9 K/UL (ref 1–4.8)
LYMPHOCYTES # BLD AUTO: ABNORMAL K/UL (ref 1–4.8)
LYMPHOCYTES NFR BLD: 11 % (ref 18–48)
LYMPHOCYTES NFR BLD: 9.2 % (ref 18–48)
MAGNESIUM SERPL-MCNC: 2.1 MG/DL (ref 1.6–2.6)
MCH RBC QN AUTO: 28 PG (ref 27–31)
MCH RBC QN AUTO: 28.2 PG (ref 27–31)
MCHC RBC AUTO-ENTMCNC: 30.6 G/DL (ref 32–36)
MCHC RBC AUTO-ENTMCNC: 32.9 G/DL (ref 32–36)
MCV RBC AUTO: 86 FL (ref 82–98)
MCV RBC AUTO: 91 FL (ref 82–98)
METAMYELOCYTES NFR BLD MANUAL: 1 %
MONOCYTES # BLD AUTO: 0.7 K/UL (ref 0.3–1)
MONOCYTES # BLD AUTO: ABNORMAL K/UL (ref 0.3–1)
MONOCYTES NFR BLD: 7.2 % (ref 4–15)
MONOCYTES NFR BLD: 8 % (ref 4–15)
MYELOCYTES NFR BLD MANUAL: 2 %
NEUTROPHILS # BLD AUTO: 7.9 K/UL (ref 1.8–7.7)
NEUTROPHILS NFR BLD: 75 % (ref 38–73)
NEUTROPHILS NFR BLD: 81 % (ref 38–73)
NEUTS BAND NFR BLD MANUAL: 3 %
NRBC BLD-RTO: 0 /100 WBC
NRBC BLD-RTO: 0 /100 WBC
OVALOCYTES BLD QL SMEAR: ABNORMAL
PATH REV BLD -IMP: NORMAL
PHOSPHATE SERPL-MCNC: 3.3 MG/DL (ref 2.7–4.5)
PLATELET # BLD AUTO: 136 K/UL (ref 150–450)
PLATELET # BLD AUTO: 89 K/UL (ref 150–450)
PLATELET BLD QL SMEAR: ABNORMAL
PLATELET BLD QL SMEAR: ABNORMAL
PMV BLD AUTO: 12.5 FL (ref 9.2–12.9)
PMV BLD AUTO: 12.6 FL (ref 9.2–12.9)
POCT GLUCOSE: 144 MG/DL (ref 70–110)
POCT GLUCOSE: 150 MG/DL (ref 70–110)
POCT GLUCOSE: 159 MG/DL (ref 70–110)
POCT GLUCOSE: 195 MG/DL (ref 70–110)
POCT GLUCOSE: 250 MG/DL (ref 70–110)
POCT GLUCOSE: 260 MG/DL (ref 70–110)
POCT GLUCOSE: 380 MG/DL (ref 70–110)
POIKILOCYTOSIS BLD QL SMEAR: SLIGHT
POIKILOCYTOSIS BLD QL SMEAR: SLIGHT
POLYCHROMASIA BLD QL SMEAR: ABNORMAL
POTASSIUM SERPL-SCNC: 4.6 MMOL/L (ref 3.5–5.1)
PROT SERPL-MCNC: 6.1 G/DL (ref 6–8.4)
RBC # BLD AUTO: 4.75 M/UL (ref 4–5.4)
RBC # BLD AUTO: 5 M/UL (ref 4–5.4)
SMUDGE CELLS BLD QL SMEAR: PRESENT
SODIUM SERPL-SCNC: 139 MMOL/L (ref 136–145)
WBC # BLD AUTO: 10.86 K/UL (ref 3.9–12.7)
WBC # BLD AUTO: 9.74 K/UL (ref 3.9–12.7)

## 2021-11-10 PROCEDURE — 99232 SBSQ HOSP IP/OBS MODERATE 35: CPT | Mod: HCNC,,, | Performed by: NURSE PRACTITIONER

## 2021-11-10 PROCEDURE — 99222 1ST HOSP IP/OBS MODERATE 55: CPT | Mod: HCNC,,, | Performed by: NURSE PRACTITIONER

## 2021-11-10 PROCEDURE — 25000003 PHARM REV CODE 250: Mod: HCNC | Performed by: NURSE PRACTITIONER

## 2021-11-10 PROCEDURE — 85060 PATHOLOGIST REVIEW: ICD-10-PCS | Mod: HCNC,,, | Performed by: PATHOLOGY

## 2021-11-10 PROCEDURE — 99232 SBSQ HOSP IP/OBS MODERATE 35: CPT | Mod: HCNC,,, | Performed by: PHYSICIAN ASSISTANT

## 2021-11-10 PROCEDURE — 99222 PR INITIAL HOSPITAL CARE,LEVL II: ICD-10-PCS | Mod: HCNC,,, | Performed by: NURSE PRACTITIONER

## 2021-11-10 PROCEDURE — 87040 BLOOD CULTURE FOR BACTERIA: CPT | Mod: 59,HCNC | Performed by: STUDENT IN AN ORGANIZED HEALTH CARE EDUCATION/TRAINING PROGRAM

## 2021-11-10 PROCEDURE — 11000001 HC ACUTE MED/SURG PRIVATE ROOM: Mod: HCNC

## 2021-11-10 PROCEDURE — 99223 1ST HOSP IP/OBS HIGH 75: CPT | Mod: HCNC,AI,GC, | Performed by: HOSPITALIST

## 2021-11-10 PROCEDURE — 82550 ASSAY OF CK (CPK): CPT | Mod: HCNC | Performed by: NURSE PRACTITIONER

## 2021-11-10 PROCEDURE — 99232 PR SUBSEQUENT HOSPITAL CARE,LEVL II: ICD-10-PCS | Mod: HCNC,,, | Performed by: NURSE PRACTITIONER

## 2021-11-10 PROCEDURE — 99223 PR INITIAL HOSPITAL CARE,LEVL III: ICD-10-PCS | Mod: HCNC,AI,GC, | Performed by: HOSPITALIST

## 2021-11-10 PROCEDURE — 80053 COMPREHEN METABOLIC PANEL: CPT | Mod: HCNC | Performed by: NURSE PRACTITIONER

## 2021-11-10 PROCEDURE — 25000003 PHARM REV CODE 250: Mod: HCNC | Performed by: PHYSICIAN ASSISTANT

## 2021-11-10 PROCEDURE — 85025 COMPLETE CBC W/AUTO DIFF WBC: CPT | Mod: HCNC | Performed by: NURSE PRACTITIONER

## 2021-11-10 PROCEDURE — 63600175 PHARM REV CODE 636 W HCPCS: Mod: HCNC | Performed by: PSYCHIATRY & NEUROLOGY

## 2021-11-10 PROCEDURE — 99232 PR SUBSEQUENT HOSPITAL CARE,LEVL II: ICD-10-PCS | Mod: HCNC,,, | Performed by: PHYSICIAN ASSISTANT

## 2021-11-10 PROCEDURE — 85060 BLOOD SMEAR INTERPRETATION: CPT | Mod: HCNC,,, | Performed by: PATHOLOGY

## 2021-11-10 PROCEDURE — 36415 COLL VENOUS BLD VENIPUNCTURE: CPT | Mod: HCNC | Performed by: STUDENT IN AN ORGANIZED HEALTH CARE EDUCATION/TRAINING PROGRAM

## 2021-11-10 PROCEDURE — 25000003 PHARM REV CODE 250: Mod: HCNC | Performed by: PSYCHIATRY & NEUROLOGY

## 2021-11-10 PROCEDURE — 25000003 PHARM REV CODE 250: Mod: HCNC | Performed by: STUDENT IN AN ORGANIZED HEALTH CARE EDUCATION/TRAINING PROGRAM

## 2021-11-10 PROCEDURE — 85007 BL SMEAR W/DIFF WBC COUNT: CPT | Mod: HCNC

## 2021-11-10 PROCEDURE — 63600175 PHARM REV CODE 636 W HCPCS: Mod: HCNC | Performed by: STUDENT IN AN ORGANIZED HEALTH CARE EDUCATION/TRAINING PROGRAM

## 2021-11-10 PROCEDURE — 84100 ASSAY OF PHOSPHORUS: CPT | Mod: HCNC | Performed by: PHYSICIAN ASSISTANT

## 2021-11-10 PROCEDURE — 36415 COLL VENOUS BLD VENIPUNCTURE: CPT | Mod: HCNC

## 2021-11-10 PROCEDURE — 36415 COLL VENOUS BLD VENIPUNCTURE: CPT | Mod: HCNC | Performed by: NURSE PRACTITIONER

## 2021-11-10 PROCEDURE — 97530 THERAPEUTIC ACTIVITIES: CPT | Mod: HCNC

## 2021-11-10 PROCEDURE — 83735 ASSAY OF MAGNESIUM: CPT | Mod: HCNC | Performed by: PHYSICIAN ASSISTANT

## 2021-11-10 PROCEDURE — 63600175 PHARM REV CODE 636 W HCPCS: Mod: HCNC | Performed by: NURSE PRACTITIONER

## 2021-11-10 PROCEDURE — 85027 COMPLETE CBC AUTOMATED: CPT | Mod: HCNC

## 2021-11-10 RX ORDER — LORAZEPAM 2 MG/ML
1 INJECTION INTRAMUSCULAR EVERY 4 HOURS PRN
Status: DISCONTINUED | OUTPATIENT
Start: 2021-11-10 | End: 2021-11-24

## 2021-11-10 RX ORDER — HALOPERIDOL 5 MG/ML
5 INJECTION INTRAMUSCULAR EVERY 6 HOURS PRN
Status: COMPLETED | OUTPATIENT
Start: 2021-11-10 | End: 2021-11-10

## 2021-11-10 RX ORDER — LEVETIRACETAM 500 MG/1
500 TABLET ORAL 2 TIMES DAILY
Status: DISCONTINUED | OUTPATIENT
Start: 2021-11-10 | End: 2021-11-15

## 2021-11-10 RX ORDER — PANTOPRAZOLE SODIUM 20 MG/1
40 TABLET, DELAYED RELEASE ORAL DAILY
Status: DISCONTINUED | OUTPATIENT
Start: 2021-11-10 | End: 2021-11-24 | Stop reason: HOSPADM

## 2021-11-10 RX ADMIN — INSULIN ASPART 10 UNITS: 100 INJECTION, SOLUTION INTRAVENOUS; SUBCUTANEOUS at 08:11

## 2021-11-10 RX ADMIN — LEVETIRACETAM 500 MG: 500 TABLET, FILM COATED ORAL at 08:11

## 2021-11-10 RX ADMIN — SENNOSIDES AND DOCUSATE SODIUM 1 TABLET: 50; 8.6 TABLET ORAL at 09:11

## 2021-11-10 RX ADMIN — INSULIN HUMAN 1.2 UNITS/HR: 1 INJECTION, SOLUTION INTRAVENOUS at 09:11

## 2021-11-10 RX ADMIN — INSULIN ASPART 10 UNITS: 100 INJECTION, SOLUTION INTRAVENOUS; SUBCUTANEOUS at 05:11

## 2021-11-10 RX ADMIN — DEXAMETHASONE SODIUM PHOSPHATE 4 MG: 4 INJECTION INTRA-ARTICULAR; INTRALESIONAL; INTRAMUSCULAR; INTRAVENOUS; SOFT TISSUE at 12:11

## 2021-11-10 RX ADMIN — SODIUM CHLORIDE: 0.9 INJECTION, SOLUTION INTRAVENOUS at 11:11

## 2021-11-10 RX ADMIN — SENNOSIDES AND DOCUSATE SODIUM 1 TABLET: 50; 8.6 TABLET ORAL at 08:11

## 2021-11-10 RX ADMIN — DEXAMETHASONE SODIUM PHOSPHATE 4 MG: 4 INJECTION INTRA-ARTICULAR; INTRALESIONAL; INTRAMUSCULAR; INTRAVENOUS; SOFT TISSUE at 05:11

## 2021-11-10 RX ADMIN — HEPARIN SODIUM 5000 UNITS: 5000 INJECTION INTRAVENOUS; SUBCUTANEOUS at 05:11

## 2021-11-10 RX ADMIN — ATENOLOL 12.5 MG: 25 TABLET ORAL at 09:11

## 2021-11-10 RX ADMIN — HEPARIN SODIUM 5000 UNITS: 5000 INJECTION INTRAVENOUS; SUBCUTANEOUS at 01:11

## 2021-11-10 RX ADMIN — DEXAMETHASONE SODIUM PHOSPHATE 4 MG: 4 INJECTION INTRA-ARTICULAR; INTRALESIONAL; INTRAMUSCULAR; INTRAVENOUS; SOFT TISSUE at 01:11

## 2021-11-10 RX ADMIN — INSULIN ASPART 10 UNITS: 100 INJECTION, SOLUTION INTRAVENOUS; SUBCUTANEOUS at 12:11

## 2021-11-10 RX ADMIN — FAMOTIDINE 20 MG: 20 TABLET ORAL at 09:11

## 2021-11-10 RX ADMIN — DEXAMETHASONE SODIUM PHOSPHATE 4 MG: 4 INJECTION INTRA-ARTICULAR; INTRALESIONAL; INTRAMUSCULAR; INTRAVENOUS; SOFT TISSUE at 06:11

## 2021-11-10 RX ADMIN — HEPARIN SODIUM 5000 UNITS: 5000 INJECTION INTRAVENOUS; SUBCUTANEOUS at 08:11

## 2021-11-10 RX ADMIN — LORAZEPAM 1 MG: 2 INJECTION INTRAMUSCULAR; INTRAVENOUS at 12:11

## 2021-11-10 RX ADMIN — ATORVASTATIN CALCIUM 40 MG: 20 TABLET, FILM COATED ORAL at 09:11

## 2021-11-10 RX ADMIN — HALOPERIDOL LACTATE 5 MG: 5 INJECTION, SOLUTION INTRAMUSCULAR at 01:11

## 2021-11-10 RX ADMIN — INSULIN ASPART 2 UNITS: 100 INJECTION, SOLUTION INTRAVENOUS; SUBCUTANEOUS at 06:11

## 2021-11-10 RX ADMIN — CEFTRIAXONE 1 G: 1 INJECTION, POWDER, FOR SOLUTION INTRAMUSCULAR; INTRAVENOUS at 01:11

## 2021-11-10 RX ADMIN — MUPIROCIN: 20 OINTMENT TOPICAL at 08:11

## 2021-11-10 RX ADMIN — MUPIROCIN: 20 OINTMENT TOPICAL at 09:11

## 2021-11-11 LAB
ALBUMIN SERPL BCP-MCNC: 2.5 G/DL (ref 3.5–5.2)
ALP SERPL-CCNC: 79 U/L (ref 55–135)
ALT SERPL W/O P-5'-P-CCNC: 21 U/L (ref 10–44)
ANION GAP SERPL CALC-SCNC: 10 MMOL/L (ref 8–16)
ANISOCYTOSIS BLD QL SMEAR: SLIGHT
AST SERPL-CCNC: 20 U/L (ref 10–40)
BASOPHILS # BLD AUTO: ABNORMAL K/UL (ref 0–0.2)
BASOPHILS NFR BLD: 0 % (ref 0–1.9)
BILIRUB SERPL-MCNC: 1 MG/DL (ref 0.1–1)
BILIRUB UR QL STRIP: NEGATIVE
BUN SERPL-MCNC: 18 MG/DL (ref 8–23)
CALCIUM SERPL-MCNC: 8.7 MG/DL (ref 8.7–10.5)
CHLORIDE SERPL-SCNC: 106 MMOL/L (ref 95–110)
CK SERPL-CCNC: 73 U/L (ref 20–180)
CLARITY UR REFRACT.AUTO: CLEAR
CO2 SERPL-SCNC: 24 MMOL/L (ref 23–29)
COLOR UR AUTO: YELLOW
CREAT SERPL-MCNC: 0.7 MG/DL (ref 0.5–1.4)
DIFFERENTIAL METHOD: ABNORMAL
EOSINOPHIL # BLD AUTO: ABNORMAL K/UL (ref 0–0.5)
EOSINOPHIL NFR BLD: 0 % (ref 0–8)
ERYTHROCYTE [DISTWIDTH] IN BLOOD BY AUTOMATED COUNT: 14.2 % (ref 11.5–14.5)
EST. GFR  (AFRICAN AMERICAN): >60 ML/MIN/1.73 M^2
EST. GFR  (NON AFRICAN AMERICAN): >60 ML/MIN/1.73 M^2
GIANT PLATELETS BLD QL SMEAR: PRESENT
GLUCOSE SERPL-MCNC: 111 MG/DL (ref 70–110)
GLUCOSE UR QL STRIP: ABNORMAL
HCT VFR BLD AUTO: 42.5 % (ref 37–48.5)
HGB BLD-MCNC: 13.7 G/DL (ref 12–16)
HGB UR QL STRIP: ABNORMAL
HYPOCHROMIA BLD QL SMEAR: ABNORMAL
IMM GRANULOCYTES # BLD AUTO: ABNORMAL K/UL (ref 0–0.04)
IMM GRANULOCYTES NFR BLD AUTO: ABNORMAL % (ref 0–0.5)
KETONES UR QL STRIP: NEGATIVE
LEUKOCYTE ESTERASE UR QL STRIP: ABNORMAL
LYMPHOCYTES # BLD AUTO: ABNORMAL K/UL (ref 1–4.8)
LYMPHOCYTES NFR BLD: 5 % (ref 18–48)
MAGNESIUM SERPL-MCNC: 1.9 MG/DL (ref 1.6–2.6)
MCH RBC QN AUTO: 27.4 PG (ref 27–31)
MCHC RBC AUTO-ENTMCNC: 32.2 G/DL (ref 32–36)
MCV RBC AUTO: 85 FL (ref 82–98)
MICROSCOPIC COMMENT: ABNORMAL
MONOCYTES # BLD AUTO: ABNORMAL K/UL (ref 0.3–1)
MONOCYTES NFR BLD: 4 % (ref 4–15)
NEUTROPHILS NFR BLD: 90 % (ref 38–73)
NEUTS BAND NFR BLD MANUAL: 1 %
NITRITE UR QL STRIP: NEGATIVE
NRBC BLD-RTO: 0 /100 WBC
OVALOCYTES BLD QL SMEAR: ABNORMAL
PH UR STRIP: 6 [PH] (ref 5–8)
PHOSPHATE SERPL-MCNC: 3 MG/DL (ref 2.7–4.5)
PLATELET # BLD AUTO: 135 K/UL (ref 150–450)
PMV BLD AUTO: 12 FL (ref 9.2–12.9)
POCT GLUCOSE: 150 MG/DL (ref 70–110)
POCT GLUCOSE: 150 MG/DL (ref 70–110)
POCT GLUCOSE: 170 MG/DL (ref 70–110)
POCT GLUCOSE: 171 MG/DL (ref 70–110)
POCT GLUCOSE: 221 MG/DL (ref 70–110)
POCT GLUCOSE: 288 MG/DL (ref 70–110)
POIKILOCYTOSIS BLD QL SMEAR: SLIGHT
POLYCHROMASIA BLD QL SMEAR: ABNORMAL
POTASSIUM SERPL-SCNC: 4.1 MMOL/L (ref 3.5–5.1)
PROT SERPL-MCNC: 5.7 G/DL (ref 6–8.4)
PROT UR QL STRIP: NEGATIVE
RBC # BLD AUTO: 5 M/UL (ref 4–5.4)
RBC #/AREA URNS AUTO: 7 /HPF (ref 0–4)
SODIUM SERPL-SCNC: 140 MMOL/L (ref 136–145)
SP GR UR STRIP: 1.02 (ref 1–1.03)
SQUAMOUS #/AREA URNS AUTO: 1 /HPF
URN SPEC COLLECT METH UR: ABNORMAL
WBC # BLD AUTO: 10.99 K/UL (ref 3.9–12.7)
WBC #/AREA URNS AUTO: 2 /HPF (ref 0–5)

## 2021-11-11 PROCEDURE — 63600175 PHARM REV CODE 636 W HCPCS: Mod: HCNC | Performed by: PSYCHIATRY & NEUROLOGY

## 2021-11-11 PROCEDURE — 11000001 HC ACUTE MED/SURG PRIVATE ROOM: Mod: HCNC

## 2021-11-11 PROCEDURE — 99233 SBSQ HOSP IP/OBS HIGH 50: CPT | Mod: HCNC,GC,, | Performed by: HOSPITALIST

## 2021-11-11 PROCEDURE — 25000003 PHARM REV CODE 250: Mod: HCNC | Performed by: NURSE PRACTITIONER

## 2021-11-11 PROCEDURE — 92507 TX SP LANG VOICE COMM INDIV: CPT | Mod: HCNC

## 2021-11-11 PROCEDURE — 25000003 PHARM REV CODE 250: Mod: HCNC | Performed by: STUDENT IN AN ORGANIZED HEALTH CARE EDUCATION/TRAINING PROGRAM

## 2021-11-11 PROCEDURE — 97535 SELF CARE MNGMENT TRAINING: CPT | Mod: HCNC

## 2021-11-11 PROCEDURE — 99232 PR SUBSEQUENT HOSPITAL CARE,LEVL II: ICD-10-PCS | Mod: HCNC,,, | Performed by: NURSE PRACTITIONER

## 2021-11-11 PROCEDURE — 85027 COMPLETE CBC AUTOMATED: CPT | Mod: HCNC | Performed by: NURSE PRACTITIONER

## 2021-11-11 PROCEDURE — 99232 PR SUBSEQUENT HOSPITAL CARE,LEVL II: ICD-10-PCS | Mod: HCNC,,, | Performed by: PHYSICIAN ASSISTANT

## 2021-11-11 PROCEDURE — 99232 SBSQ HOSP IP/OBS MODERATE 35: CPT | Mod: HCNC,,, | Performed by: PHYSICIAN ASSISTANT

## 2021-11-11 PROCEDURE — 83735 ASSAY OF MAGNESIUM: CPT | Mod: HCNC | Performed by: PHYSICIAN ASSISTANT

## 2021-11-11 PROCEDURE — 92526 ORAL FUNCTION THERAPY: CPT | Mod: HCNC

## 2021-11-11 PROCEDURE — 82550 ASSAY OF CK (CPK): CPT | Mod: HCNC | Performed by: NURSE PRACTITIONER

## 2021-11-11 PROCEDURE — 99233 PR SUBSEQUENT HOSPITAL CARE,LEVL III: ICD-10-PCS | Mod: HCNC,GC,, | Performed by: HOSPITALIST

## 2021-11-11 PROCEDURE — 85007 BL SMEAR W/DIFF WBC COUNT: CPT | Mod: HCNC | Performed by: NURSE PRACTITIONER

## 2021-11-11 PROCEDURE — 97530 THERAPEUTIC ACTIVITIES: CPT | Mod: HCNC

## 2021-11-11 PROCEDURE — 81001 URINALYSIS AUTO W/SCOPE: CPT | Mod: HCNC | Performed by: PHYSICIAN ASSISTANT

## 2021-11-11 PROCEDURE — 80053 COMPREHEN METABOLIC PANEL: CPT | Mod: HCNC | Performed by: NURSE PRACTITIONER

## 2021-11-11 PROCEDURE — 97530 THERAPEUTIC ACTIVITIES: CPT | Mod: HCNC,CQ

## 2021-11-11 PROCEDURE — 25000003 PHARM REV CODE 250: Mod: HCNC | Performed by: PHYSICIAN ASSISTANT

## 2021-11-11 PROCEDURE — 63600175 PHARM REV CODE 636 W HCPCS: Mod: HCNC | Performed by: STUDENT IN AN ORGANIZED HEALTH CARE EDUCATION/TRAINING PROGRAM

## 2021-11-11 PROCEDURE — 99232 SBSQ HOSP IP/OBS MODERATE 35: CPT | Mod: HCNC,,, | Performed by: NURSE PRACTITIONER

## 2021-11-11 PROCEDURE — 97116 GAIT TRAINING THERAPY: CPT | Mod: HCNC,CQ

## 2021-11-11 PROCEDURE — 84100 ASSAY OF PHOSPHORUS: CPT | Mod: HCNC | Performed by: PHYSICIAN ASSISTANT

## 2021-11-11 PROCEDURE — 63600175 PHARM REV CODE 636 W HCPCS: Mod: HCNC | Performed by: NURSE PRACTITIONER

## 2021-11-11 PROCEDURE — 36415 COLL VENOUS BLD VENIPUNCTURE: CPT | Mod: HCNC | Performed by: NURSE PRACTITIONER

## 2021-11-11 RX ORDER — CARVEDILOL 6.25 MG/1
6.25 TABLET ORAL 2 TIMES DAILY
Status: DISCONTINUED | OUTPATIENT
Start: 2021-11-11 | End: 2021-11-16

## 2021-11-11 RX ADMIN — HYDRALAZINE HYDROCHLORIDE 10 MG: 20 INJECTION, SOLUTION INTRAMUSCULAR; INTRAVENOUS at 04:11

## 2021-11-11 RX ADMIN — INSULIN ASPART 10 UNITS: 100 INJECTION, SOLUTION INTRAVENOUS; SUBCUTANEOUS at 10:11

## 2021-11-11 RX ADMIN — HYDRALAZINE HYDROCHLORIDE 10 MG: 20 INJECTION, SOLUTION INTRAMUSCULAR; INTRAVENOUS at 08:11

## 2021-11-11 RX ADMIN — ATENOLOL 12.5 MG: 25 TABLET ORAL at 08:11

## 2021-11-11 RX ADMIN — LEVETIRACETAM 500 MG: 500 TABLET, FILM COATED ORAL at 09:11

## 2021-11-11 RX ADMIN — MUPIROCIN: 20 OINTMENT TOPICAL at 08:11

## 2021-11-11 RX ADMIN — LABETALOL HYDROCHLORIDE 10 MG: 5 INJECTION, SOLUTION INTRAVENOUS at 11:11

## 2021-11-11 RX ADMIN — PANTOPRAZOLE SODIUM 40 MG: 20 TABLET, DELAYED RELEASE ORAL at 09:11

## 2021-11-11 RX ADMIN — HEPARIN SODIUM 5000 UNITS: 5000 INJECTION INTRAVENOUS; SUBCUTANEOUS at 02:11

## 2021-11-11 RX ADMIN — CARVEDILOL 6.25 MG: 6.25 TABLET, FILM COATED ORAL at 08:11

## 2021-11-11 RX ADMIN — DEXAMETHASONE SODIUM PHOSPHATE 4 MG: 4 INJECTION INTRA-ARTICULAR; INTRALESIONAL; INTRAMUSCULAR; INTRAVENOUS; SOFT TISSUE at 05:11

## 2021-11-11 RX ADMIN — INSULIN ASPART 5 UNITS: 100 INJECTION, SOLUTION INTRAVENOUS; SUBCUTANEOUS at 08:11

## 2021-11-11 RX ADMIN — LEVETIRACETAM 500 MG: 500 TABLET, FILM COATED ORAL at 08:11

## 2021-11-11 RX ADMIN — ACETAMINOPHEN 650 MG: 325 TABLET ORAL at 08:11

## 2021-11-11 RX ADMIN — DEXAMETHASONE SODIUM PHOSPHATE 4 MG: 4 INJECTION INTRA-ARTICULAR; INTRALESIONAL; INTRAMUSCULAR; INTRAVENOUS; SOFT TISSUE at 12:11

## 2021-11-11 RX ADMIN — HEPARIN SODIUM 5000 UNITS: 5000 INJECTION INTRAVENOUS; SUBCUTANEOUS at 09:11

## 2021-11-11 RX ADMIN — ATORVASTATIN CALCIUM 40 MG: 20 TABLET, FILM COATED ORAL at 08:11

## 2021-11-11 RX ADMIN — INSULIN ASPART 10 UNITS: 100 INJECTION, SOLUTION INTRAVENOUS; SUBCUTANEOUS at 04:11

## 2021-11-11 RX ADMIN — DEXAMETHASONE SODIUM PHOSPHATE 4 MG: 4 INJECTION INTRA-ARTICULAR; INTRALESIONAL; INTRAMUSCULAR; INTRAVENOUS; SOFT TISSUE at 11:11

## 2021-11-11 RX ADMIN — CEFTRIAXONE 1 G: 1 INJECTION, POWDER, FOR SOLUTION INTRAMUSCULAR; INTRAVENOUS at 11:11

## 2021-11-11 RX ADMIN — DEXAMETHASONE SODIUM PHOSPHATE 4 MG: 4 INJECTION INTRA-ARTICULAR; INTRALESIONAL; INTRAMUSCULAR; INTRAVENOUS; SOFT TISSUE at 06:11

## 2021-11-11 RX ADMIN — SENNOSIDES AND DOCUSATE SODIUM 1 TABLET: 50; 8.6 TABLET ORAL at 09:11

## 2021-11-11 RX ADMIN — HEPARIN SODIUM 5000 UNITS: 5000 INJECTION INTRAVENOUS; SUBCUTANEOUS at 05:11

## 2021-11-11 RX ADMIN — INSULIN ASPART 2 UNITS: 100 INJECTION, SOLUTION INTRAVENOUS; SUBCUTANEOUS at 04:11

## 2021-11-11 RX ADMIN — SODIUM CHLORIDE: 0.9 INJECTION, SOLUTION INTRAVENOUS at 03:11

## 2021-11-12 ENCOUNTER — ANESTHESIA EVENT (OUTPATIENT)
Dept: SURGERY | Facility: HOSPITAL | Age: 76
DRG: 025 | End: 2021-11-12
Payer: MEDICARE

## 2021-11-12 ENCOUNTER — ANESTHESIA EVENT (OUTPATIENT)
Dept: INTERVENTIONAL RADIOLOGY/VASCULAR | Facility: HOSPITAL | Age: 76
DRG: 025 | End: 2021-11-12
Payer: MEDICARE

## 2021-11-12 LAB
ALBUMIN SERPL BCP-MCNC: 2.2 G/DL (ref 3.5–5.2)
ALP SERPL-CCNC: 60 U/L (ref 55–135)
ALT SERPL W/O P-5'-P-CCNC: 20 U/L (ref 10–44)
ANION GAP SERPL CALC-SCNC: 8 MMOL/L (ref 8–16)
AST SERPL-CCNC: 18 U/L (ref 10–40)
BASOPHILS # BLD AUTO: ABNORMAL K/UL (ref 0–0.2)
BASOPHILS NFR BLD: 0 % (ref 0–1.9)
BILIRUB SERPL-MCNC: 0.8 MG/DL (ref 0.1–1)
BUN SERPL-MCNC: 17 MG/DL (ref 8–23)
CALCIUM SERPL-MCNC: 8.6 MG/DL (ref 8.7–10.5)
CHLORIDE SERPL-SCNC: 108 MMOL/L (ref 95–110)
CK SERPL-CCNC: 77 U/L (ref 20–180)
CO2 SERPL-SCNC: 23 MMOL/L (ref 23–29)
CREAT SERPL-MCNC: 0.6 MG/DL (ref 0.5–1.4)
DIFFERENTIAL METHOD: ABNORMAL
EOSINOPHIL # BLD AUTO: ABNORMAL K/UL (ref 0–0.5)
EOSINOPHIL NFR BLD: 0 % (ref 0–8)
ERYTHROCYTE [DISTWIDTH] IN BLOOD BY AUTOMATED COUNT: 14.4 % (ref 11.5–14.5)
EST. GFR  (AFRICAN AMERICAN): >60 ML/MIN/1.73 M^2
EST. GFR  (NON AFRICAN AMERICAN): >60 ML/MIN/1.73 M^2
GLUCOSE SERPL-MCNC: 117 MG/DL (ref 70–110)
HCT VFR BLD AUTO: 39.2 % (ref 37–48.5)
HGB BLD-MCNC: 12.6 G/DL (ref 12–16)
IMM GRANULOCYTES # BLD AUTO: ABNORMAL K/UL (ref 0–0.04)
IMM GRANULOCYTES NFR BLD AUTO: ABNORMAL % (ref 0–0.5)
LYMPHOCYTES # BLD AUTO: ABNORMAL K/UL (ref 1–4.8)
LYMPHOCYTES NFR BLD: 8 % (ref 18–48)
MAGNESIUM SERPL-MCNC: 1.9 MG/DL (ref 1.6–2.6)
MCH RBC QN AUTO: 27.4 PG (ref 27–31)
MCHC RBC AUTO-ENTMCNC: 32.1 G/DL (ref 32–36)
MCV RBC AUTO: 85 FL (ref 82–98)
METAMYELOCYTES NFR BLD MANUAL: 2 %
MONOCYTES # BLD AUTO: ABNORMAL K/UL (ref 0.3–1)
MONOCYTES NFR BLD: 4 % (ref 4–15)
MYELOCYTES NFR BLD MANUAL: 1 %
NEUTROPHILS NFR BLD: 84 % (ref 38–73)
NEUTS BAND NFR BLD MANUAL: 1 %
NRBC BLD-RTO: 0 /100 WBC
PATH REV BLD -IMP: NORMAL
PHOSPHATE SERPL-MCNC: 2.8 MG/DL (ref 2.7–4.5)
PLATELET # BLD AUTO: 180 K/UL (ref 150–450)
PLATELET BLD QL SMEAR: ABNORMAL
PMV BLD AUTO: 11.7 FL (ref 9.2–12.9)
POCT GLUCOSE: 127 MG/DL (ref 70–110)
POCT GLUCOSE: 132 MG/DL (ref 70–110)
POCT GLUCOSE: 153 MG/DL (ref 70–110)
POCT GLUCOSE: 156 MG/DL (ref 70–110)
POCT GLUCOSE: 180 MG/DL (ref 70–110)
POCT GLUCOSE: 239 MG/DL (ref 70–110)
POCT GLUCOSE: 241 MG/DL (ref 70–110)
POTASSIUM SERPL-SCNC: 3.8 MMOL/L (ref 3.5–5.1)
PROT SERPL-MCNC: 4.9 G/DL (ref 6–8.4)
RBC # BLD AUTO: 4.6 M/UL (ref 4–5.4)
SODIUM SERPL-SCNC: 139 MMOL/L (ref 136–145)
WBC # BLD AUTO: 10.79 K/UL (ref 3.9–12.7)

## 2021-11-12 PROCEDURE — 99233 PR SUBSEQUENT HOSPITAL CARE,LEVL III: ICD-10-PCS | Mod: HCNC,,, | Performed by: PHYSICIAN ASSISTANT

## 2021-11-12 PROCEDURE — D9220A PRA ANESTHESIA: Mod: HCNC,CRNA,, | Performed by: NURSE ANESTHETIST, CERTIFIED REGISTERED

## 2021-11-12 PROCEDURE — 99233 SBSQ HOSP IP/OBS HIGH 50: CPT | Mod: HCNC,,, | Performed by: HOSPITALIST

## 2021-11-12 PROCEDURE — 99232 SBSQ HOSP IP/OBS MODERATE 35: CPT | Mod: HCNC,,, | Performed by: NURSE PRACTITIONER

## 2021-11-12 PROCEDURE — 99233 PR SUBSEQUENT HOSPITAL CARE,LEVL III: ICD-10-PCS | Mod: HCNC,,, | Performed by: HOSPITALIST

## 2021-11-12 PROCEDURE — 63600175 PHARM REV CODE 636 W HCPCS: Mod: HCNC | Performed by: STUDENT IN AN ORGANIZED HEALTH CARE EDUCATION/TRAINING PROGRAM

## 2021-11-12 PROCEDURE — 25000003 PHARM REV CODE 250: Mod: HCNC | Performed by: PHYSICIAN ASSISTANT

## 2021-11-12 PROCEDURE — 92507 TX SP LANG VOICE COMM INDIV: CPT | Mod: HCNC

## 2021-11-12 PROCEDURE — 83735 ASSAY OF MAGNESIUM: CPT | Mod: HCNC | Performed by: PHYSICIAN ASSISTANT

## 2021-11-12 PROCEDURE — 63600175 PHARM REV CODE 636 W HCPCS: Mod: HCNC | Performed by: NURSE ANESTHETIST, CERTIFIED REGISTERED

## 2021-11-12 PROCEDURE — 25000003 PHARM REV CODE 250: Mod: HCNC | Performed by: PSYCHIATRY & NEUROLOGY

## 2021-11-12 PROCEDURE — 80053 COMPREHEN METABOLIC PANEL: CPT | Mod: HCNC | Performed by: NURSE PRACTITIONER

## 2021-11-12 PROCEDURE — 84100 ASSAY OF PHOSPHORUS: CPT | Mod: HCNC | Performed by: PHYSICIAN ASSISTANT

## 2021-11-12 PROCEDURE — 36415 COLL VENOUS BLD VENIPUNCTURE: CPT | Mod: HCNC | Performed by: NURSE PRACTITIONER

## 2021-11-12 PROCEDURE — 25000003 PHARM REV CODE 250: Mod: HCNC | Performed by: NEUROLOGICAL SURGERY

## 2021-11-12 PROCEDURE — D9220A PRA ANESTHESIA: ICD-10-PCS | Mod: HCNC,CRNA,, | Performed by: NURSE ANESTHETIST, CERTIFIED REGISTERED

## 2021-11-12 PROCEDURE — 85007 BL SMEAR W/DIFF WBC COUNT: CPT | Mod: HCNC | Performed by: NURSE PRACTITIONER

## 2021-11-12 PROCEDURE — 99233 SBSQ HOSP IP/OBS HIGH 50: CPT | Mod: HCNC,,, | Performed by: PHYSICIAN ASSISTANT

## 2021-11-12 PROCEDURE — 63600175 PHARM REV CODE 636 W HCPCS: Mod: HCNC | Performed by: NURSE PRACTITIONER

## 2021-11-12 PROCEDURE — 82550 ASSAY OF CK (CPK): CPT | Mod: HCNC | Performed by: NURSE PRACTITIONER

## 2021-11-12 PROCEDURE — 11000001 HC ACUTE MED/SURG PRIVATE ROOM: Mod: HCNC

## 2021-11-12 PROCEDURE — 25000003 PHARM REV CODE 250: Mod: HCNC | Performed by: NURSE PRACTITIONER

## 2021-11-12 PROCEDURE — 85027 COMPLETE CBC AUTOMATED: CPT | Mod: HCNC | Performed by: NURSE PRACTITIONER

## 2021-11-12 PROCEDURE — D9220A PRA ANESTHESIA: ICD-10-PCS | Mod: HCNC,ANES,, | Performed by: ANESTHESIOLOGY

## 2021-11-12 PROCEDURE — 63600175 PHARM REV CODE 636 W HCPCS: Mod: HCNC | Performed by: PSYCHIATRY & NEUROLOGY

## 2021-11-12 PROCEDURE — D9220A PRA ANESTHESIA: Mod: HCNC,ANES,, | Performed by: ANESTHESIOLOGY

## 2021-11-12 PROCEDURE — 97535 SELF CARE MNGMENT TRAINING: CPT | Mod: HCNC

## 2021-11-12 PROCEDURE — 25000003 PHARM REV CODE 250: Mod: HCNC | Performed by: NURSE ANESTHETIST, CERTIFIED REGISTERED

## 2021-11-12 PROCEDURE — 99232 PR SUBSEQUENT HOSPITAL CARE,LEVL II: ICD-10-PCS | Mod: HCNC,,, | Performed by: NURSE PRACTITIONER

## 2021-11-12 RX ORDER — FENTANYL CITRATE 50 UG/ML
INJECTION, SOLUTION INTRAMUSCULAR; INTRAVENOUS
Status: DISCONTINUED | OUTPATIENT
Start: 2021-11-12 | End: 2021-11-12

## 2021-11-12 RX ORDER — LIDOCAINE HYDROCHLORIDE 10 MG/ML
INJECTION INFILTRATION; PERINEURAL CODE/TRAUMA/SEDATION MEDICATION
Status: COMPLETED | OUTPATIENT
Start: 2021-11-12 | End: 2021-11-12

## 2021-11-12 RX ORDER — ONDANSETRON 2 MG/ML
4 INJECTION INTRAMUSCULAR; INTRAVENOUS DAILY PRN
Status: DISCONTINUED | OUTPATIENT
Start: 2021-11-12 | End: 2021-11-13

## 2021-11-12 RX ORDER — FENTANYL CITRATE 50 UG/ML
25 INJECTION, SOLUTION INTRAMUSCULAR; INTRAVENOUS EVERY 5 MIN PRN
Status: DISCONTINUED | OUTPATIENT
Start: 2021-11-12 | End: 2021-11-13

## 2021-11-12 RX ORDER — MIDAZOLAM HYDROCHLORIDE 1 MG/ML
INJECTION, SOLUTION INTRAMUSCULAR; INTRAVENOUS
Status: DISCONTINUED | OUTPATIENT
Start: 2021-11-12 | End: 2021-11-12

## 2021-11-12 RX ORDER — PROPOFOL 10 MG/ML
VIAL (ML) INTRAVENOUS
Status: DISCONTINUED | OUTPATIENT
Start: 2021-11-12 | End: 2021-11-12

## 2021-11-12 RX ORDER — SODIUM CHLORIDE 0.9 % (FLUSH) 0.9 %
10 SYRINGE (ML) INJECTION
Status: DISCONTINUED | OUTPATIENT
Start: 2021-11-12 | End: 2021-11-24 | Stop reason: HOSPADM

## 2021-11-12 RX ORDER — ONDANSETRON 2 MG/ML
INJECTION INTRAMUSCULAR; INTRAVENOUS
Status: DISCONTINUED | OUTPATIENT
Start: 2021-11-12 | End: 2021-11-12

## 2021-11-12 RX ORDER — SODIUM CHLORIDE 9 MG/ML
INJECTION, SOLUTION INTRAVENOUS CONTINUOUS
Status: DISCONTINUED | OUTPATIENT
Start: 2021-11-12 | End: 2021-11-16

## 2021-11-12 RX ORDER — NEOSTIGMINE METHYLSULFATE 0.5 MG/ML
INJECTION, SOLUTION INTRAVENOUS
Status: DISCONTINUED | OUTPATIENT
Start: 2021-11-12 | End: 2021-11-12

## 2021-11-12 RX ORDER — SODIUM CHLORIDE 0.9 % (FLUSH) 0.9 %
10 SYRINGE (ML) INJECTION
Status: DISCONTINUED | OUTPATIENT
Start: 2021-11-12 | End: 2021-11-13

## 2021-11-12 RX ORDER — LIDOCAINE HYDROCHLORIDE 20 MG/ML
INJECTION INTRAVENOUS
Status: DISCONTINUED | OUTPATIENT
Start: 2021-11-12 | End: 2021-11-12

## 2021-11-12 RX ORDER — PHENYLEPHRINE HYDROCHLORIDE 10 MG/ML
INJECTION INTRAVENOUS
Status: DISCONTINUED | OUTPATIENT
Start: 2021-11-12 | End: 2021-11-12

## 2021-11-12 RX ORDER — ROCURONIUM BROMIDE 10 MG/ML
INJECTION, SOLUTION INTRAVENOUS
Status: DISCONTINUED | OUTPATIENT
Start: 2021-11-12 | End: 2021-11-12

## 2021-11-12 RX ADMIN — SENNOSIDES AND DOCUSATE SODIUM 1 TABLET: 50; 8.6 TABLET ORAL at 09:11

## 2021-11-12 RX ADMIN — ROCURONIUM BROMIDE 10 MG: 10 INJECTION, SOLUTION INTRAVENOUS at 06:11

## 2021-11-12 RX ADMIN — LEVETIRACETAM 500 MG: 500 TABLET, FILM COATED ORAL at 09:11

## 2021-11-12 RX ADMIN — SODIUM CHLORIDE: 0.9 INJECTION, SOLUTION INTRAVENOUS at 09:11

## 2021-11-12 RX ADMIN — DEXAMETHASONE SODIUM PHOSPHATE 4 MG: 4 INJECTION INTRA-ARTICULAR; INTRALESIONAL; INTRAMUSCULAR; INTRAVENOUS; SOFT TISSUE at 05:11

## 2021-11-12 RX ADMIN — PROPOFOL 200 MG: 10 INJECTION, EMULSION INTRAVENOUS at 05:11

## 2021-11-12 RX ADMIN — MUPIROCIN: 20 OINTMENT TOPICAL at 09:11

## 2021-11-12 RX ADMIN — CARVEDILOL 6.25 MG: 6.25 TABLET, FILM COATED ORAL at 09:11

## 2021-11-12 RX ADMIN — GLYCOPYRROLATE 0.6 MG: 0.2 INJECTION, SOLUTION INTRAMUSCULAR; INTRAVITREAL at 07:11

## 2021-11-12 RX ADMIN — MIDAZOLAM HYDROCHLORIDE 2 MG: 1 INJECTION, SOLUTION INTRAMUSCULAR; INTRAVENOUS at 05:11

## 2021-11-12 RX ADMIN — FENTANYL CITRATE 50 MCG: 50 INJECTION, SOLUTION INTRAMUSCULAR; INTRAVENOUS at 05:11

## 2021-11-12 RX ADMIN — LIDOCAINE HYDROCHLORIDE 10 ML: 10 INJECTION, SOLUTION INFILTRATION; PERINEURAL at 05:11

## 2021-11-12 RX ADMIN — ROCURONIUM BROMIDE 50 MG: 10 INJECTION, SOLUTION INTRAVENOUS at 05:11

## 2021-11-12 RX ADMIN — HYDRALAZINE HYDROCHLORIDE 10 MG: 20 INJECTION, SOLUTION INTRAMUSCULAR; INTRAVENOUS at 08:11

## 2021-11-12 RX ADMIN — PHENYLEPHRINE HYDROCHLORIDE 200 MCG: 10 INJECTION INTRAVENOUS at 05:11

## 2021-11-12 RX ADMIN — DEXAMETHASONE SODIUM PHOSPHATE 4 MG: 4 INJECTION INTRA-ARTICULAR; INTRALESIONAL; INTRAMUSCULAR; INTRAVENOUS; SOFT TISSUE at 12:11

## 2021-11-12 RX ADMIN — ONDANSETRON 4 MG: 2 INJECTION INTRAMUSCULAR; INTRAVENOUS at 07:11

## 2021-11-12 RX ADMIN — PHENYLEPHRINE HYDROCHLORIDE 200 MCG: 10 INJECTION INTRAVENOUS at 06:11

## 2021-11-12 RX ADMIN — PANTOPRAZOLE SODIUM 40 MG: 20 TABLET, DELAYED RELEASE ORAL at 09:11

## 2021-11-12 RX ADMIN — LIDOCAINE HYDROCHLORIDE 80 MG: 20 INJECTION, SOLUTION INTRAVENOUS at 05:11

## 2021-11-12 RX ADMIN — ATORVASTATIN CALCIUM 40 MG: 20 TABLET, FILM COATED ORAL at 09:11

## 2021-11-12 RX ADMIN — CEFTRIAXONE 1 G: 1 INJECTION, POWDER, FOR SOLUTION INTRAMUSCULAR; INTRAVENOUS at 12:11

## 2021-11-12 RX ADMIN — SODIUM CHLORIDE: 0.9 INJECTION, SOLUTION INTRAVENOUS at 08:11

## 2021-11-12 RX ADMIN — SUGAMMADEX 200 MG: 100 INJECTION, SOLUTION INTRAVENOUS at 07:11

## 2021-11-12 RX ADMIN — ROCURONIUM BROMIDE 10 MG: 10 INJECTION, SOLUTION INTRAVENOUS at 07:11

## 2021-11-12 RX ADMIN — NEOSTIGMINE METHYLSULFATE 5 MG: 0.5 INJECTION INTRAVENOUS at 07:11

## 2021-11-13 LAB
ANION GAP SERPL CALC-SCNC: 7 MMOL/L (ref 8–16)
ANISOCYTOSIS BLD QL SMEAR: SLIGHT
BACTERIA BLD CULT: ABNORMAL
BASOPHILS # BLD AUTO: ABNORMAL K/UL (ref 0–0.2)
BASOPHILS NFR BLD: 0 % (ref 0–1.9)
BUN SERPL-MCNC: 18 MG/DL (ref 8–23)
BURR CELLS BLD QL SMEAR: ABNORMAL
CALCIUM SERPL-MCNC: 8.6 MG/DL (ref 8.7–10.5)
CHLORIDE SERPL-SCNC: 108 MMOL/L (ref 95–110)
CK SERPL-CCNC: 71 U/L (ref 20–180)
CO2 SERPL-SCNC: 24 MMOL/L (ref 23–29)
CREAT SERPL-MCNC: 0.7 MG/DL (ref 0.5–1.4)
DIFFERENTIAL METHOD: ABNORMAL
EOSINOPHIL # BLD AUTO: ABNORMAL K/UL (ref 0–0.5)
EOSINOPHIL NFR BLD: 0 % (ref 0–8)
ERYTHROCYTE [DISTWIDTH] IN BLOOD BY AUTOMATED COUNT: 14.3 % (ref 11.5–14.5)
EST. GFR  (AFRICAN AMERICAN): >60 ML/MIN/1.73 M^2
EST. GFR  (NON AFRICAN AMERICAN): >60 ML/MIN/1.73 M^2
GLUCOSE SERPL-MCNC: 167 MG/DL (ref 70–110)
HCT VFR BLD AUTO: 41.5 % (ref 37–48.5)
HGB BLD-MCNC: 13.2 G/DL (ref 12–16)
HYPOCHROMIA BLD QL SMEAR: ABNORMAL
IMM GRANULOCYTES # BLD AUTO: ABNORMAL K/UL (ref 0–0.04)
IMM GRANULOCYTES NFR BLD AUTO: ABNORMAL % (ref 0–0.5)
LYMPHOCYTES # BLD AUTO: ABNORMAL K/UL (ref 1–4.8)
LYMPHOCYTES NFR BLD: 7 % (ref 18–48)
MCH RBC QN AUTO: 27.4 PG (ref 27–31)
MCHC RBC AUTO-ENTMCNC: 31.8 G/DL (ref 32–36)
MCV RBC AUTO: 86 FL (ref 82–98)
MONOCYTES # BLD AUTO: ABNORMAL K/UL (ref 0.3–1)
MONOCYTES NFR BLD: 13 % (ref 4–15)
MYELOCYTES NFR BLD MANUAL: 2 %
NEUTROPHILS NFR BLD: 78 % (ref 38–73)
NRBC BLD-RTO: 0 /100 WBC
OVALOCYTES BLD QL SMEAR: ABNORMAL
PLATELET # BLD AUTO: 193 K/UL (ref 150–450)
PLATELET BLD QL SMEAR: ABNORMAL
PMV BLD AUTO: 11.6 FL (ref 9.2–12.9)
POCT GLUCOSE: 151 MG/DL (ref 70–110)
POCT GLUCOSE: 166 MG/DL (ref 70–110)
POCT GLUCOSE: 211 MG/DL (ref 70–110)
POCT GLUCOSE: 268 MG/DL (ref 70–110)
POIKILOCYTOSIS BLD QL SMEAR: SLIGHT
POLYCHROMASIA BLD QL SMEAR: ABNORMAL
POTASSIUM SERPL-SCNC: 4.2 MMOL/L (ref 3.5–5.1)
RBC # BLD AUTO: 4.82 M/UL (ref 4–5.4)
SODIUM SERPL-SCNC: 139 MMOL/L (ref 136–145)
WBC # BLD AUTO: 9.79 K/UL (ref 3.9–12.7)

## 2021-11-13 PROCEDURE — 99223 1ST HOSP IP/OBS HIGH 75: CPT | Mod: HCNC,,, | Performed by: INTERNAL MEDICINE

## 2021-11-13 PROCEDURE — 85027 COMPLETE CBC AUTOMATED: CPT | Mod: HCNC | Performed by: NURSE PRACTITIONER

## 2021-11-13 PROCEDURE — 11000001 HC ACUTE MED/SURG PRIVATE ROOM: Mod: HCNC

## 2021-11-13 PROCEDURE — 85007 BL SMEAR W/DIFF WBC COUNT: CPT | Mod: HCNC | Performed by: NURSE PRACTITIONER

## 2021-11-13 PROCEDURE — S0030 INJECTION, METRONIDAZOLE: HCPCS | Mod: HCNC

## 2021-11-13 PROCEDURE — 25000003 PHARM REV CODE 250: Mod: HCNC | Performed by: PHYSICIAN ASSISTANT

## 2021-11-13 PROCEDURE — 99233 SBSQ HOSP IP/OBS HIGH 50: CPT | Mod: HCNC,GC,, | Performed by: HOSPITALIST

## 2021-11-13 PROCEDURE — 25000003 PHARM REV CODE 250: Mod: HCNC | Performed by: NURSE PRACTITIONER

## 2021-11-13 PROCEDURE — 80048 BASIC METABOLIC PNL TOTAL CA: CPT | Mod: HCNC | Performed by: PHYSICIAN ASSISTANT

## 2021-11-13 PROCEDURE — 25000003 PHARM REV CODE 250: Mod: HCNC | Performed by: INTERNAL MEDICINE

## 2021-11-13 PROCEDURE — 82550 ASSAY OF CK (CPK): CPT | Mod: HCNC | Performed by: NURSE PRACTITIONER

## 2021-11-13 PROCEDURE — 99233 PR SUBSEQUENT HOSPITAL CARE,LEVL III: ICD-10-PCS | Mod: HCNC,GC,, | Performed by: HOSPITALIST

## 2021-11-13 PROCEDURE — 99232 SBSQ HOSP IP/OBS MODERATE 35: CPT | Mod: HCNC,,, | Performed by: NURSE PRACTITIONER

## 2021-11-13 PROCEDURE — 63600175 PHARM REV CODE 636 W HCPCS: Mod: HCNC | Performed by: INTERNAL MEDICINE

## 2021-11-13 PROCEDURE — 63600175 PHARM REV CODE 636 W HCPCS: Mod: HCNC

## 2021-11-13 PROCEDURE — 87040 BLOOD CULTURE FOR BACTERIA: CPT | Mod: 59,HCNC

## 2021-11-13 PROCEDURE — 36415 COLL VENOUS BLD VENIPUNCTURE: CPT | Mod: HCNC

## 2021-11-13 PROCEDURE — 63600175 PHARM REV CODE 636 W HCPCS: Mod: HCNC | Performed by: PSYCHIATRY & NEUROLOGY

## 2021-11-13 PROCEDURE — 25000003 PHARM REV CODE 250: Mod: HCNC

## 2021-11-13 PROCEDURE — 99223 PR INITIAL HOSPITAL CARE,LEVL III: ICD-10-PCS | Mod: HCNC,,, | Performed by: INTERNAL MEDICINE

## 2021-11-13 PROCEDURE — 63600175 PHARM REV CODE 636 W HCPCS: Mod: HCNC | Performed by: NURSE PRACTITIONER

## 2021-11-13 PROCEDURE — 99232 PR SUBSEQUENT HOSPITAL CARE,LEVL II: ICD-10-PCS | Mod: HCNC,,, | Performed by: NURSE PRACTITIONER

## 2021-11-13 RX ORDER — METRONIDAZOLE 500 MG/1
500 TABLET ORAL EVERY 8 HOURS
Status: DISCONTINUED | OUTPATIENT
Start: 2021-11-13 | End: 2021-11-13

## 2021-11-13 RX ORDER — METRONIDAZOLE 500 MG/100ML
500 INJECTION, SOLUTION INTRAVENOUS ONCE
Status: COMPLETED | OUTPATIENT
Start: 2021-11-13 | End: 2021-11-13

## 2021-11-13 RX ORDER — CEFEPIME HYDROCHLORIDE 2 G/1
2 INJECTION, POWDER, FOR SOLUTION INTRAVENOUS
Status: DISCONTINUED | OUTPATIENT
Start: 2021-11-13 | End: 2021-11-13

## 2021-11-13 RX ADMIN — DEXAMETHASONE SODIUM PHOSPHATE 4 MG: 4 INJECTION INTRA-ARTICULAR; INTRALESIONAL; INTRAMUSCULAR; INTRAVENOUS; SOFT TISSUE at 12:11

## 2021-11-13 RX ADMIN — MUPIROCIN: 20 OINTMENT TOPICAL at 12:11

## 2021-11-13 RX ADMIN — DEXAMETHASONE SODIUM PHOSPHATE 4 MG: 4 INJECTION INTRA-ARTICULAR; INTRALESIONAL; INTRAMUSCULAR; INTRAVENOUS; SOFT TISSUE at 05:11

## 2021-11-13 RX ADMIN — CEFEPIME 2 G: 2 INJECTION, POWDER, FOR SOLUTION INTRAVENOUS at 12:11

## 2021-11-13 RX ADMIN — MEROPENEM 2 G: 1 INJECTION INTRAVENOUS at 06:11

## 2021-11-13 RX ADMIN — HEPARIN SODIUM 5000 UNITS: 5000 INJECTION INTRAVENOUS; SUBCUTANEOUS at 03:11

## 2021-11-13 RX ADMIN — LEVETIRACETAM 500 MG: 500 TABLET, FILM COATED ORAL at 12:11

## 2021-11-13 RX ADMIN — LEVETIRACETAM 500 MG: 500 TABLET, FILM COATED ORAL at 10:11

## 2021-11-13 RX ADMIN — PANTOPRAZOLE SODIUM 40 MG: 20 TABLET, DELAYED RELEASE ORAL at 10:11

## 2021-11-13 RX ADMIN — METRONIDAZOLE 500 MG: 500 INJECTION, SOLUTION INTRAVENOUS at 12:11

## 2021-11-13 RX ADMIN — ATORVASTATIN CALCIUM 40 MG: 20 TABLET, FILM COATED ORAL at 10:11

## 2021-11-13 RX ADMIN — HEPARIN SODIUM 5000 UNITS: 5000 INJECTION INTRAVENOUS; SUBCUTANEOUS at 09:11

## 2021-11-13 RX ADMIN — INSULIN ASPART 5 UNITS: 100 INJECTION, SOLUTION INTRAVENOUS; SUBCUTANEOUS at 06:11

## 2021-11-13 RX ADMIN — INSULIN ASPART 10 UNITS: 100 INJECTION, SOLUTION INTRAVENOUS; SUBCUTANEOUS at 12:11

## 2021-11-13 RX ADMIN — SODIUM CHLORIDE: 0.9 INJECTION, SOLUTION INTRAVENOUS at 04:11

## 2021-11-13 RX ADMIN — SENNOSIDES AND DOCUSATE SODIUM 1 TABLET: 50; 8.6 TABLET ORAL at 12:11

## 2021-11-13 RX ADMIN — DEXAMETHASONE SODIUM PHOSPHATE 4 MG: 4 INJECTION INTRA-ARTICULAR; INTRALESIONAL; INTRAMUSCULAR; INTRAVENOUS; SOFT TISSUE at 06:11

## 2021-11-13 RX ADMIN — LEVETIRACETAM 500 MG: 500 TABLET, FILM COATED ORAL at 08:11

## 2021-11-13 RX ADMIN — CARVEDILOL 6.25 MG: 6.25 TABLET, FILM COATED ORAL at 10:11

## 2021-11-13 RX ADMIN — CARVEDILOL 6.25 MG: 6.25 TABLET, FILM COATED ORAL at 11:11

## 2021-11-13 RX ADMIN — INSULIN ASPART 2 UNITS: 100 INJECTION, SOLUTION INTRAVENOUS; SUBCUTANEOUS at 04:11

## 2021-11-13 RX ADMIN — CARVEDILOL 6.25 MG: 6.25 TABLET, FILM COATED ORAL at 08:11

## 2021-11-13 RX ADMIN — SENNOSIDES AND DOCUSATE SODIUM 1 TABLET: 50; 8.6 TABLET ORAL at 10:11

## 2021-11-13 RX ADMIN — SENNOSIDES AND DOCUSATE SODIUM 1 TABLET: 50; 8.6 TABLET ORAL at 08:11

## 2021-11-14 LAB
ABO + RH BLD: NORMAL
ANION GAP SERPL CALC-SCNC: 7 MMOL/L (ref 8–16)
APTT BLDCRRT: 26 SEC (ref 21–32)
BASOPHILS # BLD AUTO: 0.04 K/UL (ref 0–0.2)
BASOPHILS NFR BLD: 0.4 % (ref 0–1.9)
BLD GP AB SCN CELLS X3 SERPL QL: NORMAL
BUN SERPL-MCNC: 16 MG/DL (ref 8–23)
CALCIUM SERPL-MCNC: 8.6 MG/DL (ref 8.7–10.5)
CHLORIDE SERPL-SCNC: 106 MMOL/L (ref 95–110)
CK SERPL-CCNC: 84 U/L (ref 20–180)
CO2 SERPL-SCNC: 22 MMOL/L (ref 23–29)
CREAT SERPL-MCNC: 0.6 MG/DL (ref 0.5–1.4)
DIFFERENTIAL METHOD: ABNORMAL
EOSINOPHIL # BLD AUTO: 0 K/UL (ref 0–0.5)
EOSINOPHIL NFR BLD: 0 % (ref 0–8)
ERYTHROCYTE [DISTWIDTH] IN BLOOD BY AUTOMATED COUNT: 14.3 % (ref 11.5–14.5)
EST. GFR  (AFRICAN AMERICAN): >60 ML/MIN/1.73 M^2
EST. GFR  (NON AFRICAN AMERICAN): >60 ML/MIN/1.73 M^2
GLUCOSE SERPL-MCNC: 140 MG/DL (ref 70–110)
HCT VFR BLD AUTO: 38 % (ref 37–48.5)
HGB BLD-MCNC: 12.2 G/DL (ref 12–16)
IMM GRANULOCYTES # BLD AUTO: 0.45 K/UL (ref 0–0.04)
IMM GRANULOCYTES NFR BLD AUTO: 4.1 % (ref 0–0.5)
INR PPP: 1 (ref 0.8–1.2)
LYMPHOCYTES # BLD AUTO: 0.7 K/UL (ref 1–4.8)
LYMPHOCYTES NFR BLD: 6.6 % (ref 18–48)
MCH RBC QN AUTO: 27.9 PG (ref 27–31)
MCHC RBC AUTO-ENTMCNC: 32.1 G/DL (ref 32–36)
MCV RBC AUTO: 87 FL (ref 82–98)
MONOCYTES # BLD AUTO: 0.7 K/UL (ref 0.3–1)
MONOCYTES NFR BLD: 5.9 % (ref 4–15)
NEUTROPHILS # BLD AUTO: 9.2 K/UL (ref 1.8–7.7)
NEUTROPHILS NFR BLD: 83 % (ref 38–73)
NRBC BLD-RTO: 0 /100 WBC
PLATELET # BLD AUTO: 170 K/UL (ref 150–450)
PMV BLD AUTO: 11.4 FL (ref 9.2–12.9)
POCT GLUCOSE: 139 MG/DL (ref 70–110)
POCT GLUCOSE: 156 MG/DL (ref 70–110)
POCT GLUCOSE: 225 MG/DL (ref 70–110)
POCT GLUCOSE: 275 MG/DL (ref 70–110)
POCT GLUCOSE: 346 MG/DL (ref 70–110)
POTASSIUM SERPL-SCNC: 3.8 MMOL/L (ref 3.5–5.1)
PROTHROMBIN TIME: 11.1 SEC (ref 9–12.5)
RBC # BLD AUTO: 4.38 M/UL (ref 4–5.4)
SODIUM SERPL-SCNC: 135 MMOL/L (ref 136–145)
WBC # BLD AUTO: 11.05 K/UL (ref 3.9–12.7)

## 2021-11-14 PROCEDURE — 86682 HELMINTH ANTIBODY: CPT | Mod: HCNC | Performed by: INTERNAL MEDICINE

## 2021-11-14 PROCEDURE — 25000003 PHARM REV CODE 250: Mod: HCNC | Performed by: PHYSICIAN ASSISTANT

## 2021-11-14 PROCEDURE — 63600175 PHARM REV CODE 636 W HCPCS: Mod: HCNC | Performed by: STUDENT IN AN ORGANIZED HEALTH CARE EDUCATION/TRAINING PROGRAM

## 2021-11-14 PROCEDURE — 85025 COMPLETE CBC W/AUTO DIFF WBC: CPT | Mod: HCNC | Performed by: NURSE PRACTITIONER

## 2021-11-14 PROCEDURE — U0005 INFEC AGEN DETEC AMPLI PROBE: HCPCS | Performed by: STUDENT IN AN ORGANIZED HEALTH CARE EDUCATION/TRAINING PROGRAM

## 2021-11-14 PROCEDURE — 99233 PR SUBSEQUENT HOSPITAL CARE,LEVL III: ICD-10-PCS | Mod: HCNC,GC,, | Performed by: INTERNAL MEDICINE

## 2021-11-14 PROCEDURE — 25000003 PHARM REV CODE 250: Mod: HCNC | Performed by: NURSE PRACTITIONER

## 2021-11-14 PROCEDURE — 82550 ASSAY OF CK (CPK): CPT | Mod: HCNC | Performed by: NURSE PRACTITIONER

## 2021-11-14 PROCEDURE — 80048 BASIC METABOLIC PNL TOTAL CA: CPT | Mod: HCNC | Performed by: PHYSICIAN ASSISTANT

## 2021-11-14 PROCEDURE — 86920 COMPATIBILITY TEST SPIN: CPT | Mod: HCNC | Performed by: NEUROLOGICAL SURGERY

## 2021-11-14 PROCEDURE — 63600175 PHARM REV CODE 636 W HCPCS: Mod: HCNC | Performed by: INTERNAL MEDICINE

## 2021-11-14 PROCEDURE — 99233 SBSQ HOSP IP/OBS HIGH 50: CPT | Mod: HCNC,,, | Performed by: NEUROLOGICAL SURGERY

## 2021-11-14 PROCEDURE — 36415 COLL VENOUS BLD VENIPUNCTURE: CPT | Mod: HCNC | Performed by: NURSE PRACTITIONER

## 2021-11-14 PROCEDURE — U0003 INFECTIOUS AGENT DETECTION BY NUCLEIC ACID (DNA OR RNA); SEVERE ACUTE RESPIRATORY SYNDROME CORONAVIRUS 2 (SARS-COV-2) (CORONAVIRUS DISEASE [COVID-19]), AMPLIFIED PROBE TECHNIQUE, MAKING USE OF HIGH THROUGHPUT TECHNOLOGIES AS DESCRIBED BY CMS-2020-01-R: HCPCS | Mod: HCNC | Performed by: STUDENT IN AN ORGANIZED HEALTH CARE EDUCATION/TRAINING PROGRAM

## 2021-11-14 PROCEDURE — 86900 BLOOD TYPING SEROLOGIC ABO: CPT | Mod: HCNC | Performed by: STUDENT IN AN ORGANIZED HEALTH CARE EDUCATION/TRAINING PROGRAM

## 2021-11-14 PROCEDURE — 25000003 PHARM REV CODE 250: Mod: HCNC | Performed by: STUDENT IN AN ORGANIZED HEALTH CARE EDUCATION/TRAINING PROGRAM

## 2021-11-14 PROCEDURE — 99232 PR SUBSEQUENT HOSPITAL CARE,LEVL II: ICD-10-PCS | Mod: HCNC,,, | Performed by: NURSE PRACTITIONER

## 2021-11-14 PROCEDURE — 99233 PR SUBSEQUENT HOSPITAL CARE,LEVL III: ICD-10-PCS | Mod: HCNC,,, | Performed by: NEUROLOGICAL SURGERY

## 2021-11-14 PROCEDURE — 99232 SBSQ HOSP IP/OBS MODERATE 35: CPT | Mod: HCNC,,, | Performed by: NURSE PRACTITIONER

## 2021-11-14 PROCEDURE — 63600175 PHARM REV CODE 636 W HCPCS: Mod: HCNC | Performed by: NURSE PRACTITIONER

## 2021-11-14 PROCEDURE — 63600175 PHARM REV CODE 636 W HCPCS: Mod: HCNC | Performed by: PSYCHIATRY & NEUROLOGY

## 2021-11-14 PROCEDURE — 99233 SBSQ HOSP IP/OBS HIGH 50: CPT | Mod: HCNC,GC,, | Performed by: HOSPITALIST

## 2021-11-14 PROCEDURE — 25000003 PHARM REV CODE 250: Mod: HCNC | Performed by: INTERNAL MEDICINE

## 2021-11-14 PROCEDURE — 36415 COLL VENOUS BLD VENIPUNCTURE: CPT | Mod: HCNC | Performed by: STUDENT IN AN ORGANIZED HEALTH CARE EDUCATION/TRAINING PROGRAM

## 2021-11-14 PROCEDURE — 99233 PR SUBSEQUENT HOSPITAL CARE,LEVL III: ICD-10-PCS | Mod: HCNC,GC,, | Performed by: HOSPITALIST

## 2021-11-14 PROCEDURE — 85610 PROTHROMBIN TIME: CPT | Mod: HCNC | Performed by: STUDENT IN AN ORGANIZED HEALTH CARE EDUCATION/TRAINING PROGRAM

## 2021-11-14 PROCEDURE — 11000001 HC ACUTE MED/SURG PRIVATE ROOM: Mod: HCNC

## 2021-11-14 PROCEDURE — 99233 SBSQ HOSP IP/OBS HIGH 50: CPT | Mod: HCNC,GC,, | Performed by: INTERNAL MEDICINE

## 2021-11-14 PROCEDURE — 85730 THROMBOPLASTIN TIME PARTIAL: CPT | Mod: HCNC | Performed by: STUDENT IN AN ORGANIZED HEALTH CARE EDUCATION/TRAINING PROGRAM

## 2021-11-14 PROCEDURE — C9399 UNCLASSIFIED DRUGS OR BIOLOG: HCPCS | Mod: HCNC | Performed by: NURSE PRACTITIONER

## 2021-11-14 RX ORDER — POLYETHYLENE GLYCOL 3350 17 G/17G
17 POWDER, FOR SOLUTION ORAL DAILY
Status: DISCONTINUED | OUTPATIENT
Start: 2021-11-14 | End: 2021-11-20

## 2021-11-14 RX ORDER — INSULIN ASPART 100 [IU]/ML
5-10 INJECTION, SOLUTION INTRAVENOUS; SUBCUTANEOUS
Status: DISCONTINUED | OUTPATIENT
Start: 2021-11-14 | End: 2021-11-17

## 2021-11-14 RX ORDER — IBUPROFEN 200 MG
24 TABLET ORAL
Status: DISCONTINUED | OUTPATIENT
Start: 2021-11-14 | End: 2021-11-24 | Stop reason: HOSPADM

## 2021-11-14 RX ORDER — GLUCAGON 1 MG
1 KIT INJECTION
Status: DISCONTINUED | OUTPATIENT
Start: 2021-11-14 | End: 2021-11-24 | Stop reason: HOSPADM

## 2021-11-14 RX ORDER — INSULIN ASPART 100 [IU]/ML
1-10 INJECTION, SOLUTION INTRAVENOUS; SUBCUTANEOUS
Status: DISCONTINUED | OUTPATIENT
Start: 2021-11-14 | End: 2021-11-20

## 2021-11-14 RX ORDER — DEXAMETHASONE 4 MG/1
4 TABLET ORAL EVERY 6 HOURS
Status: DISCONTINUED | OUTPATIENT
Start: 2021-11-14 | End: 2021-11-15

## 2021-11-14 RX ORDER — IBUPROFEN 200 MG
16 TABLET ORAL
Status: DISCONTINUED | OUTPATIENT
Start: 2021-11-14 | End: 2021-11-24 | Stop reason: HOSPADM

## 2021-11-14 RX ADMIN — DEXAMETHASONE SODIUM PHOSPHATE 4 MG: 4 INJECTION INTRA-ARTICULAR; INTRALESIONAL; INTRAMUSCULAR; INTRAVENOUS; SOFT TISSUE at 05:11

## 2021-11-14 RX ADMIN — SENNOSIDES AND DOCUSATE SODIUM 1 TABLET: 50; 8.6 TABLET ORAL at 09:11

## 2021-11-14 RX ADMIN — MEROPENEM 2 G: 1 INJECTION INTRAVENOUS at 03:11

## 2021-11-14 RX ADMIN — CARVEDILOL 6.25 MG: 6.25 TABLET, FILM COATED ORAL at 09:11

## 2021-11-14 RX ADMIN — INSULIN ASPART 5 UNITS: 100 INJECTION, SOLUTION INTRAVENOUS; SUBCUTANEOUS at 08:11

## 2021-11-14 RX ADMIN — INSULIN ASPART 10 UNITS: 100 INJECTION, SOLUTION INTRAVENOUS; SUBCUTANEOUS at 11:11

## 2021-11-14 RX ADMIN — PANTOPRAZOLE SODIUM 40 MG: 20 TABLET, DELAYED RELEASE ORAL at 08:11

## 2021-11-14 RX ADMIN — INSULIN ASPART 10 UNITS: 100 INJECTION, SOLUTION INTRAVENOUS; SUBCUTANEOUS at 03:11

## 2021-11-14 RX ADMIN — INSULIN DETEMIR 20 UNITS: 100 INJECTION, SOLUTION SUBCUTANEOUS at 03:11

## 2021-11-14 RX ADMIN — HEPARIN SODIUM 5000 UNITS: 5000 INJECTION INTRAVENOUS; SUBCUTANEOUS at 05:11

## 2021-11-14 RX ADMIN — CARVEDILOL 6.25 MG: 6.25 TABLET, FILM COATED ORAL at 08:11

## 2021-11-14 RX ADMIN — INSULIN ASPART 8 UNITS: 100 INJECTION, SOLUTION INTRAVENOUS; SUBCUTANEOUS at 03:11

## 2021-11-14 RX ADMIN — HEPARIN SODIUM 5000 UNITS: 5000 INJECTION INTRAVENOUS; SUBCUTANEOUS at 09:11

## 2021-11-14 RX ADMIN — LEVETIRACETAM 500 MG: 500 TABLET, FILM COATED ORAL at 08:11

## 2021-11-14 RX ADMIN — DEXAMETHASONE SODIUM PHOSPHATE 4 MG: 4 INJECTION INTRA-ARTICULAR; INTRALESIONAL; INTRAMUSCULAR; INTRAVENOUS; SOFT TISSUE at 12:11

## 2021-11-14 RX ADMIN — HEPARIN SODIUM 5000 UNITS: 5000 INJECTION INTRAVENOUS; SUBCUTANEOUS at 02:11

## 2021-11-14 RX ADMIN — LEVETIRACETAM 500 MG: 500 TABLET, FILM COATED ORAL at 09:11

## 2021-11-14 RX ADMIN — MEROPENEM 2 G: 1 INJECTION INTRAVENOUS at 11:11

## 2021-11-14 RX ADMIN — INSULIN ASPART 2 UNITS: 100 INJECTION, SOLUTION INTRAVENOUS; SUBCUTANEOUS at 11:11

## 2021-11-14 RX ADMIN — DEXAMETHASONE 4 MG: 4 TABLET ORAL at 05:11

## 2021-11-14 RX ADMIN — DEXAMETHASONE SODIUM PHOSPHATE 4 MG: 4 INJECTION INTRA-ARTICULAR; INTRALESIONAL; INTRAMUSCULAR; INTRAVENOUS; SOFT TISSUE at 11:11

## 2021-11-14 RX ADMIN — DEXAMETHASONE 4 MG: 4 TABLET ORAL at 11:11

## 2021-11-14 RX ADMIN — SENNOSIDES AND DOCUSATE SODIUM 1 TABLET: 50; 8.6 TABLET ORAL at 08:11

## 2021-11-14 RX ADMIN — INSULIN ASPART 5 UNITS: 100 INJECTION, SOLUTION INTRAVENOUS; SUBCUTANEOUS at 07:11

## 2021-11-14 RX ADMIN — ATORVASTATIN CALCIUM 40 MG: 20 TABLET, FILM COATED ORAL at 08:11

## 2021-11-14 RX ADMIN — POLYETHYLENE GLYCOL 3350 17 G: 17 POWDER, FOR SOLUTION ORAL at 11:11

## 2021-11-14 RX ADMIN — INSULIN ASPART 3 UNITS: 100 INJECTION, SOLUTION INTRAVENOUS; SUBCUTANEOUS at 09:11

## 2021-11-15 ENCOUNTER — ANESTHESIA (OUTPATIENT)
Dept: SURGERY | Facility: HOSPITAL | Age: 76
DRG: 025 | End: 2021-11-15
Payer: MEDICARE

## 2021-11-15 ENCOUNTER — NURSE TRIAGE (OUTPATIENT)
Dept: ADMINISTRATIVE | Facility: CLINIC | Age: 76
End: 2021-11-15
Payer: MEDICARE

## 2021-11-15 PROBLEM — I26.99 ACUTE PULMONARY EMBOLISM WITHOUT ACUTE COR PULMONALE: Status: ACTIVE | Noted: 2021-11-15

## 2021-11-15 LAB
ALBUMIN SERPL BCP-MCNC: 1.9 G/DL (ref 3.5–5.2)
ALP SERPL-CCNC: 66 U/L (ref 55–135)
ALT SERPL W/O P-5'-P-CCNC: 21 U/L (ref 10–44)
ANION GAP SERPL CALC-SCNC: 6 MMOL/L (ref 8–16)
ANION GAP SERPL CALC-SCNC: 8 MMOL/L (ref 8–16)
AST SERPL-CCNC: 23 U/L (ref 10–40)
BACTERIA BLD CULT: NORMAL
BACTERIA BLD CULT: NORMAL
BASOPHILS # BLD AUTO: 0.04 K/UL (ref 0–0.2)
BASOPHILS # BLD AUTO: 0.05 K/UL (ref 0–0.2)
BASOPHILS NFR BLD: 0.2 % (ref 0–1.9)
BASOPHILS NFR BLD: 0.4 % (ref 0–1.9)
BILIRUB SERPL-MCNC: 0.5 MG/DL (ref 0.1–1)
BUN SERPL-MCNC: 15 MG/DL (ref 8–23)
BUN SERPL-MCNC: 15 MG/DL (ref 8–23)
CALCIUM SERPL-MCNC: 8.2 MG/DL (ref 8.7–10.5)
CALCIUM SERPL-MCNC: 8.8 MG/DL (ref 8.7–10.5)
CHLORIDE SERPL-SCNC: 105 MMOL/L (ref 95–110)
CHLORIDE SERPL-SCNC: 111 MMOL/L (ref 95–110)
CK MB SERPL-MCNC: 1.2 NG/ML (ref 0.1–6.5)
CK MB SERPL-RTO: 1 % (ref 0–5)
CK SERPL-CCNC: 125 U/L (ref 20–180)
CK SERPL-CCNC: 70 U/L (ref 20–180)
CO2 SERPL-SCNC: 24 MMOL/L (ref 23–29)
CO2 SERPL-SCNC: 25 MMOL/L (ref 23–29)
CREAT SERPL-MCNC: 0.7 MG/DL (ref 0.5–1.4)
CREAT SERPL-MCNC: 0.7 MG/DL (ref 0.5–1.4)
D DIMER PPP IA.FEU-MCNC: 17.26 MG/L FEU
DIFFERENTIAL METHOD: ABNORMAL
DIFFERENTIAL METHOD: ABNORMAL
EOSINOPHIL # BLD AUTO: 0 K/UL (ref 0–0.5)
EOSINOPHIL # BLD AUTO: 0 K/UL (ref 0–0.5)
EOSINOPHIL NFR BLD: 0 % (ref 0–8)
EOSINOPHIL NFR BLD: 0 % (ref 0–8)
ERYTHROCYTE [DISTWIDTH] IN BLOOD BY AUTOMATED COUNT: 14 % (ref 11.5–14.5)
ERYTHROCYTE [DISTWIDTH] IN BLOOD BY AUTOMATED COUNT: 14.4 % (ref 11.5–14.5)
EST. GFR  (AFRICAN AMERICAN): >60 ML/MIN/1.73 M^2
EST. GFR  (AFRICAN AMERICAN): >60 ML/MIN/1.73 M^2
EST. GFR  (NON AFRICAN AMERICAN): >60 ML/MIN/1.73 M^2
EST. GFR  (NON AFRICAN AMERICAN): >60 ML/MIN/1.73 M^2
GLUCOSE SERPL-MCNC: 158 MG/DL (ref 70–110)
GLUCOSE SERPL-MCNC: 172 MG/DL (ref 70–110)
GLUCOSE SERPL-MCNC: 176 MG/DL (ref 70–110)
GLUCOSE SERPL-MCNC: 223 MG/DL (ref 70–110)
HCO3 UR-SCNC: 25.7 MMOL/L (ref 24–28)
HCO3 UR-SCNC: 28.1 MMOL/L (ref 24–28)
HCT VFR BLD AUTO: 36.7 % (ref 37–48.5)
HCT VFR BLD AUTO: 38.2 % (ref 37–48.5)
HCT VFR BLD CALC: 32 %PCV (ref 36–54)
HCT VFR BLD CALC: 36 %PCV (ref 36–54)
HGB BLD-MCNC: 12 G/DL (ref 12–16)
HGB BLD-MCNC: 12.4 G/DL (ref 12–16)
HIV 1+2 AB+HIV1 P24 AG SERPL QL IA: NEGATIVE
IMM GRANULOCYTES # BLD AUTO: 0.41 K/UL (ref 0–0.04)
IMM GRANULOCYTES # BLD AUTO: 0.52 K/UL (ref 0–0.04)
IMM GRANULOCYTES NFR BLD AUTO: 2.4 % (ref 0–0.5)
IMM GRANULOCYTES NFR BLD AUTO: 4.4 % (ref 0–0.5)
LACTATE SERPL-SCNC: 0.9 MMOL/L (ref 0.5–2.2)
LYMPHOCYTES # BLD AUTO: 0.7 K/UL (ref 1–4.8)
LYMPHOCYTES # BLD AUTO: 0.8 K/UL (ref 1–4.8)
LYMPHOCYTES NFR BLD: 4.6 % (ref 18–48)
LYMPHOCYTES NFR BLD: 5.5 % (ref 18–48)
MCH RBC QN AUTO: 27.5 PG (ref 27–31)
MCH RBC QN AUTO: 27.7 PG (ref 27–31)
MCHC RBC AUTO-ENTMCNC: 32.5 G/DL (ref 32–36)
MCHC RBC AUTO-ENTMCNC: 32.7 G/DL (ref 32–36)
MCV RBC AUTO: 84 FL (ref 82–98)
MCV RBC AUTO: 86 FL (ref 82–98)
MONOCYTES # BLD AUTO: 0.7 K/UL (ref 0.3–1)
MONOCYTES # BLD AUTO: 0.8 K/UL (ref 0.3–1)
MONOCYTES NFR BLD: 4.8 % (ref 4–15)
MONOCYTES NFR BLD: 5.4 % (ref 4–15)
NEUTROPHILS # BLD AUTO: 10.1 K/UL (ref 1.8–7.7)
NEUTROPHILS # BLD AUTO: 15 K/UL (ref 1.8–7.7)
NEUTROPHILS NFR BLD: 84.3 % (ref 38–73)
NEUTROPHILS NFR BLD: 88 % (ref 38–73)
NRBC BLD-RTO: 0 /100 WBC
NRBC BLD-RTO: 0 /100 WBC
PCO2 BLDA: 38.8 MMHG (ref 35–45)
PCO2 BLDA: 41.3 MMHG (ref 35–45)
PH SMN: 7.43 [PH] (ref 7.35–7.45)
PH SMN: 7.44 [PH] (ref 7.35–7.45)
PLATELET # BLD AUTO: 185 K/UL (ref 150–450)
PLATELET # BLD AUTO: 218 K/UL (ref 150–450)
PMV BLD AUTO: 10.9 FL (ref 9.2–12.9)
PMV BLD AUTO: 11 FL (ref 9.2–12.9)
PO2 BLDA: 101 MMHG (ref 80–100)
PO2 BLDA: 349 MMHG (ref 80–100)
POC BE: 1 MMOL/L
POC BE: 4 MMOL/L
POC IONIZED CALCIUM: 1.23 MMOL/L (ref 1.06–1.42)
POC IONIZED CALCIUM: 1.29 MMOL/L (ref 1.06–1.42)
POC SATURATED O2: 100 % (ref 95–100)
POC SATURATED O2: 98 % (ref 95–100)
POC TCO2: 27 MMOL/L (ref 23–27)
POC TCO2: 29 MMOL/L (ref 23–27)
POCT GLUCOSE: 181 MG/DL (ref 70–110)
POCT GLUCOSE: 189 MG/DL (ref 70–110)
POCT GLUCOSE: 202 MG/DL (ref 70–110)
POCT GLUCOSE: 203 MG/DL (ref 70–110)
POCT GLUCOSE: 218 MG/DL (ref 70–110)
POCT GLUCOSE: 222 MG/DL (ref 70–110)
POCT GLUCOSE: 241 MG/DL (ref 70–110)
POTASSIUM BLD-SCNC: 3.4 MMOL/L (ref 3.5–5.1)
POTASSIUM BLD-SCNC: 3.8 MMOL/L (ref 3.5–5.1)
POTASSIUM SERPL-SCNC: 3.7 MMOL/L (ref 3.5–5.1)
POTASSIUM SERPL-SCNC: 4 MMOL/L (ref 3.5–5.1)
PROT SERPL-MCNC: 4.7 G/DL (ref 6–8.4)
RBC # BLD AUTO: 4.36 M/UL (ref 4–5.4)
RBC # BLD AUTO: 4.47 M/UL (ref 4–5.4)
SAMPLE: ABNORMAL
SAMPLE: ABNORMAL
SARS-COV-2 RNA RESP QL NAA+PROBE: NOT DETECTED
SARS-COV-2- CYCLE NUMBER: NORMAL
SODIUM BLD-SCNC: 140 MMOL/L (ref 136–145)
SODIUM BLD-SCNC: 143 MMOL/L (ref 136–145)
SODIUM SERPL-SCNC: 137 MMOL/L (ref 136–145)
SODIUM SERPL-SCNC: 142 MMOL/L (ref 136–145)
TROPONIN I SERPL DL<=0.01 NG/ML-MCNC: <0.006 NG/ML (ref 0–0.03)
WBC # BLD AUTO: 11.94 K/UL (ref 3.9–12.7)
WBC # BLD AUTO: 17 K/UL (ref 3.9–12.7)

## 2021-11-15 PROCEDURE — 88341 IMHCHEM/IMCYTCHM EA ADD ANTB: CPT | Mod: 26,HCNC,, | Performed by: STUDENT IN AN ORGANIZED HEALTH CARE EDUCATION/TRAINING PROGRAM

## 2021-11-15 PROCEDURE — 63600175 PHARM REV CODE 636 W HCPCS: Mod: HCNC | Performed by: STUDENT IN AN ORGANIZED HEALTH CARE EDUCATION/TRAINING PROGRAM

## 2021-11-15 PROCEDURE — 99291 CRITICAL CARE FIRST HOUR: CPT | Mod: HCNC,,, | Performed by: PHYSICIAN ASSISTANT

## 2021-11-15 PROCEDURE — 99900035 HC TECH TIME PER 15 MIN (STAT): Mod: HCNC

## 2021-11-15 PROCEDURE — 37000008 HC ANESTHESIA 1ST 15 MINUTES: Mod: HCNC | Performed by: NEUROLOGICAL SURGERY

## 2021-11-15 PROCEDURE — 20000000 HC ICU ROOM: Mod: HCNC

## 2021-11-15 PROCEDURE — 25000003 PHARM REV CODE 250: Mod: HCNC | Performed by: NEUROLOGICAL SURGERY

## 2021-11-15 PROCEDURE — 61781 PR STEREOTACTIC COMP ASSIST PROC,CRANIAL,INTRADURAL: ICD-10-PCS | Mod: ,,, | Performed by: NEUROLOGICAL SURGERY

## 2021-11-15 PROCEDURE — 61512 CRNEC TREPH EXC MNGIOMA STTL: CPT | Mod: HCNC,,, | Performed by: NEUROLOGICAL SURGERY

## 2021-11-15 PROCEDURE — 88342 IMHCHEM/IMCYTCHM 1ST ANTB: CPT | Mod: HCNC | Performed by: STUDENT IN AN ORGANIZED HEALTH CARE EDUCATION/TRAINING PROGRAM

## 2021-11-15 PROCEDURE — 63600175 PHARM REV CODE 636 W HCPCS: Mod: HCNC | Performed by: INTERNAL MEDICINE

## 2021-11-15 PROCEDURE — 99231 PR SUBSEQUENT HOSPITAL CARE,LEVL I: ICD-10-PCS | Mod: 57,HCNC,, | Performed by: PHYSICIAN ASSISTANT

## 2021-11-15 PROCEDURE — 88341 IMHCHEM/IMCYTCHM EA ADD ANTB: CPT | Mod: HCNC | Performed by: STUDENT IN AN ORGANIZED HEALTH CARE EDUCATION/TRAINING PROGRAM

## 2021-11-15 PROCEDURE — 99232 PR SUBSEQUENT HOSPITAL CARE,LEVL II: ICD-10-PCS | Mod: HCNC,,, | Performed by: NURSE PRACTITIONER

## 2021-11-15 PROCEDURE — 63600175 PHARM REV CODE 636 W HCPCS: Mod: HCNC | Performed by: PHYSICIAN ASSISTANT

## 2021-11-15 PROCEDURE — 88305 TISSUE EXAM BY PATHOLOGIST: CPT | Mod: HCNC | Performed by: STUDENT IN AN ORGANIZED HEALTH CARE EDUCATION/TRAINING PROGRAM

## 2021-11-15 PROCEDURE — 25000003 PHARM REV CODE 250: Mod: HCNC | Performed by: PHYSICIAN ASSISTANT

## 2021-11-15 PROCEDURE — 63600175 PHARM REV CODE 636 W HCPCS: Mod: HCNC

## 2021-11-15 PROCEDURE — 36620 INSERTION CATHETER ARTERY: CPT | Mod: HCNC,59,, | Performed by: STUDENT IN AN ORGANIZED HEALTH CARE EDUCATION/TRAINING PROGRAM

## 2021-11-15 PROCEDURE — 27201037 HC PRESSURE MONITORING SET UP: Mod: HCNC

## 2021-11-15 PROCEDURE — 25000003 PHARM REV CODE 250: Mod: HCNC | Performed by: INTERNAL MEDICINE

## 2021-11-15 PROCEDURE — 88342 CHG IMMUNOCYTOCHEMISTRY: ICD-10-PCS | Mod: 26,HCNC,, | Performed by: STUDENT IN AN ORGANIZED HEALTH CARE EDUCATION/TRAINING PROGRAM

## 2021-11-15 PROCEDURE — 99291 PR CRITICAL CARE, E/M 30-74 MINUTES: ICD-10-PCS | Mod: HCNC,,, | Performed by: PHYSICIAN ASSISTANT

## 2021-11-15 PROCEDURE — 97535 SELF CARE MNGMENT TRAINING: CPT | Mod: HCNC

## 2021-11-15 PROCEDURE — 61512 PR EXCIS SUPRATENT MENINGIOMA: ICD-10-PCS | Mod: HCNC,,, | Performed by: NEUROLOGICAL SURGERY

## 2021-11-15 PROCEDURE — 99233 PR SUBSEQUENT HOSPITAL CARE,LEVL III: ICD-10-PCS | Mod: HCNC,,, | Performed by: PSYCHIATRY & NEUROLOGY

## 2021-11-15 PROCEDURE — 37000009 HC ANESTHESIA EA ADD 15 MINS: Mod: HCNC | Performed by: NEUROLOGICAL SURGERY

## 2021-11-15 PROCEDURE — 83605 ASSAY OF LACTIC ACID: CPT | Mod: HCNC | Performed by: NURSE PRACTITIONER

## 2021-11-15 PROCEDURE — 88305 TISSUE EXAM BY PATHOLOGIST: CPT | Mod: 26,HCNC,, | Performed by: STUDENT IN AN ORGANIZED HEALTH CARE EDUCATION/TRAINING PROGRAM

## 2021-11-15 PROCEDURE — 25000003 PHARM REV CODE 250: Mod: HCNC | Performed by: STUDENT IN AN ORGANIZED HEALTH CARE EDUCATION/TRAINING PROGRAM

## 2021-11-15 PROCEDURE — 80048 BASIC METABOLIC PNL TOTAL CA: CPT | Mod: HCNC | Performed by: PHYSICIAN ASSISTANT

## 2021-11-15 PROCEDURE — 82553 CREATINE MB FRACTION: CPT | Mod: HCNC | Performed by: NURSE PRACTITIONER

## 2021-11-15 PROCEDURE — D9220A PRA ANESTHESIA: ICD-10-PCS | Mod: HCNC,CRNA,, | Performed by: NURSE ANESTHETIST, CERTIFIED REGISTERED

## 2021-11-15 PROCEDURE — 85379 FIBRIN DEGRADATION QUANT: CPT | Mod: HCNC | Performed by: NURSE PRACTITIONER

## 2021-11-15 PROCEDURE — 36000713 HC OR TIME LEV V EA ADD 15 MIN: Mod: HCNC | Performed by: NEUROLOGICAL SURGERY

## 2021-11-15 PROCEDURE — D9220A PRA ANESTHESIA: Mod: HCNC,CRNA,, | Performed by: NURSE ANESTHETIST, CERTIFIED REGISTERED

## 2021-11-15 PROCEDURE — 85025 COMPLETE CBC W/AUTO DIFF WBC: CPT | Mod: HCNC | Performed by: NURSE PRACTITIONER

## 2021-11-15 PROCEDURE — 88307 TISSUE EXAM BY PATHOLOGIST: CPT | Mod: 26,HCNC,, | Performed by: STUDENT IN AN ORGANIZED HEALTH CARE EDUCATION/TRAINING PROGRAM

## 2021-11-15 PROCEDURE — 84484 ASSAY OF TROPONIN QUANT: CPT | Mod: HCNC | Performed by: NURSE PRACTITIONER

## 2021-11-15 PROCEDURE — 63600175 PHARM REV CODE 636 W HCPCS: Mod: HCNC | Performed by: NURSE ANESTHETIST, CERTIFIED REGISTERED

## 2021-11-15 PROCEDURE — 80053 COMPREHEN METABOLIC PANEL: CPT | Mod: HCNC | Performed by: NURSE PRACTITIONER

## 2021-11-15 PROCEDURE — 99232 SBSQ HOSP IP/OBS MODERATE 35: CPT | Mod: HCNC,,, | Performed by: NURSE PRACTITIONER

## 2021-11-15 PROCEDURE — D9220A PRA ANESTHESIA: ICD-10-PCS | Mod: HCNC,ANES,, | Performed by: STUDENT IN AN ORGANIZED HEALTH CARE EDUCATION/TRAINING PROGRAM

## 2021-11-15 PROCEDURE — 36415 COLL VENOUS BLD VENIPUNCTURE: CPT | Mod: HCNC | Performed by: NURSE PRACTITIONER

## 2021-11-15 PROCEDURE — 94640 AIRWAY INHALATION TREATMENT: CPT | Mod: HCNC

## 2021-11-15 PROCEDURE — 25500020 PHARM REV CODE 255: Mod: HCNC | Performed by: PSYCHIATRY & NEUROLOGY

## 2021-11-15 PROCEDURE — 82550 ASSAY OF CK (CPK): CPT | Mod: HCNC | Performed by: NURSE PRACTITIONER

## 2021-11-15 PROCEDURE — 27800903 OPTIME MED/SURG SUP & DEVICES OTHER IMPLANTS: Mod: HCNC | Performed by: NEUROLOGICAL SURGERY

## 2021-11-15 PROCEDURE — 92507 TX SP LANG VOICE COMM INDIV: CPT | Mod: HCNC

## 2021-11-15 PROCEDURE — 36620 ARTERIAL: ICD-10-PCS | Mod: HCNC,59,, | Performed by: STUDENT IN AN ORGANIZED HEALTH CARE EDUCATION/TRAINING PROGRAM

## 2021-11-15 PROCEDURE — 99231 SBSQ HOSP IP/OBS SF/LOW 25: CPT | Mod: 57,HCNC,, | Performed by: PHYSICIAN ASSISTANT

## 2021-11-15 PROCEDURE — C1713 ANCHOR/SCREW BN/BN,TIS/BN: HCPCS | Mod: HCNC | Performed by: NEUROLOGICAL SURGERY

## 2021-11-15 PROCEDURE — 88342 IMHCHEM/IMCYTCHM 1ST ANTB: CPT | Mod: 26,HCNC,, | Performed by: STUDENT IN AN ORGANIZED HEALTH CARE EDUCATION/TRAINING PROGRAM

## 2021-11-15 PROCEDURE — 88305 TISSUE EXAM BY PATHOLOGIST: ICD-10-PCS | Mod: 26,HCNC,, | Performed by: STUDENT IN AN ORGANIZED HEALTH CARE EDUCATION/TRAINING PROGRAM

## 2021-11-15 PROCEDURE — 63600175 PHARM REV CODE 636 W HCPCS: Mod: HCNC | Performed by: NEUROLOGICAL SURGERY

## 2021-11-15 PROCEDURE — 25000242 PHARM REV CODE 250 ALT 637 W/ HCPCS: Mod: HCNC | Performed by: PHYSICIAN ASSISTANT

## 2021-11-15 PROCEDURE — 25000003 PHARM REV CODE 250: Mod: HCNC | Performed by: NURSE ANESTHETIST, CERTIFIED REGISTERED

## 2021-11-15 PROCEDURE — D9220A PRA ANESTHESIA: Mod: HCNC,ANES,, | Performed by: STUDENT IN AN ORGANIZED HEALTH CARE EDUCATION/TRAINING PROGRAM

## 2021-11-15 PROCEDURE — 88341 PR IHC OR ICC EACH ADD'L SINGLE ANTIBODY  STAINPR: ICD-10-PCS | Mod: 26,HCNC,, | Performed by: STUDENT IN AN ORGANIZED HEALTH CARE EDUCATION/TRAINING PROGRAM

## 2021-11-15 PROCEDURE — 82962 GLUCOSE BLOOD TEST: CPT | Mod: HCNC | Performed by: NEUROLOGICAL SURGERY

## 2021-11-15 PROCEDURE — 87389 HIV-1 AG W/HIV-1&-2 AB AG IA: CPT | Mod: HCNC | Performed by: INTERNAL MEDICINE

## 2021-11-15 PROCEDURE — 63600175 PHARM REV CODE 636 W HCPCS: Mod: HCNC | Performed by: NURSE PRACTITIONER

## 2021-11-15 PROCEDURE — 99233 SBSQ HOSP IP/OBS HIGH 50: CPT | Mod: HCNC,,, | Performed by: PSYCHIATRY & NEUROLOGY

## 2021-11-15 PROCEDURE — 36000712 HC OR TIME LEV V 1ST 15 MIN: Mod: HCNC | Performed by: NEUROLOGICAL SURGERY

## 2021-11-15 PROCEDURE — 94761 N-INVAS EAR/PLS OXIMETRY MLT: CPT | Mod: HCNC

## 2021-11-15 PROCEDURE — 27201423 OPTIME MED/SURG SUP & DEVICES STERILE SUPPLY: Mod: HCNC | Performed by: NEUROLOGICAL SURGERY

## 2021-11-15 PROCEDURE — 85025 COMPLETE CBC W/AUTO DIFF WBC: CPT | Mod: 91,HCNC | Performed by: NURSE PRACTITIONER

## 2021-11-15 PROCEDURE — 88307 TISSUE EXAM BY PATHOLOGIST: CPT | Mod: HCNC | Performed by: STUDENT IN AN ORGANIZED HEALTH CARE EDUCATION/TRAINING PROGRAM

## 2021-11-15 PROCEDURE — 61781 SCAN PROC CRANIAL INTRA: CPT | Mod: ,,, | Performed by: NEUROLOGICAL SURGERY

## 2021-11-15 PROCEDURE — 88307 PR  SURG PATH,LEVEL V: ICD-10-PCS | Mod: 26,HCNC,, | Performed by: STUDENT IN AN ORGANIZED HEALTH CARE EDUCATION/TRAINING PROGRAM

## 2021-11-15 DEVICE — DURAMATRIX ONLAY 3X3: Type: IMPLANTABLE DEVICE | Site: CRANIAL | Status: FUNCTIONAL

## 2021-11-15 DEVICE — SCREW UN3 AXS SD 1.5X4MM: Type: IMPLANTABLE DEVICE | Site: CRANIAL | Status: FUNCTIONAL

## 2021-11-15 DEVICE — PLATE BONE BUR HOLE COVER 10MM: Type: IMPLANTABLE DEVICE | Site: CRANIAL | Status: FUNCTIONAL

## 2021-11-15 RX ORDER — FENTANYL CITRATE 50 UG/ML
12.5 INJECTION, SOLUTION INTRAMUSCULAR; INTRAVENOUS ONCE
Status: COMPLETED | OUTPATIENT
Start: 2021-11-15 | End: 2021-11-15

## 2021-11-15 RX ORDER — KETAMINE HCL IN 0.9 % NACL 50 MG/5 ML
SYRINGE (ML) INTRAVENOUS
Status: DISCONTINUED | OUTPATIENT
Start: 2021-11-15 | End: 2021-11-15

## 2021-11-15 RX ORDER — LIDOCAINE HYDROCHLORIDE 20 MG/ML
10 SOLUTION OROPHARYNGEAL ONCE
Status: DISCONTINUED | OUTPATIENT
Start: 2021-11-15 | End: 2021-11-17

## 2021-11-15 RX ORDER — LIDOCAINE HYDROCHLORIDE AND EPINEPHRINE 10; 10 MG/ML; UG/ML
INJECTION, SOLUTION INFILTRATION; PERINEURAL
Status: DISCONTINUED | OUTPATIENT
Start: 2021-11-15 | End: 2021-11-15 | Stop reason: HOSPADM

## 2021-11-15 RX ORDER — DEXAMETHASONE SODIUM PHOSPHATE 4 MG/ML
INJECTION, SOLUTION INTRA-ARTICULAR; INTRALESIONAL; INTRAMUSCULAR; INTRAVENOUS; SOFT TISSUE
Status: DISCONTINUED | OUTPATIENT
Start: 2021-11-15 | End: 2021-11-15

## 2021-11-15 RX ORDER — ACETAMINOPHEN 325 MG/1
650 TABLET ORAL EVERY 6 HOURS PRN
Status: DISCONTINUED | OUTPATIENT
Start: 2021-11-15 | End: 2021-11-24 | Stop reason: HOSPADM

## 2021-11-15 RX ORDER — FENTANYL CITRATE 50 UG/ML
INJECTION, SOLUTION INTRAMUSCULAR; INTRAVENOUS
Status: DISCONTINUED | OUTPATIENT
Start: 2021-11-15 | End: 2021-11-15

## 2021-11-15 RX ORDER — LEVETIRACETAM 500 MG/5ML
500 INJECTION, SOLUTION, CONCENTRATE INTRAVENOUS EVERY 12 HOURS
Status: DISCONTINUED | OUTPATIENT
Start: 2021-11-15 | End: 2021-11-16

## 2021-11-15 RX ORDER — PHENYLEPHRINE HYDROCHLORIDE 10 MG/ML
INJECTION INTRAVENOUS
Status: DISCONTINUED | OUTPATIENT
Start: 2021-11-15 | End: 2021-11-15

## 2021-11-15 RX ORDER — FENTANYL CITRATE 50 UG/ML
INJECTION, SOLUTION INTRAMUSCULAR; INTRAVENOUS
Status: COMPLETED
Start: 2021-11-15 | End: 2021-11-15

## 2021-11-15 RX ORDER — PHENYLEPHRINE HYDROCHLORIDE 10 MG/ML
200 INJECTION INTRAVENOUS ONCE
Status: COMPLETED | OUTPATIENT
Start: 2021-11-15 | End: 2021-11-15

## 2021-11-15 RX ORDER — ONDANSETRON 2 MG/ML
INJECTION INTRAMUSCULAR; INTRAVENOUS
Status: DISCONTINUED | OUTPATIENT
Start: 2021-11-15 | End: 2021-11-15

## 2021-11-15 RX ORDER — DEXAMETHASONE SODIUM PHOSPHATE 4 MG/ML
4 INJECTION, SOLUTION INTRA-ARTICULAR; INTRALESIONAL; INTRAMUSCULAR; INTRAVENOUS; SOFT TISSUE EVERY 6 HOURS
Status: DISCONTINUED | OUTPATIENT
Start: 2021-11-16 | End: 2021-11-23

## 2021-11-15 RX ORDER — PROPOFOL 10 MG/ML
VIAL (ML) INTRAVENOUS CONTINUOUS PRN
Status: DISCONTINUED | OUTPATIENT
Start: 2021-11-15 | End: 2021-11-15

## 2021-11-15 RX ORDER — LIDOCAINE HYDROCHLORIDE 20 MG/ML
INJECTION, SOLUTION EPIDURAL; INFILTRATION; INTRACAUDAL; PERINEURAL
Status: DISCONTINUED | OUTPATIENT
Start: 2021-11-15 | End: 2021-11-15

## 2021-11-15 RX ORDER — ALUMINUM HYDROXIDE, MAGNESIUM HYDROXIDE, AND SIMETHICONE 2400; 240; 2400 MG/30ML; MG/30ML; MG/30ML
30 SUSPENSION ORAL ONCE
Status: DISCONTINUED | OUTPATIENT
Start: 2021-11-15 | End: 2021-11-17

## 2021-11-15 RX ORDER — BACITRACIN ZINC 500 UNIT/G
OINTMENT (GRAM) TOPICAL
Status: DISCONTINUED | OUTPATIENT
Start: 2021-11-15 | End: 2021-11-15 | Stop reason: HOSPADM

## 2021-11-15 RX ORDER — DICYCLOMINE HYDROCHLORIDE 10 MG/5ML
20 SOLUTION ORAL ONCE
Status: DISCONTINUED | OUTPATIENT
Start: 2021-11-15 | End: 2021-11-17

## 2021-11-15 RX ORDER — OXYCODONE HYDROCHLORIDE 5 MG/1
5 TABLET ORAL EVERY 4 HOURS PRN
Status: DISCONTINUED | OUTPATIENT
Start: 2021-11-15 | End: 2021-11-24 | Stop reason: HOSPADM

## 2021-11-15 RX ORDER — PROPOFOL 10 MG/ML
VIAL (ML) INTRAVENOUS
Status: DISCONTINUED | OUTPATIENT
Start: 2021-11-15 | End: 2021-11-15

## 2021-11-15 RX ORDER — ACETAMINOPHEN 10 MG/ML
INJECTION, SOLUTION INTRAVENOUS
Status: DISCONTINUED | OUTPATIENT
Start: 2021-11-15 | End: 2021-11-15

## 2021-11-15 RX ORDER — LEVETIRACETAM 500 MG/5ML
INJECTION, SOLUTION, CONCENTRATE INTRAVENOUS
Status: DISCONTINUED | OUTPATIENT
Start: 2021-11-15 | End: 2021-11-15

## 2021-11-15 RX ORDER — PHENYLEPHRINE HCL IN 0.9% NACL 1 MG/10 ML
SYRINGE (ML) INTRAVENOUS
Status: DISPENSED
Start: 2021-11-15 | End: 2021-11-16

## 2021-11-15 RX ORDER — GENTAMICIN SULFATE 40 MG/ML
INJECTION, SOLUTION INTRAMUSCULAR; INTRAVENOUS
Status: DISCONTINUED | OUTPATIENT
Start: 2021-11-15 | End: 2021-11-15 | Stop reason: HOSPADM

## 2021-11-15 RX ORDER — NEOSTIGMINE METHYLSULFATE 0.5 MG/ML
INJECTION, SOLUTION INTRAVENOUS
Status: DISCONTINUED | OUTPATIENT
Start: 2021-11-15 | End: 2021-11-15

## 2021-11-15 RX ORDER — ROCURONIUM BROMIDE 10 MG/ML
INJECTION, SOLUTION INTRAVENOUS
Status: DISCONTINUED | OUTPATIENT
Start: 2021-11-15 | End: 2021-11-15

## 2021-11-15 RX ORDER — ALBUTEROL SULFATE 2.5 MG/.5ML
2.5 SOLUTION RESPIRATORY (INHALATION) EVERY 4 HOURS PRN
Status: DISCONTINUED | OUTPATIENT
Start: 2021-11-15 | End: 2021-11-24 | Stop reason: HOSPADM

## 2021-11-15 RX ADMIN — DEXAMETHASONE SODIUM PHOSPHATE 4 MG: 4 INJECTION INTRA-ARTICULAR; INTRALESIONAL; INTRAMUSCULAR; INTRAVENOUS; SOFT TISSUE at 11:11

## 2021-11-15 RX ADMIN — PHENYLEPHRINE HYDROCHLORIDE 100 MCG: 10 INJECTION INTRAVENOUS at 02:11

## 2021-11-15 RX ADMIN — INSULIN ASPART 4 UNITS: 100 INJECTION, SOLUTION INTRAVENOUS; SUBCUTANEOUS at 05:11

## 2021-11-15 RX ADMIN — PHENYLEPHRINE HYDROCHLORIDE 100 MCG: 10 INJECTION INTRAVENOUS at 04:11

## 2021-11-15 RX ADMIN — ONDANSETRON 4 MG: 2 INJECTION INTRAMUSCULAR; INTRAVENOUS at 05:11

## 2021-11-15 RX ADMIN — ONDANSETRON 8 MG: 2 INJECTION INTRAMUSCULAR; INTRAVENOUS at 07:11

## 2021-11-15 RX ADMIN — PROPOFOL 100 MG: 10 INJECTION, EMULSION INTRAVENOUS at 02:11

## 2021-11-15 RX ADMIN — LEVETIRACETAM 500 MG: 500 INJECTION, SOLUTION INTRAVENOUS at 10:11

## 2021-11-15 RX ADMIN — SODIUM CHLORIDE 0.2 MCG/KG/MIN: 9 INJECTION, SOLUTION INTRAVENOUS at 02:11

## 2021-11-15 RX ADMIN — ALBUTEROL SULFATE 2.5 MG: 2.5 SOLUTION RESPIRATORY (INHALATION) at 10:11

## 2021-11-15 RX ADMIN — PHENYLEPHRINE HYDROCHLORIDE 200 MCG: 10 INJECTION INTRAVENOUS at 09:11

## 2021-11-15 RX ADMIN — DEXAMETHASONE 4 MG: 4 TABLET ORAL at 05:11

## 2021-11-15 RX ADMIN — PROPOFOL 100 MG: 10 INJECTION, EMULSION INTRAVENOUS at 01:11

## 2021-11-15 RX ADMIN — FENTANYL CITRATE 12.5 MCG: 50 INJECTION INTRAMUSCULAR; INTRAVENOUS at 08:11

## 2021-11-15 RX ADMIN — ACETAMINOPHEN 1000 MG: 10 INJECTION INTRAVENOUS at 02:11

## 2021-11-15 RX ADMIN — GLYCOPYRROLATE 0.5 MG: 0.2 INJECTION, SOLUTION INTRAMUSCULAR; INTRAVITREAL at 05:11

## 2021-11-15 RX ADMIN — REMIFENTANIL HYDROCHLORIDE 0.1 MCG/KG/MIN: 1 INJECTION, POWDER, LYOPHILIZED, FOR SOLUTION INTRAVENOUS at 01:11

## 2021-11-15 RX ADMIN — PROPOFOL 100 MCG/KG/MIN: 10 INJECTION, EMULSION INTRAVENOUS at 01:11

## 2021-11-15 RX ADMIN — FENTANYL CITRATE 150 MCG: 50 INJECTION, SOLUTION INTRAMUSCULAR; INTRAVENOUS at 01:11

## 2021-11-15 RX ADMIN — PHENYLEPHRINE HYDROCHLORIDE 100 MCG: 10 INJECTION INTRAVENOUS at 03:11

## 2021-11-15 RX ADMIN — INSULIN DETEMIR 20 UNITS: 100 INJECTION, SOLUTION SUBCUTANEOUS at 08:11

## 2021-11-15 RX ADMIN — IOHEXOL 100 ML: 350 INJECTION, SOLUTION INTRAVENOUS at 09:11

## 2021-11-15 RX ADMIN — FENTANYL CITRATE 25 MCG: 50 INJECTION, SOLUTION INTRAMUSCULAR; INTRAVENOUS at 05:11

## 2021-11-15 RX ADMIN — MEROPENEM 2 G: 1 INJECTION INTRAVENOUS at 02:11

## 2021-11-15 RX ADMIN — ROCURONIUM BROMIDE 10 MG: 10 INJECTION, SOLUTION INTRAVENOUS at 04:11

## 2021-11-15 RX ADMIN — SODIUM CHLORIDE, SODIUM GLUCONATE, SODIUM ACETATE, POTASSIUM CHLORIDE, MAGNESIUM CHLORIDE, SODIUM PHOSPHATE, DIBASIC, AND POTASSIUM PHOSPHATE: .53; .5; .37; .037; .03; .012; .00082 INJECTION, SOLUTION INTRAVENOUS at 02:11

## 2021-11-15 RX ADMIN — DEXAMETHASONE SODIUM PHOSPHATE 12 MG: 4 INJECTION, SOLUTION INTRAMUSCULAR; INTRAVENOUS at 01:11

## 2021-11-15 RX ADMIN — CARVEDILOL 6.25 MG: 6.25 TABLET, FILM COATED ORAL at 12:11

## 2021-11-15 RX ADMIN — NEOSTIGMINE METHYLSULFATE 4 MG: 0.5 INJECTION INTRAVENOUS at 05:11

## 2021-11-15 RX ADMIN — LIDOCAINE HYDROCHLORIDE 100 MG: 20 INJECTION, SOLUTION EPIDURAL; INFILTRATION; INTRACAUDAL; PERINEURAL at 01:11

## 2021-11-15 RX ADMIN — Medication 10 MG: at 03:11

## 2021-11-15 RX ADMIN — CEFTRIAXONE SODIUM 2 G: 1 INJECTION, SOLUTION INTRAVENOUS at 02:11

## 2021-11-15 RX ADMIN — INSULIN ASPART 2 UNITS: 100 INJECTION, SOLUTION INTRAVENOUS; SUBCUTANEOUS at 12:11

## 2021-11-15 RX ADMIN — MEROPENEM 2 G: 1 INJECTION INTRAVENOUS at 07:11

## 2021-11-15 RX ADMIN — ROCURONIUM BROMIDE 50 MG: 10 INJECTION, SOLUTION INTRAVENOUS at 01:11

## 2021-11-15 RX ADMIN — SODIUM CHLORIDE 50 ML/HR: 0.9 INJECTION, SOLUTION INTRAVENOUS at 10:11

## 2021-11-15 RX ADMIN — LEVETIRACETAM 1000 MG: 100 INJECTION, SOLUTION, CONCENTRATE INTRAVENOUS at 02:11

## 2021-11-15 RX ADMIN — Medication 10 MG: at 04:11

## 2021-11-15 RX ADMIN — SODIUM CHLORIDE: 0.9 INJECTION, SOLUTION INTRAVENOUS at 01:11

## 2021-11-15 RX ADMIN — Medication 30 MG: at 01:11

## 2021-11-15 RX ADMIN — ROCURONIUM BROMIDE 10 MG: 10 INJECTION, SOLUTION INTRAVENOUS at 03:11

## 2021-11-15 RX ADMIN — FENTANYL CITRATE 12.5 MCG: 50 INJECTION, SOLUTION INTRAMUSCULAR; INTRAVENOUS at 09:11

## 2021-11-15 RX ADMIN — LEVETIRACETAM 500 MG: 500 TABLET, FILM COATED ORAL at 12:11

## 2021-11-15 RX ADMIN — FENTANYL CITRATE 12.5 MCG: 50 INJECTION, SOLUTION INTRAMUSCULAR; INTRAVENOUS at 08:11

## 2021-11-15 RX ADMIN — HYDRALAZINE HYDROCHLORIDE 10 MG: 20 INJECTION, SOLUTION INTRAMUSCULAR; INTRAVENOUS at 05:11

## 2021-11-15 RX ADMIN — HYDRALAZINE HYDROCHLORIDE 10 MG: 20 INJECTION, SOLUTION INTRAMUSCULAR; INTRAVENOUS at 04:11

## 2021-11-16 LAB
ALBUMIN SERPL BCP-MCNC: 1.8 G/DL (ref 3.5–5.2)
ALP SERPL-CCNC: 64 U/L (ref 55–135)
ALT SERPL W/O P-5'-P-CCNC: 18 U/L (ref 10–44)
ANION GAP SERPL CALC-SCNC: 6 MMOL/L (ref 8–16)
ANION GAP SERPL CALC-SCNC: 9 MMOL/L (ref 8–16)
ANISOCYTOSIS BLD QL SMEAR: SLIGHT
ASCENDING AORTA: 3.01 CM
AST SERPL-CCNC: 15 U/L (ref 10–40)
AV INDEX (PROSTH): 1.06
AV MEAN GRADIENT: 3 MMHG
AV PEAK GRADIENT: 6 MMHG
AV VALVE AREA: 3.13 CM2
AV VELOCITY RATIO: 0.85
BASOPHILS # BLD AUTO: 0.07 K/UL (ref 0–0.2)
BASOPHILS NFR BLD: 0.2 % (ref 0–1.9)
BILIRUB SERPL-MCNC: 0.8 MG/DL (ref 0.1–1)
BSA FOR ECHO PROCEDURE: 1.86 M2
BUN SERPL-MCNC: 16 MG/DL (ref 8–23)
BUN SERPL-MCNC: 18 MG/DL (ref 8–23)
BURR CELLS BLD QL SMEAR: ABNORMAL
CALCIUM SERPL-MCNC: 8.2 MG/DL (ref 8.7–10.5)
CALCIUM SERPL-MCNC: 8.3 MG/DL (ref 8.7–10.5)
CHLORIDE SERPL-SCNC: 109 MMOL/L (ref 95–110)
CHLORIDE SERPL-SCNC: 110 MMOL/L (ref 95–110)
CO2 SERPL-SCNC: 21 MMOL/L (ref 23–29)
CO2 SERPL-SCNC: 23 MMOL/L (ref 23–29)
CREAT SERPL-MCNC: 0.6 MG/DL (ref 0.5–1.4)
CREAT SERPL-MCNC: 0.7 MG/DL (ref 0.5–1.4)
CV ECHO LV RWT: 0.47 CM
DIFFERENTIAL METHOD: ABNORMAL
DOP CALC AO PEAK VEL: 1.18 M/S
DOP CALC AO VTI: 15.98 CM
DOP CALC LVOT AREA: 3 CM2
DOP CALC LVOT DIAMETER: 1.94 CM
DOP CALC LVOT PEAK VEL: 1 M/S
DOP CALC LVOT STROKE VOLUME: 50.08 CM3
DOP CALCLVOT PEAK VEL VTI: 16.95 CM
E WAVE DECELERATION TIME: 312.85 MSEC
E/A RATIO: 0.61
E/E' RATIO: 9.83 M/S
ECHO LV POSTERIOR WALL: 0.69 CM (ref 0.6–1.1)
EJECTION FRACTION: 60 %
EOSINOPHIL # BLD AUTO: 0 K/UL (ref 0–0.5)
EOSINOPHIL NFR BLD: 0 % (ref 0–8)
ERYTHROCYTE [DISTWIDTH] IN BLOOD BY AUTOMATED COUNT: 14.6 % (ref 11.5–14.5)
EST. GFR  (AFRICAN AMERICAN): >60 ML/MIN/1.73 M^2
EST. GFR  (AFRICAN AMERICAN): >60 ML/MIN/1.73 M^2
EST. GFR  (NON AFRICAN AMERICAN): >60 ML/MIN/1.73 M^2
EST. GFR  (NON AFRICAN AMERICAN): >60 ML/MIN/1.73 M^2
FRACTIONAL SHORTENING: 25 % (ref 28–44)
GLUCOSE SERPL-MCNC: 224 MG/DL (ref 70–110)
GLUCOSE SERPL-MCNC: 244 MG/DL (ref 70–110)
HCT VFR BLD AUTO: 40.3 % (ref 37–48.5)
HGB BLD-MCNC: 12.9 G/DL (ref 12–16)
IMM GRANULOCYTES # BLD AUTO: 0.6 K/UL (ref 0–0.04)
IMM GRANULOCYTES NFR BLD AUTO: 1.8 % (ref 0–0.5)
INTERVENTRICULAR SEPTUM: 0.85 CM (ref 0.6–1.1)
LA MAJOR: 6.69 CM
LA MINOR: 6.13 CM
LA WIDTH: 3.2 CM
LEFT ATRIUM SIZE: 3 CM
LEFT ATRIUM VOLUME INDEX: 28.8 ML/M2
LEFT ATRIUM VOLUME: 52.21 CM3
LEFT INTERNAL DIMENSION IN SYSTOLE: 2.2 CM (ref 2.1–4)
LEFT VENTRICLE DIASTOLIC VOLUME INDEX: 18.41 ML/M2
LEFT VENTRICLE DIASTOLIC VOLUME: 33.33 ML
LEFT VENTRICLE MASS INDEX: 30 G/M2
LEFT VENTRICLE SYSTOLIC VOLUME INDEX: 8.9 ML/M2
LEFT VENTRICLE SYSTOLIC VOLUME: 16.11 ML
LEFT VENTRICULAR INTERNAL DIMENSION IN DIASTOLE: 2.94 CM (ref 3.5–6)
LEFT VENTRICULAR MASS: 54.27 G
LV LATERAL E/E' RATIO: 9.83 M/S
LV SEPTAL E/E' RATIO: 9.83 M/S
LYMPHOCYTES # BLD AUTO: 0.6 K/UL (ref 1–4.8)
LYMPHOCYTES NFR BLD: 1.7 % (ref 18–48)
MAGNESIUM SERPL-MCNC: 2 MG/DL (ref 1.6–2.6)
MCH RBC QN AUTO: 27.5 PG (ref 27–31)
MCHC RBC AUTO-ENTMCNC: 32 G/DL (ref 32–36)
MCV RBC AUTO: 86 FL (ref 82–98)
MONOCYTES # BLD AUTO: 1.1 K/UL (ref 0.3–1)
MONOCYTES NFR BLD: 3.3 % (ref 4–15)
MV PEAK A VEL: 0.97 M/S
MV PEAK E VEL: 0.59 M/S
MV STENOSIS PRESSURE HALF TIME: 90.73 MS
MV VALVE AREA P 1/2 METHOD: 2.42 CM2
NEUTROPHILS # BLD AUTO: 31.2 K/UL (ref 1.8–7.7)
NEUTROPHILS NFR BLD: 93 % (ref 38–73)
NRBC BLD-RTO: 0 /100 WBC
OVALOCYTES BLD QL SMEAR: ABNORMAL
PHOSPHATE SERPL-MCNC: 3.3 MG/DL (ref 2.7–4.5)
PLATELET # BLD AUTO: 206 K/UL (ref 150–450)
PLATELET BLD QL SMEAR: ABNORMAL
PMV BLD AUTO: 10.7 FL (ref 9.2–12.9)
POCT GLUCOSE: 209 MG/DL (ref 70–110)
POCT GLUCOSE: 235 MG/DL (ref 70–110)
POCT GLUCOSE: 241 MG/DL (ref 70–110)
POCT GLUCOSE: 274 MG/DL (ref 70–110)
POCT GLUCOSE: 283 MG/DL (ref 70–110)
POIKILOCYTOSIS BLD QL SMEAR: SLIGHT
POLYCHROMASIA BLD QL SMEAR: ABNORMAL
POTASSIUM SERPL-SCNC: 4.1 MMOL/L (ref 3.5–5.1)
POTASSIUM SERPL-SCNC: 4.1 MMOL/L (ref 3.5–5.1)
PROT SERPL-MCNC: 4.6 G/DL (ref 6–8.4)
RA MAJOR: 4.88 CM
RA PRESSURE: 3 MMHG
RA WIDTH: 2.65 CM
RBC # BLD AUTO: 4.69 M/UL (ref 4–5.4)
RIGHT VENTRICULAR END-DIASTOLIC DIMENSION: 2.79 CM
SINUS: 3.03 CM
SODIUM SERPL-SCNC: 139 MMOL/L (ref 136–145)
SODIUM SERPL-SCNC: 139 MMOL/L (ref 136–145)
STJ: 2.93 CM
TDI LATERAL: 0.06 M/S
TDI SEPTAL: 0.06 M/S
TDI: 0.06 M/S
TRICUSPID ANNULAR PLANE SYSTOLIC EXCURSION: 2.02 CM
TROPONIN I SERPL DL<=0.01 NG/ML-MCNC: 0.05 NG/ML (ref 0–0.03)
TROPONIN I SERPL DL<=0.01 NG/ML-MCNC: 0.06 NG/ML (ref 0–0.03)
WBC # BLD AUTO: 33.54 K/UL (ref 3.9–12.7)

## 2021-11-16 PROCEDURE — 25500020 PHARM REV CODE 255: Mod: HCNC | Performed by: PSYCHIATRY & NEUROLOGY

## 2021-11-16 PROCEDURE — 99233 SBSQ HOSP IP/OBS HIGH 50: CPT | Mod: HCNC,,, | Performed by: INTERNAL MEDICINE

## 2021-11-16 PROCEDURE — 99222 PR INITIAL HOSPITAL CARE,LEVL II: ICD-10-PCS | Mod: HCNC,,, | Performed by: NURSE PRACTITIONER

## 2021-11-16 PROCEDURE — 63600175 PHARM REV CODE 636 W HCPCS: Mod: HCNC | Performed by: PHYSICIAN ASSISTANT

## 2021-11-16 PROCEDURE — 25000003 PHARM REV CODE 250: Mod: HCNC | Performed by: NURSE PRACTITIONER

## 2021-11-16 PROCEDURE — C9399 UNCLASSIFIED DRUGS OR BIOLOG: HCPCS | Mod: HCNC | Performed by: NURSE PRACTITIONER

## 2021-11-16 PROCEDURE — 85025 COMPLETE CBC W/AUTO DIFF WBC: CPT | Mod: HCNC | Performed by: NURSE PRACTITIONER

## 2021-11-16 PROCEDURE — 99291 PR CRITICAL CARE, E/M 30-74 MINUTES: ICD-10-PCS | Mod: HCNC,GC,, | Performed by: PSYCHIATRY & NEUROLOGY

## 2021-11-16 PROCEDURE — 99222 1ST HOSP IP/OBS MODERATE 55: CPT | Mod: HCNC,,, | Performed by: NURSE PRACTITIONER

## 2021-11-16 PROCEDURE — 99232 PR SUBSEQUENT HOSPITAL CARE,LEVL II: ICD-10-PCS | Mod: HCNC,,, | Performed by: NURSE PRACTITIONER

## 2021-11-16 PROCEDURE — 20000000 HC ICU ROOM: Mod: HCNC

## 2021-11-16 PROCEDURE — 84484 ASSAY OF TROPONIN QUANT: CPT | Mod: HCNC | Performed by: PHYSICIAN ASSISTANT

## 2021-11-16 PROCEDURE — 63600175 PHARM REV CODE 636 W HCPCS: Mod: HCNC | Performed by: PSYCHIATRY & NEUROLOGY

## 2021-11-16 PROCEDURE — 94761 N-INVAS EAR/PLS OXIMETRY MLT: CPT | Mod: HCNC

## 2021-11-16 PROCEDURE — 99233 PR SUBSEQUENT HOSPITAL CARE,LEVL III: ICD-10-PCS | Mod: HCNC,GC,, | Performed by: NEUROLOGICAL SURGERY

## 2021-11-16 PROCEDURE — A9585 GADOBUTROL INJECTION: HCPCS | Mod: HCNC | Performed by: PSYCHIATRY & NEUROLOGY

## 2021-11-16 PROCEDURE — 99233 PR SUBSEQUENT HOSPITAL CARE,LEVL III: ICD-10-PCS | Mod: HCNC,,, | Performed by: INTERNAL MEDICINE

## 2021-11-16 PROCEDURE — 92610 EVALUATE SWALLOWING FUNCTION: CPT | Mod: HCNC

## 2021-11-16 PROCEDURE — 25000003 PHARM REV CODE 250: Mod: HCNC | Performed by: INTERNAL MEDICINE

## 2021-11-16 PROCEDURE — 80053 COMPREHEN METABOLIC PANEL: CPT | Mod: HCNC | Performed by: PSYCHIATRY & NEUROLOGY

## 2021-11-16 PROCEDURE — 80048 BASIC METABOLIC PNL TOTAL CA: CPT | Mod: HCNC | Performed by: PHYSICIAN ASSISTANT

## 2021-11-16 PROCEDURE — 25000003 PHARM REV CODE 250: Mod: HCNC | Performed by: PHYSICIAN ASSISTANT

## 2021-11-16 PROCEDURE — 84100 ASSAY OF PHOSPHORUS: CPT | Mod: HCNC | Performed by: PSYCHIATRY & NEUROLOGY

## 2021-11-16 PROCEDURE — 25000003 PHARM REV CODE 250: Mod: HCNC | Performed by: PSYCHIATRY & NEUROLOGY

## 2021-11-16 PROCEDURE — 99233 SBSQ HOSP IP/OBS HIGH 50: CPT | Mod: HCNC,GC,, | Performed by: NEUROLOGICAL SURGERY

## 2021-11-16 PROCEDURE — 25000003 PHARM REV CODE 250: Mod: HCNC | Performed by: STUDENT IN AN ORGANIZED HEALTH CARE EDUCATION/TRAINING PROGRAM

## 2021-11-16 PROCEDURE — 99291 CRITICAL CARE FIRST HOUR: CPT | Mod: HCNC,GC,, | Performed by: PSYCHIATRY & NEUROLOGY

## 2021-11-16 PROCEDURE — 83735 ASSAY OF MAGNESIUM: CPT | Mod: HCNC | Performed by: PSYCHIATRY & NEUROLOGY

## 2021-11-16 PROCEDURE — 63600175 PHARM REV CODE 636 W HCPCS: Mod: HCNC | Performed by: INTERNAL MEDICINE

## 2021-11-16 PROCEDURE — 99232 SBSQ HOSP IP/OBS MODERATE 35: CPT | Mod: HCNC,,, | Performed by: NURSE PRACTITIONER

## 2021-11-16 RX ORDER — LEVETIRACETAM 500 MG/1
500 TABLET ORAL 2 TIMES DAILY
Status: COMPLETED | OUTPATIENT
Start: 2021-11-16 | End: 2021-11-21

## 2021-11-16 RX ORDER — HEPARIN SODIUM 5000 [USP'U]/ML
5000 INJECTION, SOLUTION INTRAVENOUS; SUBCUTANEOUS EVERY 8 HOURS
Status: DISCONTINUED | OUTPATIENT
Start: 2021-11-16 | End: 2021-11-18

## 2021-11-16 RX ORDER — GADOBUTROL 604.72 MG/ML
9 INJECTION INTRAVENOUS
Status: COMPLETED | OUTPATIENT
Start: 2021-11-16 | End: 2021-11-16

## 2021-11-16 RX ADMIN — INSULIN ASPART 4 UNITS: 100 INJECTION, SOLUTION INTRAVENOUS; SUBCUTANEOUS at 08:11

## 2021-11-16 RX ADMIN — INSULIN ASPART 4 UNITS: 100 INJECTION, SOLUTION INTRAVENOUS; SUBCUTANEOUS at 12:11

## 2021-11-16 RX ADMIN — MEROPENEM 2 G: 1 INJECTION INTRAVENOUS at 08:11

## 2021-11-16 RX ADMIN — LEVETIRACETAM 500 MG: 500 TABLET, FILM COATED ORAL at 09:11

## 2021-11-16 RX ADMIN — HEPARIN SODIUM 5000 UNITS: 5000 INJECTION INTRAVENOUS; SUBCUTANEOUS at 02:11

## 2021-11-16 RX ADMIN — INSULIN ASPART 3 UNITS: 100 INJECTION, SOLUTION INTRAVENOUS; SUBCUTANEOUS at 10:11

## 2021-11-16 RX ADMIN — ATORVASTATIN CALCIUM 40 MG: 20 TABLET, FILM COATED ORAL at 09:11

## 2021-11-16 RX ADMIN — PANTOPRAZOLE SODIUM 40 MG: 20 TABLET, DELAYED RELEASE ORAL at 09:11

## 2021-11-16 RX ADMIN — GADOBUTROL 9 ML: 604.72 INJECTION INTRAVENOUS at 04:11

## 2021-11-16 RX ADMIN — INSULIN DETEMIR 24 UNITS: 100 INJECTION, SOLUTION SUBCUTANEOUS at 11:11

## 2021-11-16 RX ADMIN — MEROPENEM 2 G: 1 INJECTION INTRAVENOUS at 01:11

## 2021-11-16 RX ADMIN — INSULIN ASPART 6 UNITS: 100 INJECTION, SOLUTION INTRAVENOUS; SUBCUTANEOUS at 05:11

## 2021-11-16 RX ADMIN — INSULIN ASPART 5 UNITS: 100 INJECTION, SOLUTION INTRAVENOUS; SUBCUTANEOUS at 05:11

## 2021-11-16 RX ADMIN — POLYETHYLENE GLYCOL 3350 17 G: 17 POWDER, FOR SOLUTION ORAL at 09:11

## 2021-11-16 RX ADMIN — LEVETIRACETAM 500 MG: 500 INJECTION, SOLUTION INTRAVENOUS at 09:11

## 2021-11-16 RX ADMIN — SENNOSIDES AND DOCUSATE SODIUM 1 TABLET: 50; 8.6 TABLET ORAL at 09:11

## 2021-11-16 RX ADMIN — DEXAMETHASONE SODIUM PHOSPHATE 4 MG: 4 INJECTION INTRA-ARTICULAR; INTRALESIONAL; INTRAMUSCULAR; INTRAVENOUS; SOFT TISSUE at 06:11

## 2021-11-16 RX ADMIN — HEPARIN SODIUM 5000 UNITS: 5000 INJECTION INTRAVENOUS; SUBCUTANEOUS at 09:11

## 2021-11-16 RX ADMIN — DEXAMETHASONE SODIUM PHOSPHATE 4 MG: 4 INJECTION INTRA-ARTICULAR; INTRALESIONAL; INTRAMUSCULAR; INTRAVENOUS; SOFT TISSUE at 12:11

## 2021-11-16 RX ADMIN — MEROPENEM 2 G: 1 INJECTION INTRAVENOUS at 03:11

## 2021-11-17 LAB
ALBUMIN SERPL BCP-MCNC: 1.8 G/DL (ref 3.5–5.2)
ALP SERPL-CCNC: 57 U/L (ref 55–135)
ALT SERPL W/O P-5'-P-CCNC: 15 U/L (ref 10–44)
ANION GAP SERPL CALC-SCNC: 8 MMOL/L (ref 8–16)
AST SERPL-CCNC: 13 U/L (ref 10–40)
BASOPHILS # BLD AUTO: 0.03 K/UL (ref 0–0.2)
BASOPHILS NFR BLD: 0.2 % (ref 0–1.9)
BILIRUB SERPL-MCNC: 0.8 MG/DL (ref 0.1–1)
BUN SERPL-MCNC: 24 MG/DL (ref 8–23)
CALCIUM SERPL-MCNC: 8.2 MG/DL (ref 8.7–10.5)
CHLORIDE SERPL-SCNC: 106 MMOL/L (ref 95–110)
CO2 SERPL-SCNC: 23 MMOL/L (ref 23–29)
CREAT SERPL-MCNC: 0.7 MG/DL (ref 0.5–1.4)
DIFFERENTIAL METHOD: ABNORMAL
EOSINOPHIL # BLD AUTO: 0 K/UL (ref 0–0.5)
EOSINOPHIL NFR BLD: 0 % (ref 0–8)
ERYTHROCYTE [DISTWIDTH] IN BLOOD BY AUTOMATED COUNT: 14.9 % (ref 11.5–14.5)
EST. GFR  (AFRICAN AMERICAN): >60 ML/MIN/1.73 M^2
EST. GFR  (NON AFRICAN AMERICAN): >60 ML/MIN/1.73 M^2
GLUCOSE SERPL-MCNC: 254 MG/DL (ref 70–110)
HCT VFR BLD AUTO: 33.4 % (ref 37–48.5)
HGB BLD-MCNC: 11 G/DL (ref 12–16)
IMM GRANULOCYTES # BLD AUTO: 0.2 K/UL (ref 0–0.04)
IMM GRANULOCYTES NFR BLD AUTO: 1.3 % (ref 0–0.5)
LYMPHOCYTES # BLD AUTO: 0.8 K/UL (ref 1–4.8)
LYMPHOCYTES NFR BLD: 5.4 % (ref 18–48)
MAGNESIUM SERPL-MCNC: 2.1 MG/DL (ref 1.6–2.6)
MCH RBC QN AUTO: 27.8 PG (ref 27–31)
MCHC RBC AUTO-ENTMCNC: 32.9 G/DL (ref 32–36)
MCV RBC AUTO: 84 FL (ref 82–98)
MONOCYTES # BLD AUTO: 0.8 K/UL (ref 0.3–1)
MONOCYTES NFR BLD: 5.2 % (ref 4–15)
NEUTROPHILS # BLD AUTO: 13.3 K/UL (ref 1.8–7.7)
NEUTROPHILS NFR BLD: 87.9 % (ref 38–73)
NRBC BLD-RTO: 0 /100 WBC
PHOSPHATE SERPL-MCNC: 2.1 MG/DL (ref 2.7–4.5)
PLATELET # BLD AUTO: 176 K/UL (ref 150–450)
PMV BLD AUTO: 11.6 FL (ref 9.2–12.9)
POCT GLUCOSE: 150 MG/DL (ref 70–110)
POCT GLUCOSE: 222 MG/DL (ref 70–110)
POCT GLUCOSE: 239 MG/DL (ref 70–110)
POCT GLUCOSE: 248 MG/DL (ref 70–110)
POCT GLUCOSE: 250 MG/DL (ref 70–110)
POCT GLUCOSE: 253 MG/DL (ref 70–110)
POCT GLUCOSE: <20 MG/DL (ref 70–110)
POTASSIUM SERPL-SCNC: 4.3 MMOL/L (ref 3.5–5.1)
PROT SERPL-MCNC: 4.1 G/DL (ref 6–8.4)
RBC # BLD AUTO: 3.96 M/UL (ref 4–5.4)
SODIUM SERPL-SCNC: 137 MMOL/L (ref 136–145)
STRONGYLOIDES ANTIBODY IGG: NEGATIVE
WBC # BLD AUTO: 15.14 K/UL (ref 3.9–12.7)

## 2021-11-17 PROCEDURE — 25000003 PHARM REV CODE 250: Mod: HCNC | Performed by: PHYSICIAN ASSISTANT

## 2021-11-17 PROCEDURE — 25000003 PHARM REV CODE 250: Mod: HCNC | Performed by: INTERNAL MEDICINE

## 2021-11-17 PROCEDURE — 99233 PR SUBSEQUENT HOSPITAL CARE,LEVL III: ICD-10-PCS | Mod: HCNC,,, | Performed by: PSYCHIATRY & NEUROLOGY

## 2021-11-17 PROCEDURE — 80053 COMPREHEN METABOLIC PANEL: CPT | Mod: HCNC | Performed by: PHYSICIAN ASSISTANT

## 2021-11-17 PROCEDURE — 84100 ASSAY OF PHOSPHORUS: CPT | Mod: HCNC | Performed by: PHYSICIAN ASSISTANT

## 2021-11-17 PROCEDURE — 25000003 PHARM REV CODE 250: Mod: HCNC | Performed by: NURSE PRACTITIONER

## 2021-11-17 PROCEDURE — 83735 ASSAY OF MAGNESIUM: CPT | Mod: HCNC | Performed by: PHYSICIAN ASSISTANT

## 2021-11-17 PROCEDURE — 99232 SBSQ HOSP IP/OBS MODERATE 35: CPT | Mod: HCNC,,, | Performed by: NURSE PRACTITIONER

## 2021-11-17 PROCEDURE — 99233 SBSQ HOSP IP/OBS HIGH 50: CPT | Mod: HCNC,,, | Performed by: PSYCHIATRY & NEUROLOGY

## 2021-11-17 PROCEDURE — 97535 SELF CARE MNGMENT TRAINING: CPT | Mod: HCNC

## 2021-11-17 PROCEDURE — 63600175 PHARM REV CODE 636 W HCPCS: Mod: HCNC | Performed by: INTERNAL MEDICINE

## 2021-11-17 PROCEDURE — 97116 GAIT TRAINING THERAPY: CPT | Mod: HCNC

## 2021-11-17 PROCEDURE — 99232 PR SUBSEQUENT HOSPITAL CARE,LEVL II: ICD-10-PCS | Mod: HCNC,,, | Performed by: NURSE PRACTITIONER

## 2021-11-17 PROCEDURE — 63600175 PHARM REV CODE 636 W HCPCS: Mod: HCNC | Performed by: PSYCHIATRY & NEUROLOGY

## 2021-11-17 PROCEDURE — 97164 PT RE-EVAL EST PLAN CARE: CPT | Mod: HCNC

## 2021-11-17 PROCEDURE — 25000003 PHARM REV CODE 250: Mod: HCNC | Performed by: STUDENT IN AN ORGANIZED HEALTH CARE EDUCATION/TRAINING PROGRAM

## 2021-11-17 PROCEDURE — 99233 PR SUBSEQUENT HOSPITAL CARE,LEVL III: ICD-10-PCS | Mod: HCNC,,, | Performed by: INTERNAL MEDICINE

## 2021-11-17 PROCEDURE — 63600175 PHARM REV CODE 636 W HCPCS: Mod: HCNC | Performed by: NURSE PRACTITIONER

## 2021-11-17 PROCEDURE — 63600175 PHARM REV CODE 636 W HCPCS: Mod: HCNC | Performed by: PHYSICIAN ASSISTANT

## 2021-11-17 PROCEDURE — 97168 OT RE-EVAL EST PLAN CARE: CPT | Mod: HCNC

## 2021-11-17 PROCEDURE — 97530 THERAPEUTIC ACTIVITIES: CPT | Mod: HCNC

## 2021-11-17 PROCEDURE — 99233 SBSQ HOSP IP/OBS HIGH 50: CPT | Mod: HCNC,GC,, | Performed by: NEUROLOGICAL SURGERY

## 2021-11-17 PROCEDURE — 99233 PR SUBSEQUENT HOSPITAL CARE,LEVL III: ICD-10-PCS | Mod: HCNC,GC,, | Performed by: NEUROLOGICAL SURGERY

## 2021-11-17 PROCEDURE — 92523 SPEECH SOUND LANG COMPREHEN: CPT | Mod: HCNC

## 2021-11-17 PROCEDURE — 99233 SBSQ HOSP IP/OBS HIGH 50: CPT | Mod: HCNC,,, | Performed by: INTERNAL MEDICINE

## 2021-11-17 PROCEDURE — 20000000 HC ICU ROOM: Mod: HCNC

## 2021-11-17 PROCEDURE — 25000003 PHARM REV CODE 250: Mod: HCNC | Performed by: PSYCHIATRY & NEUROLOGY

## 2021-11-17 PROCEDURE — 85025 COMPLETE CBC W/AUTO DIFF WBC: CPT | Mod: HCNC | Performed by: NURSE PRACTITIONER

## 2021-11-17 RX ORDER — INSULIN ASPART 100 [IU]/ML
10-16 INJECTION, SOLUTION INTRAVENOUS; SUBCUTANEOUS
Status: DISCONTINUED | OUTPATIENT
Start: 2021-11-17 | End: 2021-11-18

## 2021-11-17 RX ORDER — INSULIN ASPART 100 [IU]/ML
8-12 INJECTION, SOLUTION INTRAVENOUS; SUBCUTANEOUS ONCE
Status: COMPLETED | OUTPATIENT
Start: 2021-11-17 | End: 2021-11-17

## 2021-11-17 RX ORDER — SODIUM,POTASSIUM PHOSPHATES 280-250MG
2 POWDER IN PACKET (EA) ORAL
Status: DISCONTINUED | OUTPATIENT
Start: 2021-11-17 | End: 2021-11-24

## 2021-11-17 RX ORDER — LANOLIN ALCOHOL/MO/W.PET/CERES
800 CREAM (GRAM) TOPICAL
Status: DISCONTINUED | OUTPATIENT
Start: 2021-11-17 | End: 2021-11-24

## 2021-11-17 RX ORDER — INSULIN ASPART 100 [IU]/ML
8-12 INJECTION, SOLUTION INTRAVENOUS; SUBCUTANEOUS
Status: DISCONTINUED | OUTPATIENT
Start: 2021-11-17 | End: 2021-11-17

## 2021-11-17 RX ADMIN — DEXAMETHASONE SODIUM PHOSPHATE 4 MG: 4 INJECTION INTRA-ARTICULAR; INTRALESIONAL; INTRAMUSCULAR; INTRAVENOUS; SOFT TISSUE at 11:11

## 2021-11-17 RX ADMIN — INSULIN ASPART 4 UNITS: 100 INJECTION, SOLUTION INTRAVENOUS; SUBCUTANEOUS at 12:11

## 2021-11-17 RX ADMIN — POLYETHYLENE GLYCOL 3350 17 G: 17 POWDER, FOR SOLUTION ORAL at 09:11

## 2021-11-17 RX ADMIN — MEROPENEM 2 G: 1 INJECTION INTRAVENOUS at 07:11

## 2021-11-17 RX ADMIN — ATORVASTATIN CALCIUM 40 MG: 20 TABLET, FILM COATED ORAL at 09:11

## 2021-11-17 RX ADMIN — DEXAMETHASONE SODIUM PHOSPHATE 4 MG: 4 INJECTION INTRA-ARTICULAR; INTRALESIONAL; INTRAMUSCULAR; INTRAVENOUS; SOFT TISSUE at 06:11

## 2021-11-17 RX ADMIN — INSULIN ASPART 12 UNITS: 100 INJECTION, SOLUTION INTRAVENOUS; SUBCUTANEOUS at 12:11

## 2021-11-17 RX ADMIN — DEXAMETHASONE SODIUM PHOSPHATE 4 MG: 4 INJECTION INTRA-ARTICULAR; INTRALESIONAL; INTRAMUSCULAR; INTRAVENOUS; SOFT TISSUE at 05:11

## 2021-11-17 RX ADMIN — INSULIN ASPART 2 UNITS: 100 INJECTION, SOLUTION INTRAVENOUS; SUBCUTANEOUS at 10:11

## 2021-11-17 RX ADMIN — PANTOPRAZOLE SODIUM 40 MG: 20 TABLET, DELAYED RELEASE ORAL at 09:11

## 2021-11-17 RX ADMIN — SENNOSIDES AND DOCUSATE SODIUM 1 TABLET: 50; 8.6 TABLET ORAL at 09:11

## 2021-11-17 RX ADMIN — HEPARIN SODIUM 5000 UNITS: 5000 INJECTION INTRAVENOUS; SUBCUTANEOUS at 10:11

## 2021-11-17 RX ADMIN — HEPARIN SODIUM 5000 UNITS: 5000 INJECTION INTRAVENOUS; SUBCUTANEOUS at 06:11

## 2021-11-17 RX ADMIN — DEXAMETHASONE SODIUM PHOSPHATE 4 MG: 4 INJECTION INTRA-ARTICULAR; INTRALESIONAL; INTRAMUSCULAR; INTRAVENOUS; SOFT TISSUE at 12:11

## 2021-11-17 RX ADMIN — INSULIN ASPART 2 UNITS: 100 INJECTION, SOLUTION INTRAVENOUS; SUBCUTANEOUS at 02:11

## 2021-11-17 RX ADMIN — INSULIN ASPART 12 UNITS: 100 INJECTION, SOLUTION INTRAVENOUS; SUBCUTANEOUS at 09:11

## 2021-11-17 RX ADMIN — INSULIN ASPART 10 UNITS: 100 INJECTION, SOLUTION INTRAVENOUS; SUBCUTANEOUS at 05:11

## 2021-11-17 RX ADMIN — LEVETIRACETAM 500 MG: 500 TABLET, FILM COATED ORAL at 10:11

## 2021-11-17 RX ADMIN — LEVETIRACETAM 500 MG: 500 TABLET, FILM COATED ORAL at 09:11

## 2021-11-17 RX ADMIN — SENNOSIDES AND DOCUSATE SODIUM 1 TABLET: 50; 8.6 TABLET ORAL at 10:11

## 2021-11-17 RX ADMIN — INSULIN DETEMIR 28 UNITS: 100 INJECTION, SOLUTION SUBCUTANEOUS at 09:11

## 2021-11-17 RX ADMIN — MEROPENEM 2 G: 1 INJECTION INTRAVENOUS at 03:11

## 2021-11-17 RX ADMIN — DEXAMETHASONE SODIUM PHOSPHATE 4 MG: 4 INJECTION INTRA-ARTICULAR; INTRALESIONAL; INTRAMUSCULAR; INTRAVENOUS; SOFT TISSUE at 01:11

## 2021-11-17 RX ADMIN — MEROPENEM 2 G: 1 INJECTION INTRAVENOUS at 01:11

## 2021-11-17 RX ADMIN — HEPARIN SODIUM 5000 UNITS: 5000 INJECTION INTRAVENOUS; SUBCUTANEOUS at 02:11

## 2021-11-17 RX ADMIN — INSULIN ASPART 6 UNITS: 100 INJECTION, SOLUTION INTRAVENOUS; SUBCUTANEOUS at 08:11

## 2021-11-18 LAB
ALBUMIN SERPL BCP-MCNC: 1.9 G/DL (ref 3.5–5.2)
ALP SERPL-CCNC: 62 U/L (ref 55–135)
ALT SERPL W/O P-5'-P-CCNC: 23 U/L (ref 10–44)
ANION GAP SERPL CALC-SCNC: 8 MMOL/L (ref 8–16)
APTT BLDCRRT: 25.7 SEC (ref 21–32)
APTT BLDCRRT: 40.2 SEC (ref 21–32)
AST SERPL-CCNC: 23 U/L (ref 10–40)
BACTERIA BLD CULT: NORMAL
BACTERIA BLD CULT: NORMAL
BASOPHILS # BLD AUTO: 0.02 K/UL (ref 0–0.2)
BASOPHILS # BLD AUTO: 0.02 K/UL (ref 0–0.2)
BASOPHILS NFR BLD: 0.2 % (ref 0–1.9)
BASOPHILS NFR BLD: 0.2 % (ref 0–1.9)
BILIRUB SERPL-MCNC: 0.8 MG/DL (ref 0.1–1)
BLD PROD TYP BPU: NORMAL
BLD PROD TYP BPU: NORMAL
BLOOD UNIT EXPIRATION DATE: NORMAL
BLOOD UNIT EXPIRATION DATE: NORMAL
BLOOD UNIT TYPE CODE: 7300
BLOOD UNIT TYPE CODE: 7300
BLOOD UNIT TYPE: NORMAL
BLOOD UNIT TYPE: NORMAL
BUN SERPL-MCNC: 22 MG/DL (ref 8–23)
CALCIUM SERPL-MCNC: 8.2 MG/DL (ref 8.7–10.5)
CHLORIDE SERPL-SCNC: 108 MMOL/L (ref 95–110)
CO2 SERPL-SCNC: 23 MMOL/L (ref 23–29)
CODING SYSTEM: NORMAL
CODING SYSTEM: NORMAL
CREAT SERPL-MCNC: 0.7 MG/DL (ref 0.5–1.4)
DIFFERENTIAL METHOD: ABNORMAL
DIFFERENTIAL METHOD: ABNORMAL
DISPENSE STATUS: NORMAL
DISPENSE STATUS: NORMAL
EOSINOPHIL # BLD AUTO: 0 K/UL (ref 0–0.5)
EOSINOPHIL # BLD AUTO: 0 K/UL (ref 0–0.5)
EOSINOPHIL NFR BLD: 0 % (ref 0–8)
EOSINOPHIL NFR BLD: 0 % (ref 0–8)
ERYTHROCYTE [DISTWIDTH] IN BLOOD BY AUTOMATED COUNT: 14.6 % (ref 11.5–14.5)
ERYTHROCYTE [DISTWIDTH] IN BLOOD BY AUTOMATED COUNT: 14.9 % (ref 11.5–14.5)
EST. GFR  (AFRICAN AMERICAN): >60 ML/MIN/1.73 M^2
EST. GFR  (NON AFRICAN AMERICAN): >60 ML/MIN/1.73 M^2
FINAL PATHOLOGIC DIAGNOSIS: NORMAL
GLUCOSE SERPL-MCNC: 252 MG/DL (ref 70–110)
GROSS: NORMAL
HCT VFR BLD AUTO: 31.7 % (ref 37–48.5)
HCT VFR BLD AUTO: 34.7 % (ref 37–48.5)
HGB BLD-MCNC: 10.3 G/DL (ref 12–16)
HGB BLD-MCNC: 11.6 G/DL (ref 12–16)
IMM GRANULOCYTES # BLD AUTO: 0.13 K/UL (ref 0–0.04)
IMM GRANULOCYTES # BLD AUTO: 0.16 K/UL (ref 0–0.04)
IMM GRANULOCYTES NFR BLD AUTO: 1.1 % (ref 0–0.5)
IMM GRANULOCYTES NFR BLD AUTO: 1.4 % (ref 0–0.5)
INR PPP: 1 (ref 0.8–1.2)
LYMPHOCYTES # BLD AUTO: 0.6 K/UL (ref 1–4.8)
LYMPHOCYTES # BLD AUTO: 0.7 K/UL (ref 1–4.8)
LYMPHOCYTES NFR BLD: 5 % (ref 18–48)
LYMPHOCYTES NFR BLD: 5.6 % (ref 18–48)
Lab: NORMAL
MAGNESIUM SERPL-MCNC: 2 MG/DL (ref 1.6–2.6)
MCH RBC QN AUTO: 27.8 PG (ref 27–31)
MCH RBC QN AUTO: 27.8 PG (ref 27–31)
MCHC RBC AUTO-ENTMCNC: 32.5 G/DL (ref 32–36)
MCHC RBC AUTO-ENTMCNC: 33.4 G/DL (ref 32–36)
MCV RBC AUTO: 83 FL (ref 82–98)
MCV RBC AUTO: 86 FL (ref 82–98)
MICROSCOPIC EXAM: NORMAL
MONOCYTES # BLD AUTO: 0.7 K/UL (ref 0.3–1)
MONOCYTES # BLD AUTO: 0.9 K/UL (ref 0.3–1)
MONOCYTES NFR BLD: 5.9 % (ref 4–15)
MONOCYTES NFR BLD: 7.4 % (ref 4–15)
NEUTROPHILS # BLD AUTO: 10.1 K/UL (ref 1.8–7.7)
NEUTROPHILS # BLD AUTO: 10.3 K/UL (ref 1.8–7.7)
NEUTROPHILS NFR BLD: 86.3 % (ref 38–73)
NEUTROPHILS NFR BLD: 86.9 % (ref 38–73)
NRBC BLD-RTO: 0 /100 WBC
NRBC BLD-RTO: 0 /100 WBC
NUM UNITS TRANS PACKED RBC: NORMAL
NUM UNITS TRANS PACKED RBC: NORMAL
PHOSPHATE SERPL-MCNC: 2 MG/DL (ref 2.7–4.5)
PLATELET # BLD AUTO: 183 K/UL (ref 150–450)
PLATELET # BLD AUTO: 199 K/UL (ref 150–450)
PMV BLD AUTO: 10.8 FL (ref 9.2–12.9)
PMV BLD AUTO: 11.5 FL (ref 9.2–12.9)
POCT GLUCOSE: 194 MG/DL (ref 70–110)
POCT GLUCOSE: 220 MG/DL (ref 70–110)
POCT GLUCOSE: 229 MG/DL (ref 70–110)
POCT GLUCOSE: 252 MG/DL (ref 70–110)
POCT GLUCOSE: 296 MG/DL (ref 70–110)
POTASSIUM SERPL-SCNC: 4.3 MMOL/L (ref 3.5–5.1)
PROT SERPL-MCNC: 4.1 G/DL (ref 6–8.4)
PROTHROMBIN TIME: 10.7 SEC (ref 9–12.5)
RBC # BLD AUTO: 3.7 M/UL (ref 4–5.4)
RBC # BLD AUTO: 4.18 M/UL (ref 4–5.4)
SODIUM SERPL-SCNC: 139 MMOL/L (ref 136–145)
WBC # BLD AUTO: 11.66 K/UL (ref 3.9–12.7)
WBC # BLD AUTO: 11.97 K/UL (ref 3.9–12.7)

## 2021-11-18 PROCEDURE — 99232 SBSQ HOSP IP/OBS MODERATE 35: CPT | Mod: HCNC,,, | Performed by: NURSE PRACTITIONER

## 2021-11-18 PROCEDURE — 85025 COMPLETE CBC W/AUTO DIFF WBC: CPT | Mod: 91,HCNC | Performed by: PSYCHIATRY & NEUROLOGY

## 2021-11-18 PROCEDURE — 97535 SELF CARE MNGMENT TRAINING: CPT | Mod: HCNC

## 2021-11-18 PROCEDURE — 25000003 PHARM REV CODE 250: Mod: HCNC | Performed by: STUDENT IN AN ORGANIZED HEALTH CARE EDUCATION/TRAINING PROGRAM

## 2021-11-18 PROCEDURE — 99232 PR SUBSEQUENT HOSPITAL CARE,LEVL II: ICD-10-PCS | Mod: HCNC,GC,, | Performed by: NEUROLOGICAL SURGERY

## 2021-11-18 PROCEDURE — 97110 THERAPEUTIC EXERCISES: CPT | Mod: HCNC

## 2021-11-18 PROCEDURE — 25000003 PHARM REV CODE 250: Mod: HCNC

## 2021-11-18 PROCEDURE — 25000003 PHARM REV CODE 250: Mod: HCNC | Performed by: NURSE PRACTITIONER

## 2021-11-18 PROCEDURE — 85025 COMPLETE CBC W/AUTO DIFF WBC: CPT | Mod: HCNC | Performed by: NURSE PRACTITIONER

## 2021-11-18 PROCEDURE — 63600175 PHARM REV CODE 636 W HCPCS: Mod: HCNC | Performed by: STUDENT IN AN ORGANIZED HEALTH CARE EDUCATION/TRAINING PROGRAM

## 2021-11-18 PROCEDURE — 99232 SBSQ HOSP IP/OBS MODERATE 35: CPT | Mod: HCNC,GC,, | Performed by: NEUROLOGICAL SURGERY

## 2021-11-18 PROCEDURE — 85730 THROMBOPLASTIN TIME PARTIAL: CPT | Mod: 91,HCNC | Performed by: PSYCHIATRY & NEUROLOGY

## 2021-11-18 PROCEDURE — 20000000 HC ICU ROOM: Mod: HCNC

## 2021-11-18 PROCEDURE — 99233 PR SUBSEQUENT HOSPITAL CARE,LEVL III: ICD-10-PCS | Mod: HCNC,GC,, | Performed by: PSYCHIATRY & NEUROLOGY

## 2021-11-18 PROCEDURE — 25000003 PHARM REV CODE 250: Mod: HCNC | Performed by: INTERNAL MEDICINE

## 2021-11-18 PROCEDURE — 97530 THERAPEUTIC ACTIVITIES: CPT | Mod: HCNC,CQ

## 2021-11-18 PROCEDURE — 85610 PROTHROMBIN TIME: CPT | Mod: HCNC | Performed by: PSYCHIATRY & NEUROLOGY

## 2021-11-18 PROCEDURE — 25000003 PHARM REV CODE 250: Mod: HCNC | Performed by: PSYCHIATRY & NEUROLOGY

## 2021-11-18 PROCEDURE — 63600175 PHARM REV CODE 636 W HCPCS: Mod: HCNC | Performed by: PSYCHIATRY & NEUROLOGY

## 2021-11-18 PROCEDURE — 99232 PR SUBSEQUENT HOSPITAL CARE,LEVL II: ICD-10-PCS | Mod: HCNC,,, | Performed by: NURSE PRACTITIONER

## 2021-11-18 PROCEDURE — 25000003 PHARM REV CODE 250: Mod: HCNC | Performed by: PHYSICIAN ASSISTANT

## 2021-11-18 PROCEDURE — 63600175 PHARM REV CODE 636 W HCPCS: Mod: HCNC | Performed by: INTERNAL MEDICINE

## 2021-11-18 PROCEDURE — 97530 THERAPEUTIC ACTIVITIES: CPT | Mod: HCNC

## 2021-11-18 PROCEDURE — 94761 N-INVAS EAR/PLS OXIMETRY MLT: CPT | Mod: HCNC

## 2021-11-18 PROCEDURE — 63600175 PHARM REV CODE 636 W HCPCS: Mod: HCNC

## 2021-11-18 PROCEDURE — 92507 TX SP LANG VOICE COMM INDIV: CPT | Mod: HCNC

## 2021-11-18 PROCEDURE — 84100 ASSAY OF PHOSPHORUS: CPT | Mod: HCNC | Performed by: PHYSICIAN ASSISTANT

## 2021-11-18 PROCEDURE — 97116 GAIT TRAINING THERAPY: CPT | Mod: HCNC,CQ

## 2021-11-18 PROCEDURE — 99233 SBSQ HOSP IP/OBS HIGH 50: CPT | Mod: HCNC,GC,, | Performed by: PSYCHIATRY & NEUROLOGY

## 2021-11-18 PROCEDURE — 80053 COMPREHEN METABOLIC PANEL: CPT | Mod: HCNC | Performed by: PHYSICIAN ASSISTANT

## 2021-11-18 PROCEDURE — 83735 ASSAY OF MAGNESIUM: CPT | Mod: HCNC | Performed by: PHYSICIAN ASSISTANT

## 2021-11-18 PROCEDURE — 63600175 PHARM REV CODE 636 W HCPCS: Mod: HCNC | Performed by: PHYSICIAN ASSISTANT

## 2021-11-18 RX ORDER — GLYCERIN 1 G/1
1 SUPPOSITORY RECTAL ONCE
Status: DISCONTINUED | OUTPATIENT
Start: 2021-11-18 | End: 2021-11-18

## 2021-11-18 RX ORDER — HEPARIN SODIUM,PORCINE/D5W 25000/250
0-40 INTRAVENOUS SOLUTION INTRAVENOUS CONTINUOUS
Status: DISPENSED | OUTPATIENT
Start: 2021-11-18 | End: 2021-11-20

## 2021-11-18 RX ORDER — INSULIN ASPART 100 [IU]/ML
12-16 INJECTION, SOLUTION INTRAVENOUS; SUBCUTANEOUS
Status: DISCONTINUED | OUTPATIENT
Start: 2021-11-18 | End: 2021-11-19

## 2021-11-18 RX ADMIN — PANTOPRAZOLE SODIUM 40 MG: 20 TABLET, DELAYED RELEASE ORAL at 08:11

## 2021-11-18 RX ADMIN — HYDRALAZINE HYDROCHLORIDE 10 MG: 20 INJECTION, SOLUTION INTRAMUSCULAR; INTRAVENOUS at 04:11

## 2021-11-18 RX ADMIN — MEROPENEM 2 G: 1 INJECTION INTRAVENOUS at 09:11

## 2021-11-18 RX ADMIN — MEROPENEM 2 G: 1 INJECTION INTRAVENOUS at 12:11

## 2021-11-18 RX ADMIN — DEXAMETHASONE SODIUM PHOSPHATE 4 MG: 4 INJECTION INTRA-ARTICULAR; INTRALESIONAL; INTRAMUSCULAR; INTRAVENOUS; SOFT TISSUE at 12:11

## 2021-11-18 RX ADMIN — INSULIN ASPART 16 UNITS: 100 INJECTION, SOLUTION INTRAVENOUS; SUBCUTANEOUS at 08:11

## 2021-11-18 RX ADMIN — INSULIN ASPART 16 UNITS: 100 INJECTION, SOLUTION INTRAVENOUS; SUBCUTANEOUS at 12:11

## 2021-11-18 RX ADMIN — INSULIN ASPART 12 UNITS: 100 INJECTION, SOLUTION INTRAVENOUS; SUBCUTANEOUS at 05:11

## 2021-11-18 RX ADMIN — HEPARIN SODIUM 5000 UNITS: 5000 INJECTION INTRAVENOUS; SUBCUTANEOUS at 05:11

## 2021-11-18 RX ADMIN — POLYETHYLENE GLYCOL 3350 17 G: 17 POWDER, FOR SOLUTION ORAL at 08:11

## 2021-11-18 RX ADMIN — INSULIN ASPART 4 UNITS: 100 INJECTION, SOLUTION INTRAVENOUS; SUBCUTANEOUS at 12:11

## 2021-11-18 RX ADMIN — MEROPENEM 2 G: 1 INJECTION INTRAVENOUS at 03:11

## 2021-11-18 RX ADMIN — POTASSIUM & SODIUM PHOSPHATES POWDER PACK 280-160-250 MG 2 PACKET: 280-160-250 PACK at 01:11

## 2021-11-18 RX ADMIN — SENNOSIDES AND DOCUSATE SODIUM 1 TABLET: 50; 8.6 TABLET ORAL at 08:11

## 2021-11-18 RX ADMIN — SENNOSIDES AND DOCUSATE SODIUM 1 TABLET: 50; 8.6 TABLET ORAL at 09:11

## 2021-11-18 RX ADMIN — LEVETIRACETAM 500 MG: 500 TABLET, FILM COATED ORAL at 09:11

## 2021-11-18 RX ADMIN — HEPARIN SODIUM AND DEXTROSE 12 UNITS/KG/HR: 10000; 5 INJECTION INTRAVENOUS at 11:11

## 2021-11-18 RX ADMIN — DEXAMETHASONE SODIUM PHOSPHATE 4 MG: 4 INJECTION INTRA-ARTICULAR; INTRALESIONAL; INTRAMUSCULAR; INTRAVENOUS; SOFT TISSUE at 05:11

## 2021-11-18 RX ADMIN — DEXAMETHASONE SODIUM PHOSPHATE 4 MG: 4 INJECTION INTRA-ARTICULAR; INTRALESIONAL; INTRAMUSCULAR; INTRAVENOUS; SOFT TISSUE at 11:11

## 2021-11-18 RX ADMIN — LEVETIRACETAM 500 MG: 500 TABLET, FILM COATED ORAL at 08:11

## 2021-11-18 RX ADMIN — INSULIN ASPART 6 UNITS: 100 INJECTION, SOLUTION INTRAVENOUS; SUBCUTANEOUS at 08:11

## 2021-11-18 RX ADMIN — INSULIN ASPART 2 UNITS: 100 INJECTION, SOLUTION INTRAVENOUS; SUBCUTANEOUS at 03:11

## 2021-11-18 RX ADMIN — HYDRALAZINE HYDROCHLORIDE 10 MG: 20 INJECTION, SOLUTION INTRAMUSCULAR; INTRAVENOUS at 06:11

## 2021-11-18 RX ADMIN — ATORVASTATIN CALCIUM 40 MG: 20 TABLET, FILM COATED ORAL at 08:11

## 2021-11-18 RX ADMIN — POTASSIUM & SODIUM PHOSPHATES POWDER PACK 280-160-250 MG 2 PACKET: 280-160-250 PACK at 05:11

## 2021-11-18 RX ADMIN — INSULIN ASPART 3 UNITS: 100 INJECTION, SOLUTION INTRAVENOUS; SUBCUTANEOUS at 11:11

## 2021-11-18 RX ADMIN — INSULIN DETEMIR 28 UNITS: 100 INJECTION, SOLUTION SUBCUTANEOUS at 08:11

## 2021-11-19 LAB
ALBUMIN SERPL BCP-MCNC: 2.2 G/DL (ref 3.5–5.2)
ALP SERPL-CCNC: 72 U/L (ref 55–135)
ALT SERPL W/O P-5'-P-CCNC: 27 U/L (ref 10–44)
ANION GAP SERPL CALC-SCNC: 8 MMOL/L (ref 8–16)
APTT BLDCRRT: 43.1 SEC (ref 21–32)
APTT BLDCRRT: 81.5 SEC (ref 21–32)
AST SERPL-CCNC: 20 U/L (ref 10–40)
BASOPHILS # BLD AUTO: 0.02 K/UL (ref 0–0.2)
BASOPHILS NFR BLD: 0.2 % (ref 0–1.9)
BILIRUB SERPL-MCNC: 1.1 MG/DL (ref 0.1–1)
BUN SERPL-MCNC: 17 MG/DL (ref 8–23)
CALCIUM SERPL-MCNC: 8.6 MG/DL (ref 8.7–10.5)
CHLORIDE SERPL-SCNC: 102 MMOL/L (ref 95–110)
CO2 SERPL-SCNC: 24 MMOL/L (ref 23–29)
CREAT SERPL-MCNC: 0.7 MG/DL (ref 0.5–1.4)
DIFFERENTIAL METHOD: ABNORMAL
EOSINOPHIL # BLD AUTO: 0 K/UL (ref 0–0.5)
EOSINOPHIL NFR BLD: 0 % (ref 0–8)
ERYTHROCYTE [DISTWIDTH] IN BLOOD BY AUTOMATED COUNT: 14.7 % (ref 11.5–14.5)
EST. GFR  (AFRICAN AMERICAN): >60 ML/MIN/1.73 M^2
EST. GFR  (NON AFRICAN AMERICAN): >60 ML/MIN/1.73 M^2
GLUCOSE SERPL-MCNC: 178 MG/DL (ref 70–110)
HCT VFR BLD AUTO: 33.3 % (ref 37–48.5)
HGB BLD-MCNC: 11 G/DL (ref 12–16)
IMM GRANULOCYTES # BLD AUTO: 0.2 K/UL (ref 0–0.04)
IMM GRANULOCYTES NFR BLD AUTO: 1.7 % (ref 0–0.5)
LYMPHOCYTES # BLD AUTO: 1.2 K/UL (ref 1–4.8)
LYMPHOCYTES NFR BLD: 9.8 % (ref 18–48)
MAGNESIUM SERPL-MCNC: 2 MG/DL (ref 1.6–2.6)
MCH RBC QN AUTO: 27.8 PG (ref 27–31)
MCHC RBC AUTO-ENTMCNC: 33 G/DL (ref 32–36)
MCV RBC AUTO: 84 FL (ref 82–98)
MONOCYTES # BLD AUTO: 1.1 K/UL (ref 0.3–1)
MONOCYTES NFR BLD: 9.2 % (ref 4–15)
NEUTROPHILS # BLD AUTO: 9.3 K/UL (ref 1.8–7.7)
NEUTROPHILS NFR BLD: 79.1 % (ref 38–73)
NRBC BLD-RTO: 0 /100 WBC
PHOSPHATE SERPL-MCNC: 2.7 MG/DL (ref 2.7–4.5)
PLATELET # BLD AUTO: 199 K/UL (ref 150–450)
PMV BLD AUTO: 11.5 FL (ref 9.2–12.9)
POCT GLUCOSE: 177 MG/DL (ref 70–110)
POCT GLUCOSE: 179 MG/DL (ref 70–110)
POCT GLUCOSE: 208 MG/DL (ref 70–110)
POCT GLUCOSE: 92 MG/DL (ref 70–110)
POCT GLUCOSE: 97 MG/DL (ref 70–110)
POTASSIUM SERPL-SCNC: 4.1 MMOL/L (ref 3.5–5.1)
PROT SERPL-MCNC: 4.7 G/DL (ref 6–8.4)
RBC # BLD AUTO: 3.95 M/UL (ref 4–5.4)
SODIUM SERPL-SCNC: 134 MMOL/L (ref 136–145)
WBC # BLD AUTO: 11.79 K/UL (ref 3.9–12.7)

## 2021-11-19 PROCEDURE — 36415 COLL VENOUS BLD VENIPUNCTURE: CPT | Mod: HCNC | Performed by: PSYCHIATRY & NEUROLOGY

## 2021-11-19 PROCEDURE — 25000003 PHARM REV CODE 250: Mod: HCNC | Performed by: NURSE PRACTITIONER

## 2021-11-19 PROCEDURE — 25000003 PHARM REV CODE 250: Mod: HCNC

## 2021-11-19 PROCEDURE — 25000003 PHARM REV CODE 250: Mod: HCNC | Performed by: STUDENT IN AN ORGANIZED HEALTH CARE EDUCATION/TRAINING PROGRAM

## 2021-11-19 PROCEDURE — 99232 SBSQ HOSP IP/OBS MODERATE 35: CPT | Mod: HCNC,GC,, | Performed by: NEUROLOGICAL SURGERY

## 2021-11-19 PROCEDURE — 94761 N-INVAS EAR/PLS OXIMETRY MLT: CPT | Mod: HCNC

## 2021-11-19 PROCEDURE — 85025 COMPLETE CBC W/AUTO DIFF WBC: CPT | Mod: HCNC | Performed by: PSYCHIATRY & NEUROLOGY

## 2021-11-19 PROCEDURE — 83735 ASSAY OF MAGNESIUM: CPT | Mod: HCNC | Performed by: PHYSICIAN ASSISTANT

## 2021-11-19 PROCEDURE — 63600175 PHARM REV CODE 636 W HCPCS: Mod: HCNC

## 2021-11-19 PROCEDURE — 25000003 PHARM REV CODE 250: Mod: HCNC | Performed by: PSYCHIATRY & NEUROLOGY

## 2021-11-19 PROCEDURE — 99900035 HC TECH TIME PER 15 MIN (STAT): Mod: HCNC

## 2021-11-19 PROCEDURE — 99232 PR SUBSEQUENT HOSPITAL CARE,LEVL II: ICD-10-PCS | Mod: HCNC,GC,, | Performed by: NEUROLOGICAL SURGERY

## 2021-11-19 PROCEDURE — 97116 GAIT TRAINING THERAPY: CPT | Mod: HCNC,CQ

## 2021-11-19 PROCEDURE — 85730 THROMBOPLASTIN TIME PARTIAL: CPT | Mod: 91,HCNC | Performed by: PSYCHIATRY & NEUROLOGY

## 2021-11-19 PROCEDURE — 25000003 PHARM REV CODE 250: Mod: HCNC | Performed by: PHYSICIAN ASSISTANT

## 2021-11-19 PROCEDURE — 20000000 HC ICU ROOM: Mod: HCNC

## 2021-11-19 PROCEDURE — 63600175 PHARM REV CODE 636 W HCPCS: Mod: HCNC | Performed by: PHYSICIAN ASSISTANT

## 2021-11-19 PROCEDURE — 84100 ASSAY OF PHOSPHORUS: CPT | Mod: HCNC | Performed by: PHYSICIAN ASSISTANT

## 2021-11-19 PROCEDURE — 99233 SBSQ HOSP IP/OBS HIGH 50: CPT | Mod: HCNC,GC,, | Performed by: PSYCHIATRY & NEUROLOGY

## 2021-11-19 PROCEDURE — 92507 TX SP LANG VOICE COMM INDIV: CPT | Mod: HCNC

## 2021-11-19 PROCEDURE — 97530 THERAPEUTIC ACTIVITIES: CPT | Mod: HCNC,CQ

## 2021-11-19 PROCEDURE — 99233 PR SUBSEQUENT HOSPITAL CARE,LEVL III: ICD-10-PCS | Mod: HCNC,GC,, | Performed by: PSYCHIATRY & NEUROLOGY

## 2021-11-19 PROCEDURE — 80053 COMPREHEN METABOLIC PANEL: CPT | Mod: HCNC | Performed by: PHYSICIAN ASSISTANT

## 2021-11-19 PROCEDURE — 85730 THROMBOPLASTIN TIME PARTIAL: CPT | Mod: HCNC | Performed by: PSYCHIATRY & NEUROLOGY

## 2021-11-19 RX ORDER — INSULIN ASPART 100 [IU]/ML
18 INJECTION, SOLUTION INTRAVENOUS; SUBCUTANEOUS
Status: DISCONTINUED | OUTPATIENT
Start: 2021-11-19 | End: 2021-11-21

## 2021-11-19 RX ORDER — CARVEDILOL 6.25 MG/1
6.25 TABLET ORAL 2 TIMES DAILY
Status: DISCONTINUED | OUTPATIENT
Start: 2021-11-19 | End: 2021-11-24 | Stop reason: HOSPADM

## 2021-11-19 RX ADMIN — MEROPENEM 2 G: 1 INJECTION INTRAVENOUS at 08:11

## 2021-11-19 RX ADMIN — MEROPENEM 2 G: 1 INJECTION INTRAVENOUS at 03:11

## 2021-11-19 RX ADMIN — ATORVASTATIN CALCIUM 40 MG: 20 TABLET, FILM COATED ORAL at 08:11

## 2021-11-19 RX ADMIN — INSULIN DETEMIR 28 UNITS: 100 INJECTION, SOLUTION SUBCUTANEOUS at 08:11

## 2021-11-19 RX ADMIN — MEROPENEM 2 G: 1 INJECTION INTRAVENOUS at 01:11

## 2021-11-19 RX ADMIN — SENNOSIDES AND DOCUSATE SODIUM 1 TABLET: 50; 8.6 TABLET ORAL at 08:11

## 2021-11-19 RX ADMIN — LEVETIRACETAM 500 MG: 500 TABLET, FILM COATED ORAL at 08:11

## 2021-11-19 RX ADMIN — DEXAMETHASONE SODIUM PHOSPHATE 4 MG: 4 INJECTION INTRA-ARTICULAR; INTRALESIONAL; INTRAMUSCULAR; INTRAVENOUS; SOFT TISSUE at 12:11

## 2021-11-19 RX ADMIN — INSULIN ASPART 18 UNITS: 100 INJECTION, SOLUTION INTRAVENOUS; SUBCUTANEOUS at 12:11

## 2021-11-19 RX ADMIN — INSULIN ASPART 1 UNITS: 100 INJECTION, SOLUTION INTRAVENOUS; SUBCUTANEOUS at 03:11

## 2021-11-19 RX ADMIN — POLYETHYLENE GLYCOL 3350 17 G: 17 POWDER, FOR SOLUTION ORAL at 09:11

## 2021-11-19 RX ADMIN — CARVEDILOL 6.25 MG: 6.25 TABLET, FILM COATED ORAL at 08:11

## 2021-11-19 RX ADMIN — ACETAMINOPHEN 650 MG: 325 TABLET ORAL at 05:11

## 2021-11-19 RX ADMIN — INSULIN ASPART 2 UNITS: 100 INJECTION, SOLUTION INTRAVENOUS; SUBCUTANEOUS at 08:11

## 2021-11-19 RX ADMIN — CARVEDILOL 6.25 MG: 6.25 TABLET, FILM COATED ORAL at 12:11

## 2021-11-19 RX ADMIN — INSULIN ASPART 4 UNITS: 100 INJECTION, SOLUTION INTRAVENOUS; SUBCUTANEOUS at 12:11

## 2021-11-19 RX ADMIN — INSULIN ASPART 16 UNITS: 100 INJECTION, SOLUTION INTRAVENOUS; SUBCUTANEOUS at 08:11

## 2021-11-19 RX ADMIN — PANTOPRAZOLE SODIUM 40 MG: 20 TABLET, DELAYED RELEASE ORAL at 08:11

## 2021-11-19 RX ADMIN — DEXAMETHASONE SODIUM PHOSPHATE 4 MG: 4 INJECTION INTRA-ARTICULAR; INTRALESIONAL; INTRAMUSCULAR; INTRAVENOUS; SOFT TISSUE at 05:11

## 2021-11-19 RX ADMIN — INSULIN ASPART 18 UNITS: 100 INJECTION, SOLUTION INTRAVENOUS; SUBCUTANEOUS at 05:11

## 2021-11-20 LAB
ALBUMIN SERPL BCP-MCNC: 2.3 G/DL (ref 3.5–5.2)
ALP SERPL-CCNC: 73 U/L (ref 55–135)
ALT SERPL W/O P-5'-P-CCNC: 27 U/L (ref 10–44)
ANION GAP SERPL CALC-SCNC: 4 MMOL/L (ref 8–16)
APTT BLDCRRT: 47.6 SEC (ref 21–32)
AST SERPL-CCNC: 18 U/L (ref 10–40)
BASOPHILS # BLD AUTO: 0.01 K/UL (ref 0–0.2)
BASOPHILS NFR BLD: 0.1 % (ref 0–1.9)
BILIRUB SERPL-MCNC: 1.1 MG/DL (ref 0.1–1)
BUN SERPL-MCNC: 16 MG/DL (ref 8–23)
CALCIUM SERPL-MCNC: 8.8 MG/DL (ref 8.7–10.5)
CHLORIDE SERPL-SCNC: 105 MMOL/L (ref 95–110)
CO2 SERPL-SCNC: 28 MMOL/L (ref 23–29)
CREAT SERPL-MCNC: 0.6 MG/DL (ref 0.5–1.4)
DIFFERENTIAL METHOD: ABNORMAL
EOSINOPHIL # BLD AUTO: 0 K/UL (ref 0–0.5)
EOSINOPHIL NFR BLD: 0 % (ref 0–8)
ERYTHROCYTE [DISTWIDTH] IN BLOOD BY AUTOMATED COUNT: 14.7 % (ref 11.5–14.5)
EST. GFR  (AFRICAN AMERICAN): >60 ML/MIN/1.73 M^2
EST. GFR  (NON AFRICAN AMERICAN): >60 ML/MIN/1.73 M^2
GLUCOSE SERPL-MCNC: 122 MG/DL (ref 70–110)
HCT VFR BLD AUTO: 33.1 % (ref 37–48.5)
HGB BLD-MCNC: 10.9 G/DL (ref 12–16)
IMM GRANULOCYTES # BLD AUTO: 0.16 K/UL (ref 0–0.04)
IMM GRANULOCYTES NFR BLD AUTO: 1.8 % (ref 0–0.5)
LYMPHOCYTES # BLD AUTO: 0.7 K/UL (ref 1–4.8)
LYMPHOCYTES NFR BLD: 7.8 % (ref 18–48)
MAGNESIUM SERPL-MCNC: 2.1 MG/DL (ref 1.6–2.6)
MCH RBC QN AUTO: 27.4 PG (ref 27–31)
MCHC RBC AUTO-ENTMCNC: 32.9 G/DL (ref 32–36)
MCV RBC AUTO: 83 FL (ref 82–98)
MONOCYTES # BLD AUTO: 0.6 K/UL (ref 0.3–1)
MONOCYTES NFR BLD: 6.4 % (ref 4–15)
NEUTROPHILS # BLD AUTO: 7.5 K/UL (ref 1.8–7.7)
NEUTROPHILS NFR BLD: 83.9 % (ref 38–73)
NRBC BLD-RTO: 0 /100 WBC
PHOSPHATE SERPL-MCNC: 2.9 MG/DL (ref 2.7–4.5)
PLATELET # BLD AUTO: 185 K/UL (ref 150–450)
PMV BLD AUTO: 10.8 FL (ref 9.2–12.9)
POCT GLUCOSE: 164 MG/DL (ref 70–110)
POCT GLUCOSE: 177 MG/DL (ref 70–110)
POCT GLUCOSE: 184 MG/DL (ref 70–110)
POCT GLUCOSE: 78 MG/DL (ref 70–110)
POTASSIUM SERPL-SCNC: 4.1 MMOL/L (ref 3.5–5.1)
PROT SERPL-MCNC: 4.6 G/DL (ref 6–8.4)
RBC # BLD AUTO: 3.98 M/UL (ref 4–5.4)
SODIUM SERPL-SCNC: 137 MMOL/L (ref 136–145)
WBC # BLD AUTO: 8.96 K/UL (ref 3.9–12.7)

## 2021-11-20 PROCEDURE — 84100 ASSAY OF PHOSPHORUS: CPT | Mod: HCNC | Performed by: PHYSICIAN ASSISTANT

## 2021-11-20 PROCEDURE — 99024 PR POST-OP FOLLOW-UP VISIT: ICD-10-PCS | Mod: HCNC,GC,, | Performed by: NEUROLOGICAL SURGERY

## 2021-11-20 PROCEDURE — 25000003 PHARM REV CODE 250: Mod: HCNC | Performed by: NURSE PRACTITIONER

## 2021-11-20 PROCEDURE — 25000003 PHARM REV CODE 250: Mod: HCNC | Performed by: PSYCHIATRY & NEUROLOGY

## 2021-11-20 PROCEDURE — 99233 PR SUBSEQUENT HOSPITAL CARE,LEVL III: ICD-10-PCS | Mod: HCNC,GC,, | Performed by: PSYCHIATRY & NEUROLOGY

## 2021-11-20 PROCEDURE — 99233 SBSQ HOSP IP/OBS HIGH 50: CPT | Mod: HCNC,GC,, | Performed by: PSYCHIATRY & NEUROLOGY

## 2021-11-20 PROCEDURE — 99024 POSTOP FOLLOW-UP VISIT: CPT | Mod: HCNC,GC,, | Performed by: NEUROLOGICAL SURGERY

## 2021-11-20 PROCEDURE — 99900035 HC TECH TIME PER 15 MIN (STAT): Mod: HCNC

## 2021-11-20 PROCEDURE — 25000003 PHARM REV CODE 250: Mod: HCNC

## 2021-11-20 PROCEDURE — 99232 SBSQ HOSP IP/OBS MODERATE 35: CPT | Mod: HCNC,,, | Performed by: NURSE PRACTITIONER

## 2021-11-20 PROCEDURE — 99232 PR SUBSEQUENT HOSPITAL CARE,LEVL II: ICD-10-PCS | Mod: HCNC,,, | Performed by: NURSE PRACTITIONER

## 2021-11-20 PROCEDURE — 25000003 PHARM REV CODE 250: Mod: HCNC | Performed by: STUDENT IN AN ORGANIZED HEALTH CARE EDUCATION/TRAINING PROGRAM

## 2021-11-20 PROCEDURE — 25000003 PHARM REV CODE 250: Mod: HCNC | Performed by: PHYSICIAN ASSISTANT

## 2021-11-20 PROCEDURE — 94761 N-INVAS EAR/PLS OXIMETRY MLT: CPT | Mod: HCNC

## 2021-11-20 PROCEDURE — 80053 COMPREHEN METABOLIC PANEL: CPT | Mod: HCNC | Performed by: PHYSICIAN ASSISTANT

## 2021-11-20 PROCEDURE — 20000000 HC ICU ROOM: Mod: HCNC

## 2021-11-20 PROCEDURE — 85730 THROMBOPLASTIN TIME PARTIAL: CPT | Mod: HCNC | Performed by: PSYCHIATRY & NEUROLOGY

## 2021-11-20 PROCEDURE — 63600175 PHARM REV CODE 636 W HCPCS: Mod: HCNC | Performed by: PSYCHIATRY & NEUROLOGY

## 2021-11-20 PROCEDURE — 83735 ASSAY OF MAGNESIUM: CPT | Mod: HCNC | Performed by: PHYSICIAN ASSISTANT

## 2021-11-20 PROCEDURE — 63600175 PHARM REV CODE 636 W HCPCS: Mod: HCNC

## 2021-11-20 PROCEDURE — 63600175 PHARM REV CODE 636 W HCPCS: Mod: HCNC | Performed by: PHYSICIAN ASSISTANT

## 2021-11-20 PROCEDURE — 85025 COMPLETE CBC W/AUTO DIFF WBC: CPT | Mod: HCNC | Performed by: NURSE PRACTITIONER

## 2021-11-20 RX ORDER — INSULIN ASPART 100 [IU]/ML
0-5 INJECTION, SOLUTION INTRAVENOUS; SUBCUTANEOUS
Status: DISCONTINUED | OUTPATIENT
Start: 2021-11-20 | End: 2021-11-24 | Stop reason: HOSPADM

## 2021-11-20 RX ADMIN — DEXAMETHASONE SODIUM PHOSPHATE 4 MG: 4 INJECTION INTRA-ARTICULAR; INTRALESIONAL; INTRAMUSCULAR; INTRAVENOUS; SOFT TISSUE at 05:11

## 2021-11-20 RX ADMIN — CARVEDILOL 6.25 MG: 6.25 TABLET, FILM COATED ORAL at 08:11

## 2021-11-20 RX ADMIN — MEROPENEM 2 G: 1 INJECTION INTRAVENOUS at 08:11

## 2021-11-20 RX ADMIN — INSULIN ASPART 18 UNITS: 100 INJECTION, SOLUTION INTRAVENOUS; SUBCUTANEOUS at 04:11

## 2021-11-20 RX ADMIN — DEXAMETHASONE SODIUM PHOSPHATE 4 MG: 4 INJECTION INTRA-ARTICULAR; INTRALESIONAL; INTRAMUSCULAR; INTRAVENOUS; SOFT TISSUE at 12:11

## 2021-11-20 RX ADMIN — LEVETIRACETAM 500 MG: 500 TABLET, FILM COATED ORAL at 08:11

## 2021-11-20 RX ADMIN — SENNOSIDES AND DOCUSATE SODIUM 1 TABLET: 50; 8.6 TABLET ORAL at 08:11

## 2021-11-20 RX ADMIN — INSULIN DETEMIR 28 UNITS: 100 INJECTION, SOLUTION SUBCUTANEOUS at 08:11

## 2021-11-20 RX ADMIN — INSULIN ASPART 18 UNITS: 100 INJECTION, SOLUTION INTRAVENOUS; SUBCUTANEOUS at 12:11

## 2021-11-20 RX ADMIN — PANTOPRAZOLE SODIUM 40 MG: 20 TABLET, DELAYED RELEASE ORAL at 08:11

## 2021-11-20 RX ADMIN — INSULIN ASPART 18 UNITS: 100 INJECTION, SOLUTION INTRAVENOUS; SUBCUTANEOUS at 07:11

## 2021-11-20 RX ADMIN — ATORVASTATIN CALCIUM 40 MG: 20 TABLET, FILM COATED ORAL at 08:11

## 2021-11-20 RX ADMIN — DEXAMETHASONE SODIUM PHOSPHATE 4 MG: 4 INJECTION INTRA-ARTICULAR; INTRALESIONAL; INTRAMUSCULAR; INTRAVENOUS; SOFT TISSUE at 11:11

## 2021-11-20 RX ADMIN — APIXABAN 5 MG: 5 TABLET, FILM COATED ORAL at 08:11

## 2021-11-20 RX ADMIN — HEPARIN SODIUM AND DEXTROSE 8 UNITS/KG/HR: 10000; 5 INJECTION INTRAVENOUS at 02:11

## 2021-11-20 RX ADMIN — MEROPENEM 2 G: 1 INJECTION INTRAVENOUS at 11:11

## 2021-11-20 RX ADMIN — MEROPENEM 2 G: 1 INJECTION INTRAVENOUS at 04:11

## 2021-11-20 RX ADMIN — POLYETHYLENE GLYCOL 3350 17 G: 17 POWDER, FOR SOLUTION ORAL at 08:11

## 2021-11-21 LAB
ALBUMIN SERPL BCP-MCNC: 2.3 G/DL (ref 3.5–5.2)
ALP SERPL-CCNC: 74 U/L (ref 55–135)
ALT SERPL W/O P-5'-P-CCNC: 29 U/L (ref 10–44)
ANION GAP SERPL CALC-SCNC: 6 MMOL/L (ref 8–16)
AST SERPL-CCNC: 20 U/L (ref 10–40)
BASOPHILS # BLD AUTO: 0.02 K/UL (ref 0–0.2)
BASOPHILS NFR BLD: 0.2 % (ref 0–1.9)
BILIRUB SERPL-MCNC: 1 MG/DL (ref 0.1–1)
BUN SERPL-MCNC: 16 MG/DL (ref 8–23)
CALCIUM SERPL-MCNC: 8.9 MG/DL (ref 8.7–10.5)
CHLORIDE SERPL-SCNC: 107 MMOL/L (ref 95–110)
CO2 SERPL-SCNC: 25 MMOL/L (ref 23–29)
CREAT SERPL-MCNC: 0.7 MG/DL (ref 0.5–1.4)
DIFFERENTIAL METHOD: ABNORMAL
EOSINOPHIL # BLD AUTO: 0 K/UL (ref 0–0.5)
EOSINOPHIL NFR BLD: 0 % (ref 0–8)
ERYTHROCYTE [DISTWIDTH] IN BLOOD BY AUTOMATED COUNT: 14.7 % (ref 11.5–14.5)
EST. GFR  (AFRICAN AMERICAN): >60 ML/MIN/1.73 M^2
EST. GFR  (NON AFRICAN AMERICAN): >60 ML/MIN/1.73 M^2
GLUCOSE SERPL-MCNC: 75 MG/DL (ref 70–110)
HCT VFR BLD AUTO: 34.6 % (ref 37–48.5)
HGB BLD-MCNC: 11.2 G/DL (ref 12–16)
IMM GRANULOCYTES # BLD AUTO: 0.18 K/UL (ref 0–0.04)
IMM GRANULOCYTES NFR BLD AUTO: 1.9 % (ref 0–0.5)
LYMPHOCYTES # BLD AUTO: 1.2 K/UL (ref 1–4.8)
LYMPHOCYTES NFR BLD: 12.4 % (ref 18–48)
MAGNESIUM SERPL-MCNC: 2.3 MG/DL (ref 1.6–2.6)
MCH RBC QN AUTO: 27.9 PG (ref 27–31)
MCHC RBC AUTO-ENTMCNC: 32.4 G/DL (ref 32–36)
MCV RBC AUTO: 86 FL (ref 82–98)
MONOCYTES # BLD AUTO: 0.9 K/UL (ref 0.3–1)
MONOCYTES NFR BLD: 8.9 % (ref 4–15)
NEUTROPHILS # BLD AUTO: 7.4 K/UL (ref 1.8–7.7)
NEUTROPHILS NFR BLD: 76.6 % (ref 38–73)
NRBC BLD-RTO: 0 /100 WBC
PHOSPHATE SERPL-MCNC: 2.5 MG/DL (ref 2.7–4.5)
PLATELET # BLD AUTO: 181 K/UL (ref 150–450)
PMV BLD AUTO: 11 FL (ref 9.2–12.9)
POCT GLUCOSE: 106 MG/DL (ref 70–110)
POCT GLUCOSE: 111 MG/DL (ref 70–110)
POCT GLUCOSE: 117 MG/DL (ref 70–110)
POCT GLUCOSE: 145 MG/DL (ref 70–110)
POCT GLUCOSE: 155 MG/DL (ref 70–110)
POTASSIUM SERPL-SCNC: 4.1 MMOL/L (ref 3.5–5.1)
PROT SERPL-MCNC: 4.6 G/DL (ref 6–8.4)
RBC # BLD AUTO: 4.01 M/UL (ref 4–5.4)
SODIUM SERPL-SCNC: 138 MMOL/L (ref 136–145)
WBC # BLD AUTO: 9.67 K/UL (ref 3.9–12.7)

## 2021-11-21 PROCEDURE — 20000000 HC ICU ROOM: Mod: HCNC

## 2021-11-21 PROCEDURE — 25000003 PHARM REV CODE 250: Mod: HCNC | Performed by: PSYCHIATRY & NEUROLOGY

## 2021-11-21 PROCEDURE — C9399 UNCLASSIFIED DRUGS OR BIOLOG: HCPCS | Mod: HCNC | Performed by: NURSE PRACTITIONER

## 2021-11-21 PROCEDURE — 84100 ASSAY OF PHOSPHORUS: CPT | Mod: HCNC | Performed by: PHYSICIAN ASSISTANT

## 2021-11-21 PROCEDURE — 99024 PR POST-OP FOLLOW-UP VISIT: ICD-10-PCS | Mod: HCNC,GC,, | Performed by: NEUROLOGICAL SURGERY

## 2021-11-21 PROCEDURE — 99233 SBSQ HOSP IP/OBS HIGH 50: CPT | Mod: HCNC,,, | Performed by: NURSE PRACTITIONER

## 2021-11-21 PROCEDURE — 94761 N-INVAS EAR/PLS OXIMETRY MLT: CPT | Mod: HCNC

## 2021-11-21 PROCEDURE — 99024 POSTOP FOLLOW-UP VISIT: CPT | Mod: HCNC,GC,, | Performed by: NEUROLOGICAL SURGERY

## 2021-11-21 PROCEDURE — 25000003 PHARM REV CODE 250: Mod: HCNC

## 2021-11-21 PROCEDURE — 25000003 PHARM REV CODE 250: Mod: HCNC | Performed by: NURSE PRACTITIONER

## 2021-11-21 PROCEDURE — 63600175 PHARM REV CODE 636 W HCPCS: Mod: HCNC | Performed by: NURSE PRACTITIONER

## 2021-11-21 PROCEDURE — 99900035 HC TECH TIME PER 15 MIN (STAT): Mod: HCNC

## 2021-11-21 PROCEDURE — 63600175 PHARM REV CODE 636 W HCPCS: Mod: HCNC | Performed by: PHYSICIAN ASSISTANT

## 2021-11-21 PROCEDURE — 63600175 PHARM REV CODE 636 W HCPCS: Mod: HCNC

## 2021-11-21 PROCEDURE — 85025 COMPLETE CBC W/AUTO DIFF WBC: CPT | Mod: HCNC | Performed by: NURSE PRACTITIONER

## 2021-11-21 PROCEDURE — 25000003 PHARM REV CODE 250: Mod: HCNC | Performed by: PHYSICIAN ASSISTANT

## 2021-11-21 PROCEDURE — 80053 COMPREHEN METABOLIC PANEL: CPT | Mod: HCNC | Performed by: PHYSICIAN ASSISTANT

## 2021-11-21 PROCEDURE — 99233 PR SUBSEQUENT HOSPITAL CARE,LEVL III: ICD-10-PCS | Mod: HCNC,,, | Performed by: NURSE PRACTITIONER

## 2021-11-21 PROCEDURE — 99232 SBSQ HOSP IP/OBS MODERATE 35: CPT | Mod: HCNC,,, | Performed by: NURSE PRACTITIONER

## 2021-11-21 PROCEDURE — 99232 PR SUBSEQUENT HOSPITAL CARE,LEVL II: ICD-10-PCS | Mod: HCNC,,, | Performed by: NURSE PRACTITIONER

## 2021-11-21 PROCEDURE — 83735 ASSAY OF MAGNESIUM: CPT | Mod: HCNC | Performed by: PHYSICIAN ASSISTANT

## 2021-11-21 RX ORDER — INSULIN ASPART 100 [IU]/ML
16 INJECTION, SOLUTION INTRAVENOUS; SUBCUTANEOUS
Status: DISCONTINUED | OUTPATIENT
Start: 2021-11-21 | End: 2021-11-22

## 2021-11-21 RX ORDER — AMLODIPINE BESYLATE 5 MG/1
5 TABLET ORAL DAILY
Status: DISCONTINUED | OUTPATIENT
Start: 2021-11-21 | End: 2021-11-24 | Stop reason: HOSPADM

## 2021-11-21 RX ADMIN — APIXABAN 5 MG: 5 TABLET, FILM COATED ORAL at 08:11

## 2021-11-21 RX ADMIN — INSULIN ASPART 16 UNITS: 100 INJECTION, SOLUTION INTRAVENOUS; SUBCUTANEOUS at 04:11

## 2021-11-21 RX ADMIN — SENNOSIDES AND DOCUSATE SODIUM 1 TABLET: 50; 8.6 TABLET ORAL at 08:11

## 2021-11-21 RX ADMIN — CARVEDILOL 6.25 MG: 6.25 TABLET, FILM COATED ORAL at 08:11

## 2021-11-21 RX ADMIN — INSULIN ASPART 16 UNITS: 100 INJECTION, SOLUTION INTRAVENOUS; SUBCUTANEOUS at 08:11

## 2021-11-21 RX ADMIN — DEXAMETHASONE SODIUM PHOSPHATE 4 MG: 4 INJECTION INTRA-ARTICULAR; INTRALESIONAL; INTRAMUSCULAR; INTRAVENOUS; SOFT TISSUE at 12:11

## 2021-11-21 RX ADMIN — AMLODIPINE BESYLATE 5 MG: 5 TABLET ORAL at 11:11

## 2021-11-21 RX ADMIN — PANTOPRAZOLE SODIUM 40 MG: 20 TABLET, DELAYED RELEASE ORAL at 08:11

## 2021-11-21 RX ADMIN — LEVETIRACETAM 500 MG: 500 TABLET, FILM COATED ORAL at 08:11

## 2021-11-21 RX ADMIN — MEROPENEM 2 G: 1 INJECTION INTRAVENOUS at 11:11

## 2021-11-21 RX ADMIN — MEROPENEM 2 G: 1 INJECTION INTRAVENOUS at 03:11

## 2021-11-21 RX ADMIN — INSULIN ASPART 16 UNITS: 100 INJECTION, SOLUTION INTRAVENOUS; SUBCUTANEOUS at 11:11

## 2021-11-21 RX ADMIN — DEXAMETHASONE SODIUM PHOSPHATE 4 MG: 4 INJECTION INTRA-ARTICULAR; INTRALESIONAL; INTRAMUSCULAR; INTRAVENOUS; SOFT TISSUE at 11:11

## 2021-11-21 RX ADMIN — INSULIN DETEMIR 28 UNITS: 100 INJECTION, SOLUTION SUBCUTANEOUS at 08:11

## 2021-11-21 RX ADMIN — ATORVASTATIN CALCIUM 40 MG: 20 TABLET, FILM COATED ORAL at 08:11

## 2021-11-21 RX ADMIN — DEXAMETHASONE SODIUM PHOSPHATE 4 MG: 4 INJECTION INTRA-ARTICULAR; INTRALESIONAL; INTRAMUSCULAR; INTRAVENOUS; SOFT TISSUE at 06:11

## 2021-11-22 LAB
ALBUMIN SERPL BCP-MCNC: 2.3 G/DL (ref 3.5–5.2)
ALP SERPL-CCNC: 70 U/L (ref 55–135)
ALT SERPL W/O P-5'-P-CCNC: 32 U/L (ref 10–44)
ANION GAP SERPL CALC-SCNC: 6 MMOL/L (ref 8–16)
APTT BLDCRRT: 23.4 SEC (ref 21–32)
AST SERPL-CCNC: 23 U/L (ref 10–40)
BASOPHILS # BLD AUTO: 0.01 K/UL (ref 0–0.2)
BASOPHILS NFR BLD: 0.1 % (ref 0–1.9)
BILIRUB SERPL-MCNC: 0.9 MG/DL (ref 0.1–1)
BUN SERPL-MCNC: 18 MG/DL (ref 8–23)
CALCIUM SERPL-MCNC: 8.7 MG/DL (ref 8.7–10.5)
CHLORIDE SERPL-SCNC: 108 MMOL/L (ref 95–110)
CO2 SERPL-SCNC: 25 MMOL/L (ref 23–29)
CREAT SERPL-MCNC: 0.6 MG/DL (ref 0.5–1.4)
DIFFERENTIAL METHOD: ABNORMAL
EOSINOPHIL # BLD AUTO: 0 K/UL (ref 0–0.5)
EOSINOPHIL NFR BLD: 0.1 % (ref 0–8)
ERYTHROCYTE [DISTWIDTH] IN BLOOD BY AUTOMATED COUNT: 15.3 % (ref 11.5–14.5)
EST. GFR  (AFRICAN AMERICAN): >60 ML/MIN/1.73 M^2
EST. GFR  (NON AFRICAN AMERICAN): >60 ML/MIN/1.73 M^2
GLUCOSE SERPL-MCNC: 68 MG/DL (ref 70–110)
HCT VFR BLD AUTO: 34.8 % (ref 37–48.5)
HGB BLD-MCNC: 11.2 G/DL (ref 12–16)
IMM GRANULOCYTES # BLD AUTO: 0.13 K/UL (ref 0–0.04)
IMM GRANULOCYTES NFR BLD AUTO: 1.6 % (ref 0–0.5)
LYMPHOCYTES # BLD AUTO: 1.4 K/UL (ref 1–4.8)
LYMPHOCYTES NFR BLD: 16.6 % (ref 18–48)
MAGNESIUM SERPL-MCNC: 2.3 MG/DL (ref 1.6–2.6)
MCH RBC QN AUTO: 28.3 PG (ref 27–31)
MCHC RBC AUTO-ENTMCNC: 32.2 G/DL (ref 32–36)
MCV RBC AUTO: 88 FL (ref 82–98)
MONOCYTES # BLD AUTO: 0.7 K/UL (ref 0.3–1)
MONOCYTES NFR BLD: 8.1 % (ref 4–15)
NEUTROPHILS # BLD AUTO: 6.2 K/UL (ref 1.8–7.7)
NEUTROPHILS NFR BLD: 73.5 % (ref 38–73)
NRBC BLD-RTO: 0 /100 WBC
PHOSPHATE SERPL-MCNC: 2.3 MG/DL (ref 2.7–4.5)
PLATELET # BLD AUTO: 172 K/UL (ref 150–450)
PMV BLD AUTO: 11.1 FL (ref 9.2–12.9)
POCT GLUCOSE: 115 MG/DL (ref 70–110)
POCT GLUCOSE: 146 MG/DL (ref 70–110)
POCT GLUCOSE: 161 MG/DL (ref 70–110)
POCT GLUCOSE: 162 MG/DL (ref 70–110)
POCT GLUCOSE: 91 MG/DL (ref 70–110)
POTASSIUM SERPL-SCNC: 4 MMOL/L (ref 3.5–5.1)
PROT SERPL-MCNC: 4.7 G/DL (ref 6–8.4)
RBC # BLD AUTO: 3.96 M/UL (ref 4–5.4)
SODIUM SERPL-SCNC: 139 MMOL/L (ref 136–145)
WBC # BLD AUTO: 8.37 K/UL (ref 3.9–12.7)

## 2021-11-22 PROCEDURE — 92507 TX SP LANG VOICE COMM INDIV: CPT | Mod: HCNC

## 2021-11-22 PROCEDURE — 63600175 PHARM REV CODE 636 W HCPCS: Mod: HCNC | Performed by: PHYSICIAN ASSISTANT

## 2021-11-22 PROCEDURE — 94761 N-INVAS EAR/PLS OXIMETRY MLT: CPT | Mod: HCNC

## 2021-11-22 PROCEDURE — 25000003 PHARM REV CODE 250: Mod: HCNC | Performed by: PHYSICIAN ASSISTANT

## 2021-11-22 PROCEDURE — 99233 SBSQ HOSP IP/OBS HIGH 50: CPT | Mod: HCNC,,, | Performed by: PHYSICIAN ASSISTANT

## 2021-11-22 PROCEDURE — 99024 PR POST-OP FOLLOW-UP VISIT: ICD-10-PCS | Mod: HCNC,GC,, | Performed by: NEUROLOGICAL SURGERY

## 2021-11-22 PROCEDURE — 63600175 PHARM REV CODE 636 W HCPCS: Mod: HCNC

## 2021-11-22 PROCEDURE — 85730 THROMBOPLASTIN TIME PARTIAL: CPT | Mod: HCNC | Performed by: PSYCHIATRY & NEUROLOGY

## 2021-11-22 PROCEDURE — 25000003 PHARM REV CODE 250: Mod: HCNC

## 2021-11-22 PROCEDURE — 99024 POSTOP FOLLOW-UP VISIT: CPT | Mod: HCNC,GC,, | Performed by: NEUROLOGICAL SURGERY

## 2021-11-22 PROCEDURE — 84100 ASSAY OF PHOSPHORUS: CPT | Mod: HCNC | Performed by: PHYSICIAN ASSISTANT

## 2021-11-22 PROCEDURE — 25000003 PHARM REV CODE 250: Mod: HCNC | Performed by: NURSE PRACTITIONER

## 2021-11-22 PROCEDURE — 80053 COMPREHEN METABOLIC PANEL: CPT | Mod: HCNC | Performed by: PHYSICIAN ASSISTANT

## 2021-11-22 PROCEDURE — 11000001 HC ACUTE MED/SURG PRIVATE ROOM: Mod: HCNC

## 2021-11-22 PROCEDURE — 99232 SBSQ HOSP IP/OBS MODERATE 35: CPT | Mod: HCNC,,, | Performed by: NURSE PRACTITIONER

## 2021-11-22 PROCEDURE — 85025 COMPLETE CBC W/AUTO DIFF WBC: CPT | Mod: HCNC | Performed by: PSYCHIATRY & NEUROLOGY

## 2021-11-22 PROCEDURE — 99232 PR SUBSEQUENT HOSPITAL CARE,LEVL II: ICD-10-PCS | Mod: HCNC,,, | Performed by: NURSE PRACTITIONER

## 2021-11-22 PROCEDURE — 83735 ASSAY OF MAGNESIUM: CPT | Mod: HCNC | Performed by: PHYSICIAN ASSISTANT

## 2021-11-22 PROCEDURE — 97116 GAIT TRAINING THERAPY: CPT | Mod: HCNC,CQ

## 2021-11-22 PROCEDURE — 99233 PR SUBSEQUENT HOSPITAL CARE,LEVL III: ICD-10-PCS | Mod: HCNC,,, | Performed by: PHYSICIAN ASSISTANT

## 2021-11-22 PROCEDURE — 97530 THERAPEUTIC ACTIVITIES: CPT | Mod: HCNC,CQ

## 2021-11-22 RX ORDER — INSULIN ASPART 100 [IU]/ML
14 INJECTION, SOLUTION INTRAVENOUS; SUBCUTANEOUS
Status: DISCONTINUED | OUTPATIENT
Start: 2021-11-22 | End: 2021-11-24

## 2021-11-22 RX ADMIN — INSULIN ASPART 14 UNITS: 100 INJECTION, SOLUTION INTRAVENOUS; SUBCUTANEOUS at 12:11

## 2021-11-22 RX ADMIN — POTASSIUM & SODIUM PHOSPHATES POWDER PACK 280-160-250 MG 2 PACKET: 280-160-250 PACK at 08:11

## 2021-11-22 RX ADMIN — MEROPENEM 2 G: 1 INJECTION INTRAVENOUS at 03:11

## 2021-11-22 RX ADMIN — CARVEDILOL 6.25 MG: 6.25 TABLET, FILM COATED ORAL at 08:11

## 2021-11-22 RX ADMIN — CARVEDILOL 6.25 MG: 6.25 TABLET, FILM COATED ORAL at 09:11

## 2021-11-22 RX ADMIN — APIXABAN 5 MG: 5 TABLET, FILM COATED ORAL at 09:11

## 2021-11-22 RX ADMIN — INSULIN ASPART 14 UNITS: 100 INJECTION, SOLUTION INTRAVENOUS; SUBCUTANEOUS at 08:11

## 2021-11-22 RX ADMIN — MEROPENEM 2 G: 1 INJECTION INTRAVENOUS at 09:11

## 2021-11-22 RX ADMIN — ATORVASTATIN CALCIUM 40 MG: 20 TABLET, FILM COATED ORAL at 08:11

## 2021-11-22 RX ADMIN — MEROPENEM 2 G: 1 INJECTION INTRAVENOUS at 12:11

## 2021-11-22 RX ADMIN — DEXAMETHASONE SODIUM PHOSPHATE 4 MG: 4 INJECTION INTRA-ARTICULAR; INTRALESIONAL; INTRAMUSCULAR; INTRAVENOUS; SOFT TISSUE at 05:11

## 2021-11-22 RX ADMIN — SENNOSIDES AND DOCUSATE SODIUM 1 TABLET: 50; 8.6 TABLET ORAL at 08:11

## 2021-11-22 RX ADMIN — DEXAMETHASONE SODIUM PHOSPHATE 4 MG: 4 INJECTION INTRA-ARTICULAR; INTRALESIONAL; INTRAMUSCULAR; INTRAVENOUS; SOFT TISSUE at 06:11

## 2021-11-22 RX ADMIN — PANTOPRAZOLE SODIUM 40 MG: 20 TABLET, DELAYED RELEASE ORAL at 08:11

## 2021-11-22 RX ADMIN — DEXAMETHASONE SODIUM PHOSPHATE 4 MG: 4 INJECTION INTRA-ARTICULAR; INTRALESIONAL; INTRAMUSCULAR; INTRAVENOUS; SOFT TISSUE at 11:11

## 2021-11-22 RX ADMIN — INSULIN ASPART 14 UNITS: 100 INJECTION, SOLUTION INTRAVENOUS; SUBCUTANEOUS at 05:11

## 2021-11-22 RX ADMIN — APIXABAN 5 MG: 5 TABLET, FILM COATED ORAL at 08:11

## 2021-11-22 RX ADMIN — DEXAMETHASONE SODIUM PHOSPHATE 4 MG: 4 INJECTION INTRA-ARTICULAR; INTRALESIONAL; INTRAMUSCULAR; INTRAVENOUS; SOFT TISSUE at 12:11

## 2021-11-22 RX ADMIN — POTASSIUM & SODIUM PHOSPHATES POWDER PACK 280-160-250 MG 2 PACKET: 280-160-250 PACK at 03:11

## 2021-11-22 RX ADMIN — SENNOSIDES AND DOCUSATE SODIUM 1 TABLET: 50; 8.6 TABLET ORAL at 09:11

## 2021-11-22 RX ADMIN — AMLODIPINE BESYLATE 5 MG: 5 TABLET ORAL at 08:11

## 2021-11-23 LAB
ALBUMIN SERPL BCP-MCNC: 2.7 G/DL (ref 3.5–5.2)
ALP SERPL-CCNC: 89 U/L (ref 55–135)
ALT SERPL W/O P-5'-P-CCNC: 48 U/L (ref 10–44)
ANION GAP SERPL CALC-SCNC: 10 MMOL/L (ref 8–16)
APTT BLDCRRT: 22.5 SEC (ref 21–32)
AST SERPL-CCNC: 27 U/L (ref 10–40)
BASOPHILS # BLD AUTO: 0.01 K/UL (ref 0–0.2)
BASOPHILS # BLD AUTO: 0.02 K/UL (ref 0–0.2)
BASOPHILS NFR BLD: 0.1 % (ref 0–1.9)
BASOPHILS NFR BLD: 0.2 % (ref 0–1.9)
BILIRUB SERPL-MCNC: 1.5 MG/DL (ref 0.1–1)
BUN SERPL-MCNC: 18 MG/DL (ref 8–23)
CALCIUM SERPL-MCNC: 9.2 MG/DL (ref 8.7–10.5)
CHLORIDE SERPL-SCNC: 104 MMOL/L (ref 95–110)
CO2 SERPL-SCNC: 20 MMOL/L (ref 23–29)
CREAT SERPL-MCNC: 0.7 MG/DL (ref 0.5–1.4)
DIFFERENTIAL METHOD: ABNORMAL
DIFFERENTIAL METHOD: ABNORMAL
EOSINOPHIL # BLD AUTO: 0 K/UL (ref 0–0.5)
EOSINOPHIL # BLD AUTO: 0 K/UL (ref 0–0.5)
EOSINOPHIL NFR BLD: 0 % (ref 0–8)
EOSINOPHIL NFR BLD: 0 % (ref 0–8)
ERYTHROCYTE [DISTWIDTH] IN BLOOD BY AUTOMATED COUNT: 15.4 % (ref 11.5–14.5)
ERYTHROCYTE [DISTWIDTH] IN BLOOD BY AUTOMATED COUNT: 15.6 % (ref 11.5–14.5)
EST. GFR  (AFRICAN AMERICAN): >60 ML/MIN/1.73 M^2
EST. GFR  (NON AFRICAN AMERICAN): >60 ML/MIN/1.73 M^2
GLUCOSE SERPL-MCNC: 241 MG/DL (ref 70–110)
HCT VFR BLD AUTO: 38.6 % (ref 37–48.5)
HCT VFR BLD AUTO: 38.8 % (ref 37–48.5)
HGB BLD-MCNC: 12.6 G/DL (ref 12–16)
HGB BLD-MCNC: 12.8 G/DL (ref 12–16)
IMM GRANULOCYTES # BLD AUTO: 0.14 K/UL (ref 0–0.04)
IMM GRANULOCYTES # BLD AUTO: 0.14 K/UL (ref 0–0.04)
IMM GRANULOCYTES NFR BLD AUTO: 1.6 % (ref 0–0.5)
IMM GRANULOCYTES NFR BLD AUTO: 1.6 % (ref 0–0.5)
LYMPHOCYTES # BLD AUTO: 0.7 K/UL (ref 1–4.8)
LYMPHOCYTES # BLD AUTO: 0.8 K/UL (ref 1–4.8)
LYMPHOCYTES NFR BLD: 8.1 % (ref 18–48)
LYMPHOCYTES NFR BLD: 8.8 % (ref 18–48)
MAGNESIUM SERPL-MCNC: 2.3 MG/DL (ref 1.6–2.6)
MCH RBC QN AUTO: 27.5 PG (ref 27–31)
MCH RBC QN AUTO: 28.3 PG (ref 27–31)
MCHC RBC AUTO-ENTMCNC: 32.5 G/DL (ref 32–36)
MCHC RBC AUTO-ENTMCNC: 33.2 G/DL (ref 32–36)
MCV RBC AUTO: 85 FL (ref 82–98)
MCV RBC AUTO: 85 FL (ref 82–98)
MONOCYTES # BLD AUTO: 0.4 K/UL (ref 0.3–1)
MONOCYTES # BLD AUTO: 0.4 K/UL (ref 0.3–1)
MONOCYTES NFR BLD: 4.3 % (ref 4–15)
MONOCYTES NFR BLD: 5.1 % (ref 4–15)
NEUTROPHILS # BLD AUTO: 7.3 K/UL (ref 1.8–7.7)
NEUTROPHILS # BLD AUTO: 7.3 K/UL (ref 1.8–7.7)
NEUTROPHILS NFR BLD: 84.4 % (ref 38–73)
NEUTROPHILS NFR BLD: 85.8 % (ref 38–73)
NRBC BLD-RTO: 0 /100 WBC
NRBC BLD-RTO: 0 /100 WBC
PHOSPHATE SERPL-MCNC: 2.9 MG/DL (ref 2.7–4.5)
PLATELET # BLD AUTO: 220 K/UL (ref 150–450)
PLATELET # BLD AUTO: 220 K/UL (ref 150–450)
PMV BLD AUTO: 10.7 FL (ref 9.2–12.9)
PMV BLD AUTO: 11.5 FL (ref 9.2–12.9)
POCT GLUCOSE: 152 MG/DL (ref 70–110)
POCT GLUCOSE: 218 MG/DL (ref 70–110)
POCT GLUCOSE: 219 MG/DL (ref 70–110)
POCT GLUCOSE: 239 MG/DL (ref 70–110)
POCT GLUCOSE: 308 MG/DL (ref 70–110)
POTASSIUM SERPL-SCNC: 4.5 MMOL/L (ref 3.5–5.1)
PROT SERPL-MCNC: 5.5 G/DL (ref 6–8.4)
RBC # BLD AUTO: 4.53 M/UL (ref 4–5.4)
RBC # BLD AUTO: 4.58 M/UL (ref 4–5.4)
SODIUM SERPL-SCNC: 134 MMOL/L (ref 136–145)
WBC # BLD AUTO: 8.55 K/UL (ref 3.9–12.7)
WBC # BLD AUTO: 8.63 K/UL (ref 3.9–12.7)

## 2021-11-23 PROCEDURE — 63600175 PHARM REV CODE 636 W HCPCS: Mod: HCNC

## 2021-11-23 PROCEDURE — 25000003 PHARM REV CODE 250: Mod: HCNC | Performed by: NURSE PRACTITIONER

## 2021-11-23 PROCEDURE — 99024 PR POST-OP FOLLOW-UP VISIT: ICD-10-PCS | Mod: HCNC,GC,, | Performed by: NEUROLOGICAL SURGERY

## 2021-11-23 PROCEDURE — 92507 TX SP LANG VOICE COMM INDIV: CPT | Mod: HCNC

## 2021-11-23 PROCEDURE — 99024 POSTOP FOLLOW-UP VISIT: CPT | Mod: HCNC,GC,, | Performed by: NEUROLOGICAL SURGERY

## 2021-11-23 PROCEDURE — 25000003 PHARM REV CODE 250: Mod: HCNC

## 2021-11-23 PROCEDURE — 84100 ASSAY OF PHOSPHORUS: CPT | Mod: HCNC | Performed by: PHYSICIAN ASSISTANT

## 2021-11-23 PROCEDURE — 99024 PR POST-OP FOLLOW-UP VISIT: ICD-10-PCS | Mod: HCNC,,, | Performed by: PHYSICIAN ASSISTANT

## 2021-11-23 PROCEDURE — 97535 SELF CARE MNGMENT TRAINING: CPT | Mod: HCNC

## 2021-11-23 PROCEDURE — 99232 PR SUBSEQUENT HOSPITAL CARE,LEVL II: ICD-10-PCS | Mod: HCNC,,, | Performed by: NURSE PRACTITIONER

## 2021-11-23 PROCEDURE — 25000003 PHARM REV CODE 250: Mod: HCNC | Performed by: STUDENT IN AN ORGANIZED HEALTH CARE EDUCATION/TRAINING PROGRAM

## 2021-11-23 PROCEDURE — 63600175 PHARM REV CODE 636 W HCPCS: Mod: HCNC | Performed by: PHYSICIAN ASSISTANT

## 2021-11-23 PROCEDURE — 99232 SBSQ HOSP IP/OBS MODERATE 35: CPT | Mod: HCNC,,, | Performed by: NURSE PRACTITIONER

## 2021-11-23 PROCEDURE — 63600175 PHARM REV CODE 636 W HCPCS: Mod: HCNC | Performed by: NURSE PRACTITIONER

## 2021-11-23 PROCEDURE — 80053 COMPREHEN METABOLIC PANEL: CPT | Mod: HCNC | Performed by: PHYSICIAN ASSISTANT

## 2021-11-23 PROCEDURE — 99024 POSTOP FOLLOW-UP VISIT: CPT | Mod: HCNC,,, | Performed by: PHYSICIAN ASSISTANT

## 2021-11-23 PROCEDURE — 85025 COMPLETE CBC W/AUTO DIFF WBC: CPT | Mod: 91,HCNC | Performed by: NURSE PRACTITIONER

## 2021-11-23 PROCEDURE — 85025 COMPLETE CBC W/AUTO DIFF WBC: CPT | Mod: HCNC | Performed by: PSYCHIATRY & NEUROLOGY

## 2021-11-23 PROCEDURE — 25000003 PHARM REV CODE 250: Mod: HCNC | Performed by: PHYSICIAN ASSISTANT

## 2021-11-23 PROCEDURE — 11000001 HC ACUTE MED/SURG PRIVATE ROOM: Mod: HCNC

## 2021-11-23 PROCEDURE — 83735 ASSAY OF MAGNESIUM: CPT | Mod: HCNC | Performed by: PHYSICIAN ASSISTANT

## 2021-11-23 PROCEDURE — 97530 THERAPEUTIC ACTIVITIES: CPT | Mod: HCNC

## 2021-11-23 PROCEDURE — 85730 THROMBOPLASTIN TIME PARTIAL: CPT | Mod: HCNC | Performed by: PSYCHIATRY & NEUROLOGY

## 2021-11-23 RX ORDER — DEXAMETHASONE SODIUM PHOSPHATE 4 MG/ML
4 INJECTION, SOLUTION INTRA-ARTICULAR; INTRALESIONAL; INTRAMUSCULAR; INTRAVENOUS; SOFT TISSUE EVERY 8 HOURS
Status: DISCONTINUED | OUTPATIENT
Start: 2021-11-23 | End: 2021-11-24

## 2021-11-23 RX ADMIN — INSULIN ASPART 14 UNITS: 100 INJECTION, SOLUTION INTRAVENOUS; SUBCUTANEOUS at 12:11

## 2021-11-23 RX ADMIN — PANTOPRAZOLE SODIUM 40 MG: 20 TABLET, DELAYED RELEASE ORAL at 07:11

## 2021-11-23 RX ADMIN — MEROPENEM 2 G: 1 INJECTION INTRAVENOUS at 01:11

## 2021-11-23 RX ADMIN — ACETAMINOPHEN 650 MG: 325 TABLET ORAL at 07:11

## 2021-11-23 RX ADMIN — SENNOSIDES AND DOCUSATE SODIUM 1 TABLET: 50; 8.6 TABLET ORAL at 07:11

## 2021-11-23 RX ADMIN — INSULIN ASPART 14 UNITS: 100 INJECTION, SOLUTION INTRAVENOUS; SUBCUTANEOUS at 08:11

## 2021-11-23 RX ADMIN — DEXAMETHASONE SODIUM PHOSPHATE 4 MG: 4 INJECTION INTRA-ARTICULAR; INTRALESIONAL; INTRAMUSCULAR; INTRAVENOUS; SOFT TISSUE at 06:11

## 2021-11-23 RX ADMIN — AMLODIPINE BESYLATE 5 MG: 5 TABLET ORAL at 07:11

## 2021-11-23 RX ADMIN — SENNOSIDES AND DOCUSATE SODIUM 1 TABLET: 50; 8.6 TABLET ORAL at 10:11

## 2021-11-23 RX ADMIN — APIXABAN 5 MG: 5 TABLET, FILM COATED ORAL at 07:11

## 2021-11-23 RX ADMIN — APIXABAN 5 MG: 5 TABLET, FILM COATED ORAL at 10:11

## 2021-11-23 RX ADMIN — MEROPENEM 2 G: 1 INJECTION INTRAVENOUS at 06:11

## 2021-11-23 RX ADMIN — ATORVASTATIN CALCIUM 40 MG: 20 TABLET, FILM COATED ORAL at 07:11

## 2021-11-23 RX ADMIN — DEXAMETHASONE SODIUM PHOSPHATE 4 MG: 4 INJECTION INTRA-ARTICULAR; INTRALESIONAL; INTRAMUSCULAR; INTRAVENOUS; SOFT TISSUE at 10:11

## 2021-11-23 RX ADMIN — DEXAMETHASONE SODIUM PHOSPHATE 4 MG: 4 INJECTION INTRA-ARTICULAR; INTRALESIONAL; INTRAMUSCULAR; INTRAVENOUS; SOFT TISSUE at 12:11

## 2021-11-23 RX ADMIN — INSULIN ASPART 1 UNITS: 100 INJECTION, SOLUTION INTRAVENOUS; SUBCUTANEOUS at 02:11

## 2021-11-23 RX ADMIN — INSULIN ASPART 4 UNITS: 100 INJECTION, SOLUTION INTRAVENOUS; SUBCUTANEOUS at 12:11

## 2021-11-23 RX ADMIN — CARVEDILOL 6.25 MG: 6.25 TABLET, FILM COATED ORAL at 07:11

## 2021-11-23 RX ADMIN — CARVEDILOL 6.25 MG: 6.25 TABLET, FILM COATED ORAL at 10:11

## 2021-11-23 RX ADMIN — INSULIN ASPART 14 UNITS: 100 INJECTION, SOLUTION INTRAVENOUS; SUBCUTANEOUS at 05:11

## 2021-11-24 ENCOUNTER — PATIENT MESSAGE (OUTPATIENT)
Dept: NEUROSURGERY | Facility: CLINIC | Age: 76
End: 2021-11-24
Payer: MEDICARE

## 2021-11-24 VITALS
HEART RATE: 86 BPM | RESPIRATION RATE: 18 BRPM | TEMPERATURE: 98 F | DIASTOLIC BLOOD PRESSURE: 72 MMHG | OXYGEN SATURATION: 97 % | SYSTOLIC BLOOD PRESSURE: 144 MMHG | HEIGHT: 62 IN | BODY MASS INDEX: 32.2 KG/M2 | WEIGHT: 175 LBS

## 2021-11-24 LAB
ALBUMIN SERPL BCP-MCNC: 2.6 G/DL (ref 3.5–5.2)
ALP SERPL-CCNC: 82 U/L (ref 55–135)
ALT SERPL W/O P-5'-P-CCNC: 35 U/L (ref 10–44)
ANION GAP SERPL CALC-SCNC: 8 MMOL/L (ref 8–16)
APTT BLDCRRT: 22.5 SEC (ref 21–32)
AST SERPL-CCNC: 16 U/L (ref 10–40)
BASOPHILS # BLD AUTO: 0.01 K/UL (ref 0–0.2)
BASOPHILS NFR BLD: 0.1 % (ref 0–1.9)
BILIRUB SERPL-MCNC: 1.6 MG/DL (ref 0.1–1)
BUN SERPL-MCNC: 18 MG/DL (ref 8–23)
CALCIUM SERPL-MCNC: 9.2 MG/DL (ref 8.7–10.5)
CHLORIDE SERPL-SCNC: 103 MMOL/L (ref 95–110)
CO2 SERPL-SCNC: 22 MMOL/L (ref 23–29)
CREAT SERPL-MCNC: 0.8 MG/DL (ref 0.5–1.4)
DIFFERENTIAL METHOD: ABNORMAL
EOSINOPHIL # BLD AUTO: 0 K/UL (ref 0–0.5)
EOSINOPHIL NFR BLD: 0 % (ref 0–8)
ERYTHROCYTE [DISTWIDTH] IN BLOOD BY AUTOMATED COUNT: 15.9 % (ref 11.5–14.5)
EST. GFR  (AFRICAN AMERICAN): >60 ML/MIN/1.73 M^2
EST. GFR  (NON AFRICAN AMERICAN): >60 ML/MIN/1.73 M^2
GLUCOSE SERPL-MCNC: 205 MG/DL (ref 70–110)
HCT VFR BLD AUTO: 38.2 % (ref 37–48.5)
HGB BLD-MCNC: 12.2 G/DL (ref 12–16)
IMM GRANULOCYTES # BLD AUTO: 0.1 K/UL (ref 0–0.04)
IMM GRANULOCYTES NFR BLD AUTO: 1.2 % (ref 0–0.5)
LYMPHOCYTES # BLD AUTO: 0.6 K/UL (ref 1–4.8)
LYMPHOCYTES NFR BLD: 7.2 % (ref 18–48)
MCH RBC QN AUTO: 28 PG (ref 27–31)
MCHC RBC AUTO-ENTMCNC: 31.9 G/DL (ref 32–36)
MCV RBC AUTO: 88 FL (ref 82–98)
MONOCYTES # BLD AUTO: 0.4 K/UL (ref 0.3–1)
MONOCYTES NFR BLD: 4.7 % (ref 4–15)
NEUTROPHILS # BLD AUTO: 7.1 K/UL (ref 1.8–7.7)
NEUTROPHILS NFR BLD: 86.8 % (ref 38–73)
NRBC BLD-RTO: 0 /100 WBC
PLATELET # BLD AUTO: 159 K/UL (ref 150–450)
PMV BLD AUTO: 11 FL (ref 9.2–12.9)
POCT GLUCOSE: 192 MG/DL (ref 70–110)
POCT GLUCOSE: 282 MG/DL (ref 70–110)
POCT GLUCOSE: 346 MG/DL (ref 70–110)
POTASSIUM SERPL-SCNC: 4.4 MMOL/L (ref 3.5–5.1)
PROT SERPL-MCNC: 5 G/DL (ref 6–8.4)
RBC # BLD AUTO: 4.36 M/UL (ref 4–5.4)
SODIUM SERPL-SCNC: 133 MMOL/L (ref 136–145)
WBC # BLD AUTO: 8.23 K/UL (ref 3.9–12.7)

## 2021-11-24 PROCEDURE — 25000003 PHARM REV CODE 250: Mod: HCNC

## 2021-11-24 PROCEDURE — 97535 SELF CARE MNGMENT TRAINING: CPT | Mod: HCNC

## 2021-11-24 PROCEDURE — 85730 THROMBOPLASTIN TIME PARTIAL: CPT | Mod: HCNC | Performed by: PSYCHIATRY & NEUROLOGY

## 2021-11-24 PROCEDURE — 85025 COMPLETE CBC W/AUTO DIFF WBC: CPT | Mod: HCNC | Performed by: PSYCHIATRY & NEUROLOGY

## 2021-11-24 PROCEDURE — 99024 PR POST-OP FOLLOW-UP VISIT: ICD-10-PCS | Mod: HCNC,,, | Performed by: PHYSICIAN ASSISTANT

## 2021-11-24 PROCEDURE — 36415 COLL VENOUS BLD VENIPUNCTURE: CPT | Mod: HCNC | Performed by: PSYCHIATRY & NEUROLOGY

## 2021-11-24 PROCEDURE — 25000003 PHARM REV CODE 250: Mod: HCNC | Performed by: PHYSICIAN ASSISTANT

## 2021-11-24 PROCEDURE — 63600175 PHARM REV CODE 636 W HCPCS: Mod: HCNC | Performed by: PHYSICIAN ASSISTANT

## 2021-11-24 PROCEDURE — 25000003 PHARM REV CODE 250: Mod: HCNC | Performed by: NURSE PRACTITIONER

## 2021-11-24 PROCEDURE — 99232 SBSQ HOSP IP/OBS MODERATE 35: CPT | Mod: HCNC,,, | Performed by: NURSE PRACTITIONER

## 2021-11-24 PROCEDURE — 99024 POSTOP FOLLOW-UP VISIT: CPT | Mod: HCNC,,, | Performed by: PHYSICIAN ASSISTANT

## 2021-11-24 PROCEDURE — 99222 1ST HOSP IP/OBS MODERATE 55: CPT | Mod: HCNC,,, | Performed by: PHYSICAL MEDICINE & REHABILITATION

## 2021-11-24 PROCEDURE — 63600175 PHARM REV CODE 636 W HCPCS: Mod: HCNC

## 2021-11-24 PROCEDURE — 80053 COMPREHEN METABOLIC PANEL: CPT | Mod: HCNC | Performed by: PHYSICIAN ASSISTANT

## 2021-11-24 PROCEDURE — 97530 THERAPEUTIC ACTIVITIES: CPT | Mod: HCNC

## 2021-11-24 PROCEDURE — 99222 PR INITIAL HOSPITAL CARE,LEVL II: ICD-10-PCS | Mod: HCNC,,, | Performed by: PHYSICAL MEDICINE & REHABILITATION

## 2021-11-24 PROCEDURE — 99232 PR SUBSEQUENT HOSPITAL CARE,LEVL II: ICD-10-PCS | Mod: HCNC,,, | Performed by: NURSE PRACTITIONER

## 2021-11-24 PROCEDURE — 92507 TX SP LANG VOICE COMM INDIV: CPT | Mod: HCNC

## 2021-11-24 RX ORDER — INSULIN ASPART 100 [IU]/ML
14 INJECTION, SOLUTION INTRAVENOUS; SUBCUTANEOUS
Refills: 0
Start: 2021-11-24 | End: 2022-04-19

## 2021-11-24 RX ORDER — CARVEDILOL 6.25 MG/1
6.25 TABLET ORAL 2 TIMES DAILY
Qty: 60 TABLET | Refills: 11
Start: 2021-11-24 | End: 2021-12-27 | Stop reason: SDUPTHER

## 2021-11-24 RX ORDER — POLYETHYLENE GLYCOL 3350 17 G/17G
17 POWDER, FOR SOLUTION ORAL DAILY
Status: DISCONTINUED | OUTPATIENT
Start: 2021-11-24 | End: 2021-11-24 | Stop reason: HOSPADM

## 2021-11-24 RX ORDER — OXYCODONE HYDROCHLORIDE 5 MG/1
5 TABLET ORAL EVERY 6 HOURS PRN
Refills: 0
Start: 2021-11-24 | End: 2022-02-01

## 2021-11-24 RX ORDER — AMLODIPINE BESYLATE 5 MG/1
5 TABLET ORAL DAILY
Qty: 30 TABLET | Refills: 11 | Status: ON HOLD
Start: 2021-11-25 | End: 2021-12-14 | Stop reason: SDUPTHER

## 2021-11-24 RX ORDER — DEXAMETHASONE 2 MG/1
TABLET ORAL
Qty: 32 TABLET | Refills: 0
Start: 2021-11-24 | End: 2021-12-04

## 2021-11-24 RX ORDER — DEXAMETHASONE 4 MG/1
4 TABLET ORAL EVERY 8 HOURS
Status: DISCONTINUED | OUTPATIENT
Start: 2021-11-24 | End: 2021-11-24 | Stop reason: HOSPADM

## 2021-11-24 RX ORDER — INSULIN ASPART 100 [IU]/ML
0-5 INJECTION, SOLUTION INTRAVENOUS; SUBCUTANEOUS
Refills: 0
Start: 2021-11-24 | End: 2022-04-19

## 2021-11-24 RX ORDER — INSULIN ASPART 100 [IU]/ML
16 INJECTION, SOLUTION INTRAVENOUS; SUBCUTANEOUS
Status: DISCONTINUED | OUTPATIENT
Start: 2021-11-24 | End: 2021-11-24 | Stop reason: HOSPADM

## 2021-11-24 RX ORDER — AMOXICILLIN 250 MG
1 CAPSULE ORAL 2 TIMES DAILY
Status: ON HOLD
Start: 2021-11-24 | End: 2021-12-14 | Stop reason: SDUPTHER

## 2021-11-24 RX ADMIN — DEXAMETHASONE 4 MG: 4 TABLET ORAL at 02:11

## 2021-11-24 RX ADMIN — CARVEDILOL 6.25 MG: 6.25 TABLET, FILM COATED ORAL at 07:11

## 2021-11-24 RX ADMIN — INSULIN ASPART 16 UNITS: 100 INJECTION, SOLUTION INTRAVENOUS; SUBCUTANEOUS at 11:11

## 2021-11-24 RX ADMIN — ATORVASTATIN CALCIUM 40 MG: 20 TABLET, FILM COATED ORAL at 07:11

## 2021-11-24 RX ADMIN — PANTOPRAZOLE SODIUM 40 MG: 20 TABLET, DELAYED RELEASE ORAL at 07:11

## 2021-11-24 RX ADMIN — POLYETHYLENE GLYCOL 3350 17 G: 17 POWDER, FOR SOLUTION ORAL at 11:11

## 2021-11-24 RX ADMIN — INSULIN ASPART 4 UNITS: 100 INJECTION, SOLUTION INTRAVENOUS; SUBCUTANEOUS at 11:11

## 2021-11-24 RX ADMIN — MEROPENEM 2 G: 1 INJECTION INTRAVENOUS at 12:11

## 2021-11-24 RX ADMIN — INSULIN ASPART 3 UNITS: 100 INJECTION, SOLUTION INTRAVENOUS; SUBCUTANEOUS at 07:11

## 2021-11-24 RX ADMIN — INSULIN ASPART 14 UNITS: 100 INJECTION, SOLUTION INTRAVENOUS; SUBCUTANEOUS at 07:11

## 2021-11-24 RX ADMIN — APIXABAN 5 MG: 5 TABLET, FILM COATED ORAL at 07:11

## 2021-11-24 RX ADMIN — DEXAMETHASONE SODIUM PHOSPHATE 4 MG: 4 INJECTION INTRA-ARTICULAR; INTRALESIONAL; INTRAMUSCULAR; INTRAVENOUS; SOFT TISSUE at 05:11

## 2021-11-24 RX ADMIN — SENNOSIDES AND DOCUSATE SODIUM 1 TABLET: 50; 8.6 TABLET ORAL at 09:11

## 2021-11-24 RX ADMIN — AMLODIPINE BESYLATE 5 MG: 5 TABLET ORAL at 07:11

## 2021-12-07 PROCEDURE — G0180 PR HOME HEALTH MD CERTIFICATION: ICD-10-PCS | Mod: ,,, | Performed by: PHYSICAL MEDICINE & REHABILITATION

## 2021-12-07 PROCEDURE — G0180 MD CERTIFICATION HHA PATIENT: HCPCS | Mod: ,,, | Performed by: PHYSICAL MEDICINE & REHABILITATION

## 2021-12-08 ENCOUNTER — TELEPHONE (OUTPATIENT)
Dept: INTERNAL MEDICINE | Facility: CLINIC | Age: 76
End: 2021-12-08
Payer: MEDICARE

## 2021-12-08 ENCOUNTER — NURSE TRIAGE (OUTPATIENT)
Dept: ADMINISTRATIVE | Facility: CLINIC | Age: 76
End: 2021-12-08
Payer: MEDICARE

## 2021-12-11 ENCOUNTER — HOSPITAL ENCOUNTER (INPATIENT)
Facility: HOSPITAL | Age: 76
LOS: 2 days | Discharge: HOME HEALTH | DRG: 871 | End: 2021-12-14
Attending: EMERGENCY MEDICINE | Admitting: EMERGENCY MEDICINE
Payer: MEDICARE

## 2021-12-11 DIAGNOSIS — E11.69 TYPE 2 DIABETES MELLITUS WITH OTHER SPECIFIED COMPLICATION, WITHOUT LONG-TERM CURRENT USE OF INSULIN: ICD-10-CM

## 2021-12-11 DIAGNOSIS — K65.1 ABSCESS OF ABDOMINAL CAVITY: ICD-10-CM

## 2021-12-11 DIAGNOSIS — R07.9 CHEST PAIN: ICD-10-CM

## 2021-12-11 DIAGNOSIS — K57.92 DIVERTICULITIS: ICD-10-CM

## 2021-12-11 DIAGNOSIS — A41.9 SEPSIS DUE TO URINARY TRACT INFECTION: ICD-10-CM

## 2021-12-11 DIAGNOSIS — T50.901A OVERDOSE: ICD-10-CM

## 2021-12-11 DIAGNOSIS — N39.0 SEPSIS DUE TO URINARY TRACT INFECTION: ICD-10-CM

## 2021-12-11 DIAGNOSIS — A41.9 SEPSIS, DUE TO UNSPECIFIED ORGANISM, UNSPECIFIED WHETHER ACUTE ORGAN DYSFUNCTION PRESENT: ICD-10-CM

## 2021-12-11 LAB
ALBUMIN SERPL BCP-MCNC: 2.2 G/DL (ref 3.5–5.2)
ALP SERPL-CCNC: 84 U/L (ref 55–135)
ALT SERPL W/O P-5'-P-CCNC: 14 U/L (ref 10–44)
ANION GAP SERPL CALC-SCNC: 9 MMOL/L (ref 8–16)
ANISOCYTOSIS BLD QL SMEAR: SLIGHT
AST SERPL-CCNC: 15 U/L (ref 10–40)
BACTERIA #/AREA URNS AUTO: ABNORMAL /HPF
BASOPHILS # BLD AUTO: ABNORMAL K/UL (ref 0–0.2)
BASOPHILS NFR BLD: 0 % (ref 0–1.9)
BILIRUB SERPL-MCNC: 0.6 MG/DL (ref 0.1–1)
BILIRUB UR QL STRIP: NEGATIVE
BUN SERPL-MCNC: 10 MG/DL (ref 8–23)
CALCIUM SERPL-MCNC: 9.1 MG/DL (ref 8.7–10.5)
CHLORIDE SERPL-SCNC: 110 MMOL/L (ref 95–110)
CLARITY UR REFRACT.AUTO: ABNORMAL
CO2 SERPL-SCNC: 21 MMOL/L (ref 23–29)
COLOR UR AUTO: YELLOW
CREAT SERPL-MCNC: 0.7 MG/DL (ref 0.5–1.4)
DIFFERENTIAL METHOD: ABNORMAL
EOSINOPHIL # BLD AUTO: ABNORMAL K/UL (ref 0–0.5)
EOSINOPHIL NFR BLD: 0 % (ref 0–8)
ERYTHROCYTE [DISTWIDTH] IN BLOOD BY AUTOMATED COUNT: 16 % (ref 11.5–14.5)
EST. GFR  (AFRICAN AMERICAN): >60 ML/MIN/1.73 M^2
EST. GFR  (NON AFRICAN AMERICAN): >60 ML/MIN/1.73 M^2
GLUCOSE SERPL-MCNC: 75 MG/DL (ref 70–110)
GLUCOSE UR QL STRIP: NEGATIVE
HCT VFR BLD AUTO: 35 % (ref 37–48.5)
HGB BLD-MCNC: 11.2 G/DL (ref 12–16)
HGB UR QL STRIP: ABNORMAL
IMM GRANULOCYTES # BLD AUTO: ABNORMAL K/UL (ref 0–0.04)
IMM GRANULOCYTES NFR BLD AUTO: ABNORMAL % (ref 0–0.5)
KETONES UR QL STRIP: NEGATIVE
LACTATE SERPL-SCNC: 2.5 MMOL/L (ref 0.5–2.2)
LEUKOCYTE ESTERASE UR QL STRIP: ABNORMAL
LYMPHOCYTES # BLD AUTO: ABNORMAL K/UL (ref 1–4.8)
LYMPHOCYTES NFR BLD: 31 % (ref 18–48)
MCH RBC QN AUTO: 28.2 PG (ref 27–31)
MCHC RBC AUTO-ENTMCNC: 32 G/DL (ref 32–36)
MCV RBC AUTO: 88 FL (ref 82–98)
METAMYELOCYTES NFR BLD MANUAL: 3 %
MICROSCOPIC COMMENT: ABNORMAL
MONOCYTES # BLD AUTO: ABNORMAL K/UL (ref 0.3–1)
MONOCYTES NFR BLD: 7 % (ref 4–15)
MYELOCYTES NFR BLD MANUAL: 1 %
NEUTROPHILS NFR BLD: 55 % (ref 38–73)
NEUTS BAND NFR BLD MANUAL: 3 %
NITRITE UR QL STRIP: NEGATIVE
NRBC BLD-RTO: 0 /100 WBC
OVALOCYTES BLD QL SMEAR: ABNORMAL
PH UR STRIP: 6 [PH] (ref 5–8)
PLATELET # BLD AUTO: 139 K/UL (ref 150–450)
PLATELET BLD QL SMEAR: ABNORMAL
PMV BLD AUTO: 11 FL (ref 9.2–12.9)
POCT GLUCOSE: 69 MG/DL (ref 70–110)
POCT GLUCOSE: 87 MG/DL (ref 70–110)
POIKILOCYTOSIS BLD QL SMEAR: SLIGHT
POTASSIUM SERPL-SCNC: 4 MMOL/L (ref 3.5–5.1)
PROT SERPL-MCNC: 5.7 G/DL (ref 6–8.4)
PROT UR QL STRIP: NEGATIVE
RBC # BLD AUTO: 3.97 M/UL (ref 4–5.4)
RBC #/AREA URNS AUTO: 6 /HPF (ref 0–4)
SODIUM SERPL-SCNC: 140 MMOL/L (ref 136–145)
SP GR UR STRIP: 1.01 (ref 1–1.03)
SQUAMOUS #/AREA URNS AUTO: 3 /HPF
URN SPEC COLLECT METH UR: ABNORMAL
WBC # BLD AUTO: 6.22 K/UL (ref 3.9–12.7)
WBC #/AREA URNS AUTO: >100 /HPF (ref 0–5)

## 2021-12-11 PROCEDURE — 99285 EMERGENCY DEPT VISIT HI MDM: CPT | Mod: ,,, | Performed by: EMERGENCY MEDICINE

## 2021-12-11 PROCEDURE — 82962 GLUCOSE BLOOD TEST: CPT | Mod: HCNC

## 2021-12-11 PROCEDURE — 93005 ELECTROCARDIOGRAM TRACING: CPT | Mod: HCNC

## 2021-12-11 PROCEDURE — 85027 COMPLETE CBC AUTOMATED: CPT | Mod: HCNC | Performed by: EMERGENCY MEDICINE

## 2021-12-11 PROCEDURE — 99285 PR EMERGENCY DEPT VISIT,LEVEL V: ICD-10-PCS | Mod: ,,, | Performed by: EMERGENCY MEDICINE

## 2021-12-11 PROCEDURE — 83605 ASSAY OF LACTIC ACID: CPT | Mod: HCNC

## 2021-12-11 PROCEDURE — 63600175 PHARM REV CODE 636 W HCPCS: Mod: HCNC

## 2021-12-11 PROCEDURE — 99285 EMERGENCY DEPT VISIT HI MDM: CPT | Mod: 25,HCNC

## 2021-12-11 PROCEDURE — 96366 THER/PROPH/DIAG IV INF ADDON: CPT | Mod: HCNC

## 2021-12-11 PROCEDURE — 25000003 PHARM REV CODE 250: Mod: HCNC

## 2021-12-11 PROCEDURE — 80053 COMPREHEN METABOLIC PANEL: CPT | Mod: HCNC | Performed by: EMERGENCY MEDICINE

## 2021-12-11 PROCEDURE — 85007 BL SMEAR W/DIFF WBC COUNT: CPT | Mod: HCNC | Performed by: EMERGENCY MEDICINE

## 2021-12-11 PROCEDURE — 96365 THER/PROPH/DIAG IV INF INIT: CPT | Mod: HCNC

## 2021-12-11 PROCEDURE — 87186 SC STD MICRODIL/AGAR DIL: CPT | Mod: HCNC

## 2021-12-11 PROCEDURE — 87086 URINE CULTURE/COLONY COUNT: CPT | Mod: HCNC

## 2021-12-11 PROCEDURE — 87088 URINE BACTERIA CULTURE: CPT | Mod: HCNC

## 2021-12-11 PROCEDURE — 93010 EKG 12-LEAD: ICD-10-PCS | Mod: HCNC,,, | Performed by: INTERNAL MEDICINE

## 2021-12-11 PROCEDURE — 81001 URINALYSIS AUTO W/SCOPE: CPT | Mod: HCNC

## 2021-12-11 PROCEDURE — 96361 HYDRATE IV INFUSION ADD-ON: CPT | Mod: HCNC

## 2021-12-11 PROCEDURE — 93010 ELECTROCARDIOGRAM REPORT: CPT | Mod: HCNC,,, | Performed by: INTERNAL MEDICINE

## 2021-12-11 PROCEDURE — 96375 TX/PRO/DX INJ NEW DRUG ADDON: CPT | Mod: HCNC

## 2021-12-11 PROCEDURE — 87040 BLOOD CULTURE FOR BACTERIA: CPT | Mod: HCNC

## 2021-12-11 PROCEDURE — 87077 CULTURE AEROBIC IDENTIFY: CPT | Mod: HCNC

## 2021-12-11 RX ORDER — DEXTROSE 50 % IN WATER (D50W) INTRAVENOUS SYRINGE
12.5
Status: COMPLETED | OUTPATIENT
Start: 2021-12-11 | End: 2021-12-11

## 2021-12-11 RX ORDER — CEFEPIME HYDROCHLORIDE 2 G/1
2 INJECTION, POWDER, FOR SOLUTION INTRAVENOUS
Status: COMPLETED | OUTPATIENT
Start: 2021-12-11 | End: 2021-12-11

## 2021-12-11 RX ORDER — GLUCAGON 1 MG
1 KIT INJECTION
Status: DISCONTINUED | OUTPATIENT
Start: 2021-12-11 | End: 2021-12-11

## 2021-12-11 RX ADMIN — VANCOMYCIN HYDROCHLORIDE 2000 MG: 10 INJECTION, POWDER, LYOPHILIZED, FOR SOLUTION INTRAVENOUS at 10:12

## 2021-12-11 RX ADMIN — DEXTROSE MONOHYDRATE 12.5 G: 25 INJECTION, SOLUTION INTRAVENOUS at 11:12

## 2021-12-11 RX ADMIN — CEFEPIME 2 G: 2 INJECTION, POWDER, FOR SOLUTION INTRAVENOUS at 10:12

## 2021-12-11 RX ADMIN — SODIUM CHLORIDE, SODIUM LACTATE, POTASSIUM CHLORIDE, AND CALCIUM CHLORIDE 2382 ML: .6; .31; .03; .02 INJECTION, SOLUTION INTRAVENOUS at 09:12

## 2021-12-12 PROBLEM — Z86.711 HISTORY OF PULMONARY EMBOLISM: Status: ACTIVE | Noted: 2021-12-12

## 2021-12-12 PROBLEM — A41.9 SEPSIS: Status: ACTIVE | Noted: 2021-12-12

## 2021-12-12 PROBLEM — E11.9 TYPE 2 DIABETES MELLITUS: Status: ACTIVE | Noted: 2017-10-09

## 2021-12-12 PROBLEM — T50.901A MEDICATION OVERDOSE: Status: ACTIVE | Noted: 2021-12-12

## 2021-12-12 PROBLEM — R41.82 AMS (ALTERED MENTAL STATUS): Status: ACTIVE | Noted: 2021-12-12

## 2021-12-12 LAB
ALBUMIN SERPL BCP-MCNC: 2.1 G/DL (ref 3.5–5.2)
ALLENS TEST: ABNORMAL
ALP SERPL-CCNC: 90 U/L (ref 55–135)
ALT SERPL W/O P-5'-P-CCNC: 12 U/L (ref 10–44)
ANION GAP SERPL CALC-SCNC: 12 MMOL/L (ref 8–16)
AST SERPL-CCNC: 15 U/L (ref 10–40)
BASOPHILS # BLD AUTO: 0.05 K/UL (ref 0–0.2)
BASOPHILS NFR BLD: 0.8 % (ref 0–1.9)
BILIRUB SERPL-MCNC: 0.8 MG/DL (ref 0.1–1)
BUN SERPL-MCNC: 6 MG/DL (ref 8–23)
CALCIUM SERPL-MCNC: 9.3 MG/DL (ref 8.7–10.5)
CHLORIDE SERPL-SCNC: 108 MMOL/L (ref 95–110)
CO2 SERPL-SCNC: 21 MMOL/L (ref 23–29)
CREAT SERPL-MCNC: 0.6 MG/DL (ref 0.5–1.4)
CTP QC/QA: YES
DELSYS: ABNORMAL
DIFFERENTIAL METHOD: ABNORMAL
EOSINOPHIL # BLD AUTO: 0 K/UL (ref 0–0.5)
EOSINOPHIL NFR BLD: 0.3 % (ref 0–8)
ERYTHROCYTE [DISTWIDTH] IN BLOOD BY AUTOMATED COUNT: 16 % (ref 11.5–14.5)
EST. GFR  (AFRICAN AMERICAN): >60 ML/MIN/1.73 M^2
EST. GFR  (NON AFRICAN AMERICAN): >60 ML/MIN/1.73 M^2
GLUCOSE SERPL-MCNC: 78 MG/DL (ref 70–110)
GRAM STN SPEC: NORMAL
HCO3 UR-SCNC: 25.9 MMOL/L (ref 24–28)
HCT VFR BLD AUTO: 38.9 % (ref 37–48.5)
HCT VFR BLD CALC: 35 %PCV (ref 36–54)
HGB BLD-MCNC: 12.1 G/DL (ref 12–16)
IMM GRANULOCYTES # BLD AUTO: 0.28 K/UL (ref 0–0.04)
IMM GRANULOCYTES NFR BLD AUTO: 4.3 % (ref 0–0.5)
INR PPP: 0.9 (ref 0.8–1.2)
LACTATE SERPL-SCNC: 1.4 MMOL/L (ref 0.5–2.2)
LACTATE SERPL-SCNC: 3.1 MMOL/L (ref 0.5–2.2)
LYMPHOCYTES # BLD AUTO: 1.9 K/UL (ref 1–4.8)
LYMPHOCYTES NFR BLD: 29.2 % (ref 18–48)
MCH RBC QN AUTO: 28.2 PG (ref 27–31)
MCHC RBC AUTO-ENTMCNC: 31.1 G/DL (ref 32–36)
MCV RBC AUTO: 91 FL (ref 82–98)
MODE: ABNORMAL
MONOCYTES # BLD AUTO: 0.6 K/UL (ref 0.3–1)
MONOCYTES NFR BLD: 8.5 % (ref 4–15)
NEUTROPHILS # BLD AUTO: 3.7 K/UL (ref 1.8–7.7)
NEUTROPHILS NFR BLD: 56.9 % (ref 38–73)
NRBC BLD-RTO: 0 /100 WBC
PCO2 BLDA: 41.6 MMHG (ref 35–45)
PH SMN: 7.4 [PH] (ref 7.35–7.45)
PLATELET # BLD AUTO: 150 K/UL (ref 150–450)
PMV BLD AUTO: 10.1 FL (ref 9.2–12.9)
PO2 BLDA: 27 MMHG (ref 40–60)
POC BE: 1 MMOL/L
POC IONIZED CALCIUM: 1.27 MMOL/L (ref 1.06–1.42)
POC SATURATED O2: 51 % (ref 95–100)
POC TCO2: 27 MMOL/L (ref 24–29)
POCT GLUCOSE: 172 MG/DL (ref 70–110)
POCT GLUCOSE: 187 MG/DL (ref 70–110)
POCT GLUCOSE: 73 MG/DL (ref 70–110)
POCT GLUCOSE: 80 MG/DL (ref 70–110)
POTASSIUM BLD-SCNC: 3.1 MMOL/L (ref 3.5–5.1)
POTASSIUM SERPL-SCNC: 3.4 MMOL/L (ref 3.5–5.1)
PROT SERPL-MCNC: 5.8 G/DL (ref 6–8.4)
PROTHROMBIN TIME: 10.4 SEC (ref 9–12.5)
RBC # BLD AUTO: 4.29 M/UL (ref 4–5.4)
SAMPLE: ABNORMAL
SARS-COV-2 RDRP RESP QL NAA+PROBE: NEGATIVE
SITE: ABNORMAL
SODIUM BLD-SCNC: 136 MMOL/L (ref 136–145)
SODIUM SERPL-SCNC: 141 MMOL/L (ref 136–145)
WBC # BLD AUTO: 6.5 K/UL (ref 3.9–12.7)

## 2021-12-12 PROCEDURE — 87102 FUNGUS ISOLATION CULTURE: CPT | Mod: HCNC | Performed by: INTERNAL MEDICINE

## 2021-12-12 PROCEDURE — 85610 PROTHROMBIN TIME: CPT | Mod: HCNC | Performed by: STUDENT IN AN ORGANIZED HEALTH CARE EDUCATION/TRAINING PROGRAM

## 2021-12-12 PROCEDURE — 83605 ASSAY OF LACTIC ACID: CPT | Mod: 91,HCNC | Performed by: INTERNAL MEDICINE

## 2021-12-12 PROCEDURE — 87106 FUNGI IDENTIFICATION YEAST: CPT | Mod: HCNC | Performed by: INTERNAL MEDICINE

## 2021-12-12 PROCEDURE — 80053 COMPREHEN METABOLIC PANEL: CPT | Mod: HCNC | Performed by: STUDENT IN AN ORGANIZED HEALTH CARE EDUCATION/TRAINING PROGRAM

## 2021-12-12 PROCEDURE — 25000003 PHARM REV CODE 250: Mod: HCNC | Performed by: STUDENT IN AN ORGANIZED HEALTH CARE EDUCATION/TRAINING PROGRAM

## 2021-12-12 PROCEDURE — 63600175 PHARM REV CODE 636 W HCPCS: Mod: HCNC | Performed by: STUDENT IN AN ORGANIZED HEALTH CARE EDUCATION/TRAINING PROGRAM

## 2021-12-12 PROCEDURE — 25500020 PHARM REV CODE 255: Mod: HCNC | Performed by: INTERNAL MEDICINE

## 2021-12-12 PROCEDURE — 87186 SC STD MICRODIL/AGAR DIL: CPT | Mod: HCNC | Performed by: INTERNAL MEDICINE

## 2021-12-12 PROCEDURE — 87076 CULTURE ANAEROBE IDENT EACH: CPT | Mod: HCNC | Performed by: INTERNAL MEDICINE

## 2021-12-12 PROCEDURE — 87205 SMEAR GRAM STAIN: CPT | Mod: HCNC | Performed by: INTERNAL MEDICINE

## 2021-12-12 PROCEDURE — 87206 SMEAR FLUORESCENT/ACID STAI: CPT | Mod: HCNC | Performed by: INTERNAL MEDICINE

## 2021-12-12 PROCEDURE — 99223 1ST HOSP IP/OBS HIGH 75: CPT | Mod: HCNC,,, | Performed by: INTERNAL MEDICINE

## 2021-12-12 PROCEDURE — 25000003 PHARM REV CODE 250: Mod: HCNC | Performed by: INTERNAL MEDICINE

## 2021-12-12 PROCEDURE — C9399 UNCLASSIFIED DRUGS OR BIOLOG: HCPCS | Mod: HCNC | Performed by: INTERNAL MEDICINE

## 2021-12-12 PROCEDURE — 87077 CULTURE AEROBIC IDENTIFY: CPT | Mod: HCNC | Performed by: INTERNAL MEDICINE

## 2021-12-12 PROCEDURE — 25000003 PHARM REV CODE 250: Mod: HCNC | Performed by: RADIOLOGY

## 2021-12-12 PROCEDURE — 36415 COLL VENOUS BLD VENIPUNCTURE: CPT | Mod: HCNC | Performed by: INTERNAL MEDICINE

## 2021-12-12 PROCEDURE — 63600175 PHARM REV CODE 636 W HCPCS: Mod: HCNC | Performed by: RADIOLOGY

## 2021-12-12 PROCEDURE — 99900035 HC TECH TIME PER 15 MIN (STAT): Mod: HCNC

## 2021-12-12 PROCEDURE — 20600001 HC STEP DOWN PRIVATE ROOM: Mod: HCNC

## 2021-12-12 PROCEDURE — 87040 BLOOD CULTURE FOR BACTERIA: CPT | Mod: HCNC

## 2021-12-12 PROCEDURE — 87075 CULTR BACTERIA EXCEPT BLOOD: CPT | Mod: HCNC | Performed by: INTERNAL MEDICINE

## 2021-12-12 PROCEDURE — 99223 PR INITIAL HOSPITAL CARE,LEVL III: ICD-10-PCS | Mod: HCNC,,, | Performed by: INTERNAL MEDICINE

## 2021-12-12 PROCEDURE — 82803 BLOOD GASES ANY COMBINATION: CPT | Mod: HCNC

## 2021-12-12 PROCEDURE — 99223 1ST HOSP IP/OBS HIGH 75: CPT | Mod: HCNC,AI,GC, | Performed by: INTERNAL MEDICINE

## 2021-12-12 PROCEDURE — 87116 MYCOBACTERIA CULTURE: CPT | Mod: HCNC | Performed by: INTERNAL MEDICINE

## 2021-12-12 PROCEDURE — 87015 SPECIMEN INFECT AGNT CONCNTJ: CPT | Mod: HCNC | Performed by: INTERNAL MEDICINE

## 2021-12-12 PROCEDURE — 85025 COMPLETE CBC W/AUTO DIFF WBC: CPT | Mod: HCNC | Performed by: STUDENT IN AN ORGANIZED HEALTH CARE EDUCATION/TRAINING PROGRAM

## 2021-12-12 PROCEDURE — 87070 CULTURE OTHR SPECIMN AEROBIC: CPT | Mod: HCNC | Performed by: INTERNAL MEDICINE

## 2021-12-12 PROCEDURE — U0002 COVID-19 LAB TEST NON-CDC: HCPCS | Mod: HCNC

## 2021-12-12 PROCEDURE — 83605 ASSAY OF LACTIC ACID: CPT | Mod: HCNC

## 2021-12-12 PROCEDURE — 99223 PR INITIAL HOSPITAL CARE,LEVL III: ICD-10-PCS | Mod: HCNC,AI,GC, | Performed by: INTERNAL MEDICINE

## 2021-12-12 RX ORDER — SODIUM CHLORIDE 0.9 % (FLUSH) 0.9 %
10 SYRINGE (ML) INJECTION EVERY 12 HOURS PRN
Status: DISCONTINUED | OUTPATIENT
Start: 2021-12-12 | End: 2021-12-14 | Stop reason: HOSPADM

## 2021-12-12 RX ORDER — SIMETHICONE 80 MG
1 TABLET,CHEWABLE ORAL 4 TIMES DAILY PRN
Status: DISCONTINUED | OUTPATIENT
Start: 2021-12-12 | End: 2021-12-14 | Stop reason: HOSPADM

## 2021-12-12 RX ORDER — DIPHENHYDRAMINE HYDROCHLORIDE 50 MG/ML
INJECTION INTRAMUSCULAR; INTRAVENOUS CODE/TRAUMA/SEDATION MEDICATION
Status: COMPLETED | OUTPATIENT
Start: 2021-12-12 | End: 2021-12-12

## 2021-12-12 RX ORDER — NALOXONE HCL 0.4 MG/ML
0.02 VIAL (ML) INJECTION
Status: DISCONTINUED | OUTPATIENT
Start: 2021-12-12 | End: 2021-12-14 | Stop reason: HOSPADM

## 2021-12-12 RX ORDER — LIDOCAINE HYDROCHLORIDE 10 MG/ML
INJECTION INFILTRATION; PERINEURAL CODE/TRAUMA/SEDATION MEDICATION
Status: COMPLETED | OUTPATIENT
Start: 2021-12-12 | End: 2021-12-12

## 2021-12-12 RX ORDER — PRAVASTATIN SODIUM 10 MG/1
40 TABLET ORAL NIGHTLY
Status: DISCONTINUED | OUTPATIENT
Start: 2021-12-12 | End: 2021-12-12

## 2021-12-12 RX ORDER — IBUPROFEN 200 MG
16 TABLET ORAL
Status: DISCONTINUED | OUTPATIENT
Start: 2021-12-12 | End: 2021-12-14 | Stop reason: HOSPADM

## 2021-12-12 RX ORDER — SODIUM CHLORIDE, SODIUM LACTATE, POTASSIUM CHLORIDE, CALCIUM CHLORIDE 600; 310; 30; 20 MG/100ML; MG/100ML; MG/100ML; MG/100ML
INJECTION, SOLUTION INTRAVENOUS CONTINUOUS
Status: DISCONTINUED | OUTPATIENT
Start: 2021-12-12 | End: 2021-12-12

## 2021-12-12 RX ORDER — TALC
6 POWDER (GRAM) TOPICAL NIGHTLY PRN
Status: DISCONTINUED | OUTPATIENT
Start: 2021-12-12 | End: 2021-12-14 | Stop reason: HOSPADM

## 2021-12-12 RX ORDER — MEROPENEM AND SODIUM CHLORIDE 1 G/50ML
1 INJECTION, SOLUTION INTRAVENOUS
Status: DISCONTINUED | OUTPATIENT
Start: 2021-12-12 | End: 2021-12-14 | Stop reason: HOSPADM

## 2021-12-12 RX ORDER — CYANOCOBALAMIN (VITAMIN B-12) 250 MCG
250 TABLET ORAL DAILY
Status: DISCONTINUED | OUTPATIENT
Start: 2021-12-12 | End: 2021-12-14 | Stop reason: HOSPADM

## 2021-12-12 RX ORDER — GLUCAGON 1 MG
1 KIT INJECTION
Status: DISCONTINUED | OUTPATIENT
Start: 2021-12-12 | End: 2021-12-12

## 2021-12-12 RX ORDER — FERROUS SULFATE, DRIED 160(50) MG
1 TABLET, EXTENDED RELEASE ORAL 2 TIMES DAILY
Status: DISCONTINUED | OUTPATIENT
Start: 2021-12-12 | End: 2021-12-14 | Stop reason: HOSPADM

## 2021-12-12 RX ORDER — GLUCAGON 1 MG
1 KIT INJECTION
Status: DISCONTINUED | OUTPATIENT
Start: 2021-12-12 | End: 2021-12-14 | Stop reason: HOSPADM

## 2021-12-12 RX ORDER — PREGABALIN 75 MG/1
75 CAPSULE ORAL 2 TIMES DAILY
Status: DISCONTINUED | OUTPATIENT
Start: 2021-12-12 | End: 2021-12-12

## 2021-12-12 RX ORDER — INSULIN ASPART 100 [IU]/ML
1-10 INJECTION, SOLUTION INTRAVENOUS; SUBCUTANEOUS EVERY 6 HOURS PRN
Status: DISCONTINUED | OUTPATIENT
Start: 2021-12-12 | End: 2021-12-14 | Stop reason: HOSPADM

## 2021-12-12 RX ORDER — OXYCODONE HYDROCHLORIDE 5 MG/1
5 TABLET ORAL EVERY 6 HOURS PRN
Status: DISCONTINUED | OUTPATIENT
Start: 2021-12-12 | End: 2021-12-14 | Stop reason: HOSPADM

## 2021-12-12 RX ORDER — POLYETHYLENE GLYCOL 3350 17 G/17G
17 POWDER, FOR SOLUTION ORAL 2 TIMES DAILY
Status: DISCONTINUED | OUTPATIENT
Start: 2021-12-12 | End: 2021-12-14 | Stop reason: HOSPADM

## 2021-12-12 RX ORDER — AMOXICILLIN 250 MG
1 CAPSULE ORAL 2 TIMES DAILY
Status: DISCONTINUED | OUTPATIENT
Start: 2021-12-12 | End: 2021-12-14 | Stop reason: HOSPADM

## 2021-12-12 RX ORDER — IBUPROFEN 200 MG
24 TABLET ORAL
Status: DISCONTINUED | OUTPATIENT
Start: 2021-12-12 | End: 2021-12-14 | Stop reason: HOSPADM

## 2021-12-12 RX ORDER — ENOXAPARIN SODIUM 100 MG/ML
40 INJECTION SUBCUTANEOUS EVERY 24 HOURS
Status: DISCONTINUED | OUTPATIENT
Start: 2021-12-12 | End: 2021-12-12

## 2021-12-12 RX ORDER — ACETAMINOPHEN 325 MG/1
650 TABLET ORAL EVERY 8 HOURS PRN
Status: DISCONTINUED | OUTPATIENT
Start: 2021-12-12 | End: 2021-12-14 | Stop reason: HOSPADM

## 2021-12-12 RX ORDER — FAMOTIDINE 20 MG/1
20 TABLET, FILM COATED ORAL NIGHTLY
Status: DISCONTINUED | OUTPATIENT
Start: 2021-12-12 | End: 2021-12-14 | Stop reason: HOSPADM

## 2021-12-12 RX ORDER — SODIUM CHLORIDE, SODIUM LACTATE, POTASSIUM CHLORIDE, CALCIUM CHLORIDE 600; 310; 30; 20 MG/100ML; MG/100ML; MG/100ML; MG/100ML
INJECTION, SOLUTION INTRAVENOUS CONTINUOUS
Status: ACTIVE | OUTPATIENT
Start: 2021-12-12 | End: 2021-12-13

## 2021-12-12 RX ADMIN — APIXABAN 5 MG: 5 TABLET, FILM COATED ORAL at 09:12

## 2021-12-12 RX ADMIN — SENNOSIDES AND DOCUSATE SODIUM 1 TABLET: 50; 8.6 TABLET ORAL at 09:12

## 2021-12-12 RX ADMIN — IOHEXOL 75 ML: 350 INJECTION, SOLUTION INTRAVENOUS at 12:12

## 2021-12-12 RX ADMIN — POLYETHYLENE GLYCOL 3350 17 G: 17 POWDER, FOR SOLUTION ORAL at 09:12

## 2021-12-12 RX ADMIN — DIPHENHYDRAMINE HYDROCHLORIDE 50 MG: 50 INJECTION INTRAMUSCULAR; INTRAVENOUS at 11:12

## 2021-12-12 RX ADMIN — Medication 1 TABLET: at 09:12

## 2021-12-12 RX ADMIN — PIPERACILLIN SODIUM AND TAZOBACTAM SODIUM 4.5 G: 4; .5 INJECTION, POWDER, FOR SOLUTION INTRAVENOUS at 01:12

## 2021-12-12 RX ADMIN — INSULIN DETEMIR 5 UNITS: 100 INJECTION, SOLUTION SUBCUTANEOUS at 09:12

## 2021-12-12 RX ADMIN — CYANOCOBALAMIN TAB 250 MCG 250 MCG: 250 TAB at 09:12

## 2021-12-12 RX ADMIN — FAMOTIDINE 20 MG: 20 TABLET ORAL at 01:12

## 2021-12-12 RX ADMIN — MEROPENEM AND SODIUM CHLORIDE 1 G: 1 INJECTION, SOLUTION INTRAVENOUS at 02:12

## 2021-12-12 RX ADMIN — MEROPENEM AND SODIUM CHLORIDE 1 G: 1 INJECTION, SOLUTION INTRAVENOUS at 09:12

## 2021-12-12 RX ADMIN — LIDOCAINE HYDROCHLORIDE 10 ML: 10 INJECTION, SOLUTION INFILTRATION; PERINEURAL at 11:12

## 2021-12-12 RX ADMIN — MEROPENEM AND SODIUM CHLORIDE 1 G: 1 INJECTION, SOLUTION INTRAVENOUS at 11:12

## 2021-12-12 RX ADMIN — SODIUM CHLORIDE, SODIUM LACTATE, POTASSIUM CHLORIDE, AND CALCIUM CHLORIDE: .6; .31; .03; .02 INJECTION, SOLUTION INTRAVENOUS at 03:12

## 2021-12-12 RX ADMIN — POTASSIUM BICARBONATE 50 MEQ: 978 TABLET, EFFERVESCENT ORAL at 02:12

## 2021-12-12 RX ADMIN — FAMOTIDINE 20 MG: 20 TABLET ORAL at 09:12

## 2021-12-12 NOTE — ED NOTES
Telemetry Verification   Patient placed on Telemetry Box  Verified with War Room  Box # [id number]   Monitor Tech    Rate 109   Rhythm Sinus tach

## 2021-12-12 NOTE — H&P
[facility] - Telemetry [facility] Medicine  History & Physical    Patient Name: Alisia Gusman  MRN: [id number]  Patient Class: IP- Inpatient  Admission Date: 12/11/2021  Attending Physician: Sarah Gage MD   Primary Care Provider: Selena Montemayor MD         Patient information was obtained from patient and ER records.     Subjective:     Principal Problem:Sepsis    Chief Complaint:   Chief Complaint   Patient presents with    Drug Overdose     Pt reports to ED via EMS from home w/ complaints of accidental overdose pt took 7 days worth of pregabalin 75mg, atenolol 25mg, ASA, Famotidine 20mg, pravastatin 40mg, anastrozole 1mg, magnesium oxide 250mg, insulin,         HPI:   76 year old female with medical history of  type 2 DM, recurrent breast cancer (on Anastrozole), meningioma s/p R frontal craniotomy, PE w/o cor pulmonale and HTN  that was brought to the ED for altered mental status and medication overdose. The patient accidentally took a weeks worth of medication including lyrica 75mg, anastrozole, famotidine 20mg, atenolol 25mg, and pravastatin 40mg. The patient usually has a pill box labeled with the days of the week and she misplaced it so she is unsure of what she took. ROS + for fever and lower abdominal pain. ROS - for nausea, vomiting, diarrhea, chest pain or SOB.    Daughter reports patient has issues with memory loss at baseline due to meningioma but does not usually take more meds than she should but is capable of most ADLs. Her baseline mental status is alert and oriented, ambulates with a walker and lives alone with family checking on her. She mentioned that her sister helps her as well as her children.     Of relevance, patient was admitted in November 2021 for sepsis with E.coli UTI, Pseudomonas and Clostridium bacteremia, diverticulitis, and PNA (finished course of Merrem on 11/23).    Tachypneic, tachycardic, hypotensive  WBC WNL  Lactic acid-->2.5-->3.1    CT abdomen pelvis: Persistent left  lower quadrant colonic mural thickening and associated fat stranding in this patient consistent with diverticulitis.  New focal collection in the left lower quadrant measuring 4.1 cm which may represent pericolonic abscess or fluid/stool within an irregular distended diverticulum.  New mild bilateral hydroureteronephrosis to the level of the urinary bladder, likely related to distension of urinary bladder.  Few foci of air within the urinary bladder, recommend correlation for prior catheterization or procedure.  Correlation with urinalysis could be helpful to exclude colovesicular fistula if no recent catheterization was performed          Past Medical History:   Diagnosis Date    Acute pulmonary embolism without acute cor pulmonale 11/15/2021    Adult bronchiectasis 7/26/2017    Allergy     Anemia     Arthritis     Asthma     Breast cancer 1993    left w/ radiation     Cancer 1993    L eft breast    Cataract     Chronic cervical radiculopathy 9/20/2012    Diabetes mellitus     Diabetes mellitus type II     Diabetes with neurologic complications     Diverticulosis     Dry eyes     Dysphagia     after knee surgery June 2014    GERD (gastroesophageal reflux disease)     Hyperlipidemia     Hypertension     Neuropathy     feet    Scalp tenderness     Shortness of breath     Ulcer        Past Surgical History:   Procedure Laterality Date    BREAST BIOPSY Left 1993    BREAST LUMPECTOMY Left 1993    CARPAL TUNNEL RELEASE Bilateral 2011    CATARACT EXTRACTION W/  INTRAOCULAR LENS IMPLANT Bilateral 2013 and 2014    CHOLECYSTECTOMY      COLONOSCOPY N/A 11/6/2015    Procedure: COLONOSCOPY;  Surgeon: Major Michelle MD;  Location: [address]);  Service: Endoscopy;  Laterality: N/A;    COLONOSCOPY N/A 5/8/2019    Procedure: COLONOSCOPY;  Surgeon: Major Michelle MD;  Location: [facility] ([address] [place] [address]);  Service: Endoscopy;  Laterality: N/A;    CRANIOTOMY USING FRAMELESS STEREOTAXY Right  11/15/2021    Procedure: Right Frontal Craniotomy for Meningioma with  Stealth Navigation;  Surgeon: Moiz Hutchison DO;  Location: [address];  Service: Neurosurgery;  Laterality: Right;    EYE SURGERY      HYSTERECTOMY      partial hyst    INJECTION FOR SENTINEL NODE IDENTIFICATION Left 2/20/2020    Procedure: INJECTION, FOR SENTINEL NODE IDENTIFICATION;  Surgeon: Hamida Nieves MD;  Location: [address];  Service: General;  Laterality: Left;    JOINT REPLACEMENT Left June 2014    knee    MASTECTOMY      SENTINEL LYMPH NODE BIOPSY Left 2/20/2020    Procedure: BIOPSY, LYMPH NODE, SENTINEL;  Surgeon: Hamida Nieves MD;  Location: [address];  Service: General;  Laterality: Left;    UNILATERAL MASTECTOMY Left 2/20/2020    Procedure: MASTECTOMY, UNILATERAL;  Surgeon: Hamida Nieves MD;  Location: [address];  Service: General;  Laterality: Left;    uvuloplasty         Review of patient's allergies indicates:   Allergen Reactions    Grass pollen-june grass standard Other (See Comments)     Causes sinus symptoms like coughing    Losartan      cough    Nubain [nalbuphine] Other (See Comments)     Other reaction(s): Hypotension    Sinus & allergy [chlorpheniramine-phenylephrine]        Current Facility-Administered Medications on File Prior to Encounter   Medication    [DISCONTINUED] GENERIC EXTERNAL MEDICATION    [DISCONTINUED] GENERIC EXTERNAL MEDICATION     Current Outpatient Medications on File Prior to Encounter   Medication Sig    ACCU-CHEK DOMENICO PLUS TEST STRP Strp TEST THREE TIMES DAILY AS DIRECTED    ACCU-CHEK SOFTCLIX LANCETS Misc 1 each by Misc.(Non-Drug; Combo Route) route 3 (three) times daily.    amLODIPine (NORVASC) 5 MG tablet Take 1 tablet (5 mg total) by mouth once daily.    anastrozole (ARIMIDEX) 1 mg Tab TAKE 1 TABLET (1 MG TOTAL) BY MOUTH ONCE DAILY.    apixaban (ELIQUIS) 5 mg Tab Take 1 tablet (5 mg total) by mouth 2 (two) times daily.    BD ALCOHOL SWABS PadM  "1 each 2 (two) times daily.    BD INSULIN SYRINGE ULTRA-FINE 1 mL 31 gauge x 5/16 Syrg TO USE  TWICE DAILY WITH  INSULIN    BD VEO INSULIN SYRINGE UF 0.3 mL 31 gauge x 15/64" Syrg USE  TO INJECT TWICE DAILY    blood-glucose meter Misc 1 each by Misc.(Non-Drug; Combo Route) route once daily.    calcium carbonate-vitamin D3 (CALCIUM 600 WITH VITAMIN D3) 600 mg(1,500mg) -400 unit Chew 1 tablet once daily.     carvediloL (COREG) 6.25 MG tablet Take 1 tablet (6.25 mg total) by mouth 2 (two) times daily.    cyanocobalamin-cobamamide (B-12 PLUS) 5,000-100 mcg Subl Take 5000 mcg daily.    famotidine (PEPCID) 20 MG tablet TAKE 1 TABLET (20 MG TOTAL) BY MOUTH EVERY EVENING.    flash glucose scanning reader (FREESTYLE GERARDO 14 DAY READER) Misc 1 each by Misc.(Non-Drug; Combo Route) route once daily.    flash glucose sensor (FREESTYLE GERARDO 14 DAY SENSOR) Kit 2 each by Misc.(Non-Drug; Combo Route) route every 14 (fourteen) days.    FOLIC ACID/MULTIVIT-MIN/LUTEIN (CENTRUM SILVER ORAL) Take 1 tablet by mouth once daily.    insulin aspart U-100 (NOVOLOG) 100 unit/mL (3 mL) InPn pen Inject 14 Units into the skin 3 (three) times daily with meals.    insulin aspart U-100 (NOVOLOG) 100 unit/mL (3 mL) InPn pen Inject 0-5 Units into the skin before meals and at bedtime as needed (Hyperglycemia).    insulin detemir U-100 (LEVEMIR FLEXTOUCH) 100 unit/mL (3 mL) SubQ InPn pen Inject 28 Units into the skin once daily.    insulin NPH-insulin regular, 70/30, (NOVOLIN 70/30) 100 unit/mL (70-30) injection RELION BRAND 32 units thirty minutes before breakfast and 24 units thirty minutes before dinner.    omeprazole (PRILOSEC) 40 MG capsule TAKE 1 CAPSULE (40 MG TOTAL) BY MOUTH EVERY MORNING.    oxyCODONE (ROXICODONE) 5 MG immediate release tablet Take 1 tablet (5 mg total) by mouth every 6 (six) hours as needed for Pain.    OZEMPIC 1 mg/dose (4 mg/3 mL) INJECT 1MG (0.75 ML) UNDER THE SKIN EVERY 7 DAYS.    pen needle, diabetic " "(BD ULTRA-FINE SUSIE PEN NEEDLE) 32 gauge x 5/32" Ndle USE  TO  INJECT  Weekly    potassium chloride SA (K-DUR,KLOR-CON) 10 MEQ tablet     pravastatin (PRAVACHOL) 40 MG tablet TAKE 1 TABLET NIGHTLY.    pregabalin (LYRICA) 75 MG capsule TAKE 1 CAPSULE TWICE DAILY    semaglutide (OZEMPIC) 1 mg/dose (2 mg/1.5 mL) PnIj Inject 0.75 mLs into the skin every 7 days.    senna-docusate 8.6-50 mg (PERICOLACE) 8.6-50 mg per tablet Take 1 tablet by mouth 2 (two) times daily.    [DISCONTINUED] famotidine (PEPCID) 20 MG tablet TAKE 1 TABLET (20 MG TOTAL) BY MOUTH EVERY EVENING.    [DISCONTINUED] glimepiride (AMARYL) 4 MG tablet TAKE 1 TABLET EVERY DAY WITH BREAKFAST     Family History     Problem Relation (Age of Onset)    Arthritis Sister    Breast cancer Paternal Aunt    Cancer Sister, Sister    Colon polyps Sister, Sister    Diabetes Mother, Sister, Sister, Sister    Heart disease Father    No Known Problems Brother, Daughter, Son, Daughter    Psoriasis Sister    Seizures Sister    Stroke Sister        Tobacco Use    Smoking status: Never Smoker    Smokeless tobacco: Never Used   Substance and Sexual Activity    Alcohol use: No    Drug use: No    Sexual activity: Not Currently     Partners: Male     Review of Systems   Constitutional: Positive for fatigue and fever. Negative for activity change and chills.   HENT: Negative for sneezing and sore throat.    Respiratory: Negative for cough and shortness of breath.    Cardiovascular: Negative for chest pain and palpitations.   Gastrointestinal: Positive for abdominal pain. Negative for abdominal distention.   Genitourinary: Negative for difficulty urinating and flank pain.   Musculoskeletal: Negative for back pain and myalgias.   Skin: Negative for color change and rash.   Neurological: Negative for dizziness and headaches.   Psychiatric/Behavioral: Negative for agitation and self-injury.     Objective:     Vital Signs (Most Recent):  Temp: 97.1 °F (36.2 °C) (12/12/21 " 0400)  Pulse: 87 (12/12/21 0600)  Resp: 13 (12/12/21 0600)  BP: (!) 102/55 (12/12/21 0400)  SpO2: 96 % (12/12/21 0600) Vital Signs (24h Range):  Temp:  [97.1 °F (36.2 °C)-100.3 °F (37.9 °C)] 97.1 °F (36.2 °C)  Pulse:  [] 87  Resp:  [13-21] 13  SpO2:  [94 %-100 %] 96 %  BP: (102-134)/(55-78) 102/55     Weight: 98.5 kg (217 lb 2.5 oz)  Body mass index is 39.72 kg/m².    Physical Exam  Constitutional:       Appearance: Normal appearance. She is ill-appearing.   HENT:      Head: Normocephalic and atraumatic.      Mouth/Throat:      Mouth: Mucous membranes are dry.   Cardiovascular:      Rate and Rhythm: Normal rate and regular rhythm.      Pulses: Normal pulses.      Heart sounds: Normal heart sounds.   Pulmonary:      Effort: Pulmonary effort is normal.      Breath sounds: Normal breath sounds.   Abdominal:      General: Abdomen is flat. Bowel sounds are normal.      Palpations: Abdomen is soft.      Tenderness: There is abdominal tenderness.   Musculoskeletal:         General: No deformity.      Right lower leg: No edema.      Left lower leg: No edema.   Skin:     General: Skin is warm and dry.      Comments: No edema present   Neurological:      Mental Status: She is alert and oriented to person, place, and time.             Significant Labs:   CBC:   Recent Labs   Lab 12/11/21 2020 12/12/21  0122   WBC 6.22  --    HGB 11.2*  --    HCT 35.0* 35*   *  --      CMP:   Recent Labs   Lab 12/11/21 2020      K 4.0      CO2 21*   GLU 75   BUN 10   CREATININE 0.7   CALCIUM 9.1   PROT 5.7*   ALBUMIN 2.2*   BILITOT 0.6   ALKPHOS 84   AST 15   ALT 14   ANIONGAP 9   EGFRNONAA >60.0       Significant Imaging: I have reviewed all pertinent imaging results/findings within the past 24 hours.    Assessment/Plan:     Sepsis  This patient does have evidence of infective focus  My overall impression is sepsis. Vital signs were reviewed and noted in progress note.  Antibiotics given- Merrem/  "Vancomycin  Antibiotics (From admission, onward)            Start     Stop Route Frequency Ordered    12/13/21 1030  vancomycin 750 mg in dextrose 5 % 250 mL IVPB (ready to mix system)         -- IV Every 12 hours (non-standard times) 12/12/21 0027    12/12/21 0715  meropenem-0.9% sodium chloride 1 g/50 mL IVPB         -- IV Every 8 hours (non-standard times) 12/12/21 0604    12/12/21 0121  vancomycin - pharmacy to dose  (vancomycin IVPB)        "And" Linked Group Details    -- IV pharmacy to manage frequency 12/12/21 0021        Cultures were taken-   Microbiology Results (last 7 days)     Procedure Component Value Units Date/Time    Urine culture [[id number]] Collected: 12/11/21 2247    Order Status: No result Specimen: Urine Updated: 12/11/21 2325    Blood culture x two cultures. Draw prior to antibiotics. [[id number]] Collected: 12/11/21 2144    Order Status: Sent Specimen: Blood from Peripheral, Forearm, Right Updated: 12/11/21 2203    Blood Culture #1 **CANNOT BE ORDERED STAT** [[id number]]     Order Status: Canceled Specimen: Blood     Blood culture x two cultures. Draw prior to antibiotics. [[id number]]     Order Status: Sent Specimen: Blood         Latest lactate reviewed, they are-  Recent Labs   Lab 12/11/21 2143   LACTATE 2.5*       Source: Diverticulitis/ UTI    CT abdomen pelvis: Persistent left lower quadrant colonic mural thickening and associated fat stranding in this patient consistent with diverticulitis.  New focal collection in the left lower quadrant measuring 4.1 cm which may represent pericolonic abscess or fluid/stool within an irregular distended diverticulum.  New mild bilateral hydroureteronephrosis to the level of the urinary bladder, likely related to distension of urinary bladder.  Few foci of air within the urinary bladder, recommend correlation for prior catheterization or procedure.  Correlation with urinalysis could be helpful to exclude colovesicular fistula if no recent " catheterization was performed      The patient meets SIRS criteria      Plan  - Vanc/ Merrem  - Consult ID  - General Surgery vs IR to discuss findings in CT abdomen pelvis. Will keep NPO.    AMS (altered mental status)  Resolved      Medication overdose  CMP every 6 hours to monitor for liver toxicity  PT/ INR & monitor for any signs of bleeding. If this happens, reversal can be achieved with PCC.  Hold all medications for today. Reevaluate in the AM tomorrow.      History of pulmonary embolism  PE 11/15/2021    Unclear if patient took the Eliquis too with the other medications. Will order PT/INR. Restart Eliquis tomorrow if PT/ INR at goal. Monitor for signs of bleeding. Reverse with PCC if necessary.      Type 2 diabetes mellitus  Insulin 70/30: 32 units AM/ 25 units QHS  Ozempic 1 mg weekly   Glimepiride 4 mg daily     Patient had episode of hypoglycemia at the ED that was treated with D5    Plan  - Cover with SSI. Monitor BG overnight and start insulin Detemir 10 units BID in the AM if not any hypoglycemic episodes.      Essential hypertension  Plan  - Hold home Amlodipine 5 mg        VTE Risk Mitigation (From admission, onward)         Ordered     apixaban tablet 5 mg  2 times daily         12/12/21 0047     IP VTE HIGH RISK PATIENT  Once         12/12/21 0013     Place sequential compression device  Until discontinued         12/12/21 0013                   Yoon Celaya MD  Department of Hospital Medicine   [facility] - Telemetry Stepdown

## 2021-12-12 NOTE — HOSPITAL COURSE
Underwent 12/12 IR aspiration of abscess seen on CT a/p in LLQ. Lactate normalized and maintenance fluids were discontinued. ID consulted regarding antibiotic choice, recommended continuing vanc and meropenem pending cultures. Abscess aspirate culture grew Proteus, urine culture grew pan-sensitive E coli. Evaluated by PT/OT with recommendation for home health.

## 2021-12-12 NOTE — ED PROVIDER NOTES
Encounter Date: 12/11/2021       History     Chief Complaint   Patient presents with    Drug Overdose     Pt reports to ED via EMS from home w/ complaints of accidental overdose pt took 7 days worth of pregabalin 75mg, atenolol 25mg, ASA, Famotidine 20mg, pravastatin 40mg, anastrozole 1mg, magnesium oxide 250mg, insulin,      76 y.o. F with type 2 DM, recurrent breast cancer, meningioma s/p R frontal craniotomy, diverticulosis w/ diverticulitis, UTI, bacteremia (Pseudomonas and Clostridium), PE w/o cor pulmonale, HTN here for altered mental status and medication overdose. Per daughter, patient accidentally took a weeks worth of medication including lyrica 75mg, anastrozole, famotidine 20mg, atenolol 25mg, and pravastatin 40mg. Pt took normal dose of insulin but has not eaten much. Daughter reports patient has issues with memory loss at baseline due to meningioma but does not usually take more meds than she should but is capable of most ADLs. At home, temp of 100.1 F last night with complaints of abdominal pain and R flank pain. Pt reports mild to moderate abdominal pain. Also reported to daughter of a headache. No Tylenol prior to arrival.    Pt recently admitted in November for sepsis with E.coli UTI, Pseudomonas and Clostridium bacteremia, diverticulitis, and PNA (finished course of Merrem on 11/23).    Pt otherwise denies nausea, emesis, dysuria, hematuria, CP, or SOB.        Review of patient's allergies indicates:   Allergen Reactions    Grass pollen-june grass standard Other (See Comments)     Causes sinus symptoms like coughing    Losartan      cough    Nubain [nalbuphine] Other (See Comments)     Other reaction(s): Hypotension    Sinus & allergy [chlorpheniramine-phenylephrine]      Past Medical History:   Diagnosis Date    Acute pulmonary embolism without acute cor pulmonale 11/15/2021    Adult bronchiectasis 7/26/2017    Allergy     Anemia     Arthritis     Asthma     Breast cancer 1993    left  w/ radiation     Cancer 1993    L eft breast    Cataract     Chronic cervical radiculopathy 9/20/2012    Diabetes mellitus     Diabetes mellitus type II     Diabetes with neurologic complications     Diverticulosis     Dry eyes     Dysphagia     after knee surgery June 2014    GERD (gastroesophageal reflux disease)     Hyperlipidemia     Hypertension     Neuropathy     feet    Scalp tenderness     Shortness of breath     Ulcer      Past Surgical History:   Procedure Laterality Date    BREAST BIOPSY Left 1993    BREAST LUMPECTOMY Left 1993    CARPAL TUNNEL RELEASE Bilateral 2011    CATARACT EXTRACTION W/  INTRAOCULAR LENS IMPLANT Bilateral 2013 and 2014    CHOLECYSTECTOMY      COLONOSCOPY N/A 11/6/2015    Procedure: COLONOSCOPY;  Surgeon: Major Michelle MD;  Location: [address]);  Service: Endoscopy;  Laterality: N/A;    COLONOSCOPY N/A 5/8/2019    Procedure: COLONOSCOPY;  Surgeon: Major Michelle MD;  Location: [facility] ([address] [place] [address]);  Service: Endoscopy;  Laterality: N/A;    CRANIOTOMY USING FRAMELESS STEREOTAXY Right 11/15/2021    Procedure: Right Frontal Craniotomy for Meningioma with  Stealth Navigation;  Surgeon: Moiz Hutchison DO;  Location: [address];  Service: Neurosurgery;  Laterality: Right;    EYE SURGERY      HYSTERECTOMY      partial hyst    INJECTION FOR SENTINEL NODE IDENTIFICATION Left 2/20/2020    Procedure: INJECTION, FOR SENTINEL NODE IDENTIFICATION;  Surgeon: Hamida Nieves MD;  Location: [address];  Service: General;  Laterality: Left;    JOINT REPLACEMENT Left June 2014    knee    MASTECTOMY      SENTINEL LYMPH NODE BIOPSY Left 2/20/2020    Procedure: BIOPSY, LYMPH NODE, SENTINEL;  Surgeon: Hamida Nieves MD;  Location: [address];  Service: General;  Laterality: Left;    UNILATERAL MASTECTOMY Left 2/20/2020    Procedure: MASTECTOMY, UNILATERAL;  Surgeon: Hamida Nieves MD;  Location: [address];  Service: General;  Laterality:  Left;    uvuloplasty       Family History   Problem Relation Age of Onset    Diabetes Mother     Diabetes Sister     Colon polyps Sister     Heart disease Father     No Known Problems Brother     No Known Problems Daughter     No Known Problems Son     Cancer Sister         breast and colon ca    Colon polyps Sister     Diabetes Sister     Cancer Sister         colon ca    Arthritis Sister     Psoriasis Sister     No Known Problems Daughter     Breast cancer Paternal Aunt     Stroke Sister     Seizures Sister     Diabetes Sister     Asthma Neg Hx     Emphysema Neg Hx     Lupus Neg Hx     Rheum arthritis Neg Hx     Melanoma Neg Hx     Colon cancer Neg Hx     Irritable bowel syndrome Neg Hx     Inflammatory bowel disease Neg Hx     Stomach cancer Neg Hx     Esophageal cancer Neg Hx      Social History     Tobacco Use    Smoking status: Never Smoker    Smokeless tobacco: Never Used   Substance Use Topics    Alcohol use: No    Drug use: No     Review of Systems   Constitutional: Positive for appetite change (decreased), chills and fever.   HENT: Negative for congestion and sore throat.    Eyes: Negative for pain and visual disturbance.   Respiratory: Negative for cough and shortness of breath.    Cardiovascular: Negative for chest pain and palpitations.   Gastrointestinal: Positive for abdominal pain. Negative for blood in stool, diarrhea, nausea and vomiting.   Genitourinary: Negative for enuresis and hematuria.        (+) R flank pain   Musculoskeletal: Negative for neck pain and neck stiffness.   Skin: Negative for rash and wound.   Neurological: Positive for headaches. Negative for dizziness and speech difficulty.   Psychiatric/Behavioral: Positive for confusion. Negative for agitation and hallucinations. The patient is not nervous/anxious.        Physical Exam     Initial Vitals [12/1945]   BP Pulse Resp Temp SpO2   134/78 (!) 112 18 100.3 °F (37.9 °C) 100 %      MAP       --          Physical Exam    Nursing note and vitals reviewed.  Constitutional: She appears well-developed and well-nourished. She is not diaphoretic. She is Obese . She is active. No distress.   Pt mildly lethargic but easily arousable.   HENT:   Head: Normocephalic and atraumatic.   Right Ear: External ear normal.   Left Ear: External ear normal.   Nose: Nose normal.   Mouth/Throat: Oropharynx is clear and moist. No oropharyngeal exudate.   Pt with missing frontal teeth. Well healed craniotomy scar.    Eyes: Conjunctivae are normal. Right eye exhibits no discharge. Left eye exhibits no discharge. No scleral icterus.   Neck: Neck supple. No tracheal deviation present.   Cardiovascular: Regular rhythm. Exam reveals no friction rub.    No murmur heard.  Pulmonary/Chest: Effort normal and breath sounds normal. No accessory muscle usage or stridor. No respiratory distress. She has no wheezes. She has no rhonchi. She has no rales.   Abdominal: Abdomen is soft. Bowel sounds are normal. She exhibits no distension. There is abdominal tenderness in the left upper quadrant. There is no rebound and no guarding.   No right CVA tenderness.  No left CVA tenderness.   Musculoskeletal:      Cervical back: Neck supple.     Neurological: She is alert.   Oriented to name, , location, year, and month. Not oriented to day. Amount of medication taken reported by patient does not match daughter's story. Able to follow commands. Can get up with assistance.   Skin: Skin is warm, dry and intact. Capillary refill takes less than 2 seconds. No laceration, no rash and no abscess noted.   Psychiatric: She has a normal mood and affect. Thought content normal.         ED Course   Procedures  Labs Reviewed   CBC W/ AUTO DIFFERENTIAL - Abnormal; Notable for the following components:       Result Value    RBC 3.97 (*)     Hemoglobin 11.2 (*)     Hematocrit 35.0 (*)     RDW 16.0 (*)     Platelets 139 (*)     Platelet Estimate Decreased (*)     All  other components within normal limits   COMPREHENSIVE METABOLIC PANEL - Abnormal; Notable for the following components:    CO2 21 (*)     Total Protein 5.7 (*)     Albumin 2.2 (*)     All other components within normal limits   LACTIC ACID, PLASMA - Abnormal; Notable for the following components:    Lactate (Lactic Acid) 2.5 (*)     All other components within normal limits   URINALYSIS, REFLEX TO URINE CULTURE - Abnormal; Notable for the following components:    Appearance, UA Cloudy (*)     Occult Blood UA 1+ (*)     Leukocytes, UA 3+ (*)     All other components within normal limits    Narrative:     Specimen Source->Urine   URINALYSIS MICROSCOPIC - Abnormal; Notable for the following components:    RBC, UA 6 (*)     WBC, UA >100 (*)     All other components within normal limits    Narrative:     Specimen Source->Urine   POCT GLUCOSE - Abnormal; Notable for the following components:    POCT Glucose 69 (*)     All other components within normal limits   POCT GLUCOSE - Abnormal; Notable for the following components:    POCT Glucose 172 (*)     All other components within normal limits   CULTURE, BLOOD   CULTURE, BLOOD   CULTURE, URINE   LACTIC ACID, PLASMA   SARS-COV-2 RDRP GENE   POCT GLUCOSE   POCT GLUCOSE   POCT GLUCOSE MONITORING CONTINUOUS   POCT GLUCOSE MONITORING CONTINUOUS   POCT GLUCOSE MONITORING CONTINUOUS     EKG Readings: (Independently Interpreted)   Initial Reading: No STEMI. Rhythm: Sinus Tachycardia. Heart Rate: 108.       Imaging Results          CT Head Without Contrast (Final result)  Result time 12/11/21 23:35:31    Final result by Richard Sweeney MD (12/11/21 23:35:31)                 Impression:      Postoperative changes of right parietal craniotomy for prior tumor resection.  Continued but mildly improved hypoattenuation/edema throughout the right cerebral hemisphere with improved mass effect and decreased leftward midline shift when compared with 11/19/2021.    No acute intracranial  "hemorrhage or worsening mass effect.      Electronically signed by: Richard Sweeney MD  Date:    12/11/2021  Time:    23:35             Narrative:    EXAMINATION:  CT HEAD WITHOUT CONTRAST    CLINICAL HISTORY:  Mental status change, unknown cause;Headache, new or worsening (Age >= 50y);    TECHNIQUE:  Low dose axial images were obtained through the head.  Coronal and sagittal reformations were also performed. Contrast was not administered.    COMPARISON:  11/19/2021.    FINDINGS:  Postoperative changes of right parietal craniotomy for previous tumor resection.  Persistent hypoattenuation/edema in the right cerebral hemisphere which overall appears mildly improved when compared with the previous study.  No new acute territorial infarct, new mass effect, acute parenchymal hemorrhage, or worsening midline shift.  There is leftward midline shift of approximately 4 mm (previously 6 mm).  Brain parenchyma otherwise appears similar to the recent prior study.  Left frontal convexity extra-axial lesion is more conspicuous on prior contrast enhanced brain MRI.    Ventricles are not significantly enlarged and appears similar to the prior exam.    No displaced calvarial fracture.  Postoperative changes in the right calvarium.    Visualized paranasal sinuses and mastoid air cells are essentially clear.                               X-Ray Chest AP Portable (Final result)  Result time 12/11/21 21:31:01    Final result by Richard Sweeney MD (12/11/21 21:31:01)                 Impression:      Cardiomegaly with coarsened interstitial lung markings and patchy ground-glass opacities in the mid and lower lung zones, potentially related to pulmonary edema or infectious/inflammatory process.      Electronically signed by: Richard Sweeney MD  Date:    12/11/2021  Time:    21:31             Narrative:    EXAMINATION:  XR CHEST AP PORTABLE    CLINICAL HISTORY:  Provided history is "Sepsis;  ".    TECHNIQUE:  One view of the " chest.    COMPARISON:  11/15/2021.    FINDINGS:  Cardiac wires overlie the chest.  Cardiomediastinal silhouette is enlarged and appears similar to the previous study.  There are coarsened interstitial lung markings and patchy ground-glass opacities in the mid and lower lung zones.  No large pleural effusion.  No pneumothorax.                                 Medications   sodium chloride 0.9% flush 10 mL (has no administration in time range)   naloxone 0.4 mg/mL injection 0.02 mg (has no administration in time range)   glucose chewable tablet 16 g (has no administration in time range)   glucose chewable tablet 24 g (has no administration in time range)   dextrose 50% injection 25 g (has no administration in time range)   acetaminophen tablet 650 mg (has no administration in time range)   melatonin tablet 6 mg (has no administration in time range)   simethicone chewable tablet 80 mg (has no administration in time range)   dextrose 50% injection 12.5 g (has no administration in time range)   glucagon (human recombinant) injection 1 mg (has no administration in time range)   insulin aspart U-100 pen 1-10 Units (has no administration in time range)   vancomycin - pharmacy to dose (has no administration in time range)   piperacillin-tazobactam 4.5 g in sodium chloride 0.9% 100 mL IVPB (ready to mix system) (has no administration in time range)   vancomycin 750 mg in dextrose 5 % 250 mL IVPB (ready to mix system) (has no administration in time range)   insulin detemir U-100 pen 10 Units (has no administration in time range)   lactated ringers bolus 2,382 mL (2,382 mLs Intravenous New Bag 12/11/21 2132)   ceFEPIme injection 2 g (2 g Intravenous Given 12/11/21 2219)   vancomycin 2 g in dextrose 5 % 500 mL IVPB (2,000 mg Intravenous New Bag 12/11/21 2225)   dextrose 50 % in water (D50W) injection 12.5 g (12.5 g Intravenous Given 12/11/21 2340)     Medical Decision Making:   History:   I obtained history from: someone other  "than patient.  Old Medical Records: I decided to obtain old medical records.  Old Records Summarized: records from previous admission(s).       <> Summary of Records: ECHO with EF of 60%. No systolic or diastolic dysfunction noted.  Initial Assessment:   76 y.o. F w/ breast cancer on anastrozole, meningioma s/p craniotomy here with sinus tachycardia borderline fever with very warm skin throughout, lethargy, date confusion. On PE, moderatel LUQ abdominal pain with palpation. No skin wounds seen. Well-healed coronal craniotomy scar to front of skull.  Differential Diagnosis:   Severe sepsis, urosepsis, diverticulitis, pneumonia, cancer, ICH, mass effect  Independently Interpreted Test(s):   I have ordered and independently interpreted X-rays - see prior notes.  I have ordered and independently interpreted EKG Reading(s) - see prior notes  Clinical Tests:   Lab Tests: Ordered and Reviewed       <> Summary of Lab: UA with UTI - >100 WBCs.  Radiological Study: Ordered and Reviewed  Medical Tests: Ordered and Reviewed  Sepsis Perfusion Assessment: "I attest a sepsis perfusion exam was performed within 6 hours of sepsis, severe sepsis, or septic shock presentation, following fluid resuscitation."  ED Management:  Pt started on sepsis protocol with adequate perfusion (~2.6L). Started on Vanc and Cefepime (past micro showed sensitivity). Pt with elevated lactate, UTI, tachycardia to 114, temp of 100.3 F, concerns for urosepsis. CXR with ground glass opacities, cardiomegaly, and increased interstitial markings.   CT w/ contrast of abd & pelvis pending to r/o diverticulitis although low suspicious for necessity of gen surgery service. Pt also with low blood glucose, fed some snacks, still with low BG so given dextrose 12.5. Pt admitted to medicine for sepsis monitoring and further work-up.  Other:   I have discussed this case with another health care provider.            Attending Attestation:   Physician Attestation Statement " for Resident:  As the supervising MD   Physician Attestation Statement: I have personally seen and examined this patient.   I agree with the above history. -:   As the supervising MD I agree with the above PE.    As the supervising MD I agree with the above treatment, course, plan, and disposition.   -: Urinary tract infection with sepsis.                ED Course as of 12/12/21 0029   Sat Dec 11, 2021   2102 Urinalysis, Reflex to Urine Culture Urine, Clean Catch [NT]   2103 Pulse(!): 112 [NT]   2111 CBG 69. Pt given snacks with plans to re-check glucose in 15 minutes.  [NT]   2141 CT Head Without Contrast [NT]   2145 Blood culture x two cultures. Draw prior to antibiotics. [NT]   2153 Urinalysis, Reflex to Urine Culture Urine, Clean Catch [NT]   2209 POCT glucose [NT]   2210 X-Ray Chest AP Portable [NT]   2303 Urinalysis, Reflex to Urine Culture Urine, Catheterized [NT]   2325 Lactate, Ricki(!): 2.5  Elevated lactate. [NT]   2326 Dextrose ordered for hypoglycemia.  [NT]   2326 Urinalysis Microscopic(!)  UTI - RBC and >100 WBC.  [NT]   2327 Microscopic Comment: SEE COMMENT [NT]   2327 Temp: 100.3 °F (37.9 °C) [NT]   2348 No ICH or worsening mass effect of known hx of meningioma s/p R frontal craniostomy, CT with post surgical changes. [NT]   2348 CT Head Without Contrast [NT]   2353 X-Ray Chest AP Portable [NT]      ED Course User Index  [NT] Mai Howe MD             Clinical Impression:   Final diagnoses:  [T50.901A] Overdose  [A41.9, N39.0] Sepsis due to urinary tract infection          ED Disposition Condition    Admit               Mai Howe MD  Resident  12/12/21 0029       Caesar Baptiste MD  12/12/21 2304

## 2021-12-12 NOTE — ED TRIAGE NOTES
Pt brought to ED via Ambulance from home. Pt states that she accidentally doubled up on her daily medications today. Pt states she got her pillbox mixed up and took her medications with dinner. Pt denies c/o at present.   Pt took Pregablin 75 mg   Atenolol 25 mg   Aspirin   Famotidine 20 mg   Pravastatin 40 mg   Anastrozole 1 mg   Mag Ox 250 mg

## 2021-12-12 NOTE — BRIEF OP NOTE
Radiology Post-Procedure Note    Pre Op Diagnosis: Sigmoid peridiverticular abscess  Post Op Diagnosis: Same    Procedure: 1. CT-guided aspiration of sigmoid peridiverticular abscess    Procedure performed by: Marcos Pavon MD    Written Informed Consent Obtained: Yes  Specimen Removed: YES, 5-cc of frankly purulent fluid  Estimated Blood Loss: none    Findings:   Pre-op CT reveals collection is now much smaller and not of adequate size to safely accept a drainage catheter likely 2/2 interval decompression into the colon. Now s/p aspiration with 5-cc of frankly purulent fluid sent for analysis. Patient tolerated the procedure well. No immediate post-procedural complications noted.     Thank you for considering IR for the care of your patient.     Marcos Pavon MD  Interventional Radiology

## 2021-12-12 NOTE — PLAN OF CARE
Patient brought to room  for abscess drain. Patient is alert and disoriented. VSS. Respirations even and unlabored. NAD. Education accepted. Consent signed. Allergies reviewed.

## 2021-12-12 NOTE — SUBJECTIVE & OBJECTIVE
Past Medical History:   Diagnosis Date    Acute pulmonary embolism without acute cor pulmonale 11/15/2021    Adult bronchiectasis 7/26/2017    Allergy     Anemia     Arthritis     Asthma     Breast cancer 1993    left w/ radiation     Cancer 1993    L eft breast    Cataract     Chronic cervical radiculopathy 9/20/2012    Diabetes mellitus     Diabetes mellitus type II     Diabetes with neurologic complications     Diverticulosis     Dry eyes     Dysphagia     after knee surgery June 2014    GERD (gastroesophageal reflux disease)     Hyperlipidemia     Hypertension     Neuropathy     feet    Scalp tenderness     Shortness of breath     Ulcer        Past Surgical History:   Procedure Laterality Date    BREAST BIOPSY Left 1993    BREAST LUMPECTOMY Left 1993    CARPAL TUNNEL RELEASE Bilateral 2011    CATARACT EXTRACTION W/  INTRAOCULAR LENS IMPLANT Bilateral 2013 and 2014    CHOLECYSTECTOMY      COLONOSCOPY N/A 11/6/2015    Procedure: COLONOSCOPY;  Surgeon: Major Michelle MD;  Location: [facility] ([address] [place] [address]);  Service: Endoscopy;  Laterality: N/A;    COLONOSCOPY N/A 5/8/2019    Procedure: COLONOSCOPY;  Surgeon: Major Michelle MD;  Location: [facility] ([address] [place] [address]);  Service: Endoscopy;  Laterality: N/A;    CRANIOTOMY USING FRAMELESS STEREOTAXY Right 11/15/2021    Procedure: Right Frontal Craniotomy for Meningioma with  Stealth Navigation;  Surgeon: Moiz Hutchison DO;  Location: [address];  Service: Neurosurgery;  Laterality: Right;    EYE SURGERY      HYSTERECTOMY      partial hyst    INJECTION FOR SENTINEL NODE IDENTIFICATION Left 2/20/2020    Procedure: INJECTION, FOR SENTINEL NODE IDENTIFICATION;  Surgeon: Hamida Nieves MD;  Location: [address];  Service: General;  Laterality: Left;    JOINT REPLACEMENT Left June 2014    knee    MASTECTOMY      SENTINEL LYMPH NODE BIOPSY Left 2/20/2020    Procedure: BIOPSY, LYMPH NODE, SENTINEL;  Surgeon: Hamida Nieves MD;  Location:  "[facility] OR [address] [place] [address];  Service: General;  Laterality: Left;    UNILATERAL MASTECTOMY Left 2/20/2020    Procedure: MASTECTOMY, UNILATERAL;  Surgeon: Hamida Nieves MD;  Location: [facility] OR [address] [place] [address];  Service: General;  Laterality: Left;    uvuloplasty         Review of patient's allergies indicates:   Allergen Reactions    Grass pollen-june grass standard Other (See Comments)     Causes sinus symptoms like coughing    Losartan      cough    Nubain [nalbuphine] Other (See Comments)     Other reaction(s): Hypotension    Sinus & allergy [chlorpheniramine-phenylephrine]        Medications:  Medications Prior to Admission   Medication Sig    ACCU-CHEK DOMENICO PLUS TEST STRP Strp TEST THREE TIMES DAILY AS DIRECTED    ACCU-CHEK SOFTCLIX LANCETS Misc 1 each by Misc.(Non-Drug; Combo Route) route 3 (three) times daily.    amLODIPine (NORVASC) 5 MG tablet Take 1 tablet (5 mg total) by mouth once daily.    anastrozole (ARIMIDEX) 1 mg Tab TAKE 1 TABLET (1 MG TOTAL) BY MOUTH ONCE DAILY.    apixaban (ELIQUIS) 5 mg Tab Take 1 tablet (5 mg total) by mouth 2 (two) times daily.    BD ALCOHOL SWABS PadM 1 each 2 (two) times daily.    BD INSULIN SYRINGE ULTRA-FINE 1 mL 31 gauge x 5/16 Syrg TO USE  TWICE DAILY WITH  INSULIN    BD VEO INSULIN SYRINGE UF 0.3 mL 31 gauge x 15/64" Syrg USE  TO INJECT TWICE DAILY    blood-glucose meter Misc 1 each by Misc.(Non-Drug; Combo Route) route once daily.    calcium carbonate-vitamin D3 (CALCIUM 600 WITH VITAMIN D3) 600 mg(1,500mg) -400 unit Chew 1 tablet once daily.     carvediloL (COREG) 6.25 MG tablet Take 1 tablet (6.25 mg total) by mouth 2 (two) times daily.    cyanocobalamin-cobamamide (B-12 PLUS) 5,000-100 mcg Subl Take 5000 mcg daily.    famotidine (PEPCID) 20 MG tablet TAKE 1 TABLET (20 MG TOTAL) BY MOUTH EVERY EVENING.    flash glucose scanning reader (FREESTYLE GERARDO 14 DAY READER) Misc 1 each by Misc.(Non-Drug; Combo Route) route once daily.    flash glucose sensor (FREESTYLE " "GERARDO 14 DAY SENSOR) Kit 2 each by Misc.(Non-Drug; Combo Route) route every 14 (fourteen) days.    FOLIC ACID/MULTIVIT-MIN/LUTEIN (CENTRUM SILVER ORAL) Take 1 tablet by mouth once daily.    insulin aspart U-100 (NOVOLOG) 100 unit/mL (3 mL) InPn pen Inject 14 Units into the skin 3 (three) times daily with meals.    insulin aspart U-100 (NOVOLOG) 100 unit/mL (3 mL) InPn pen Inject 0-5 Units into the skin before meals and at bedtime as needed (Hyperglycemia).    insulin detemir U-100 (LEVEMIR FLEXTOUCH) 100 unit/mL (3 mL) SubQ InPn pen Inject 28 Units into the skin once daily.    insulin NPH-insulin regular, 70/30, (NOVOLIN 70/30) 100 unit/mL (70-30) injection RELION BRAND 32 units thirty minutes before breakfast and 24 units thirty minutes before dinner.    omeprazole (PRILOSEC) 40 MG capsule TAKE 1 CAPSULE (40 MG TOTAL) BY MOUTH EVERY MORNING.    oxyCODONE (ROXICODONE) 5 MG immediate release tablet Take 1 tablet (5 mg total) by mouth every 6 (six) hours as needed for Pain.    OZEMPIC 1 mg/dose (4 mg/3 mL) INJECT 1MG (0.75 ML) UNDER THE SKIN EVERY 7 DAYS.    pen needle, diabetic (BD ULTRA-FINE SUSIE PEN NEEDLE) 32 gauge x 5/32" Ndle USE  TO  INJECT  Weekly    potassium chloride SA (K-DUR,KLOR-CON) 10 MEQ tablet     pravastatin (PRAVACHOL) 40 MG tablet TAKE 1 TABLET NIGHTLY.    pregabalin (LYRICA) 75 MG capsule TAKE 1 CAPSULE TWICE DAILY    semaglutide (OZEMPIC) 1 mg/dose (2 mg/1.5 mL) PnIj Inject 0.75 mLs into the skin every 7 days.    senna-docusate 8.6-50 mg (PERICOLACE) 8.6-50 mg per tablet Take 1 tablet by mouth 2 (two) times daily.     Antibiotics (From admission, onward)            Start     Stop Route Frequency Ordered    12/13/21 1030  vancomycin 750 mg in dextrose 5 % 250 mL IVPB (ready to mix system)         -- IV Every 12 hours (non-standard times) 12/12/21 0027    12/12/21 0715  meropenem-0.9% sodium chloride 1 g/50 mL IVPB         -- IV Every 8 hours (non-standard times) 12/12/21 0604    " "12/12/21 0121  vancomycin - pharmacy to dose  (vancomycin IVPB)        "And" Linked Group Details    -- IV pharmacy to manage frequency 12/12/21 0021        Antifungals (From admission, onward)            None        Antivirals (From admission, onward)    None           Immunization History   Administered Date(s) Administered    COVID-19, MRNA, LN-S, PF (Pfizer) 02/19/2021, 03/12/2021    Influenza 01/19/2006, 11/15/2010    Influenza (FLUAD) - Quadrivalent - Adjuvanted - PF *Preferred* (65+) 10/29/2021    Influenza - High Dose - PF (65 years and older) 11/04/2011, 10/16/2013, 10/15/2014, 10/14/2015, 11/16/2016, 09/21/2017, 10/17/2018, 10/10/2019    Influenza - Quadrivalent - High Dose - PF (65 years and older) 09/28/2020    Influenza - Trivalent (ADULT) 11/15/2010    Influenza Split 01/19/2006, 11/15/2010    Pneumococcal Conjugate - 13 Valent 09/09/2015    Pneumococcal Polysaccharide - 23 Valent 08/10/2011       Family History     Problem Relation (Age of Onset)    Arthritis Sister    Breast cancer Paternal Aunt    Cancer Sister, Sister    Colon polyps Sister, Sister    Diabetes Mother, Sister, Sister, Sister    Heart disease Father    No Known Problems Brother, Daughter, Son, Daughter    Psoriasis Sister    Seizures Sister    Stroke Sister        Social History     Socioeconomic History    Marital status:    Occupational History    Occupation: Retired   Tobacco Use    Smoking status: Never Smoker    Smokeless tobacco: Never Used   Substance and Sexual Activity    Alcohol use: No    Drug use: No    Sexual activity: Not Currently     Partners: Male   Social History Narrative    No assistance w/ ADLs. 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 Lives with  at home. 3 children who live nearby.      Review of Systems   Constitutional: Positive for activity change, appetite change and fatigue. Negative for chills, diaphoresis and " fever.   HENT: Negative for rhinorrhea and sore throat.    Respiratory: Negative for cough and shortness of breath.    Cardiovascular: Negative for chest pain and leg swelling.   Gastrointestinal: Negative for abdominal pain, diarrhea, nausea and vomiting.   Genitourinary: Negative for dysuria and hematuria.   Musculoskeletal: Negative for arthralgias and myalgias.   Skin: Negative for rash.   Neurological: Negative for headaches.     Objective:     Vital Signs (Most Recent):  Temp: 97.7 °F (36.5 °C) (12/12/21 1534)  Pulse: 86 (12/12/21 1534)  Resp: 16 (12/12/21 1534)  BP: (!) 111/58 (12/12/21 1534)  SpO2: 95 % (12/12/21 1534) Vital Signs (24h Range):  Temp:  [97.1 °F (36.2 °C)-100.3 °F (37.9 °C)] 97.7 °F (36.5 °C)  Pulse:  [] 86  Resp:  [13-21] 16  SpO2:  [94 %-100 %] 95 %  BP: (102-134)/(55-78) 111/58     Weight: 98.5 kg (217 lb 2.5 oz)  Body mass index is 39.72 kg/m².    Estimated Creatinine Clearance: 84.8 mL/min (based on SCr of 0.6 mg/dL).    Physical Exam  Vitals reviewed.   Constitutional:       General: She is not in acute distress.     Appearance: She is well-developed. She is obese. She is ill-appearing. She is not toxic-appearing or diaphoretic.   HENT:      Head: Normocephalic and atraumatic.   Eyes:      Conjunctiva/sclera: Conjunctivae normal.   Pulmonary:      Effort: Pulmonary effort is normal. No respiratory distress.   Abdominal:      General: Abdomen is flat. There is no distension.      Palpations: Abdomen is soft.      Tenderness: There is no abdominal tenderness. There is no guarding.   Musculoskeletal:         General: Normal range of motion.   Skin:     General: Skin is warm and dry.      Findings: No erythema or rash.   Neurological:      Mental Status: She is alert and oriented to person, place, and time.   Psychiatric:         Behavior: Behavior normal.         Significant Labs: All pertinent labs within the past 24 hours have been reviewed.    Significant Imaging: I have reviewed  all pertinent imaging results/findings within the past 24 hours.

## 2021-12-12 NOTE — CONSULTS
[facility] - Telemetry Stepdown  Infectious Disease  Consult Note    Patient Name: Alisia Gusman  MRN: [id number]  Admission Date: 12/11/2021  Hospital Length of Stay: 0 days  Attending Physician: Sarah Gage MD  Primary Care Provider: Selena Montemayor MD     Isolation Status: No active isolations    Patient information was obtained from patient and past medical records.      Inpatient consult to Infectious Diseases  Consult performed by: Ginette Ambrocio MD  Consult ordered by: Yoon Celaya MD        Assessment/Plan:     * Sepsis  76-year-old female with history of T2DM, recurrent breast cancer on anastrozole, meningioma s/p R frontal craniotomy, presented with AMS, accidental drug overdose, sepsis 2/2 acute diverticulitis with intraabdominal abscess s/p IR drainage 12/12/2021.     Recommendations:  - Follow-up blood cultures, abscess cultures  - Continue empiric rowan and vanc for now        Thank you for your consult. I will follow-up with patient. Please contact us if you have any additional questions.    Ginette Ambrocio MD  Infectious Disease  [facility] - Telemetry Stepdown    Subjective:     Principal Problem: Sepsis    HPI: 76-year-old female with history of T2DM, recurrent breast cancer on anastrozole, meningioma s/p R frontal craniotomy, presented with AMS. Per family, patient accidentally took entire week of her medications in accidental drug overdose. Patient was septic with lactic acidosis, CT AP with acute diverticulitis and abscess, started empirically with vanc / rowan.      Past Medical History:   Diagnosis Date    Acute pulmonary embolism without acute cor pulmonale 11/15/2021    Adult bronchiectasis 7/26/2017    Allergy     Anemia     Arthritis     Asthma     Breast cancer 1993    left w/ radiation     Cancer 1993    L eft breast    Cataract     Chronic cervical radiculopathy 9/20/2012    Diabetes mellitus     Diabetes mellitus type II     Diabetes with neurologic complications      Diverticulosis     Dry eyes     Dysphagia     after knee surgery June 2014    GERD (gastroesophageal reflux disease)     Hyperlipidemia     Hypertension     Neuropathy     feet    Scalp tenderness     Shortness of breath     Ulcer        Past Surgical History:   Procedure Laterality Date    BREAST BIOPSY Left 1993    BREAST LUMPECTOMY Left 1993    CARPAL TUNNEL RELEASE Bilateral 2011    CATARACT EXTRACTION W/  INTRAOCULAR LENS IMPLANT Bilateral 2013 and 2014    CHOLECYSTECTOMY      COLONOSCOPY N/A 11/6/2015    Procedure: COLONOSCOPY;  Surgeon: Major Michelle MD;  Location: [facility] ENDO (4TH FLR);  Service: Endoscopy;  Laterality: N/A;    COLONOSCOPY N/A 5/8/2019    Procedure: COLONOSCOPY;  Surgeon: Major Michelle MD;  Location: [facility] ENDO (4TH FLR);  Service: Endoscopy;  Laterality: N/A;    CRANIOTOMY USING FRAMELESS STEREOTAXY Right 11/15/2021    Procedure: Right Frontal Craniotomy for Meningioma with  Stealth Navigation;  Surgeon: Moiz Hutchison DO;  Location: [address];  Service: Neurosurgery;  Laterality: Right;    EYE SURGERY      HYSTERECTOMY      partial hyst    INJECTION FOR SENTINEL NODE IDENTIFICATION Left 2/20/2020    Procedure: INJECTION, FOR SENTINEL NODE IDENTIFICATION;  Surgeon: Hamida Nieves MD;  Location: [address];  Service: General;  Laterality: Left;    JOINT REPLACEMENT Left June 2014    knee    MASTECTOMY      SENTINEL LYMPH NODE BIOPSY Left 2/20/2020    Procedure: BIOPSY, LYMPH NODE, SENTINEL;  Surgeon: Hamida Nieves MD;  Location: [address];  Service: General;  Laterality: Left;    UNILATERAL MASTECTOMY Left 2/20/2020    Procedure: MASTECTOMY, UNILATERAL;  Surgeon: Hamida Nieves MD;  Location: [address];  Service: General;  Laterality: Left;    uvuloplasty         Review of patient's allergies indicates:   Allergen Reactions    Grass pollen-tim grass standard Other (See Comments)     Causes sinus symptoms like coughing    Losartan       "cough    Nubain [nalbuphine] Other (See Comments)     Other reaction(s): Hypotension    Sinus & allergy [chlorpheniramine-phenylephrine]        Medications:  Medications Prior to Admission   Medication Sig    ACCU-CHEK DOMENICO PLUS TEST STRP Strp TEST THREE TIMES DAILY AS DIRECTED    ACCU-CHEK SOFTCLIX LANCETS Misc 1 each by Misc.(Non-Drug; Combo Route) route 3 (three) times daily.    amLODIPine (NORVASC) 5 MG tablet Take 1 tablet (5 mg total) by mouth once daily.    anastrozole (ARIMIDEX) 1 mg Tab TAKE 1 TABLET (1 MG TOTAL) BY MOUTH ONCE DAILY.    apixaban (ELIQUIS) 5 mg Tab Take 1 tablet (5 mg total) by mouth 2 (two) times daily.    BD ALCOHOL SWABS PadM 1 each 2 (two) times daily.    BD INSULIN SYRINGE ULTRA-FINE 1 mL 31 gauge x 5/16 Syrg TO USE  TWICE DAILY WITH  INSULIN    BD VEO INSULIN SYRINGE UF 0.3 mL 31 gauge x 15/64" Syrg USE  TO INJECT TWICE DAILY    blood-glucose meter Misc 1 each by Misc.(Non-Drug; Combo Route) route once daily.    calcium carbonate-vitamin D3 (CALCIUM 600 WITH VITAMIN D3) 600 mg(1,500mg) -400 unit Chew 1 tablet once daily.     carvediloL (COREG) 6.25 MG tablet Take 1 tablet (6.25 mg total) by mouth 2 (two) times daily.    cyanocobalamin-cobamamide (B-12 PLUS) 5,000-100 mcg Subl Take 5000 mcg daily.    famotidine (PEPCID) 20 MG tablet TAKE 1 TABLET (20 MG TOTAL) BY MOUTH EVERY EVENING.    flash glucose scanning reader (FREESTYLE GERARDO 14 DAY READER) Misc 1 each by Misc.(Non-Drug; Combo Route) route once daily.    flash glucose sensor (FREESTYLE GERARDO 14 DAY SENSOR) Kit 2 each by Misc.(Non-Drug; Combo Route) route every 14 (fourteen) days.    FOLIC ACID/MULTIVIT-MIN/LUTEIN (CENTRUM SILVER ORAL) Take 1 tablet by mouth once daily.    insulin aspart U-100 (NOVOLOG) 100 unit/mL (3 mL) InPn pen Inject 14 Units into the skin 3 (three) times daily with meals.    insulin aspart U-100 (NOVOLOG) 100 unit/mL (3 mL) InPn pen Inject 0-5 Units into the skin before meals and at " "bedtime as needed (Hyperglycemia).    insulin detemir U-100 (LEVEMIR FLEXTOUCH) 100 unit/mL (3 mL) SubQ InPn pen Inject 28 Units into the skin once daily.    insulin NPH-insulin regular, 70/30, (NOVOLIN 70/30) 100 unit/mL (70-30) injection RELION BRAND 32 units thirty minutes before breakfast and 24 units thirty minutes before dinner.    omeprazole (PRILOSEC) 40 MG capsule TAKE 1 CAPSULE (40 MG TOTAL) BY MOUTH EVERY MORNING.    oxyCODONE (ROXICODONE) 5 MG immediate release tablet Take 1 tablet (5 mg total) by mouth every 6 (six) hours as needed for Pain.    OZEMPIC 1 mg/dose (4 mg/3 mL) INJECT 1MG (0.75 ML) UNDER THE SKIN EVERY 7 DAYS.    pen needle, diabetic (BD ULTRA-FINE SUSIE PEN NEEDLE) 32 gauge x 5/32" Ndle USE  TO  INJECT  Weekly    potassium chloride SA (K-DUR,KLOR-CON) 10 MEQ tablet     pravastatin (PRAVACHOL) 40 MG tablet TAKE 1 TABLET NIGHTLY.    pregabalin (LYRICA) 75 MG capsule TAKE 1 CAPSULE TWICE DAILY    semaglutide (OZEMPIC) 1 mg/dose (2 mg/1.5 mL) PnIj Inject 0.75 mLs into the skin every 7 days.    senna-docusate 8.6-50 mg (PERICOLACE) 8.6-50 mg per tablet Take 1 tablet by mouth 2 (two) times daily.     Antibiotics (From admission, onward)            Start     Stop Route Frequency Ordered    12/13/21 1030  vancomycin 750 mg in dextrose 5 % 250 mL IVPB (ready to mix system)         -- IV Every 12 hours (non-standard times) 12/12/21 0027    12/12/21 0715  meropenem-0.9% sodium chloride 1 g/50 mL IVPB         -- IV Every 8 hours (non-standard times) 12/12/21 0604    12/12/21 0121  vancomycin - pharmacy to dose  (vancomycin IVPB)        "And" Linked Group Details    -- IV pharmacy to manage frequency 12/12/21 0021        Antifungals (From admission, onward)            None        Antivirals (From admission, onward)    None           Immunization History   Administered Date(s) Administered    COVID-19, MRNA, LN-S, PF (Pfizer) 02/19/2021, 03/12/2021    Influenza 01/19/2006, 11/15/2010    " Influenza (FLUAD) - Quadrivalent - Adjuvanted - PF *Preferred* (65+) 10/29/2021    Influenza - High Dose - PF (65 years and older) 11/04/2011, 10/16/2013, 10/15/2014, 10/14/2015, 11/16/2016, 09/21/2017, 10/17/2018, 10/10/2019    Influenza - Quadrivalent - High Dose - PF (65 years and older) 09/28/2020    Influenza - Trivalent (ADULT) 11/15/2010    Influenza Split 01/19/2006, 11/15/2010    Pneumococcal Conjugate - 13 Valent 09/09/2015    Pneumococcal Polysaccharide - 23 Valent 08/10/2011       Family History     Problem Relation (Age of Onset)    Arthritis Sister    Breast cancer Paternal Aunt    Cancer Sister, Sister    Colon polyps Sister, Sister    Diabetes Mother, Sister, Sister, Sister    Heart disease Father    No Known Problems Brother, Daughter, Son, Daughter    Psoriasis Sister    Seizures Sister    Stroke Sister        Social History     Socioeconomic History    Marital status:    Occupational History    Occupation: Retired   Tobacco Use    Smoking status: Never Smoker    Smokeless tobacco: Never Used   Substance and Sexual Activity    Alcohol use: No    Drug use: No    Sexual activity: Not Currently     Partners: Male   Social History Narrative    No assistance w/ ADLs. 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 Lives with  at home. 3 children who live nearby.      Review of Systems   Constitutional: Positive for activity change, appetite change and fatigue. Negative for chills, diaphoresis and fever.   HENT: Negative for rhinorrhea and sore throat.    Respiratory: Negative for cough and shortness of breath.    Cardiovascular: Negative for chest pain and leg swelling.   Gastrointestinal: Negative for abdominal pain, diarrhea, nausea and vomiting.   Genitourinary: Negative for dysuria and hematuria.   Musculoskeletal: Negative for arthralgias and myalgias.   Skin: Negative for rash.   Neurological: Negative for  headaches.     Objective:     Vital Signs (Most Recent):  Temp: 97.7 °F (36.5 °C) (12/12/21 1534)  Pulse: 86 (12/12/21 1534)  Resp: 16 (12/12/21 1534)  BP: (!) 111/58 (12/12/21 1534)  SpO2: 95 % (12/12/21 1534) Vital Signs (24h Range):  Temp:  [97.1 °F (36.2 °C)-100.3 °F (37.9 °C)] 97.7 °F (36.5 °C)  Pulse:  [] 86  Resp:  [13-21] 16  SpO2:  [94 %-100 %] 95 %  BP: (102-134)/(55-78) 111/58     Weight: 98.5 kg (217 lb 2.5 oz)  Body mass index is 39.72 kg/m².    Estimated Creatinine Clearance: 84.8 mL/min (based on SCr of 0.6 mg/dL).    Physical Exam  Vitals reviewed.   Constitutional:       General: She is not in acute distress.     Appearance: She is well-developed. She is obese. She is ill-appearing. She is not toxic-appearing or diaphoretic.   HENT:      Head: Normocephalic and atraumatic.   Eyes:      Conjunctiva/sclera: Conjunctivae normal.   Pulmonary:      Effort: Pulmonary effort is normal. No respiratory distress.   Abdominal:      General: Abdomen is flat. There is no distension.      Palpations: Abdomen is soft.      Tenderness: There is no abdominal tenderness. There is no guarding.   Musculoskeletal:         General: Normal range of motion.   Skin:     General: Skin is warm and dry.      Findings: No erythema or rash.   Neurological:      Mental Status: She is alert and oriented to person, place, and time.   Psychiatric:         Behavior: Behavior normal.         Significant Labs: All pertinent labs within the past 24 hours have been reviewed.    Significant Imaging: I have reviewed all pertinent imaging results/findings within the past 24 hours.

## 2021-12-12 NOTE — ASSESSMENT & PLAN NOTE
PE 11/15/2021    Unclear if patient took the Eliquis too with the other medications. Will order PT/INR. Restart Eliquis tomorrow if PT/ INR at goal. Monitor for signs of bleeding. Reverse with PCC if necessary.

## 2021-12-12 NOTE — ASSESSMENT & PLAN NOTE
"This patient does have evidence of infective focus  My overall impression is sepsis. Vital signs were reviewed and noted in progress note.  Antibiotics given- Merrem/ Vancomycin  Antibiotics (From admission, onward)            Start     Stop Route Frequency Ordered    12/13/21 1030  vancomycin 750 mg in dextrose 5 % 250 mL IVPB (ready to mix system)         -- IV Every 12 hours (non-standard times) 12/12/21 0027    12/12/21 0715  meropenem-0.9% sodium chloride 1 g/50 mL IVPB         -- IV Every 8 hours (non-standard times) 12/12/21 0604    12/12/21 0121  vancomycin - pharmacy to dose  (vancomycin IVPB)        "And" Linked Group Details    -- IV pharmacy to manage frequency 12/12/21 0021        Cultures were taken-   Microbiology Results (last 7 days)     Procedure Component Value Units Date/Time    Urine culture [[id number]] Collected: 12/11/21 2247    Order Status: No result Specimen: Urine Updated: 12/11/21 2325    Blood culture x two cultures. Draw prior to antibiotics. [[id number]] Collected: 12/11/21 2144    Order Status: Sent Specimen: Blood from Peripheral, Forearm, Right Updated: 12/11/21 2203    Blood Culture #1 **CANNOT BE ORDERED STAT** [[id number]]     Order Status: Canceled Specimen: Blood     Blood culture x two cultures. Draw prior to antibiotics. [[id number]]     Order Status: Sent Specimen: Blood         Latest lactate reviewed, they are-  Recent Labs   Lab 12/11/21 2143   LACTATE 2.5*       Source: Diverticulitis/ UTI    CT abdomen pelvis: Persistent left lower quadrant colonic mural thickening and associated fat stranding in this patient consistent with diverticulitis.  New focal collection in the left lower quadrant measuring 4.1 cm which may represent pericolonic abscess or fluid/stool within an irregular distended diverticulum.  New mild bilateral hydroureteronephrosis to the level of the urinary bladder, likely related to distension of urinary bladder.  Few foci of air within the urinary bladder, " recommend correlation for prior catheterization or procedure.  Correlation with urinalysis could be helpful to exclude colovesicular fistula if no recent catheterization was performed      The patient meets SIRS criteria      Plan  - Vanc/ Merrem  - Consult ID  - General Surgery vs IR to discuss findings in CT abdomen pelvis. Will keep NPO.

## 2021-12-12 NOTE — ASSESSMENT & PLAN NOTE
76-year-old female with history of T2DM, recurrent breast cancer on anastrozole, meningioma s/p R frontal craniotomy, presented with AMS, accidental drug overdose, sepsis 2/2 acute diverticulitis with intraabdominal abscess s/p IR drainage 12/12/2021.     Recommendations:  - Follow-up blood cultures, abscess cultures  - Continue empiric rowan and vanc for now

## 2021-12-12 NOTE — PROGRESS NOTES
Pharmacokinetic Initial Assessment & PLan: IV Vancomycin      IV Vancomycin 2 g x 1 given in the ED @ 2226 on 12/11.  Plan to continue Vancomycin 750 mg every 12 hours.  Draw a Vanco trough 30 min prior to the 4th dose on 12/14 @ 1000. Desired empiric serum trough concentration is 10 to 20 mcg/mL    Pharmacy will continue to follow and monitor vancomycin.    X [phone] with any questions regarding this assessment.     Thank you for the consult,   Rajwinder Whiteside       Patient brief summary:  Alisia Gusman is a 76 y.o. female initiated on antimicrobial therapy with IV Vancomycin for treatment of suspected sepsis    Drug Allergies:   Review of patient's allergies indicates:   Allergen Reactions    Grass pollen-june grass standard Other (See Comments)     Causes sinus symptoms like coughing    Losartan      cough    Nubain [nalbuphine] Other (See Comments)     Other reaction(s): Hypotension    Sinus & allergy [chlorpheniramine-phenylephrine]        Actual Body Weight:   79.4 kg    Renal Function:   Estimated Creatinine Clearance: 64.6 mL/min (based on SCr of 0.7 mg/dL).,     CBC (last 72 hours):  Recent Labs   Lab Result Units 12/11/21 2020   WBC K/uL 6.22   Hemoglobin g/dL 11.2*   Hematocrit % 35.0*   Platelets K/uL 139*   Gran % % 55.0   Lymph % % 31.0   Mono % % 7.0   Eosinophil % % 0.0   Basophil % % 0.0   Differential Method  Manual       Metabolic Panel (last 72 hours):  Recent Labs   Lab Result Units 12/11/21 2020 12/11/21  2247   Sodium mmol/L 140  --    Potassium mmol/L 4.0  --    Chloride mmol/L 110  --    CO2 mmol/L 21*  --    Glucose mg/dL 75  --    Glucose, UA   --  Negative   BUN mg/dL 10  --    Creatinine mg/dL 0.7  --    Albumin g/dL 2.2*  --    Total Bilirubin mg/dL 0.6  --    Alkaline Phosphatase U/L 84  --    AST U/L 15  --    ALT U/L 14  --        Drug levels (last 3 results):  No results for input(s): VANCOMYCINRA, VANCOMYCINPE, VANCOMYCINTR in the last 72 hours.    Microbiologic  Results:  Microbiology Results (last 7 days)     Procedure Component Value Units Date/Time    Urine culture [[id number]] Collected: 12/11/21 2247    Order Status: No result Specimen: Urine Updated: 12/11/21 2325    Blood culture x two cultures. Draw prior to antibiotics. [[id number]] Collected: 12/11/21 2144    Order Status: Sent Specimen: Blood from Peripheral, Forearm, Right Updated: 12/11/21 2203    Blood Culture #1 **CANNOT BE ORDERED STAT** [[id number]]     Order Status: Canceled Specimen: Blood     Blood culture x two cultures. Draw prior to antibiotics. [[id number]]     Order Status: Sent Specimen: Blood

## 2021-12-12 NOTE — H&P
Please refer to consult note for full H/P.    Thank you for considering IR for the care of your patient.     Marcos Pavon MD  Interventional Radiology

## 2021-12-12 NOTE — PT/OT/SLP PROGRESS
Physical Therapy      Patient Name:  Alisia Gusman   MRN:  [id number]    Patient not seen today secondary to Pt LURDES for IR abscess drain. Will follow-up 12/13/21.

## 2021-12-12 NOTE — CONSULTS
Inpatient Radiology Pre-procedure Note    History of Present Illness:  Alisia Gusman is a 76 y.o. female with acute sigmoid diverticulitis c/b sepsis and 1.6 x 4.0 x 3.1-cm sigmoid peridiverticular abscess requiring image-guided decompression and fluid sampling for pain relief, source control, diagnosis and treatment planning.     A new inpatient IR consult received for CT-guided placement of a percutaneous left flank-approach sigmoid peridiverticular abscess drainage catheter.    Admission H&P reviewed.  Past Medical History:   Diagnosis Date    Acute pulmonary embolism without acute cor pulmonale 11/15/2021    Adult bronchiectasis 7/26/2017    Allergy     Anemia     Arthritis     Asthma     Breast cancer 1993    left w/ radiation     Cancer 1993    L eft breast    Cataract     Chronic cervical radiculopathy 9/20/2012    Diabetes mellitus     Diabetes mellitus type II     Diabetes with neurologic complications     Diverticulosis     Dry eyes     Dysphagia     after knee surgery June 2014    GERD (gastroesophageal reflux disease)     Hyperlipidemia     Hypertension     Neuropathy     feet    Scalp tenderness     Shortness of breath     Ulcer      Past Surgical History:   Procedure Laterality Date    BREAST BIOPSY Left 1993    BREAST LUMPECTOMY Left 1993    CARPAL TUNNEL RELEASE Bilateral 2011    CATARACT EXTRACTION W/  INTRAOCULAR LENS IMPLANT Bilateral 2013 and 2014    CHOLECYSTECTOMY      COLONOSCOPY N/A 11/6/2015    Procedure: COLONOSCOPY;  Surgeon: Major Michelle MD;  Location: [address]);  Service: Endoscopy;  Laterality: N/A;    COLONOSCOPY N/A 5/8/2019    Procedure: COLONOSCOPY;  Surgeon: Major Michelle MD;  Location: [address]);  Service: Endoscopy;  Laterality: N/A;    CRANIOTOMY USING FRAMELESS STEREOTAXY Right 11/15/2021    Procedure: Right Frontal Craniotomy for Meningioma with  Stealth Navigation;  Surgeon: Moiz Hutchison DO;  Location: [address]  "[facility];  Service: Neurosurgery;  Laterality: Right;    EYE SURGERY      HYSTERECTOMY      partial hyst    INJECTION FOR SENTINEL NODE IDENTIFICATION Left 2/20/2020    Procedure: INJECTION, FOR SENTINEL NODE IDENTIFICATION;  Surgeon: Hamida Nieves MD;  [facility];  Service: General;  Laterality: Left;    JOINT REPLACEMENT Left June 2014    knee    MASTECTOMY      SENTINEL LYMPH NODE BIOPSY Left 2/20/2020    Procedure: BIOPSY, LYMPH NODE, SENTINEL;  Surgeon: Hamida Nieves MD;  [facility];  Service: General;  Laterality: Left;    UNILATERAL MASTECTOMY Left 2/20/2020    Procedure: MASTECTOMY, UNILATERAL;  Surgeon: Hamida Nieves MD;  Location: [facility];  Service: General;  Laterality: Left;    uvuloplasty       Review of Systems:   As documented in primary team H&P    Home Meds:   Prior to Admission medications    Medication Sig Start Date End Date Taking? Authorizing Provider   ACCU-CHEK DOMENICO PLUS TEST STRP Strp TEST THREE TIMES DAILY AS DIRECTED 3/30/21   Selena Montemayor MD   ACCU-CHEK SOFTCLIX LANCETS Misc 1 each by Misc.(Non-Drug; Combo Route) route 3 (three) times daily. 10/3/18   Lindy Nelson MD   amLODIPine (NORVASC) 5 MG tablet Take 1 tablet (5 mg total) by mouth once daily. 11/25/21 11/25/22  Lynn Calderon PA-C   anastrozole (ARIMIDEX) 1 mg Tab TAKE 1 TABLET (1 MG TOTAL) BY MOUTH ONCE DAILY. 1/6/21 1/6/22  Alexy Hines MD   apixaban (ELIQUIS) 5 mg Tab Take 1 tablet (5 mg total) by mouth 2 (two) times daily. 11/24/21   Lynn Calderon PA-C   BD ALCOHOL SWABS PadM 1 each 2 (two) times daily. 6/17/15   Historical Provider   BD INSULIN SYRINGE ULTRA-FINE 1 mL 31 gauge x 5/16 Syrg TO USE  TWICE DAILY WITH  INSULIN 10/25/20   Lindy Nelson MD   BD VEO INSULIN SYRINGE UF 0.3 mL 31 gauge x 15/64" Syrg USE  TO INJECT TWICE DAILY 8/19/21   Selena Montemayor MD   blood-glucose meter Misc 1 each by Misc.(Non-Drug; Combo Route) route once daily. 11/13/17 7/31/20  Selena " MD Albina   calcium carbonate-vitamin D3 (CALCIUM 600 WITH VITAMIN D3) 600 mg(1,500mg) -400 unit Chew 1 tablet once daily.  7/12/12   Historical Provider   carvediloL (COREG) 6.25 MG tablet Take 1 tablet (6.25 mg total) by mouth 2 (two) times daily. 11/24/21 11/24/22  Lynn Calderon PA-C   cyanocobalamin-cobamamide (B-12 PLUS) 5,000-100 mcg Subl Take 5000 mcg daily. 2/15/21   Selena Montemayor MD   famotidine (PEPCID) 20 MG tablet TAKE 1 TABLET (20 MG TOTAL) BY MOUTH EVERY EVENING. 7/28/21   Selena Montemayor MD   flash glucose scanning reader (FREESTYLE GERARDO 14 DAY READER) Misc 1 each by Misc.(Non-Drug; Combo Route) route once daily. 10/11/21   Lindy Nelson MD   flash glucose sensor (FREESTYLE GERARDO 14 DAY SENSOR) Kit 2 each by Misc.(Non-Drug; Combo Route) route every 14 (fourteen) days. 10/11/21   Lindy Nelson MD   FOLIC ACID/MULTIVIT-MIN/LUTEIN (CENTRUM SILVER ORAL) Take 1 tablet by mouth once daily.    Historical Provider   insulin aspart U-100 (NOVOLOG) 100 unit/mL (3 mL) InPn pen Inject 14 Units into the skin 3 (three) times daily with meals. 11/24/21 11/24/22  Lynn Calderon PA-C   insulin aspart U-100 (NOVOLOG) 100 unit/mL (3 mL) InPn pen Inject 0-5 Units into the skin before meals and at bedtime as needed (Hyperglycemia). 11/24/21 11/24/22  Lynn Calderon PA-C   insulin detemir U-100 (LEVEMIR FLEXTOUCH) 100 unit/mL (3 mL) SubQ InPn pen Inject 28 Units into the skin once daily. 11/25/21 11/25/22  Lynn Calderon PA-C   insulin NPH-insulin regular, 70/30, (NOVOLIN 70/30) 100 unit/mL (70-30) injection RELION BRAND 32 units thirty minutes before breakfast and 24 units thirty minutes before dinner. 2/15/21   Selena Montemayor MD   omeprazole (PRILOSEC) 40 MG capsule TAKE 1 CAPSULE (40 MG TOTAL) BY MOUTH EVERY MORNING. 7/9/20 7/9/21  Selena Montemayor MD   oxyCODONE (ROXICODONE) 5 MG immediate release tablet Take 1 tablet (5 mg total) by mouth every 6 (six) hours as needed for Pain. 11/24/21   Lynn DIALLO  "JARON Calderon   OZEMPIC 1 mg/dose (4 mg/3 mL) INJECT 1MG (0.75 ML) UNDER THE SKIN EVERY 7 DAYS. 7/30/21   Lindy Nelson MD   pen needle, diabetic (BD ULTRA-FINE SUSIE PEN NEEDLE) 32 gauge x 5/32" Ndle USE  TO  INJECT  Weekly 3/4/20   Lindy Nelson MD   potassium chloride SA (K-DUR,KLOR-CON) 10 MEQ tablet  2/18/21   Historical Provider   pravastatin (PRAVACHOL) 40 MG tablet TAKE 1 TABLET NIGHTLY. 4/12/21   Selena Montemayor MD   pregabalin (LYRICA) 75 MG capsule TAKE 1 CAPSULE TWICE DAILY 9/21/21   Selena Montemayor MD   semaglutide (OZEMPIC) 1 mg/dose (2 mg/1.5 mL) PnIj Inject 0.75 mLs into the skin every 7 days. 10/7/20   Lindy Nelson MD   senna-docusate 8.6-50 mg (PERICOLACE) 8.6-50 mg per tablet Take 1 tablet by mouth 2 (two) times daily. 11/24/21   Lynn Calderon PA-C   famotidine (PEPCID) 20 MG tablet TAKE 1 TABLET (20 MG TOTAL) BY MOUTH EVERY EVENING. 7/23/20   Selena Montemayor MD   glimepiride (AMARYL) 4 MG tablet TAKE 1 TABLET EVERY DAY WITH BREAKFAST 2/5/20   Selena Montemayor MD     Scheduled Meds:    calcium-vitamin D3  1 tablet Oral BID    cyanocobalamin  250 mcg Oral Daily    famotidine  20 mg Oral QHS    meropenem (MERREM) IVPB  1 g Intravenous Q8H    polyethylene glycol  17 g Oral BID    senna-docusate 8.6-50 mg  1 tablet Oral BID    [START ON 12/13/2021] vancomycin (VANCOCIN) IVPB  750 mg Intravenous Q12H     Continuous Infusions:    lactated ringers 100 mL/hr at 12/12/21 0500     PRN Meds:acetaminophen, dextrose 50%, dextrose 50%, glucagon (human recombinant), glucose, glucose, insulin aspart U-100, melatonin, naloxone, oxyCODONE, simethicone, sodium chloride 0.9%, Pharmacy to dose Vancomycin consult **AND** vancomycin - pharmacy to dose     Anticoagulants/Antiplatelets: Eliquis    Allergies:   Review of patient's allergies indicates:   Allergen Reactions    Grass pollen-june grass standard Other (See Comments)     Causes sinus symptoms like coughing    Losartan      cough    Nubain [nalbuphine] Other (See " Comments)     Other reaction(s): Hypotension    Sinus & allergy [chlorpheniramine-phenylephrine]      Sedation Hx: have not been any systemic reactions    Labs:  Recent Labs   Lab 12/12/21 0122   INR 0.9       Recent Labs   Lab 12/11/21 2020 12/12/21 0122   WBC 6.22  --    HGB 11.2*  --    HCT 35.0* 35*   MCV 88  --    *  --       Recent Labs   Lab 12/11/21 2020   GLU 75      K 4.0      CO2 21*   BUN 10   CREATININE 0.7   CALCIUM 9.1   ALT 14   AST 15   ALBUMIN 2.2*   BILITOT 0.6     Vitals:  Temp: 97.1 °F (36.2 °C) (12/12/21 0400)  Pulse: 87 (12/12/21 0600)  Resp: 13 (12/12/21 0600)  BP: (!) 102/55 (12/12/21 0400)  SpO2: 96 % (12/12/21 0600)     Physical Exam:  ASA: II  Mallampati: II    General: no acute distress  Mental Status: alert and oriented to person, place and time  HEENT: normocephalic, atraumatic  Chest: unlabored breathing  Heart: regular heart rate  Abdomen: nondistended  Extremity: moves all extremities    A/P:  76 y.o. female with acute sigmoid diverticulitis c/b sepsis and 1.6 x 4.0 x 3.1-cm sigmoid peridiverticular abscess requiring image-guided decompression and fluid sampling for pain relief, source control, diagnosis and treatment planning.     1. Sigmoid peridiverticular abscess - Will attempt CT-guided placement of a percutaneous left flank-approach sigmoid peridiverticular abscess drainage catheter with local anesthetic and up to moderate conscious sedation.    Risks (including, but not limited to, pain, bleeding, infection, damage to nearby structures, failure to obtain sufficient material for a diagnosis, the need for additional procedures, and death), benefits, and alternatives were discussed with the patient. All questions were answered to the best of my abilities. The patient wishes to proceed with the procedure. Written informed consent was obtained.    Thank you for considering IR for the care of your patient.     Marcos Pavon MD  Interventional  Radiology

## 2021-12-12 NOTE — ASSESSMENT & PLAN NOTE
CMP every 6 hours to monitor for liver toxicity  PT/ INR & monitor for any signs of bleeding. If this happens, reversal can be achieved with PCC.  Hold all medications for today. Reevaluate in the AM tomorrow.

## 2021-12-12 NOTE — PLAN OF CARE
Problem: Adult Inpatient Plan of Care  Goal: Plan of Care Review  Outcome: Ongoing, Progressing  Flowsheets (Taken 12/12/2021 0522)  Plan of Care Reviewed With: patient     Problem: Diabetes Comorbidity  Goal: Blood Glucose Level Within Targeted Range  Outcome: Ongoing, Progressing  Intervention: Monitor and Manage Glycemia  Flowsheets (Taken 12/12/2021 0522)  Glycemic Management: blood glucose monitored     Problem: Fall Injury Risk  Goal: Absence of Fall and Fall-Related Injury  Outcome: Ongoing, Progressing  Intervention: Identify and Manage Contributors  Flowsheets (Taken 12/12/2021 0522)  Self-Care Promotion: BADL personal routines maintained  Medication Review/Management: medications reviewed  Intervention: Promote Injury-Free Environment  Flowsheets (Taken 12/12/2021 0522)  Safety Promotion/Fall Prevention:   assistive device/personal item within reach   bed alarm set   Pt AAO*4, calm, cooperative. Continuous infusion of LR @ 100ml/hr. Bed in lowest position. Call light within reach, no acute changes overnight. WCTM

## 2021-12-12 NOTE — HPI
"  76 year old female with medical history of  type 2 DM, recurrent breast cancer (on Anastrozole), meningioma s/p R frontal craniotomy, PE w/o cor pulmonale and HTN  that was brought to the ED for altered mental status and medication overdose. The patient accidentally took a weeks worth of medication including lyrica 75mg, anastrozole, famotidine 20mg, atenolol 25mg, and pravastatin 40mg. The patient usually has a pill box labeled with the days of the week and she misplaced it so she is unsure of what she took. ROS + for fever and lower abdominal pain. ROS - for nausea, vomiting, diarrhea, chest pain or SOB.    Daughter reports patient has issues with memory loss at baseline due to meningioma but does not usually take more meds than she should but is capable of most ADLs. Her baseline mental status is alert and oriented, ambulates with a walker and lives alone with family checking on her. She mentioned that her sister helps her as well as her children.     Of relevance, patient was admitted in November 2021 for sepsis with E.coli UTI, Pseudomonas and Clostridium bacteremia, diverticulitis, and PNA (finished course of Merrem on 11/23).    Initially tachypneic, tachycardic, hypotensive. WBC WNL, lactate 2.5. CT abdomen/pelvis with "persistent left lower quadrant colonic mural thickening and associated fat stranding in this patient consistent with diverticulitis.  New focal collection in the left lower quadrant measuring 4.1 cm which may represent pericolonic abscess or fluid/stool within an irregular distended diverticulum.  New mild bilateral hydroureteronephrosis to the level of the urinary bladder, likely related to distension of urinary bladder."   "

## 2021-12-12 NOTE — NURSING
Pt arrived to the unit via stretcher, with patient belongings. NO acute changes noted. Pt oriented to the floor and answered questions at the bedside.

## 2021-12-12 NOTE — ASSESSMENT & PLAN NOTE
Insulin 70/30: 32 units AM/ 25 units QHS  Ozempic 1 mg weekly   Glimepiride 4 mg daily     Patient had episode of hypoglycemia at the ED that was treated with D5    Plan  - Cover with SSI. Monitor BG overnight and start insulin Detemir 10 units BID in the AM if not any hypoglycemic episodes.

## 2021-12-12 NOTE — SUBJECTIVE & OBJECTIVE
Past Medical History:   Diagnosis Date    Acute pulmonary embolism without acute cor pulmonale 11/15/2021    Adult bronchiectasis 7/26/2017    Allergy     Anemia     Arthritis     Asthma     Breast cancer 1993    left w/ radiation     Cancer 1993    L eft breast    Cataract     Chronic cervical radiculopathy 9/20/2012    Diabetes mellitus     Diabetes mellitus type II     Diabetes with neurologic complications     Diverticulosis     Dry eyes     Dysphagia     after knee surgery June 2014    GERD (gastroesophageal reflux disease)     Hyperlipidemia     Hypertension     Neuropathy     feet    Scalp tenderness     Shortness of breath     Ulcer        Past Surgical History:   Procedure Laterality Date    BREAST BIOPSY Left 1993    BREAST LUMPECTOMY Left 1993    CARPAL TUNNEL RELEASE Bilateral 2011    CATARACT EXTRACTION W/  INTRAOCULAR LENS IMPLANT Bilateral 2013 and 2014    CHOLECYSTECTOMY      COLONOSCOPY N/A 11/6/2015    Procedure: COLONOSCOPY;  Surgeon: Major Michelle MD;  Location: [facility] ([address] [place] [address]);  Service: Endoscopy;  Laterality: N/A;    COLONOSCOPY N/A 5/8/2019    Procedure: COLONOSCOPY;  Surgeon: Major Michelle MD;  Location: [facility] ([address] [place] [address]);  Service: Endoscopy;  Laterality: N/A;    CRANIOTOMY USING FRAMELESS STEREOTAXY Right 11/15/2021    Procedure: Right Frontal Craniotomy for Meningioma with  Stealth Navigation;  Surgeon: Moiz Hutchison DO;  Location: [address];  Service: Neurosurgery;  Laterality: Right;    EYE SURGERY      HYSTERECTOMY      partial hyst    INJECTION FOR SENTINEL NODE IDENTIFICATION Left 2/20/2020    Procedure: INJECTION, FOR SENTINEL NODE IDENTIFICATION;  Surgeon: Hamida Nieves MD;  Location: [address];  Service: General;  Laterality: Left;    JOINT REPLACEMENT Left June 2014    knee    MASTECTOMY      SENTINEL LYMPH NODE BIOPSY Left 2/20/2020    Procedure: BIOPSY, LYMPH NODE, SENTINEL;  Surgeon: Hamida Nieves MD;  Location:  "[facility] OR [address] [place] [address];  Service: General;  Laterality: Left;    UNILATERAL MASTECTOMY Left 2/20/2020    Procedure: MASTECTOMY, UNILATERAL;  Surgeon: Hamida Nieves MD;  Location: [facility] OR [address] [place] [address];  Service: General;  Laterality: Left;    uvuloplasty         Review of patient's allergies indicates:   Allergen Reactions    Grass pollen-june grass standard Other (See Comments)     Causes sinus symptoms like coughing    Losartan      cough    Nubain [nalbuphine] Other (See Comments)     Other reaction(s): Hypotension    Sinus & allergy [chlorpheniramine-phenylephrine]        Current Facility-Administered Medications on File Prior to Encounter   Medication    [DISCONTINUED] GENERIC EXTERNAL MEDICATION    [DISCONTINUED] GENERIC EXTERNAL MEDICATION     Current Outpatient Medications on File Prior to Encounter   Medication Sig    ACCU-CHEK DOMENICO PLUS TEST STRP Strp TEST THREE TIMES DAILY AS DIRECTED    ACCU-CHEK SOFTCLIX LANCETS Misc 1 each by Misc.(Non-Drug; Combo Route) route 3 (three) times daily.    amLODIPine (NORVASC) 5 MG tablet Take 1 tablet (5 mg total) by mouth once daily.    anastrozole (ARIMIDEX) 1 mg Tab TAKE 1 TABLET (1 MG TOTAL) BY MOUTH ONCE DAILY.    apixaban (ELIQUIS) 5 mg Tab Take 1 tablet (5 mg total) by mouth 2 (two) times daily.    BD ALCOHOL SWABS PadM 1 each 2 (two) times daily.    BD INSULIN SYRINGE ULTRA-FINE 1 mL 31 gauge x 5/16 Syrg TO USE  TWICE DAILY WITH  INSULIN    BD VEO INSULIN SYRINGE UF 0.3 mL 31 gauge x 15/64" Syrg USE  TO INJECT TWICE DAILY    blood-glucose meter Misc 1 each by Misc.(Non-Drug; Combo Route) route once daily.    calcium carbonate-vitamin D3 (CALCIUM 600 WITH VITAMIN D3) 600 mg(1,500mg) -400 unit Chew 1 tablet once daily.     carvediloL (COREG) 6.25 MG tablet Take 1 tablet (6.25 mg total) by mouth 2 (two) times daily.    cyanocobalamin-cobamamide (B-12 PLUS) 5,000-100 mcg Subl Take 5000 mcg daily.    famotidine (PEPCID) 20 MG tablet TAKE 1 TABLET (20 MG " "TOTAL) BY MOUTH EVERY EVENING.    flash glucose scanning reader (FREESTYLE GERARDO 14 DAY READER) Misc 1 each by Misc.(Non-Drug; Combo Route) route once daily.    flash glucose sensor (FREESTYLE GERARDO 14 DAY SENSOR) Kit 2 each by Misc.(Non-Drug; Combo Route) route every 14 (fourteen) days.    FOLIC ACID/MULTIVIT-MIN/LUTEIN (CENTRUM SILVER ORAL) Take 1 tablet by mouth once daily.    insulin aspart U-100 (NOVOLOG) 100 unit/mL (3 mL) InPn pen Inject 14 Units into the skin 3 (three) times daily with meals.    insulin aspart U-100 (NOVOLOG) 100 unit/mL (3 mL) InPn pen Inject 0-5 Units into the skin before meals and at bedtime as needed (Hyperglycemia).    insulin detemir U-100 (LEVEMIR FLEXTOUCH) 100 unit/mL (3 mL) SubQ InPn pen Inject 28 Units into the skin once daily.    insulin NPH-insulin regular, 70/30, (NOVOLIN 70/30) 100 unit/mL (70-30) injection RELION BRAND 32 units thirty minutes before breakfast and 24 units thirty minutes before dinner.    omeprazole (PRILOSEC) 40 MG capsule TAKE 1 CAPSULE (40 MG TOTAL) BY MOUTH EVERY MORNING.    oxyCODONE (ROXICODONE) 5 MG immediate release tablet Take 1 tablet (5 mg total) by mouth every 6 (six) hours as needed for Pain.    OZEMPIC 1 mg/dose (4 mg/3 mL) INJECT 1MG (0.75 ML) UNDER THE SKIN EVERY 7 DAYS.    pen needle, diabetic (BD ULTRA-FINE SUSIE PEN NEEDLE) 32 gauge x 5/32" Ndle USE  TO  INJECT  Weekly    potassium chloride SA (K-DUR,KLOR-CON) 10 MEQ tablet     pravastatin (PRAVACHOL) 40 MG tablet TAKE 1 TABLET NIGHTLY.    pregabalin (LYRICA) 75 MG capsule TAKE 1 CAPSULE TWICE DAILY    semaglutide (OZEMPIC) 1 mg/dose (2 mg/1.5 mL) PnIj Inject 0.75 mLs into the skin every 7 days.    senna-docusate 8.6-50 mg (PERICOLACE) 8.6-50 mg per tablet Take 1 tablet by mouth 2 (two) times daily.    [DISCONTINUED] famotidine (PEPCID) 20 MG tablet TAKE 1 TABLET (20 MG TOTAL) BY MOUTH EVERY EVENING.    [DISCONTINUED] glimepiride (AMARYL) 4 MG tablet TAKE 1 TABLET EVERY DAY " WITH BREAKFAST     Family History     Problem Relation (Age of Onset)    Arthritis Sister    Breast cancer Paternal Aunt    Cancer Sister, Sister    Colon polyps Sister, Sister    Diabetes Mother, Sister, Sister, Sister    Heart disease Father    No Known Problems Brother, Daughter, Son, Daughter    Psoriasis Sister    Seizures Sister    Stroke Sister        Tobacco Use    Smoking status: Never Smoker    Smokeless tobacco: Never Used   Substance and Sexual Activity    Alcohol use: No    Drug use: No    Sexual activity: Not Currently     Partners: Male     Review of Systems   Constitutional: Positive for fatigue and fever. Negative for activity change and chills.   HENT: Negative for sneezing and sore throat.    Respiratory: Negative for cough and shortness of breath.    Cardiovascular: Negative for chest pain and palpitations.   Gastrointestinal: Positive for abdominal pain. Negative for abdominal distention.   Genitourinary: Negative for difficulty urinating and flank pain.   Musculoskeletal: Negative for back pain and myalgias.   Skin: Negative for color change and rash.   Neurological: Negative for dizziness and headaches.   Psychiatric/Behavioral: Negative for agitation and self-injury.     Objective:     Vital Signs (Most Recent):  Temp: 97.1 °F (36.2 °C) (12/12/21 0400)  Pulse: 87 (12/12/21 0600)  Resp: 13 (12/12/21 0600)  BP: (!) 102/55 (12/12/21 0400)  SpO2: 96 % (12/12/21 0600) Vital Signs (24h Range):  Temp:  [97.1 °F (36.2 °C)-100.3 °F (37.9 °C)] 97.1 °F (36.2 °C)  Pulse:  [] 87  Resp:  [13-21] 13  SpO2:  [94 %-100 %] 96 %  BP: (102-134)/(55-78) 102/55     Weight: 98.5 kg (217 lb 2.5 oz)  Body mass index is 39.72 kg/m².    Physical Exam  Constitutional:       Appearance: Normal appearance. She is ill-appearing.   HENT:      Head: Normocephalic and atraumatic.      Mouth/Throat:      Mouth: Mucous membranes are dry.   Cardiovascular:      Rate and Rhythm: Normal rate and regular rhythm.       Pulses: Normal pulses.      Heart sounds: Normal heart sounds.   Pulmonary:      Effort: Pulmonary effort is normal.      Breath sounds: Normal breath sounds.   Abdominal:      General: Abdomen is flat. Bowel sounds are normal.      Palpations: Abdomen is soft.      Tenderness: There is abdominal tenderness.   Musculoskeletal:         General: No deformity.      Right lower leg: No edema.      Left lower leg: No edema.   Skin:     General: Skin is warm and dry.      Comments: No edema present   Neurological:      Mental Status: She is alert and oriented to person, place, and time.             Significant Labs:   CBC:   Recent Labs   Lab 12/11/21 2020 12/12/21  0122   WBC 6.22  --    HGB 11.2*  --    HCT 35.0* 35*   *  --      CMP:   Recent Labs   Lab 12/11/21 2020      K 4.0      CO2 21*   GLU 75   BUN 10   CREATININE 0.7   CALCIUM 9.1   PROT 5.7*   ALBUMIN 2.2*   BILITOT 0.6   ALKPHOS 84   AST 15   ALT 14   ANIONGAP 9   EGFRNONAA >60.0       Significant Imaging: I have reviewed all pertinent imaging results/findings within the past 24 hours.

## 2021-12-12 NOTE — HPI
76-year-old female with history of T2DM, recurrent breast cancer on anastrozole, meningioma s/p R frontal craniotomy, presented with AMS. Per family, patient accidentally took entire week of her medications in accidental drug overdose. Patient was septic with lactic acidosis, CT AP with acute diverticulitis and abscess, started empirically with vanc / rowan.

## 2021-12-13 LAB
ALBUMIN SERPL BCP-MCNC: 2 G/DL (ref 3.5–5.2)
ALP SERPL-CCNC: 95 U/L (ref 55–135)
ALT SERPL W/O P-5'-P-CCNC: 10 U/L (ref 10–44)
ANION GAP SERPL CALC-SCNC: 10 MMOL/L (ref 8–16)
AST SERPL-CCNC: 15 U/L (ref 10–40)
BASOPHILS # BLD AUTO: 0.03 K/UL (ref 0–0.2)
BASOPHILS NFR BLD: 0.6 % (ref 0–1.9)
BILIRUB SERPL-MCNC: 0.9 MG/DL (ref 0.1–1)
BUN SERPL-MCNC: 6 MG/DL (ref 8–23)
CALCIUM SERPL-MCNC: 9.1 MG/DL (ref 8.7–10.5)
CHLORIDE SERPL-SCNC: 105 MMOL/L (ref 95–110)
CO2 SERPL-SCNC: 24 MMOL/L (ref 23–29)
CREAT SERPL-MCNC: 0.7 MG/DL (ref 0.5–1.4)
DIFFERENTIAL METHOD: ABNORMAL
EOSINOPHIL # BLD AUTO: 0 K/UL (ref 0–0.5)
EOSINOPHIL NFR BLD: 0.6 % (ref 0–8)
ERYTHROCYTE [DISTWIDTH] IN BLOOD BY AUTOMATED COUNT: 16 % (ref 11.5–14.5)
EST. GFR  (AFRICAN AMERICAN): >60 ML/MIN/1.73 M^2
EST. GFR  (NON AFRICAN AMERICAN): >60 ML/MIN/1.73 M^2
GLUCOSE SERPL-MCNC: 102 MG/DL (ref 70–110)
HCT VFR BLD AUTO: 38.1 % (ref 37–48.5)
HGB BLD-MCNC: 12.2 G/DL (ref 12–16)
IMM GRANULOCYTES # BLD AUTO: 0.24 K/UL (ref 0–0.04)
IMM GRANULOCYTES NFR BLD AUTO: 4.5 % (ref 0–0.5)
LYMPHOCYTES # BLD AUTO: 1.6 K/UL (ref 1–4.8)
LYMPHOCYTES NFR BLD: 30.5 % (ref 18–48)
MAGNESIUM SERPL-MCNC: 1.7 MG/DL (ref 1.6–2.6)
MCH RBC QN AUTO: 28.6 PG (ref 27–31)
MCHC RBC AUTO-ENTMCNC: 32 G/DL (ref 32–36)
MCV RBC AUTO: 89 FL (ref 82–98)
MONOCYTES # BLD AUTO: 0.4 K/UL (ref 0.3–1)
MONOCYTES NFR BLD: 7.4 % (ref 4–15)
NEUTROPHILS # BLD AUTO: 3 K/UL (ref 1.8–7.7)
NEUTROPHILS NFR BLD: 56.4 % (ref 38–73)
NRBC BLD-RTO: 0 /100 WBC
PHOSPHATE SERPL-MCNC: 2.8 MG/DL (ref 2.7–4.5)
PLATELET # BLD AUTO: 186 K/UL (ref 150–450)
PMV BLD AUTO: 10.1 FL (ref 9.2–12.9)
POCT GLUCOSE: 233 MG/DL (ref 70–110)
POCT GLUCOSE: 265 MG/DL (ref 70–110)
POCT GLUCOSE: 327 MG/DL (ref 70–110)
POTASSIUM SERPL-SCNC: 4.1 MMOL/L (ref 3.5–5.1)
PROT SERPL-MCNC: 5.8 G/DL (ref 6–8.4)
RBC # BLD AUTO: 4.26 M/UL (ref 4–5.4)
SODIUM SERPL-SCNC: 139 MMOL/L (ref 136–145)
WBC # BLD AUTO: 5.37 K/UL (ref 3.9–12.7)

## 2021-12-13 PROCEDURE — 97530 THERAPEUTIC ACTIVITIES: CPT | Mod: HCNC

## 2021-12-13 PROCEDURE — 84100 ASSAY OF PHOSPHORUS: CPT | Mod: HCNC | Performed by: STUDENT IN AN ORGANIZED HEALTH CARE EDUCATION/TRAINING PROGRAM

## 2021-12-13 PROCEDURE — 97110 THERAPEUTIC EXERCISES: CPT | Mod: HCNC

## 2021-12-13 PROCEDURE — 25000003 PHARM REV CODE 250: Mod: HCNC | Performed by: INTERNAL MEDICINE

## 2021-12-13 PROCEDURE — 25000003 PHARM REV CODE 250: Mod: HCNC | Performed by: STUDENT IN AN ORGANIZED HEALTH CARE EDUCATION/TRAINING PROGRAM

## 2021-12-13 PROCEDURE — 99232 PR SUBSEQUENT HOSPITAL CARE,LEVL II: ICD-10-PCS | Mod: HCNC,GC,, | Performed by: INTERNAL MEDICINE

## 2021-12-13 PROCEDURE — 97161 PT EVAL LOW COMPLEX 20 MIN: CPT | Mod: HCNC

## 2021-12-13 PROCEDURE — 83735 ASSAY OF MAGNESIUM: CPT | Mod: HCNC | Performed by: STUDENT IN AN ORGANIZED HEALTH CARE EDUCATION/TRAINING PROGRAM

## 2021-12-13 PROCEDURE — 97165 OT EVAL LOW COMPLEX 30 MIN: CPT | Mod: HCNC

## 2021-12-13 PROCEDURE — 99232 SBSQ HOSP IP/OBS MODERATE 35: CPT | Mod: HCNC,GC,, | Performed by: INTERNAL MEDICINE

## 2021-12-13 PROCEDURE — 20600001 HC STEP DOWN PRIVATE ROOM: Mod: HCNC

## 2021-12-13 PROCEDURE — 36415 COLL VENOUS BLD VENIPUNCTURE: CPT | Mod: HCNC | Performed by: STUDENT IN AN ORGANIZED HEALTH CARE EDUCATION/TRAINING PROGRAM

## 2021-12-13 PROCEDURE — 80053 COMPREHEN METABOLIC PANEL: CPT | Mod: HCNC | Performed by: STUDENT IN AN ORGANIZED HEALTH CARE EDUCATION/TRAINING PROGRAM

## 2021-12-13 PROCEDURE — 63600175 PHARM REV CODE 636 W HCPCS: Mod: HCNC | Performed by: STUDENT IN AN ORGANIZED HEALTH CARE EDUCATION/TRAINING PROGRAM

## 2021-12-13 PROCEDURE — 85025 COMPLETE CBC W/AUTO DIFF WBC: CPT | Mod: HCNC | Performed by: STUDENT IN AN ORGANIZED HEALTH CARE EDUCATION/TRAINING PROGRAM

## 2021-12-13 PROCEDURE — 94761 N-INVAS EAR/PLS OXIMETRY MLT: CPT | Mod: HCNC

## 2021-12-13 PROCEDURE — 63600175 PHARM REV CODE 636 W HCPCS: Mod: HCNC | Performed by: INTERNAL MEDICINE

## 2021-12-13 RX ADMIN — Medication 1 TABLET: at 08:12

## 2021-12-13 RX ADMIN — INSULIN DETEMIR 5 UNITS: 100 INJECTION, SOLUTION SUBCUTANEOUS at 08:12

## 2021-12-13 RX ADMIN — POLYETHYLENE GLYCOL 3350 17 G: 17 POWDER, FOR SOLUTION ORAL at 10:12

## 2021-12-13 RX ADMIN — INSULIN ASPART 6 UNITS: 100 INJECTION, SOLUTION INTRAVENOUS; SUBCUTANEOUS at 12:12

## 2021-12-13 RX ADMIN — APIXABAN 5 MG: 5 TABLET, FILM COATED ORAL at 10:12

## 2021-12-13 RX ADMIN — CYANOCOBALAMIN TAB 250 MCG 250 MCG: 250 TAB at 10:12

## 2021-12-13 RX ADMIN — APIXABAN 5 MG: 5 TABLET, FILM COATED ORAL at 08:12

## 2021-12-13 RX ADMIN — MEROPENEM AND SODIUM CHLORIDE 1 G: 1 INJECTION, SOLUTION INTRAVENOUS at 11:12

## 2021-12-13 RX ADMIN — MEROPENEM AND SODIUM CHLORIDE 1 G: 1 INJECTION, SOLUTION INTRAVENOUS at 03:12

## 2021-12-13 RX ADMIN — Medication 1 TABLET: at 10:12

## 2021-12-13 RX ADMIN — FAMOTIDINE 20 MG: 20 TABLET ORAL at 08:12

## 2021-12-13 RX ADMIN — SENNOSIDES AND DOCUSATE SODIUM 1 TABLET: 50; 8.6 TABLET ORAL at 10:12

## 2021-12-13 RX ADMIN — MEROPENEM AND SODIUM CHLORIDE 1 G: 1 INJECTION, SOLUTION INTRAVENOUS at 09:12

## 2021-12-13 RX ADMIN — VANCOMYCIN HYDROCHLORIDE 750 MG: 750 INJECTION, POWDER, LYOPHILIZED, FOR SOLUTION INTRAVENOUS at 12:12

## 2021-12-13 RX ADMIN — INSULIN ASPART 4 UNITS: 100 INJECTION, SOLUTION INTRAVENOUS; SUBCUTANEOUS at 08:12

## 2021-12-13 NOTE — PT/OT/SLP EVAL
Occupational Therapy   Evaluation and Treatment with PT     Name: Alisia Gusman  MRN: [id number]  Admitting Diagnosis:  Sepsis  Recent Surgery: * No surgery found *      Recommendations:     Discharge Recommendations: home health OT,home health PT  Discharge Equipment Recommendations:  none  Barriers to discharge:  None    Assessment:     Alisia Gusman is a 76 y.o. female with a medical diagnosis of Sepsis.  She presents with performance deficits affecting function: weakness,impaired endurance,impaired self care skills,impaired functional mobilty,gait instability,impaired balance,impaired cardiopulmonary response to activity.  Pt tolerated session well and motivated to participate in therapy session. Pt performed mobility and transfers with CGA. Pt would benefit from continued skilled acute OT services in order to maximize independence and safety with ADLs and functional mobility to ensure safe return to PLOF in the least restrictive environment. OT recommending HH once pt is medically appropriate for d/c.     Rehab Prognosis: Good; patient would benefit from acute skilled OT services to address these deficits and reach maximum level of function.       Plan:     Patient to be seen 3 x/week to address the above listed problems via self-care/home management,therapeutic activities,therapeutic exercises  · Plan of Care Expires: 01/11/22  · Plan of Care Reviewed with: patient    Subjective     Chief Complaint: none stated   Patient/Family Comments/goals: to get better and return home     Occupational Profile:  Living Environment: Pt lives alone in a H with 10 ELISA and B HRs present. Pt reports her sister and family live next door to pt. Pt has a walk-in shower with shower chair present. Pt reports recent falls.  Previous level of function: PTA, pt reports she was (I) <>mod (I) using RW. PTA, pt was (I) with ADLs  Roles and Routines: caretaker to self   Equipment Used at Home:  cane, straight,cane, quad,walker,  rolling,wheelchair  Assistance upon Discharge: Pt will have assistance from family upon d/c.     Pain/Comfort:  · Pain Rating 1: 0/10  · Pain Rating Post-Intervention 1: 0/10    Patients cultural, spiritual, Restoration conflicts given the current situation: no    Objective:     Communicated with: RN prior to session.  Patient found HOB elevated with telemetry,pulse ox (continuous),blood pressure cuff,PureWick, bed alarm upon OT entry to room. Pt agreeable to therapy session.     General Precautions: Standard, fall   Orthopedic Precautions:N/A   Braces: N/A  Respiratory Status::   · O2 Device (Oxygen Therapy): room air    Occupational Performance:    Bed Mobility:    · Patient completed Rolling/Turning to Right with contact guard assistance  · Patient completed Scooting/Bridging with contact guard assistance  · Patient completed Supine to Sit with contact guard assistance and with HOB elevated \    Functional Mobility/Transfers:  · Patient completed Sit <> Stand Transfer with contact guard assistance  with  no assistive device   · Patient completed Bed <> Chair Transfer using Step Transfer technique with contact guard assistance with no assistive device  · Functional Mobility: Pt engaging in functional mobility to simulate household/community distances approx 6ft  with CGA and utilizing HHA in order to maximize functional activity tolerance and standing balance required for engagement in occupations of choice.   · Pt with slight instability but no overt LOB     Activities of Daily Living:  · Toileting: purewick and brief in place   · Feeding: set-up A sitting [facility]     Cognitive/Visual Perceptual:  Cognitive/Psychosocial Skills:     -       Oriented to: Person, Place, Time and Situation   -       Follows Commands/attention:Follows multistep  commands  -       Communication: clear/fluent  -       Memory: No Deficits noted  -       Safety awareness/insight to disability: intact   -       Mood/Affect/Coping  skills/emotional control: Appropriate to situation  Visual/Perceptual:      -Intact      Physical Exam:  Balance:     Static sit: SBA   Dynamic sit: SBA   Static standing: CGA   Dynamic Standing: CGA    Postural examination/scapula alignment:    -       Rounded shoulders  Skin integrity: Visible skin intact  Edema:  Mild R UE to elbow and R hand  Sensation:    -       Intact; slight numbness in R hand   Dominant hand:    -       right   Upper Extremity Range of Motion:     -       Right Upper Extremity: WFL  -       Left Upper Extremity: WFL  Upper Extremity Strength:    -       Right Upper Extremity: WFL  -       Left Upper Extremity: WFL    AMPAC 6 Click ADL:  AMPAC Total Score: 20    Treatment & Education:   Pt educated on role of OT, POC, and goals for therapy.     POC was dicussed with patient/caregiver, who was included in its development and is in agreement with the identified goals and treatment plan.    Patient and family aware of patient's deficits and therapy progression.    MD Whitten present during session and planned to notify primary team of R UE swelling    Time provided for therapeutic counseling and discussion of health disposition.    Educated on importance of EOB/OOB mobility, maintaining routine, sitting up in chair, and maximizing independence with ADLs during admission    Pt completed ADLs and functional mobility for treatment session as noted above    Pt/caregiver verbalized understanding and expressed no further concerns/questions.   Updated communication board with level of assist required (CGA x 1 person assistance ) & educated RN/patient that pt is appropriate for transfers with RN/PCT.     Co-evaluation/treatment performed due to patient's multiple deficits requiring two skilled therapists to appropriately and safely assess patient's strength and endurance while facilitating functional tasks in addition to accommodating for patient's activity tolerance.    Education:    Patient left up in chair with all lines intact, call button in reach and RN  notified    GOALS:   Multidisciplinary Problems     Occupational Therapy Goals        Problem: Occupational Therapy Goal    Goal Priority Disciplines Outcome Interventions   Occupational Therapy Goal     OT, PT/OT Ongoing, Progressing    Description: Goals to be met by: 12/27/2021    Patient will increase functional independence with ADLs by performing:    UE Dressing with Modified [place].  LE Dressing with Modified [place].  Grooming while standing at sink with Modified [place].  Toileting from toilet with Modified [place] for hygiene and clothing management.   Toilet transfer to toilet with Modified [place].                     History:     Past Medical History:   Diagnosis Date    Acute pulmonary embolism without acute cor pulmonale 11/15/2021    Adult bronchiectasis 7/26/2017    Allergy     Anemia     Arthritis     Asthma     Breast cancer 1993    left w/ radiation     Cancer 1993    L eft breast    Cataract     Chronic cervical radiculopathy 9/20/2012    Diabetes mellitus     Diabetes mellitus type II     Diabetes with neurologic complications     Diverticulosis     Dry eyes     Dysphagia     after knee surgery June 2014    GERD (gastroesophageal reflux disease)     Hyperlipidemia     Hypertension     Neuropathy     feet    Scalp tenderness     Shortness of breath     Ulcer        Past Surgical History:   Procedure Laterality Date    BREAST BIOPSY Left 1993    BREAST LUMPECTOMY Left 1993    CARPAL TUNNEL RELEASE Bilateral 2011    CATARACT EXTRACTION W/  INTRAOCULAR LENS IMPLANT Bilateral 2013 and 2014    CHOLECYSTECTOMY      COLONOSCOPY N/A 11/6/2015    Procedure: COLONOSCOPY;  Surgeon: Major Michelle MD;  Location: [facility] ([address] [place] [address]);  Service: Endoscopy;  Laterality: N/A;    COLONOSCOPY N/A 5/8/2019    Procedure: COLONOSCOPY;  Surgeon: Major Michelle MD;   Location: [facility] ENDO (4TH FLR);  Service: Endoscopy;  Laterality: N/A;    CRANIOTOMY USING FRAMELESS STEREOTAXY Right 11/15/2021    Procedure: Right Frontal Craniotomy for Meningioma with  Stealth Navigation;  Surgeon: Moiz Hutchison DO;  Location: [address];  Service: Neurosurgery;  Laterality: Right;    EYE SURGERY      HYSTERECTOMY      partial hyst    INJECTION FOR SENTINEL NODE IDENTIFICATION Left 2/20/2020    Procedure: INJECTION, FOR SENTINEL NODE IDENTIFICATION;  Surgeon: Hamida Nieves MD;  Location: [address];  Service: General;  Laterality: Left;    JOINT REPLACEMENT Left June 2014    knee    MASTECTOMY      SENTINEL LYMPH NODE BIOPSY Left 2/20/2020    Procedure: BIOPSY, LYMPH NODE, SENTINEL;  Surgeon: Hamida Nieves MD;  Location: [address];  Service: General;  Laterality: Left;    UNILATERAL MASTECTOMY Left 2/20/2020    Procedure: MASTECTOMY, UNILATERAL;  Surgeon: Hamida Nieves MD;  Location: [address];  Service: General;  Laterality: Left;    uvuloplasty         Time Tracking:     OT Date of Treatment: 12/13/21  OT Start Time: 0906  OT Stop Time: 0930  OT Total Time (min): 24 min    Billable Minutes:Evaluation 12  Therapeutic Activity 10    12/13/2021

## 2021-12-13 NOTE — PLAN OF CARE
Discharge Recommendation: HHPT with family assist    Evaluation completed today. PT goals appropriate.    Patient is safe to perform bed <> chair transfer with CGA and HHA or RW with nursing staff.    Please continue Progressive Mobility Protocol as appropriate.    Kelly Rowland, PT, DPT  2021  Pager: [phone]      Problem: Physical Therapy Goal  Goal: Physical Therapy Goal  Description: Goals to be met by: 2021     Patient will increase functional independence with mobility by performin. Sit to stand transfer with Supervision  2. Bed to chair transfer with Supervision using Rolling Walker or LRAD  3. Gait  x 100 feet with Stand-by Assistance using Rolling Walker or LRAD   4. Ascend/descend 10 stair with bilateral Handrails Contact Guard Assistance using LRAD.   5. Stand for 5 minutes with Supervision using LRAD while performing dynamic balance task including reaches outside PHYLLIS to demo WFL balance and safety to perform daily activities in the home  6. Lower extremity exercise program x30 reps per handout, with independence    Outcome: Ongoing, Progressing

## 2021-12-13 NOTE — PROGRESS NOTES
"[facility] - Telemetry [facility] Medicine  Progress Note    Patient Name: Alisia Gusman  MRN: [id number]  Patient Class: IP- Inpatient   Admission Date: 12/11/2021  Length of Stay: 1 days  Attending Physician: Sarah Gage MD  Primary Care Provider: Selena Montemayor MD      Subjective:     Principal Problem:Sepsis      HPI:    76 year old female with medical history of  type 2 DM, recurrent breast cancer (on Anastrozole), meningioma s/p R frontal craniotomy, PE w/o cor pulmonale and HTN  that was brought to the ED for altered mental status and medication overdose. The patient accidentally took a weeks worth of medication including lyrica 75mg, anastrozole, famotidine 20mg, atenolol 25mg, and pravastatin 40mg. The patient usually has a pill box labeled with the days of the week and she misplaced it so she is unsure of what she took. ROS + for fever and lower abdominal pain. ROS - for nausea, vomiting, diarrhea, chest pain or SOB.    Daughter reports patient has issues with memory loss at baseline due to meningioma but does not usually take more meds than she should but is capable of most ADLs. Her baseline mental status is alert and oriented, ambulates with a walker and lives alone with family checking on her. She mentioned that her sister helps her as well as her children.     Of relevance, patient was admitted in November 2021 for sepsis with E.coli UTI, Pseudomonas and Clostridium bacteremia, diverticulitis, and PNA (finished course of Merrem on 11/23).    Initially tachypneic, tachycardic, hypotensive. WBC WNL, lactate 2.5. CT abdomen/pelvis with "persistent left lower quadrant colonic mural thickening and associated fat stranding in this patient consistent with diverticulitis.  New focal collection in the left lower quadrant measuring 4.1 cm which may represent pericolonic abscess or fluid/stool within an irregular distended diverticulum.  New mild bilateral hydroureteronephrosis to the level of the urinary " "bladder, likely related to distension of urinary bladder."       Overview/Hospital Course:  Underwent 12/12 IR aspiration of abscess seen on CT a/p in LLQ. Lactate normalized and maintenance fluids were discontinued. Aspirate culture grew Proteus, urine culture grew GNR--susceptibilities pending. ID consulted. Evaluated by PT/OT with recommendation for home health.      Interval History: No events overnight. Patient with some right forearm swelling and pain after apparent infiltration of IV in right hand. Eating breakfast with good appetite. No other complaints, including no abdominal pain or dysuria.    Review of Systems   Constitutional: Negative for chills and fever.   HENT: Negative for congestion and rhinorrhea.    Eyes: Negative for pain and redness.   Respiratory: Negative for cough and shortness of breath.    Cardiovascular: Negative for chest pain and leg swelling.   Gastrointestinal: Negative for abdominal pain (improved), diarrhea and nausea.   Genitourinary: Negative for dysuria and hematuria.   Musculoskeletal: Positive for myalgias (right forearm). Negative for back pain.   Neurological: Negative for weakness and headaches.   Psychiatric/Behavioral: Negative for agitation and confusion.     Objective:     Vital Signs (Most Recent):  Temp: 98 °F (36.7 °C) (12/13/21 1126)  Pulse: 104 (12/13/21 1135)  Resp: 20 (12/13/21 1126)  BP: (!) 99/57 (12/13/21 1126)  SpO2: 99 % (12/13/21 1126) Vital Signs (24h Range):  Temp:  [97.7 °F (36.5 °C)-99.1 °F (37.3 °C)] 98 °F (36.7 °C)  Pulse:  [] 104  Resp:  [16-20] 20  SpO2:  [92 %-99 %] 99 %  BP: ()/(57-63) 99/57     Weight: 98.5 kg (217 lb 2.5 oz)  Body mass index is 39.72 kg/m².    Intake/Output Summary (Last 24 hours) at 12/13/2021 1342  Last data filed at 12/13/2021 0200  Gross per 24 hour   Intake 1480 ml   Output 1600 ml   Net -120 ml      Physical Exam  Vitals and nursing note reviewed.   Constitutional:       General: She is not in acute " distress.  HENT:      Head: Normocephalic and atraumatic.      Mouth/Throat:      Mouth: Mucous membranes are moist.   Eyes:      Conjunctiva/sclera: Conjunctivae normal.      Pupils: Pupils are equal, round, and reactive to light.   Cardiovascular:      Rate and Rhythm: Normal rate and regular rhythm.      Heart sounds: No murmur heard.      Pulmonary:      Effort: Pulmonary effort is normal. No respiratory distress.      Breath sounds: Normal breath sounds.   Abdominal:      Palpations: Abdomen is soft.      Tenderness: There is abdominal tenderness (slight LLQ). There is no guarding or rebound.   Musculoskeletal:      Right lower leg: No edema.      Left lower leg: No edema.   Skin:     General: Skin is warm and dry.      Capillary Refill: Capillary refill takes less than 2 seconds.   Neurological:      General: No focal deficit present.      Mental Status: She is alert and oriented to person, place, and time.   Psychiatric:         Mood and Affect: Mood normal.         Behavior: Behavior normal.         Significant Labs: All pertinent labs within the past 24 hours have been reviewed.    Significant Imaging: I have reviewed all pertinent imaging results/findings within the past 24 hours.      Assessment/Plan:      * Sepsis  Initially had evidence of infective focus with diverticulitis and abscess on CT a/p and evidence of infection on UA. Received 30 cc/kg bolus in ED then continued on maintenance fluids until lactate normalized. Started on meropenem and vancomycin because she received meropenem during admission last month for UTI, diverticulitis, and polymicrobial bacteremia per ID rec. S/p LLQ abscess aspiration by IR, culture growing proteus. UCx with GNR. BCx NGTD.    - ID consulted, appreciate recommendations   - vanc, meropenem  - f/u aspirate cultures, BCx, UCx    Diverticulitis  Seen on CT a/p. LLQ tenderness on initial exam improving with antibiotics.      Abscess of abdominal cavity  S/p IR aspiration  12/12 of LLQ abscess seen on CT a/p.    - Cultures growing presumptive proteus, susceptibilities pending  - continuing meropenem      Medication overdose  Reportedly took double to a week's worth of her home medications prior to arrival 2/2 altered mental status: lyrica, anastrozole, famotidine, atenolol, and pravastatin. CMP, INR WNL. Mental status now improved with treatment of sepsis.    History of pulmonary embolism  Segmental and subsegmental pulmonary emboli in the right upper and right lower lobes seen on 11/15/21 CTA Chest. Eliquis held for IR procedure then resumed.    - continue home Eliquis 5 mg BID    Type 2 diabetes mellitus  On insulin 70/30 32 units AM/ 25 units QHS, Ozempic 1 mg weekly, and Glimepiride 4 mg daily at home. Had episode of hypoglycemia in ED perhaps 2/2 taking extra doses of her home medications prior to arrival.      - LDSSI, will monitor BGL for need to start scheduled insulin      Essential hypertension  - holding home amlodipine 5 mg due to initial hypotension    AMS (altered mental status)  RESOLVED  Initially confused, improved to A&Ox4 after treatment for sepsis.      VTE Risk Mitigation (From admission, onward)         Ordered     apixaban tablet 5 mg  2 times daily         12/12/21 1250     IP VTE HIGH RISK PATIENT  Once         12/12/21 0013     Place sequential compression device  Until discontinued         12/12/21 0013                Discharge Planning   YUSEF: 12/15/2021     Code Status: Full Code   Is the patient medically ready for discharge?:     Reason for patient still in hospital (select all that apply): Patient trending condition, Laboratory test, Treatment and Consult recommendations  Discharge Plan A: Home with family,Home            Angela Gaines MD  Department of Hospital Medicine   [facility] - Telemetry Stepdown

## 2021-12-13 NOTE — SUBJECTIVE & OBJECTIVE
Interval History: No events overnight. No fevers. Patient had sigmoid peridiverticular abscess drained yesterday without complications. Was not adequate size to leave a drainage catheter in place. Fluid drained was frankly purulent per IR. She does not complain of any abdominal pain, N/V/D this morning. She reports having a non-bloody BM today.    Review of Systems   Constitutional: Negative for appetite change and fever.   HENT: Negative for congestion and sinus pain.    Eyes: Negative for discharge and redness.   Respiratory: Negative for cough and shortness of breath.    Cardiovascular: Negative for chest pain and leg swelling.   Gastrointestinal: Negative for diarrhea and nausea.   Endocrine: Negative for polydipsia and polyuria.   Genitourinary: Negative for dysuria and hematuria.   Musculoskeletal: Negative for arthralgias and myalgias.   Skin: Negative for color change and rash.   Neurological: Negative for weakness and numbness.   Psychiatric/Behavioral: Negative for agitation and confusion.     Objective:     Vital Signs (Most Recent):  Temp: 98 °F (36.7 °C) (12/13/21 0706)  Pulse: 100 (12/13/21 0706)  Resp: 20 (12/13/21 0706)  BP: 121/63 (12/13/21 0706)  SpO2: (!) 92 % (12/13/21 0706) Vital Signs (24h Range):  Temp:  [97.7 °F (36.5 °C)-99.1 °F (37.3 °C)] 98 °F (36.7 °C)  Pulse:  [] 100  Resp:  [16-20] 20  SpO2:  [92 %-97 %] 92 %  BP: ()/(58-63) 121/63     Weight: 98.5 kg (217 lb 2.5 oz)  Body mass index is 39.72 kg/m².    Estimated Creatinine Clearance: 72.7 mL/min (based on SCr of 0.7 mg/dL).    Physical Exam  Constitutional:       General: She is not in acute distress.     Appearance: Normal appearance.   HENT:      Head: Normocephalic and atraumatic.      Mouth/Throat:      Mouth: Mucous membranes are moist.      Pharynx: Oropharynx is clear.   Eyes:      Conjunctiva/sclera: Conjunctivae normal.      Pupils: Pupils are equal, round, and reactive to light.   Cardiovascular:      Rate and  Rhythm: Normal rate and regular rhythm.      Pulses: Normal pulses.      Heart sounds: Normal heart sounds.   Pulmonary:      Effort: Pulmonary effort is normal.      Breath sounds: Normal breath sounds.      Comments: Bibasilar rales  Abdominal:      General: Abdomen is flat. Bowel sounds are normal. There is no distension.      Palpations: There is no mass.      Tenderness: There is no guarding or rebound.      Comments: Soft, non-tender abdomen   Musculoskeletal:         General: No swelling or deformity.      Cervical back: Normal range of motion and neck supple. No tenderness.   Skin:     General: Skin is warm and dry.   Neurological:      General: No focal deficit present.      Mental Status: She is alert and oriented to person, place, and time.   Psychiatric:         Mood and Affect: Mood normal.         Behavior: Behavior normal.         Significant Labs:   CBC:   Recent Labs   Lab 12/11/21 2020 12/12/21 0122 12/12/21  1051 12/13/21  0416   WBC 6.22  --  6.50 5.37   HGB 11.2*  --  12.1 12.2   HCT 35.0* 35* 38.9 38.1   *  --  150 186     Microbiology Results (last 7 days)     Procedure Component Value Units Date/Time    Aerobic culture [[id number]]  (Abnormal) Collected: 12/12/21 1127    Order Status: Completed Specimen: Abscess from Abdomen Updated: 12/13/21 1322     Aerobic Bacterial Culture PRESUMPTIVE PROTEUS SPECIES  Rare  Identification and susceptibility pending      Narrative:      LLQ abscess    Culture, Anaerobe [[id number]] Collected: 12/12/21 1127    Order Status: Completed Specimen: Abscess from Abdomen Updated: 12/13/21 0644     Anaerobic Culture Culture in progress    Narrative:      LLQ abscess    Blood culture x two cultures. Draw prior to antibiotics. [[id number]] Collected: 12/12/21 0121    Order Status: Completed Specimen: Blood from Peripheral, Hand, Right Updated: 12/13/21 0613     Blood Culture, Routine No Growth to date      No Growth to date    Narrative:      Aerobic and  anaerobic    Blood culture x two cultures. Draw prior to antibiotics. [[id number]] Collected: 12/11/21 2144    Order Status: Completed Specimen: Blood from Peripheral, Forearm, Right Updated: 12/13/21 0613     Blood Culture, Routine No Growth to date      No Growth to date    Narrative:      Aerobic and anaerobic    Urine culture [[id number]]  (Abnormal) Collected: 12/11/21 2247    Order Status: Completed Specimen: Urine Updated: 12/12/21 1640     Urine Culture, Routine GRAM NEGATIVE SHELLY  50,000 - 99,999 cfu/ml  Identification and susceptibility pending      Narrative:      Specimen Source->Urine    Gram stain [[id number]] Collected: 12/12/21 1127    Order Status: Completed Specimen: Abscess from Abdomen Updated: 12/12/21 1602     Gram Stain Result Many WBC's      Moderate Gram negative rods      Rare gram positive cocci    Narrative:      LLQ abscess    Fungus culture [[id number]] Collected: 12/12/21 1127    Order Status: Sent Specimen: Abscess from Abdomen Updated: 12/12/21 1145    AFB Culture & Smear [[id number]] Collected: 12/12/21 1127    Order Status: Sent Specimen: Abscess from Abdomen Updated: 12/12/21 1144    Blood Culture #1 **CANNOT BE ORDERED STAT** [[id number]]     Order Status: Canceled Specimen: Blood           Significant Imaging: I have reviewed all pertinent imaging results/findings within the past 24 hours.

## 2021-12-13 NOTE — ASSESSMENT & PLAN NOTE
76-year-old female with history of T2DM, recurrent breast cancer on anastrozole, meningioma s/p R frontal craniotomy, presented with AMS, accidental drug overdose, sepsis 2/2 acute diverticulitis with intraabdominal abscess s/p IR drainage 12/12/2021.     Urine Cx 12/11 with E coli. BCx 12/12 NGTD. Abscess gram stain with GPC, Cx with pan sensitive proteus and bacteroides fragilis pending sensitivities.    Recommendations:  - Augmentin 875mg BID for a total of 14 days from abscess drainage. This covers GI GPCs (if enterococcus or streptococcus spp grows), GI anaerobes, and the pan-sensitive proteus, along with her E coli in the urine  - ID 2 week clinic follow up with repeat CT abdomen/pelvis with contrast prior to office visit to evaluate full resolution of the abdominal abscess and final culture data

## 2021-12-13 NOTE — ASSESSMENT & PLAN NOTE
Initially had evidence of infective focus with diverticulitis and abscess on CT a/p and evidence of infection on UA. Received 30 cc/kg bolus in ED then continued on maintenance fluids until lactate normalized. Started on meropenem and vancomycin because she received meropenem during admission last month for UTI, diverticulitis, and polymicrobial bacteremia per ID rec. S/p LLQ abscess aspiration by IR, culture growing proteus. UCx with GNR. BCx NGTD.    - ID consulted, appreciate recommendations   - vanc, meropenem  - f/u aspirate cultures, BCx, UCx

## 2021-12-13 NOTE — SUBJECTIVE & OBJECTIVE
Interval History: No events overnight. Patient with some right forearm swelling and pain after apparent infiltration of IV in right hand. Eating breakfast with good appetite. No other complaints, including no abdominal pain or dysuria.    Review of Systems   Constitutional: Negative for chills and fever.   HENT: Negative for congestion and rhinorrhea.    Eyes: Negative for pain and redness.   Respiratory: Negative for cough and shortness of breath.    Cardiovascular: Negative for chest pain and leg swelling.   Gastrointestinal: Negative for abdominal pain (improved), diarrhea and nausea.   Genitourinary: Negative for dysuria and hematuria.   Musculoskeletal: Positive for myalgias (right forearm). Negative for back pain.   Neurological: Negative for weakness and headaches.   Psychiatric/Behavioral: Negative for agitation and confusion.     Objective:     Vital Signs (Most Recent):  Temp: 98 °F (36.7 °C) (12/13/21 1126)  Pulse: 104 (12/13/21 1135)  Resp: 20 (12/13/21 1126)  BP: (!) 99/57 (12/13/21 1126)  SpO2: 99 % (12/13/21 1126) Vital Signs (24h Range):  Temp:  [97.7 °F (36.5 °C)-99.1 °F (37.3 °C)] 98 °F (36.7 °C)  Pulse:  [] 104  Resp:  [16-20] 20  SpO2:  [92 %-99 %] 99 %  BP: ()/(57-63) 99/57     Weight: 98.5 kg (217 lb 2.5 oz)  Body mass index is 39.72 kg/m².    Intake/Output Summary (Last 24 hours) at 12/13/2021 1342  Last data filed at 12/13/2021 0200  Gross per 24 hour   Intake 1480 ml   Output 1600 ml   Net -120 ml      Physical Exam  Vitals and nursing note reviewed.   Constitutional:       General: She is not in acute distress.  HENT:      Head: Normocephalic and atraumatic.      Mouth/Throat:      Mouth: Mucous membranes are moist.   Eyes:      Conjunctiva/sclera: Conjunctivae normal.      Pupils: Pupils are equal, round, and reactive to light.   Cardiovascular:      Rate and Rhythm: Normal rate and regular rhythm.      Heart sounds: No murmur heard.      Pulmonary:      Effort: Pulmonary  effort is normal. No respiratory distress.      Breath sounds: Normal breath sounds.   Abdominal:      Palpations: Abdomen is soft.      Tenderness: There is abdominal tenderness (slight LLQ). There is no guarding or rebound.   Musculoskeletal:      Right lower leg: No edema.      Left lower leg: No edema.   Skin:     General: Skin is warm and dry.      Capillary Refill: Capillary refill takes less than 2 seconds.   Neurological:      General: No focal deficit present.      Mental Status: She is alert and oriented to person, place, and time.   Psychiatric:         Mood and Affect: Mood normal.         Behavior: Behavior normal.         Significant Labs: All pertinent labs within the past 24 hours have been reviewed.    Significant Imaging: I have reviewed all pertinent imaging results/findings within the past 24 hours.

## 2021-12-13 NOTE — ASSESSMENT & PLAN NOTE
On insulin 70/30 32 units AM/ 25 units QHS, Ozempic 1 mg weekly, and Glimepiride 4 mg daily at home. Had episode of hypoglycemia in ED perhaps 2/2 taking extra doses of her home medications prior to arrival.      - LDSSI, will monitor BGL for need to start scheduled insulin

## 2021-12-13 NOTE — PT/OT/SLP EVAL
Physical Therapy Co-Evaluation and Co-Treatment    Patient Name:  Alisia Gusman   MRN:  [id number]  Admit Date: 12/11/2021  Admitting Diagnosis:  Sepsis   Length of Stay: 1 days  Recent Surgery: * No surgery found *      Recommendations:     Discharge Recommendations:  home health PT,home health OT   Discharge Equipment Recommendations: none   Barriers to discharge: decreased safety awareness, impaired dynamic balance, generalized weakness, increased fall risk    Assessment:     Alisia Gusman is a 76 y.o. female admitted with a medical diagnosis of Sepsis.  She presents with the following impairments/functional limitations: weakness,impaired endurance,impaired self care skills,gait instability,impaired functional mobilty,decreased safety awareness,decreased upper extremity function,decreased lower extremity function,impaired coordination,impaired fine motor. Pt tolerated initial evaluation well today. Pt presents with good functional strength and is able to perform all transfers and ambulation without physical assist from therapist. Pt with impaired dynamic balance and demo'ed generalized instability with both dynamic tasks and ambulation on assessment -requiring HHA and CGA from therapist to prevent LOB. Pt would benefit from increased use of RW both in hospital and at home to improved stability by widening PHYLLIS and allowing offloading of BLE via RW. Pt needs continued reinforcement on importance of use of RW as well as general safety awareness. Increased swelling of Rt hand and UE noted with pt reporting it started after IV placed in dorsum of hand this AM. Dr. Whitten present in room and assessed hand and UE and stated he would notify both RN and primary team. PT treatment focused on BLE functional strengthening and postural musculature endurance on this date.  Pt will continue to benefit from skilled PT services during this hospital admit to continue to improve transfer ability and efficiency as well as continue to  progress pt's ambulation distance and cardiopulmonary endurance to maximize pt's functional independence and return to PLOF. After discharge pt will benefit from HHPT to further progress functional mobility and cardiopulmonary endurance as well as for home safety and energy conservation techniques.    Rehab Prognosis: Good; patient would benefit from acute skilled PT services to address these deficits and reach maximum level of function.      Plan:     During this hospitalization, patient to be seen 3 x/week to address the identified rehab impairments via gait training,therapeutic activities,therapeutic exercises,neuromuscular re-education and progress towards the established goals.    · Plan of Care Expires:  01/12/22    Subjective     RN notified prior to session. No family/friends present upon PT entrance into room.    Chief Complaint: Pt reporting she is feeling much better  Patient/Family Comments/goals: go home  Pain/Comfort:  · Pain Rating 1: 0/10  · Pain Rating Post-Intervention 1: 0/10    Social History:  Residence: lives alone 1-story house with 10 ELISA and BHR. Pt's bathroom has a walk in shower.  Support available: family  Equipment Used: walker, rolling,rollator,cane, straight - pt reports she was not using any of this equipment  Prior level of function: independent  Hand Dominance: right.   Work: Retired.   Drive: no.   Managing Medicines/Managing Home: yes.    Patient reports they will have assistance from family (live next door and check in daily) upon discharge.    Objective:     Additional staff present: OT for co-evaluation due to patient's medical complexities requiring two skilled therapists in order to appropriately assess patient's functional deficits as well as ensure patient safety, accommodate for limited activity tolerance, and provide appropriate, skilled assistance to maximize functional potential during evaluation.    Patient found HOB elevated with: telemetry,pulse ox (continuous),blood  "pressure cuff,PureWick     General Precautions: Standard, Cardiac fall   Orthopedic Precautions:N/A   Braces: N/A   Body mass index is 39.72 kg/m².  Oxygen Device: O2 Device (Oxygen Therapy): room air  Vitals: /63 (BP Location: Right arm, Patient Position: Lying)   Pulse 100   Temp 98 °F (36.7 °C) (Oral)   Resp 20   Ht 5' 2" (1.575 m)   Wt 98.5 kg (217 lb 2.5 oz)   LMP  (LMP Unknown) Comment: Partial  SpO2 (!) 92%   Breastfeeding No   BMI 39.72 kg/m²     Exams:  · Cognition:   · Alert and Cooperative  · AxOx4  · Command following: Follows multistep verbal commands  · Fluency: clear/fluent  · Hearing: Intact  · Vision:  Intact  · Skin Integrity: Visible skin intact  · Postural Assessment: no deviations noted  · Physical Exam:    Left UE Left LE Right UE Right LE   Edema absent absent Present - increased swelling of Rt hand and UE noted absent   ROM AROM WFL AROM WFL AROM WFL AROM WFL   Modified Joe Scale 0: No increase in muscle tone 0: No increase in muscle tone 0: No increase in muscle tone 0: No increase in muscle tone   Strength within normal limits within normal limits within normal limits within normal limits   Sensation intact to light touch intact to light touch intact to light touch intact to light touch   Coordination normal normal normal normal     Outcome Measures:  AM-PAC 6 CLICK MOBILITY  Turning over in bed (including adjusting bedclothes, sheets and blankets)?: 3  Sitting down on and standing up from a chair with arms (e.g., wheelchair, bedside commode, etc.): 3  Moving from lying on back to sitting on the side of the bed?: 3  Moving to and from a bed to a chair (including a wheelchair)?: 3  Need to walk in hospital room?: 3  Climbing 3-5 steps with a railing?: 2  Basic Mobility Total Score: 17     Functional Mobility:    Bed Mobility:   · Supine to Sit: contact guard assistance; from Rt side of bed  · Scooting anteriorly to EOB to have both feet planted on floor: contact guard " assistance    Sitting Balance at Edge of Bed:   Static Sitting Balance: Good : able to maintain balance against moderate resistance   Dynamic Sitting Balance: Good- : able to sit unsupported and weight shift across midline minimally   Assistance Level Required: Stand-by Assistance    Transfers:   · Sit <> Stand Transfer: contact guard assistance with hand-held assist   · Stand <> Sit Transfer: contact guard assistance with hand-held assist   · [id number] from EOB and [id number] from bedside chair  · Bed <> Chair Transfer: Step Transfer technique with contact guard assistance with hand-held assist  · Chair on patient's Rt    Standing Balance:   Static Standing Balance: Good- : able to maintain standing balance against minimal resistance   Dynamic Standing Balance: Fair : stand independently unsupported, weight shift, and reach ipsilaterally. LOB noted when crossing midline.   Assistance Level Required: Contact Guard Assistance   Patient used: hand-held assist       Gait:   · Patient ambulated: ~6 ft to bedside chair   · Patient required: contact guard  · Patient used:  hand-held assist   · Gait Pattern observed: reciprocal gait  · Gait Deviation(s): decreased step length, wide base of support, decreased weight shift, shuffle gait, flexed posture and decreased meliton  · Impairments due to: impaired balance and decreased endurance  · all lines remained intact throughout ambulation trial  · Comments: Pt with wide PHYLLIS and decreased step length throughout. Utilized HHA to widen PHYLLIS and improve sense of stability throughout - would benefit from RW use for improved stability    Education:   Time provided for education, counseling and discussion of health disposition in regards to patient's current status   All questions answered within PT scope of practice and to patient's satisfaction   PT role in POC to address current functional deficits   Pt educated on proper body mechanics, safety techniques, and energy  conservation with PT facilitation and cueing throughout session   Call nursing/pct to transfer to chair/use bathroom. Pt stated understanding.   Whiteboard updated with therapist name and pt's current mobility status documented above   Safe to perform steo transfer to/from chair/bedside commode CGA and HHA w/ nursing/PCT present   Importance of OOB tolerance prn hrs/day to improve lung ventilation and expansion as well as strengthen postural musculature    Patient left up in chair with all lines intact, call button in reach and RN notified.    GOALS:   Multidisciplinary Problems     Physical Therapy Goals        Problem: Physical Therapy Goal    Goal Priority Disciplines Outcome Goal Variances Interventions   Physical Therapy Goal     PT, PT/OT Ongoing, Progressing     Description: Goals to be met by: 2021     Patient will increase functional independence with mobility by performin. Sit to stand transfer with Supervision  2. Bed to chair transfer with Supervision using Rolling Walker or LRAD  3. Gait  x 100 feet with Stand-by Assistance using Rolling Walker or LRAD   4. Ascend/descend 10 stair with bilateral Handrails Contact Guard Assistance using LRAD.   5. Stand for 5 minutes with Supervision using LRAD while performing dynamic balance task including reaches outside PHYLLIS to demo WFL balance and safety to perform daily activities in the home  6. Lower extremity exercise program x30 reps per handout, with independence                     History:     Past Medical History:   Diagnosis Date    Acute pulmonary embolism without acute cor pulmonale 11/15/2021    Adult bronchiectasis 2017    Allergy     Anemia     Arthritis     Asthma     Breast cancer 1993    left w/ radiation     Cancer     L eft breast    Cataract     Chronic cervical radiculopathy 2012    Diabetes mellitus     Diabetes mellitus type II     Diabetes with neurologic complications     Diverticulosis     Dry  eyes     Dysphagia     after knee surgery June 2014    GERD (gastroesophageal reflux disease)     Hyperlipidemia     Hypertension     Neuropathy     feet    Scalp tenderness     Shortness of breath     Ulcer        Past Surgical History:   Procedure Laterality Date    BREAST BIOPSY Left 1993    BREAST LUMPECTOMY Left 1993    CARPAL TUNNEL RELEASE Bilateral 2011    CATARACT EXTRACTION W/  INTRAOCULAR LENS IMPLANT Bilateral 2013 and 2014    CHOLECYSTECTOMY      COLONOSCOPY N/A 11/6/2015    Procedure: COLONOSCOPY;  Surgeon: Major Michelle MD;  Location: [facility] ENDO (4TH FLR);  Service: Endoscopy;  Laterality: N/A;    COLONOSCOPY N/A 5/8/2019    Procedure: COLONOSCOPY;  Surgeon: Major Michelle MD;  [facility]  Service: Endoscopy;  Laterality: N/A;    CRANIOTOMY USING FRAMELESS STEREOTAXY Right 11/15/2021    Procedure: Right Frontal Craniotomy for Meningioma with  Stealth Navigation;  Surgeon: Moiz Hutchison DO;  Location: [address];  Service: Neurosurgery;  Laterality: Right;    EYE SURGERY      HYSTERECTOMY      partial hyst    INJECTION FOR SENTINEL NODE IDENTIFICATION Left 2/20/2020    Procedure: INJECTION, FOR SENTINEL NODE IDENTIFICATION;  Surgeon: Hamida Nieves MD;  Location: [address];  Service: General;  Laterality: Left;    JOINT REPLACEMENT Left June 2014    knee    MASTECTOMY      SENTINEL LYMPH NODE BIOPSY Left 2/20/2020    Procedure: BIOPSY, LYMPH NODE, SENTINEL;  Surgeon: Hamida Nieves MD;  Location: [address];  Service: General;  Laterality: Left;    UNILATERAL MASTECTOMY Left 2/20/2020    Procedure: MASTECTOMY, UNILATERAL;  Surgeon: Hamida Nieves MD;  Location: [address];  Service: General;  Laterality: Left;    uvuloplasty         Time Tracking:     PT Received On: 12/13/21  PT Start Time: 0906     PT Stop Time: 0930  PT Total Time (min): 24 min     Billable Minutes: Evaluation 1 procedure and Therapeutic Exercise 15  cara Rowland, PT, DPT  12/13/2021  Pager: [phone]

## 2021-12-13 NOTE — PLAN OF CARE
CM MET WITH PATIENT IN HER ROOM TO DISCUSS DISCHARGE PLANNING PT STATES THAT SHE LIVES IN A 1 STORY HOUSE ALONE SHE IS NOT ON DIALYSIS AND DOES NOT TAKE COUMADIN SH ESTATES THAT ONE OF HER CHILDREN WILL PROVIDE TRANSPORTATION HOME   PHARMACY [employer] GOLD MAIL ORDER PATIENT GETS [facility] AT [facility]  POA/DAUGHTER JOSE EDGAR   PCP [facility] - Telemetry Stepdown  Initial Discharge Assessment       Primary Care Provider: Jose Montemayor MD    Admission Diagnosis: Overdose [T50.901A]  Sepsis due to urinary tract infection [A41.9, N39.0]  Chest pain [R07.9]    Admission Date: 12/11/2021  Expected Discharge Date: 12/15/2021    Discharge Barriers Identified: Transportation    Payor: HU[employer] MANAGED MEDICARE / Plan: HU[employer] MEDICARE HMO / Product Type: Capitation /     Extended Emergency Contact Information  Primary Emergency Contact: [place]   [place]  Mobile Phone: [phone]  Relation: Daughter  Secondary Emergency Contact: Сергей Gusman  Mobile Phone: [phone]  Relation: Son  Preferred language: English   needed? No    Discharge Plan A: Home with family,Home  Discharge Plan B: Home with family,Home Health,Home      [facility] [place] [id number] [facility]  [address] [address]  Phone: [phone] Fax: [phone]    [address] [place] - [address]  [address] [place] [address]  Phone: [phone] Fax: [phone]    [facility] [place] [address]  [address]  [facility] [address]  Phone: [phone] Fax: [phone]      Initial Assessment (most recent)     Adult Discharge Assessment - 12/13/21 1109        Discharge Assessment    Assessment Type Discharge Planning Assessment     Confirmed/corrected address, phone number and insurance Yes     Confirmed Demographics Correct on Facesheet     Source of  Information patient     Communicated YUSEF with patient/caregiver No     Lives With alone     Do you expect to return to your current living situation? Yes     Do you have help at home or someone to help you manage your care at home? No     Prior to hospitilization cognitive status: Alert/Oriented     Current cognitive status: Alert/Oriented     Walking or Climbing Stairs Difficulty none     Dressing/Bathing Difficulty none     Home Layout Able to live on 1st floor     Equipment Currently Used at Home cane, straight;walker, rolling;wheelchair     Readmission within 30 days? No     Patient currently being followed by outpatient case management? No     Do you currently have service(s) that help you manage your care at home? No     Do you take prescription medications? Yes     Do you have prescription coverage? Yes     Do you have any problems affording any of your prescribed medications? No     Is the patient taking medications as prescribed? yes     Who is going to help you get home at discharge? DAUGHTER AND/OR SON     How do you get to doctors appointments? family or friend will provide     Are you on dialysis? No     Do you take coumadin? No     Discharge Plan A Home with family;Home     Discharge Plan B Home with family;Home Health;Home     DME Needed Upon Discharge  none     Discharge Plan discussed with: Patient     Discharge Barriers Identified Transportation

## 2021-12-13 NOTE — PROGRESS NOTES
[facility] - Telemetry Stepdown  Infectious Disease  Progress Note    Patient Name: Alisia Gusman  MRN: [id number]  Admission Date: 12/11/2021  Length of Stay: 1 days  Attending Physician: Sarah Gage MD  Primary Care Provider: Selena Montemayor MD    Isolation Status: No active isolations  Assessment/Plan:      * Sepsis  76-year-old female with history of T2DM, recurrent breast cancer on anastrozole, meningioma s/p R frontal craniotomy, presented with AMS, accidental drug overdose, sepsis 2/2 acute diverticulitis with intraabdominal abscess s/p IR drainage 12/12/2021.     Urine Cx 12/11 with GNR pending speciation. BCx 12/12 NGTD. Abscess cx 12/12 gram stain with rare GPC, moderate GNR.    Recommendations:  - Follow-up blood cultures, abscess cultures  - Continue empiric rowan and vanc for now, will tailor pending culture results  - Would evaluate RUE if concerned about DVT. Could just be infiltrated IV with new PIV placed this morning      Anticipated Disposition: per primary    Thank you for your consult. I will follow-up with patient. Please contact us if you have any additional questions.    Connor M Gillies, MD  Infectious Disease  [facility] - Telemetry Stepdown    Subjective:     Principal Problem:Sepsis    HPI: 76-year-old female with history of T2DM, recurrent breast cancer on anastrozole, meningioma s/p R frontal craniotomy, presented with AMS. Per family, patient accidentally took entire week of her medications in accidental drug overdose. Patient was septic with lactic acidosis, CT AP with acute diverticulitis and abscess, started empirically with vanc / rowan.    Interval History: No events overnight. No fevers. Patient had sigmoid peridiverticular abscess drained yesterday without complications. Was not adequate size to leave a drainage catheter in place. Fluid drained was frankly purulent per IR. She does not complain of any abdominal pain, N/V/D this morning. She reports having a non-bloody BM today.    Review  of Systems   Constitutional: Negative for appetite change and fever.   HENT: Negative for congestion and sinus pain.    Eyes: Negative for discharge and redness.   Respiratory: Negative for cough and shortness of breath.    Cardiovascular: Negative for chest pain and leg swelling.   Gastrointestinal: Negative for diarrhea and nausea.   Endocrine: Negative for polydipsia and polyuria.   Genitourinary: Negative for dysuria and hematuria.   Musculoskeletal: Negative for arthralgias and myalgias.   Skin: Negative for color change and rash.   Neurological: Negative for weakness and numbness.   Psychiatric/Behavioral: Negative for agitation and confusion.     Objective:     Vital Signs (Most Recent):  Temp: 98 °F (36.7 °C) (12/13/21 0706)  Pulse: 100 (12/13/21 0706)  Resp: 20 (12/13/21 0706)  BP: 121/63 (12/13/21 0706)  SpO2: (!) 92 % (12/13/21 0706) Vital Signs (24h Range):  Temp:  [97.7 °F (36.5 °C)-99.1 °F (37.3 °C)] 98 °F (36.7 °C)  Pulse:  [] 100  Resp:  [16-20] 20  SpO2:  [92 %-97 %] 92 %  BP: ()/(58-63) 121/63     Weight: 98.5 kg (217 lb 2.5 oz)  Body mass index is 39.72 kg/m².    Estimated Creatinine Clearance: 72.7 mL/min (based on SCr of 0.7 mg/dL).    Physical Exam  Constitutional:       General: She is not in acute distress.     Appearance: Normal appearance.   HENT:      Head: Normocephalic and atraumatic.      Mouth/Throat:      Mouth: Mucous membranes are moist.      Pharynx: Oropharynx is clear.   Eyes:      Conjunctiva/sclera: Conjunctivae normal.      Pupils: Pupils are equal, round, and reactive to light.   Cardiovascular:      Rate and Rhythm: Normal rate and regular rhythm.      Pulses: Normal pulses.      Heart sounds: Normal heart sounds.   Pulmonary:      Effort: Pulmonary effort is normal.      Breath sounds: Normal breath sounds.      Comments: Bibasilar rales  Abdominal:      General: Abdomen is flat. Bowel sounds are normal. There is no distension.      Palpations: There is no mass.       Tenderness: There is no guarding or rebound.      Comments: Soft, non-tender abdomen   Musculoskeletal:         General: No swelling or deformity.      Cervical back: Normal range of motion and neck supple. No tenderness.     Extremities: RUE with swelling after new IV placed this morning   Skin:     General: Skin is warm and dry.   Neurological:      General: No focal deficit present.      Mental Status: She is alert and oriented to person, place, and time.   Psychiatric:         Mood and Affect: Mood normal.         Behavior: Behavior normal.         Significant Labs:   CBC:   Recent Labs   Lab 12/11/21  2020 12/12/21  0122 12/12/21  1051 12/13/21  0416   WBC 6.22  --  6.50 5.37   HGB 11.2*  --  12.1 12.2   HCT 35.0* 35* 38.9 38.1   *  --  150 186     Microbiology Results (last 7 days)     Procedure Component Value Units Date/Time    Aerobic culture [[id number]]  (Abnormal) Collected: 12/12/21 1127    Order Status: Completed Specimen: Abscess from Abdomen Updated: 12/13/21 1322     Aerobic Bacterial Culture PRESUMPTIVE PROTEUS SPECIES  Rare  Identification and susceptibility pending      Narrative:      LLQ abscess    Culture, Anaerobe [[id number]] Collected: 12/12/21 1127    Order Status: Completed Specimen: Abscess from Abdomen Updated: 12/13/21 0644     Anaerobic Culture Culture in progress    Narrative:      LLQ abscess    Blood culture x two cultures. Draw prior to antibiotics. [[id number]] Collected: 12/12/21 0121    Order Status: Completed Specimen: Blood from Peripheral, Hand, Right Updated: 12/13/21 0613     Blood Culture, Routine No Growth to date      No Growth to date    Narrative:      Aerobic and anaerobic    Blood culture x two cultures. Draw prior to antibiotics. [[id number]] Collected: 12/11/21 2144    Order Status: Completed Specimen: Blood from Peripheral, Forearm, Right Updated: 12/13/21 0613     Blood Culture, Routine No Growth to date      No Growth to date    Narrative:      Aerobic  and anaerobic    Urine culture [[id number]]  (Abnormal) Collected: 12/11/21 2247    Order Status: Completed Specimen: Urine Updated: 12/12/21 1640     Urine Culture, Routine GRAM NEGATIVE SHELLY  50,000 - 99,999 cfu/ml  Identification and susceptibility pending      Narrative:      Specimen Source->Urine    Gram stain [[id number]] Collected: 12/12/21 1127    Order Status: Completed Specimen: Abscess from Abdomen Updated: 12/12/21 1602     Gram Stain Result Many WBC's      Moderate Gram negative rods      Rare gram positive cocci    Narrative:      LLQ abscess    Fungus culture [[id number]] Collected: 12/12/21 1127    Order Status: Sent Specimen: Abscess from Abdomen Updated: 12/12/21 1145    AFB Culture & Smear [[id number]] Collected: 12/12/21 1127    Order Status: Sent Specimen: Abscess from Abdomen Updated: 12/12/21 1144    Blood Culture #1 **CANNOT BE ORDERED STAT** [[id number]]     Order Status: Canceled Specimen: Blood           Significant Imaging: I have reviewed all pertinent imaging results/findings within the past 24 hours.

## 2021-12-13 NOTE — ASSESSMENT & PLAN NOTE
S/p IR aspiration 12/12 of LLQ abscess seen on CT a/p.    - Cultures growing presumptive proteus, susceptibilities pending  - continuing meropenem

## 2021-12-13 NOTE — PLAN OF CARE
Problem: Occupational Therapy Goal  Goal: Occupational Therapy Goal  Description: Goals to be met by: 12/27/2021    Patient will increase functional independence with ADLs by performing:    UE Dressing with Modified [place].  LE Dressing with Modified [place].  Grooming while standing at sink with Modified [place].  Toileting from toilet with Modified [place] for hygiene and clothing management.   Toilet transfer to toilet with Modified [place].    Outcome: Ongoing, Progressing

## 2021-12-13 NOTE — ASSESSMENT & PLAN NOTE
76-year-old female with history of T2DM, recurrent breast cancer on anastrozole, meningioma s/p R frontal craniotomy, presented with AMS, accidental drug overdose, sepsis 2/2 acute diverticulitis with intraabdominal abscess s/p IR drainage 12/12/2021.     Urine Cx 12/11 with GNR pending speciation. BCx 12/12 NGTD. Abscess cx 12/12 gram stain with rare GPC, moderate GNR.    Recommendations:  - Follow-up blood cultures, abscess cultures  - Continue empiric rowan and vanc for now, will tailor pending culture results

## 2021-12-13 NOTE — ASSESSMENT & PLAN NOTE
Reportedly took double to a week's worth of her home medications prior to arrival 2/2 altered mental status: lyrica, anastrozole, famotidine, atenolol, and pravastatin. CMP, INR WNL. Mental status now improved with treatment of sepsis.

## 2021-12-13 NOTE — ASSESSMENT & PLAN NOTE
Segmental and subsegmental pulmonary emboli in the right upper and right lower lobes seen on 11/15/21 CTA Chest. Eliquis held for IR procedure then resumed.    - continue home Eliquis 5 mg BID

## 2021-12-13 NOTE — PLAN OF CARE
Per CM, pt current with [facility].  Referral sent via [place].         12/13/21 1124   Post-Acute Status   Post-Acute Authorization Home Health   Home Health Status Referrals Sent     Rneita Manuel LMSW  PRN-  [facility]  Ext. [phone]

## 2021-12-14 VITALS
TEMPERATURE: 98 F | DIASTOLIC BLOOD PRESSURE: 66 MMHG | HEART RATE: 100 BPM | BODY MASS INDEX: 39.96 KG/M2 | WEIGHT: 217.13 LBS | RESPIRATION RATE: 25 BRPM | HEIGHT: 62 IN | OXYGEN SATURATION: 100 % | SYSTOLIC BLOOD PRESSURE: 125 MMHG

## 2021-12-14 PROBLEM — A41.9 SEPSIS: Status: RESOLVED | Noted: 2021-12-12 | Resolved: 2021-12-14

## 2021-12-14 PROBLEM — K57.20 COLONIC DIVERTICULAR ABSCESS: Status: ACTIVE | Noted: 2021-12-14

## 2021-12-14 LAB
ALBUMIN SERPL BCP-MCNC: 1.8 G/DL (ref 3.5–5.2)
ALP SERPL-CCNC: 81 U/L (ref 55–135)
ALT SERPL W/O P-5'-P-CCNC: 10 U/L (ref 10–44)
ANION GAP SERPL CALC-SCNC: 9 MMOL/L (ref 8–16)
AST SERPL-CCNC: 12 U/L (ref 10–40)
BACTERIA UR CULT: ABNORMAL
BASOPHILS # BLD AUTO: ABNORMAL K/UL (ref 0–0.2)
BASOPHILS NFR BLD: 0 % (ref 0–1.9)
BILIRUB SERPL-MCNC: 0.7 MG/DL (ref 0.1–1)
BUN SERPL-MCNC: 6 MG/DL (ref 8–23)
CALCIUM SERPL-MCNC: 8.5 MG/DL (ref 8.7–10.5)
CHLORIDE SERPL-SCNC: 100 MMOL/L (ref 95–110)
CO2 SERPL-SCNC: 24 MMOL/L (ref 23–29)
CREAT SERPL-MCNC: 0.6 MG/DL (ref 0.5–1.4)
DIFFERENTIAL METHOD: ABNORMAL
EOSINOPHIL # BLD AUTO: ABNORMAL K/UL (ref 0–0.5)
EOSINOPHIL NFR BLD: 0 % (ref 0–8)
ERYTHROCYTE [DISTWIDTH] IN BLOOD BY AUTOMATED COUNT: 15.7 % (ref 11.5–14.5)
EST. GFR  (AFRICAN AMERICAN): >60 ML/MIN/1.73 M^2
EST. GFR  (NON AFRICAN AMERICAN): >60 ML/MIN/1.73 M^2
GLUCOSE SERPL-MCNC: 208 MG/DL (ref 70–110)
HCT VFR BLD AUTO: 32.3 % (ref 37–48.5)
HGB BLD-MCNC: 10.4 G/DL (ref 12–16)
IMM GRANULOCYTES # BLD AUTO: ABNORMAL K/UL (ref 0–0.04)
IMM GRANULOCYTES NFR BLD AUTO: ABNORMAL % (ref 0–0.5)
LYMPHOCYTES # BLD AUTO: ABNORMAL K/UL (ref 1–4.8)
LYMPHOCYTES NFR BLD: 17 % (ref 18–48)
MAGNESIUM SERPL-MCNC: 1.5 MG/DL (ref 1.6–2.6)
MCH RBC QN AUTO: 28.5 PG (ref 27–31)
MCHC RBC AUTO-ENTMCNC: 32.2 G/DL (ref 32–36)
MCV RBC AUTO: 89 FL (ref 82–98)
METAMYELOCYTES NFR BLD MANUAL: 1 %
MONOCYTES # BLD AUTO: ABNORMAL K/UL (ref 0.3–1)
MONOCYTES NFR BLD: 4 % (ref 4–15)
MYELOCYTES NFR BLD MANUAL: 3 %
NEUTROPHILS NFR BLD: 72 % (ref 38–73)
NEUTS BAND NFR BLD MANUAL: 3 %
NRBC BLD-RTO: 0 /100 WBC
PHOSPHATE SERPL-MCNC: 2.6 MG/DL (ref 2.7–4.5)
PLATELET # BLD AUTO: 200 K/UL (ref 150–450)
PLATELET BLD QL SMEAR: ABNORMAL
PMV BLD AUTO: 9.9 FL (ref 9.2–12.9)
POCT GLUCOSE: 206 MG/DL (ref 70–110)
POCT GLUCOSE: 223 MG/DL (ref 70–110)
POCT GLUCOSE: 290 MG/DL (ref 70–110)
POTASSIUM SERPL-SCNC: 3.5 MMOL/L (ref 3.5–5.1)
PROT SERPL-MCNC: 5 G/DL (ref 6–8.4)
RBC # BLD AUTO: 3.65 M/UL (ref 4–5.4)
SMUDGE CELLS BLD QL SMEAR: PRESENT
SODIUM SERPL-SCNC: 133 MMOL/L (ref 136–145)
VANCOMYCIN TROUGH SERPL-MCNC: 8.8 UG/ML (ref 10–22)
WBC # BLD AUTO: 4.92 K/UL (ref 3.9–12.7)

## 2021-12-14 PROCEDURE — 84100 ASSAY OF PHOSPHORUS: CPT | Mod: HCNC | Performed by: STUDENT IN AN ORGANIZED HEALTH CARE EDUCATION/TRAINING PROGRAM

## 2021-12-14 PROCEDURE — 63600175 PHARM REV CODE 636 W HCPCS: Mod: HCNC | Performed by: INTERNAL MEDICINE

## 2021-12-14 PROCEDURE — 99239 HOSP IP/OBS DSCHRG MGMT >30: CPT | Mod: HCNC,GC,, | Performed by: INTERNAL MEDICINE

## 2021-12-14 PROCEDURE — 99232 SBSQ HOSP IP/OBS MODERATE 35: CPT | Mod: HCNC,GC,, | Performed by: INTERNAL MEDICINE

## 2021-12-14 PROCEDURE — 36415 COLL VENOUS BLD VENIPUNCTURE: CPT | Mod: HCNC | Performed by: STUDENT IN AN ORGANIZED HEALTH CARE EDUCATION/TRAINING PROGRAM

## 2021-12-14 PROCEDURE — 63600175 PHARM REV CODE 636 W HCPCS: Mod: HCNC | Performed by: STUDENT IN AN ORGANIZED HEALTH CARE EDUCATION/TRAINING PROGRAM

## 2021-12-14 PROCEDURE — 25000003 PHARM REV CODE 250: Mod: HCNC | Performed by: STUDENT IN AN ORGANIZED HEALTH CARE EDUCATION/TRAINING PROGRAM

## 2021-12-14 PROCEDURE — 25000003 PHARM REV CODE 250: Mod: HCNC | Performed by: INTERNAL MEDICINE

## 2021-12-14 PROCEDURE — 94761 N-INVAS EAR/PLS OXIMETRY MLT: CPT | Mod: HCNC

## 2021-12-14 PROCEDURE — 36415 COLL VENOUS BLD VENIPUNCTURE: CPT | Mod: HCNC | Performed by: INTERNAL MEDICINE

## 2021-12-14 PROCEDURE — 99232 PR SUBSEQUENT HOSPITAL CARE,LEVL II: ICD-10-PCS | Mod: HCNC,GC,, | Performed by: INTERNAL MEDICINE

## 2021-12-14 PROCEDURE — 83735 ASSAY OF MAGNESIUM: CPT | Mod: HCNC | Performed by: STUDENT IN AN ORGANIZED HEALTH CARE EDUCATION/TRAINING PROGRAM

## 2021-12-14 PROCEDURE — 85027 COMPLETE CBC AUTOMATED: CPT | Mod: HCNC | Performed by: STUDENT IN AN ORGANIZED HEALTH CARE EDUCATION/TRAINING PROGRAM

## 2021-12-14 PROCEDURE — 85007 BL SMEAR W/DIFF WBC COUNT: CPT | Mod: HCNC | Performed by: STUDENT IN AN ORGANIZED HEALTH CARE EDUCATION/TRAINING PROGRAM

## 2021-12-14 PROCEDURE — 80202 ASSAY OF VANCOMYCIN: CPT | Mod: HCNC | Performed by: INTERNAL MEDICINE

## 2021-12-14 PROCEDURE — 99239 PR HOSPITAL DISCHARGE DAY,>30 MIN: ICD-10-PCS | Mod: HCNC,GC,, | Performed by: INTERNAL MEDICINE

## 2021-12-14 PROCEDURE — 80053 COMPREHEN METABOLIC PANEL: CPT | Mod: HCNC | Performed by: STUDENT IN AN ORGANIZED HEALTH CARE EDUCATION/TRAINING PROGRAM

## 2021-12-14 RX ORDER — AMOXICILLIN AND CLAVULANATE POTASSIUM 875; 125 MG/1; MG/1
1 TABLET, FILM COATED ORAL 2 TIMES DAILY
Qty: 22 TABLET | Refills: 0 | Status: SHIPPED | OUTPATIENT
Start: 2021-12-14 | End: 2021-12-25

## 2021-12-14 RX ORDER — SODIUM,POTASSIUM PHOSPHATES 280-250MG
2 POWDER IN PACKET (EA) ORAL
Status: COMPLETED | OUTPATIENT
Start: 2021-12-14 | End: 2021-12-14

## 2021-12-14 RX ORDER — POLYETHYLENE GLYCOL 3350 17 G/17G
17 POWDER, FOR SOLUTION ORAL DAILY
Qty: 30 PACKET | Refills: 5 | Status: SHIPPED | OUTPATIENT
Start: 2021-12-14 | End: 2021-12-14 | Stop reason: SDUPTHER

## 2021-12-14 RX ORDER — AMOXICILLIN 250 MG
1 CAPSULE ORAL DAILY
Qty: 180 TABLET | Refills: 0
Start: 2021-12-14 | End: 2022-02-08

## 2021-12-14 RX ORDER — CARVEDILOL 6.25 MG/1
6.25 TABLET ORAL 2 TIMES DAILY
Status: DISCONTINUED | OUTPATIENT
Start: 2021-12-14 | End: 2021-12-14 | Stop reason: HOSPADM

## 2021-12-14 RX ORDER — INSULIN ASPART 100 [IU]/ML
3 INJECTION, SOLUTION INTRAVENOUS; SUBCUTANEOUS
Status: DISCONTINUED | OUTPATIENT
Start: 2021-12-14 | End: 2021-12-14 | Stop reason: HOSPADM

## 2021-12-14 RX ORDER — AMLODIPINE BESYLATE 5 MG/1
5 TABLET ORAL DAILY
Qty: 30 TABLET | Refills: 11
Start: 2021-12-14 | End: 2022-11-11 | Stop reason: SDUPTHER

## 2021-12-14 RX ORDER — POLYETHYLENE GLYCOL 3350 17 G/17G
17 POWDER, FOR SOLUTION ORAL 2 TIMES DAILY
Qty: 1020 G | Refills: 5 | Status: SHIPPED | OUTPATIENT
Start: 2021-12-14 | End: 2022-02-08

## 2021-12-14 RX ORDER — MAGNESIUM SULFATE HEPTAHYDRATE 40 MG/ML
2 INJECTION, SOLUTION INTRAVENOUS ONCE
Status: COMPLETED | OUTPATIENT
Start: 2021-12-14 | End: 2021-12-14

## 2021-12-14 RX ADMIN — VANCOMYCIN HYDROCHLORIDE 750 MG: 750 INJECTION, POWDER, LYOPHILIZED, FOR SOLUTION INTRAVENOUS at 01:12

## 2021-12-14 RX ADMIN — INSULIN ASPART 4 UNITS: 100 INJECTION, SOLUTION INTRAVENOUS; SUBCUTANEOUS at 09:12

## 2021-12-14 RX ADMIN — POTASSIUM & SODIUM PHOSPHATES POWDER PACK 280-160-250 MG 2 PACKET: 280-160-250 PACK at 09:12

## 2021-12-14 RX ADMIN — INSULIN ASPART 4 UNITS: 100 INJECTION, SOLUTION INTRAVENOUS; SUBCUTANEOUS at 12:12

## 2021-12-14 RX ADMIN — MAGNESIUM SULFATE 2 G: 2 INJECTION INTRAVENOUS at 09:12

## 2021-12-14 RX ADMIN — CYANOCOBALAMIN TAB 250 MCG 250 MCG: 250 TAB at 09:12

## 2021-12-14 RX ADMIN — APIXABAN 5 MG: 5 TABLET, FILM COATED ORAL at 09:12

## 2021-12-14 RX ADMIN — CARVEDILOL 6.25 MG: 6.25 TABLET, FILM COATED ORAL at 09:12

## 2021-12-14 RX ADMIN — POLYETHYLENE GLYCOL 3350 17 G: 17 POWDER, FOR SOLUTION ORAL at 09:12

## 2021-12-14 RX ADMIN — Medication 1 TABLET: at 09:12

## 2021-12-14 RX ADMIN — SENNOSIDES AND DOCUSATE SODIUM 1 TABLET: 50; 8.6 TABLET ORAL at 09:12

## 2021-12-14 RX ADMIN — MEROPENEM AND SODIUM CHLORIDE 1 G: 1 INJECTION, SOLUTION INTRAVENOUS at 06:12

## 2021-12-14 RX ADMIN — INSULIN ASPART 3 UNITS: 100 INJECTION, SOLUTION INTRAVENOUS; SUBCUTANEOUS at 12:12

## 2021-12-14 RX ADMIN — POTASSIUM & SODIUM PHOSPHATES POWDER PACK 280-160-250 MG 2 PACKET: 280-160-250 PACK at 12:12

## 2021-12-14 NOTE — DISCHARGE SUMMARY
"[facility] - Telemetry [facility] Medicine  Discharge Summary      Patient Name: Alisia Gusman  MRN: [id number]  Patient Class: IP- Inpatient  Admission Date: 12/11/2021  Hospital Length of Stay: 2 days  Discharge Date and Time:  12/14/2021 1:46 PM  Attending Physician: Sarah Gage MD   Discharging Provider: Angela Gaines MD  Primary Care Provider: Selena Montemayor MD      HPI:     76 year old female with medical history of  type 2 DM, recurrent breast cancer (on Anastrozole), meningioma s/p R frontal craniotomy, PE w/o cor pulmonale and HTN  that was brought to the ED for altered mental status and medication overdose. The patient accidentally took a weeks worth of medication including lyrica 75mg, anastrozole, famotidine 20mg, atenolol 25mg, and pravastatin 40mg. The patient usually has a pill box labeled with the days of the week and she misplaced it so she is unsure of what she took. ROS + for fever and lower abdominal pain. ROS - for nausea, vomiting, diarrhea, chest pain or SOB.    Daughter reports patient has issues with memory loss at baseline due to meningioma but does not usually take more meds than she should but is capable of most ADLs. Her baseline mental status is alert and oriented, ambulates with a walker and lives alone with family checking on her. She mentioned that her sister helps her as well as her children.     Of relevance, patient was admitted in November 2021 for sepsis with E.coli UTI, Pseudomonas and Clostridium bacteremia, diverticulitis, and PNA (finished course of Merrem on 11/23).    Initially tachypneic, tachycardic, hypotensive. WBC WNL, lactate 2.5. CT abdomen/pelvis with "persistent left lower quadrant colonic mural thickening and associated fat stranding in this patient consistent with diverticulitis.  New focal collection in the left lower quadrant measuring 4.1 cm which may represent pericolonic abscess or fluid/stool within an irregular distended diverticulum.  New mild " "bilateral hydroureteronephrosis to the level of the urinary bladder, likely related to distension of urinary bladder."       * No surgery found *      Hospital Course:   Underwent 12/12 IR aspiration of abscess seen on CT a/p in LLQ. Lactate normalized and maintenance fluids were discontinued. ID consulted regarding antibiotic choice, recommended continuing vanc and meropenem pending cultures. Abscess aspirate culture grew Proteus, urine culture grew pan-sensitive E coli. Evaluated by PT/OT with recommendation for home health. Stable for discharge. Colonoscopy and repeat CT abdomen w/ contrast ordered. Will follow up with ID in 2 weeks and GI regarding diverticulitis with abscess.       Goals of Care Treatment Preferences:  Code Status: Full Code    /64 (BP Location: Right arm, Patient Position: Sitting)   Pulse 104   Temp 98.4 °F (36.9 °C) (Oral)   Resp 19   Ht 5' 2" (1.575 m)   Wt 98.5 kg (217 lb 2.5 oz)   LMP  (LMP Unknown) Comment: Partial  SpO2 100%   Breastfeeding No   BMI 39.72 kg/m²     Physical Exam  Vitals and nursing note reviewed.   Constitutional:       General: She is not in acute distress.  HENT:      Head: Normocephalic and atraumatic.      Mouth/Throat:      Mouth: Mucous membranes are moist.   Eyes:      Conjunctiva/sclera: Conjunctivae normal.      Pupils: Pupils are equal, round, and reactive to light.   Cardiovascular:      Rate and Rhythm: Normal rate and regular rhythm.      Heart sounds: No murmur heard.       Pulmonary:      Effort: Pulmonary effort is normal. No respiratory distress.      Breath sounds: Normal breath sounds.   Abdominal:      Palpations: Abdomen is soft.      Tenderness: There is no abdominal tenderness. There is no guarding or rebound.   Musculoskeletal:      Right lower leg: No edema.      Left lower leg: No edema.   Skin:     General: Skin is warm and dry.      Capillary Refill: Capillary refill takes less than 2 seconds.   Neurological:      General: No " "focal deficit present.      Mental Status: She is alert and oriented to person, place, and time.   Psychiatric:         Mood and Affect: Mood normal.         Behavior: Behavior normal.     Consults:   Consults (From admission, onward)        Status Ordering Provider     Inpatient consult to Infectious Diseases  Once        Provider:  (Not yet assigned)    Completed CRISTY OHARA     Pharmacy to dose Vancomycin consult  Once        Provider:  (Not yet assigned)   "And" Linked Group Details    Acknowledged CRISTY OHARA          * Sepsis  Initially had evidence of infective focus with diverticulitis and abscess on CT a/p and evidence of infection on UA. Received 30 cc/kg bolus in ED then continued on maintenance fluids until lactate normalized. Started on meropenem and vancomycin because she received meropenem during admission last month for UTI, diverticulitis, and polymicrobial bacteremia per ID rec. S/p LLQ abscess aspiration by IR, culture growing proteus with sensitivities pending. UCx with pan-sensitive E coli. BCx NGTD.    - ID consulted, appreciate recommendations   - vanc, meropenem  - f/u aspirate cultures, BCx, UCx    Diverticulitis  Seen on CT a/p. LLQ tenderness on initial exam improved with antibiotics.      Abscess of abdominal cavity  S/p IR aspiration 12/12 of LLQ abscess seen on CT a/p.    - Cultures growing presumptive proteus, susceptibilities pending  - continuing meropenem      Medication overdose  Reportedly took double to a week's worth of her home medications prior to arrival 2/2 altered mental status: lyrica, anastrozole, famotidine, atenolol, and pravastatin. CMP, INR WNL. Mental status now improved with treatment of sepsis.    History of pulmonary embolism  Segmental and subsegmental pulmonary emboli in the right upper and right lower lobes seen on 11/15/21 CTA Chest. Eliquis held for IR procedure then resumed.    - continue home Eliquis 5 mg BID    Type 2 diabetes " mellitus  On insulin 70/30 32 units AM/ 25 units QHS, Ozempic 1 mg weekly, and Glimepiride 4 mg daily at home. Had episode of hypoglycemia in ED perhaps 2/2 taking extra doses of her home medications prior to arrival. Patient states her BGL is 170s at home on her current home regimen.      - detemir 6U in AM, aspart 3U TIDWM  - LDSSI      Essential hypertension  On coreg 6.25 BID and amlodipine 5 at home.    - coreg restarted 12/14  - holding amlodipine    AMS (altered mental status)  RESOLVED  Initially confused, improved to A&Ox4 after treatment for sepsis.      Final Active Diagnoses:    Diagnosis Date Noted POA    PRINCIPAL PROBLEM:  Sepsis [A41.9] 12/12/2021 No    Diverticulitis [K57.92]  Yes    Medication overdose [T50.901A] 12/12/2021 Yes    Abscess of abdominal cavity [K65.1]  Yes    History of pulmonary embolism [Z86.711] 12/12/2021 Yes    Type 2 diabetes mellitus [E11.9] 10/09/2017 Yes    Essential hypertension [I10] 03/02/2017 Yes    AMS (altered mental status) [R41.82] 12/12/2021 Yes      Problems Resolved During this Admission:       Discharged Condition: stable    Disposition:     Follow Up:   Follow-up Information     Ginette Sheth PA-C. Go on 12/27/2021.    Specialty: Internal Medicine  Why: for scheduled PCP appointment  Contact information:  [address] [place] [address]  [phone]             Moiz Hutchison DO. Go on 1/4/2022.    Specialty: Neurosurgery  Why: for scheduled Neurosurgery appointment  Contact information:  [address]  [address]  [facility] [address]  [phone]             Schedule an appointment as soon as possible for a visit with [facility] 4th Fl.    Specialty: Gastroenterology  Contact information:  [address] [facility] [address] [phone]  Additional information:  [facility] - Main Building, 4th Floor   Please park in [facility] and take [facility] elevator           Schedule an appointment as soon as  possible for a visit with [facility] - Infectious Disease [facility].    Specialty: Infectious Diseases  Why: for 2 weeks from now  Contact information:  [address]  [facility] [address] [phone]  Additional information:  Main Building, 1st floor near [place] entrance   Please park in [facility]                     Patient Instructions:      CT Abdomen Pelvis With Contrast   Standing Status: Future Standing Exp. Date: 12/14/22     Order Specific Question Answer Comments   Is the patient allergic to iodine or contrast? No    Is the patient on ANY Metformin drug such as Glucophage/Glucovance?           Should be off drug 48 hours after contrast. Check renal function before restart. No    History of Kidney Disease - including: decreased kidney function, dialysis, kidney transplay, single kidney, kidney cancer, kidney surgery? None    Does the patient have high blood pressure requiring medical treatment? Yes    Diabetes? Yes    May the Radiologist modify the order per protocol to meet the clinical needs of the patient? Yes    Oral/Rectal Contrast instructions: Routine Oral Contrast Oral and IV contrast     Ambulatory referral/consult to Gastroenterology   Standing Status: Future   Referral Priority: Routine Referral Type: Consultation   Referral Reason: Specialty Services Required   Requested Specialty: Gastroenterology   Number of Visits Requested: 1     Ambulatory referral/consult to Infectious Disease   Standing Status: Future   Referral Priority: Routine Referral Type: Consultation   Referral Reason: Specialty Services Required   Requested Specialty: Infectious Diseases     Case Request Endoscopy: COLONOSCOPY     Order Specific Question Answer Comments   Medical Necessity: Medically Non-Urgent [100]    CPT Code: ND COLON CA SCRN NOT HI RSK IND []    Is an on-site pathologist required for this procedure? N/A        Significant Diagnostic Studies: Labs:   BMP:   Recent Labs   Lab  12/13/21 0417 12/14/21  0614    208*    133*   K 4.1 3.5    100   CO2 24 24   BUN 6* 6*   CREATININE 0.7 0.6   CALCIUM 9.1 8.5*   MG 1.7 1.5*    and CBC   Recent Labs   Lab 12/13/21 0416 12/13/21 0416 12/14/21  0614   WBC 5.37  --  4.92   HGB 12.2  --  10.4*   HCT 38.1   < > 32.3*     --  200    < > = values in this interval not displayed.     Microbiology:   Blood Culture   Lab Results   Component Value Date    LABBLOO No Growth to date 12/12/2021    LABBLOO No Growth to date 12/12/2021    LABBLOO No Growth to date 12/12/2021    and Urine Culture    Lab Results   Component Value Date    LABURIN ESCHERICHIA COLI  50,000 - 99,999 cfu/ml   (A) 12/11/2021 12/12/21 Abscess Aspirate Culture: Rare Proteus mirabilis      Pending Diagnostic Studies:     Procedure Component Value Units Date/Time    Magnesium [[id number]] Collected: 12/12/21 1051    Order Status: Sent Lab Status: In process Updated: 12/12/21 1051    Specimen: Blood     Narrative:      Collection has been rescheduled by TP5 at 12/12/2021 05:28 Reason:   Unable to collect notified nurse ramirez  Collection has been rescheduled by LT1 at 12/12/2021 09:03 Reason:   unable to collect patient is a hard stick. VENESSA Ceballos ext. [id number]  Collection has been rescheduled by TRM at 12/12/2021 10:50 Reason:   per nurse Tucker [phone] draw all labs now    Phosphorus [[id number]] Collected: 12/12/21 1051    Order Status: Sent Lab Status: In process Updated: 12/12/21 1051    Specimen: Blood     Narrative:      Collection has been rescheduled by TP5 at 12/12/2021 05:28 Reason:   Unable to collect notified nurse ramirez  Collection has been rescheduled by LT1 at 12/12/2021 09:03 Reason:   unable to collect patient is a hard stick. VENESSA Ceballos ext. [id number]  Collection has been rescheduled by TRM at 12/12/2021 10:50 Reason:   per nurse Tucker [phone] draw all labs now    VANCOMYCIN, TROUGH [[id number]] Collected: 12/14/21 1256    Order Status: Sent Lab Status: In  "process Updated: 12/14/21 9114    Specimen: Blood          Medications:  Reconciled Home Medications:      Medication List      START taking these medications    amoxicillin-clavulanate 875-125mg 875-125 mg per tablet  Commonly known as: AUGMENTIN  Take 1 tablet by mouth 2 (two) times daily. for 11 days     polyethylene glycol 17 gram/dose powder  Commonly known as: GLYCOLAX  Mix 1 capful (17 g) with a liquid and take by mouth 2 (two) times daily.        CHANGE how you take these medications    amLODIPine 5 MG tablet  Commonly known as: NORVASC  Take 1 tablet (5 mg total) by mouth once daily. DO NOT TAKE THIS MEDICATION UNTIL TOLD TO RESTART BY PHYSICIAN  What changed: additional instructions     senna-docusate 8.6-50 mg 8.6-50 mg per tablet  Commonly known as: PERICOLACE  Take 1 tablet by mouth once daily.  What changed: when to take this        CONTINUE taking these medications    ACCU-CHEK DOMENICO PLUS TEST STRP Strp  Generic drug: blood sugar diagnostic  TEST THREE TIMES DAILY AS DIRECTED     ACCU-CHEK SOFTCLIX LANCETS Misc  Generic drug: lancets  1 each by Misc.(Non-Drug; Combo Route) route 3 (three) times daily.     anastrozole 1 mg Tab  Commonly known as: ARIMIDEX  TAKE 1 TABLET (1 MG TOTAL) BY MOUTH ONCE DAILY.     apixaban 5 mg Tab  Commonly known as: ELIQUIS  Take 1 tablet (5 mg total) by mouth 2 (two) times daily.     BD ALCOHOL SWABS Padm  Generic drug: alcohol swabs  1 each 2 (two) times daily.     BD INSULIN SYRINGE ULTRA-FINE 1 mL 31 gauge x 5/16 Syrg  Generic drug: insulin syringe-needle U-100  TO USE  TWICE DAILY WITH  INSULIN     BD VEO INSULIN SYRINGE UF 0.3 mL 31 gauge x 15/64" Syrg  Generic drug: insulin syringe-needle U-100  USE  TO INJECT TWICE DAILY     blood-glucose meter Misc  1 each by Misc.(Non-Drug; Combo Route) route once daily.     calcium carbonate-vitamin D3 600 mg-10 mcg (400 unit) Chew  Commonly known as: CALCIUM 600 WITH VITAMIN D3  1 tablet once daily.     carvediloL 6.25 MG " "tablet  Commonly known as: COREG  Take 1 tablet (6.25 mg total) by mouth 2 (two) times daily.     CENTRUM SILVER ORAL  Take 1 tablet by mouth once daily.     cyanocobalamin-cobamamide 5,000-100 mcg Subl  Commonly known as: B-12 PLUS  Take 5000 mcg daily.     famotidine 20 MG tablet  Commonly known as: PEPCID  TAKE 1 TABLET (20 MG TOTAL) BY MOUTH EVERY EVENING.     FREESTYLE GERARDO 14 DAY READER Misc  Generic drug: flash glucose scanning reader  1 each by Misc.(Non-Drug; Combo Route) route once daily.     FREESTYLE GERARDO 14 DAY SENSOR Kit  Generic drug: flash glucose sensor  2 each by Misc.(Non-Drug; Combo Route) route every 14 (fourteen) days.     * insulin aspart U-100 100 unit/mL (3 mL) Inpn pen  Commonly known as: NovoLOG  Inject 14 Units into the skin 3 (three) times daily with meals.     * insulin aspart U-100 100 unit/mL (3 mL) Inpn pen  Commonly known as: NovoLOG  Inject 0-5 Units into the skin before meals and at bedtime as needed (Hyperglycemia).     insulin detemir U-100 100 unit/mL (3 mL) Inpn pen  Commonly known as: Levemir FLEXTOUCH  Inject 28 Units into the skin once daily.     omeprazole 40 MG capsule  Commonly known as: PRILOSEC  TAKE 1 CAPSULE (40 MG TOTAL) BY MOUTH EVERY MORNING.     oxyCODONE 5 MG immediate release tablet  Commonly known as: ROXICODONE  Take 1 tablet (5 mg total) by mouth every 6 (six) hours as needed for Pain.     * OZEMPIC 1 mg/dose (2 mg/1.5 mL) Pnij  Generic drug: semaglutide  Inject 0.75 mLs into the skin every 7 days.     * OZEMPIC 1 mg/dose (4 mg/3 mL)  Generic drug: semaglutide  INJECT 1MG (0.75 ML) UNDER THE SKIN EVERY 7 DAYS.     pen needle, diabetic 32 gauge x 5/32" Ndle  Commonly known as: BD ULTRA-FINE SUSIE PEN NEEDLE  USE  TO  INJECT  Weekly     potassium chloride SA 10 MEQ tablet  Commonly known as: K-DUR,KLOR-CON     pravastatin 40 MG tablet  Commonly known as: PRAVACHOL  TAKE 1 TABLET NIGHTLY.     pregabalin 75 MG capsule  Commonly known as: LYRICA  TAKE 1 CAPSULE " TWICE DAILY         * This list has 4 medication(s) that are the same as other medications prescribed for you. Read the directions carefully, and ask your doctor or other care provider to review them with you.                Indwelling Lines/Drains at time of discharge:   Lines/Drains/Airways     None                 Time spent on the discharge of patient: 35 minutes         Angela Gaines MD  Department of Hospital Medicine  [facility] - Telemetry Stepdown

## 2021-12-14 NOTE — PLAN OF CARE
Problem: Adult Inpatient Plan of Care  Goal: Plan of Care Review  Outcome: Ongoing, Progressing  Goal: Patient-Specific Goal (Individualized)  Outcome: Ongoing, Progressing  Goal: Absence of Hospital-Acquired Illness or Injury  Outcome: Ongoing, Progressing  Goal: Optimal Comfort and Wellbeing  Outcome: Ongoing, Progressing  Goal: Readiness for Transition of Care  Outcome: Ongoing, Progressing

## 2021-12-14 NOTE — PLAN OF CARE
PARVIN spoke to Peri Antwon [facility] and was advised pt is current with [facility] and will see pt on Thursday.  PARVIN in contact with medical team.     Renita Manuel LMSW  PRN-  [facility]  Ext. [phone]

## 2021-12-14 NOTE — DISCHARGE INSTRUCTIONS
I have sent a referral to the GI department to get you set up for your colonoscopy. Please call their endoscopy nurse at [phone] to schedule.    You have a referral to see GI and Infectious Disease specialists. If you do not get a call in a few days, call them to schedule these appointments. You also have a repeat CT of the abdomen ordered--schedule this for before your Infectious Disease appointment.    It is important for you to take a bowel regimen (pericolace and miralax) daily to prevent constipation. Constipation can make your diverticulosis worse.    We held your amlodipine and coreg while you were hospitalized because your blood pressure was initially low. We restarted your coreg. Follow up with your PCP regarding restarting amlodipine.

## 2021-12-14 NOTE — ASSESSMENT & PLAN NOTE
On insulin 70/30 32 units AM/ 25 units QHS, Ozempic 1 mg weekly, and Glimepiride 4 mg daily at home. Had episode of hypoglycemia in ED perhaps 2/2 taking extra doses of her home medications prior to arrival. Patient states her BGL is 170s at home on her current home regimen.      - detemir 6U in AM, aspart 3U TIDWM  - LDSSI

## 2021-12-14 NOTE — PLAN OF CARE
[facility] - Telemetry Stepdown  Discharge Final Note    Primary Care Provider: Selena Montemayor MD    Expected Discharge Date: 12/14/2021    Final Discharge Note (most recent)     Final Note - 12/14/21 1611        Final Note    Assessment Type Final Discharge Note     Anticipated Discharge Disposition Home-Health Care [facility]     Hospital Resources/Appts/Education Provided Provided patient/caregiver with written discharge plan information        Post-Acute Status    Post-Acute Authorization Home Health     Home Health Status Set-up Complete/Auth obtained     Discharge Delays None known at this time                 Important Message from Medicare  Important Message from Medicare regarding Discharge Appeal Rights: Given to patient/caregiver,Explained to patient/caregiver,Signed/date by patient/caregiver     Date IMM was signed: 12/14/21  Time IMM was signed: 1031    Contact Info     Ginette Sheth PA-C   Specialty: Internal Medicine    [address]  [place] [address]   Phone: [phone]       Next Steps: Go on 12/27/2021    Instructions: for scheduled PCP appointment    Moiz Hutchison DO   Specialty: Neurosurgery    [address]  [address]  [facility] [place] [address]   Phone: [phone]       Next Steps: Go on 1/4/2022    Instructions: for scheduled Neurosurgery appointment    [facility] 4[facility]   Specialty: Gastroenterology    [address]  [place] [address]   Phone: [phone]       Next Steps: Schedule an appointment as soon as possible for a visit    [facility] - Infectious Disease 1st Fl   Specialty: Infectious Diseases    [address]  [place] [address]   Phone: [phone]       Next Steps: Schedule an appointment as soon as possible for a visit    Instructions: for 2 weeks from now          Pt will resume care with [facility] on Thursday.  Pt's family will provide transportation home.      Renita Manuel LMSW  PRN-  [facility]  Ext. [phone]

## 2021-12-14 NOTE — SUBJECTIVE & OBJECTIVE
Interval History: No overnight events. Patient reports her abdominal pain and right arm swelling have improved, IV was removed from the right hand yesterday. Had a large BM yesterday. No complaints this morning.    Review of Systems   Constitutional: Negative for chills and fever.   HENT: Negative for congestion and rhinorrhea.    Eyes: Negative for pain and redness.   Respiratory: Negative for cough and shortness of breath.    Cardiovascular: Negative for chest pain and leg swelling.   Gastrointestinal: Negative for abdominal pain (improved), diarrhea and nausea.   Genitourinary: Negative for dysuria and hematuria.   Musculoskeletal: Positive for myalgias (right forearm, improved). Negative for back pain.   Neurological: Negative for weakness and headaches.   Psychiatric/Behavioral: Negative for agitation and confusion.     Objective:     Vital Signs (Most Recent):  Temp: 98.3 °F (36.8 °C) (12/14/21 0327)  Pulse: 99 (12/14/21 0812)  Resp: 16 (12/14/21 0812)  BP: 128/69 (12/14/21 0812)  SpO2: 100 % (12/14/21 0812) Vital Signs (24h Range):  Temp:  [98 °F (36.7 °C)-98.7 °F (37.1 °C)] 98.3 °F (36.8 °C)  Pulse:  [] 99  Resp:  [14-21] 16  SpO2:  [95 %-100 %] 100 %  BP: ()/(57-69) 128/69     Weight: 98.5 kg (217 lb 2.5 oz)  Body mass index is 39.72 kg/m².    Intake/Output Summary (Last 24 hours) at 12/14/2021 1022  Last data filed at 12/13/2021 1948  Gross per 24 hour   Intake 150 ml   Output 350 ml   Net -200 ml      Physical Exam  Vitals and nursing note reviewed.   Constitutional:       General: She is not in acute distress.  HENT:      Head: Normocephalic and atraumatic.      Mouth/Throat:      Mouth: Mucous membranes are moist.   Eyes:      Conjunctiva/sclera: Conjunctivae normal.      Pupils: Pupils are equal, round, and reactive to light.   Cardiovascular:      Rate and Rhythm: Normal rate and regular rhythm.      Heart sounds: No murmur heard.      Pulmonary:      Effort: Pulmonary effort is normal. No  respiratory distress.      Breath sounds: Normal breath sounds.   Abdominal:      Palpations: Abdomen is soft.      Tenderness: There is no abdominal tenderness. There is no guarding or rebound.   Musculoskeletal:      Right lower leg: No edema.      Left lower leg: No edema.   Skin:     General: Skin is warm and dry.      Capillary Refill: Capillary refill takes less than 2 seconds.   Neurological:      General: No focal deficit present.      Mental Status: She is alert and oriented to person, place, and time.   Psychiatric:         Mood and Affect: Mood normal.         Behavior: Behavior normal.         Significant Labs: All pertinent labs within the past 24 hours have been reviewed.    Significant Imaging: I have reviewed all pertinent imaging results/findings within the past 24 hours.

## 2021-12-14 NOTE — SUBJECTIVE & OBJECTIVE
Interval History: No events overnight. No fevers. Patient doing well, up in chair without complaints.     Review of Systems   Constitutional: Negative for appetite change and fever.   HENT: Negative for congestion and sinus pain.    Eyes: Negative for discharge and redness.   Respiratory: Negative for cough and shortness of breath.    Cardiovascular: Negative for chest pain and leg swelling.   Gastrointestinal: Negative for diarrhea and nausea.   Endocrine: Negative for polydipsia and polyuria.   Genitourinary: Negative for dysuria and hematuria.   Musculoskeletal: Negative for arthralgias and myalgias.   Skin: Negative for color change and rash.   Neurological: Negative for weakness and numbness.   Psychiatric/Behavioral: Negative for agitation and confusion.     Objective:     Vital Signs (Most Recent):  Temp: 98.1 °F (36.7 °C) (12/14/21 1529)  Pulse: 100 (12/14/21 1529)  Resp: (!) 25 (12/14/21 1529)  BP: 125/66 (12/14/21 1529)  SpO2: 100 % (12/14/21 1529) Vital Signs (24h Range):  Temp:  [98.1 °F (36.7 °C)-98.7 °F (37.1 °C)] 98.1 °F (36.7 °C)  Pulse:  [] 100  Resp:  [14-25] 25  SpO2:  [95 %-100 %] 100 %  BP: (113-148)/(59-69) 125/66     Weight: 98.5 kg (217 lb 2.5 oz)  Body mass index is 39.72 kg/m².    Estimated Creatinine Clearance: 84.8 mL/min (based on SCr of 0.6 mg/dL).    Physical Exam  Constitutional:       General: She is not in acute distress.     Appearance: Normal appearance.   HENT:      Head: Normocephalic and atraumatic.      Mouth/Throat:      Mouth: Mucous membranes are moist.      Pharynx: Oropharynx is clear.   Eyes:      Conjunctiva/sclera: Conjunctivae normal.      Pupils: Pupils are equal, round, and reactive to light.   Cardiovascular:      Rate and Rhythm: Normal rate and regular rhythm.      Pulses: Normal pulses.      Heart sounds: Normal heart sounds.   Pulmonary:      Effort: Pulmonary effort is normal.      Breath sounds: Normal breath sounds.   Abdominal:      General: Abdomen  is flat. Bowel sounds are normal. There is no distension.      Palpations: There is no mass.      Tenderness: There is no guarding or rebound.      Comments: Soft, non-tender abdomen   Musculoskeletal:         General: No swelling or deformity.      Cervical back: Normal range of motion and neck supple. No tenderness.   Skin:     General: Skin is warm and dry.   Neurological:      General: No focal deficit present.      Mental Status: She is alert and oriented to person, place, and time.   Psychiatric:         Mood and Affect: Mood normal.         Behavior: Behavior normal.         Significant Labs:   CBC:   Recent Labs   Lab 12/13/21  0416 12/14/21  0614   WBC 5.37 4.92   HGB 12.2 10.4*   HCT 38.1 32.3*    200     Microbiology Results (last 7 days)     Procedure Component Value Units Date/Time    Culture, Anaerobe [[id number]]  (Abnormal) Collected: 12/12/21 1127    Order Status: Completed Specimen: Abscess from Abdomen Updated: 12/14/21 1352     Anaerobic Culture BACTEROIDES FRAGILIS  Moderate      Narrative:      LLQ abscess    Fungus culture [[id number]] Collected: 12/12/21 1127    Order Status: Completed Specimen: Abscess from Abdomen Updated: 12/14/21 1228     Fungus (Mycology) Culture Culture in progress    Narrative:      LLQ abscess    Aerobic culture [[id number]]  (Abnormal)  (Susceptibility) Collected: 12/12/21 1127    Order Status: Completed Specimen: Abscess from Abdomen Updated: 12/14/21 1055     Aerobic Bacterial Culture PROTEUS MIRABILIS  Rare      Narrative:      LLQ abscess    Blood culture x two cultures. Draw prior to antibiotics. [[id number]] Collected: 12/12/21 0121    Order Status: Completed Specimen: Blood from Peripheral, Hand, Right Updated: 12/14/21 0613     Blood Culture, Routine No Growth to date      No Growth to date      No Growth to date    Narrative:      Aerobic and anaerobic    Blood culture x two cultures. Draw prior to antibiotics. [[id number]] Collected: 12/11/21 7896     Order Status: Completed Specimen: Blood from Peripheral, Forearm, Right Updated: 12/14/21 0613     Blood Culture, Routine No Growth to date      No Growth to date      No Growth to date    Narrative:      Aerobic and anaerobic    Urine culture [[id number]]  (Abnormal)  (Susceptibility) Collected: 12/11/21 2247    Order Status: Completed Specimen: Urine Updated: 12/14/21 0127     Urine Culture, Routine ESCHERICHIA COLI  50,000 - 99,999 cfu/ml      Narrative:      Specimen Source->Urine    AFB Culture & Smear [[id number]] Collected: 12/12/21 1127    Order Status: Completed Specimen: Abscess from Abdomen Updated: 12/13/21 2127     AFB Culture & Smear Culture in progress     AFB CULTURE STAIN No acid fast bacilli seen.    Narrative:      LLQ abscess    Gram stain [[id number]] Collected: 12/12/21 1127    Order Status: Completed Specimen: Abscess from Abdomen Updated: 12/12/21 1602     Gram Stain Result Many WBC's      Moderate Gram negative rods      Rare gram positive cocci    Narrative:      LLQ abscess    Blood Culture #1 **CANNOT BE ORDERED STAT** [[id number]]     Order Status: Canceled Specimen: Blood           Significant Imaging: I have reviewed all pertinent imaging results/findings within the past 24 hours.

## 2021-12-14 NOTE — NURSING
IV and tele removed. Patient received D/C education verbally as well as written, patient's daughter at bedside and involved in D/C education too. Medications delivered bedside, all questions answered. Patient gathered all her belongings including dentures. Waiting for transport to personal vehicle.    1797 patient transported to personal vehicle via wheelchair

## 2021-12-14 NOTE — PLAN OF CARE
[facility] - Telemetry Stepdown      HOME HEALTH ORDERS  FACE TO FACE ENCOUNTER    Patient Name: Alisia Gusman  YOB: 1945    PCP: Selena Montemayor MD   PCP Address: [address] / [place] [address]  PCP Phone Number: [phone]  PCP Fax: [phone]    Encounter Date: 12/11/21    Admit to Home Health    Diagnoses:  Active Hospital Problems    Diagnosis  POA    *Sepsis [A41.9]  No     Priority: 1 - High    Diverticulitis [K57.92]  Yes     Priority: 2     Medication overdose [T50.901A]  Yes     Priority: 3     Abscess of abdominal cavity [K65.1]  Yes     Priority: 3     History of pulmonary embolism [Z86.711]  Yes     Priority: 10     Type 2 diabetes mellitus [E11.9]  Yes     Priority: 10     Essential hypertension [I10]  Yes     Priority: 10     AMS (altered mental status) [R41.82]  Yes     Priority: 20       Resolved Hospital Problems   No resolved problems to display.       Follow Up Appointments:  Future Appointments   Date Time Provider Department Center   12/21/2021  3:00 PM Ginette Sheth PA-C [facility] PCW   12/22/2021  9:15 AM [facility] [id number] [facility] MAM[facility]   12/27/2021 11:00 AM Ginette Sheth PA-C [facility] PCW   1/4/2022 11:00 AM Moiz Hutchison DO [facility] NEUROSC [facility]   2/10/2022 11:15 AM Emily Dao DPM [facility] POD [facility]   4/19/2022  9:30 AM Lindy Nelson MD [facility] ENDODIA [facility]       Allergies:  Review of patient's allergies indicates:   Allergen Reactions    Grass pollen-tim grass standard Other (See Comments)     Causes sinus symptoms like coughing    Losartan      cough    Nubain [nalbuphine] Other (See Comments)     Other reaction(s): Hypotension    Sinus & allergy [chlorpheniramine-phenylephrine]        Medications: Review discharge medications with patient and family and provide education.    Current Facility-Administered Medications   Medication Dose Route Frequency Provider Last Rate Last Admin    acetaminophen tablet  650 mg  650 mg Oral Q8H PRN Yoon Celaya MD        apixaban tablet 5 mg  5 mg Oral BID Angela Gaines MD   5 mg at 12/14/21 0918    calcium-vitamin D3 500 mg-5 mcg (200 unit) per tablet 1 tablet  1 tablet Oral BID Yoon Celaya MD   1 tablet at 12/14/21 0917    carvediloL tablet 6.25 mg  6.25 mg Oral BID Angela Gaines MD   6.25 mg at 12/14/21 0918    cyanocobalamin tablet 250 mcg  250 mcg Oral Daily Yoon Celaya MD   250 mcg at 12/14/21 0917    dextrose 50% injection 12.5 g  12.5 g Intravenous PRN Yoon Celaya MD        dextrose 50% injection 25 g  25 g Intravenous PRN Yoon Celaya MD        famotidine tablet 20 mg  20 mg Oral QHS Yoon Celaya MD   20 mg at 12/13/21 2027    glucagon (human recombinant) injection 1 mg  1 mg Intramuscular PRN Yoon Celaya MD        glucose chewable tablet 16 g  16 g Oral PRN Yoon Celaya MD        glucose chewable tablet 24 g  24 g Oral PRN Yoon Celaya MD        insulin aspart U-100 pen 1-10 Units  1-10 Units Subcutaneous Q6H PRN Yoon Celaya MD   4 Units at 12/14/21 1233    insulin aspart U-100 pen 3 Units  3 Units Subcutaneous TIDWM Angela Gaines MD   3 Units at 12/14/21 1232    [START ON 12/15/2021] insulin detemir U-100 pen 6 Units  6 Units Subcutaneous Daily Angela Gaines MD        melatonin tablet 6 mg  6 mg Oral Nightly PRN Yoon Celaya MD        meropenem-0.9% sodium chloride 1 g/50 mL IVPB  1 g Intravenous Q8H Yoon Celaya MD   Stopped at 12/14/21 0725    naloxone 0.4 mg/mL injection 0.02 mg  0.02 mg Intravenous PRN Yoon Celaya MD        oxyCODONE immediate release tablet 5 mg  5 mg Oral Q6H PRN Yoon Celaya MD        polyethylene glycol packet 17 g  17 g Oral BID Lan Bamnolker, DO   17 g at 12/14/21 0918    senna-docusate 8.6-50 mg per tablet 1 tablet  1 tablet Oral BID Lan Lei DO    1 tablet at 12/14/21 0918    simethicone chewable tablet 80 mg  1 tablet Oral QID PRN Yoon Celaya MD        sodium chloride 0.9% flush 10 mL  10 mL Intravenous Q12H PRN Yoon Celaya MD        vancomycin - pharmacy to dose   Intravenous pharmacy to manage frequency Yoon Celaya MD        vancomycin 750 mg in dextrose 5 % 250 mL IVPB (ready to mix system)  750 mg Intravenous Q12H Sarah Gage  mL/hr at 12/14/21 1324 750 mg at 12/14/21 1324     Current Discharge Medication List      START taking these medications    Details   amoxicillin-clavulanate 875-125mg (AUGMENTIN) 875-125 mg per tablet Take 1 tablet by mouth 2 (two) times daily. for 11 days  Qty: 22 tablet, Refills: 0      polyethylene glycol (GLYCOLAX) 17 gram/dose powder Mix 1 capful (17 g) with a liquid and take by mouth 2 (two) times daily.  Qty: 1020 g, Refills: 5         CONTINUE these medications which have CHANGED    Details   amLODIPine (NORVASC) 5 MG tablet Take 1 tablet (5 mg total) by mouth once daily. DO NOT TAKE THIS MEDICATION UNTIL TOLD TO RESTART BY PHYSICIAN  Qty: 30 tablet, Refills: 11    Comments: DO NOT TAKE THIS MEDICATION UNTIL TOLD TO RESTART BY PHYSICIAN      senna-docusate 8.6-50 mg (PERICOLACE) 8.6-50 mg per tablet Take 1 tablet by mouth once daily.  Qty: 180 tablet, Refills: 0         CONTINUE these medications which have NOT CHANGED    Details   ACCU-CHEK DOMENICO PLUS TEST STRP Strp TEST THREE TIMES DAILY AS DIRECTED  Qty: 300 strip, Refills: 3    Associated Diagnoses: Hypertension associated with diabetes; Uncontrolled type 2 diabetes mellitus with hyperglycemia; Type 2 diabetes, uncontrolled, with neuropathy      ACCU-CHEK SOFTCLIX LANCETS Misc 1 each by Misc.(Non-Drug; Combo Route) route 3 (three) times daily.  Qty: 270 each, Refills: 3    Associated Diagnoses: Type 2 diabetes mellitus with polyneuropathy      anastrozole (ARIMIDEX) 1 mg Tab TAKE 1 TABLET (1 MG TOTAL) BY MOUTH ONCE  "DAILY.  Qty: 90 tablet, Refills: 3    Associated Diagnoses: Use of aromatase inhibitors      apixaban (ELIQUIS) 5 mg Tab Take 1 tablet (5 mg total) by mouth 2 (two) times daily.      BD ALCOHOL SWABS PadM 1 each 2 (two) times daily.      BD INSULIN SYRINGE ULTRA-FINE 1 mL 31 gauge x 5/16 Syrg TO USE  TWICE DAILY WITH  INSULIN  Qty: 100 each, Refills: 11      BD VEO INSULIN SYRINGE UF 0.3 mL 31 gauge x 15/64" Syrg USE  TO INJECT TWICE DAILY  Qty: 200 Syringe, Refills: 3    Associated Diagnoses: Diabetes mellitus type 2 in obese; Current use of insulin; Type 2 diabetes mellitus, uncontrolled, with neuropathy; Diabetic peripheral neuropathy      blood-glucose meter Misc 1 each by Misc.(Non-Drug; Combo Route) route once daily.  Qty: 1 each, Refills: 0      calcium carbonate-vitamin D3 (CALCIUM 600 WITH VITAMIN D3) 600 mg(1,500mg) -400 unit Chew 1 tablet once daily.       carvediloL (COREG) 6.25 MG tablet Take 1 tablet (6.25 mg total) by mouth 2 (two) times daily.  Qty: 60 tablet, Refills: 11    Comments: .      cyanocobalamin-cobamamide (B-12 PLUS) 5,000-100 mcg Subl Take 5000 mcg daily.  Qty: 90 tablet, Refills: 3    Associated Diagnoses: B12 deficiency      famotidine (PEPCID) 20 MG tablet TAKE 1 TABLET (20 MG TOTAL) BY MOUTH EVERY EVENING.  Qty: 90 tablet, Refills: 3      flash glucose scanning reader (FREESTYLE GERARDO 14 DAY READER) Misc 1 each by Misc.(Non-Drug; Combo Route) route once daily.  Qty: 1 each, Refills: 0    Associated Diagnoses: Type 2 diabetes mellitus with diabetic polyneuropathy, with long-term current use of insulin      flash glucose sensor (FREESTYLE GERARDO 14 DAY SENSOR) Kit 2 each by Misc.(Non-Drug; Combo Route) route every 14 (fourteen) days.  Qty: 2 kit, Refills: 11    Associated Diagnoses: Type 2 diabetes mellitus with diabetic polyneuropathy, with long-term current use of insulin      FOLIC ACID/MULTIVIT-MIN/LUTEIN (CENTRUM SILVER ORAL) Take 1 tablet by mouth once daily.      !! insulin " "aspart U-100 (NOVOLOG) 100 unit/mL (3 mL) InPn pen Inject 14 Units into the skin 3 (three) times daily with meals.  Refills: 0      !! insulin aspart U-100 (NOVOLOG) 100 unit/mL (3 mL) InPn pen Inject 0-5 Units into the skin before meals and at bedtime as needed (Hyperglycemia).  Refills: 0      insulin detemir U-100 (LEVEMIR FLEXTOUCH) 100 unit/mL (3 mL) SubQ InPn pen Inject 28 Units into the skin once daily.  Refills: 0      omeprazole (PRILOSEC) 40 MG capsule TAKE 1 CAPSULE (40 MG TOTAL) BY MOUTH EVERY MORNING.  Qty: 90 capsule, Refills: 3    Associated Diagnoses: Laryngopharyngeal reflux (LPR)      oxyCODONE (ROXICODONE) 5 MG immediate release tablet Take 1 tablet (5 mg total) by mouth every 6 (six) hours as needed for Pain.  Refills: 0    Comments: Quantity prescribed more than 7 day supply? No      OZEMPIC 1 mg/dose (4 mg/3 mL) INJECT 1MG (0.75 ML) UNDER THE SKIN EVERY 7 DAYS.  Qty: 4 pen, Refills: 11      pen needle, diabetic (BD ULTRA-FINE SUSIE PEN NEEDLE) 32 gauge x 5/32" Ndle USE  TO  INJECT  Weekly  Qty: 90 each, Refills: 3      potassium chloride SA (K-DUR,KLOR-CON) 10 MEQ tablet       pravastatin (PRAVACHOL) 40 MG tablet TAKE 1 TABLET NIGHTLY.  Qty: 90 tablet, Refills: 3    Associated Diagnoses: Hyperlipidemia associated with type 2 diabetes mellitus      pregabalin (LYRICA) 75 MG capsule TAKE 1 CAPSULE TWICE DAILY  Qty: 180 capsule, Refills: 3    Associated Diagnoses: Diabetic peripheral neuropathy associated with type 2 diabetes mellitus; Type 2 diabetes, uncontrolled, with neuropathy      semaglutide (OZEMPIC) 1 mg/dose (2 mg/1.5 mL) PnIj Inject 0.75 mLs into the skin every 7 days.  Qty: 9 mL, Refills: 3    Associated Diagnoses: Type 2 diabetes mellitus with diabetic polyneuropathy, with long-term current use of insulin       !! - Potential duplicate medications found. Please discuss with provider.            I have seen and examined this patient within the last 30 days. My clinical findings that " support the need for the home health skilled services and home bound status are the following:no   Weakness/numbness causing balance and gait disturbance due to Weakness/Debility making it taxing to leave home.  Requiring assistive device to leave home due to unsteady gait caused by  Weakness/Debility.     Diet:   diabetic diet 2000 calorie    Labs:  None    Referrals/ Consults  Physical Therapy to evaluate and treat. Evaluate for home safety and equipment needs; Establish/upgrade home exercise program. Perform / instruct on therapeutic exercises, gait training, transfer training, and Range of Motion.  Occupational Therapy to evaluate and treat. Evaluate home environment for safety and equipment needs. Perform/Instruct on transfers, ADL training, ROM, and therapeutic exercises.    Activities:   activity as tolerated    Nursing:   Agency to admit patient within per agency protocol of hospital discharge unless specified on physician order or at patient request    SN to complete comprehensive assessment including routine vital signs. Instruct on disease process and s/s of complications to report to MD. Review/verify medication list sent home with the patient at time of discharge  and instruct patient/caregiver as needed. Frequency may be adjusted depending on start of care date.     Skilled nurse to perform up to 3 visits PRN for symptoms related to diagnosis    Notify MD if SBP > 160 or < 90; DBP > 90 or < 50; HR > 120 or < 50; Temp > 101; O2 < 88%    Ok to schedule additional visits based on staff availability and patient request on consecutive days within the home health episode.    Miscellaneous   Diabetic Care:   SN to perform and educate Diabetic management with blood glucose monitoring:, Fingerstick blood sugar AC and HS and Report CBG < 60 or > 350 to physician.    Home Health Aide:  Nursing Three times weekly, Physical Therapy Three times weekly and Occupational Therapy Three times weekly    Wound Care  Orders  n/a    I certify that this patient is confined to her home and needs intermittent skilled nursing care, physical therapy and speech therapy.

## 2021-12-14 NOTE — NURSING
Patient AOx4, VSS. Denies CP, SOB, N/V. Tolerating IV abx. All safety measures in place throughout entire shift. Prescribed precautions maintained. Bed in lowest position, bed alarm on, side rails up x 3, Call light within reach and verbalized understanding of use. WCTM.

## 2021-12-14 NOTE — PLAN OF CARE
Pt will resume care with [facility] on Thursday.  PARVIN notified pt's daughter, Lennie [phone], of discharge.  Pt's daughter, Lennie, advised PARVIN pt's daughter Estrellita [phone] will  pt.  PARVIN notified medical team pt's daughter will provide transportation home.        12/14/21 1442   Post-Acute Status   Post-Acute Authorization Home Health   Home Health Status Set-up Complete/Auth obtained     Renita Manuel LMSW  PRN-  [facility]  Ext. [phone]     Where Do You Want The Question To Include Opioid Counseling Located?: Case Summary Tab

## 2021-12-14 NOTE — PROGRESS NOTES
[facility] - Telemetry Stepdown  Infectious Disease  Progress Note    Patient Name: Alisia Gusman  MRN: [id number]  Admission Date: 12/11/2021  Length of Stay: 2 days  Attending Physician: Sarah Gage MD  Primary Care Provider: Selena Montemayor MD    Isolation Status: No active isolations  Assessment/Plan:      Colonic diverticular abscess  76-year-old female with history of T2DM, recurrent breast cancer on anastrozole, meningioma s/p R frontal craniotomy, presented with AMS, accidental drug overdose, sepsis 2/2 acute diverticulitis with intraabdominal abscess s/p IR drainage 12/12/2021.     Urine Cx 12/11 with E coli. BCx 12/12 NGTD. Abscess gram stain with GPC, Cx with pan sensitive proteus and bacteroides fragilis pending sensitivities.    Recommendations:  - Augmentin 875mg BID for a total of 14 days from abscess drainage. This covers GI GPCs (if enterococcus or streptococcus spp grows), GI anaerobes, and the pan-sensitive proteus, along with her E coli in the urine  - ID 2 week clinic follow up with repeat CT abdomen/pelvis with contrast prior to office visit to evaluate full resolution of the abdominal abscess and final culture data          Anticipated Disposition: per primary    Thank you for your consult. I will sign off. Please contact us if you have any additional questions.    Connor M Gillies, MD  Infectious Disease  [facility] - Telemetry Stepdown    Subjective:     Principal Problem:Colonic diverticular abscess    HPI: 76-year-old female with history of T2DM, recurrent breast cancer on anastrozole, meningioma s/p R frontal craniotomy, presented with AMS. Per family, patient accidentally took entire week of her medications in accidental drug overdose. Patient was septic with lactic acidosis, CT AP with acute diverticulitis and abscess, started empirically with vanc / rowan.    Interval History: No events overnight. No fevers. Patient doing well, up in chair without complaints.     Review of Systems    Constitutional: Negative for appetite change and fever.   HENT: Negative for congestion and sinus pain.    Eyes: Negative for discharge and redness.   Respiratory: Negative for cough and shortness of breath.    Cardiovascular: Negative for chest pain and leg swelling.   Gastrointestinal: Negative for diarrhea and nausea.   Endocrine: Negative for polydipsia and polyuria.   Genitourinary: Negative for dysuria and hematuria.   Musculoskeletal: Negative for arthralgias and myalgias.   Skin: Negative for color change and rash.   Neurological: Negative for weakness and numbness.   Psychiatric/Behavioral: Negative for agitation and confusion.     Objective:     Vital Signs (Most Recent):  Temp: 98.1 °F (36.7 °C) (12/14/21 1529)  Pulse: 100 (12/14/21 1529)  Resp: (!) 25 (12/14/21 1529)  BP: 125/66 (12/14/21 1529)  SpO2: 100 % (12/14/21 1529) Vital Signs (24h Range):  Temp:  [98.1 °F (36.7 °C)-98.7 °F (37.1 °C)] 98.1 °F (36.7 °C)  Pulse:  [] 100  Resp:  [14-25] 25  SpO2:  [95 %-100 %] 100 %  BP: (113-148)/(59-69) 125/66     Weight: 98.5 kg (217 lb 2.5 oz)  Body mass index is 39.72 kg/m².    Estimated Creatinine Clearance: 84.8 mL/min (based on SCr of 0.6 mg/dL).    Physical Exam  Constitutional:       General: She is not in acute distress.     Appearance: Normal appearance.   HENT:      Head: Normocephalic and atraumatic.      Mouth/Throat:      Mouth: Mucous membranes are moist.      Pharynx: Oropharynx is clear.   Eyes:      Conjunctiva/sclera: Conjunctivae normal.      Pupils: Pupils are equal, round, and reactive to light.   Cardiovascular:      Rate and Rhythm: Normal rate and regular rhythm.      Pulses: Normal pulses.      Heart sounds: Normal heart sounds.   Pulmonary:      Effort: Pulmonary effort is normal.      Breath sounds: Normal breath sounds.   Abdominal:      General: Abdomen is flat. Bowel sounds are normal. There is no distension.      Palpations: There is no mass.      Tenderness: There is no  guarding or rebound.      Comments: Soft, non-tender abdomen   Musculoskeletal:         General: No swelling or deformity.      Cervical back: Normal range of motion and neck supple. No tenderness.   Skin:     General: Skin is warm and dry.   Neurological:      General: No focal deficit present.      Mental Status: She is alert and oriented to person, place, and time.   Psychiatric:         Mood and Affect: Mood normal.         Behavior: Behavior normal.         Significant Labs:   CBC:   Recent Labs   Lab 12/13/21  0416 12/14/21  0614   WBC 5.37 4.92   HGB 12.2 10.4*   HCT 38.1 32.3*    200     Microbiology Results (last 7 days)     Procedure Component Value Units Date/Time    Culture, Anaerobe [[id number]]  (Abnormal) Collected: 12/12/21 1127    Order Status: Completed Specimen: Abscess from Abdomen Updated: 12/14/21 1352     Anaerobic Culture BACTEROIDES FRAGILIS  Moderate      Narrative:      LLQ abscess    Fungus culture [[id number]] Collected: 12/12/21 1127    Order Status: Completed Specimen: Abscess from Abdomen Updated: 12/14/21 1228     Fungus (Mycology) Culture Culture in progress    Narrative:      LLQ abscess    Aerobic culture [[id number]]  (Abnormal)  (Susceptibility) Collected: 12/12/21 1127    Order Status: Completed Specimen: Abscess from Abdomen Updated: 12/14/21 1055     Aerobic Bacterial Culture PROTEUS MIRABILIS  Rare      Narrative:      LLQ abscess    Blood culture x two cultures. Draw prior to antibiotics. [[id number]] Collected: 12/12/21 0121    Order Status: Completed Specimen: Blood from Peripheral, Hand, Right Updated: 12/14/21 0613     Blood Culture, Routine No Growth to date      No Growth to date      No Growth to date    Narrative:      Aerobic and anaerobic    Blood culture x two cultures. Draw prior to antibiotics. [[id number]] Collected: 12/11/21 2144    Order Status: Completed Specimen: Blood from Peripheral, Forearm, Right Updated: 12/14/21 0613     Blood Culture, Routine No  Growth to date      No Growth to date      No Growth to date    Narrative:      Aerobic and anaerobic    Urine culture [[id number]]  (Abnormal)  (Susceptibility) Collected: 12/11/21 2247    Order Status: Completed Specimen: Urine Updated: 12/14/21 0127     Urine Culture, Routine ESCHERICHIA COLI  50,000 - 99,999 cfu/ml      Narrative:      Specimen Source->Urine    AFB Culture & Smear [[id number]] Collected: 12/12/21 1127    Order Status: Completed Specimen: Abscess from Abdomen Updated: 12/13/21 2127     AFB Culture & Smear Culture in progress     AFB CULTURE STAIN No acid fast bacilli seen.    Narrative:      LLQ abscess    Gram stain [[id number]] Collected: 12/12/21 1127    Order Status: Completed Specimen: Abscess from Abdomen Updated: 12/12/21 1602     Gram Stain Result Many WBC's      Moderate Gram negative rods      Rare gram positive cocci    Narrative:      LLQ abscess    Blood Culture #1 **CANNOT BE ORDERED STAT** [[id number]]     Order Status: Canceled Specimen: Blood           Significant Imaging: I have reviewed all pertinent imaging results/findings within the past 24 hours.

## 2021-12-14 NOTE — ASSESSMENT & PLAN NOTE
Initially had evidence of infective focus with diverticulitis and abscess on CT a/p and evidence of infection on UA. Received 30 cc/kg bolus in ED then continued on maintenance fluids until lactate normalized. Started on meropenem and vancomycin because she received meropenem during admission last month for UTI, diverticulitis, and polymicrobial bacteremia per ID rec. S/p LLQ abscess aspiration by IR, culture growing proteus with sensitivities pending. UCx with pan-sensitive E coli. BCx NGTD.    - ID consulted, appreciate recommendations   - vanc, meropenem  - f/u aspirate cultures, BCx, UCx

## 2021-12-15 ENCOUNTER — PATIENT OUTREACH (OUTPATIENT)
Dept: ADMINISTRATIVE | Facility: CLINIC | Age: 76
End: 2021-12-15
Payer: MEDICARE

## 2021-12-16 LAB — BACTERIA SPEC ANAEROBE CULT: ABNORMAL

## 2021-12-17 LAB
BACTERIA BLD CULT: NORMAL
BACTERIA BLD CULT: NORMAL
BACTERIA SPEC AEROBE CULT: ABNORMAL
BACTERIA SPEC AEROBE CULT: ABNORMAL

## 2021-12-17 NOTE — PHYSICIAN QUERY
PT Name: Alisia uGsman  MR #: [id number]    DOCUMENTATION CLARIFICATION     CDS/: ADRIANNA Saul, RN, CDS               Contact information:[email or web address]   This form is a permanent document in the medical record.     Query Date: 2021    By submitting this query, we are merely seeking further clarification of documentation. Please utilize your independent clinical judgment when addressing the question(s) below.    The Medical Record contains the following:   Indicators   Supporting Clinical Findings Location in Medical Record   X AMS, Confusion,  LOC, etc.   Confusion   Pt mildly lethargic but easily arousable   Oriented to name, , location, year, and month. Not oriented to day. Amount of medication taken reported by patient does not match daughter's story. Able to follow commands.      Presents with altered mental status an accidental drug overdose    Patient accidentally took a week's worth of her medications.      Patient mental status much improved this a.m. and she is alert and oriented and following commands      Encephalopathy- organ dysfunction of sepsis     Patient mental status much improved and she is alert and oriented and following commands.      ED, Dr. Baptiste,                H&P, Dr. Celaya/Dr. Gage,                      , Dr. Gaines/Dr. Gage,    X Acute/Chronic Illness  Sepsis, lactic acidosis, accidental drug overdose, Acute diverticulitis, UTI  DM2, recurrent breast cancer (on Anastrozole), meningioma s/p R frontal craniotomy, PE w/o cor pulmonale and HTN     H&P, Dr. Celaya/Dr. Gage,      X Radiology Findings  CT head impression:  Postoperative changes of right parietal craniotomy for prior tumor resection.  Continued but mildly improved hypoattenuation/edema throughout the right cerebral hemisphere with improved mass effect and decreased leftward midline shift when compared with 2021.   No acute intracranial hemorrhage or worsening mass  effect.    CT 12/11    Electrolyte Imbalance      Medication     X Treatment          IV Vanc/Merrem    ED, Dr. Baptiste, 12/11   X Other  Per daughter, patient accidentally took a weeks worth of medication including lyrica 75mg, anastrozole, famotidine 20mg, atenolol 25mg, and pravastatin 40mg. Pt took normal dose of insulin but has not eaten much.    ED, Dr. Baptiste, 12/11     The noted clinical guidelines are only system guidelines and do not replace the providers clinical judgment.    The National [place] of Neurologic Disorders and Stroke (NINDS) of the NIH describes encephalopathy as any diffuse disease of the brain that alters brain function or structure.     Provider, please specify the Type of Encephalopathy associated with above clinical findings.    [   ] Metabolic Encephalopathy - Due to electrolyte imbalance, metabolic derangements, or infectious processes, includes Septic Encephalopathy, Uremic Encephalopathy   [   ] Toxic Encephalopathy - Due to drugs, chemicals, or other toxic substances   [   ] Encephalopathy, unspecified      [   ] Other Encephalopathy (please specify): ____________________   [   ] Other neurological condition- Includes Post-ictal altered mental status (please specify condition): __________   [  ]  Clinically Undetermined     Present on Admission (POA) Status:      Please document in your progress notes daily for the duration of treatment until resolved, and include in your discharge summary.    References:  BHARGAVI Lombardi RN, CCDS. (2018, June 9). Notes from the Instructor: Encephalopathy tips. Retrieved October 22, 2020, from [email or web address]/articles/[email or web address]-[email or web address]-encephalopathy-tips    ICD-9-CM Coding Clinic First Quarter 2013, Effective with discharges: October 21, 2013 [facility] § Seizure with encephalopathy due to postictal state (2013).    ICD-10-CM/PCS NUMBER26 Integrated Codebook (Version V.20.8.10.0) [Computer software]. (2020). Retrieved October 21,  2020.    National [place] of Neurological Disorders and Stroke. (2019, March 27). Retrieved October 22, 2020, from https://www.ninds.nih.gov/Disorders/All-Disorders/Kscyepsxhawvfm-Gabpkoftskr-Hcnt    Form No. 63086

## 2021-12-17 NOTE — PHYSICIAN QUERY
PT Name: Alisia Gusman  MR #: [id number]     DOCUMENTATION CLARIFICATION      CDS/: ADRIANNA Saul, RN, CDS               Contact information:[email or web address]   This form is a permanent document in the medical record.    Query Date: December 17, 2021    By submitting this query, we are merely seeking further clarification of documentation to reflect the severity of illness of your patient. Please utilize your independent clinical judgment when addressing the question(s) below.     The Medical Record contains the following:   Indicators   Supporting Clinical Findings Location in Medical Record   X Documentation of Sepsis, Septic Shock  Sepsis- source Acute diverticulitis, UTI     H&P, Dr. Celaya/Dr. Gage, 12/12   X Blood Culture  Blood culture: No growth    Lab 12/11 and 12/12    Respiratory Culture         X Urine Culture  Urine culture: Escherichia coli    Lab 12/11   X Other Culture  Abdomin abscess culture:   Candida Albicans, Bacteroides fragilis, proteus mirabilis     Lab 12/12   X Acute/Chronic Illness  Sepsis, lactic acidosis, accidental drug overdose, Acute diverticulitis, UTI  DM2, recurrent breast cancer (on Anastrozole), meningioma s/p R frontal craniotomy, PE w/o cor pulmonale and HTN     H&P, Dr. Celaya/Dr. Gage, 12/12   X Medication/Treatment  IV vanc/merrem     Urine grew E coli and abscess grew Proteus. ID consulted and recommend 2 week course of oral augmentin.     H&P, Dr. Celaya/Dr. Gage, 12/12     DCS, Dr. aGines/Dr. Gage, 12/14    Other         Provider, please further specify organism(s) related to the Sepsis diagnosis.  Please check all that apply:    [   ] Due to E. Coli   [   ] Due to Proteus mirabilis    [   ] Due to Bacteroides fragilis   [   ] Due to (please specify): __________________________________   [X] Other specification (please specify): _E Coli UTI, Proteus mirabilis on bowel abscess culture___   [   ] Clinically Undetermined         Please document in your  progress notes daily for the duration of treatment until resolved, and include in your discharge summary.  Form No. 91564

## 2021-12-21 ENCOUNTER — OFFICE VISIT (OUTPATIENT)
Dept: INTERNAL MEDICINE | Facility: CLINIC | Age: 76
End: 2021-12-21
Payer: MEDICARE

## 2021-12-21 ENCOUNTER — EXTERNAL HOME HEALTH (OUTPATIENT)
Dept: HOME HEALTH SERVICES | Facility: HOSPITAL | Age: 76
End: 2021-12-21
Payer: MEDICARE

## 2021-12-21 VITALS
WEIGHT: 160.94 LBS | BODY MASS INDEX: 27.48 KG/M2 | HEART RATE: 80 BPM | RESPIRATION RATE: 18 BRPM | DIASTOLIC BLOOD PRESSURE: 78 MMHG | HEIGHT: 64 IN | SYSTOLIC BLOOD PRESSURE: 132 MMHG | OXYGEN SATURATION: 99 %

## 2021-12-21 DIAGNOSIS — K57.20 COLONIC DIVERTICULAR ABSCESS: ICD-10-CM

## 2021-12-21 DIAGNOSIS — Z79.4 DIABETES MELLITUS TYPE 2, INSULIN DEPENDENT: ICD-10-CM

## 2021-12-21 DIAGNOSIS — E11.9 DIABETES MELLITUS TYPE 2, INSULIN DEPENDENT: ICD-10-CM

## 2021-12-21 DIAGNOSIS — Z09 HOSPITAL DISCHARGE FOLLOW-UP: Primary | ICD-10-CM

## 2021-12-21 PROCEDURE — 1126F AMNT PAIN NOTED NONE PRSNT: CPT | Mod: HCNC,CPTII,S$GLB, | Performed by: PHYSICIAN ASSISTANT

## 2021-12-21 PROCEDURE — 99999 PR PBB SHADOW E&M-EST. PATIENT-LVL V: ICD-10-PCS | Mod: PBBFAC,HCNC,, | Performed by: PHYSICIAN ASSISTANT

## 2021-12-21 PROCEDURE — 1100F PR PT FALLS ASSESS DOC 2+ FALLS/FALL W/INJURY/YR: ICD-10-PCS | Mod: HCNC,CPTII,S$GLB, | Performed by: PHYSICIAN ASSISTANT

## 2021-12-21 PROCEDURE — 99214 OFFICE O/P EST MOD 30 MIN: CPT | Mod: HCNC,S$GLB,, | Performed by: PHYSICIAN ASSISTANT

## 2021-12-21 PROCEDURE — 3078F DIAST BP <80 MM HG: CPT | Mod: HCNC,CPTII,S$GLB, | Performed by: PHYSICIAN ASSISTANT

## 2021-12-21 PROCEDURE — 1126F PR PAIN SEVERITY QUANTIFIED, NO PAIN PRESENT: ICD-10-PCS | Mod: HCNC,CPTII,S$GLB, | Performed by: PHYSICIAN ASSISTANT

## 2021-12-21 PROCEDURE — 99214 PR OFFICE/OUTPT VISIT, EST, LEVL IV, 30-39 MIN: ICD-10-PCS | Mod: HCNC,S$GLB,, | Performed by: PHYSICIAN ASSISTANT

## 2021-12-21 PROCEDURE — 1100F PTFALLS ASSESS-DOCD GE2>/YR: CPT | Mod: HCNC,CPTII,S$GLB, | Performed by: PHYSICIAN ASSISTANT

## 2021-12-21 PROCEDURE — 3075F PR MOST RECENT SYSTOLIC BLOOD PRESS GE 130-139MM HG: ICD-10-PCS | Mod: HCNC,CPTII,S$GLB, | Performed by: PHYSICIAN ASSISTANT

## 2021-12-21 PROCEDURE — 99999 PR PBB SHADOW E&M-EST. PATIENT-LVL V: CPT | Mod: PBBFAC,HCNC,, | Performed by: PHYSICIAN ASSISTANT

## 2021-12-21 PROCEDURE — 3078F PR MOST RECENT DIASTOLIC BLOOD PRESSURE < 80 MM HG: ICD-10-PCS | Mod: HCNC,CPTII,S$GLB, | Performed by: PHYSICIAN ASSISTANT

## 2021-12-21 PROCEDURE — 3288F PR FALLS RISK ASSESSMENT DOCUMENTED: ICD-10-PCS | Mod: HCNC,CPTII,S$GLB, | Performed by: PHYSICIAN ASSISTANT

## 2021-12-21 PROCEDURE — 3075F SYST BP GE 130 - 139MM HG: CPT | Mod: HCNC,CPTII,S$GLB, | Performed by: PHYSICIAN ASSISTANT

## 2021-12-21 PROCEDURE — 3288F FALL RISK ASSESSMENT DOCD: CPT | Mod: HCNC,CPTII,S$GLB, | Performed by: PHYSICIAN ASSISTANT

## 2021-12-21 PROCEDURE — 99499 UNLISTED E&M SERVICE: CPT | Mod: HCNC,S$GLB,, | Performed by: PHYSICIAN ASSISTANT

## 2021-12-21 PROCEDURE — 99499 RISK ADDL DX/OHS AUDIT: ICD-10-PCS | Mod: HCNC,S$GLB,, | Performed by: PHYSICIAN ASSISTANT

## 2021-12-21 NOTE — PROGRESS NOTES
Subjective:       Patient ID: Alisia Gusman is a 76 y.o. female.    Chief Complaint: Hospital Follow Up    HPI     Established pt of Selena Montemayor MD (new to me)      Here for hospital discharge after admission on 12/11 for diverticulitis complicated by colonic abscess s/p IR drainage (see hospital course below)      Transitional Care Note    Family and/or Caretaker present at visit?  Yes. (dtr)  Diagnostic tests reviewed/disposition: No diagnosic tests pending after this hospitalization.  Disease/illness education: Diverticulitis, DM  Home health/community services discussion/referrals: Patient has home health established at [facility].   Establishment or re-establishment of referral orders for community resources: No other necessary community resources.   Discussion with other health care providers: No discussion with other health care providers necessary.           Admission Date: 12/11/2021  Hospital Length of Stay: 2 days  Discharge Date and Time:  12/14/2021  Hospital Course:   Underwent 12/12 IR aspiration of abscess seen on CT a/p in LLQ. Lactate normalized and maintenance fluids were discontinued. ID consulted regarding antibiotic choice, recommended continuing vanc and meropenem pending cultures. Abscess aspirate culture grew Proteus, urine culture grew pan-sensitive E coli. Evaluated by PT/OT with recommendation for home health. Stable for discharge. Colonoscopy and repeat CT abdomen w/ contrast ordered. Will follow up with ID in 2 weeks and GI regarding diverticulitis with abscess.      Admission Date: 11/6/2021  Hospital Length of Stay: 18 days  Discharge Date and Time:  11/24/2021  Of note prior admission  November 2021 for sepsis with E.coli UTI, Pseudomonas and Clostridium bacteremia, diverticulitis, and PNA (finished course of Merrem on 11/23).        Today, pt states she is feeling much better. Having BM with miralax, mild llq but much improved since discharge. She has completed 2 week course of  Augmentin. No fevers, n/v/d, melena or brbrp. Appetite has improved. Has questions about insulin, Per dtr only administering Lantus 26units. Not using short acting insulin. No longer using 70/30. Home glucose readings in 140s.     She has appt with ID and NSGY scheduled    Needs f/u schedule with GI and repeat CT scan.     dtr states several meds need refilled but unsure of which ones.        Past Medical History:   Diagnosis Date    Acute pulmonary embolism without acute cor pulmonale 11/15/2021    Adult bronchiectasis 7/26/2017    Allergy     Anemia     Arthritis     Asthma     Breast cancer 1993    left w/ radiation     Breast cancer 2020    IDC, left mastectomy    Cancer 1993    L eft breast    Cataract     Chronic cervical radiculopathy 9/20/2012    Diabetes mellitus     Diabetes mellitus type II     Diabetes with neurologic complications     Diverticulosis     Dry eyes     Dysphagia     after knee surgery June 2014    GERD (gastroesophageal reflux disease)     Hyperlipidemia     Hypertension     Neuropathy     feet    Scalp tenderness     Sepsis 12/12/2021    Shortness of breath     Ulcer      Social History     Tobacco Use    Smoking status: Never Smoker    Smokeless tobacco: Never Used   Substance Use Topics    Alcohol use: No    Drug use: No     Review of patient's allergies indicates:   Allergen Reactions    Grass pollen-june grass standard Other (See Comments)     Causes sinus symptoms like coughing    Losartan      cough    Nubain [nalbuphine] Other (See Comments)     Other reaction(s): Hypotension    Sinus & allergy [chlorpheniramine-phenylephrine]          Review of Systems   Constitutional: Positive for fatigue. Negative for chills, fever and unexpected weight change.   Respiratory: Negative for cough and shortness of breath.    Cardiovascular: Negative for chest pain and leg swelling.   Gastrointestinal: Negative for abdominal pain, diarrhea, nausea and vomiting.  "  Integumentary:  Negative for rash.   Neurological: Negative for weakness and headaches.         Objective: /78 (BP Location: Left arm, Patient Position: Sitting, BP Method: Medium (Manual))   Pulse 80   Resp 18   Ht 5' 4" (1.626 m)   Wt 73 kg (160 lb 15 oz)   LMP  (LMP Unknown) Comment: Partial  SpO2 99%   BMI 27.62 kg/m²         Physical Exam  Vitals reviewed.   Constitutional:       General: She is not in acute distress.     Appearance: She is well-developed.   HENT:      Head: Normocephalic and atraumatic.   Cardiovascular:      Rate and Rhythm: Normal rate and regular rhythm.      Heart sounds: No murmur heard.      Pulmonary:      Effort: Pulmonary effort is normal.      Breath sounds: Normal breath sounds. No wheezing or rales.   Abdominal:      General: Bowel sounds are normal.      Palpations: Abdomen is soft.      Tenderness: There is abdominal tenderness (mild to llq, improved since admission per pt).   Musculoskeletal:      Right lower leg: No edema.      Left lower leg: No edema.   Skin:     General: Skin is warm and dry.      Findings: No rash.   Neurological:      Mental Status: She is alert and oriented to person, place, and time.         Assessment:       Problem List Items Addressed This Visit    None     Visit Diagnoses     Hospital discharge follow-up    -  Primary          Plan:           Alisia was seen today for hospital follow up.    Diagnoses and all orders for this visit:    Hospital discharge follow-up    Colonic diverticular abscess    Diabetes mellitus type 2, insulin dependent        Pt has Complete outpatient abx, appears to be doing well since dischage  Has c-scope schedule for 1/28 but needs GI appt and repeat CT schedule (will have staff help arrange)  Pt/dtr to bring all meds and BG on RTC for review  Keep all speciality clinic appts  RTC/ED precautions discussed.     Future Appointments   Date Time Provider Department Center   12/22/2021  9:15 AM [facility] " [facility]   12/27/2021 11:00 AM Ginette Sheth PA-C [facility]   1/4/2022 11:00 AM Moiz Hutchison DO [facility]   1/6/2022  3:30 PM Gadiel Okeefe MD [facility] ID [facility]   2/10/2022 11:15 AM Emily Dao DPM [facility] POD [facility]   4/19/2022  9:30 AM Lindy Nelson MD [facility] ENDODIA [facility]     Ginette Sheth PA-C

## 2021-12-21 NOTE — Clinical Note
Pt needs GI clinic follow up for hospital discharge and please help schedule CT scan (ordered by Dr. Gaines)

## 2021-12-21 NOTE — PHYSICIAN QUERY
PT Name: Alisia Gusman  MR #: [id number]    DOCUMENTATION CLARIFICATION     CDS/: ADRIANNA Saul, RN, CDS               Contact information:[email or web address]   This form is a permanent document in the medical record.     Query Date: 2021    By submitting this query, we are merely seeking further clarification of documentation. Please utilize your independent clinical judgment when addressing the question(s) below.    The Medical Record contains the following:   Indicators   Supporting Clinical Findings Location in Medical Record   X AMS, Confusion,  LOC, etc.   Confusion   Pt mildly lethargic but easily arousable   Oriented to name, , location, year, and month. Not oriented to day. Amount of medication taken reported by patient does not match daughter's story. Able to follow commands.      Presents with altered mental status an accidental drug overdose    Patient accidentally took a week's worth of her medications.      Patient mental status much improved this a.m. and she is alert and oriented and following commands      Encephalopathy- organ dysfunction of sepsis     Patient mental status much improved and she is alert and oriented and following commands.      ED, Dr. Baptiste,                H&P, Dr. Celaya/Dr. Gage,                      , Dr. Gaines/Dr. Gage,    X Acute/Chronic Illness  Sepsis, lactic acidosis, accidental drug overdose, Acute diverticulitis, UTI  DM2, recurrent breast cancer (on Anastrozole), meningioma s/p R frontal craniotomy, PE w/o cor pulmonale and HTN     H&P, Dr. Celaya/Dr. Gage,      X Radiology Findings  CT head impression:  Postoperative changes of right parietal craniotomy for prior tumor resection.  Continued but mildly improved hypoattenuation/edema throughout the right cerebral hemisphere with improved mass effect and decreased leftward midline shift when compared with 2021.   No acute intracranial hemorrhage or worsening mass  effect.    CT 12/11    Electrolyte Imbalance      Medication     X Treatment          IV Vanc/Merrem    ED, Dr. Baptiste, 12/11   X Other  Per daughter, patient accidentally took a weeks worth of medication including lyrica 75mg, anastrozole, famotidine 20mg, atenolol 25mg, and pravastatin 40mg. Pt took normal dose of insulin but has not eaten much.    ED, Dr. Baptiste, 12/11     The noted clinical guidelines are only system guidelines and do not replace the providers clinical judgment.    The National [place] of Neurologic Disorders and Stroke (NINDS) of the NIH describes encephalopathy as any diffuse disease of the brain that alters brain function or structure.     Provider, please specify the Type of Encephalopathy associated with above clinical findings.    [  X ] Metabolic Encephalopathy - Due to electrolyte imbalance, metabolic derangements, or infectious processes, includes Septic Encephalopathy, Uremic Encephalopathy   [  X ] Toxic Encephalopathy - Due to drugs, chemicals, or other toxic substances   [   ] Encephalopathy, unspecified      [   ] Other Encephalopathy (please specify): ____________________   [   ] Other neurological condition- Includes Post-ictal altered mental status (please specify condition): __________   [  ]  Clinically Undetermined           Please document in your progress notes daily for the duration of treatment until resolved, and include in your discharge summary.    References:  BHARGAVI Lombardi RN, CCDS. (2018, June 9). Notes from the Instructor: Encephalopathy tips. Retrieved October 22, 2020, from [email or web address]/articles/[email or web address]-[email or web address]-encephalopathy-tips    ICD-9-CM Coding Clinic First Quarter 2013, Effective with discharges: October 21, 2013 [facility] § Seizure with encephalopathy due to postictal state (2013).    ICD-10-CM/PCS [employer] Integrated Codebook (Version V.20.8.10.0) [Computer software]. (2020). Retrieved October 21, 2020.    National [place] of  Neurological Disorders and Stroke. (2019, March 27). Retrieved October 22, 2020, from https://www.ninds.nih.gov/Disorders/All-Disorders/Sizzxvwwixbezz-Iudtgvuruib-Qqkz    Form No. 92508

## 2021-12-21 NOTE — PATIENT INSTRUCTIONS
BRING ALL YOUR MEDICATIONS WHEN YOU COME BACK ON 12/27    WILL REVIEW YOUR GLUCOSE    WE FOLLOW UP GI APPT AND REPEAT CT SCAN THAT NEEDS TO BE SCHEDULED.

## 2021-12-22 ENCOUNTER — DOCUMENT SCAN (OUTPATIENT)
Dept: HOME HEALTH SERVICES | Facility: HOSPITAL | Age: 76
End: 2021-12-22
Payer: MEDICARE

## 2021-12-22 ENCOUNTER — HOSPITAL ENCOUNTER (OUTPATIENT)
Dept: RADIOLOGY | Facility: HOSPITAL | Age: 76
Discharge: HOME | End: 2021-12-22
Attending: SURGERY
Payer: MEDICARE

## 2021-12-22 DIAGNOSIS — Z12.31 ENCOUNTER FOR SCREENING MAMMOGRAM FOR MALIGNANT NEOPLASM OF BREAST: ICD-10-CM

## 2021-12-22 DIAGNOSIS — Z12.39 SCREENING FOR MALIGNANT NEOPLASM OF BREAST: ICD-10-CM

## 2021-12-22 PROCEDURE — 77067 SCR MAMMO BI INCL CAD: CPT | Mod: TC,HCNC

## 2021-12-22 PROCEDURE — 77063 MAMMO DIGITAL SCREENING RIGHT WITH TOMOSYNTHESIS_CAD: ICD-10-PCS | Mod: 26,HCNC,, | Performed by: RADIOLOGY

## 2021-12-22 PROCEDURE — 77063 BREAST TOMOSYNTHESIS BI: CPT | Mod: 26,HCNC,, | Performed by: RADIOLOGY

## 2021-12-22 PROCEDURE — 77067 MAMMO DIGITAL SCREENING RIGHT WITH TOMOSYNTHESIS_CAD: ICD-10-PCS | Mod: 26,HCNC,, | Performed by: RADIOLOGY

## 2021-12-22 PROCEDURE — 77067 SCR MAMMO BI INCL CAD: CPT | Mod: 26,HCNC,, | Performed by: RADIOLOGY

## 2021-12-27 ENCOUNTER — TELEPHONE (OUTPATIENT)
Dept: INTERNAL MEDICINE | Facility: CLINIC | Age: 76
End: 2021-12-27
Payer: MEDICARE

## 2021-12-27 ENCOUNTER — DOCUMENT SCAN (OUTPATIENT)
Dept: HOME HEALTH SERVICES | Facility: HOSPITAL | Age: 76
End: 2021-12-27
Payer: MEDICARE

## 2021-12-27 ENCOUNTER — OFFICE VISIT (OUTPATIENT)
Dept: INTERNAL MEDICINE | Facility: CLINIC | Age: 76
End: 2021-12-27
Payer: MEDICARE

## 2021-12-27 VITALS
OXYGEN SATURATION: 98 % | WEIGHT: 163.56 LBS | HEART RATE: 103 BPM | HEIGHT: 64 IN | DIASTOLIC BLOOD PRESSURE: 60 MMHG | SYSTOLIC BLOOD PRESSURE: 114 MMHG | BODY MASS INDEX: 27.92 KG/M2

## 2021-12-27 DIAGNOSIS — Z09 FOLLOW-UP EXAM: Primary | ICD-10-CM

## 2021-12-27 PROCEDURE — 3074F SYST BP LT 130 MM HG: CPT | Mod: HCNC,CPTII,S$GLB, | Performed by: PHYSICIAN ASSISTANT

## 2021-12-27 PROCEDURE — 1159F PR MEDICATION LIST DOCUMENTED IN MEDICAL RECORD: ICD-10-PCS | Mod: HCNC,CPTII,S$GLB, | Performed by: PHYSICIAN ASSISTANT

## 2021-12-27 PROCEDURE — 1126F PR PAIN SEVERITY QUANTIFIED, NO PAIN PRESENT: ICD-10-PCS | Mod: HCNC,CPTII,S$GLB, | Performed by: PHYSICIAN ASSISTANT

## 2021-12-27 PROCEDURE — 1159F MED LIST DOCD IN RCRD: CPT | Mod: HCNC,CPTII,S$GLB, | Performed by: PHYSICIAN ASSISTANT

## 2021-12-27 PROCEDURE — 3288F FALL RISK ASSESSMENT DOCD: CPT | Mod: HCNC,CPTII,S$GLB, | Performed by: PHYSICIAN ASSISTANT

## 2021-12-27 PROCEDURE — 1101F PR PT FALLS ASSESS DOC 0-1 FALLS W/OUT INJ PAST YR: ICD-10-PCS | Mod: HCNC,CPTII,S$GLB, | Performed by: PHYSICIAN ASSISTANT

## 2021-12-27 PROCEDURE — 1160F RVW MEDS BY RX/DR IN RCRD: CPT | Mod: HCNC,CPTII,S$GLB, | Performed by: PHYSICIAN ASSISTANT

## 2021-12-27 PROCEDURE — 99213 OFFICE O/P EST LOW 20 MIN: CPT | Mod: HCNC,S$GLB,, | Performed by: PHYSICIAN ASSISTANT

## 2021-12-27 PROCEDURE — 3078F DIAST BP <80 MM HG: CPT | Mod: HCNC,CPTII,S$GLB, | Performed by: PHYSICIAN ASSISTANT

## 2021-12-27 PROCEDURE — 3288F PR FALLS RISK ASSESSMENT DOCUMENTED: ICD-10-PCS | Mod: HCNC,CPTII,S$GLB, | Performed by: PHYSICIAN ASSISTANT

## 2021-12-27 PROCEDURE — 99213 PR OFFICE/OUTPT VISIT, EST, LEVL III, 20-29 MIN: ICD-10-PCS | Mod: HCNC,S$GLB,, | Performed by: PHYSICIAN ASSISTANT

## 2021-12-27 PROCEDURE — 1126F AMNT PAIN NOTED NONE PRSNT: CPT | Mod: HCNC,CPTII,S$GLB, | Performed by: PHYSICIAN ASSISTANT

## 2021-12-27 PROCEDURE — 99999 PR PBB SHADOW E&M-EST. PATIENT-LVL III: CPT | Mod: PBBFAC,HCNC,, | Performed by: PHYSICIAN ASSISTANT

## 2021-12-27 PROCEDURE — 99999 PR PBB SHADOW E&M-EST. PATIENT-LVL III: ICD-10-PCS | Mod: PBBFAC,HCNC,, | Performed by: PHYSICIAN ASSISTANT

## 2021-12-27 PROCEDURE — 3074F PR MOST RECENT SYSTOLIC BLOOD PRESSURE < 130 MM HG: ICD-10-PCS | Mod: HCNC,CPTII,S$GLB, | Performed by: PHYSICIAN ASSISTANT

## 2021-12-27 PROCEDURE — 1111F DSCHRG MED/CURRENT MED MERGE: CPT | Mod: HCNC,CPTII,S$GLB, | Performed by: PHYSICIAN ASSISTANT

## 2021-12-27 PROCEDURE — 1160F PR REVIEW ALL MEDS BY PRESCRIBER/CLIN PHARMACIST DOCUMENTED: ICD-10-PCS | Mod: HCNC,CPTII,S$GLB, | Performed by: PHYSICIAN ASSISTANT

## 2021-12-27 PROCEDURE — 1111F PR DISCHARGE MEDS RECONCILED W/ CURRENT OUTPATIENT MED LIST: ICD-10-PCS | Mod: HCNC,CPTII,S$GLB, | Performed by: PHYSICIAN ASSISTANT

## 2021-12-27 PROCEDURE — 3078F PR MOST RECENT DIASTOLIC BLOOD PRESSURE < 80 MM HG: ICD-10-PCS | Mod: HCNC,CPTII,S$GLB, | Performed by: PHYSICIAN ASSISTANT

## 2021-12-27 PROCEDURE — 1101F PT FALLS ASSESS-DOCD LE1/YR: CPT | Mod: HCNC,CPTII,S$GLB, | Performed by: PHYSICIAN ASSISTANT

## 2021-12-27 RX ORDER — CARVEDILOL 6.25 MG/1
6.25 TABLET ORAL 2 TIMES DAILY
Qty: 90 TABLET | Refills: 3 | Status: SHIPPED | OUTPATIENT
Start: 2021-12-27 | End: 2022-04-11

## 2021-12-27 RX ORDER — TALC
POWDER (GRAM) TOPICAL ONCE
COMMUNITY
End: 2022-02-08

## 2021-12-27 RX ORDER — PANTOPRAZOLE SODIUM 40 MG/1
40 TABLET, DELAYED RELEASE ORAL DAILY
COMMUNITY
End: 2022-02-08

## 2021-12-27 RX ORDER — ATORVASTATIN CALCIUM 40 MG/1
40 TABLET, FILM COATED ORAL NIGHTLY
COMMUNITY
Start: 2021-12-06 | End: 2022-04-11

## 2021-12-27 NOTE — PROGRESS NOTES
Subjective:       Patient ID: Alisia Gusman is a 76 y.o. female.    Chief Complaint: Follow-up    HPI     Established pt of Selena Montemayor MD     Here for follow up.     Seen by me last week for hospital discharge after admission for colonic diverticulitis abscess s/p IR drainage and completion of 2 week course Augmentin.     Here to follow up meds, as at last visit dtr with concerns of several meds needing a refill but was unsure of names.     All home meds/bottles brought in by pt, med reconciliation performed    Pt otherwise doing well. Has f/u with ID, NSGY scheduled. c-scope scheduled for 1/28. Repeat CT scan (ordered at discharge) and GI appt not scheduled yet.       Past Medical History:   Diagnosis Date    Acute pulmonary embolism without acute cor pulmonale 11/15/2021    Adult bronchiectasis 7/26/2017    Allergy     Anemia     Arthritis     Asthma     Breast cancer 1993    left w/ radiation     Breast cancer 2020    IDC, left mastectomy    Cancer 1993    L eft breast    Cataract     Chronic cervical radiculopathy 9/20/2012    Diabetes mellitus     Diabetes mellitus type II     Diabetes with neurologic complications     Diverticulosis     Dry eyes     Dysphagia     after knee surgery June 2014    GERD (gastroesophageal reflux disease)     Hyperlipidemia     Hypertension     Neuropathy     feet    Scalp tenderness     Sepsis 12/12/2021    Shortness of breath     Ulcer      Social History     Tobacco Use    Smoking status: Never Smoker    Smokeless tobacco: Never Used   Substance Use Topics    Alcohol use: No    Drug use: No     Review of patient's allergies indicates:   Allergen Reactions    Grass pollen-june grass standard Other (See Comments)     Causes sinus symptoms like coughing    Losartan      cough    Nubain [nalbuphine] Other (See Comments)     Other reaction(s): Hypotension    Sinus & allergy [chlorpheniramine-phenylephrine]          Review of Systems   Constitutional:  "Negative for chills, fever and unexpected weight change.   Respiratory: Negative for cough and shortness of breath.    Cardiovascular: Negative for chest pain and leg swelling.   Gastrointestinal: Negative for abdominal pain, blood in stool, diarrhea, nausea and vomiting.   Integumentary:  Negative for rash.   Neurological: Negative for weakness and headaches.         Objective: /60 (BP Location: Right arm, Patient Position: Sitting, BP Method: Medium (Manual))   Pulse 103   Ht 5' 4" (1.626 m)   Wt 74.2 kg (163 lb 9.3 oz)   LMP  (LMP Unknown) Comment: Partial  SpO2 98%   BMI 28.08 kg/m²         Physical Exam  Vitals reviewed.   Constitutional:       General: She is not in acute distress.     Appearance: She is well-developed.   HENT:      Head: Normocephalic and atraumatic.   Cardiovascular:      Rate and Rhythm: Normal rate and regular rhythm.      Heart sounds: No murmur heard.      Pulmonary:      Effort: Pulmonary effort is normal.      Breath sounds: Normal breath sounds. No wheezing or rales.   Abdominal:      General: Bowel sounds are normal. There is no distension.      Palpations: Abdomen is soft. There is no mass.      Tenderness: There is no abdominal tenderness. There is no guarding.   Musculoskeletal:      Right lower leg: No edema.      Left lower leg: No edema.   Skin:     General: Skin is warm and dry.      Findings: No rash.   Neurological:      Mental Status: She is alert.         Assessment:       Problem List Items Addressed This Visit    None     Visit Diagnoses     Follow-up exam    -  Primary          Plan:           Alisia was seen today for follow-up.    Diagnoses and all orders for this visit:    Follow-up exam  All home meds/bottles brought in by pt, med reconciliation performed  Meds/indications reviewed with dtr and pt.   Other orders  -     carvediloL (COREG) 6.25 MG tablet; Take 1 tablet (6.25 mg total) by mouth 2 (two) times daily.      Future Appointments   Date Time " Provider Department Center   1/4/2022 11:00 AM Moiz Hutchison DO [facility] NEUROSC [address]   1/6/2022  2:30 PM PFIZER VACCINE, PRIMARY CARE AND WELLNESS [facility] IM [address]   1/6/2022  3:30 PM Gadiel Okeefe MD [facility] ID [address]   2/10/2022 11:15 AM Emily Dao DPM [facility]OD [facility]   4/19/2022  9:30 AM Lindy Nelson MD [facility]ODIA [facility]       F/u in with PCP in 1 month or sooner if needed.     Ginette Sheth PA-C

## 2021-12-29 ENCOUNTER — PATIENT OUTREACH (OUTPATIENT)
Dept: ADMINISTRATIVE | Facility: OTHER | Age: 76
End: 2021-12-29
Payer: MEDICARE

## 2022-01-03 ENCOUNTER — TELEPHONE (OUTPATIENT)
Dept: INTERNAL MEDICINE | Facility: CLINIC | Age: 77
End: 2022-01-03
Payer: MEDICARE

## 2022-01-03 NOTE — TELEPHONE ENCOUNTER
----- Message from Ginette Sheth PA-C sent at 1/2/2022  4:28 PM CST -----  Pt needs GI clinic follow up for hospital discharge and please help schedule CT scan (ordered by Dr. Gaines)

## 2022-01-04 ENCOUNTER — TELEPHONE (OUTPATIENT)
Dept: NEUROSURGERY | Facility: CLINIC | Age: 77
End: 2022-01-04
Payer: MEDICARE

## 2022-01-04 NOTE — TELEPHONE ENCOUNTER
LM for pt, she can be seen this afternoon in person or virtually. I asked pt to call back with her preference.     ----- Message from Veronica Ward sent at 1/4/2022 10:52 AM CST -----  Contact: patient  Patient requesting callback to see if she can be seen later today due to transportation.     Patient @[phone]

## 2022-01-06 ENCOUNTER — OFFICE VISIT (OUTPATIENT)
Dept: INFECTIOUS DISEASES | Facility: CLINIC | Age: 77
End: 2022-01-06
Payer: MEDICARE

## 2022-01-06 VITALS
BODY MASS INDEX: 29.13 KG/M2 | DIASTOLIC BLOOD PRESSURE: 87 MMHG | HEIGHT: 62 IN | SYSTOLIC BLOOD PRESSURE: 168 MMHG | HEART RATE: 92 BPM | WEIGHT: 158.31 LBS

## 2022-01-06 DIAGNOSIS — K57.92 DIVERTICULITIS: Primary | ICD-10-CM

## 2022-01-06 PROCEDURE — 1126F PR PAIN SEVERITY QUANTIFIED, NO PAIN PRESENT: ICD-10-PCS | Mod: HCNC,CPTII,S$GLB, | Performed by: INTERNAL MEDICINE

## 2022-01-06 PROCEDURE — 3079F PR MOST RECENT DIASTOLIC BLOOD PRESSURE 80-89 MM HG: ICD-10-PCS | Mod: HCNC,CPTII,S$GLB, | Performed by: INTERNAL MEDICINE

## 2022-01-06 PROCEDURE — 3077F SYST BP >= 140 MM HG: CPT | Mod: HCNC,CPTII,S$GLB, | Performed by: INTERNAL MEDICINE

## 2022-01-06 PROCEDURE — 99999 PR PBB SHADOW E&M-EST. PATIENT-LVL III: CPT | Mod: PBBFAC,HCNC,, | Performed by: INTERNAL MEDICINE

## 2022-01-06 PROCEDURE — 99999 PR PBB SHADOW E&M-EST. PATIENT-LVL III: ICD-10-PCS | Mod: PBBFAC,HCNC,, | Performed by: INTERNAL MEDICINE

## 2022-01-06 PROCEDURE — 1100F PR PT FALLS ASSESS DOC 2+ FALLS/FALL W/INJURY/YR: ICD-10-PCS | Mod: HCNC,CPTII,S$GLB, | Performed by: INTERNAL MEDICINE

## 2022-01-06 PROCEDURE — 3288F FALL RISK ASSESSMENT DOCD: CPT | Mod: HCNC,CPTII,S$GLB, | Performed by: INTERNAL MEDICINE

## 2022-01-06 PROCEDURE — 3288F PR FALLS RISK ASSESSMENT DOCUMENTED: ICD-10-PCS | Mod: HCNC,CPTII,S$GLB, | Performed by: INTERNAL MEDICINE

## 2022-01-06 PROCEDURE — 1111F DSCHRG MED/CURRENT MED MERGE: CPT | Mod: HCNC,CPTII,S$GLB, | Performed by: INTERNAL MEDICINE

## 2022-01-06 PROCEDURE — 99214 OFFICE O/P EST MOD 30 MIN: CPT | Mod: HCNC,S$GLB,, | Performed by: INTERNAL MEDICINE

## 2022-01-06 PROCEDURE — 3077F PR MOST RECENT SYSTOLIC BLOOD PRESSURE >= 140 MM HG: ICD-10-PCS | Mod: HCNC,CPTII,S$GLB, | Performed by: INTERNAL MEDICINE

## 2022-01-06 PROCEDURE — 1159F MED LIST DOCD IN RCRD: CPT | Mod: HCNC,CPTII,S$GLB, | Performed by: INTERNAL MEDICINE

## 2022-01-06 PROCEDURE — 1100F PTFALLS ASSESS-DOCD GE2>/YR: CPT | Mod: HCNC,CPTII,S$GLB, | Performed by: INTERNAL MEDICINE

## 2022-01-06 PROCEDURE — 1159F PR MEDICATION LIST DOCUMENTED IN MEDICAL RECORD: ICD-10-PCS | Mod: HCNC,CPTII,S$GLB, | Performed by: INTERNAL MEDICINE

## 2022-01-06 PROCEDURE — 1160F RVW MEDS BY RX/DR IN RCRD: CPT | Mod: HCNC,CPTII,S$GLB, | Performed by: INTERNAL MEDICINE

## 2022-01-06 PROCEDURE — 3079F DIAST BP 80-89 MM HG: CPT | Mod: HCNC,CPTII,S$GLB, | Performed by: INTERNAL MEDICINE

## 2022-01-06 PROCEDURE — 99214 PR OFFICE/OUTPT VISIT, EST, LEVL IV, 30-39 MIN: ICD-10-PCS | Mod: HCNC,S$GLB,, | Performed by: INTERNAL MEDICINE

## 2022-01-06 PROCEDURE — 1126F AMNT PAIN NOTED NONE PRSNT: CPT | Mod: HCNC,CPTII,S$GLB, | Performed by: INTERNAL MEDICINE

## 2022-01-06 PROCEDURE — 1111F PR DISCHARGE MEDS RECONCILED W/ CURRENT OUTPATIENT MED LIST: ICD-10-PCS | Mod: HCNC,CPTII,S$GLB, | Performed by: INTERNAL MEDICINE

## 2022-01-06 PROCEDURE — 1160F PR REVIEW ALL MEDS BY PRESCRIBER/CLIN PHARMACIST DOCUMENTED: ICD-10-PCS | Mod: HCNC,CPTII,S$GLB, | Performed by: INTERNAL MEDICINE

## 2022-01-06 RX ORDER — FLUCONAZOLE 200 MG/1
400 TABLET ORAL DAILY
Qty: 28 TABLET | Refills: 0 | Status: SHIPPED | OUTPATIENT
Start: 2022-01-06 | End: 2022-01-20

## 2022-01-06 RX ORDER — AMOXICILLIN AND CLAVULANATE POTASSIUM 875; 125 MG/1; MG/1
1 TABLET, FILM COATED ORAL 2 TIMES DAILY
Qty: 28 TABLET | Refills: 0 | Status: SHIPPED | OUTPATIENT
Start: 2022-01-06 | End: 2022-01-20

## 2022-01-06 NOTE — PROGRESS NOTES
Subjective:      Patient ID: Alisia Gusman is a 76 y.o. female.    Chief Complaint:Follow-up      History of Present Illness    76 year old female with a history of DM type 2, brain tumor who was admitted to the hospital in November of 2021 with polymicrobial bacteremia (E coli, Eggetherla lenta, Clostridioides clostridiformes and Pseudomonas).  The source of his bacteremia was determined to be due to diverticulitis. She was treated with IV meropenem.  She was re-admitted to the hospital in December with altered mental status presumed secondary to accidental drug overdoes.  Imaging showed abscess associated with diverticulitis.  She was initially on vancomycin and meropenem.  She underwent IR aspiration of the abscess on December 12 and cultures were positive for E coli, Proteus mirabilis, Bacteroides fragilis and Candida albicans.    She was ultimately discharged on augmentin and fluconazole.  She is here today for follow up.    Review of Systems   Constitutional: Negative for chills, decreased appetite, fever, malaise/fatigue, night sweats, weight gain and weight loss.   HENT: Negative for congestion, ear pain, hearing loss, hoarse voice, sore throat and tinnitus.    Eyes: Negative for blurred vision, redness and visual disturbance.   Cardiovascular: Negative for chest pain, leg swelling and palpitations.   Respiratory: Negative for cough, hemoptysis, shortness of breath, sputum production and wheezing.    Hematologic/Lymphatic: Negative for adenopathy. Does not bruise/bleed easily.   Skin: Negative for dry skin, itching, rash and suspicious lesions.   Musculoskeletal: Negative for back pain, joint pain, myalgias and neck pain.   Gastrointestinal: Negative for abdominal pain, constipation, diarrhea, heartburn, nausea and vomiting.   Genitourinary: Negative for dysuria, flank pain, frequency, hematuria, hesitancy and urgency.   Neurological: Negative for dizziness, headaches, numbness, paresthesias and weakness.    Psychiatric/Behavioral: Negative for depression and memory loss. The patient does not have insomnia and is not nervous/anxious.      Objective:   Physical Exam  Vitals and nursing note reviewed.   Constitutional:       General: She is not in acute distress.     Appearance: She is well-developed and well-nourished. She is not diaphoretic.   HENT:      Head: Normocephalic and atraumatic.      Right Ear: External ear normal.      Left Ear: External ear normal.      Nose: Nose normal.      Mouth/Throat:      Mouth: Oropharynx is clear and moist.      Pharynx: No oropharyngeal exudate.   Eyes:      General: No scleral icterus.        Right eye: No discharge.         Left eye: No discharge.      Extraocular Movements: EOM normal.      Conjunctiva/sclera: Conjunctivae normal.      Pupils: Pupils are equal, round, and reactive to light.   Neck:      Thyroid: No thyromegaly.      Vascular: No JVD.      Trachea: No tracheal deviation.   Cardiovascular:      Rate and Rhythm: Normal rate and regular rhythm.      Pulses: Intact distal pulses.      Heart sounds: No murmur heard.  No friction rub. No gallop.    Pulmonary:      Effort: Pulmonary effort is normal. No respiratory distress.      Breath sounds: Normal breath sounds. No stridor. No wheezing or rales.   Chest:      Chest wall: No tenderness.   Abdominal:      General: Bowel sounds are normal. There is no distension.      Palpations: Abdomen is soft. There is no mass.      Tenderness: There is no abdominal tenderness. There is no guarding or rebound.   Musculoskeletal:         General: No tenderness or edema. Normal range of motion.      Cervical back: Normal range of motion and neck supple.   Lymphadenopathy:      Cervical: No cervical adenopathy.   Skin:     General: Skin is warm.      Coloration: Skin is not pale.      Findings: No erythema or rash.   Neurological:      Mental Status: She is alert and oriented to person, place, and time.      Cranial Nerves: No  cranial nerve deficit.      Motor: No abnormal muscle tone.      Coordination: Coordination normal.      Deep Tendon Reflexes: Reflexes normal.   Psychiatric:         Mood and Affect: Mood and affect normal.         Behavior: Behavior normal.         Thought Content: Thought content normal.         Judgment: Judgment normal.       Assessment:       1. Diverticulitis        76 year old female with diverticulitis and associated abscess.  She is here today for follow up.  She is feeling well.  Symptoms have resolved.  She has had about 3.5 weeks of antibiotics from the time of her drainage procedure.  I will extend her oral antibiotics for 2 additional weeks.    Plan:       Diverticulitis  -     amoxicillin-clavulanate 875-125mg (AUGMENTIN) 875-125 mg per tablet; Take 1 tablet by mouth 2 (two) times daily. for 14 days  Dispense: 28 tablet; Refill: 0  -     fluconazole (DIFLUCAN) 200 MG Tab; Take 2 tablets (400 mg total) by mouth once daily. for 14 days  Dispense: 28 tablet; Refill: 0

## 2022-01-11 ENCOUNTER — PATIENT MESSAGE (OUTPATIENT)
Dept: INTERNAL MEDICINE | Facility: CLINIC | Age: 77
End: 2022-01-11
Payer: MEDICARE

## 2022-01-14 RX ORDER — INSULIN GLARGINE 100 [IU]/ML
28 INJECTION, SOLUTION SUBCUTANEOUS DAILY
Qty: 9 ML | Refills: 3 | Status: SHIPPED | OUTPATIENT
Start: 2022-01-14 | End: 2022-01-21 | Stop reason: SDUPTHER

## 2022-01-17 LAB — FUNGUS SPEC CULT: ABNORMAL

## 2022-01-20 ENCOUNTER — TELEPHONE (OUTPATIENT)
Dept: INTERNAL MEDICINE | Facility: CLINIC | Age: 77
End: 2022-01-20
Payer: MEDICARE

## 2022-01-20 NOTE — TELEPHONE ENCOUNTER
----- Message from Mari Chirinos sent at 1/19/2022  1:46 PM CST -----  Contact: Susanne @[facility] [phone]  Patient would like to get medical advice.    Would you like a call back, or a response through your [facility] portal?:   call back if needed     Pharmacy name and phone # (copy from chart):      [facility] [place] [place] - [address] [place]) [place] [address]  Phone: [phone] Fax: [phone]    Comments:  Calling to speak with  the nurse regarding a new rx for insulin vials. Pharmacy states the pt was unaware of  insulin (LANTUS SOLOSTAR U-100 INSULIN) glargine 100 units/mL (3mL) SubQ pen. Pharmacy would like a supply sent to local pharmacy.

## 2022-01-21 RX ORDER — INSULIN GLARGINE 100 [IU]/ML
28 INJECTION, SOLUTION SUBCUTANEOUS DAILY
Qty: 9 ML | Refills: 3 | Status: SHIPPED | OUTPATIENT
Start: 2022-01-21 | End: 2022-04-19 | Stop reason: SDUPTHER

## 2022-01-28 ENCOUNTER — PATIENT MESSAGE (OUTPATIENT)
Dept: NEUROSURGERY | Facility: CLINIC | Age: 77
End: 2022-01-28
Payer: MEDICARE

## 2022-01-28 DIAGNOSIS — K57.92 DIVERTICULITIS: ICD-10-CM

## 2022-01-28 RX ORDER — FLUCONAZOLE 200 MG/1
400 TABLET ORAL DAILY
Qty: 28 TABLET | Refills: 0 | OUTPATIENT
Start: 2022-01-28 | End: 2022-02-11

## 2022-01-28 RX ORDER — AMOXICILLIN AND CLAVULANATE POTASSIUM 875; 125 MG/1; MG/1
1 TABLET, FILM COATED ORAL 2 TIMES DAILY
Qty: 28 TABLET | Refills: 0 | OUTPATIENT
Start: 2022-01-28 | End: 2022-02-11

## 2022-01-30 LAB
ACID FAST MOD KINY STN SPEC: NORMAL
MYCOBACTERIUM SPEC QL CULT: NORMAL

## 2022-02-01 ENCOUNTER — LAB VISIT (OUTPATIENT)
Dept: LAB | Facility: HOSPITAL | Age: 77
End: 2022-02-01
Attending: NEUROLOGICAL SURGERY
Payer: MEDICARE

## 2022-02-01 ENCOUNTER — OFFICE VISIT (OUTPATIENT)
Dept: NEUROSURGERY | Facility: CLINIC | Age: 77
End: 2022-02-01
Payer: MEDICARE

## 2022-02-01 ENCOUNTER — IMMUNIZATION (OUTPATIENT)
Dept: INTERNAL MEDICINE | Facility: CLINIC | Age: 77
End: 2022-02-01
Payer: MEDICARE

## 2022-02-01 ENCOUNTER — PATIENT MESSAGE (OUTPATIENT)
Dept: INTERNAL MEDICINE | Facility: CLINIC | Age: 77
End: 2022-02-01

## 2022-02-01 VITALS
DIASTOLIC BLOOD PRESSURE: 81 MMHG | BODY MASS INDEX: 28.93 KG/M2 | HEIGHT: 62 IN | WEIGHT: 157.19 LBS | HEART RATE: 97 BPM | SYSTOLIC BLOOD PRESSURE: 137 MMHG

## 2022-02-01 DIAGNOSIS — N30.00 ACUTE CYSTITIS WITHOUT HEMATURIA: ICD-10-CM

## 2022-02-01 DIAGNOSIS — Z23 NEED FOR VACCINATION: Primary | ICD-10-CM

## 2022-02-01 DIAGNOSIS — Z98.890 S/P CRANIOTOMY: ICD-10-CM

## 2022-02-01 DIAGNOSIS — D32.9 MENINGIOMA: ICD-10-CM

## 2022-02-01 DIAGNOSIS — Z86.711 HISTORY OF PULMONARY EMBOLISM: Primary | ICD-10-CM

## 2022-02-01 DIAGNOSIS — R41.0 CONFUSION: ICD-10-CM

## 2022-02-01 DIAGNOSIS — D32.9 MENINGIOMA: Primary | ICD-10-CM

## 2022-02-01 LAB
ANION GAP SERPL CALC-SCNC: 10 MMOL/L (ref 8–16)
BASOPHILS # BLD AUTO: 0.05 K/UL (ref 0–0.2)
BASOPHILS NFR BLD: 0.8 % (ref 0–1.9)
BUN SERPL-MCNC: 8 MG/DL (ref 8–23)
CALCIUM SERPL-MCNC: 10.6 MG/DL (ref 8.7–10.5)
CHLORIDE SERPL-SCNC: 104 MMOL/L (ref 95–110)
CO2 SERPL-SCNC: 28 MMOL/L (ref 23–29)
CREAT SERPL-MCNC: 0.7 MG/DL (ref 0.5–1.4)
CREAT SERPL-MCNC: 0.7 MG/DL (ref 0.5–1.4)
DIFFERENTIAL METHOD: ABNORMAL
EOSINOPHIL # BLD AUTO: 0 K/UL (ref 0–0.5)
EOSINOPHIL NFR BLD: 0.7 % (ref 0–8)
ERYTHROCYTE [DISTWIDTH] IN BLOOD BY AUTOMATED COUNT: 16 % (ref 11.5–14.5)
EST. GFR  (AFRICAN AMERICAN): >60 ML/MIN/1.73 M^2
EST. GFR  (AFRICAN AMERICAN): >60 ML/MIN/1.73 M^2
EST. GFR  (NON AFRICAN AMERICAN): >60 ML/MIN/1.73 M^2
EST. GFR  (NON AFRICAN AMERICAN): >60 ML/MIN/1.73 M^2
GLUCOSE SERPL-MCNC: 186 MG/DL (ref 70–110)
HCT VFR BLD AUTO: 40.9 % (ref 37–48.5)
HGB BLD-MCNC: 12.7 G/DL (ref 12–16)
IMM GRANULOCYTES # BLD AUTO: 0.03 K/UL (ref 0–0.04)
IMM GRANULOCYTES NFR BLD AUTO: 0.5 % (ref 0–0.5)
LYMPHOCYTES # BLD AUTO: 2.3 K/UL (ref 1–4.8)
LYMPHOCYTES NFR BLD: 37.6 % (ref 18–48)
MCH RBC QN AUTO: 28 PG (ref 27–31)
MCHC RBC AUTO-ENTMCNC: 31.1 G/DL (ref 32–36)
MCV RBC AUTO: 90 FL (ref 82–98)
MONOCYTES # BLD AUTO: 0.5 K/UL (ref 0.3–1)
MONOCYTES NFR BLD: 9 % (ref 4–15)
NEUTROPHILS # BLD AUTO: 3.1 K/UL (ref 1.8–7.7)
NEUTROPHILS NFR BLD: 51.4 % (ref 38–73)
NRBC BLD-RTO: 0 /100 WBC
PLATELET # BLD AUTO: 191 K/UL (ref 150–450)
PMV BLD AUTO: 10.8 FL (ref 9.2–12.9)
POTASSIUM SERPL-SCNC: 3.9 MMOL/L (ref 3.5–5.1)
RBC # BLD AUTO: 4.54 M/UL (ref 4–5.4)
SODIUM SERPL-SCNC: 142 MMOL/L (ref 136–145)
WBC # BLD AUTO: 5.99 K/UL (ref 3.9–12.7)

## 2022-02-01 PROCEDURE — 1125F AMNT PAIN NOTED PAIN PRSNT: CPT | Mod: HCNC,CPTII,S$GLB, | Performed by: NEUROLOGICAL SURGERY

## 2022-02-01 PROCEDURE — 99999 PR PBB SHADOW E&M-EST. PATIENT-LVL IV: ICD-10-PCS | Mod: PBBFAC,HCNC,, | Performed by: NEUROLOGICAL SURGERY

## 2022-02-01 PROCEDURE — 3079F PR MOST RECENT DIASTOLIC BLOOD PRESSURE 80-89 MM HG: ICD-10-PCS | Mod: HCNC,CPTII,S$GLB, | Performed by: NEUROLOGICAL SURGERY

## 2022-02-01 PROCEDURE — 3288F PR FALLS RISK ASSESSMENT DOCUMENTED: ICD-10-PCS | Mod: HCNC,CPTII,S$GLB, | Performed by: NEUROLOGICAL SURGERY

## 2022-02-01 PROCEDURE — 1101F PR PT FALLS ASSESS DOC 0-1 FALLS W/OUT INJ PAST YR: ICD-10-PCS | Mod: HCNC,CPTII,S$GLB, | Performed by: NEUROLOGICAL SURGERY

## 2022-02-01 PROCEDURE — 1160F PR REVIEW ALL MEDS BY PRESCRIBER/CLIN PHARMACIST DOCUMENTED: ICD-10-PCS | Mod: HCNC,CPTII,S$GLB, | Performed by: NEUROLOGICAL SURGERY

## 2022-02-01 PROCEDURE — 85025 COMPLETE CBC W/AUTO DIFF WBC: CPT | Mod: HCNC | Performed by: NEUROLOGICAL SURGERY

## 2022-02-01 PROCEDURE — 36415 COLL VENOUS BLD VENIPUNCTURE: CPT | Mod: HCNC | Performed by: NEUROLOGICAL SURGERY

## 2022-02-01 PROCEDURE — 91300 COVID-19, MRNA, LNP-S, PF, 30 MCG/0.3 ML DOSE VACCINE: CPT | Mod: HCNC,PBBFAC | Performed by: INTERNAL MEDICINE

## 2022-02-01 PROCEDURE — 1159F MED LIST DOCD IN RCRD: CPT | Mod: HCNC,CPTII,S$GLB, | Performed by: NEUROLOGICAL SURGERY

## 2022-02-01 PROCEDURE — 99024 POSTOP FOLLOW-UP VISIT: CPT | Mod: HCNC,S$GLB,, | Performed by: NEUROLOGICAL SURGERY

## 2022-02-01 PROCEDURE — 3288F FALL RISK ASSESSMENT DOCD: CPT | Mod: HCNC,CPTII,S$GLB, | Performed by: NEUROLOGICAL SURGERY

## 2022-02-01 PROCEDURE — 80048 BASIC METABOLIC PNL TOTAL CA: CPT | Mod: HCNC | Performed by: NEUROLOGICAL SURGERY

## 2022-02-01 PROCEDURE — 3075F SYST BP GE 130 - 139MM HG: CPT | Mod: HCNC,CPTII,S$GLB, | Performed by: NEUROLOGICAL SURGERY

## 2022-02-01 PROCEDURE — 1125F PR PAIN SEVERITY QUANTIFIED, PAIN PRESENT: ICD-10-PCS | Mod: HCNC,CPTII,S$GLB, | Performed by: NEUROLOGICAL SURGERY

## 2022-02-01 PROCEDURE — 99999 PR PBB SHADOW E&M-EST. PATIENT-LVL IV: CPT | Mod: PBBFAC,HCNC,, | Performed by: NEUROLOGICAL SURGERY

## 2022-02-01 PROCEDURE — 3075F PR MOST RECENT SYSTOLIC BLOOD PRESS GE 130-139MM HG: ICD-10-PCS | Mod: HCNC,CPTII,S$GLB, | Performed by: NEUROLOGICAL SURGERY

## 2022-02-01 PROCEDURE — 99024 PR POST-OP FOLLOW-UP VISIT: ICD-10-PCS | Mod: HCNC,S$GLB,, | Performed by: NEUROLOGICAL SURGERY

## 2022-02-01 PROCEDURE — 1159F PR MEDICATION LIST DOCUMENTED IN MEDICAL RECORD: ICD-10-PCS | Mod: HCNC,CPTII,S$GLB, | Performed by: NEUROLOGICAL SURGERY

## 2022-02-01 PROCEDURE — 1160F RVW MEDS BY RX/DR IN RCRD: CPT | Mod: HCNC,CPTII,S$GLB, | Performed by: NEUROLOGICAL SURGERY

## 2022-02-01 PROCEDURE — 3079F DIAST BP 80-89 MM HG: CPT | Mod: HCNC,CPTII,S$GLB, | Performed by: NEUROLOGICAL SURGERY

## 2022-02-01 PROCEDURE — 1101F PT FALLS ASSESS-DOCD LE1/YR: CPT | Mod: HCNC,CPTII,S$GLB, | Performed by: NEUROLOGICAL SURGERY

## 2022-02-01 NOTE — PROGRESS NOTES
CHIEF COMPLAINT:  4-6 week follow-up    HPI:    Alisia Gusman is a 76 y.o.-year-old female who presents today for post-operative follow-up s/p right frontal crani for gross total resection of meningioma on 11/15/21. Doing well from neuro standpoint.  Remains mildly confused but no focal deficits.  P/w daughter.  Wound has healed well.  No HA, seizures, or sensory motor changes. She developed DVT/PE postop and was placed on eliquis.      ROS:  As stated in the above HPI    PE:  Vitals:    02/01/22 1031   BP: 137/81   Pulse: 97       AAOX3  NAD  CN 2-12 intact     Strength:      Deltoids Biceps Triceps Wrist Ext. Wrist Flex. Hand    RUE 5 5 5 5 5 5   LUE 5 5 5 5 5 5    Hip Flex. Knee Flex. Knee Ext. Dorsi Flex Plantar Flex EHL   RLE 5 5 5 5 5 5   LLE 5 5 5 5 5 5     Sensation:  Intact to light touch (All 4 extremities)    Gait:  normal    Right frontal incision:  Healed well, hair grown back, no signs of infx   Cranial plate palpable beneath skin but no breakdown or tenderness    IMAGING:  All imaging reviewed by me.    Postop MRI - GTR    ASSESSMENT:   Problem List Items Addressed This Visit        Neuro    S/P craniotomy    Relevant Orders    MRI Brain W WO Contrast       Renal/    Urinary tract infection without hematuria    Relevant Orders    Creatinine, serum (Completed)    BASIC METABOLIC PANEL (Completed)    CBC W/ AUTO DIFFERENTIAL (Completed)    Urinalysis, Reflex to Urine Culture Urine, Clean Catch       Oncology    Meningioma - Primary    Relevant Orders    MRI Brain W WO Contrast    Creatinine, serum (Completed)    BASIC METABOLIC PANEL (Completed)    CBC W/ AUTO DIFFERENTIAL (Completed)    Urinalysis, Reflex to Urine Culture Urine, Clean Catch      Other Visit Diagnoses     Confusion              S/p right frontal crani for gross total resection of meningioma (final path WHO grade I).  Doing well with some mild baseline confusion.  Wound healed well.  Daughter concerned she may have recurrent  UTI.    PLAN:   - RTC in 3m with [facility]  - Order CBC, BMP, UA to r/o UTI  - F/u with PCP re: duration of eliquis for DVT/PE

## 2022-02-02 ENCOUNTER — TELEPHONE (OUTPATIENT)
Dept: PRIMARY CARE CLINIC | Facility: CLINIC | Age: 77
End: 2022-02-02
Payer: MEDICARE

## 2022-02-02 NOTE — TELEPHONE ENCOUNTER
"Sea, can you schedule pt to see in the next few weeks? Ginette, sometimes I'll due a D dimer or repeat scan before I stop it. But I"ll see her to determine if we need to continue. Thanks for letting me know!  "

## 2022-02-03 ENCOUNTER — TELEPHONE (OUTPATIENT)
Dept: PRIMARY CARE CLINIC | Facility: CLINIC | Age: 77
End: 2022-02-03
Payer: MEDICARE

## 2022-02-03 RX ORDER — FUROSEMIDE 20 MG/1
20 TABLET ORAL DAILY PRN
Qty: 30 TABLET | Refills: 0 | Status: SHIPPED | OUTPATIENT
Start: 2022-02-03 | End: 2022-05-11 | Stop reason: SDUPTHER

## 2022-02-03 NOTE — TELEPHONE ENCOUNTER
Spoke with Ms. Crump's daughter. She is concerned about mother's health. Stated that mother has been having episodes of hallucination, seeing people that are not in the home.   Saw Neuro last week, they ran labs to rule out uti, noted mild confusion.  Appointment made for 2/8. Will call daughter to get the aunt's number that lives next to her mom.

## 2022-02-04 ENCOUNTER — PATIENT MESSAGE (OUTPATIENT)
Dept: ENDOCRINOLOGY | Facility: CLINIC | Age: 77
End: 2022-02-04
Payer: MEDICARE

## 2022-02-04 ENCOUNTER — PATIENT MESSAGE (OUTPATIENT)
Dept: PRIMARY CARE CLINIC | Facility: CLINIC | Age: 77
End: 2022-02-04
Payer: MEDICARE

## 2022-02-04 NOTE — TELEPHONE ENCOUNTER
Spoke to the daughter and she feels like her mom is a danger to herself and to others, warm tranfer to Dr. Montemayor on the phone
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
## 2022-02-07 NOTE — TELEPHONE ENCOUNTER
Spoke to daughter, states mom has been peaceful since we spoke, still thinks there is someone in the house but has not got upset about that matter, confirmed apt for tomorrow.

## 2022-02-08 ENCOUNTER — LAB VISIT (OUTPATIENT)
Dept: LAB | Facility: HOSPITAL | Age: 77
End: 2022-02-08
Attending: INTERNAL MEDICINE
Payer: MEDICARE

## 2022-02-08 ENCOUNTER — OFFICE VISIT (OUTPATIENT)
Dept: PRIMARY CARE CLINIC | Facility: CLINIC | Age: 77
End: 2022-02-08
Payer: MEDICARE

## 2022-02-08 VITALS
BODY MASS INDEX: 28.03 KG/M2 | WEIGHT: 152.31 LBS | HEART RATE: 89 BPM | OXYGEN SATURATION: 99 % | DIASTOLIC BLOOD PRESSURE: 70 MMHG | TEMPERATURE: 99 F | RESPIRATION RATE: 18 BRPM | SYSTOLIC BLOOD PRESSURE: 138 MMHG | HEIGHT: 62 IN

## 2022-02-08 DIAGNOSIS — E78.5 HYPERLIPIDEMIA ASSOCIATED WITH TYPE 2 DIABETES MELLITUS: ICD-10-CM

## 2022-02-08 DIAGNOSIS — C50.912 RECURRENT BREAST CANCER, LEFT: ICD-10-CM

## 2022-02-08 DIAGNOSIS — E11.42 TYPE 2 DIABETES MELLITUS WITH DIABETIC POLYNEUROPATHY, WITH LONG-TERM CURRENT USE OF INSULIN: ICD-10-CM

## 2022-02-08 DIAGNOSIS — R44.1 VISUAL HALLUCINATION: ICD-10-CM

## 2022-02-08 DIAGNOSIS — Z79.4 TYPE 2 DIABETES MELLITUS WITH DIABETIC POLYNEUROPATHY, WITH LONG-TERM CURRENT USE OF INSULIN: ICD-10-CM

## 2022-02-08 DIAGNOSIS — Z79.01 CHRONIC ANTICOAGULATION: ICD-10-CM

## 2022-02-08 DIAGNOSIS — E11.69 HYPERLIPIDEMIA ASSOCIATED WITH TYPE 2 DIABETES MELLITUS: ICD-10-CM

## 2022-02-08 DIAGNOSIS — I26.99 PULMONARY EMBOLISM WITHOUT ACUTE COR PULMONALE, UNSPECIFIED CHRONICITY, UNSPECIFIED PULMONARY EMBOLISM TYPE: ICD-10-CM

## 2022-02-08 DIAGNOSIS — Z99.89 WALKER AS AMBULATION AID: ICD-10-CM

## 2022-02-08 DIAGNOSIS — R44.0 AUDITORY HALLUCINATION: ICD-10-CM

## 2022-02-08 DIAGNOSIS — N39.0 URINARY TRACT INFECTION WITHOUT HEMATURIA, SITE UNSPECIFIED: ICD-10-CM

## 2022-02-08 DIAGNOSIS — D32.9 MENINGIOMA: Primary | ICD-10-CM

## 2022-02-08 DIAGNOSIS — K65.1 INTRA-ABDOMINAL ABSCESS: ICD-10-CM

## 2022-02-08 DIAGNOSIS — Z98.890 H/O CRANIOTOMY: ICD-10-CM

## 2022-02-08 LAB
ALBUMIN SERPL BCP-MCNC: 3.7 G/DL (ref 3.5–5.2)
ALP SERPL-CCNC: 68 U/L (ref 55–135)
ALT SERPL W/O P-5'-P-CCNC: 11 U/L (ref 10–44)
ANION GAP SERPL CALC-SCNC: 9 MMOL/L (ref 8–16)
AST SERPL-CCNC: 19 U/L (ref 10–40)
BILIRUB SERPL-MCNC: 1.2 MG/DL (ref 0.1–1)
BUN SERPL-MCNC: 13 MG/DL (ref 8–23)
CALCIUM SERPL-MCNC: 10.6 MG/DL (ref 8.7–10.5)
CHLORIDE SERPL-SCNC: 105 MMOL/L (ref 95–110)
CO2 SERPL-SCNC: 26 MMOL/L (ref 23–29)
CREAT SERPL-MCNC: 0.8 MG/DL (ref 0.5–1.4)
EST. GFR  (AFRICAN AMERICAN): >60 ML/MIN/1.73 M^2
EST. GFR  (NON AFRICAN AMERICAN): >60 ML/MIN/1.73 M^2
ESTIMATED AVG GLUCOSE: 151 MG/DL (ref 68–131)
GLUCOSE SERPL-MCNC: 104 MG/DL (ref 70–110)
HBA1C MFR BLD: 6.9 % (ref 4–5.6)
POTASSIUM SERPL-SCNC: 3.9 MMOL/L (ref 3.5–5.1)
PROT SERPL-MCNC: 7.6 G/DL (ref 6–8.4)
SODIUM SERPL-SCNC: 140 MMOL/L (ref 136–145)

## 2022-02-08 PROCEDURE — 99215 OFFICE O/P EST HI 40 MIN: CPT | Mod: HCNC,S$GLB,, | Performed by: INTERNAL MEDICINE

## 2022-02-08 PROCEDURE — 3078F PR MOST RECENT DIASTOLIC BLOOD PRESSURE < 80 MM HG: ICD-10-PCS | Mod: HCNC,CPTII,S$GLB, | Performed by: INTERNAL MEDICINE

## 2022-02-08 PROCEDURE — 1126F AMNT PAIN NOTED NONE PRSNT: CPT | Mod: HCNC,CPTII,S$GLB, | Performed by: INTERNAL MEDICINE

## 2022-02-08 PROCEDURE — 80053 COMPREHEN METABOLIC PANEL: CPT | Mod: HCNC | Performed by: INTERNAL MEDICINE

## 2022-02-08 PROCEDURE — 1159F PR MEDICATION LIST DOCUMENTED IN MEDICAL RECORD: ICD-10-PCS | Mod: HCNC,CPTII,S$GLB, | Performed by: INTERNAL MEDICINE

## 2022-02-08 PROCEDURE — 3075F SYST BP GE 130 - 139MM HG: CPT | Mod: HCNC,CPTII,S$GLB, | Performed by: INTERNAL MEDICINE

## 2022-02-08 PROCEDURE — 99499 RISK ADDL DX/OHS AUDIT: ICD-10-PCS | Mod: S$GLB,,, | Performed by: INTERNAL MEDICINE

## 2022-02-08 PROCEDURE — 99999 PR PBB SHADOW E&M-EST. PATIENT-LVL V: ICD-10-PCS | Mod: PBBFAC,HCNC,, | Performed by: INTERNAL MEDICINE

## 2022-02-08 PROCEDURE — 1101F PR PT FALLS ASSESS DOC 0-1 FALLS W/OUT INJ PAST YR: ICD-10-PCS | Mod: HCNC,CPTII,S$GLB, | Performed by: INTERNAL MEDICINE

## 2022-02-08 PROCEDURE — 99215 PR OFFICE/OUTPT VISIT, EST, LEVL V, 40-54 MIN: ICD-10-PCS | Mod: HCNC,S$GLB,, | Performed by: INTERNAL MEDICINE

## 2022-02-08 PROCEDURE — 99999 PR PBB SHADOW E&M-EST. PATIENT-LVL V: CPT | Mod: PBBFAC,HCNC,, | Performed by: INTERNAL MEDICINE

## 2022-02-08 PROCEDURE — 83036 HEMOGLOBIN GLYCOSYLATED A1C: CPT | Mod: HCNC | Performed by: INTERNAL MEDICINE

## 2022-02-08 PROCEDURE — 1101F PT FALLS ASSESS-DOCD LE1/YR: CPT | Mod: HCNC,CPTII,S$GLB, | Performed by: INTERNAL MEDICINE

## 2022-02-08 PROCEDURE — 1160F PR REVIEW ALL MEDS BY PRESCRIBER/CLIN PHARMACIST DOCUMENTED: ICD-10-PCS | Mod: HCNC,CPTII,S$GLB, | Performed by: INTERNAL MEDICINE

## 2022-02-08 PROCEDURE — 3075F PR MOST RECENT SYSTOLIC BLOOD PRESS GE 130-139MM HG: ICD-10-PCS | Mod: HCNC,CPTII,S$GLB, | Performed by: INTERNAL MEDICINE

## 2022-02-08 PROCEDURE — 99499 UNLISTED E&M SERVICE: CPT | Mod: S$GLB,,, | Performed by: INTERNAL MEDICINE

## 2022-02-08 PROCEDURE — 1159F MED LIST DOCD IN RCRD: CPT | Mod: HCNC,CPTII,S$GLB, | Performed by: INTERNAL MEDICINE

## 2022-02-08 PROCEDURE — 3078F DIAST BP <80 MM HG: CPT | Mod: HCNC,CPTII,S$GLB, | Performed by: INTERNAL MEDICINE

## 2022-02-08 PROCEDURE — 1126F PR PAIN SEVERITY QUANTIFIED, NO PAIN PRESENT: ICD-10-PCS | Mod: HCNC,CPTII,S$GLB, | Performed by: INTERNAL MEDICINE

## 2022-02-08 PROCEDURE — 3288F FALL RISK ASSESSMENT DOCD: CPT | Mod: HCNC,CPTII,S$GLB, | Performed by: INTERNAL MEDICINE

## 2022-02-08 PROCEDURE — 36415 COLL VENOUS BLD VENIPUNCTURE: CPT | Mod: HCNC,PN | Performed by: INTERNAL MEDICINE

## 2022-02-08 PROCEDURE — 1160F RVW MEDS BY RX/DR IN RCRD: CPT | Mod: HCNC,CPTII,S$GLB, | Performed by: INTERNAL MEDICINE

## 2022-02-08 PROCEDURE — 3288F PR FALLS RISK ASSESSMENT DOCUMENTED: ICD-10-PCS | Mod: HCNC,CPTII,S$GLB, | Performed by: INTERNAL MEDICINE

## 2022-02-08 NOTE — PATIENT INSTRUCTIONS
Needs medical release for Dr. Butts on the [facility] for last eye exam.   Schedule neurology appointment due to hallucinations.  Schedule appointment with GI.   Schedule CT of the abdomen and pelvis to make sure the abscess in the belly is gone.   Schedule hematology appointment to see how long you need to be on blood thinner.    Please bring in power of  and living will so we can scan it into the computer system.

## 2022-02-09 ENCOUNTER — TELEPHONE (OUTPATIENT)
Dept: PRIMARY CARE CLINIC | Facility: CLINIC | Age: 77
End: 2022-02-09
Payer: MEDICARE

## 2022-02-09 ENCOUNTER — TELEPHONE (OUTPATIENT)
Dept: SURGERY | Facility: CLINIC | Age: 77
End: 2022-02-09
Payer: MEDICARE

## 2022-02-09 ENCOUNTER — PATIENT MESSAGE (OUTPATIENT)
Dept: PRIMARY CARE CLINIC | Facility: CLINIC | Age: 77
End: 2022-02-09
Payer: MEDICARE

## 2022-02-09 RX ORDER — DOXYCYCLINE HYCLATE 100 MG
100 TABLET ORAL EVERY 12 HOURS
Qty: 14 TABLET | Refills: 0 | Status: SHIPPED | OUTPATIENT
Start: 2022-02-09 | End: 2022-02-14

## 2022-02-09 NOTE — TELEPHONE ENCOUNTER
Can you call pt's family to let them know that sugars are ok? Calcium is a bit high. I believe she's takign Calcium and vitamin D. Recommend to stop the calcium supplement. Urine does still look like UTI. Sending in doxycycline to [facility]. Pending urine culture.

## 2022-02-09 NOTE — TELEPHONE ENCOUNTER
Called Mr. Rushing, let him know about sugars ok , and to stop calcium supplement , and also the Rx for Doxy at [facility].

## 2022-02-09 NOTE — TELEPHONE ENCOUNTER
----- Message from Roselia Katz sent at 2/9/2022  8:01 AM CST -----  Regarding: pt called  Name of Who is Calling: CINDA TATUM [[id number]      What is the request in detail: pt is a little confused and wanted to be explained why her physician made this jim for her. Please advise       Can the clinic reply by [facility]: No      What [facility] to [facility] Back [facility]: [phone]

## 2022-02-09 NOTE — TELEPHONE ENCOUNTER
Spoke with patient regarding need for appointment for diverticulitis. Explained to patient that diverticulitis was seen on CT scan in December and internal medicine doctor referred patient for appointment. Patient verbalizies understanding.

## 2022-02-11 ENCOUNTER — TELEPHONE (OUTPATIENT)
Dept: PRIMARY CARE CLINIC | Facility: CLINIC | Age: 77
End: 2022-02-11
Payer: MEDICARE

## 2022-02-12 NOTE — TELEPHONE ENCOUNTER
Good evening all, please assist me if possible with getting this patient in to be seen with your specialty as soon as possible. I would like to say it is very important that she is seen/established with you all soon, please see below paragraph from Dr. Montemayor's last clinic note.     Thank you in advance

## 2022-02-12 NOTE — TELEPHONE ENCOUNTER
Daughter, Lennie, who lives in [place] had reported that pt has been having some VH/AH. On 2/4, daughter expressed concern that pt may be a danger to herself and others. Recommended ED eval for safety reason and pt may not be able to go home to live by herself even though pt's sister lives next door to her. Didn't end up going to ED. Still w/ ongoing VH/AH. Lennie has been w/ pt since last Friday and will be here this week. Will eventually need to go back to [place].

## 2022-02-14 ENCOUNTER — TELEPHONE (OUTPATIENT)
Dept: PRIMARY CARE CLINIC | Facility: CLINIC | Age: 77
End: 2022-02-14
Payer: MEDICARE

## 2022-02-14 ENCOUNTER — OFFICE VISIT (OUTPATIENT)
Dept: SURGERY | Facility: CLINIC | Age: 77
End: 2022-02-14
Payer: MEDICARE

## 2022-02-14 VITALS
BODY MASS INDEX: 29.44 KG/M2 | SYSTOLIC BLOOD PRESSURE: 154 MMHG | HEART RATE: 102 BPM | DIASTOLIC BLOOD PRESSURE: 72 MMHG | HEIGHT: 62 IN | WEIGHT: 160 LBS

## 2022-02-14 DIAGNOSIS — K57.92 DIVERTICULITIS: ICD-10-CM

## 2022-02-14 DIAGNOSIS — R44.1 VISUAL HALLUCINATION: ICD-10-CM

## 2022-02-14 DIAGNOSIS — R44.0 AUDITORY HALLUCINATION: Primary | ICD-10-CM

## 2022-02-14 PROCEDURE — 99499 RISK ADDL DX/OHS AUDIT: ICD-10-PCS | Mod: S$GLB,,, | Performed by: COLON & RECTAL SURGERY

## 2022-02-14 PROCEDURE — 1160F PR REVIEW ALL MEDS BY PRESCRIBER/CLIN PHARMACIST DOCUMENTED: ICD-10-PCS | Mod: HCNC,CPTII,S$GLB, | Performed by: COLON & RECTAL SURGERY

## 2022-02-14 PROCEDURE — 99999 PR PBB SHADOW E&M-EST. PATIENT-LVL III: ICD-10-PCS | Mod: PBBFAC,HCNC,, | Performed by: COLON & RECTAL SURGERY

## 2022-02-14 PROCEDURE — 1159F PR MEDICATION LIST DOCUMENTED IN MEDICAL RECORD: ICD-10-PCS | Mod: HCNC,CPTII,S$GLB, | Performed by: COLON & RECTAL SURGERY

## 2022-02-14 PROCEDURE — 99999 PR PBB SHADOW E&M-EST. PATIENT-LVL III: CPT | Mod: PBBFAC,HCNC,, | Performed by: COLON & RECTAL SURGERY

## 2022-02-14 PROCEDURE — 1159F MED LIST DOCD IN RCRD: CPT | Mod: HCNC,CPTII,S$GLB, | Performed by: COLON & RECTAL SURGERY

## 2022-02-14 PROCEDURE — 1126F PR PAIN SEVERITY QUANTIFIED, NO PAIN PRESENT: ICD-10-PCS | Mod: HCNC,CPTII,S$GLB, | Performed by: COLON & RECTAL SURGERY

## 2022-02-14 PROCEDURE — 1126F AMNT PAIN NOTED NONE PRSNT: CPT | Mod: HCNC,CPTII,S$GLB, | Performed by: COLON & RECTAL SURGERY

## 2022-02-14 PROCEDURE — 99203 OFFICE O/P NEW LOW 30 MIN: CPT | Mod: HCNC,S$GLB,, | Performed by: COLON & RECTAL SURGERY

## 2022-02-14 PROCEDURE — 3078F PR MOST RECENT DIASTOLIC BLOOD PRESSURE < 80 MM HG: ICD-10-PCS | Mod: HCNC,CPTII,S$GLB, | Performed by: COLON & RECTAL SURGERY

## 2022-02-14 PROCEDURE — 1160F RVW MEDS BY RX/DR IN RCRD: CPT | Mod: HCNC,CPTII,S$GLB, | Performed by: COLON & RECTAL SURGERY

## 2022-02-14 PROCEDURE — 3077F SYST BP >= 140 MM HG: CPT | Mod: HCNC,CPTII,S$GLB, | Performed by: COLON & RECTAL SURGERY

## 2022-02-14 PROCEDURE — 99203 PR OFFICE/OUTPT VISIT, NEW, LEVL III, 30-44 MIN: ICD-10-PCS | Mod: HCNC,S$GLB,, | Performed by: COLON & RECTAL SURGERY

## 2022-02-14 PROCEDURE — 99499 UNLISTED E&M SERVICE: CPT | Mod: S$GLB,,, | Performed by: COLON & RECTAL SURGERY

## 2022-02-14 PROCEDURE — 3077F PR MOST RECENT SYSTOLIC BLOOD PRESSURE >= 140 MM HG: ICD-10-PCS | Mod: HCNC,CPTII,S$GLB, | Performed by: COLON & RECTAL SURGERY

## 2022-02-14 PROCEDURE — 3078F DIAST BP <80 MM HG: CPT | Mod: HCNC,CPTII,S$GLB, | Performed by: COLON & RECTAL SURGERY

## 2022-02-14 RX ORDER — AMOXICILLIN AND CLAVULANATE POTASSIUM 875; 125 MG/1; MG/1
1 TABLET, FILM COATED ORAL 2 TIMES DAILY
Qty: 14 TABLET | Refills: 0 | Status: SHIPPED | OUTPATIENT
Start: 2022-02-14 | End: 2022-02-21

## 2022-02-14 NOTE — PROGRESS NOTES
Subjective:       Patient ID: Alisia Gusman is a 76 y.o. female.    Chief Complaint: Diverticulitis    HPI  77 yo F referred for follow-up of diverticulitis.  She has a number of significant medical comorbidities, including poorly controlled diabetes, hypertension, hyperlipidemia.    She was hospitalized in early November with generalized weakness and falls as well as confusion and altered mental status.  Imaging revealed a right anterior frontal extra-axial enhancing brain mass.  She underwent craniotomy for excision of the brain mass on 11/15/2021 - pathology revealed a benign meningioma.  She also had a CT of the abdomen and pelvis done on admission for complaints of abdominal pain, which revealed uncomplicated diverticulitis of the distal descending/proximal sigmoid colon.  This was treated with antibiotics.  Postoperatively she also developed a pulmonary embolism for which she is now on Eliquis.    CT A/P 11/6/21:  (Images personally reviewed.)  1. Left lower quadrant acute, uncomplicated diverticulitis of the distal descending/proximal sigmoid colon.  Consider follow-up with imaging or endoscopy after therapy according to colorectal screening guidelines.  2. Evidence of remote partially imaged left mastectomy, cholecystectomy and hysterectomy.  3. Small hiatal hernia.  4. Bilateral renal simple cysts and subcentimeter hypoattenuating cortical foci which are too small to characterize.    She was readmitted with accidental medicine overdose  12/11/21 - found to have lactic acidosis and polymicrobial bacteremia.  CT scan demonstrated persistent diverticular disease now with a 4.1 cm pericolonic abscess which was drained by Interventional Radiology.  She was treated with a 2 week course of oral Augmentin.  CRS does not appear to have been consulted during that admission.    CT A/P 12/12/21: (Images personally reviewed.)  -Persistent left lower quadrant colonic mural thickening and associated fat stranding in  this patient consistent with diverticulitis.  New focal collection in the left lower quadrant measuring 4.1 cm which may represent pericolonic abscess or fluid/stool within an irregular distended diverticulum.  No pneumoperitoneum.  -New mild bilateral hydroureteronephrosis to the level of the urinary bladder, likely related to distension of urinary bladder.  Few foci of air within the urinary bladder, recommend correlation for prior catheterization or procedure.  Correlation with urinalysis could be helpful to exclude colovesicular fistula if no recent catheterization was performed.     Last colonoscopy - 5/2019 - pandiverticulosis    She comes in today for further evaluation, accompanied by her sister.   She is a poor historian, unclear about why she is here, has trouble remembering details of the past few months.  According to her sister, since surgery for meningioma she has been having issues w short-term loss and visual/auditory hallucinations - undergoing further evaluation.  Patient denies having hallucinations - became argumentative with the sister about this.  She does appear to be having significant issues with cognition, though she is oriented x3.  She denies having any abdominal pain, says BM's are normal for most part.  She has been suffering from frequent UTI's.  She denies passing air or stool with her urine.    Review of patient's allergies indicates:   Allergen Reactions    Grass pollen-june grass standard Other (See Comments)     Causes sinus symptoms like coughing    Losartan      cough    Nubain [nalbuphine] Other (See Comments)     Other reaction(s): Hypotension    Sinus & allergy [chlorpheniramine-phenylephrine]        Past Medical History:   Diagnosis Date    Acute pulmonary embolism without acute cor pulmonale 11/15/2021    Adult bronchiectasis 7/26/2017    Allergy     Anemia     Arthritis     Asthma     Breast cancer 1993    left w/ radiation     Breast cancer 2020    IDC, left  mastectomy    Cancer 1993    L eft breast    Cataract     Chronic cervical radiculopathy 9/20/2012    Diabetes mellitus     Diabetes mellitus type II     Diabetes with neurologic complications     Diverticulosis     Dry eyes     Dysphagia     after knee surgery June 2014    GERD (gastroesophageal reflux disease)     Hyperlipidemia     Hypertension     Neuropathy     feet    Scalp tenderness     Sepsis 12/12/2021    Shortness of breath     Ulcer        Past Surgical History:   Procedure Laterality Date    BREAST BIOPSY Left 1993    BREAST LUMPECTOMY Left 1993    CARPAL TUNNEL RELEASE Bilateral 2011    CATARACT EXTRACTION W/  INTRAOCULAR LENS IMPLANT Bilateral 2013 and 2014    CHOLECYSTECTOMY      COLONOSCOPY N/A 11/6/2015    Procedure: COLONOSCOPY;  Surgeon: Major Michelle MD;  Location: [facility] ENDO ([address] [place] [address]);  Service: Endoscopy;  Laterality: N/A;    COLONOSCOPY N/A 5/8/2019    Procedure: COLONOSCOPY;  Surgeon: Major Michelle MD;  Location: [facility] ENDO ([address] [place] [address]);  Service: Endoscopy;  Laterality: N/A;    CRANIOTOMY USING FRAMELESS STEREOTAXY Right 11/15/2021    Procedure: Right Frontal Craniotomy for Meningioma with  Stealth Navigation;  Surgeon: Moiz Hutchison DO;  Location: [address];  Service: Neurosurgery;  Laterality: Right;    EYE SURGERY      HYSTERECTOMY      partial hyst    INJECTION FOR SENTINEL NODE IDENTIFICATION Left 2/20/2020    Procedure: INJECTION, FOR SENTINEL NODE IDENTIFICATION;  Surgeon: Hamida Nieves MD;  Location: [address];  Service: General;  Laterality: Left;    JOINT REPLACEMENT Left June 2014    knee    MASTECTOMY Left 2020    SENTINEL LYMPH NODE BIOPSY Left 2/20/2020    Procedure: BIOPSY, LYMPH NODE, SENTINEL;  Surgeon: Hamida Nieves MD;  Location: [address];  Service: General;  Laterality: Left;    UNILATERAL MASTECTOMY Left 2/20/2020    Procedure: MASTECTOMY, UNILATERAL;  Surgeon: Hamida Nieves MD;  Location: [address];   "Service: General;  Laterality: Left;    uvuloplasty         Current Outpatient Medications   Medication Sig Dispense Refill    ACCU-CHEK DOMENICO PLUS METER [facility] USE AS DIRECTED 1 each 1    ACCU-CHEK DOMENICO PLUS TEST STRP Strp TEST THREE TIMES DAILY AS DIRECTED 300 strip 3    ACCU-CHEK SOFTCLIX LANCETS Misc 1 each by Misc.(Non-Drug; Combo Route) route 3 (three) times daily. 270 each 3    amLODIPine (NORVASC) 5 MG tablet Take 1 tablet (5 mg total) by mouth once daily. DO NOT TAKE THIS MEDICATION UNTIL TOLD TO RESTART BY PHYSICIAN 30 tablet 11    anastrozole (ARIMIDEX) 1 mg Tab TAKE 1 TABLET EVERY DAY 90 tablet 3    apixaban (ELIQUIS) 5 mg Tab Take 1 tablet (5 mg total) by mouth 2 (two) times daily. 60 tablet 1    atorvastatin (LIPITOR) 40 MG tablet Take 40 mg by mouth nightly.      BD ALCOHOL SWABS PadM 1 each 2 (two) times daily.      BD INSULIN SYRINGE ULTRA-FINE 1 mL 31 gauge x 5/16 Syrg TO USE  TWICE DAILY WITH  INSULIN 100 each 11    BD VEO INSULIN SYRINGE UF 0.3 mL 31 gauge x 15/64" Syrg USE  TO INJECT TWICE DAILY 200 Syringe 3    carvediloL (COREG) 6.25 MG tablet Take 1 tablet (6.25 mg total) by mouth 2 (two) times daily. 90 tablet 3    cyanocobalamin-cobamamide (B-12 PLUS) 5,000-100 mcg Subl Take 5000 mcg daily. 90 tablet 3    doxycycline (VIBRA-TABS) 100 MG tablet Take 1 tablet (100 mg total) by mouth every 12 (twelve) hours. for 7 days 14 tablet 0    famotidine (PEPCID) 20 MG tablet TAKE 1 TABLET (20 MG TOTAL) BY MOUTH EVERY EVENING. 90 tablet 3    flash glucose scanning reader (FREESTYLE GERARDO 14 DAY READER) Misc 1 each by Misc.(Non-Drug; Combo Route) route once daily. 1 each 0    flash glucose sensor (FREESTYLE GERARDO 14 DAY SENSOR) Kit 2 each by Misc.(Non-Drug; Combo Route) route every 14 (fourteen) days. 2 kit 11    FOLIC ACID/MULTIVIT-MIN/LUTEIN (CENTRUM SILVER ORAL) Take 1 tablet by mouth once daily.      furosemide (LASIX) 20 MG tablet Take 1 tablet (20 mg total) by mouth daily as " "needed (leg swelling). 30 tablet 0    insulin (LANTUS SOLOSTAR U-100 INSULIN) glargine 100 units/mL (3mL) SubQ pen Inject 28 Units into the skin once daily. 9 mL 3    insulin aspart U-100 (NOVOLOG) 100 unit/mL (3 mL) InPn pen Inject 14 Units into the skin 3 (three) times daily with meals.  0    insulin aspart U-100 (NOVOLOG) 100 unit/mL (3 mL) InPn pen Inject 0-5 Units into the skin before meals and at bedtime as needed (Hyperglycemia).  0    OZEMPIC 1 mg/dose (4 mg/3 mL) INJECT 1MG (0.75 ML) UNDER THE SKIN EVERY 7 DAYS. 4 pen 11    pen needle, diabetic (BD ULTRA-FINE SUSIE PEN NEEDLE) 32 gauge x 5/32" Ndle USE  TO  INJECT  Weekly 90 each 3    semaglutide (OZEMPIC) 1 mg/dose (2 mg/1.5 mL) PnIj Inject 0.75 mLs into the skin every 7 days. 9 mL 3     No current facility-administered medications for this visit.       Family History   Problem Relation Age of Onset    Diabetes Mother     Diabetes Sister     Colon polyps Sister     Heart disease Father     No Known Problems Brother     No Known Problems Daughter     No Known Problems Son     Cancer Sister         breast and colon ca    Colon polyps Sister     Diabetes Sister     Cancer Sister         colon ca    Arthritis Sister     Psoriasis Sister     No Known Problems Daughter     Breast cancer Paternal Aunt     Stroke Sister     Seizures Sister     Diabetes Sister     Asthma Neg Hx     Emphysema Neg Hx     Lupus Neg Hx     Rheum arthritis Neg Hx     Melanoma Neg Hx     Colon cancer Neg Hx     Irritable bowel syndrome Neg Hx     Inflammatory bowel disease Neg Hx     Stomach cancer Neg Hx     Esophageal cancer Neg Hx        Social History     Socioeconomic History    Marital status:    Occupational History    Occupation: Retired   Tobacco Use    Smoking status: Never Smoker    Smokeless tobacco: Never Used   Substance and Sexual Activity    Alcohol use: No    Drug use: No    Sexual activity: Not Currently     Partners: Male "   Social History Narrative    No assistance w/ ADLs. 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 Lives with  at home. 3 children who live nearby.        Review of Systems   Constitutional: Negative for chills and fever.   HENT: Negative for congestion and sore throat.    Eyes: Negative for visual disturbance.   Respiratory: Negative for cough and shortness of breath.    Cardiovascular: Negative for chest pain and palpitations.   Gastrointestinal: Negative for abdominal distention, abdominal pain, anal bleeding, blood in stool, constipation, diarrhea, nausea, rectal pain and vomiting.   Endocrine: Negative for cold intolerance and heat intolerance.   Genitourinary: Positive for frequency. Negative for dysuria.   Musculoskeletal: Negative for arthralgias, back pain and neck pain.   Skin: Negative for rash.   Allergic/Immunologic: Negative for immunocompromised state.   Neurological: Negative for dizziness, light-headedness and headaches.   Hematological: Does not bruise/bleed easily.   Psychiatric/Behavioral: Positive for hallucinations. Negative for confusion. The patient is not nervous/anxious.        Objective:      Physical Exam  Constitutional:       Appearance: She is well-developed and well-nourished.   HENT:      Head: Normocephalic.   Cardiovascular:      Heart sounds: Normal heart sounds.   Pulmonary:      Effort: Pulmonary effort is normal. No respiratory distress.   Abdominal:      General: Bowel sounds are normal. There is no distension.      Palpations: Abdomen is soft. There is no mass.      Tenderness: There is no abdominal tenderness. There is no guarding or rebound.   Musculoskeletal:         General: Normal range of motion.      Cervical back: Normal range of motion and neck supple.   Skin:     General: Skin is warm and dry.   Neurological:      Mental Status: She is alert and oriented to person, place, and time.   Psychiatric:          Mood and Affect: Mood and affect normal.           Lab Results   Component Value Date    WBC 5.99 02/01/2022    HGB 12.7 02/01/2022    HCT 40.9 02/01/2022    MCV 90 02/01/2022     02/01/2022     BMP  Lab Results   Component Value Date     02/08/2022    K 3.9 02/08/2022     02/08/2022    CO2 26 02/08/2022    BUN 13 02/08/2022    CREATININE 0.8 02/08/2022    CALCIUM 10.6 (H) 02/08/2022    ANIONGAP 9 02/08/2022    ESTGFRAFRICA >60.0 02/08/2022    EGFRNONAA >60.0 02/08/2022     CMP  Sodium   Date Value Ref Range Status   02/08/2022 140 136 - 145 mmol/L Final     Potassium   Date Value Ref Range Status   02/08/2022 3.9 3.5 - 5.1 mmol/L Final     Chloride   Date Value Ref Range Status   02/08/2022 105 95 - 110 mmol/L Final     CO2   Date Value Ref Range Status   02/08/2022 26 23 - 29 mmol/L Final     Glucose   Date Value Ref Range Status   02/08/2022 104 70 - 110 mg/dL Final     BUN   Date Value Ref Range Status   02/08/2022 13 8 - 23 mg/dL Final     Creatinine   Date Value Ref Range Status   02/08/2022 0.8 0.5 - 1.4 mg/dL Final     Calcium   Date Value Ref Range Status   02/08/2022 10.6 (H) 8.7 - 10.5 mg/dL Final     Total Protein   Date Value Ref Range Status   02/08/2022 7.6 6.0 - 8.4 g/dL Final     Albumin   Date Value Ref Range Status   02/08/2022 3.7 3.5 - 5.2 g/dL Final     Total Bilirubin   Date Value Ref Range Status   02/08/2022 1.2 (H) 0.1 - 1.0 mg/dL Final     Comment:     For infants and newborns, interpretation of results should be based  on gestational age, weight and in agreement with clinical  observations.    Premature Infant recommended reference ranges:  Up to 24 hours.............<8.0 mg/dL  Up to 48 hours............<12.0 mg/dL  3-5 days..................<15.0 mg/dL  6-29 days.................<15.0 mg/dL       Alkaline Phosphatase   Date Value Ref Range Status   02/08/2022 68 55 - 135 U/L Final     AST   Date Value Ref Range Status   02/08/2022 19 10 - 40 U/L Final     ALT    Date Value Ref Range Status   02/08/2022 11 10 - 44 U/L Final     Anion Gap   Date Value Ref Range Status   02/08/2022 9 8 - 16 mmol/L Final     eGFR if    Date Value Ref Range Status   02/08/2022 >60.0 >60 mL/min/1.73 m^2 Final     eGFR if non    Date Value Ref Range Status   02/08/2022 >60.0 >60 mL/min/1.73 m^2 Final     Comment:     Calculation used to obtain the estimated glomerular filtration  rate (eGFR) is the CKD-EPI equation.        No results found for: CEA        Assessment:       1. Diverticulitis    She had a recent episode of complicated diverticulitis treated with percutaneous drainage by Interventional Radiology and a 2 week course of Augmentin - this appears to have resolved clinically, though she still is having issues with frequent urinary tract infections.    Plan:   Will check CT A/P with oral/rectal contrast to ensure that her diverticulitis and the associated abscess have resolved, and that there is no evidence of a colovesical fistula to account for her frequent urinary tract infections.  If her diverticulitis appears to have resolved radiographically and there is no colovesical fistula,  I am not sure that we should do much more at this point.  She had a colonoscopy almost 3 years ago, so the likelihood of an occult colon cancer is quite low.  Given her comorbidities, I would not recommend an elective sigmoid colectomy.      Joe Dave MD, FACS, FASCRS  Senior Staff Surgeon  Department of Colon & Rectal Surgery     This note was created using voice recognition software, and may contain some unrecognized transcriptional errors.

## 2022-02-14 NOTE — TELEPHONE ENCOUNTER
Em Arauz! I went ahead and put in a psychiatry appointment also but I think she warrants a neurology eval also b/c she does have some confusion along w/ her AH/VH. I've seen patients with dementia with this presentation also. She recently had a brain resection for her meningioma and is following w/ NSGY. Would you be able to assist w/ an appointment for her to see neurology also and we'll work on a psychiatry appt for her? Thanks! Feel free to let me know if you have any other questions.

## 2022-02-14 NOTE — TELEPHONE ENCOUNTER
----- Message from Selena Montemayor MD sent at 2/14/2022 10:46 AM CST -----  Can you call to let pt/family know that her urine culture grew out klebsiella? They didn't give sensitivities to the antibiotic I put her on so I'm going to change her doxycycline to augmentin, which it is definitely sensitive to. Sent to local pharmacy.

## 2022-02-14 NOTE — LETTER
February 14, 2022      Angela Gaines MD  [address]  [place] [address]           [facility]  [address]  [facility] [address]  Phone: [phone]  Fax: [phone]          Patient: Alisia Gusman   MR Number: [id number]   YOB: 1945   Date of Visit: 2/14/2022       Dear Dr. Angela Gaines:    Thank you for referring Alisia Gusman to me for evaluation. Attached you will find relevant portions of my assessment and plan of care.    If you have questions, please do not hesitate to call me. I look forward to following Alisia Gusman along with you.    Sincerely,    Joe Dave MD    Enclosure  CC:  Selena Montemayor MD    If you would like to receive this communication electronically, please contact [email or web address] or [phone] to request more information on [employer] Link access.    For providers and/or their staff who would like to refer a patient to [facility], please contact us through our one-stop-shop provider referral line, [facility], at [phone].    If you feel you have received this communication in error or would no longer like to receive these types of communications, please e-mail [email or web address]

## 2022-02-14 NOTE — TELEPHONE ENCOUNTER
Thank you for your assistance.     Dr. Montemayor just wanted me to double check with you cause she just recently had brain surgery, she has a follow up with neuro-surg but can you just clarify if this may or may not be a component for neurology or not.

## 2022-02-14 NOTE — TELEPHONE ENCOUNTER
Spoke with MS. Hogue, stated that she would send someone to go get abx at the local [facility].  Will call with any questions or concerns

## 2022-02-17 NOTE — TELEPHONE ENCOUNTER
----- Message from Andrews Reddy sent at 2/2/2022 11:17 AM CST -----  Contact: berta [phone]  Berta from [facility] stated plus 2 edema and will like to know if provider will like to prescribe diuretic stated discharging pt today from home health.    Please call and advise     
Called Ms. Hogue's number and left message to contact me regarding ms Crump.   
Called x2 left voicemail both times B if you talk to her please book her an appointment in the next two weeks.  
Please call them to get more information  
Selena with [facility] noticed +2 edema in lower extremities. Is she on diuretics?   Discharged from  on 2/2/22.  
Spoke with Ms. Crump's daughter. She is concerned about mother's health. Stated that mother has been having episodes of hallucination, seeing people that are not in the home.   Saw Neuro last week, they ran labs to rule out uti, noted mild confusion.  Appointment made for 2/8. Will call daughter to get the aunt's number that lives next to her mom.   ( copied telephone note)   
Were you able to get her in to see me, Sea?     Leigh, please let her know I'll start lasix 20mg daily (sent to [facility] on  Jan) but then that means she has to see me next week for the f/u's. Elevate legs whenever sitting. Low salt diet - <2g in 24 hours. Recommend to check BP daily to make sure BP not too low on fluid pill.   
3 = A little assistance

## 2022-02-18 ENCOUNTER — PATIENT MESSAGE (OUTPATIENT)
Dept: ENDOCRINOLOGY | Facility: CLINIC | Age: 77
End: 2022-02-18
Payer: MEDICARE

## 2022-02-25 ENCOUNTER — HOSPITAL ENCOUNTER (OUTPATIENT)
Dept: RADIOLOGY | Facility: HOSPITAL | Age: 77
Discharge: HOME | End: 2022-02-25
Attending: INTERNAL MEDICINE
Payer: MEDICARE

## 2022-02-25 DIAGNOSIS — K65.1 INTRA-ABDOMINAL ABSCESS: ICD-10-CM

## 2022-02-25 PROCEDURE — 74176 CT ABD & PELVIS W/O CONTRAST: CPT | Mod: TC,HCNC

## 2022-02-25 PROCEDURE — 74176 CT ABDOMEN PELVIS WITHOUT CONTRAST: ICD-10-PCS | Mod: 26,HCNC,, | Performed by: INTERNAL MEDICINE

## 2022-02-25 PROCEDURE — 74176 CT ABD & PELVIS W/O CONTRAST: CPT | Mod: 26,HCNC,, | Performed by: INTERNAL MEDICINE

## 2022-02-28 ENCOUNTER — TELEPHONE (OUTPATIENT)
Dept: PRIMARY CARE CLINIC | Facility: CLINIC | Age: 77
End: 2022-02-28
Payer: MEDICARE

## 2022-02-28 NOTE — TELEPHONE ENCOUNTER
Leigh, please call and notify pt:    CT abdomen/pelvis without contrast shows resolving diverticulitis and the abscess is no longer see. There are no new fluid collections.

## 2022-03-02 ENCOUNTER — HOSPITAL ENCOUNTER (OUTPATIENT)
Dept: RADIOLOGY | Facility: HOSPITAL | Age: 77
Discharge: HOME | End: 2022-03-02
Attending: COLON & RECTAL SURGERY
Payer: MEDICARE

## 2022-03-02 DIAGNOSIS — K57.92 DIVERTICULITIS: ICD-10-CM

## 2022-03-02 PROCEDURE — 71260 CT CHEST ABDOMEN PELVIS WITH CONTRAST (XPD): ICD-10-PCS | Mod: 26,HCNC,, | Performed by: RADIOLOGY

## 2022-03-02 PROCEDURE — 25500020 PHARM REV CODE 255: Mod: HCNC | Performed by: COLON & RECTAL SURGERY

## 2022-03-02 PROCEDURE — 74177 CT ABD & PELVIS W/CONTRAST: CPT | Mod: TC,HCNC

## 2022-03-02 PROCEDURE — 74177 CT ABD & PELVIS W/CONTRAST: CPT | Mod: 26,HCNC,, | Performed by: RADIOLOGY

## 2022-03-02 PROCEDURE — 71260 CT THORAX DX C+: CPT | Mod: 26,HCNC,, | Performed by: RADIOLOGY

## 2022-03-02 PROCEDURE — 71260 CT THORAX DX C+: CPT | Mod: TC,HCNC

## 2022-03-02 PROCEDURE — 74177 CT CHEST ABDOMEN PELVIS WITH CONTRAST (XPD): ICD-10-PCS | Mod: 26,HCNC,, | Performed by: RADIOLOGY

## 2022-03-02 RX ADMIN — IOHEXOL 15 ML: 350 INJECTION, SOLUTION INTRAVENOUS at 02:03

## 2022-03-02 RX ADMIN — IOHEXOL 30 ML: 350 INJECTION, SOLUTION INTRAVENOUS at 02:03

## 2022-03-02 RX ADMIN — IOHEXOL 75 ML: 350 INJECTION, SOLUTION INTRAVENOUS at 02:03

## 2022-03-04 NOTE — TELEPHONE ENCOUNTER
Spoke with Ms Lennie this am. Let her know CT results.  Reminded her that they have an upcoming appt with Dr Montemayor on 3/9@ 9:40 am.

## 2022-03-10 ENCOUNTER — PATIENT MESSAGE (OUTPATIENT)
Dept: PODIATRY | Facility: CLINIC | Age: 77
End: 2022-03-10
Payer: MEDICARE

## 2022-03-15 ENCOUNTER — OFFICE VISIT (OUTPATIENT)
Dept: PODIATRY | Facility: CLINIC | Age: 77
End: 2022-03-15
Payer: MEDICARE

## 2022-03-15 VITALS
HEART RATE: 90 BPM | HEIGHT: 62 IN | RESPIRATION RATE: 18 BRPM | DIASTOLIC BLOOD PRESSURE: 91 MMHG | WEIGHT: 154 LBS | BODY MASS INDEX: 28.34 KG/M2 | SYSTOLIC BLOOD PRESSURE: 160 MMHG

## 2022-03-15 DIAGNOSIS — L84 CORN OR CALLUS: ICD-10-CM

## 2022-03-15 DIAGNOSIS — E11.65 TYPE 2 DIABETES MELLITUS WITH HYPERGLYCEMIA, WITH LONG-TERM CURRENT USE OF INSULIN: Primary | ICD-10-CM

## 2022-03-15 DIAGNOSIS — Z79.4 TYPE 2 DIABETES MELLITUS WITH HYPERGLYCEMIA, WITH LONG-TERM CURRENT USE OF INSULIN: Primary | ICD-10-CM

## 2022-03-15 DIAGNOSIS — B35.1 ONYCHOMYCOSIS DUE TO DERMATOPHYTE: ICD-10-CM

## 2022-03-15 DIAGNOSIS — M79.89 LEG SWELLING: ICD-10-CM

## 2022-03-15 PROCEDURE — 11056 PARNG/CUTG B9 HYPRKR LES 2-4: CPT | Mod: Q9,S$GLB,, | Performed by: PODIATRIST

## 2022-03-15 PROCEDURE — 3077F PR MOST RECENT SYSTOLIC BLOOD PRESSURE >= 140 MM HG: ICD-10-PCS | Mod: CPTII,S$GLB,, | Performed by: PODIATRIST

## 2022-03-15 PROCEDURE — 99499 NO LOS: ICD-10-PCS | Mod: S$GLB,,, | Performed by: PODIATRIST

## 2022-03-15 PROCEDURE — 99999 PR PBB SHADOW E&M-EST. PATIENT-LVL IV: CPT | Mod: PBBFAC,,, | Performed by: PODIATRIST

## 2022-03-15 PROCEDURE — 3080F DIAST BP >= 90 MM HG: CPT | Mod: CPTII,S$GLB,, | Performed by: PODIATRIST

## 2022-03-15 PROCEDURE — 11056 PR TRIM BENIGN HYPERKERATOTIC SKIN LESION,2-4: ICD-10-PCS | Mod: Q9,S$GLB,, | Performed by: PODIATRIST

## 2022-03-15 PROCEDURE — 3080F PR MOST RECENT DIASTOLIC BLOOD PRESSURE >= 90 MM HG: ICD-10-PCS | Mod: CPTII,S$GLB,, | Performed by: PODIATRIST

## 2022-03-15 PROCEDURE — 99499 UNLISTED E&M SERVICE: CPT | Mod: S$GLB,,, | Performed by: PODIATRIST

## 2022-03-15 PROCEDURE — 99999 PR PBB SHADOW E&M-EST. PATIENT-LVL IV: ICD-10-PCS | Mod: PBBFAC,,, | Performed by: PODIATRIST

## 2022-03-15 PROCEDURE — 1125F AMNT PAIN NOTED PAIN PRSNT: CPT | Mod: CPTII,S$GLB,, | Performed by: PODIATRIST

## 2022-03-15 PROCEDURE — 11721 DEBRIDE NAIL 6 OR MORE: CPT | Mod: Q9,59,S$GLB, | Performed by: PODIATRIST

## 2022-03-15 PROCEDURE — 1125F PR PAIN SEVERITY QUANTIFIED, PAIN PRESENT: ICD-10-PCS | Mod: CPTII,S$GLB,, | Performed by: PODIATRIST

## 2022-03-15 PROCEDURE — 3077F SYST BP >= 140 MM HG: CPT | Mod: CPTII,S$GLB,, | Performed by: PODIATRIST

## 2022-03-15 PROCEDURE — 11721 PR DEBRIDEMENT OF NAILS, 6 OR MORE: ICD-10-PCS | Mod: Q9,59,S$GLB, | Performed by: PODIATRIST

## 2022-03-16 ENCOUNTER — TELEPHONE (OUTPATIENT)
Dept: PODIATRY | Facility: CLINIC | Age: 77
End: 2022-03-16
Payer: MEDICARE

## 2022-03-16 NOTE — TELEPHONE ENCOUNTER
Left voice message for pt to give the office a call back at [phone]. Returning pt's phone call.

## 2022-03-16 NOTE — TELEPHONE ENCOUNTER
----- Message from Aby Lora sent at 3/16/2022  1:53 PM CDT -----  Regarding: pt advice  Contact: pt @ [phone]  Pt calling to speak with someone in Dr. Blake's office regarding the prescription given to her on yesterday. Please call.

## 2022-03-21 ENCOUNTER — PATIENT MESSAGE (OUTPATIENT)
Dept: NEUROLOGY | Facility: CLINIC | Age: 77
End: 2022-03-21
Payer: MEDICARE

## 2022-03-22 ENCOUNTER — OFFICE VISIT (OUTPATIENT)
Dept: NEUROLOGY | Facility: CLINIC | Age: 77
End: 2022-03-22
Payer: MEDICARE

## 2022-03-22 VITALS
HEART RATE: 98 BPM | HEIGHT: 62 IN | DIASTOLIC BLOOD PRESSURE: 77 MMHG | WEIGHT: 152.31 LBS | SYSTOLIC BLOOD PRESSURE: 134 MMHG | BODY MASS INDEX: 28.03 KG/M2

## 2022-03-22 DIAGNOSIS — R41.89 COGNITIVE IMPAIRMENT: Primary | ICD-10-CM

## 2022-03-22 DIAGNOSIS — R44.0 AUDITORY HALLUCINATION: ICD-10-CM

## 2022-03-22 DIAGNOSIS — Z86.018 S/P RESECTION OF MENINGIOMA: ICD-10-CM

## 2022-03-22 DIAGNOSIS — Z98.890 S/P RESECTION OF MENINGIOMA: ICD-10-CM

## 2022-03-22 DIAGNOSIS — R44.1 VISUAL HALLUCINATION: ICD-10-CM

## 2022-03-22 PROCEDURE — 3288F FALL RISK ASSESSMENT DOCD: CPT | Mod: HCNC,CPTII,S$GLB, | Performed by: PSYCHIATRY & NEUROLOGY

## 2022-03-22 PROCEDURE — 3072F PR LOW RISK FOR RETINOPATHY: ICD-10-PCS | Mod: HCNC,CPTII,S$GLB, | Performed by: PSYCHIATRY & NEUROLOGY

## 2022-03-22 PROCEDURE — 1101F PR PT FALLS ASSESS DOC 0-1 FALLS W/OUT INJ PAST YR: ICD-10-PCS | Mod: HCNC,CPTII,S$GLB, | Performed by: PSYCHIATRY & NEUROLOGY

## 2022-03-22 PROCEDURE — 99417 PR PROLONGED SVC, OUTPT, W/WO DIRECT PT CONTACT,  EA ADDTL 15 MIN: ICD-10-PCS | Mod: HCNC,S$GLB,, | Performed by: PSYCHIATRY & NEUROLOGY

## 2022-03-22 PROCEDURE — 3288F PR FALLS RISK ASSESSMENT DOCUMENTED: ICD-10-PCS | Mod: HCNC,CPTII,S$GLB, | Performed by: PSYCHIATRY & NEUROLOGY

## 2022-03-22 PROCEDURE — 1101F PT FALLS ASSESS-DOCD LE1/YR: CPT | Mod: HCNC,CPTII,S$GLB, | Performed by: PSYCHIATRY & NEUROLOGY

## 2022-03-22 PROCEDURE — 99215 PR OFFICE/OUTPT VISIT, EST, LEVL V, 40-54 MIN: ICD-10-PCS | Mod: HCNC,S$GLB,, | Performed by: PSYCHIATRY & NEUROLOGY

## 2022-03-22 PROCEDURE — 3075F PR MOST RECENT SYSTOLIC BLOOD PRESS GE 130-139MM HG: ICD-10-PCS | Mod: HCNC,CPTII,S$GLB, | Performed by: PSYCHIATRY & NEUROLOGY

## 2022-03-22 PROCEDURE — 1157F ADVNC CARE PLAN IN RCRD: CPT | Mod: HCNC,CPTII,S$GLB, | Performed by: PSYCHIATRY & NEUROLOGY

## 2022-03-22 PROCEDURE — 3078F DIAST BP <80 MM HG: CPT | Mod: HCNC,CPTII,S$GLB, | Performed by: PSYCHIATRY & NEUROLOGY

## 2022-03-22 PROCEDURE — 1126F AMNT PAIN NOTED NONE PRSNT: CPT | Mod: HCNC,CPTII,S$GLB, | Performed by: PSYCHIATRY & NEUROLOGY

## 2022-03-22 PROCEDURE — 1157F PR ADVANCE CARE PLAN OR EQUIV PRESENT IN MEDICAL RECORD: ICD-10-PCS | Mod: HCNC,CPTII,S$GLB, | Performed by: PSYCHIATRY & NEUROLOGY

## 2022-03-22 PROCEDURE — 99215 OFFICE O/P EST HI 40 MIN: CPT | Mod: HCNC,S$GLB,, | Performed by: PSYCHIATRY & NEUROLOGY

## 2022-03-22 PROCEDURE — 99999 PR PBB SHADOW E&M-EST. PATIENT-LVL IV: CPT | Mod: PBBFAC,HCNC,, | Performed by: PSYCHIATRY & NEUROLOGY

## 2022-03-22 PROCEDURE — 99499 UNLISTED E&M SERVICE: CPT | Mod: HCNC,S$GLB,, | Performed by: PSYCHIATRY & NEUROLOGY

## 2022-03-22 PROCEDURE — 1126F PR PAIN SEVERITY QUANTIFIED, NO PAIN PRESENT: ICD-10-PCS | Mod: HCNC,CPTII,S$GLB, | Performed by: PSYCHIATRY & NEUROLOGY

## 2022-03-22 PROCEDURE — 99499 RISK ADDL DX/OHS AUDIT: ICD-10-PCS | Mod: HCNC,S$GLB,, | Performed by: PSYCHIATRY & NEUROLOGY

## 2022-03-22 PROCEDURE — 99417 PROLNG OP E/M EACH 15 MIN: CPT | Mod: HCNC,S$GLB,, | Performed by: PSYCHIATRY & NEUROLOGY

## 2022-03-22 PROCEDURE — 3072F LOW RISK FOR RETINOPATHY: CPT | Mod: HCNC,CPTII,S$GLB, | Performed by: PSYCHIATRY & NEUROLOGY

## 2022-03-22 PROCEDURE — 3075F SYST BP GE 130 - 139MM HG: CPT | Mod: HCNC,CPTII,S$GLB, | Performed by: PSYCHIATRY & NEUROLOGY

## 2022-03-22 PROCEDURE — 99999 PR PBB SHADOW E&M-EST. PATIENT-LVL IV: ICD-10-PCS | Mod: PBBFAC,HCNC,, | Performed by: PSYCHIATRY & NEUROLOGY

## 2022-03-22 PROCEDURE — 3078F PR MOST RECENT DIASTOLIC BLOOD PRESSURE < 80 MM HG: ICD-10-PCS | Mod: HCNC,CPTII,S$GLB, | Performed by: PSYCHIATRY & NEUROLOGY

## 2022-03-22 RX ORDER — DICLOFENAC SODIUM 1 MG/ML
1 SOLUTION/ DROPS OPHTHALMIC 4 TIMES DAILY
COMMUNITY
End: 2023-04-20

## 2022-03-22 NOTE — PROGRESS NOTES
[facility]    Name: Alisia Gusman  : 1945  MRN: [id number]    Date: 3/22/2022    Initial evaluation    Assessment:     Ms. Gusman is a 76 y.o. female with a history of meningioma s/p resection, HTN, HLD, DM2, breast CA, and PE who presents to the [facility] due to cognitive impairment and delusions.  Patient was found to have a 4.2 x 4.6 x 3.3 cm meningioma involving the right anterior frontal lobe as well as a smaller left frontal meningioma.  Right frontal meningioma was resected on 11/15/21.  After discharge, the patient was noted to have substantial memory deficits and began experiencing delusions that people were coming into her home and stealing her things.  In addition trouble with memory and behavior, she has reportedly had trouble with language, cognitive processing speed, attention, planning, problem solving, and judgment. On today's evaluation, MMSE was 23/30 with points lost on orientation to time (-2), attention (-3), delayed recall (-3), and 3-step command (-1). MRI performed 22 was reviewed and significant for mild generalized volume loss without regional predominance. Right frontal postsurgical changes are noted. There is T2/FLAIR hyperintensity of the supratentorial and pontine white matter consistent with mild chronic microvascular ischemia.     Patient's cognitive impairment and delusions are likely associated with right frontal meningioma and/or surgical resection. Right frontal lesions can result in impaired attention, working memory, and executive functioning. These cognitive processes are necessary for effective encoding and retrieval of information and impairment can indirectly lead to memory deficits. Additionally, right frontal lobe lesions are often associated with delusions.     Recommendations:     Workup  No additional workup necessary for cognitive impairment.     Treatment  Would avoid cholinesterase inhibitor given QTc prolongation  as high as 559 ms. Cholinesterase inhibitors will prolong QT further.   Recommend Seroquel 25-50 mg BID to help with delusions.   Avoid benzodiazepines, anticholinergics, and opioids as these may worsen cognition.    Safety-related  We recommend that a medication management system be put in place to avoid errors in taking medication. Helpful services include [employer] (pillpack.com; free service) and [employer] ([employer]; paid subscription service).  We recommend that Ms. Gusman not drive.   Recommend supervision.    Health maintenance   Continue to follow-up with primary care doctor to monitor and treat vascular risk factors.  Recommend performing moderate-intensity cardiovascular exercise as able, ideally 30 minutes per day, 5 days per week.  Recommend staying socially and cognitively active.  Recommend eating a healthy and balanced diet (Mediterranean or DASH diet).     Follow-up: Follow up in about 4 weeks (around 4/19/2022). I provided Ms. Gusman and her granddaughter Stella our contact information should they have any questions or concerns.    Subjective:      Chief complaint:  Memory Deficits    Consultation requested by: Dr. Selena Montemayor    History of present illness:  Ms. Gusman is a 76 y.o. female with a history of meningioma s/p resection, HTN, HLD, DM2, breast CA, and PE who presents today to the [facility] due to concerns regarding cognitive impairment. Ms. Gusman is accompanied by her granddaughter, Stella, who participates in providing history. Additional information is obtained by reviewing available medical records.     In November 2021, the patient began experiencing generalized weakness which persisted for several days and fell on 11/06/2021 while walking around her home.  The patient's daughter-in-law checked on her that evening and found her on the floor.  Her son noted that she was acting strangely earlier that day.  Patient was transported to the hospital and was found  to have AMS and decreased strength in all extremities.  Head CT followed by brain MRI were performed on 11/06/2021 and revealed a 4.2 x 4.6 x 3.3 cm right frontoparietal homogeneously enhancing mass consistent with meningioma.  There is also substantial vasogenic edema throughout the right frontal and parietal lobes and mass effect with 5 mm leftward midline shift and subfalcine herniation.  Additionally, there was a 0.8 cm left frontal homogeneously enhancing lesion also consistent with meningioma.  She underwent resection of right meningioma 11/15/2021 and was discharged on 11/24/2021.  On day of discharge, no neuro deficits were noted on exam.    Patient had relatively mild trouble with memory prior to meningioma resection, but since undergoing resection of meningioma she has reportedly experienced substantial worsening of cognitive decline.  The patient's granddaughter reports that the patient has had trouble recalling recent events and conversations which leads to repeating questions.  She also reportedly forgets to take medication and loses items around her home.  She often forgets things that she should be able to recall without issue, such as the names of her siblings.  The patient has 7 siblings, but was only able to recall 4 of them during her evaluation today.  Her cognitive processing speed has slowed and she has difficulty with attention, planning, and problem solving.  Comprehension is also impaired which impacts her ability to understand certain concepts.  For example, a window broke in her bathroom and she wanted to make an insurance claim on it, however, and her daughter explained to her that it would be more reasonable just to pay for the window out-of-pocket because the insurance deductible is higher than the cash price of the window.  The patient was unable to understand this idea and continued to insist on filing an insurance claim.  She has had increased difficulty with word finding and  occasionally makes word substitutions, though this does not significantly impair her ability to communicate.  She is much more easily frustrated than she was in the past.  She admits to worsening anxiety, but denies depression.  However, Stella says that the patient has been increasingly depressed recently.  The patient says that she sleeps well and may wake once during the night to use the restroom.     The patient has reportedly experienced delusions since meningioma resection and reports that people have been entering her home and stealing her clothes. She says that she has never seen these people in her home but knows that they are there because she can hear her floor creaking when they walk. She says that they sleep all day in the bed but they cover their face with the pillow. She says that she has pulled the covers off of the people she thinks are in her bed, but they instantly disappear.  She reports that somehow these people got her keys, which she thinks occurred because she left her purse at home when she was in the hospital. She reports that these unknown people park in front of her house every night, which makes her very anxious because they have her keys.  She has [employer] security and says that she does not know how they would get into her home. She is reportedly telling everyone she knows about this.     Lennie, daughter, [phone].  Called 3/23.  Lennie says that she thinks the patient is actually experiencing hallucinations in addition to delusions.  She says the patient will talk about a woman coming in and out of her home and then leaving in a yellow car at night.    Review of systems:  Memory  Difficulty recalling recent events: Yes  Difficulty recalling recent conversations: Yes  Repeats questions and statements: Yes  Forgets medication: Yes  Loses items: Yes    Executive function  Slowed cognitive processing speed: Yes   Difficulty with attention and concentration: Yes   Difficulty with  judgment and/or problem solving: Yes   Difficulty with planning and/or organization: Yes     Language  Difficulty with fluency and tone: No  Word-finding difficulties: Yes  Word substitutions: Yes     Visuospatial ability  Difficulty navigating: Yes   Becomes lost in familiar places: Yes   Difficulty recognizing objects or faces: No    Behavior  Disinhibition: No  Irritability: Yes - easily frustrated   Apathy: No  Hygiene: No  Appetite: No  Depression: Patient says no, granddaughter says yes.   Anxiety: Yes   Hallucinations: Does not seem so. Seems as if it is all delusions.   Delusions: Yes     Motor  Difficulty walking: Yes - uses cane but does not have significant difficulty.   Imbalance: Yes - mild  Falls: No  Weakness: No  Trouble with fine motor movements: No  Tremor: No  Involuntary muscle movement: No  Difficulty swallowing: No    Functional status:   iADLs:  Household chores: independent  Meal preparation: dependent  Medication management: dependent  Shopping: independent  Managing money: independent  Transportation: dependent  Telephoning/communication: independent    ADLs:  Transferring: independent  Feeding: independent  Hygiene: independent  Toileting: independent  Bathing: independent  Dressing: independent    Past Medical History:   Diagnosis Date    Acute pulmonary embolism without acute cor pulmonale 11/15/2021    Adult bronchiectasis 7/26/2017    Allergy     Anemia     Arthritis     Asthma     Breast cancer 1993    left w/ radiation     Breast cancer 2020    IDC, left mastectomy    Cancer 1993    L eft breast    Cataract     Chronic cervical radiculopathy 9/20/2012    Diabetes mellitus     Diabetes mellitus type II     Diabetes with neurologic complications     Diverticulosis     Dry eyes     Dysphagia     after knee surgery June 2014    GERD (gastroesophageal reflux disease)     Hyperlipidemia     Hypertension     Neuropathy     feet    Scalp tenderness     Sepsis 12/12/2021    Shortness of  breath     Ulcer      Past Surgical History:   Procedure Laterality Date    BREAST BIOPSY Left 1993    BREAST LUMPECTOMY Left 1993    CARPAL TUNNEL RELEASE Bilateral 2011    CATARACT EXTRACTION W/  INTRAOCULAR LENS IMPLANT Bilateral 2013 and 2014    CHOLECYSTECTOMY      COLONOSCOPY N/A 11/6/2015    Procedure: COLONOSCOPY;  Surgeon: Major Michelle MD;  Location: [facility] ENDO (4TH FLR);  Service: Endoscopy;  Laterality: N/A;    COLONOSCOPY N/A 5/8/2019    Procedure: COLONOSCOPY;  Surgeon: Major Michelle MD;  Location: [facility] ENDO (4TH FLR);  Service: Endoscopy;  Laterality: N/A;    CRANIOTOMY USING FRAMELESS STEREOTAXY Right 11/15/2021    Procedure: Right Frontal Craniotomy for Meningioma with  Stealth Navigation;  Surgeon: Moiz Hutchison DO;  Location: [address];  Service: Neurosurgery;  Laterality: Right;    EYE SURGERY      HYSTERECTOMY      partial hyst    INJECTION FOR SENTINEL NODE IDENTIFICATION Left 2/20/2020    Procedure: INJECTION, FOR SENTINEL NODE IDENTIFICATION;  Surgeon: Hamida Nieves MD;  Location: [address];  Service: General;  Laterality: Left;    JOINT REPLACEMENT Left June 2014    knee    MASTECTOMY Left 2020    SENTINEL LYMPH NODE BIOPSY Left 2/20/2020    Procedure: BIOPSY, LYMPH NODE, SENTINEL;  Surgeon: Hamida Nieves MD;  Location: [address];  Service: General;  Laterality: Left;    UNILATERAL MASTECTOMY Left 2/20/2020    Procedure: MASTECTOMY, UNILATERAL;  Surgeon: Hamida Nieves MD;  Location: [address];  Service: General;  Laterality: Left;    uvuloplasty       Family History   Problem Relation Age of Onset    Diabetes Mother     Diabetes Sister     Colon polyps Sister     Heart disease Father     No Known Problems Brother     No Known Problems Daughter     No Known Problems Son     Cancer Sister         breast and colon ca    Colon polyps Sister     Diabetes Sister     Cancer Sister         colon ca    Arthritis Sister     Psoriasis Sister     No Known Problems  "Daughter     Breast cancer Paternal Aunt     Stroke Sister     Seizures Sister     Diabetes Sister     Asthma Neg Hx     Emphysema Neg Hx     Lupus Neg Hx     Rheum arthritis Neg Hx     Melanoma Neg Hx     Colon cancer Neg Hx     Irritable bowel syndrome Neg Hx     Inflammatory bowel disease Neg Hx     Stomach cancer Neg Hx     Esophageal cancer Neg Hx      Social History     Socioeconomic History    Marital status:    Occupational History    Occupation: Retired   Tobacco Use    Smoking status: Never Smoker    Smokeless tobacco: Never Used   Substance and Sexual Activity    Alcohol use: No    Drug use: No    Sexual activity: Not Currently     Partners: Male   Social History Narrative    No assistance w/ ADLs. 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 Lives with  at home. 3 children who live nearby.      Current Outpatient Medications:     ACCU-CHEK DOMENICO PLUS METER Misc, USE AS DIRECTED, Disp: 1 each, Rfl: 1    ACCU-CHEK DOMENICO PLUS TEST STRP Strp, CHECK BLOOD SUGAR THREE TIMES DAILY AS DIRECTED, Disp: 300 strip, Rfl: 3    ACCU-CHEK SOFTCLIX LANCETS Misc, 1 each by Misc.(Non-Drug; Combo Route) route 3 (three) times daily., Disp: 270 each, Rfl: 3    amLODIPine (NORVASC) 5 MG tablet, Take 1 tablet (5 mg total) by mouth once daily. DO NOT TAKE THIS MEDICATION UNTIL TOLD TO RESTART BY PHYSICIAN, Disp: 30 tablet, Rfl: 11    anastrozole (ARIMIDEX) 1 mg Tab, TAKE 1 TABLET EVERY DAY, Disp: 90 tablet, Rfl: 3    apixaban (ELIQUIS) 5 mg Tab, Take 1 tablet (5 mg total) by mouth 2 (two) times daily., Disp: 60 tablet, Rfl: 1    atorvastatin (LIPITOR) 40 MG tablet, Take 40 mg by mouth nightly., Disp: , Rfl:     BD ALCOHOL SWABS PadM, 1 each 2 (two) times daily., Disp: , Rfl:     BD INSULIN SYRINGE ULTRA-FINE 1 mL 31 gauge x 5/16 Syrg, TO USE  TWICE DAILY WITH  INSULIN, Disp: 100 each, Rfl: 11    BD VEO INSULIN SYRINGE UF 0.3 mL 31 gauge x 15/64" Syrg, USE  " "TO INJECT TWICE DAILY, Disp: 200 Syringe, Rfl: 3    carvediloL (COREG) 6.25 MG tablet, Take 1 tablet (6.25 mg total) by mouth 2 (two) times daily., Disp: 90 tablet, Rfl: 3    cyanocobalamin-cobamamide (B-12 PLUS) 5,000-100 mcg Subl, Take 5000 mcg daily., Disp: 90 tablet, Rfl: 3    diclofenac (VOLTAREN) 0.1 % ophthalmic solution, 1 drop 4 (four) times daily. For feet, Disp: , Rfl:     famotidine (PEPCID) 20 MG tablet, TAKE 1 TABLET (20 MG TOTAL) BY MOUTH EVERY EVENING., Disp: 90 tablet, Rfl: 3    flash glucose scanning reader (FREESTYLE GERARDO 14 DAY READER) Misc, 1 each by Misc.(Non-Drug; Combo Route) route once daily., Disp: 1 each, Rfl: 0    flash glucose sensor (FREESTYLE GERARDO 14 DAY SENSOR) Kit, 2 each by Misc.(Non-Drug; Combo Route) route every 14 (fourteen) days., Disp: 2 kit, Rfl: 11    FOLIC ACID/MULTIVIT-MIN/LUTEIN (CENTRUM SILVER ORAL), Take 1 tablet by mouth once daily., Disp: , Rfl:     furosemide (LASIX) 20 MG tablet, Take 1 tablet (20 mg total) by mouth daily as needed (leg swelling)., Disp: 30 tablet, Rfl: 0    insulin (LANTUS SOLOSTAR U-100 INSULIN) glargine 100 units/mL (3mL) SubQ pen, Inject 28 Units into the skin once daily., Disp: 9 mL, Rfl: 3    insulin aspart U-100 (NOVOLOG) 100 unit/mL (3 mL) InPn pen, Inject 14 Units into the skin 3 (three) times daily with meals., Disp: , Rfl: 0    insulin aspart U-100 (NOVOLOG) 100 unit/mL (3 mL) InPn pen, Inject 0-5 Units into the skin before meals and at bedtime as needed (Hyperglycemia)., Disp: , Rfl: 0    OZEMPIC 1 mg/dose (4 mg/3 mL), INJECT 1MG (0.75 ML) UNDER THE SKIN EVERY 7 DAYS., Disp: 4 pen, Rfl: 11    pen needle, diabetic (BD ULTRA-FINE SUSIE PEN NEEDLE) 32 gauge x 5/32" Ndle, USE  TO  INJECT  Weekly, Disp: 90 each, Rfl: 3    semaglutide (OZEMPIC) 1 mg/dose (2 mg/1.5 mL) Cherelle, Inject 0.75 mLs into the skin every 7 days., Disp: 9 mL, Rfl: 3    Allergies:  Grass pollen-june grass standard, Losartan, Nubain [nalbuphine], and Sinus & allergy " "[chlorpheniramine-phenylephrine]    Objective:     Vital signs:  Blood pressure 134/77, pulse 98, height 5' 2" (1.575 m), weight 69.1 kg (152 lb 5.4 oz).    NEUROLOGICAL EXAMINATION:     MENTAL STATUS   Oriented to person.   Oriented to place.   Disoriented to time. Disoriented to year and day. Oriented to month, date and season.   Attention: normal. Concentration: normal.   Speech: speech is normal   Level of consciousness: alert  Able to name object (Named 4/6 objects). Able to read (Read 6/6 words and 5/5 sentences. Struggled while reading sentences, but did not make any errors.). Able to repeat. Able to write. Normal comprehension. Praxis: normal     CRANIAL NERVES     CN II   Visual fields full to confrontation.     CN III, IV, VI   Pupils are equal, round, and reactive to light.  Extraocular motions are normal.   Nystagmus: none   Diplopia: none  Ophthalmoparesis: none  Upgaze: normal  Downgaze: normal    CN V   Facial sensation intact.     CN VII   Facial expression full, symmetric.     CN VIII   Hearing: intact    CN IX, X   Palate: symmetric    CN XI   Right sternocleidomastoid strength: normal  Left sternocleidomastoid strength: normal  Right trapezius strength: normal  Left trapezius strength: normal    CN XII   Tongue: not atrophic  Fasciculations: absent  Tongue deviation: none    MOTOR EXAM   Muscle bulk: normal  Overall muscle tone: normal    Strength   Strength 5/5 throughout.     REFLEXES     Reflexes   Reflexes 2+ except as noted.   Right Dorado: absent  Left Dorado: absent    SENSORY EXAM   Light touch normal.   Vibration normal.     GAIT AND COORDINATION     Gait  Gait: normal     Coordination   Finger to nose coordination: normal    Tremor   Resting tremor: absent  Intention tremor: absent    Mini Mental State Examination (MMSE):  Task Response Score   Orientation to Time        Year Incorrect - "2021"       Season Incorrect - "Fall"       Month Correct       Day Correct       Date Correct " "Total: 3/5   Orientation to Place        Location Correct       State Correct       Parish Correct       City Correct       Floor Correct Total: 5/5   Immediate Recall        Ball Correct       Flag Correct       Tree Correct Total: 3/3   Attention        D Correct       L Correct       R Incorrect       O Incorrect       W Incorrect Total: 2/5   Naming        Watch Correct       Pencil Correct Total: 2/2   Repetition        "No ifs, ands, or buts." Correct Total: 1/1   Delayed Recall        Ball Correct       Riana Incorrect       Tree Correct Total: 2/3   3-Step Command        Takes paper in right hand Incorrect       Folds paper in half Correct       Puts paper on floor Correct Total: 3/3   Reading        Closes eyes Correct Total: 1/1   Writing        Writes sentence Correct Total: 1/1   Copying        Draws polygons Correct Total: 1/1          Total Score: 23/30     Neuroimaging:  Results for orders placed or performed during the hospital encounter of 12/11/21   CT Head Without Contrast    Narrative    EXAMINATION:  CT HEAD WITHOUT CONTRAST    CLINICAL HISTORY:  Mental status change, unknown cause;Headache, new or worsening (Age >= 50y);    TECHNIQUE:  Low dose axial images were obtained through the head.  Coronal and sagittal reformations were also performed. Contrast was not administered.    COMPARISON:  11/19/2021.    FINDINGS:  Postoperative changes of right parietal craniotomy for previous tumor resection.  Persistent hypoattenuation/edema in the right cerebral hemisphere which overall appears mildly improved when compared with the previous study.  No new acute territorial infarct, new mass effect, acute parenchymal hemorrhage, or worsening midline shift.  There is leftward midline shift of approximately 4 mm (previously 6 mm).  Brain parenchyma otherwise appears similar to the recent prior study.  Left frontal convexity extra-axial lesion is more conspicuous on prior contrast enhanced brain " MRI.    Ventricles are not significantly enlarged and appears similar to the prior exam.    No displaced calvarial fracture.  Postoperative changes in the right calvarium.    Visualized paranasal sinuses and mastoid air cells are essentially clear.      Impression    Postoperative changes of right parietal craniotomy for prior tumor resection.  Continued but mildly improved hypoattenuation/edema throughout the right cerebral hemisphere with improved mass effect and decreased leftward midline shift when compared with 11/19/2021.    No acute intracranial hemorrhage or worsening mass effect.      Electronically signed by: Richard Sweeney MD  Date:    12/11/2021  Time:    23:35   Results for orders placed or performed during the hospital encounter of 11/06/21   MRI Brain W WO Contrast    Narrative    EXAMINATION:  MRI BRAIN W WO CONTRAST    CLINICAL HISTORY:  sp crani tumor;    TECHNIQUE:  Sagittal and axial T1, axial T2, axial FLAIR, axial gradient, axial diffusion imaging of the whole brain pre-contrast with postcontrast axial T1 and axial spoiled gradient imaging reformatted in the coronal and sagittal planes.. Nine ml of Gadavist injected intravenously.    COMPARISON:  11/06/2021    FINDINGS:  Interval operative changes status post bifrontal craniotomy for right frontal lesion resection.  There is a thin hyperintense FLAIR extra-axial collection overlying the right frontal lobe most pronounced along the right aspect interhemispheric fissure suggestive for small volume postoperative subdural hemorrhage this measures 2-3 mm in thickness.  Heterogeneous signal underlying the craniotomy and extending to the right frontal sulci suggestive for postoperative hemorrhage in fluid.  There is susceptibility extending from the operative bed into the parenchyma of the right frontal lobe to the right lateral ventricle with previous tubular enhancement in this region which may represent hypertrophied vasculature.    There is  restricted diffusion within the right frontal lobe underlying the resection bed which may be component of postoperative hemorrhage and artifact however concerning for postoperative ischemia/infarction.    There is continued large component of vasogenic edema like signal within the right frontal lobe which extends into the right parietal lobe and basal ganglia small component extending across midline through the anterior body of the corpus callosum    Continued mass effect and compression of the lateral ventricles although slightly reduced with continued leftward midline shift of approximately 3 mm at the level septum pellucidum previously measuring some 0.8 cm.  There is increased size of the ventricles specifically lateral ventricles and 3rd ventricle suggestive for re-expansion without dilatation of the temporal horns to suggest definite developing hydrocephalus.  Allowing for heterogeneous signal related to postoperative blood products within the postoperative bed there is no definite nodular enhancement to suggest significant residual lesion.    Separate extra-axial lesion overlying the left frontal lobe is unchanged measuring approximately 1.2 cm in greatest transverse dimension image 17 series 16.      Impression    Interval operative change status post bifrontal craniotomy for right parasagittal frontal lesion resection allowing for scattered postoperative hemorrhage within about the resection bed there is no evidence for nodular enhancement to suggest residual enhancing lesion.  There is moderate diffusion signal abnormality within the right frontal lobe about the resection bed concerning for postoperative ischemia/infarction.    Continued large edema signal abnormality within the right frontal lobe extending into the right basal ganglia and right parietal lobe with trace component extending into the corpus callosum.    Continue though reduced mass effect with slight re-expansion of the ventricles and  reduction of midline shift.    Small stable extra-axial lesion overlying the left frontal lobe unchanged.    Clinical correlation and follow-up advised.      Electronically signed by: Jose Carrillo DO  Date:    11/16/2021  Time:    08:41     *Note: Due to a large number of results and/or encounters for the requested time period, some results have not been displayed. A complete set of results can be found in Results Review.     Laboratory studies:  No visits with results within 6 Week(s) from this visit.   Latest known visit with results is:   Lab Visit on 02/08/2022   Component Date Value Ref Range Status    RBC, UA 02/08/2022 30 (A) 0 - 4 /hpf Final    WBC, UA 02/08/2022 >100 (A) 0 - 5 /hpf Final    WBC Clumps, UA 02/08/2022 Occasional (A) None-Rare Final    Bacteria 02/08/2022 Many (A) None-Occ /hpf Final    Squam Epithel, UA 02/08/2022 6  /hpf Final    Ca Oxalate Dariela, UA 02/08/2022 Many (A) None-Moderate Final    Microscopic Comment 02/08/2022 SEE COMMENT   Final    Comment: Other formed elements not mentioned in the report are not   present in the microscopic examination.       Specimen UA 02/08/2022 Urine, Unspecified   Final    Color, UA 02/08/2022 Yellow  Yellow, Straw, Sherry Final    Appearance, UA 02/08/2022 Cloudy (A) Clear Final    pH, UA 02/08/2022 5.0  5.0 - 8.0 Final    Specific Gravity, UA 02/08/2022 1.025  1.005 - 1.030 Final    Protein, UA 02/08/2022 Negative  Negative Final    Comment: Recommend a 24 hour urine protein or a urine   protein/creatinine ratio if globulin induced proteinuria is  clinically suspected.      Glucose, UA 02/08/2022 Negative  Negative Final    Ketones, UA 02/08/2022 Negative  Negative Final    Bilirubin (UA) 02/08/2022 Negative  Negative Final    Occult Blood UA 02/08/2022 Negative  Negative Final    Nitrite, UA 02/08/2022 Positive (A) Negative Final    Leukocytes, UA 02/08/2022 3+ (A) Negative Final    Urine Culture, Routine 02/08/2022  (A)  Final                     Value:KLEBSIELLA PNEUMONIAE  50,000 - 99,999 cfu/ml                         I performed a total of 99 minutes on Ms. Gusman's care on the day of their visit excluding time spent related to any billed procedures. This time includes time spent with the patient as well as time spent documenting in the medical record, reviewing patient's records and tests, obtaining history, placing orders, communicating with other healthcare professionals, counseling the patient, family, or caregiver, and/or care coordination for the diagnoses above.    This note was dictated using WagglModal Fluency speech recognition software. Please excuse any spelling or grammatical errors. Word recognition mistakes are occasionally missed on review.      Andre Santos MD   Behavioral Neurology & Neuropsychiatry  [facility]

## 2022-03-23 NOTE — PROGRESS NOTES
Subjective:      Patient ID: Alisia Gusman is a 76 y.o. female.    Chief Complaint: PCP (Ginette Sheth PA-C 12/27/21), Diabetic Foot Exam (Foot swelling ), Nail Care, Callouses, and Foot Pain (Rt foot )    Alisia is a 76 y.o. female who presents to the clinic for evaluation and treatment of high risk feet. Alisia has a past medical history of Acute pulmonary embolism without acute cor pulmonale (11/15/2021), Adult bronchiectasis (7/26/2017), Allergy, Anemia, Arthritis, Asthma, Breast cancer (1993), Breast cancer (2020), Cancer (1993), Cataract, Chronic cervical radiculopathy (9/20/2012), Diabetes mellitus, Diabetes mellitus type II, Diabetes with neurologic complications, Diverticulosis, Dry eyes, Dysphagia, GERD (gastroesophageal reflux disease), Hyperlipidemia, Hypertension, Neuropathy, Scalp tenderness, Sepsis (12/12/2021), Shortness of breath, and Ulcer. The patient's chief complaint is HRFC  This patient has documented high risk feet requiring routine maintenance secondary to diabetes mellitis and those secondary complications of diabetes, as mentioned..    PCP: Selena Montemayor MD    Date Last Seen by PCP:   Chief Complaint   Patient presents with    PCP     Ginette Sheth PA-C 12/27/21    Diabetic Foot Exam     Foot swelling     Nail Care    Callouses    Foot Pain     Rt foot          Hemoglobin A1C   Date Value Ref Range Status   02/08/2022 6.9 (H) 4.0 - 5.6 % Final     Comment:     ADA Screening Guidelines:  5.7-6.4%  Consistent with prediabetes  >or=6.5%  Consistent with diabetes    High levels of fetal hemoglobin interfere with the HbA1C  assay. Heterozygous hemoglobin variants (HbS, HgC, etc)do  not significantly interfere with this assay.   However, presence of multiple variants may affect accuracy.     11/06/2021 7.5 (H) 4.0 - 5.6 % Final     Comment:     ADA Screening Guidelines:  5.7-6.4%  Consistent with prediabetes  >or=6.5%  Consistent with diabetes    High levels of fetal hemoglobin interfere  "with the HbA1C  assay. Heterozygous hemoglobin variants (HbS, HgC, etc)do  not significantly interfere with this assay.   However, presence of multiple variants may affect accuracy.     10/29/2021 7.5 (H) 4.0 - 5.6 % Final     Comment:     ADA Screening Guidelines:  5.7-6.4%  Consistent with prediabetes  >or=6.5%  Consistent with diabetes    High levels of fetal hemoglobin interfere with the HbA1C  assay. Heterozygous hemoglobin variants (HbS, HgC, etc)do  not significantly interfere with this assay.   However, presence of multiple variants may affect accuracy.         Review of Systems   Constitutional: Negative for chills, decreased appetite and fever.   Cardiovascular: Positive for leg swelling (L>R stable ).   Respiratory: Negative for cough and shortness of breath.    Skin: Positive for dry skin and nail changes. Negative for color change, flushing, itching, poor wound healing, rash and skin cancer.   Musculoskeletal: Negative for arthritis, back pain, falls, joint pain, joint swelling and myalgias.   Gastrointestinal: Negative for nausea and vomiting.   Neurological: Negative for loss of balance, numbness and paresthesias.   All other systems reviewed and are negative.          Objective:       Vitals:    03/15/22 0939   BP: (!) 160/91   Pulse: 90   Resp: 18   Weight: 69.9 kg (154 lb)   Height: 5' 2" (1.575 m)   PainSc:   3   PainLoc: Foot        Physical Exam  Vitals and nursing note reviewed.   Constitutional:       Appearance: She is well-developed.   Cardiovascular:      Pulses:           Dorsalis pedis pulses are 2+ on the right side and 2+ on the left side.        Posterior tibial pulses are 1+ on the right side and 1+ on the left side.      Comments: Dorsalis pedis and posterior tibial pulses are diminished Bilaterally. Toes are cool to touch. Feet are warm proximally.There is decreased digital hair. Skin is atrophic, slightly hyperpigmented, and mildly edematous      Musculoskeletal:         General: " Swelling present. No tenderness, deformity or signs of injury. Normal range of motion.      Right ankle: Normal.      Left ankle: Normal.      Right foot: No swelling, deformity or crepitus.      Left foot: No swelling, deformity or crepitus.      Comments: Adequate joint range of motion without pain, limitation, nor crepitation Bilateral feet and ankle joints. Muscle strength is 5/5 in all groups bilaterally.         Lymphadenopathy:      Comments: No palpable lymph nodes   Skin:     General: Skin is warm and dry.      Coloration: Skin is not ashen, cyanotic or pale.      Findings: No bruising, ecchymosis, erythema, lesion or rash.      Nails: There is no clubbing.      Comments: Nails x10 are elongated by  1-5mm's, thickened by 2-4 mm's, dystrophic, and are darkened in  coloration . Xerosis Bilaterally. No open lesions, lacerations or wounds noted    Hyperkeratotic tissue noted to x 2 distal hallux b/l       Neurological:      Mental Status: She is alert and oriented to person, place, and time.      Comments: [place] 5.07 monofilamant testing is diminished Vikash feet. Sharp/dull sensation diminished Bilaterally. Light touch absent Bilaterally.       Psychiatric:         Behavior: Behavior normal.             Assessment:       Encounter Diagnoses   Name Primary?    Type 2 diabetes mellitus with hyperglycemia, with long-term current use of insulin Yes    Onychomycosis due to dermatophyte     Corn or callus     Leg swelling          Plan:       Alisia was seen today for pcp, diabetic foot exam, nail care, callouses and foot pain.    Diagnoses and all orders for this visit:    Type 2 diabetes mellitus with hyperglycemia, with long-term current use of insulin    Onychomycosis due to dermatophyte    Corn or callus    Leg swelling      I counseled the patient on her conditions, their implications and medical management.    Shoe inspection. Diabetic Foot Education. Patient reminded of the importance of good  nutrition and blood sugar control to help prevent podiatric complications of diabetes. Patient instructed on proper foot hygeine. We discussed wearing proper shoe gear, daily foot inspections, never walking without protective shoe gear, never putting sharp instruments to feet    - With patient's permission, nails were aggressively reduced and debrided x 10 to their soft tissue attachment mechanically and with electric , removing all offending nail and debris. Patient relates relief following the procedure. She will continue to monitor the areas daily, inspect her feet, wear protective shoe gear when ambulatory, moisturizer to maintain skin integrity and follow in this office in approximately 2-3 months, sooner p.r.n.    - After cleansing the  area w/ alcohol prep pad the above mentioned hyperkeratosis was trimmed utilizing No 15 scapel, to a smooth base with out incident. Patient tolerated this  well and reported comfort x2     - Discussed etiology of swelling at length.

## 2022-03-29 ENCOUNTER — PATIENT MESSAGE (OUTPATIENT)
Dept: PRIMARY CARE CLINIC | Facility: CLINIC | Age: 77
End: 2022-03-29
Payer: MEDICARE

## 2022-03-30 ENCOUNTER — OFFICE VISIT (OUTPATIENT)
Dept: PRIMARY CARE CLINIC | Facility: CLINIC | Age: 77
End: 2022-03-30
Payer: MEDICARE

## 2022-03-30 VITALS
HEIGHT: 62 IN | WEIGHT: 151.88 LBS | HEART RATE: 91 BPM | SYSTOLIC BLOOD PRESSURE: 138 MMHG | BODY MASS INDEX: 27.95 KG/M2 | DIASTOLIC BLOOD PRESSURE: 68 MMHG | OXYGEN SATURATION: 99 %

## 2022-03-30 DIAGNOSIS — Z98.890 H/O CRANIOTOMY: ICD-10-CM

## 2022-03-30 DIAGNOSIS — E11.9 DIABETES MELLITUS TYPE 2, INSULIN DEPENDENT: ICD-10-CM

## 2022-03-30 DIAGNOSIS — G47.00 INSOMNIA, UNSPECIFIED TYPE: Primary | ICD-10-CM

## 2022-03-30 DIAGNOSIS — Z79.4 DIABETES MELLITUS TYPE 2, INSULIN DEPENDENT: ICD-10-CM

## 2022-03-30 PROBLEM — D69.6 THROMBOCYTOPENIA: Status: RESOLVED | Noted: 2021-11-10 | Resolved: 2022-03-30

## 2022-03-30 PROBLEM — R74.8 ABNORMAL CPK: Status: RESOLVED | Noted: 2021-11-06 | Resolved: 2022-03-30

## 2022-03-30 PROBLEM — E87.6 HYPOKALEMIA: Status: RESOLVED | Noted: 2021-11-06 | Resolved: 2022-03-30

## 2022-03-30 PROBLEM — R26.9 ABNORMALITY OF GAIT AND MOBILITY: Status: RESOLVED | Noted: 2020-08-17 | Resolved: 2022-03-30

## 2022-03-30 PROBLEM — Z86.018 HISTORY OF MENINGIOMA: Status: ACTIVE | Noted: 2022-02-01

## 2022-03-30 PROBLEM — K57.20 COLONIC DIVERTICULAR ABSCESS: Status: RESOLVED | Noted: 2021-12-14 | Resolved: 2022-03-30

## 2022-03-30 PROBLEM — Z01.818 PREOPERATIVE CLEARANCE: Status: RESOLVED | Noted: 2021-11-10 | Resolved: 2022-03-30

## 2022-03-30 PROBLEM — R79.89 ELEVATED LACTIC ACID LEVEL: Status: RESOLVED | Noted: 2021-11-06 | Resolved: 2022-03-30

## 2022-03-30 PROBLEM — N39.0 URINARY TRACT INFECTION WITHOUT HEMATURIA: Status: RESOLVED | Noted: 2021-11-06 | Resolved: 2022-03-30

## 2022-03-30 PROBLEM — J47.9 ADULT BRONCHIECTASIS: Status: RESOLVED | Noted: 2017-07-26 | Resolved: 2022-03-30

## 2022-03-30 PROBLEM — E78.5 HYPERLIPIDEMIA: Status: RESOLVED | Noted: 2021-11-06 | Resolved: 2022-03-30

## 2022-03-30 PROBLEM — Z87.898 HISTORY OF BACTEREMIA: Status: ACTIVE | Noted: 2021-11-09

## 2022-03-30 PROBLEM — R41.82 AMS (ALTERED MENTAL STATUS): Status: RESOLVED | Noted: 2021-12-12 | Resolved: 2022-03-30

## 2022-03-30 PROBLEM — C50.912 RECURRENT BREAST CANCER, LEFT: Status: RESOLVED | Noted: 2020-02-03 | Resolved: 2022-03-30

## 2022-03-30 PROBLEM — D49.6 BRAIN TUMOR: Status: RESOLVED | Noted: 2021-11-06 | Resolved: 2022-03-30

## 2022-03-30 PROCEDURE — 3075F PR MOST RECENT SYSTOLIC BLOOD PRESS GE 130-139MM HG: ICD-10-PCS | Mod: CPTII,S$GLB,, | Performed by: INTERNAL MEDICINE

## 2022-03-30 PROCEDURE — 3288F FALL RISK ASSESSMENT DOCD: CPT | Mod: CPTII,S$GLB,, | Performed by: INTERNAL MEDICINE

## 2022-03-30 PROCEDURE — 1160F PR REVIEW ALL MEDS BY PRESCRIBER/CLIN PHARMACIST DOCUMENTED: ICD-10-PCS | Mod: CPTII,S$GLB,, | Performed by: INTERNAL MEDICINE

## 2022-03-30 PROCEDURE — 99214 PR OFFICE/OUTPT VISIT, EST, LEVL IV, 30-39 MIN: ICD-10-PCS | Mod: S$GLB,,, | Performed by: INTERNAL MEDICINE

## 2022-03-30 PROCEDURE — 1159F MED LIST DOCD IN RCRD: CPT | Mod: CPTII,S$GLB,, | Performed by: INTERNAL MEDICINE

## 2022-03-30 PROCEDURE — 3288F PR FALLS RISK ASSESSMENT DOCUMENTED: ICD-10-PCS | Mod: CPTII,S$GLB,, | Performed by: INTERNAL MEDICINE

## 2022-03-30 PROCEDURE — 1160F RVW MEDS BY RX/DR IN RCRD: CPT | Mod: CPTII,S$GLB,, | Performed by: INTERNAL MEDICINE

## 2022-03-30 PROCEDURE — 1101F PT FALLS ASSESS-DOCD LE1/YR: CPT | Mod: CPTII,S$GLB,, | Performed by: INTERNAL MEDICINE

## 2022-03-30 PROCEDURE — 99999 PR PBB SHADOW E&M-EST. PATIENT-LVL V: CPT | Mod: PBBFAC,,, | Performed by: INTERNAL MEDICINE

## 2022-03-30 PROCEDURE — 1126F PR PAIN SEVERITY QUANTIFIED, NO PAIN PRESENT: ICD-10-PCS | Mod: CPTII,S$GLB,, | Performed by: INTERNAL MEDICINE

## 2022-03-30 PROCEDURE — 99214 OFFICE O/P EST MOD 30 MIN: CPT | Mod: S$GLB,,, | Performed by: INTERNAL MEDICINE

## 2022-03-30 PROCEDURE — 3075F SYST BP GE 130 - 139MM HG: CPT | Mod: CPTII,S$GLB,, | Performed by: INTERNAL MEDICINE

## 2022-03-30 PROCEDURE — 3078F DIAST BP <80 MM HG: CPT | Mod: CPTII,S$GLB,, | Performed by: INTERNAL MEDICINE

## 2022-03-30 PROCEDURE — 1101F PR PT FALLS ASSESS DOC 0-1 FALLS W/OUT INJ PAST YR: ICD-10-PCS | Mod: CPTII,S$GLB,, | Performed by: INTERNAL MEDICINE

## 2022-03-30 PROCEDURE — 1126F AMNT PAIN NOTED NONE PRSNT: CPT | Mod: CPTII,S$GLB,, | Performed by: INTERNAL MEDICINE

## 2022-03-30 PROCEDURE — 99999 PR PBB SHADOW E&M-EST. PATIENT-LVL V: ICD-10-PCS | Mod: PBBFAC,,, | Performed by: INTERNAL MEDICINE

## 2022-03-30 PROCEDURE — 1159F PR MEDICATION LIST DOCUMENTED IN MEDICAL RECORD: ICD-10-PCS | Mod: CPTII,S$GLB,, | Performed by: INTERNAL MEDICINE

## 2022-03-30 PROCEDURE — 3078F PR MOST RECENT DIASTOLIC BLOOD PRESSURE < 80 MM HG: ICD-10-PCS | Mod: CPTII,S$GLB,, | Performed by: INTERNAL MEDICINE

## 2022-03-30 RX ORDER — QUETIAPINE FUMARATE 25 MG/1
25 TABLET, FILM COATED ORAL NIGHTLY
Qty: 30 TABLET | Refills: 3 | Status: SHIPPED | OUTPATIENT
Start: 2022-03-30 | End: 2022-04-11
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
## 2022-04-11 ENCOUNTER — PATIENT MESSAGE (OUTPATIENT)
Dept: ENDOCRINOLOGY | Facility: CLINIC | Age: 77
End: 2022-04-11
Payer: MEDICARE

## 2022-04-11 ENCOUNTER — TELEPHONE (OUTPATIENT)
Dept: PRIMARY CARE CLINIC | Facility: CLINIC | Age: 77
End: 2022-04-11
Payer: MEDICARE

## 2022-04-11 ENCOUNTER — PATIENT MESSAGE (OUTPATIENT)
Dept: PRIMARY CARE CLINIC | Facility: CLINIC | Age: 77
End: 2022-04-11

## 2022-04-11 ENCOUNTER — PATIENT MESSAGE (OUTPATIENT)
Dept: PRIMARY CARE CLINIC | Facility: CLINIC | Age: 77
End: 2022-04-11
Payer: MEDICARE

## 2022-04-11 ENCOUNTER — OFFICE VISIT (OUTPATIENT)
Dept: PRIMARY CARE CLINIC | Facility: CLINIC | Age: 77
End: 2022-04-11
Payer: MEDICARE

## 2022-04-11 VITALS
TEMPERATURE: 99 F | HEIGHT: 62 IN | SYSTOLIC BLOOD PRESSURE: 145 MMHG | OXYGEN SATURATION: 100 % | WEIGHT: 151.25 LBS | BODY MASS INDEX: 27.83 KG/M2 | RESPIRATION RATE: 18 BRPM | DIASTOLIC BLOOD PRESSURE: 75 MMHG | HEART RATE: 91 BPM

## 2022-04-11 DIAGNOSIS — Z79.4 DIABETES MELLITUS TYPE 2, INSULIN DEPENDENT: ICD-10-CM

## 2022-04-11 DIAGNOSIS — R60.0 LOWER EXTREMITY EDEMA: ICD-10-CM

## 2022-04-11 DIAGNOSIS — I10 BENIGN ESSENTIAL HYPERTENSION: Primary | ICD-10-CM

## 2022-04-11 DIAGNOSIS — E11.9 DIABETES MELLITUS TYPE 2, INSULIN DEPENDENT: ICD-10-CM

## 2022-04-11 DIAGNOSIS — G47.00 INSOMNIA, UNSPECIFIED TYPE: ICD-10-CM

## 2022-04-11 PROCEDURE — 1159F PR MEDICATION LIST DOCUMENTED IN MEDICAL RECORD: ICD-10-PCS | Mod: CPTII,S$GLB,, | Performed by: INTERNAL MEDICINE

## 2022-04-11 PROCEDURE — 99214 OFFICE O/P EST MOD 30 MIN: CPT | Mod: S$GLB,,, | Performed by: INTERNAL MEDICINE

## 2022-04-11 PROCEDURE — 1126F PR PAIN SEVERITY QUANTIFIED, NO PAIN PRESENT: ICD-10-PCS | Mod: CPTII,S$GLB,, | Performed by: INTERNAL MEDICINE

## 2022-04-11 PROCEDURE — 1160F RVW MEDS BY RX/DR IN RCRD: CPT | Mod: CPTII,S$GLB,, | Performed by: INTERNAL MEDICINE

## 2022-04-11 PROCEDURE — 1101F PR PT FALLS ASSESS DOC 0-1 FALLS W/OUT INJ PAST YR: ICD-10-PCS | Mod: CPTII,S$GLB,, | Performed by: INTERNAL MEDICINE

## 2022-04-11 PROCEDURE — 1160F PR REVIEW ALL MEDS BY PRESCRIBER/CLIN PHARMACIST DOCUMENTED: ICD-10-PCS | Mod: CPTII,S$GLB,, | Performed by: INTERNAL MEDICINE

## 2022-04-11 PROCEDURE — 99999 PR PBB SHADOW E&M-EST. PATIENT-LVL V: CPT | Mod: PBBFAC,,, | Performed by: INTERNAL MEDICINE

## 2022-04-11 PROCEDURE — 99499 UNLISTED E&M SERVICE: CPT | Mod: S$GLB,,, | Performed by: INTERNAL MEDICINE

## 2022-04-11 PROCEDURE — 99999 PR PBB SHADOW E&M-EST. PATIENT-LVL V: ICD-10-PCS | Mod: PBBFAC,,, | Performed by: INTERNAL MEDICINE

## 2022-04-11 PROCEDURE — 1159F MED LIST DOCD IN RCRD: CPT | Mod: CPTII,S$GLB,, | Performed by: INTERNAL MEDICINE

## 2022-04-11 PROCEDURE — 1126F AMNT PAIN NOTED NONE PRSNT: CPT | Mod: CPTII,S$GLB,, | Performed by: INTERNAL MEDICINE

## 2022-04-11 PROCEDURE — 3078F DIAST BP <80 MM HG: CPT | Mod: CPTII,S$GLB,, | Performed by: INTERNAL MEDICINE

## 2022-04-11 PROCEDURE — 3078F PR MOST RECENT DIASTOLIC BLOOD PRESSURE < 80 MM HG: ICD-10-PCS | Mod: CPTII,S$GLB,, | Performed by: INTERNAL MEDICINE

## 2022-04-11 PROCEDURE — 3288F FALL RISK ASSESSMENT DOCD: CPT | Mod: CPTII,S$GLB,, | Performed by: INTERNAL MEDICINE

## 2022-04-11 PROCEDURE — 3288F PR FALLS RISK ASSESSMENT DOCUMENTED: ICD-10-PCS | Mod: CPTII,S$GLB,, | Performed by: INTERNAL MEDICINE

## 2022-04-11 PROCEDURE — 99214 PR OFFICE/OUTPT VISIT, EST, LEVL IV, 30-39 MIN: ICD-10-PCS | Mod: S$GLB,,, | Performed by: INTERNAL MEDICINE

## 2022-04-11 PROCEDURE — 1101F PT FALLS ASSESS-DOCD LE1/YR: CPT | Mod: CPTII,S$GLB,, | Performed by: INTERNAL MEDICINE

## 2022-04-11 PROCEDURE — 99499 RISK ADDL DX/OHS AUDIT: ICD-10-PCS | Mod: S$GLB,,, | Performed by: INTERNAL MEDICINE

## 2022-04-11 PROCEDURE — 3077F SYST BP >= 140 MM HG: CPT | Mod: CPTII,S$GLB,, | Performed by: INTERNAL MEDICINE

## 2022-04-11 PROCEDURE — 3077F PR MOST RECENT SYSTOLIC BLOOD PRESSURE >= 140 MM HG: ICD-10-PCS | Mod: CPTII,S$GLB,, | Performed by: INTERNAL MEDICINE

## 2022-04-11 RX ORDER — PRAVASTATIN SODIUM 40 MG/1
40 TABLET ORAL DAILY
COMMUNITY
Start: 2022-01-30 | End: 2022-04-11

## 2022-04-11 RX ORDER — QUETIAPINE FUMARATE 50 MG/1
50 TABLET, FILM COATED ORAL NIGHTLY
Qty: 90 TABLET | Refills: 3 | Status: SHIPPED | OUTPATIENT
Start: 2022-04-11 | End: 2022-04-19 | Stop reason: SDUPTHER

## 2022-04-11 RX ORDER — CARVEDILOL 12.5 MG/1
12.5 TABLET ORAL 2 TIMES DAILY WITH MEALS
Qty: 180 TABLET | Refills: 3 | Status: SHIPPED | OUTPATIENT
Start: 2022-04-11 | End: 2023-01-30

## 2022-04-11 RX ORDER — CETIRIZINE HYDROCHLORIDE 10 MG/1
10 TABLET ORAL NIGHTLY
Qty: 30 TABLET | Refills: 1 | Status: SHIPPED | OUTPATIENT
Start: 2022-04-11 | End: 2022-11-11

## 2022-04-11 RX ORDER — PRAVASTATIN SODIUM 40 MG/1
40 TABLET ORAL DAILY
Qty: 90 TABLET | Refills: 3 | Status: SHIPPED | OUTPATIENT
Start: 2022-04-11 | End: 2022-05-12

## 2022-04-11 NOTE — TELEPHONE ENCOUNTER
Spoke with Ms. Alisia. Let her know to expect [employer] car at around 11:30 to bring her to 's appt. Will call when car is on the way to make sure she gets into car.

## 2022-04-11 NOTE — PROGRESS NOTES
"Subjective:       Patient ID: Alisia Gusman is a 76 y.o. female.    Chief Complaint: follow up for DM    HPI   Pt brought in all her medicines. No atorvastatin and is on pravastatin.     Has tingling in the feet from DM. Takes lyrica 1 pill BID.     Taking seroquel 25mg nightly. Hasn't helped w/ insomnia/AH/VH per daughter's message. Pt reports lady still lives in the house and tends to move her meds or double cross her glucose log. Pt showed me her sugar logs    BLE edema that just started. No weight gain. No SOB/HICKS. Has lasix rx prn leg swelling. Sister helps w/ meds so not sure if she's taking.     Had cough that started a week ago. Productive of white sputum. No fevers/chills/SOB/HICKS. No wheezing. Taking benadryl congestion nightly. Helps.     Review of Systems  Comprehensive review of systems otherwise negative. See history/subjective section for more details.    Objective:      Physical Exam    BP (!) 145/75   Pulse 91   Temp 99 °F (37.2 °C) (Oral)   Resp 18   Ht 5' 2" (1.575 m)   Wt 68.6 kg (151 lb 3.8 oz)   LMP  (LMP Unknown) Comment: Partial  SpO2 100%   BMI 27.66 kg/m²     Gen - A+OX4, NAD. Paranoid thoughts.   HEENT - PERRL,O P clear. MMM.   NECK - NO LAD  CV - RRR, no m/r  Chest - Ctab, no wheezing/rhonchi/crackles  Abd - S/NT/ND/+BS  eXT - 2+ b pedal edema. 2+ B DP pulses.     Previous labs reviewed.     Assessment/Plan     Diagnoses and all orders for this visit:    Benign essential hypertension  -     carvediloL (COREG) 12.5 MG tablet; Take 1 tablet (12.5 mg total) by mouth 2 (two) times daily with meals.    Diabetes mellitus type 2, insulin dependent    Lower extremity edema    Insomnia, unspecified type  -     QUEtiapine (SEROQUEL) 50 MG tablet; Take 1 tablet (50 mg total) by mouth every evening.    Other orders  -     pravastatin (PRAVACHOL) 40 MG tablet; Take 1 tablet (40 mg total) by mouth once daily.  -     cetirizine (ZYRTEC) 10 MG tablet; Take 1 tablet (10 mg total) by mouth every " evening.    Stop benadryl. You can try zyrtec (cetirizine) 10mg nightly for congestion/cough.   Make sure you're taking your furosemide 20mg daily (previously was prescribed as needed for leg swelling).   I only see pravastatin in your medications today. Previously we prescribed atorvastatin. Make sure not to take both.   Ok to take pregabalin (Lyrica) twice daily.     Increase quetiapine (Seroquel) to 2 pills (50mg total) nightly.   Increase carvedilol (Coreg) from 6.25 to 12.5mg twice daily.     F/u 4/19/22 at 11:40am.      Selena Montemayor MD  Department of Internal Medicine - [facility]  12:27 PM

## 2022-04-11 NOTE — PATIENT INSTRUCTIONS
Stop benadryl. You can try zyrtec (cetirizine) 10mg nightly for congestion/cough.   Make sure you're taking your furosemide 20mg daily (previously was prescribed as needed for leg swelling).   I only see pravastatin in your medications today. Previously we prescribed atorvastatin. Make sure not to take both.   Ok to take pregabalin (Lyrica) twice daily.     Increase quetiapine (Seroquel) to 2 pills (50mg total) nightly.   Increase carvedilol (Coreg) from 6.25 to 12.5mg twice daily.

## 2022-04-18 ENCOUNTER — PATIENT OUTREACH (OUTPATIENT)
Dept: ADMINISTRATIVE | Facility: OTHER | Age: 77
End: 2022-04-18
Payer: MEDICARE

## 2022-04-18 ENCOUNTER — TELEPHONE (OUTPATIENT)
Dept: PRIMARY CARE CLINIC | Facility: CLINIC | Age: 77
End: 2022-04-18
Payer: MEDICARE

## 2022-04-18 NOTE — PROGRESS NOTES
Health Maintenance Due   Topic Date Due    Shingles Vaccine (1 of 2) Never done    Pneumococcal Vaccines (Age 65+) (3 - PPSV23 or PCV20) 09/09/2016    Eye Exam  12/16/2021    COVID-19 Vaccine (4 - Booster for Pfizer series) 05/01/2022     Updates were requested from care everywhere.  Chart was reviewed for overdue [facility]active [facility] Encounters ([facility]) topics (CRS, Breast Cancer Screening, Eye exam)  Health Maintenance has been updated.  LINKS immunization registry triggered.  Immunizations were reconciled.

## 2022-04-19 ENCOUNTER — OFFICE VISIT (OUTPATIENT)
Dept: PRIMARY CARE CLINIC | Facility: CLINIC | Age: 77
End: 2022-04-19
Payer: MEDICARE

## 2022-04-19 ENCOUNTER — OFFICE VISIT (OUTPATIENT)
Dept: ENDOCRINOLOGY | Facility: CLINIC | Age: 77
End: 2022-04-19
Payer: MEDICARE

## 2022-04-19 ENCOUNTER — TELEPHONE (OUTPATIENT)
Dept: PRIMARY CARE CLINIC | Facility: CLINIC | Age: 77
End: 2022-04-19

## 2022-04-19 VITALS
TEMPERATURE: 98 F | WEIGHT: 152.31 LBS | DIASTOLIC BLOOD PRESSURE: 72 MMHG | OXYGEN SATURATION: 99 % | HEART RATE: 90 BPM | BODY MASS INDEX: 28.03 KG/M2 | HEIGHT: 62 IN | SYSTOLIC BLOOD PRESSURE: 132 MMHG

## 2022-04-19 VITALS
WEIGHT: 153.44 LBS | DIASTOLIC BLOOD PRESSURE: 80 MMHG | BODY MASS INDEX: 28.24 KG/M2 | HEIGHT: 62 IN | HEART RATE: 98 BPM | SYSTOLIC BLOOD PRESSURE: 136 MMHG | OXYGEN SATURATION: 99 %

## 2022-04-19 DIAGNOSIS — E11.69 TYPE 2 DIABETES MELLITUS WITH OTHER SPECIFIED COMPLICATION, WITH LONG-TERM CURRENT USE OF INSULIN: Primary | ICD-10-CM

## 2022-04-19 DIAGNOSIS — I10 BENIGN ESSENTIAL HYPERTENSION: Primary | ICD-10-CM

## 2022-04-19 DIAGNOSIS — E11.69 TYPE 2 DIABETES MELLITUS WITH OTHER SPECIFIED COMPLICATION, WITHOUT LONG-TERM CURRENT USE OF INSULIN: ICD-10-CM

## 2022-04-19 DIAGNOSIS — I10 ESSENTIAL HYPERTENSION: ICD-10-CM

## 2022-04-19 DIAGNOSIS — R44.1 VISUAL HALLUCINATION: ICD-10-CM

## 2022-04-19 DIAGNOSIS — G47.00 INSOMNIA, UNSPECIFIED TYPE: ICD-10-CM

## 2022-04-19 DIAGNOSIS — Z79.4 TYPE 2 DIABETES MELLITUS WITH OTHER SPECIFIED COMPLICATION, WITH LONG-TERM CURRENT USE OF INSULIN: Primary | ICD-10-CM

## 2022-04-19 PROCEDURE — 1123F ACP DISCUSS/DSCN MKR DOCD: CPT | Mod: CPTII,S$GLB,, | Performed by: INTERNAL MEDICINE

## 2022-04-19 PROCEDURE — 1126F AMNT PAIN NOTED NONE PRSNT: CPT | Mod: CPTII,S$GLB,, | Performed by: INTERNAL MEDICINE

## 2022-04-19 PROCEDURE — 3078F DIAST BP <80 MM HG: CPT | Mod: CPTII,S$GLB,, | Performed by: INTERNAL MEDICINE

## 2022-04-19 PROCEDURE — 3075F SYST BP GE 130 - 139MM HG: CPT | Mod: CPTII,S$GLB,, | Performed by: INTERNAL MEDICINE

## 2022-04-19 PROCEDURE — 1123F PR ADV CARE PLAN DISCUSSED, PLAN OR SURROGATE DOCUMENTED: ICD-10-PCS | Mod: CPTII,S$GLB,, | Performed by: INTERNAL MEDICINE

## 2022-04-19 PROCEDURE — 1160F RVW MEDS BY RX/DR IN RCRD: CPT | Mod: CPTII,S$GLB,, | Performed by: INTERNAL MEDICINE

## 2022-04-19 PROCEDURE — 1126F PR PAIN SEVERITY QUANTIFIED, NO PAIN PRESENT: ICD-10-PCS | Mod: CPTII,S$GLB,, | Performed by: INTERNAL MEDICINE

## 2022-04-19 PROCEDURE — 1101F PR PT FALLS ASSESS DOC 0-1 FALLS W/OUT INJ PAST YR: ICD-10-PCS | Mod: CPTII,S$GLB,, | Performed by: INTERNAL MEDICINE

## 2022-04-19 PROCEDURE — 3075F PR MOST RECENT SYSTOLIC BLOOD PRESS GE 130-139MM HG: ICD-10-PCS | Mod: CPTII,S$GLB,, | Performed by: INTERNAL MEDICINE

## 2022-04-19 PROCEDURE — 3079F DIAST BP 80-89 MM HG: CPT | Mod: CPTII,S$GLB,, | Performed by: INTERNAL MEDICINE

## 2022-04-19 PROCEDURE — 1160F PR REVIEW ALL MEDS BY PRESCRIBER/CLIN PHARMACIST DOCUMENTED: ICD-10-PCS | Mod: CPTII,S$GLB,, | Performed by: INTERNAL MEDICINE

## 2022-04-19 PROCEDURE — 99214 OFFICE O/P EST MOD 30 MIN: CPT | Mod: S$GLB,,, | Performed by: INTERNAL MEDICINE

## 2022-04-19 PROCEDURE — 99999 PR PBB SHADOW E&M-EST. PATIENT-LVL IV: ICD-10-PCS | Mod: PBBFAC,,, | Performed by: INTERNAL MEDICINE

## 2022-04-19 PROCEDURE — 3078F PR MOST RECENT DIASTOLIC BLOOD PRESSURE < 80 MM HG: ICD-10-PCS | Mod: CPTII,S$GLB,, | Performed by: INTERNAL MEDICINE

## 2022-04-19 PROCEDURE — 3288F FALL RISK ASSESSMENT DOCD: CPT | Mod: CPTII,S$GLB,, | Performed by: INTERNAL MEDICINE

## 2022-04-19 PROCEDURE — 99499 UNLISTED E&M SERVICE: CPT | Mod: S$GLB,,, | Performed by: INTERNAL MEDICINE

## 2022-04-19 PROCEDURE — 1101F PT FALLS ASSESS-DOCD LE1/YR: CPT | Mod: CPTII,S$GLB,, | Performed by: INTERNAL MEDICINE

## 2022-04-19 PROCEDURE — 3288F PR FALLS RISK ASSESSMENT DOCUMENTED: ICD-10-PCS | Mod: CPTII,S$GLB,, | Performed by: INTERNAL MEDICINE

## 2022-04-19 PROCEDURE — 1159F PR MEDICATION LIST DOCUMENTED IN MEDICAL RECORD: ICD-10-PCS | Mod: CPTII,S$GLB,, | Performed by: INTERNAL MEDICINE

## 2022-04-19 PROCEDURE — 99999 PR PBB SHADOW E&M-EST. PATIENT-LVL V: ICD-10-PCS | Mod: PBBFAC,,, | Performed by: INTERNAL MEDICINE

## 2022-04-19 PROCEDURE — 99999 PR PBB SHADOW E&M-EST. PATIENT-LVL IV: CPT | Mod: PBBFAC,,, | Performed by: INTERNAL MEDICINE

## 2022-04-19 PROCEDURE — 99499 RISK ADDL DX/OHS AUDIT: ICD-10-PCS | Mod: S$GLB,,, | Performed by: INTERNAL MEDICINE

## 2022-04-19 PROCEDURE — 1159F MED LIST DOCD IN RCRD: CPT | Mod: CPTII,S$GLB,, | Performed by: INTERNAL MEDICINE

## 2022-04-19 PROCEDURE — 3079F PR MOST RECENT DIASTOLIC BLOOD PRESSURE 80-89 MM HG: ICD-10-PCS | Mod: CPTII,S$GLB,, | Performed by: INTERNAL MEDICINE

## 2022-04-19 PROCEDURE — 99214 PR OFFICE/OUTPT VISIT, EST, LEVL IV, 30-39 MIN: ICD-10-PCS | Mod: S$GLB,,, | Performed by: INTERNAL MEDICINE

## 2022-04-19 PROCEDURE — 99999 PR PBB SHADOW E&M-EST. PATIENT-LVL V: CPT | Mod: PBBFAC,,, | Performed by: INTERNAL MEDICINE

## 2022-04-19 RX ORDER — INSULIN GLARGINE 100 [IU]/ML
26 INJECTION, SOLUTION SUBCUTANEOUS DAILY
Qty: 45 ML | Refills: 3 | Status: SHIPPED | OUTPATIENT
Start: 2022-04-19 | End: 2022-08-16

## 2022-04-19 RX ORDER — QUETIAPINE FUMARATE 50 MG/1
75 TABLET, FILM COATED ORAL NIGHTLY
Qty: 90 TABLET | Refills: 3 | Status: SHIPPED | OUTPATIENT
Start: 2022-04-19 | End: 2022-11-07

## 2022-04-19 RX ORDER — PEN NEEDLE, DIABETIC 30 GX3/16"
NEEDLE, DISPOSABLE MISCELLANEOUS
Qty: 90 EACH | Refills: 3 | Status: SHIPPED | OUTPATIENT
Start: 2022-04-19 | End: 2023-05-16

## 2022-04-19 NOTE — TELEPHONE ENCOUNTER
----- Message from Ros Gee sent at 4/19/2022  2:21 PM CDT -----  Contact: Lennie daughter [phone]  Patient would like to get medical advice.  Symptoms (please be specific):    How long have you had these symptoms:   Would you like a call back, or a response through your [facility] portal?:call back  Pharmacy name and phone # (copy from chart):    Comments:  Pt's daughter is requesting a call back from the nurse regarding a rx and also maybe they can get her mom 's medications pre packaged as well

## 2022-04-19 NOTE — PROGRESS NOTES
Subjective:      Patient ID: Alisia Gusman is a 76 y.o. female.    Chief Complaint:  No chief complaint on file.      History of Present Illness    Ms. Gusman is a 76 year old woman who is here for T2DM that is well controlled on reasonable regimen. Since her last visit underwent craniotomy and resection of a meningioma 11/2021.      Today Ms. Gusman is here with her daughter. She looks well and feels well.   Reports delusions that developed following surgery/meningioma.   She has lost 30 lbs since her last visit with me.   Reports eating fairly well, three meals daily.   Lowest blood sugars 80 - 90s, at bedtime. Frequently takes a snack at bedtime.     T2DM  Diagnosed at least 25 years ago  Known complications: diabetic neuropathy  Denies chest pain, pressure or shortness of breath.      Current Diabetes Regimen:  Ozempic --> 1 mg weekly - tolerating well has used for the past four months; stopped using the last two weeks due to confusion regarding packaging.  lantus 28 units in the morning (adminstered by her sister in the morning)     Hypoglycemic readings 80-90 before bedtime.        Previously tried   Metformin      Diet/Exercise:  Improving appetite     Recent Hgb A1C:  Lab Results   Component Value Date    HGBA1C 6.9 (H) 02/08/2022       Glucose Monitoring:  Unable to use decom --> too complicated    Hypoglycemic Episodes:  80 -90s    Screening / DM Complications:    Nephropathy:  ACEi/ARB: Taking  Lab Results   Component Value Date    MICALBCREAT 77.5 (H) 10/29/2021       Last Lipid Panel:  Statin: Taking  Lab Results   Component Value Date    LDLCALC 59.4 (L) 11/06/2021       Neuropathy:yes  Last foot exam : 04/19/2022  Last eye exam : 12/16/2020;  no laser surgery or DR    B12:  Lab Results   Component Value Date    [id number] 11/27/2019     Review of Systems  Feeling well   No recent illness or URI    Objective:   Physical Exam  Vitals:    04/19/22 0942   BP: 136/80   BP Location: Right arm  "  Patient Position: Sitting   BP Method: Medium (Manual)   Pulse: 98   SpO2: 99%   Weight: 69.6 kg (153 lb 7 oz)   Height: 5' 2" (1.575 m)       BP Readings from Last 3 Encounters:   04/19/22 132/72   04/19/22 136/80   04/11/22 (!) 145/75     Wt Readings from Last 1 Encounters:   04/19/22 1148 69.1 kg (152 lb 5.4 oz)         Body mass index is 28.06 kg/m².    Protective Sensation (w/ 10 gram monofilament):  Right: Decreased  Left: Decreased    Visual Inspection:  Onychomycosis -  Bilateral   Has left cut from "digging in between her fourth and fifth toes"    Pedal Pulses:   Right: difficult to palpated due to lower extremity swelling  Left: Present    Posterior tibialis:   Right: difficult to palpate due to lower extremity swelling  Left: Present    Vibratory sense:  Right - absent   Left - diminished      Lab Review:   Lab Results   Component Value Date    HGBA1C 6.9 (H) 02/08/2022     Lab Results   Component Value Date    CHOL 126 11/06/2021    HDL 47 11/06/2021    LDLCALC 59.4 (L) 11/06/2021    TRIG 98 11/06/2021    CHOLHDL 37.3 11/06/2021     Lab Results   Component Value Date     02/08/2022    K 3.9 02/08/2022     02/08/2022    CO2 26 02/08/2022     02/08/2022    BUN 13 02/08/2022    CREATININE 0.8 02/08/2022    CALCIUM 10.6 (H) 02/08/2022    PROT 7.6 02/08/2022    ALBUMIN 3.7 02/08/2022    BILITOT 1.2 (H) 02/08/2022    ALKPHOS 68 02/08/2022    AST 19 02/08/2022    ALT 11 02/08/2022    ANIONGAP 9 02/08/2022    ESTGFRAFRICA >60.0 02/08/2022    EGFRNONAA >60.0 02/08/2022    TSH 1.140 11/06/2021         Assessment and Plan     Type 2 diabetes mellitus  Previous A1C was 6.9%  Reports good appetite and 30 lbs weight loss since surgery.   Trying to stay active at home.     Goal should be a little higher to avoid risk of hypoglycemia (7 - 7.5%)  Currently regimen is working for her as sister administers insulin in the morning and weekly dose of ozempic.     Reduced dose of lantus just a little bit " to 26 units.     Repeat labs and follow up visit in august 2022.       Essential hypertension  Reasonable goal

## 2022-04-19 NOTE — PATIENT INSTRUCTIONS
Colace - docusate sodium - 50mg once a day.   Increase water intake.   Check blood pressure daily and write down on a log.   Make appointment to see Dr. Cardoza to recheck your eyes especially since you had the surgery after seeing him.     Increase seroquel from 50mg nightly to 75mg nightly (1.5 tablets of the 50mg tablets).

## 2022-04-19 NOTE — PROGRESS NOTES
"Subjective:       Patient ID: Alisia Gusman is a 76 y.o. female.    Chief Complaint: HTN Follow-up    HPI accompanied by daughter, Lennie, today.   Recently saw pt 4/11/22.   At that visit, increased coreg from 6.25mg BID to 12.5mg BID due to elevated BP. Also increased seroquel from 25 to 50mg nightly due to AH/VH and insomnia. Pt had some leg swelling so also told to make sure she's taking her lasix 20mg daily. She brought in pravastatin bottle even though she's supposed to be on atorvastatin now.   Saw Dr. Nelson this morning. On ozempic 1mg weekly and lantus 28 units qam (sister who lives next door gives her med). Last a1c 6.9 2/8/22. Decreased to 26 units nightly.   Has podiatry appt scheduled 5/5/22.  Saw Dr. Cardoza 11/2021 - outside eye doctor on [facility].  Here for f/u w/ me today.    Checks BP at home. Didn't bring log.     Reports no changes in VH.     Some stomach cramping prior to BM sometimes.     Review of Systems  as above in HPI.     Objective:      Physical Exam    /72   Pulse 90   Temp 98.4 °F (36.9 °C) (Oral)   Ht 5' 2" (1.575 m)   Wt 69.1 kg (152 lb 5.4 oz)   LMP  (LMP Unknown) Comment: Partial  SpO2 99%   BMI 27.86 kg/m²     GEN - A+OX4, NAD   Psych - paranoid thoughts.  HEENT - PERRL, EOMI, OP clear. MMM.   Neck - No thyromegaly or cervical LAD. No thyroid masses felt.  CV - RRR, no m/r   Chest - CTAB, no wheezing or rhonchi  Abd - S/NT/ND/+BS.   Ext - 2+B radial pulses. No C/C/E.  Skin - No rash.    PREVIOUS LABS REVIEWED.     Assessment/Plan     Alisia was seen today for follow-up.    Diagnoses and all orders for this visit:    Benign essential hypertension - Stable and controlled. Continue current medications.    Insomnia, unspecified type/ah/vh  -     QUEtiapine (SEROQUEL) 50 MG tablet; Take 1.5 tablets (75 mg total) by mouth every evening.    Colace - docusate sodium - 50mg once a day.   Increase water intake.   Check blood pressure daily and write down on a log.   Make " appointment to see Dr. Cardoza to recheck your eyes especially since you had the surgery after seeing him.     Increase seroquel from 50mg nightly to 75mg nightly (1.5 tablets of the 50mg tablets).    Advance Care Planning     Date: 04/20/2022    Living Will  During this visit, I engaged the patient  in the advance care planning process.  The patient and I reviewed the role for advance directives and their purpose in directing future healthcare if the patient's unable to speak for him/herself.  At this point in time, the patient does have full decision-making capacity.  We discussed different extreme health states that she could experience, and reviewed what kind of medical care she would want in those situations.  The patient communicated that if she were comatose and had little chance of a meaningful recovery, she would not want machines/life-sustaining treatments used. In addition to the above preference, other important end-of-life issues for the patient include N/A. The patient has completed a living will to reflect these preferences.  I spent a total of 3 minutes engaging the patient in this advance care planning discussion.          Power of   I initiated the process of advance care planning today and explained the importance of this process to the patient.  I introduced the concept of advance directives to the patient, as well. Then the patient received detailed information about the importance of designating a Health Care Power of  (HCPOA). She was also instructed to communicate with this person about their wishes for future healthcare, should she become sick and lose decision-making capacity. The patient has previously appointed a HCPOA. After our discussion, the patient has decided to complete a HCPOA and has appointed her daughter, health care agent: [facility] care agent number: [phone] I spent a total time of 3 minutes discussing this issue with the patient.     Will scan in  documents into chart.    Follow up in about 4 weeks (around 5/17/2022).      Selena Montemayor MD  Department of Internal Medicine - [facility]  10:56 AM

## 2022-04-21 NOTE — ASSESSMENT & PLAN NOTE
Previous A1C was 6.9%  Reports good appetite and 30 lbs weight loss since surgery.   Trying to stay active at home.     Goal should be a little higher to avoid risk of hypoglycemia (7 - 7.5%)  Currently regimen is working for her as sister administers insulin in the morning and weekly dose of ozempic.     Reduced dose of lantus just a little bit to 26 units.     Repeat labs and follow up visit in august 2022.

## 2022-04-22 ENCOUNTER — PATIENT MESSAGE (OUTPATIENT)
Dept: PRIMARY CARE CLINIC | Facility: CLINIC | Age: 77
End: 2022-04-22
Payer: MEDICARE

## 2022-04-27 ENCOUNTER — PATIENT MESSAGE (OUTPATIENT)
Dept: PODIATRY | Facility: CLINIC | Age: 77
End: 2022-04-27
Payer: MEDICARE

## 2022-05-11 ENCOUNTER — PATIENT MESSAGE (OUTPATIENT)
Dept: PRIMARY CARE CLINIC | Facility: CLINIC | Age: 77
End: 2022-05-11
Payer: MEDICARE

## 2022-05-11 DIAGNOSIS — Z86.711 HISTORY OF PULMONARY EMBOLISM: ICD-10-CM

## 2022-05-11 RX ORDER — FUROSEMIDE 20 MG/1
20 TABLET ORAL DAILY PRN
Qty: 30 TABLET | Refills: 11 | Status: SHIPPED | OUTPATIENT
Start: 2022-05-11 | End: 2023-05-18 | Stop reason: SDUPTHER

## 2022-05-11 RX ORDER — SEMAGLUTIDE 1.34 MG/ML
INJECTION, SOLUTION SUBCUTANEOUS
Qty: 4 PEN | Refills: 11 | Status: SHIPPED | OUTPATIENT
Start: 2022-05-11 | End: 2022-11-11 | Stop reason: SDUPTHER

## 2022-05-12 ENCOUNTER — PATIENT MESSAGE (OUTPATIENT)
Dept: PRIMARY CARE CLINIC | Facility: CLINIC | Age: 77
End: 2022-05-12
Payer: MEDICARE

## 2022-05-12 ENCOUNTER — PATIENT MESSAGE (OUTPATIENT)
Dept: NEUROSURGERY | Facility: CLINIC | Age: 77
End: 2022-05-12
Payer: MEDICARE

## 2022-05-12 ENCOUNTER — TELEPHONE (OUTPATIENT)
Dept: NEUROSURGERY | Facility: CLINIC | Age: 77
End: 2022-05-12
Payer: MEDICARE

## 2022-05-12 DIAGNOSIS — D49.6 BRAIN TUMOR: ICD-10-CM

## 2022-05-12 DIAGNOSIS — Z98.890 S/P CRANIOTOMY: Primary | ICD-10-CM

## 2022-05-12 DIAGNOSIS — D32.9 MENINGIOMA: ICD-10-CM

## 2022-05-12 RX ORDER — PREGABALIN 75 MG/1
75 CAPSULE ORAL 2 TIMES DAILY
Qty: 60 CAPSULE | Refills: 1 | Status: SHIPPED | OUTPATIENT
Start: 2022-05-12 | End: 2022-11-11

## 2022-05-12 RX ORDER — ATORVASTATIN CALCIUM 40 MG/1
40 TABLET, FILM COATED ORAL DAILY
Qty: 90 TABLET | Refills: 3 | Status: SHIPPED | OUTPATIENT
Start: 2022-05-12 | End: 2023-07-19

## 2022-06-10 ENCOUNTER — PATIENT MESSAGE (OUTPATIENT)
Dept: PRIMARY CARE CLINIC | Facility: CLINIC | Age: 77
End: 2022-06-10
Payer: MEDICARE

## 2022-06-10 ENCOUNTER — PATIENT MESSAGE (OUTPATIENT)
Dept: NEUROSURGERY | Facility: CLINIC | Age: 77
End: 2022-06-10
Payer: MEDICARE

## 2022-06-10 NOTE — TELEPHONE ENCOUNTER
Dr Montemayor, I do not see that she is on Protonix, I see Pepcid on this list. I sent them a message that we are looking at this.

## 2022-06-13 RX ORDER — PANTOPRAZOLE SODIUM 40 MG/1
40 TABLET, DELAYED RELEASE ORAL DAILY
Qty: 30 TABLET | Refills: 0 | Status: SHIPPED | OUTPATIENT
Start: 2022-06-13 | End: 2022-11-11

## 2022-07-05 ENCOUNTER — TELEPHONE (OUTPATIENT)
Dept: PRIMARY CARE CLINIC | Facility: CLINIC | Age: 77
End: 2022-07-05
Payer: MEDICARE

## 2022-07-05 NOTE — TELEPHONE ENCOUNTER
----- Message from Renita Walton sent at 7/5/2022 12:42 PM CDT -----  Contact: [facility]  @ [phone]  Pharmacy is calling to clarify an RX.  RX name:  Pravastatin 40 mg   What do they need to clarify:  Which statin is she getting atorvastatin or Pravastatin??   Comments:  Please call and any one can speak with you.

## 2022-07-12 ENCOUNTER — OFFICE VISIT (OUTPATIENT)
Dept: PODIATRY | Facility: CLINIC | Age: 77
End: 2022-07-12
Payer: MEDICARE

## 2022-07-12 VITALS
WEIGHT: 149.94 LBS | RESPIRATION RATE: 18 BRPM | BODY MASS INDEX: 27.59 KG/M2 | HEART RATE: 78 BPM | SYSTOLIC BLOOD PRESSURE: 163 MMHG | DIASTOLIC BLOOD PRESSURE: 91 MMHG | HEIGHT: 62 IN

## 2022-07-12 DIAGNOSIS — L84 PRE-ULCERATIVE CALLUSES: ICD-10-CM

## 2022-07-12 DIAGNOSIS — B35.1 ONYCHOMYCOSIS DUE TO DERMATOPHYTE: ICD-10-CM

## 2022-07-12 DIAGNOSIS — M20.42 HAMMERTOE, BILATERAL: ICD-10-CM

## 2022-07-12 DIAGNOSIS — R60.0 BILATERAL LOWER EXTREMITY EDEMA: ICD-10-CM

## 2022-07-12 DIAGNOSIS — M20.41 HAMMERTOE, BILATERAL: ICD-10-CM

## 2022-07-12 DIAGNOSIS — L84 CORN OR CALLUS: ICD-10-CM

## 2022-07-12 DIAGNOSIS — E11.49 TYPE II DIABETES MELLITUS WITH NEUROLOGICAL MANIFESTATIONS: Primary | ICD-10-CM

## 2022-07-12 PROCEDURE — 1160F PR REVIEW ALL MEDS BY PRESCRIBER/CLIN PHARMACIST DOCUMENTED: ICD-10-PCS | Mod: CPTII,S$GLB,, | Performed by: PODIATRIST

## 2022-07-12 PROCEDURE — 1160F RVW MEDS BY RX/DR IN RCRD: CPT | Mod: CPTII,S$GLB,, | Performed by: PODIATRIST

## 2022-07-12 PROCEDURE — 3080F DIAST BP >= 90 MM HG: CPT | Mod: CPTII,S$GLB,, | Performed by: PODIATRIST

## 2022-07-12 PROCEDURE — 1159F PR MEDICATION LIST DOCUMENTED IN MEDICAL RECORD: ICD-10-PCS | Mod: CPTII,S$GLB,, | Performed by: PODIATRIST

## 2022-07-12 PROCEDURE — 1126F AMNT PAIN NOTED NONE PRSNT: CPT | Mod: CPTII,S$GLB,, | Performed by: PODIATRIST

## 2022-07-12 PROCEDURE — 11721 DEBRIDE NAIL 6 OR MORE: CPT | Mod: 59,Q8,S$GLB, | Performed by: PODIATRIST

## 2022-07-12 PROCEDURE — 1126F PR PAIN SEVERITY QUANTIFIED, NO PAIN PRESENT: ICD-10-PCS | Mod: CPTII,S$GLB,, | Performed by: PODIATRIST

## 2022-07-12 PROCEDURE — 3080F PR MOST RECENT DIASTOLIC BLOOD PRESSURE >= 90 MM HG: ICD-10-PCS | Mod: CPTII,S$GLB,, | Performed by: PODIATRIST

## 2022-07-12 PROCEDURE — 99499 UNLISTED E&M SERVICE: CPT | Mod: S$GLB,,, | Performed by: PODIATRIST

## 2022-07-12 PROCEDURE — 99999 PR PBB SHADOW E&M-EST. PATIENT-LVL IV: ICD-10-PCS | Mod: PBBFAC,,, | Performed by: PODIATRIST

## 2022-07-12 PROCEDURE — 1159F MED LIST DOCD IN RCRD: CPT | Mod: CPTII,S$GLB,, | Performed by: PODIATRIST

## 2022-07-12 PROCEDURE — 11057 PARNG/CUTG B9 HYPRKR LES >4: CPT | Mod: Q8,S$GLB,, | Performed by: PODIATRIST

## 2022-07-12 PROCEDURE — 3077F SYST BP >= 140 MM HG: CPT | Mod: CPTII,S$GLB,, | Performed by: PODIATRIST

## 2022-07-12 PROCEDURE — 11721 PR DEBRIDEMENT OF NAILS, 6 OR MORE: ICD-10-PCS | Mod: 59,Q8,S$GLB, | Performed by: PODIATRIST

## 2022-07-12 PROCEDURE — 3077F PR MOST RECENT SYSTOLIC BLOOD PRESSURE >= 140 MM HG: ICD-10-PCS | Mod: CPTII,S$GLB,, | Performed by: PODIATRIST

## 2022-07-12 PROCEDURE — 11057 PR TRIM BENIGN HYPERKERATOTIC SKIN LESION,>4: ICD-10-PCS | Mod: Q8,S$GLB,, | Performed by: PODIATRIST

## 2022-07-12 PROCEDURE — 1157F ADVNC CARE PLAN IN RCRD: CPT | Mod: CPTII,S$GLB,, | Performed by: PODIATRIST

## 2022-07-12 PROCEDURE — 1157F PR ADVANCE CARE PLAN OR EQUIV PRESENT IN MEDICAL RECORD: ICD-10-PCS | Mod: CPTII,S$GLB,, | Performed by: PODIATRIST

## 2022-07-12 PROCEDURE — 99499 NO LOS: ICD-10-PCS | Mod: S$GLB,,, | Performed by: PODIATRIST

## 2022-07-12 PROCEDURE — 99999 PR PBB SHADOW E&M-EST. PATIENT-LVL IV: CPT | Mod: PBBFAC,,, | Performed by: PODIATRIST

## 2022-07-25 NOTE — PROGRESS NOTES
"Subjective:       Patient ID: Alisia Gusman is a 71 y.o. female.    Chief Complaint: Hyperlipidemia and Hypertension    HPI   DM2 - on glimepiride 2mg qam prior to breakfast, regular 5 units before dinner, metformin XR 750mg bid, lantus 22 units daily. Reviewed glucose log - dinner and bed time glucose elevated 130s-250s. Sometimes cheats on her diet.  Diabetic neuropathy. On gabapentin 300mg TID. Burning in the feet. Reports her feet numbness is worse. Also c/o nail fungus. No itching. Last b12 is 454 10/14/15.  On asa 81 and pravastatin 40mg daily.  a1c 2/1/17 8.  LDL 68.8 11/18/16  Foot - 2/16/17  Eye 3/20/17  At last visit, c/o some sweats intermittently. Reports improved but still present. Still does not check her glucose when she gets the sweats as they're usually at night. No weight loss. Appetite is great. Trying to cut back on her diet. Exercises 4x/week - water aerobic 2 days a week and 2 hours each day; also will do [employer] classes 1 hour each.   No polyuria/polyuria.    HTN - on losartan-hctz 50-12.5mg daily.     Chronic cough - much improved after started on ranitidine 150mg BID. C/o some SOB (chronic) - needs refill on inhalers. No CP. EKG unchanged. No wheezing at night anymore.    L knee pain. Seen in ortho. Takes mobic 15mg prn. Helps to control the pain.     Slight lack of coordination. No falls or ataxia.     C/o darkness in the elbow intermittently. Not itchy. C/o dandruff in the scalp.    Review of Systems  Comprehensive review of systems otherwise negative. See history/subjective section for more details.    Objective:      Physical Exam    /66  Pulse 86  Temp 97.9 °F (36.6 °C) (Oral)   Resp 17  Ht 5' 2" (1.575 m)  Wt 80.9 kg (178 lb 4.8 oz)  BMI 32.61 kg/m2    Gen - A+Ox4, NAD  HEENT - PERRL,OP clear. MMM.   NECK - No LAD  CV - RRR, no m/r  Chest - CTAB, no wheezing/rhonchi  Abd - S/NT/ND/+BS  Ext - 2+ BDP and radial pulses. No LE edema.   Skin - " hyperpigmentation around the elbows and L upper forearm. Dandruff of scalp.  MSK - normal gait.     Assessment/Plan     Alisia was seen today for hyperlipidemia and hypertension.    Diagnoses and all orders for this visit:    Diabetes mellitus type 2 in obese - inc pre dinner regular to 6 units.  -     insulin regular 100 unit/mL Inj injection; Inject 6 Units into the skin before dinner. RELION brand    Gastroesophageal reflux disease, esophagitis presence not specified - doing well. Cont zantac.  -     ranitidine (ZANTAC) 150 MG tablet; Take 1 tablet (150 mg total) by mouth 2 (two) times daily.    Hyperlipidemia associated with type 2 diabetes mellitus - controlled. Cont pravastatin.  -     Lipid panel; Future    Hypertension associated with diabetes - Stable and controlled. Continue current medications.  -     Lipid panel; Future    Diabetic peripheral neuropathy - inc gabapentin from 300mg tid to 300mg qam and afternoon and then 600mg at night. Start alpha lipoic acid over the counter daily.  -     Vitamin B12; Future  -     gabapentin (NEURONTIN) 300 MG capsule; 300mg in AM, 300mg in PM and 600mg at bedtime  -     insulin regular 100 unit/mL Inj injection; Inject 6 Units into the skin before dinner. RELION brand    Lack of coordination   -     Vitamin B12; Future    Dermatitis - trial of lotrisone for hyperpigmentation around the forearm on L and the back of elbows. Nizoral shampoo for seborrheic dermatitis of scalp.      Other orders  -     albuterol 90 mcg/actuation inhaler; Inhale 1-2 puffs into the lungs every 6 (six) hours as needed for Wheezing or Shortness of Breath. Rescue    Return in about 4 months (around 9/16/2017).      Selena Montemayor MD  Department of Internal Medicine - [facility]  9:13 AM   Private car

## 2022-08-02 ENCOUNTER — OFFICE VISIT (OUTPATIENT)
Dept: PODIATRY | Facility: CLINIC | Age: 77
End: 2022-08-02
Payer: MEDICARE

## 2022-08-02 ENCOUNTER — TELEPHONE (OUTPATIENT)
Dept: PRIMARY CARE CLINIC | Facility: CLINIC | Age: 77
End: 2022-08-02
Payer: MEDICARE

## 2022-08-02 VITALS
HEART RATE: 98 BPM | BODY MASS INDEX: 27.59 KG/M2 | HEIGHT: 62 IN | DIASTOLIC BLOOD PRESSURE: 78 MMHG | WEIGHT: 149.94 LBS | SYSTOLIC BLOOD PRESSURE: 132 MMHG

## 2022-08-02 DIAGNOSIS — E11.49 TYPE II DIABETES MELLITUS WITH NEUROLOGICAL MANIFESTATIONS: Primary | ICD-10-CM

## 2022-08-02 DIAGNOSIS — L84 PRE-ULCERATIVE CALLUSES: ICD-10-CM

## 2022-08-02 DIAGNOSIS — Z87.2 HEALED ULCER OF LEFT FOOT: ICD-10-CM

## 2022-08-02 PROCEDURE — 1126F PR PAIN SEVERITY QUANTIFIED, NO PAIN PRESENT: ICD-10-PCS | Mod: CPTII,S$GLB,, | Performed by: PODIATRIST

## 2022-08-02 PROCEDURE — 1159F MED LIST DOCD IN RCRD: CPT | Mod: CPTII,S$GLB,, | Performed by: PODIATRIST

## 2022-08-02 PROCEDURE — 99999 PR PBB SHADOW E&M-EST. PATIENT-LVL IV: CPT | Mod: PBBFAC,,, | Performed by: PODIATRIST

## 2022-08-02 PROCEDURE — 99213 PR OFFICE/OUTPT VISIT, EST, LEVL III, 20-29 MIN: ICD-10-PCS | Mod: S$GLB,,, | Performed by: PODIATRIST

## 2022-08-02 PROCEDURE — 99999 PR PBB SHADOW E&M-EST. PATIENT-LVL IV: ICD-10-PCS | Mod: PBBFAC,,, | Performed by: PODIATRIST

## 2022-08-02 PROCEDURE — 1160F PR REVIEW ALL MEDS BY PRESCRIBER/CLIN PHARMACIST DOCUMENTED: ICD-10-PCS | Mod: CPTII,S$GLB,, | Performed by: PODIATRIST

## 2022-08-02 PROCEDURE — 3078F DIAST BP <80 MM HG: CPT | Mod: CPTII,S$GLB,, | Performed by: PODIATRIST

## 2022-08-02 PROCEDURE — 3075F SYST BP GE 130 - 139MM HG: CPT | Mod: CPTII,S$GLB,, | Performed by: PODIATRIST

## 2022-08-02 PROCEDURE — 1159F PR MEDICATION LIST DOCUMENTED IN MEDICAL RECORD: ICD-10-PCS | Mod: CPTII,S$GLB,, | Performed by: PODIATRIST

## 2022-08-02 PROCEDURE — 1157F ADVNC CARE PLAN IN RCRD: CPT | Mod: CPTII,S$GLB,, | Performed by: PODIATRIST

## 2022-08-02 PROCEDURE — 1126F AMNT PAIN NOTED NONE PRSNT: CPT | Mod: CPTII,S$GLB,, | Performed by: PODIATRIST

## 2022-08-02 PROCEDURE — 3075F PR MOST RECENT SYSTOLIC BLOOD PRESS GE 130-139MM HG: ICD-10-PCS | Mod: CPTII,S$GLB,, | Performed by: PODIATRIST

## 2022-08-02 PROCEDURE — 99213 OFFICE O/P EST LOW 20 MIN: CPT | Mod: S$GLB,,, | Performed by: PODIATRIST

## 2022-08-02 PROCEDURE — 3078F PR MOST RECENT DIASTOLIC BLOOD PRESSURE < 80 MM HG: ICD-10-PCS | Mod: CPTII,S$GLB,, | Performed by: PODIATRIST

## 2022-08-02 PROCEDURE — 1157F PR ADVANCE CARE PLAN OR EQUIV PRESENT IN MEDICAL RECORD: ICD-10-PCS | Mod: CPTII,S$GLB,, | Performed by: PODIATRIST

## 2022-08-02 PROCEDURE — 1160F RVW MEDS BY RX/DR IN RCRD: CPT | Mod: CPTII,S$GLB,, | Performed by: PODIATRIST

## 2022-08-02 NOTE — PROGRESS NOTES
Subjective:      Patient ID: Alisia Gusman is a 77 y.o. female.    Chief Complaint: Foot Pain (Vikash foot /Pcp-NELSON 4/19/22) and Wound Care    Alisia is a 77 y.o. female who presents to the clinic for evaluation and treatment of high risk feet. Alisia has a past medical history of Acute pulmonary embolism without acute cor pulmonale (11/15/2021), Adult bronchiectasis (7/26/2017), Allergy, Anemia, Arthritis, Asthma, Breast cancer (1993), Breast cancer (2020), Cancer (1993), Cataract, Chronic cervical radiculopathy (9/20/2012), Diabetes mellitus, Diabetes mellitus type II, Diabetes with neurologic complications, Diverticulosis, Dry eyes, Dysphagia, GERD (gastroesophageal reflux disease), Hyperlipidemia, Hypertension, Neuropathy, Scalp tenderness, Sepsis (12/12/2021), Shortness of breath, and Ulcer. The patient's returns to clinic for follow up assessment of L 2nd toe lesion. Has been doing well. Wearing DM shoes with custom insoles as directed. Feels the wound/toe has fully healed. No other complaints.  This patient has documented high risk feet requiring routine maintenance secondary to diabetes mellitis and those secondary complications of diabetes, as mentioned..    PCP: Selena Montemayor MD    Date Last Seen by PCP:   Chief Complaint   Patient presents with    Foot Pain     Vikash foot   Pcp-NELSON 4/19/22    Wound Care         Hemoglobin A1C   Date Value Ref Range Status   02/08/2022 6.9 (H) 4.0 - 5.6 % Final     Comment:     ADA Screening Guidelines:  5.7-6.4%  Consistent with prediabetes  >or=6.5%  Consistent with diabetes    High levels of fetal hemoglobin interfere with the HbA1C  assay. Heterozygous hemoglobin variants (HbS, HgC, etc)do  not significantly interfere with this assay.   However, presence of multiple variants may affect accuracy.     11/06/2021 7.5 (H) 4.0 - 5.6 % Final     Comment:     ADA Screening Guidelines:  5.7-6.4%  Consistent with prediabetes  >or=6.5%  Consistent with diabetes    High levels of fetal  "hemoglobin interfere with the HbA1C  assay. Heterozygous hemoglobin variants (HbS, HgC, etc)do  not significantly interfere with this assay.   However, presence of multiple variants may affect accuracy.     10/29/2021 7.5 (H) 4.0 - 5.6 % Final     Comment:     ADA Screening Guidelines:  5.7-6.4%  Consistent with prediabetes  >or=6.5%  Consistent with diabetes    High levels of fetal hemoglobin interfere with the HbA1C  assay. Heterozygous hemoglobin variants (HbS, HgC, etc)do  not significantly interfere with this assay.   However, presence of multiple variants may affect accuracy.         Review of Systems   Constitutional: Negative for chills, decreased appetite and fever.   Cardiovascular: Positive for leg swelling (L>R stable ).   Respiratory: Negative for cough and shortness of breath.    Skin: Positive for dry skin, nail changes and suspicious lesions. Negative for color change, flushing, itching, poor wound healing, rash and skin cancer.   Musculoskeletal: Negative for arthritis, back pain, falls, joint pain, joint swelling and myalgias.   Gastrointestinal: Negative for nausea and vomiting.   Neurological: Negative for loss of balance, numbness and paresthesias.   All other systems reviewed and are negative.          Objective:       Vitals:    08/02/22 0853   BP: 132/78   Pulse: 98   Weight: 68 kg (149 lb 14.6 oz)   Height: 5' 2" (1.575 m)   PainSc: 0-No pain        Physical Exam  Vitals and nursing note reviewed.   Constitutional:       Appearance: She is well-developed.   Cardiovascular:      Pulses:           Dorsalis pedis pulses are 1+ on the right side and 1+ on the left side.      Comments: Posterior tibial pulses are diminished Bilaterally. Toes are cool to touch. Feet are warm proximally.There is decreased digital hair. Skin is atrophic, slightly hyperpigmented, and mildly edematous      Musculoskeletal:         General: Swelling present. No tenderness, deformity or signs of injury. Normal range of " motion.      Right ankle: Normal.      Left ankle: Normal.      Right foot: No swelling, deformity or crepitus.      Left foot: No swelling, deformity or crepitus.      Comments: Semi-reducible hammertoe contractures noted to toes 2-4 b/l-asymptomatic.  Otherwise adequate joint range of motion without pain, limitation, nor crepitation Bilateral feet and ankle joints. Muscle strength is 5/5 in all groups bilaterally.         Lymphadenopathy:      Comments: No palpable lymph nodes   Skin:     General: Skin is warm and dry.      Coloration: Skin is not ashen, cyanotic or pale.      Findings: No bruising, ecchymosis, erythema, lesion or rash.      Nails: There is no clubbing.      Comments: Nails x10 are elongated by  1-5mm's, thickened by 2-4 mm's, dystrophic, and are darkened in  coloration . Xerosis Bilaterally. No open lesions, lacerations or wounds noted    Hyperkeratotic lesion noted to distal tips of b/l hallux and 2nd toes and L 3rd toe--all stable and trimmed well. Skin lines present to lesion/s. No signs of deep tissue injury to all lesions INCLUDING L 2nd toe which appears remain healed.    Neurological:      Mental Status: She is alert and oriented to person, place, and time.      Sensory: Sensory deficit present.      Comments: Kittrell-Leonel 5.07 monofilamant testing is diminished Vikash feet. Sharp/dull sensation diminished Bilaterally. Light touch absent Bilaterally.       Psychiatric:         Behavior: Behavior normal.             Assessment:       Encounter Diagnoses   Name Primary?    Type II diabetes mellitus with neurological manifestations Yes    Pre-ulcerative calluses     Healed ulcer of left foot          Plan:       Alisia was seen today for foot pain and wound care.    Diagnoses and all orders for this visit:    Type II diabetes mellitus with neurological manifestations    Pre-ulcerative calluses    Healed ulcer of left foot      I counseled the patient on her conditions, their implications  and medical management.    Shoe inspection. Diabetic Foot Education. Patient reminded of the importance of good nutrition and blood sugar control to help prevent podiatric complications of diabetes. Patient instructed on proper foot hygeine. We discussed wearing proper shoe gear, daily foot inspections, never walking without protective shoe gear, never putting sharp instruments to feet     L 2nd toe appears to be a healed ulceration. Pre ulcerative. Monitor for any complications and report immediately.     Rx diabetic shoes with custom molded inserts to be worn at all times while ambulating. Continue.     Discussed regular and routine moisturizer to skin of both feet to help improve dry skin. Advised to continue to apply twice daily until resolution of symptoms. Avoid between toes.     Discussed proper and consistent elevation of lower extremities, above the level of the heart, while at rest, to help control/improve edema.     RTC 6 weeks for routine high risk foot care and every 9 weeks thereafter, sooner PRN

## 2022-08-02 NOTE — TELEPHONE ENCOUNTER
----- Message from Natalie Light sent at 8/2/2022 12:34 PM CDT -----  CINDA TATUM calling regarding Patient Advice (message) for what vaccine she is due for?  [phone]

## 2022-08-12 ENCOUNTER — TELEPHONE (OUTPATIENT)
Dept: PODIATRY | Facility: CLINIC | Age: 77
End: 2022-08-12
Payer: MEDICARE

## 2022-08-12 ENCOUNTER — TELEPHONE (OUTPATIENT)
Dept: PRIMARY CARE CLINIC | Facility: CLINIC | Age: 77
End: 2022-08-12
Payer: MEDICARE

## 2022-08-12 NOTE — TELEPHONE ENCOUNTER
----- Message from Rachel Ulloa sent at 8/12/2022 11:23 AM CDT -----  Regarding: Medication  Contact: pt @ [phone]  Pt is calling because  was supposed to send in prescription for her, went to pharmacy to pick it up, nothing has been called in. Asking for call back

## 2022-08-12 NOTE — TELEPHONE ENCOUNTER
----- Message from Roxanne Gale sent at 8/12/2022 11:32 AM CDT -----  Contact: pt [phone]  Pt states she had her two shots. Pt states that she did not have the pneumonia shot.  Please advise.

## 2022-08-15 NOTE — PROGRESS NOTES
Subjective:      Patient ID: Alisia Gusman is a 77 y.o. female.    Chief Complaint:  Diabetes      History of Present Illness    Endocrinology Return Visit     77 y.o. female with HTN, meningioma s/p craniotomy and resection 11/2021, insomnia and T2DM diagnosed 25 years ago.    T2DM  Diagnosed at least 25 years ago. At her last visit she has lost 5 pounds.  Known complications: diabetic neuropathy.   Denies chest pain, pressure or shortness of breath.      Current Diabetes Regimen:  Ozempic --> 1 mg weekly - tolerating well has used for the past eight months.  lantus 28 units in the morning (administered by her sister who lives next door)    Omitted doses: missed a dose last Friday due to shipping delay    Glucose Monitoring:  BG checks prior to breakfast, lunch, and dinner, sometimes before bed.      Previously tried   Metformin      Diet/Exercise:  Eating a bit less than prior  Breakfast: sometimes coffee, grits, strip or two of doty/sausage, toast, sometimes a small coffee cake  Lunch: pepper turkey sandwich or leftovers  Dinner: red beans or a sandwich    No exercise right now, her car is in a state of disrepair and her family removed the tags/insurance since her surgery.    Recent Hgb A1C:  Lab Results   Component Value Date    HGBA1C 6.5 (H) 08/10/2022       Hypoglycemic Episodes:  None known, no reported symptoms    Screening / DM Complications:    Nephropathy:   Lab Results   Component Value Date    MICALBCREAT 77.5 (H) 10/29/2021       Last Lipid Panel:  Lab Results   Component Value Date    LDLCALC 59.4 (L) 11/06/2021       B12:  Lab Results   Component Value Date    [id number] 797 11/27/2019       Neuropathy:   Diabetic Retinopathy: last saw Ophthalmology earlier this year. No laser surgery or DR  Podiatry: Last saw podiatry on 8/2/2022 with healed ulceration on L 2nd toe that was pre ulcerative. Plans to follow up with podiatry in 6 weeks for re-check of toe.     MEDICATIONS:    Current Outpatient  "Medications:     ACCU-CHEK [place] PLUS [place] [facility] [place] AS DIRECTED, Disp: 1 each, Rfl: 1    ACCU-CHEK DOMENICO PLUS TEST STRP Strp, CHECK BLOOD SUGAR THREE TIMES DAILY AS DIRECTED, Disp: 300 strip, Rfl: 3    ACCU-CHEK SOFTCLIX LANCETS Misc, 1 each by Misc.(Non-Drug; Combo Route) route 3 (three) times daily., Disp: 270 each, Rfl: 3    amLODIPine (NORVASC) 5 MG tablet, Take 1 tablet (5 mg total) by mouth once daily. DO NOT TAKE THIS MEDICATION UNTIL TOLD TO RESTART BY PHYSICIAN, Disp: 30 tablet, Rfl: 11    anastrozole (ARIMIDEX) 1 mg Tab, TAKE 1 TABLET EVERY DAY, Disp: 90 tablet, Rfl: 3    apixaban (ELIQUIS) 5 mg Tab, Take 1 tablet (5 mg total) by mouth 2 (two) times daily., Disp: 60 tablet, Rfl: 11    atorvastatin (LIPITOR) 40 MG tablet, Take 1 tablet (40 mg total) by mouth once daily., Disp: 90 tablet, Rfl: 3    BD ALCOHOL SWABS PadM, 1 each 2 (two) times daily., Disp: , Rfl:     BD INSULIN SYRINGE ULTRA-FINE 1 mL 31 gauge x 5/16 Syrg, TO USE  TWICE DAILY WITH  INSULIN, Disp: 100 each, Rfl: 11    BD VEO INSULIN SYRINGE UF 0.3 mL 31 gauge x 15/64" Syrg, USE  TO INJECT TWICE DAILY, Disp: 200 Syringe, Rfl: 3    carvediloL (COREG) 12.5 MG tablet, Take 1 tablet (12.5 mg total) by mouth 2 (two) times daily with meals., Disp: 180 tablet, Rfl: 3    cetirizine (ZYRTEC) 10 MG tablet, Take 1 tablet (10 mg total) by mouth every evening., Disp: 30 tablet, Rfl: 1    cyanocobalamin-cobamamide (B-12 PLUS) 5,000-100 mcg Subl, Take 5000 mcg daily., Disp: 90 tablet, Rfl: 3    diclofenac (VOLTAREN) 0.1 % ophthalmic solution, 1 drop 4 (four) times daily. For feet, Disp: , Rfl:     famotidine (PEPCID) 20 MG tablet, TAKE 1 TABLET (20 MG TOTAL) BY MOUTH EVERY EVENING., Disp: 90 tablet, Rfl: 3    FOLIC ACID/MULTIVIT-MIN/LUTEIN (CENTRUM SILVER ORAL), Take 1 tablet by mouth once daily., Disp: , Rfl:     furosemide (LASIX) 20 MG tablet, Take 1 tablet (20 mg total) by mouth daily as needed (leg swelling)., Disp: 30 tablet, Rfl: " "11    insulin (LANTUS SOLOSTAR U-100 INSULIN) glargine 100 units/mL (3mL) SubQ pen, Inject 26 Units into the skin once daily., Disp: 45 mL, Rfl: 3    pantoprazole (PROTONIX) 40 MG tablet, Take 1 tablet (40 mg total) by mouth once daily., Disp: 30 tablet, Rfl: 0    pen needle, diabetic (BD ULTRA-FINE SUSIE PEN NEEDLE) 32 gauge x 5/32" Ndle, USE  TO  INJECT  daily, Disp: 90 each, Rfl: 3    pregabalin (LYRICA) 75 MG capsule, Take 1 capsule (75 mg total) by mouth 2 (two) times daily., Disp: 60 capsule, Rfl: 1    QUEtiapine (SEROQUEL) 50 MG tablet, Take 1.5 tablets (75 mg total) by mouth every evening., Disp: 90 tablet, Rfl: 3    semaglutide (OZEMPIC) 1 mg/dose (4 mg/3 mL), INJECT 1MG (0.75 ML) UNDER THE SKIN EVERY 7 DAYS., Disp: 4 pen, Rfl: 11      ALLERGIES:  Review of patient's allergies indicates:   Allergen Reactions    Grass pollen-june grass standard Other (See Comments)     Causes sinus symptoms like coughing    Losartan      cough    Nubain [nalbuphine] Other (See Comments)     Other reaction(s): Hypotension    Sinus & allergy [chlorpheniramine-phenylephrine]          Review of Systems   Constitutional: Negative for activity change, appetite change and fever.   Respiratory: Negative for cough, shortness of breath and wheezing.    Cardiovascular: Negative for chest pain and leg swelling.   Gastrointestinal: Positive for diarrhea (some intermittent). Negative for abdominal pain, constipation, nausea and vomiting.   Endocrine: Negative.    Genitourinary: Negative.    Musculoskeletal: Negative.    Skin: Negative for rash.   Neurological: Negative for headaches.   Psychiatric/Behavioral: Negative.        Objective:   Physical Exam  HENT:      Head: Normocephalic and atraumatic.   Cardiovascular:      Rate and Rhythm: Normal rate and regular rhythm.      Pulses: Normal pulses.   Pulmonary:      Effort: Pulmonary effort is normal. No respiratory distress.      Breath sounds: No wheezing or rales.   Abdominal: "      Palpations: Abdomen is soft.      Tenderness: There is no abdominal tenderness. There is no guarding.   Musculoskeletal:         General: No deformity. Normal range of motion.   Feet:      Comments: No ulcerations on foot present  Skin:     General: Skin is warm.   Neurological:      General: No focal deficit present.      Mental Status: She is alert and oriented to person, place, and time.   Psychiatric:         Mood and Affect: Mood normal.       There were no vitals filed for this visit.    BP Readings from Last 3 Encounters:   08/02/22 132/78   07/12/22 (!) 163/91   04/19/22 132/72     Wt Readings from Last 1 Encounters:   08/02/22 0853 68 kg (149 lb 14.6 oz)         Body mass index is 26.41 kg/m².    Lab Review:   Lab Results   Component Value Date    HGBA1C 6.5 (H) 08/10/2022     Lab Results   Component Value Date    CHOL 126 11/06/2021    HDL 47 11/06/2021    LDLCALC 59.4 (L) 11/06/2021    TRIG 98 11/06/2021    CHOLHDL 37.3 11/06/2021     Lab Results   Component Value Date     02/08/2022    K 3.9 02/08/2022     02/08/2022    CO2 26 02/08/2022     02/08/2022    BUN 13 02/08/2022    CREATININE 0.9 08/10/2022    CALCIUM 10.6 (H) 02/08/2022    PROT 7.6 02/08/2022    ALBUMIN 3.7 02/08/2022    BILITOT 1.2 (H) 02/08/2022    ALKPHOS 68 02/08/2022    AST 19 02/08/2022    ALT 11 02/08/2022    ANIONGAP 9 02/08/2022    ESTGFRAFRICA >60.0 02/08/2022    EGFRNONAA >60.0 02/08/2022    TSH 1.140 11/06/2021         Assessment and Plan     Type 2 diabetes mellitus  Patient with well controlled A1C (6.5) on most recent check. Given risk of hypoglycemia and recent 5lb weight loss since last visit, will decrease insulin to 22u nightly. Changes printed on AVS and will be discussed with sister who provides medications and lives next door.    Will also contact podiatry as patient concerned that no follow up was scheduled for her.    Will plan for repeat A1C and visit back in January 2023.

## 2022-08-16 ENCOUNTER — OFFICE VISIT (OUTPATIENT)
Dept: NEUROSURGERY | Facility: CLINIC | Age: 77
End: 2022-08-16
Payer: MEDICARE

## 2022-08-16 ENCOUNTER — OFFICE VISIT (OUTPATIENT)
Dept: ENDOCRINOLOGY | Facility: CLINIC | Age: 77
End: 2022-08-16
Payer: MEDICARE

## 2022-08-16 VITALS
BODY MASS INDEX: 26.57 KG/M2 | HEART RATE: 76 BPM | SYSTOLIC BLOOD PRESSURE: 152 MMHG | DIASTOLIC BLOOD PRESSURE: 81 MMHG | WEIGHT: 144.38 LBS | HEIGHT: 62 IN

## 2022-08-16 VITALS
OXYGEN SATURATION: 96 % | SYSTOLIC BLOOD PRESSURE: 124 MMHG | HEART RATE: 87 BPM | WEIGHT: 144.38 LBS | DIASTOLIC BLOOD PRESSURE: 76 MMHG | HEIGHT: 62 IN | BODY MASS INDEX: 26.57 KG/M2

## 2022-08-16 DIAGNOSIS — Z79.4 TYPE 2 DIABETES MELLITUS WITH DIABETIC POLYNEUROPATHY, WITH LONG-TERM CURRENT USE OF INSULIN: Primary | ICD-10-CM

## 2022-08-16 DIAGNOSIS — E11.69 TYPE 2 DIABETES MELLITUS WITH OTHER SPECIFIED COMPLICATION, WITHOUT LONG-TERM CURRENT USE OF INSULIN: ICD-10-CM

## 2022-08-16 DIAGNOSIS — Z98.890 S/P CRANIOTOMY: Primary | ICD-10-CM

## 2022-08-16 DIAGNOSIS — E11.42 TYPE 2 DIABETES MELLITUS WITH DIABETIC POLYNEUROPATHY, WITH LONG-TERM CURRENT USE OF INSULIN: Primary | ICD-10-CM

## 2022-08-16 DIAGNOSIS — Z86.018 HISTORY OF MENINGIOMA: ICD-10-CM

## 2022-08-16 PROCEDURE — 1125F PR PAIN SEVERITY QUANTIFIED, PAIN PRESENT: ICD-10-PCS | Mod: CPTII,S$GLB,, | Performed by: NEUROLOGICAL SURGERY

## 2022-08-16 PROCEDURE — 99999 PR PBB SHADOW E&M-EST. PATIENT-LVL III: CPT | Mod: PBBFAC,,, | Performed by: NEUROLOGICAL SURGERY

## 2022-08-16 PROCEDURE — 1157F PR ADVANCE CARE PLAN OR EQUIV PRESENT IN MEDICAL RECORD: ICD-10-PCS | Mod: CPTII,S$GLB,, | Performed by: NEUROLOGICAL SURGERY

## 2022-08-16 PROCEDURE — 1125F AMNT PAIN NOTED PAIN PRSNT: CPT | Mod: CPTII,S$GLB,, | Performed by: NEUROLOGICAL SURGERY

## 2022-08-16 PROCEDURE — 1159F PR MEDICATION LIST DOCUMENTED IN MEDICAL RECORD: ICD-10-PCS | Mod: CPTII,S$GLB,, | Performed by: NEUROLOGICAL SURGERY

## 2022-08-16 PROCEDURE — 1160F RVW MEDS BY RX/DR IN RCRD: CPT | Mod: CPTII,S$GLB,, | Performed by: NEUROLOGICAL SURGERY

## 2022-08-16 PROCEDURE — 1157F PR ADVANCE CARE PLAN OR EQUIV PRESENT IN MEDICAL RECORD: ICD-10-PCS | Mod: CPTII,S$GLB,, | Performed by: INTERNAL MEDICINE

## 2022-08-16 PROCEDURE — 3078F PR MOST RECENT DIASTOLIC BLOOD PRESSURE < 80 MM HG: ICD-10-PCS | Mod: CPTII,S$GLB,, | Performed by: INTERNAL MEDICINE

## 2022-08-16 PROCEDURE — 1101F PR PT FALLS ASSESS DOC 0-1 FALLS W/OUT INJ PAST YR: ICD-10-PCS | Mod: CPTII,S$GLB,, | Performed by: NEUROLOGICAL SURGERY

## 2022-08-16 PROCEDURE — 99999 PR PBB SHADOW E&M-EST. PATIENT-LVL V: ICD-10-PCS | Mod: PBBFAC,,, | Performed by: INTERNAL MEDICINE

## 2022-08-16 PROCEDURE — 1126F AMNT PAIN NOTED NONE PRSNT: CPT | Mod: CPTII,S$GLB,, | Performed by: INTERNAL MEDICINE

## 2022-08-16 PROCEDURE — 1126F PR PAIN SEVERITY QUANTIFIED, NO PAIN PRESENT: ICD-10-PCS | Mod: CPTII,S$GLB,, | Performed by: INTERNAL MEDICINE

## 2022-08-16 PROCEDURE — 99999 PR PBB SHADOW E&M-EST. PATIENT-LVL III: ICD-10-PCS | Mod: PBBFAC,,, | Performed by: NEUROLOGICAL SURGERY

## 2022-08-16 PROCEDURE — 3074F PR MOST RECENT SYSTOLIC BLOOD PRESSURE < 130 MM HG: ICD-10-PCS | Mod: CPTII,S$GLB,, | Performed by: INTERNAL MEDICINE

## 2022-08-16 PROCEDURE — 99213 PR OFFICE/OUTPT VISIT, EST, LEVL III, 20-29 MIN: ICD-10-PCS | Mod: S$GLB,,, | Performed by: NEUROLOGICAL SURGERY

## 2022-08-16 PROCEDURE — 1160F PR REVIEW ALL MEDS BY PRESCRIBER/CLIN PHARMACIST DOCUMENTED: ICD-10-PCS | Mod: CPTII,S$GLB,, | Performed by: NEUROLOGICAL SURGERY

## 2022-08-16 PROCEDURE — 99213 OFFICE O/P EST LOW 20 MIN: CPT | Mod: S$GLB,,, | Performed by: INTERNAL MEDICINE

## 2022-08-16 PROCEDURE — 1100F PR PT FALLS ASSESS DOC 2+ FALLS/FALL W/INJURY/YR: ICD-10-PCS | Mod: CPTII,S$GLB,, | Performed by: INTERNAL MEDICINE

## 2022-08-16 PROCEDURE — 3074F SYST BP LT 130 MM HG: CPT | Mod: CPTII,S$GLB,, | Performed by: INTERNAL MEDICINE

## 2022-08-16 PROCEDURE — 99213 PR OFFICE/OUTPT VISIT, EST, LEVL III, 20-29 MIN: ICD-10-PCS | Mod: S$GLB,,, | Performed by: INTERNAL MEDICINE

## 2022-08-16 PROCEDURE — 3288F PR FALLS RISK ASSESSMENT DOCUMENTED: ICD-10-PCS | Mod: CPTII,S$GLB,, | Performed by: NEUROLOGICAL SURGERY

## 2022-08-16 PROCEDURE — 3079F DIAST BP 80-89 MM HG: CPT | Mod: CPTII,S$GLB,, | Performed by: NEUROLOGICAL SURGERY

## 2022-08-16 PROCEDURE — 99999 PR PBB SHADOW E&M-EST. PATIENT-LVL V: CPT | Mod: PBBFAC,,, | Performed by: INTERNAL MEDICINE

## 2022-08-16 PROCEDURE — 3288F PR FALLS RISK ASSESSMENT DOCUMENTED: ICD-10-PCS | Mod: CPTII,S$GLB,, | Performed by: INTERNAL MEDICINE

## 2022-08-16 PROCEDURE — 1100F PTFALLS ASSESS-DOCD GE2>/YR: CPT | Mod: CPTII,S$GLB,, | Performed by: INTERNAL MEDICINE

## 2022-08-16 PROCEDURE — 3078F DIAST BP <80 MM HG: CPT | Mod: CPTII,S$GLB,, | Performed by: INTERNAL MEDICINE

## 2022-08-16 PROCEDURE — 3288F FALL RISK ASSESSMENT DOCD: CPT | Mod: CPTII,S$GLB,, | Performed by: INTERNAL MEDICINE

## 2022-08-16 PROCEDURE — 3288F FALL RISK ASSESSMENT DOCD: CPT | Mod: CPTII,S$GLB,, | Performed by: NEUROLOGICAL SURGERY

## 2022-08-16 PROCEDURE — 3077F PR MOST RECENT SYSTOLIC BLOOD PRESSURE >= 140 MM HG: ICD-10-PCS | Mod: CPTII,S$GLB,, | Performed by: NEUROLOGICAL SURGERY

## 2022-08-16 PROCEDURE — 1159F MED LIST DOCD IN RCRD: CPT | Mod: CPTII,S$GLB,, | Performed by: NEUROLOGICAL SURGERY

## 2022-08-16 PROCEDURE — 3079F PR MOST RECENT DIASTOLIC BLOOD PRESSURE 80-89 MM HG: ICD-10-PCS | Mod: CPTII,S$GLB,, | Performed by: NEUROLOGICAL SURGERY

## 2022-08-16 PROCEDURE — 1157F ADVNC CARE PLAN IN RCRD: CPT | Mod: CPTII,S$GLB,, | Performed by: INTERNAL MEDICINE

## 2022-08-16 PROCEDURE — 1157F ADVNC CARE PLAN IN RCRD: CPT | Mod: CPTII,S$GLB,, | Performed by: NEUROLOGICAL SURGERY

## 2022-08-16 PROCEDURE — 3077F SYST BP >= 140 MM HG: CPT | Mod: CPTII,S$GLB,, | Performed by: NEUROLOGICAL SURGERY

## 2022-08-16 PROCEDURE — 1101F PT FALLS ASSESS-DOCD LE1/YR: CPT | Mod: CPTII,S$GLB,, | Performed by: NEUROLOGICAL SURGERY

## 2022-08-16 PROCEDURE — 99213 OFFICE O/P EST LOW 20 MIN: CPT | Mod: S$GLB,,, | Performed by: NEUROLOGICAL SURGERY

## 2022-08-16 RX ORDER — ONDANSETRON 4 MG/1
TABLET, ORALLY DISINTEGRATING ORAL
COMMUNITY
Start: 2022-05-10 | End: 2022-11-11

## 2022-08-16 RX ORDER — HYDROCODONE BITARTRATE AND ACETAMINOPHEN 5; 325 MG/1; MG/1
TABLET ORAL
COMMUNITY
Start: 2022-05-10 | End: 2022-11-11

## 2022-08-16 RX ORDER — INSULIN GLARGINE 100 [IU]/ML
22 INJECTION, SOLUTION SUBCUTANEOUS DAILY
Qty: 3 EACH | Refills: 1 | Status: SHIPPED | OUTPATIENT
Start: 2022-08-16 | End: 2023-04-20 | Stop reason: SDUPTHER

## 2022-08-16 NOTE — PROGRESS NOTES
CHIEF COMPLAINT:  9m postop f/u    INTERVAL HISTORY (8/16/22):  9m postop routine f/u.  Denies any HA, seizures, change in behavior or thinking, vision change, or sensory motor change.  Wound healed with hair regrowth.    HPI:    Alisia Gusman is a 77 y.o.-year-old female who presents today for post-operative follow-up s/p right frontal crani for gross total resection of meningioma on 11/15/21. Doing well from neuro standpoint.  Remains mildly confused but no focal deficits.  P/w daughter.  Wound has healed well.  No HA, seizures, or sensory motor changes. She developed DVT/PE postop and was placed on eliquis.      ROS:  As stated in the above HPI    PE:  Vitals:    08/16/22 1302   BP: (!) 152/81   Pulse: 76       AAOX3  NAD  CN 2-12 intact     Strength:      Deltoids Biceps Triceps Wrist Ext. Wrist Flex. Hand    RUE 5 5 5 5 5 5   LUE 5 5 5 5 5 5    Hip Flex. Knee Flex. Knee Ext. Dorsi Flex Plantar Flex EHL   RLE 5 5 5 5 5 5   LLE 5 5 5 5 5 5     Sensation:  Intact to light touch (All 4 extremities)    Gait:  normal    Right frontal incision:  Healed well, hair grown back, no signs of infx   Cranial plate palpable beneath skin but no breakdown or tenderness    IMAGING:  All imaging reviewed by me.    [facility], 8/10/22:  1. No recurrence of right frontal meningioma  2. Stable small left frontal meningioma    Postop MRI - GTR    ASSESSMENT:   Problem List Items Addressed This Visit        Neuro    S/P craniotomy - Primary    Relevant Orders    MRI Brain W WO Contrast       Oncology    History of meningioma    Relevant Orders    MRI Brain W WO Contrast        S/p right frontal crani for gross total resection of meningioma (final path WHO grade I).  Continues to do well.  No issues or neuro deficits. MRI does not show any recurrence and stable small left frontal meningioma.  Continue surveillance.    PLAN:   - RTC in 1yr with [facility]

## 2022-08-16 NOTE — ASSESSMENT & PLAN NOTE
Patient with well controlled A1C (6.5) on most recent check. Given risk of hypoglycemia and recent 5lb weight loss since last visit, will decrease insulin to 22u nightly. Changes printed on AVS and will be discussed with sister who provides medications and lives next door.    Will also contact podiatry as patient concerned that no follow up was scheduled for her.    Will plan for repeat A1C and visit back in January 2023.

## 2022-08-16 NOTE — PATIENT INSTRUCTIONS
Today we changed the dose of your Lantus insulin from 26 to 22 units at bedtime. From now on you can take 22 units at bedtime.

## 2022-08-22 ENCOUNTER — TELEPHONE (OUTPATIENT)
Dept: PRIMARY CARE CLINIC | Facility: CLINIC | Age: 77
End: 2022-08-22
Payer: MEDICARE

## 2022-08-22 DIAGNOSIS — Z12.31 ENCOUNTER FOR SCREENING MAMMOGRAM FOR MALIGNANT NEOPLASM OF BREAST: Primary | ICD-10-CM

## 2022-08-22 NOTE — TELEPHONE ENCOUNTER
----- Message from Kellee Holden sent at 8/20/2022  8:43 AM CDT -----  Needs advice from nurse:      Who Called:pt  Regarding:needs order for mammo  Would the patient rather a call back or [facility]IA [facility]?  Best Call Back number:[phone]  Additional Info:

## 2022-08-24 NOTE — TELEPHONE ENCOUNTER
Jodie Montemayor Selena Staff  Caller: [phone] or [phone] (Today,  3:19 PM)  Caller is requesting to schedule their annual screening mammogram appointment. Order is not listed in Epic.  Please enter order and contact patient to schedule.   Would the patient like a call back, or a response through their [facility] portal?:   Call back

## 2022-08-30 ENCOUNTER — TELEPHONE (OUTPATIENT)
Dept: PRIMARY CARE CLINIC | Facility: CLINIC | Age: 77
End: 2022-08-30
Payer: MEDICARE

## 2022-09-13 ENCOUNTER — OFFICE VISIT (OUTPATIENT)
Dept: PODIATRY | Facility: CLINIC | Age: 77
End: 2022-09-13
Payer: MEDICARE

## 2022-09-13 VITALS
SYSTOLIC BLOOD PRESSURE: 141 MMHG | DIASTOLIC BLOOD PRESSURE: 79 MMHG | BODY MASS INDEX: 26.57 KG/M2 | HEIGHT: 62 IN | HEART RATE: 91 BPM | WEIGHT: 144.38 LBS

## 2022-09-13 DIAGNOSIS — B35.1 ONYCHOMYCOSIS DUE TO DERMATOPHYTE: ICD-10-CM

## 2022-09-13 DIAGNOSIS — E11.49 TYPE II DIABETES MELLITUS WITH NEUROLOGICAL MANIFESTATIONS: Primary | ICD-10-CM

## 2022-09-13 DIAGNOSIS — L84 CORN OR CALLUS: ICD-10-CM

## 2022-09-13 PROCEDURE — 1157F ADVNC CARE PLAN IN RCRD: CPT | Mod: CPTII,S$GLB,, | Performed by: PODIATRIST

## 2022-09-13 PROCEDURE — 1126F AMNT PAIN NOTED NONE PRSNT: CPT | Mod: CPTII,S$GLB,, | Performed by: PODIATRIST

## 2022-09-13 PROCEDURE — 3077F SYST BP >= 140 MM HG: CPT | Mod: CPTII,S$GLB,, | Performed by: PODIATRIST

## 2022-09-13 PROCEDURE — 3078F DIAST BP <80 MM HG: CPT | Mod: CPTII,S$GLB,, | Performed by: PODIATRIST

## 2022-09-13 PROCEDURE — 3077F PR MOST RECENT SYSTOLIC BLOOD PRESSURE >= 140 MM HG: ICD-10-PCS | Mod: CPTII,S$GLB,, | Performed by: PODIATRIST

## 2022-09-13 PROCEDURE — 99214 OFFICE O/P EST MOD 30 MIN: CPT | Mod: 25,S$GLB,, | Performed by: PODIATRIST

## 2022-09-13 PROCEDURE — 1159F MED LIST DOCD IN RCRD: CPT | Mod: CPTII,S$GLB,, | Performed by: PODIATRIST

## 2022-09-13 PROCEDURE — 1160F PR REVIEW ALL MEDS BY PRESCRIBER/CLIN PHARMACIST DOCUMENTED: ICD-10-PCS | Mod: CPTII,S$GLB,, | Performed by: PODIATRIST

## 2022-09-13 PROCEDURE — 99214 PR OFFICE/OUTPT VISIT, EST, LEVL IV, 30-39 MIN: ICD-10-PCS | Mod: 25,S$GLB,, | Performed by: PODIATRIST

## 2022-09-13 PROCEDURE — 11721 DEBRIDE NAIL 6 OR MORE: CPT | Mod: 59,Q8,S$GLB, | Performed by: PODIATRIST

## 2022-09-13 PROCEDURE — 11057 PR TRIM BENIGN HYPERKERATOTIC SKIN LESION,>4: ICD-10-PCS | Mod: Q8,S$GLB,, | Performed by: PODIATRIST

## 2022-09-13 PROCEDURE — 3078F PR MOST RECENT DIASTOLIC BLOOD PRESSURE < 80 MM HG: ICD-10-PCS | Mod: CPTII,S$GLB,, | Performed by: PODIATRIST

## 2022-09-13 PROCEDURE — 99999 PR PBB SHADOW E&M-EST. PATIENT-LVL IV: ICD-10-PCS | Mod: PBBFAC,,, | Performed by: PODIATRIST

## 2022-09-13 PROCEDURE — 1157F PR ADVANCE CARE PLAN OR EQUIV PRESENT IN MEDICAL RECORD: ICD-10-PCS | Mod: CPTII,S$GLB,, | Performed by: PODIATRIST

## 2022-09-13 PROCEDURE — 11057 PARNG/CUTG B9 HYPRKR LES >4: CPT | Mod: Q8,S$GLB,, | Performed by: PODIATRIST

## 2022-09-13 PROCEDURE — 1160F RVW MEDS BY RX/DR IN RCRD: CPT | Mod: CPTII,S$GLB,, | Performed by: PODIATRIST

## 2022-09-13 PROCEDURE — 1126F PR PAIN SEVERITY QUANTIFIED, NO PAIN PRESENT: ICD-10-PCS | Mod: CPTII,S$GLB,, | Performed by: PODIATRIST

## 2022-09-13 PROCEDURE — 99999 PR PBB SHADOW E&M-EST. PATIENT-LVL IV: CPT | Mod: PBBFAC,,, | Performed by: PODIATRIST

## 2022-09-13 PROCEDURE — 11721 PR DEBRIDEMENT OF NAILS, 6 OR MORE: ICD-10-PCS | Mod: 59,Q8,S$GLB, | Performed by: PODIATRIST

## 2022-09-13 PROCEDURE — 1159F PR MEDICATION LIST DOCUMENTED IN MEDICAL RECORD: ICD-10-PCS | Mod: CPTII,S$GLB,, | Performed by: PODIATRIST

## 2022-09-13 RX ORDER — CETIRIZINE HYDROCHLORIDE 10 MG/1
TABLET ORAL
COMMUNITY
Start: 2022-06-05 | End: 2022-11-11

## 2022-09-13 RX ORDER — KETOCONAZOLE 20 MG/G
CREAM TOPICAL DAILY
Qty: 60 G | Refills: 4 | Status: SHIPPED | OUTPATIENT
Start: 2022-09-13

## 2022-09-13 NOTE — PROGRESS NOTES
Subjective:      Patient ID: Alisia Gusman is a 77 y.o. female.    Chief Complaint: Diabetic Foot Exam, Ingrown Toenail, and Nail Problem (Discolored toenails)    Alisia is a 77 y.o. female who presents to the clinic for evaluation and treatment of high risk feet. Alisia has a past medical history of Acute pulmonary embolism without acute cor pulmonale (11/15/2021), Adult bronchiectasis (7/26/2017), Allergy, Anemia, Arthritis, Asthma, Breast cancer (1993), Breast cancer (2020), Cancer (1993), Cataract, Chronic cervical radiculopathy (9/20/2012), Diabetes mellitus, Diabetes mellitus type II, Diabetes with neurologic complications, Diverticulosis, Dry eyes, Dysphagia, GERD (gastroesophageal reflux disease), Hyperlipidemia, Hypertension, Neuropathy, Scalp tenderness, Sepsis (12/12/2021), Shortness of breath, and Ulcer. The patient's returns to clinic for routine high risk DM foot exam/care. Never got antifungal cream and requesting new Rx. Otherwise doing well.  This patient has documented high risk feet requiring routine maintenance secondary to diabetes mellitis and those secondary complications of diabetes, as mentioned..    PCP: Selena Montemayor MD    Date Last Seen by PCP: 4/19/2022   Chief Complaint   Patient presents with    Diabetic Foot Exam    Ingrown Toenail    Nail Problem     Discolored toenails         Hemoglobin A1C   Date Value Ref Range Status   08/10/2022 6.5 (H) 4.0 - 5.6 % Final     Comment:     ADA Screening Guidelines:  5.7-6.4%  Consistent with prediabetes  >or=6.5%  Consistent with diabetes    High levels of fetal hemoglobin interfere with the HbA1C  assay. Heterozygous hemoglobin variants (HbS, HgC, etc)do  not significantly interfere with this assay.   However, presence of multiple variants may affect accuracy.     02/08/2022 6.9 (H) 4.0 - 5.6 % Final     Comment:     ADA Screening Guidelines:  5.7-6.4%  Consistent with prediabetes  >or=6.5%  Consistent with diabetes    High levels of fetal hemoglobin  "interfere with the HbA1C  assay. Heterozygous hemoglobin variants (HbS, HgC, etc)do  not significantly interfere with this assay.   However, presence of multiple variants may affect accuracy.     11/06/2021 7.5 (H) 4.0 - 5.6 % Final     Comment:     ADA Screening Guidelines:  5.7-6.4%  Consistent with prediabetes  >or=6.5%  Consistent with diabetes    High levels of fetal hemoglobin interfere with the HbA1C  assay. Heterozygous hemoglobin variants (HbS, HgC, etc)do  not significantly interfere with this assay.   However, presence of multiple variants may affect accuracy.         Review of Systems   Constitutional: Negative for chills, decreased appetite and fever.   Cardiovascular:  Positive for leg swelling (L>R stable ).   Respiratory:  Negative for cough and shortness of breath.    Skin:  Positive for dry skin, nail changes and suspicious lesions. Negative for color change, flushing, itching, poor wound healing, rash and skin cancer.   Musculoskeletal:  Negative for arthritis, back pain, falls, joint pain, joint swelling and myalgias.   Gastrointestinal:  Negative for nausea and vomiting.   Neurological:  Negative for loss of balance, numbness and paresthesias.   All other systems reviewed and are negative.        Objective:       Vitals:    09/13/22 0832   BP: (!) 141/79   Pulse: 91   Weight: 65.5 kg (144 lb 6.4 oz)   Height: 5' 2" (1.575 m)   PainSc: 0-No pain        Physical Exam  Vitals and nursing note reviewed.   Constitutional:       Appearance: She is well-developed.   Cardiovascular:      Pulses:           Dorsalis pedis pulses are 1+ on the right side and 1+ on the left side.      Comments: Posterior tibial pulses are diminished Bilaterally. Toes are cool to touch. Feet are warm proximally.There is decreased digital hair. Skin is atrophic, slightly hyperpigmented, and mildly edematous      Musculoskeletal:         General: Swelling present. No tenderness, deformity or signs of injury. Normal range of " motion.      Right ankle: Normal.      Left ankle: Normal.      Right foot: No swelling, deformity or crepitus.      Left foot: No swelling, deformity or crepitus.      Comments: Semi-reducible hammertoe contractures noted to toes 2-4 b/l-asymptomatic.  Otherwise adequate joint range of motion without pain, limitation, nor crepitation Bilateral feet and ankle joints. Muscle strength is 5/5 in all groups bilaterally.         Lymphadenopathy:      Comments: No palpable lymph nodes   Skin:     General: Skin is warm and dry.      Coloration: Skin is not ashen, cyanotic or pale.      Findings: No bruising, ecchymosis, erythema, lesion or rash.      Nails: There is no clubbing.      Comments: Nails x10 are elongated by  1-5mm's, thickened by 2-4 mm's, dystrophic, and are darkened in  coloration . Xerosis Bilaterally. No open lesions, lacerations or wounds noted    Hyperkeratotic lesion noted to distal tips of b/l hallux and 2nd toes and L 3rd toe--all stable and trimmed well. Skin lines present to lesion/s. No signs of deep tissue injury to all lesions.    Neurological:      Mental Status: She is alert and oriented to person, place, and time.      Sensory: Sensory deficit present.      Comments: Stephenson-Leonel 5.07 monofilamant testing is diminished Vikash feet. Sharp/dull sensation diminished Bilaterally. Light touch absent Bilaterally.       Psychiatric:         Behavior: Behavior normal.           Assessment:       Encounter Diagnoses   Name Primary?    Type II diabetes mellitus with neurological manifestations Yes    Onychomycosis due to dermatophyte     Corn or callus            Plan:       Alisia was seen today for diabetic foot exam, ingrown toenail and nail problem.    Diagnoses and all orders for this visit:    Type II diabetes mellitus with neurological manifestations    Onychomycosis due to dermatophyte    Corn or callus    Other orders  -     ketoconazole (NIZORAL) 2 % cream; Apply topically once daily. Apply  daily to affected toenail for 6-12 months      I counseled the patient on her conditions, their implications and medical management.    - Shoe inspection. Diabetic Foot Education. Patient reminded of the importance of good nutrition and blood sugar control to help prevent podiatric complications of diabetes. Patient instructed on proper foot hygeine. We discussed wearing proper shoe gear, daily foot inspections, never walking without protective shoe gear, caution putting sharp instruments to feet     - Discussed DM foot care:  Wear comfortable, proper fitting shoes. Wash feet daily. Dry well. After drying, apply moisturizer to feet (no lotion to webspaces). Inspect feet daily for skin breaks, blisters, swelling, or redness. Wear cotton socks (preferably white)  Change socks every day. Do NOT walk barefoot. Do NOT use heating pads or warm/hot water soaks     With patient's permission, nails were aggressively reduced and debrided 1-5 b/l, removing all offending nail and debris. Patient tolerated this well and no blood was drawn. Patient reports relief following the procedure.     The affected area was cleansed with an alcohol prep pad. Next, utilizing a 5mm curette, the hyperkeratotic tissues were trimmed from areas as noted above, down to appropriate level of skin. Care was taken to remove any nucleated core from the center of the lesion. No pinpoint bleeding was encountered. The patient tolerated relief following this procedure.      Rx Ketoconazole cream to be applied to affected toenails for up to 1 year.    Discussed regular and routine moisturizer to skin of both feet to help improve dry skin. Advised to apply twice daily until resolution of symptoms. Avoid between toes.     RTC 2-3 months, sooner PRN

## 2022-09-21 ENCOUNTER — OFFICE VISIT (OUTPATIENT)
Dept: HEMATOLOGY/ONCOLOGY | Facility: CLINIC | Age: 77
End: 2022-09-21
Payer: MEDICARE

## 2022-09-21 VITALS
HEART RATE: 88 BPM | HEIGHT: 62 IN | DIASTOLIC BLOOD PRESSURE: 92 MMHG | WEIGHT: 141.56 LBS | TEMPERATURE: 98 F | RESPIRATION RATE: 18 BRPM | BODY MASS INDEX: 26.05 KG/M2 | SYSTOLIC BLOOD PRESSURE: 177 MMHG | OXYGEN SATURATION: 96 %

## 2022-09-21 DIAGNOSIS — C50.612 CARCINOMA OF AXILLARY TAIL OF LEFT BREAST IN FEMALE, ESTROGEN RECEPTOR POSITIVE: ICD-10-CM

## 2022-09-21 DIAGNOSIS — Z79.811 PROPHYLACTIC USE OF ANASTROZOLE (ARIMIDEX): Primary | ICD-10-CM

## 2022-09-21 DIAGNOSIS — Z17.0 CARCINOMA OF AXILLARY TAIL OF LEFT BREAST IN FEMALE, ESTROGEN RECEPTOR POSITIVE: ICD-10-CM

## 2022-09-21 PROCEDURE — 1101F PR PT FALLS ASSESS DOC 0-1 FALLS W/OUT INJ PAST YR: ICD-10-PCS | Mod: CPTII,S$GLB,, | Performed by: INTERNAL MEDICINE

## 2022-09-21 PROCEDURE — 1157F ADVNC CARE PLAN IN RCRD: CPT | Mod: CPTII,S$GLB,, | Performed by: INTERNAL MEDICINE

## 2022-09-21 PROCEDURE — 1159F PR MEDICATION LIST DOCUMENTED IN MEDICAL RECORD: ICD-10-PCS | Mod: CPTII,S$GLB,, | Performed by: INTERNAL MEDICINE

## 2022-09-21 PROCEDURE — 99213 PR OFFICE/OUTPT VISIT, EST, LEVL III, 20-29 MIN: ICD-10-PCS | Mod: S$GLB,,, | Performed by: INTERNAL MEDICINE

## 2022-09-21 PROCEDURE — 99999 PR PBB SHADOW E&M-EST. PATIENT-LVL V: CPT | Mod: PBBFAC,,, | Performed by: INTERNAL MEDICINE

## 2022-09-21 PROCEDURE — 3288F FALL RISK ASSESSMENT DOCD: CPT | Mod: CPTII,S$GLB,, | Performed by: INTERNAL MEDICINE

## 2022-09-21 PROCEDURE — 3080F PR MOST RECENT DIASTOLIC BLOOD PRESSURE >= 90 MM HG: ICD-10-PCS | Mod: CPTII,S$GLB,, | Performed by: INTERNAL MEDICINE

## 2022-09-21 PROCEDURE — 3288F PR FALLS RISK ASSESSMENT DOCUMENTED: ICD-10-PCS | Mod: CPTII,S$GLB,, | Performed by: INTERNAL MEDICINE

## 2022-09-21 PROCEDURE — 3077F PR MOST RECENT SYSTOLIC BLOOD PRESSURE >= 140 MM HG: ICD-10-PCS | Mod: CPTII,S$GLB,, | Performed by: INTERNAL MEDICINE

## 2022-09-21 PROCEDURE — 1159F MED LIST DOCD IN RCRD: CPT | Mod: CPTII,S$GLB,, | Performed by: INTERNAL MEDICINE

## 2022-09-21 PROCEDURE — 99213 OFFICE O/P EST LOW 20 MIN: CPT | Mod: S$GLB,,, | Performed by: INTERNAL MEDICINE

## 2022-09-21 PROCEDURE — 3080F DIAST BP >= 90 MM HG: CPT | Mod: CPTII,S$GLB,, | Performed by: INTERNAL MEDICINE

## 2022-09-21 PROCEDURE — 3077F SYST BP >= 140 MM HG: CPT | Mod: CPTII,S$GLB,, | Performed by: INTERNAL MEDICINE

## 2022-09-21 PROCEDURE — 1126F PR PAIN SEVERITY QUANTIFIED, NO PAIN PRESENT: ICD-10-PCS | Mod: CPTII,S$GLB,, | Performed by: INTERNAL MEDICINE

## 2022-09-21 PROCEDURE — 1101F PT FALLS ASSESS-DOCD LE1/YR: CPT | Mod: CPTII,S$GLB,, | Performed by: INTERNAL MEDICINE

## 2022-09-21 PROCEDURE — 1157F PR ADVANCE CARE PLAN OR EQUIV PRESENT IN MEDICAL RECORD: ICD-10-PCS | Mod: CPTII,S$GLB,, | Performed by: INTERNAL MEDICINE

## 2022-09-21 PROCEDURE — 1126F AMNT PAIN NOTED NONE PRSNT: CPT | Mod: CPTII,S$GLB,, | Performed by: INTERNAL MEDICINE

## 2022-09-21 PROCEDURE — 99999 PR PBB SHADOW E&M-EST. PATIENT-LVL V: ICD-10-PCS | Mod: PBBFAC,,, | Performed by: INTERNAL MEDICINE

## 2022-09-21 NOTE — PROGRESS NOTES
Subjective:       Patient ID: Alisia Gusman is a 77 y.o. female.    Chief Complaint: No chief complaint on file.    HPI     Mrs. Gusman presents to the clinic for evaluation.  I had last seen her in August 2021 I had recommended a three-month follow-up, however, it is only today that she returns to the clinic.  Apparently she underwent a resection of a meningioma in November and that is why she missed her appointment.     Briefly, she is a 77-year-old  female with a remote history of left sided breast cancer treated in 1993 with a lumpectomy, radiation therapy, and 5 years of tamoxifen.  Apparently the size of her tumor in 1993 was 4 mm.  She completed her 5 years of tamoxifen and she was followed expectantly.      An in breast recurrence was noted in January 2020.  A core biopsy on 01/15/2020 showed an infiltrating ductal carcinoma that was ER positive IA negative and HER 2 negative.  On 02/20/2020 she underwent a left mastectomy and a sentinel lymph node biopsy.  The sentinel lymph node was negative.  On the  mastectomy specimen there was a 2 mm area of DCIS and there was a 6 mm area of invasive cancer.  Resection margins were clear.  She has made an uneventful recovery and was started on anastrozole in early April 2020.    Her latest mammogram in December 2021 was read as BI-RADS I and a one year follow-up was recommended.      Review of Systems    Overall she feels well. She states that she has tolerated the anastrozole well so far and has not experienced any side effects.  ECOG PS is 1.  She denies any anxiety, depression, easy bruising, fevers, chills, night  sweats, weight loss, nausea, vomiting, diarrhea, constipation, diplopia, chest pain, palpitations, shortness of breath, breast pain, abdominal pain, extremity pain, or difficulty ambulating.  The remainder of the ten-point ROS, including general, skin, lymph, H/N, cardiorespiratory, GI, , Neuro, Endocrine, and psychiatric is negative.      Objective:      Physical Exam    She is alert, oriented to time, place, person, pleasant, well nourished, in no acute physical distress.    She is here with her granddaughter                                VITAL SIGNS:  Reviewed                                      HEENT:  Normal.  There are no nasal, oral, lip, gingival, auricular, lid,    or conjunctival lesions.  Mucosae are moist and pink, and there is no        thrush.  Pupils are equal, reactive to light and accommodation.              Extraocular muscle movements are intact.  Dentition is fair.  There is no frontal or maxillary tenderness.                                     NECK:  Supple without JVD, adenopathy, or thyromegaly.                       LUNGS:  Clear to auscultation without wheezing, rales, or rhonchi.           CARDIOVASCULAR:  Reveals an S1, S2, no murmurs, no rubs, no gallops.         ABDOMEN:  Soft, nontender, without organomegaly.  Bowel sounds are    present.                                                                     EXTREMITIES:  No cyanosis, clubbing, or edema.                               BREASTS:  She is status post left mastectomy with a well-healed incision.    There are no masses in the right breast.                                        LYMPHATIC:  There is no cervical, axillary, or supraclavicular adenopathy.   SKIN:  Warm and moist, without petechiae, rashes, induration, or ecchymoses.           NEUROLOGIC:  DTRs are 0-1+ bilaterally, symmetrical, motor function is 5/5,  and cranial nerves are  within normal limits.    Assessment:       1. Prophylactic use of anastrozole (Arimidex)    2. 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     Status post resection of a meningioma  Plan:         I had a long discussion with Mrs. Gusman.  I recommended that she remain on anastrozole which she should take for a minimum of 5 years, through the end of March 2025.  I will see her again  in 6 months.  Her mammogram will be repeated in December and will be checked by Dr. Nieves.  Her multiple questions were answered to her satisfaction.        Route Chart for Scheduling    Med Onc Chart Routing      Follow up with physician 6 months.   Follow up with KATIE    Infusion scheduling note    Injection scheduling note    Labs    Imaging    Pharmacy appointment    Other referrals

## 2022-09-25 NOTE — PROGRESS NOTES
I have personally taken the history and examined this patient and agree with the resident's note as stated above.

## 2022-11-11 ENCOUNTER — OFFICE VISIT (OUTPATIENT)
Dept: PRIMARY CARE CLINIC | Facility: CLINIC | Age: 77
End: 2022-11-11
Payer: MEDICARE

## 2022-11-11 ENCOUNTER — LAB VISIT (OUTPATIENT)
Dept: LAB | Facility: HOSPITAL | Age: 77
End: 2022-11-11
Attending: INTERNAL MEDICINE
Payer: MEDICARE

## 2022-11-11 ENCOUNTER — TELEPHONE (OUTPATIENT)
Dept: PRIMARY CARE CLINIC | Facility: CLINIC | Age: 77
End: 2022-11-11

## 2022-11-11 VITALS
WEIGHT: 140.19 LBS | HEART RATE: 72 BPM | OXYGEN SATURATION: 100 % | SYSTOLIC BLOOD PRESSURE: 136 MMHG | HEIGHT: 62 IN | BODY MASS INDEX: 25.8 KG/M2 | DIASTOLIC BLOOD PRESSURE: 88 MMHG

## 2022-11-11 DIAGNOSIS — R22.9 SKIN NODULE: ICD-10-CM

## 2022-11-11 DIAGNOSIS — I10 BENIGN ESSENTIAL HYPERTENSION: ICD-10-CM

## 2022-11-11 DIAGNOSIS — E78.5 HYPERLIPIDEMIA ASSOCIATED WITH TYPE 2 DIABETES MELLITUS: ICD-10-CM

## 2022-11-11 DIAGNOSIS — E66.9 DIABETES MELLITUS TYPE 2 IN OBESE: Primary | ICD-10-CM

## 2022-11-11 DIAGNOSIS — Z86.711 HISTORY OF PULMONARY EMBOLUS (PE): ICD-10-CM

## 2022-11-11 DIAGNOSIS — E11.42 DIABETIC PERIPHERAL NEUROPATHY: ICD-10-CM

## 2022-11-11 DIAGNOSIS — C50.612 CARCINOMA OF AXILLARY TAIL OF LEFT BREAST IN FEMALE, ESTROGEN RECEPTOR POSITIVE: ICD-10-CM

## 2022-11-11 DIAGNOSIS — E11.69 DIABETES MELLITUS TYPE 2 IN OBESE: Primary | ICD-10-CM

## 2022-11-11 DIAGNOSIS — Z79.01 CHRONIC ANTICOAGULATION: ICD-10-CM

## 2022-11-11 DIAGNOSIS — E83.52 HYPERCALCEMIA: ICD-10-CM

## 2022-11-11 DIAGNOSIS — E11.69 HYPERLIPIDEMIA ASSOCIATED WITH TYPE 2 DIABETES MELLITUS: ICD-10-CM

## 2022-11-11 DIAGNOSIS — Z17.0 CARCINOMA OF AXILLARY TAIL OF LEFT BREAST IN FEMALE, ESTROGEN RECEPTOR POSITIVE: ICD-10-CM

## 2022-11-11 DIAGNOSIS — D32.9 MENINGIOMA: ICD-10-CM

## 2022-11-11 DIAGNOSIS — E66.9 DIABETES MELLITUS TYPE 2 IN OBESE: ICD-10-CM

## 2022-11-11 DIAGNOSIS — R91.8 PULMONARY NODULES: ICD-10-CM

## 2022-11-11 DIAGNOSIS — E11.69 DIABETES MELLITUS TYPE 2 IN OBESE: ICD-10-CM

## 2022-11-11 PROBLEM — T50.901A MEDICATION OVERDOSE: Status: RESOLVED | Noted: 2021-12-12 | Resolved: 2022-11-11

## 2022-11-11 LAB
ALBUMIN SERPL BCP-MCNC: 3.8 G/DL (ref 3.5–5.2)
ALP SERPL-CCNC: 61 U/L (ref 55–135)
ALT SERPL W/O P-5'-P-CCNC: 21 U/L (ref 10–44)
ANION GAP SERPL CALC-SCNC: 9 MMOL/L (ref 8–16)
AST SERPL-CCNC: 25 U/L (ref 10–40)
BILIRUB SERPL-MCNC: 1.9 MG/DL (ref 0.1–1)
BUN SERPL-MCNC: 13 MG/DL (ref 8–23)
CA-I BLDV-SCNC: 1.36 MMOL/L (ref 1.06–1.42)
CALCIUM SERPL-MCNC: 10.1 MG/DL (ref 8.7–10.5)
CHLORIDE SERPL-SCNC: 107 MMOL/L (ref 95–110)
CHOLEST SERPL-MCNC: 127 MG/DL (ref 120–199)
CHOLEST/HDLC SERPL: 1.8 {RATIO} (ref 2–5)
CO2 SERPL-SCNC: 27 MMOL/L (ref 23–29)
CREAT SERPL-MCNC: 0.7 MG/DL (ref 0.5–1.4)
EST. GFR  (NO RACE VARIABLE): >60 ML/MIN/1.73 M^2
GLUCOSE SERPL-MCNC: 106 MG/DL (ref 70–110)
HDLC SERPL-MCNC: 70 MG/DL (ref 40–75)
HDLC SERPL: 55.1 % (ref 20–50)
LDLC SERPL CALC-MCNC: 47 MG/DL (ref 63–159)
NONHDLC SERPL-MCNC: 57 MG/DL
POTASSIUM SERPL-SCNC: 4 MMOL/L (ref 3.5–5.1)
PROT SERPL-MCNC: 7.3 G/DL (ref 6–8.4)
SODIUM SERPL-SCNC: 143 MMOL/L (ref 136–145)
TRIGL SERPL-MCNC: 50 MG/DL (ref 30–150)

## 2022-11-11 PROCEDURE — 3079F PR MOST RECENT DIASTOLIC BLOOD PRESSURE 80-89 MM HG: ICD-10-PCS | Mod: CPTII,S$GLB,, | Performed by: INTERNAL MEDICINE

## 2022-11-11 PROCEDURE — 36415 COLL VENOUS BLD VENIPUNCTURE: CPT | Mod: PN | Performed by: INTERNAL MEDICINE

## 2022-11-11 PROCEDURE — 82330 ASSAY OF CALCIUM: CPT | Performed by: INTERNAL MEDICINE

## 2022-11-11 PROCEDURE — 1159F PR MEDICATION LIST DOCUMENTED IN MEDICAL RECORD: ICD-10-PCS | Mod: CPTII,S$GLB,, | Performed by: INTERNAL MEDICINE

## 2022-11-11 PROCEDURE — 3075F PR MOST RECENT SYSTOLIC BLOOD PRESS GE 130-139MM HG: ICD-10-PCS | Mod: CPTII,S$GLB,, | Performed by: INTERNAL MEDICINE

## 2022-11-11 PROCEDURE — 1101F PR PT FALLS ASSESS DOC 0-1 FALLS W/OUT INJ PAST YR: ICD-10-PCS | Mod: CPTII,S$GLB,, | Performed by: INTERNAL MEDICINE

## 2022-11-11 PROCEDURE — 82306 VITAMIN D 25 HYDROXY: CPT | Performed by: INTERNAL MEDICINE

## 2022-11-11 PROCEDURE — 1157F PR ADVANCE CARE PLAN OR EQUIV PRESENT IN MEDICAL RECORD: ICD-10-PCS | Mod: CPTII,S$GLB,, | Performed by: INTERNAL MEDICINE

## 2022-11-11 PROCEDURE — 82570 ASSAY OF URINE CREATININE: CPT | Performed by: INTERNAL MEDICINE

## 2022-11-11 PROCEDURE — 1126F AMNT PAIN NOTED NONE PRSNT: CPT | Mod: CPTII,S$GLB,, | Performed by: INTERNAL MEDICINE

## 2022-11-11 PROCEDURE — 83036 HEMOGLOBIN GLYCOSYLATED A1C: CPT | Performed by: INTERNAL MEDICINE

## 2022-11-11 PROCEDURE — 80053 COMPREHEN METABOLIC PANEL: CPT | Performed by: INTERNAL MEDICINE

## 2022-11-11 PROCEDURE — 3079F DIAST BP 80-89 MM HG: CPT | Mod: CPTII,S$GLB,, | Performed by: INTERNAL MEDICINE

## 2022-11-11 PROCEDURE — 85025 COMPLETE CBC W/AUTO DIFF WBC: CPT | Performed by: INTERNAL MEDICINE

## 2022-11-11 PROCEDURE — 99999 PR PBB SHADOW E&M-EST. PATIENT-LVL V: CPT | Mod: PBBFAC,,, | Performed by: INTERNAL MEDICINE

## 2022-11-11 PROCEDURE — 99499 UNLISTED E&M SERVICE: CPT | Mod: HCNC,S$GLB,, | Performed by: INTERNAL MEDICINE

## 2022-11-11 PROCEDURE — 99215 PR OFFICE/OUTPT VISIT, EST, LEVL V, 40-54 MIN: ICD-10-PCS | Mod: S$GLB,,, | Performed by: INTERNAL MEDICINE

## 2022-11-11 PROCEDURE — 1101F PT FALLS ASSESS-DOCD LE1/YR: CPT | Mod: CPTII,S$GLB,, | Performed by: INTERNAL MEDICINE

## 2022-11-11 PROCEDURE — 99215 OFFICE O/P EST HI 40 MIN: CPT | Mod: S$GLB,,, | Performed by: INTERNAL MEDICINE

## 2022-11-11 PROCEDURE — 1159F MED LIST DOCD IN RCRD: CPT | Mod: CPTII,S$GLB,, | Performed by: INTERNAL MEDICINE

## 2022-11-11 PROCEDURE — 3288F PR FALLS RISK ASSESSMENT DOCUMENTED: ICD-10-PCS | Mod: CPTII,S$GLB,, | Performed by: INTERNAL MEDICINE

## 2022-11-11 PROCEDURE — 99999 PR PBB SHADOW E&M-EST. PATIENT-LVL V: ICD-10-PCS | Mod: PBBFAC,,, | Performed by: INTERNAL MEDICINE

## 2022-11-11 PROCEDURE — 83970 ASSAY OF PARATHORMONE: CPT | Performed by: INTERNAL MEDICINE

## 2022-11-11 PROCEDURE — 1126F PR PAIN SEVERITY QUANTIFIED, NO PAIN PRESENT: ICD-10-PCS | Mod: CPTII,S$GLB,, | Performed by: INTERNAL MEDICINE

## 2022-11-11 PROCEDURE — 1157F ADVNC CARE PLAN IN RCRD: CPT | Mod: CPTII,S$GLB,, | Performed by: INTERNAL MEDICINE

## 2022-11-11 PROCEDURE — 3288F FALL RISK ASSESSMENT DOCD: CPT | Mod: CPTII,S$GLB,, | Performed by: INTERNAL MEDICINE

## 2022-11-11 PROCEDURE — 3075F SYST BP GE 130 - 139MM HG: CPT | Mod: CPTII,S$GLB,, | Performed by: INTERNAL MEDICINE

## 2022-11-11 PROCEDURE — 80061 LIPID PANEL: CPT | Performed by: INTERNAL MEDICINE

## 2022-11-11 PROCEDURE — 99499 RISK ADDL DX/OHS AUDIT: ICD-10-PCS | Mod: HCNC,S$GLB,, | Performed by: INTERNAL MEDICINE

## 2022-11-11 PROCEDURE — 82043 UR ALBUMIN QUANTITATIVE: CPT | Performed by: INTERNAL MEDICINE

## 2022-11-11 RX ORDER — BNT162B2 0.23 MG/2.25ML
INJECTION, SUSPENSION INTRAMUSCULAR
COMMUNITY
Start: 2022-08-03 | End: 2022-11-11

## 2022-11-11 RX ORDER — AMLODIPINE BESYLATE 5 MG/1
5 TABLET ORAL DAILY
Qty: 90 TABLET | Refills: 3 | Status: SHIPPED | OUTPATIENT
Start: 2022-11-11 | End: 2023-04-20 | Stop reason: SDUPTHER

## 2022-11-11 RX ORDER — SEMAGLUTIDE 1.34 MG/ML
INJECTION, SOLUTION SUBCUTANEOUS
Qty: 12 PEN | Refills: 3 | Status: SHIPPED | OUTPATIENT
Start: 2022-11-11 | End: 2023-12-26

## 2022-11-11 RX ORDER — MUPIROCIN 20 MG/G
OINTMENT TOPICAL
Qty: 30 G | Refills: 1 | Status: SHIPPED | OUTPATIENT
Start: 2022-11-11 | End: 2022-12-01 | Stop reason: SDUPTHER

## 2022-11-11 RX ORDER — ZOSTER VACCINE RECOMBINANT, ADJUVANTED 50 MCG/0.5
KIT INTRAMUSCULAR
COMMUNITY
Start: 2022-08-03 | End: 2022-11-11

## 2022-11-11 NOTE — PATIENT INSTRUCTIONS
Labs and urine today,  Keep appointment with eye doctor (optometry) in December.   Don't forget to get your second shingles vaccine at our local pharmacy.   Please call [phone] to schedule your COVID bivalent booster (newest COVID booster) if you would like to get it at [facility]. Otherwise it is likely available at your local pharmacy also.    Repeat CT of the chest in March 2023 to re-evaluate the lung nodules seen in the abdominal CT scan (done due to diverticulitis).     There's 2 bottles of anastrazole (cancer medicine) 1mg daily in the pill bottle bag. Make sure to only take 1mg daily.     Restart amlodipine 5mg daily, which is your blood pressure medicine. Continue carvedilol twice daily at same dosage.     Use bactroban ointment topically twice daily x 7 days as needed for abscesses.

## 2022-11-11 NOTE — TELEPHONE ENCOUNTER
----- Message from Austin Jasmine sent at 11/11/2022  4:03 PM CST -----  Contact: pt [phone]  Patient states she does not understand change in medication or instructions given to her at her appointment. Please call and advise.    Thank you and have a great day.

## 2022-11-11 NOTE — PROGRESS NOTES
Subjective:       Patient ID: Alisia Gusman is a 77 y.o. female.    Chief Complaint: DM Follow-up    HPI  [facility]   Due for lipids.   Due for second shingrix.  MMG scheduled for 12/27/22.   Last DEXA was 5/6/22 - osteopenia. Mild hypercalcemia. Cr ok. Needs PTH, vit d, ionized ca.  H/o diverticulitis. Cscope UTD. Ct a/p 2/14/22 and 3/2/22 reviewed. Previously seen Dr. Dave in colorectal.  Lives by self. Sister lives next door and administers/gives her her medicines.    DM2 - lantus 22 units qam (decreased from 28 u due to weight loss and risk of hypoglycemia), ozempic 1mg weekly. Reports has been off of ozempic x 2 weeks due to shortage at pharmacy - on back order at the pharmacy. Reviewed her log at home sugars have still been doing well the last 2 weeks.  Last seen Dr. Nelson 8/16/22. F/u in Jan.  A1c 8/10/22 6.5.  Peripheral neuropathy. Last seen Dr. Salazar in podiatry 9/13/22. F/u in 3 mo.  Has eye exam from outside eye doctor - Dr. Conti. Seeing Dr. Hill 12/27/22 in optometry.  Due for MAC.     HTN - coreg 12.5mg BID, amlodipine 5mg daily.   There were remote lacunar infarcts seen in head imaging in Nov.    HLD - atorvastatin 40mg qd.  Last LDL 11/6/21 59.4.    Meningioma s/p R frontal craniotomy and resection 11/2021  AH/VH, poor memory and insomnia (seen Dr. Santos 3/22/22 and apparently delusions are commonly seen w/ R hemispheric lesions)- seroquel 50mg 1.5 tabs nightly.  Followed up w/  in NSGY 8/16/22. F/u in 1 yr w/ repeat brain MRI.   MRI brain 8/10/22 - Evolution of postsurgical changes.  No recurrent meningioma at the right frontal operative bed.  Stable small left frontal meningioma.  There is a small focus of extra-axial hemorrhage overlying the right frontal vertex however these are late subacute blood products with no evidence of acute hemorrhage.    PE when she was in the hosp for the brain resection. On eliquis 5mg BID.   Easy bruising.  "    Recurrent breast cancer s/p L mastectomy 2020. On anastrozole 1mg daily (plan for 5 yrs - till end of 3/2025).   Last seen Dr. Olvera 9/21/22. F/u in 6 mo. Has repeat MMG in Dec and follows w/ Dr. Nieves also.  Multiple pulmonary micro nodules seen in the CT C/A/P 3/2022. Repeat 12 mo    RLE w/ significant venous reflux on US 11/2021.    Reports has a bump in the buttocks that was painful when sitting on it. Used neosporin and that helps. Using dial soap. No drainage. No fevers/chills.     Review of Systems  Comprehensive review of systems otherwise negative. See history/subjective section for more details.    Objective:      Physical Exam    /88   Pulse 72   Ht 5' 2" (1.575 m)   Wt 63.6 kg (140 lb 3.4 oz)   LMP  (LMP Unknown) Comment: Partial  SpO2 100%   BMI 25.65 kg/m²     GEN - A+OX4, NAD   HEENT - PERRL, EOMI, OP clear. MMM.   Neck - No thyromegaly or cervical LAD. No thyroid masses felt.  CV - RRR, no m/r   Chest - CTAB, no wheezing or rhonchi  Abd - S/NT/ND/+BS.   Ext - 1+BDP and 2+ radial pulses. Trace BLE edema at the ankles.  Neuro - 5/5 BUE and BLE strength. Normal gait.   Skin - No rash. Appears to be resolved abscess at the L buttocks.     Previous labs reviewed.     Assessment/Plan     Alisia was seen today for follow-up.    Diagnoses and all orders for this visit:    Diabetes mellitus type 2 in obese  -     Hemoglobin A1C; Future  -     Microalbumin/Creatinine Ratio, Urine; Future  -     semaglutide (OZEMPIC) 1 mg/dose (4 mg/3 mL); INJECT 1MG (0.75 ML) UNDER THE SKIN EVERY 7 DAYS.    Diabetic peripheral neuropathy  -     Hemoglobin A1C; Future  -     Microalbumin/Creatinine Ratio, Urine; Future  -     semaglutide (OZEMPIC) 1 mg/dose (4 mg/3 mL); INJECT 1MG (0.75 ML) UNDER THE SKIN EVERY 7 DAYS.    Benign essential hypertension  -     Comprehensive Metabolic Panel; Future  -     amLODIPine (NORVASC) 5 MG tablet; Take 1 tablet (5 mg total) by mouth once daily.    Hyperlipidemia associated " with type 2 diabetes mellitus  -     Comprehensive Metabolic Panel; Future  -     Hemoglobin A1C; Future  -     Microalbumin/Creatinine Ratio, Urine; Future  -     Lipid Panel; Future  -     semaglutide (OZEMPIC) 1 mg/dose (4 mg/3 mL); INJECT 1MG (0.75 ML) UNDER THE SKIN EVERY 7 DAYS.    Meningioma - stable on MRI. F/u w/ NSGY.  -     CBC Auto Differential; Future    History of pulmonary embolus (PE) - if D dimer normal, consider stopping eliquis - will also check w/ Dr. Olvera.  -     CBC Auto Differential; Future  -     D-DIMER, QUANTITATIVE; Future    Chronic anticoagulation  -     CBC Auto Differential; Future  -     D-DIMER, QUANTITATIVE; Future    Pulmonary nodules - repeat CT in March 2023.  -     CT Chest Without Contrast; Future    Carcinoma of axillary tail of left breast in female, estrogen receptor positive - cont anastrazole 1mg daily.  -     CBC Auto Differential; Future    Hypercalcemia  -     Comprehensive Metabolic Panel; Future  -     PTH, intact; Future  -     Vitamin D; Future  -     CALCIUM, IONIZED; Future    Other orders  -     mupirocin (BACTROBAN) 2 % ointment; Use topically twice daily x 7 days as needed for small abscesses.    Labs and urine today,  Keep appointment with eye doctor (optometry) in December.   Don't forget to get your second shingles vaccine at our local pharmacy.   Please call [phone] to schedule your COVID bivalent booster (newest COVID booster) if you would like to get it at [facility]. Otherwise it is likely available at your local pharmacy also.    Repeat CT of the chest in March 2023 to re-evaluate the lung nodules seen in the abdominal CT scan (done due to diverticulitis).     There's 2 bottles of anastrazole (cancer medicine) 1mg daily in the pill bottle bag. Make sure to only take 1mg daily.     Restart amlodipine 5mg daily, which is your blood pressure medicine. Continue carvedilol twice daily at same dosage.     Use bactroban ointment topically twice daily x 7  days as needed for abscesses.    Follow up in about 6 weeks (around 12/23/2022).      Selena Montemayor MD  Department of Internal Medicine - [facility]  9:52 AM

## 2022-11-12 LAB
25(OH)D3+25(OH)D2 SERPL-MCNC: 63 NG/ML (ref 30–96)
ALBUMIN/CREAT UR: 25.3 UG/MG (ref 0–30)
BASOPHILS # BLD AUTO: 0.05 K/UL (ref 0–0.2)
BASOPHILS NFR BLD: 0.9 % (ref 0–1.9)
CREAT UR-MCNC: 158 MG/DL (ref 15–325)
DIFFERENTIAL METHOD: ABNORMAL
EOSINOPHIL # BLD AUTO: 0 K/UL (ref 0–0.5)
EOSINOPHIL NFR BLD: 0.7 % (ref 0–8)
ERYTHROCYTE [DISTWIDTH] IN BLOOD BY AUTOMATED COUNT: 14 % (ref 11.5–14.5)
ESTIMATED AVG GLUCOSE: 134 MG/DL (ref 68–131)
HBA1C MFR BLD: 6.3 % (ref 4–5.6)
HCT VFR BLD AUTO: 41.4 % (ref 37–48.5)
HGB BLD-MCNC: 13 G/DL (ref 12–16)
IMM GRANULOCYTES # BLD AUTO: 0.02 K/UL (ref 0–0.04)
IMM GRANULOCYTES NFR BLD AUTO: 0.3 % (ref 0–0.5)
LYMPHOCYTES # BLD AUTO: 2.9 K/UL (ref 1–4.8)
LYMPHOCYTES NFR BLD: 49.9 % (ref 18–48)
MCH RBC QN AUTO: 30 PG (ref 27–31)
MCHC RBC AUTO-ENTMCNC: 31.4 G/DL (ref 32–36)
MCV RBC AUTO: 96 FL (ref 82–98)
MICROALBUMIN UR DL<=1MG/L-MCNC: 40 UG/ML
MONOCYTES # BLD AUTO: 0.5 K/UL (ref 0.3–1)
MONOCYTES NFR BLD: 7.9 % (ref 4–15)
NEUTROPHILS # BLD AUTO: 2.3 K/UL (ref 1.8–7.7)
NEUTROPHILS NFR BLD: 40.3 % (ref 38–73)
NRBC BLD-RTO: 0 /100 WBC
PLATELET # BLD AUTO: 170 K/UL (ref 150–450)
PMV BLD AUTO: 11.3 FL (ref 9.2–12.9)
PTH-INTACT SERPL-MCNC: 160.9 PG/ML (ref 9–77)
RBC # BLD AUTO: 4.33 M/UL (ref 4–5.4)
WBC # BLD AUTO: 5.79 K/UL (ref 3.9–12.7)

## 2022-11-14 ENCOUNTER — TELEPHONE (OUTPATIENT)
Dept: PRIMARY CARE CLINIC | Facility: CLINIC | Age: 77
End: 2022-11-14
Payer: MEDICARE

## 2022-11-16 ENCOUNTER — TELEPHONE (OUTPATIENT)
Dept: PRIMARY CARE CLINIC | Facility: CLINIC | Age: 77
End: 2022-11-16
Payer: MEDICARE

## 2022-11-16 DIAGNOSIS — Z86.711 HISTORY OF PULMONARY EMBOLUS (PE): Primary | ICD-10-CM

## 2022-11-16 NOTE — TELEPHONE ENCOUNTER
They attempted to contact her to have her come back in to redraw, unable to reach her. If still needed will you reorder and I will try and contact her to redraw?

## 2022-11-17 NOTE — TELEPHONE ENCOUNTER
Can you please call and check on her about what medication she is confused about? She also needs to redraw labs if she can.

## 2022-11-17 NOTE — TELEPHONE ENCOUNTER
""Restart amlodipine 5mg daily, which is your blood pressure medicine.  Continue carvedilol twice daily at same dosage." MY  "

## 2022-11-17 NOTE — TELEPHONE ENCOUNTER
Neelam Montemayor [facility] Staff  Caller: Self/[phone] (Today, 12:49 PM)  Pt said that she is calling in regards to she was seen In the office on 11/11 and Dr Montemayor sent a rx to the pharmacy for her pt stated that she refused the medication bc she was not aware that the doctor wanted her to take the medication pt stated that the pharmacy canceled the rx now she wants to take the medication and wants Dr Montemayor to send another rx pt stated that she does not know the name of the medication. Please advise

## 2022-11-18 ENCOUNTER — PES CALL (OUTPATIENT)
Dept: ADMINISTRATIVE | Facility: CLINIC | Age: 77
End: 2022-11-18
Payer: MEDICARE

## 2022-11-18 NOTE — TELEPHONE ENCOUNTER
Left vm msg to call us about d-dimer labs / Lab update from visit   Will also leave my chart msg  
Please call and notify pt:  Sugars look great! Keep up the good work!  Kidney function is stable. There is no extra protein spilling into the urine. Parathyroid hormone is mildly elevated but calcium is now back at the normal range. Vitamin D is sufficient. Follow up with Dr. Nelson as scheduled.   
No

## 2022-12-01 RX ORDER — MUPIROCIN 20 MG/G
OINTMENT TOPICAL
Qty: 30 G | Refills: 1 | Status: SHIPPED | OUTPATIENT
Start: 2022-12-01 | End: 2023-07-19

## 2022-12-01 NOTE — TELEPHONE ENCOUNTER
----- Message from Jessica Chirinos sent at 12/1/2022 10:14 AM CST -----  Contact: [phone]  Requesting an RX refill or new RX.  Is this a refill or new RX: refill  RX name and strength   mupirocin (BACTROBAN) 2 % ointment 30 g   Is this a 30 day or 90 day RX:   Pharmacy name and phone # :  [id number] [id number] - [id number]   Phone:  [phone]  Fax:  [phone]    Patient says that the knot is still there and she would like to have another rx .filled for her.

## 2022-12-13 ENCOUNTER — OFFICE VISIT (OUTPATIENT)
Dept: PODIATRY | Facility: CLINIC | Age: 77
End: 2022-12-13
Payer: MEDICARE

## 2022-12-13 VITALS
WEIGHT: 141.75 LBS | SYSTOLIC BLOOD PRESSURE: 161 MMHG | DIASTOLIC BLOOD PRESSURE: 88 MMHG | HEIGHT: 62 IN | HEART RATE: 84 BPM | BODY MASS INDEX: 26.09 KG/M2

## 2022-12-13 DIAGNOSIS — E11.49 TYPE II DIABETES MELLITUS WITH NEUROLOGICAL MANIFESTATIONS: Primary | ICD-10-CM

## 2022-12-13 DIAGNOSIS — L84 CORN OR CALLUS: ICD-10-CM

## 2022-12-13 DIAGNOSIS — B35.1 ONYCHOMYCOSIS DUE TO DERMATOPHYTE: ICD-10-CM

## 2022-12-13 PROCEDURE — 99999 PR PBB SHADOW E&M-EST. PATIENT-LVL II: CPT | Mod: PBBFAC,,, | Performed by: PODIATRIST

## 2022-12-13 PROCEDURE — 3079F DIAST BP 80-89 MM HG: CPT | Mod: CPTII,S$GLB,, | Performed by: PODIATRIST

## 2022-12-13 PROCEDURE — 11721 PR DEBRIDEMENT OF NAILS, 6 OR MORE: ICD-10-PCS | Mod: Q9,59,S$GLB, | Performed by: PODIATRIST

## 2022-12-13 PROCEDURE — 1157F PR ADVANCE CARE PLAN OR EQUIV PRESENT IN MEDICAL RECORD: ICD-10-PCS | Mod: CPTII,S$GLB,, | Performed by: PODIATRIST

## 2022-12-13 PROCEDURE — 11056 PARNG/CUTG B9 HYPRKR LES 2-4: CPT | Mod: Q9,S$GLB,, | Performed by: PODIATRIST

## 2022-12-13 PROCEDURE — 11056 PR TRIM BENIGN HYPERKERATOTIC SKIN LESION,2-4: ICD-10-PCS | Mod: Q9,S$GLB,, | Performed by: PODIATRIST

## 2022-12-13 PROCEDURE — 1126F AMNT PAIN NOTED NONE PRSNT: CPT | Mod: CPTII,S$GLB,, | Performed by: PODIATRIST

## 2022-12-13 PROCEDURE — 99999 PR PBB SHADOW E&M-EST. PATIENT-LVL II: ICD-10-PCS | Mod: PBBFAC,,, | Performed by: PODIATRIST

## 2022-12-13 PROCEDURE — 11721 DEBRIDE NAIL 6 OR MORE: CPT | Mod: Q9,59,S$GLB, | Performed by: PODIATRIST

## 2022-12-13 PROCEDURE — 1126F PR PAIN SEVERITY QUANTIFIED, NO PAIN PRESENT: ICD-10-PCS | Mod: CPTII,S$GLB,, | Performed by: PODIATRIST

## 2022-12-13 PROCEDURE — 99499 UNLISTED E&M SERVICE: CPT | Mod: S$GLB,,, | Performed by: PODIATRIST

## 2022-12-13 PROCEDURE — 3077F SYST BP >= 140 MM HG: CPT | Mod: CPTII,S$GLB,, | Performed by: PODIATRIST

## 2022-12-13 PROCEDURE — 99499 NO LOS: ICD-10-PCS | Mod: S$GLB,,, | Performed by: PODIATRIST

## 2022-12-13 PROCEDURE — 3079F PR MOST RECENT DIASTOLIC BLOOD PRESSURE 80-89 MM HG: ICD-10-PCS | Mod: CPTII,S$GLB,, | Performed by: PODIATRIST

## 2022-12-13 PROCEDURE — 3077F PR MOST RECENT SYSTOLIC BLOOD PRESSURE >= 140 MM HG: ICD-10-PCS | Mod: CPTII,S$GLB,, | Performed by: PODIATRIST

## 2022-12-13 PROCEDURE — 1157F ADVNC CARE PLAN IN RCRD: CPT | Mod: CPTII,S$GLB,, | Performed by: PODIATRIST

## 2022-12-13 NOTE — PROGRESS NOTES
Subjective:      Patient ID: Alisia Gusman is a 77 y.o. female.    Chief Complaint: Diabetic Foot Exam (Selena Montemayor MD 11/11/2022)      Alisia is a 77 y.o. female who presents to the clinic for evaluation and treatment of high risk feet. Alisia has a past medical history of Acute pulmonary embolism without acute cor pulmonale (11/15/2021), Adult bronchiectasis (7/26/2017), Allergy, Anemia, Arthritis, Asthma, Breast cancer (1993), Breast cancer (2020), Cancer (1993), Cataract, Chronic cervical radiculopathy (9/20/2012), Diabetes mellitus, Diabetes mellitus type II, Diabetes with neurologic complications, Diverticulosis, Dry eyes, Dysphagia, GERD (gastroesophageal reflux disease), Hyperlipidemia, Hypertension, Neuropathy, Scalp tenderness, Sepsis (12/12/2021), Shortness of breath, and Ulcer. The patient's returns to clinic for routine high risk DM foot exam/care. N doing well.  This patient has documented high risk feet requiring routine maintenance secondary to diabetes mellitis and those secondary complications of diabetes, as mentioned..    PCP: Selena Montemayor MD    Date Last Seen by PCP: 11/11/2022   Chief Complaint   Patient presents with    Diabetic Foot Exam     Selena Montemayor MD 11/11/2022         Hemoglobin A1C   Date Value Ref Range Status   11/11/2022 6.3 (H) 4.0 - 5.6 % Final     Comment:     ADA Screening Guidelines:  5.7-6.4%  Consistent with prediabetes  >or=6.5%  Consistent with diabetes    High levels of fetal hemoglobin interfere with the HbA1C  assay. Heterozygous hemoglobin variants (HbS, HgC, etc)do  not significantly interfere with this assay.   However, presence of multiple variants may affect accuracy.     08/10/2022 6.5 (H) 4.0 - 5.6 % Final     Comment:     ADA Screening Guidelines:  5.7-6.4%  Consistent with prediabetes  >or=6.5%  Consistent with diabetes    High levels of fetal hemoglobin interfere with the HbA1C  assay. Heterozygous hemoglobin variants (HbS, HgC, etc)do  not significantly interfere with  "this assay.   However, presence of multiple variants may affect accuracy.     02/08/2022 6.9 (H) 4.0 - 5.6 % Final     Comment:     ADA Screening Guidelines:  5.7-6.4%  Consistent with prediabetes  >or=6.5%  Consistent with diabetes    High levels of fetal hemoglobin interfere with the HbA1C  assay. Heterozygous hemoglobin variants (HbS, HgC, etc)do  not significantly interfere with this assay.   However, presence of multiple variants may affect accuracy.         Review of Systems   Constitutional: Negative for chills, decreased appetite and fever.   Respiratory:  Negative for cough and shortness of breath.    Skin:  Positive for dry skin and nail changes. Negative for color change, flushing, itching, poor wound healing, rash and skin cancer.   Musculoskeletal:  Negative for arthritis, back pain, falls, joint pain, joint swelling and myalgias.   Gastrointestinal:  Negative for nausea and vomiting.   Neurological:  Negative for loss of balance, numbness and paresthesias.   All other systems reviewed and are negative.        Objective:       Vitals:    12/13/22 1102   BP: (!) 161/88   Pulse: 84   Weight: 64.3 kg (141 lb 12.1 oz)   Height: 5' 2" (1.575 m)   PainSc: 0-No pain        Physical Exam  Vitals and nursing note reviewed.   Constitutional:       Appearance: She is well-developed.   Cardiovascular:      Pulses:           Dorsalis pedis pulses are 1+ on the right side and 1+ on the left side.      Comments: Posterior tibial pulses are diminished Bilaterally. Toes are cool to touch. Feet are warm proximally.There is decreased digital hair. Skin is atrophic, slightly hyperpigmented, and mildly edematous      Musculoskeletal:         General: Swelling (chronic- stable) present. No tenderness, deformity or signs of injury. Normal range of motion.      Right ankle: Normal.      Left ankle: Normal.      Right foot: No swelling, deformity or crepitus.      Left foot: No swelling, deformity or crepitus.      Comments: " Semi-reducible hammertoe contractures noted to toes 2-4 b/l-asymptomatic.  Otherwise adequate joint range of motion without pain, limitation, nor crepitation Bilateral feet and ankle joints. Muscle strength is 5/5 in all groups bilaterally.         Lymphadenopathy:      Comments: No palpable lymph nodes   Skin:     General: Skin is warm and dry.      Coloration: Skin is not ashen, cyanotic or pale.      Findings: No bruising, ecchymosis, erythema, lesion or rash.      Nails: There is no clubbing.      Comments: Nails x10 are elongated by  2-5mm's, thickened by 2-4 mm's, dystrophic, and are darkened in  coloration . Xerosis Bilaterally. No open lesions, lacerations or wounds noted    Hyperkeratotic lesion noted to distal tips of b/l hallux   Neurological:      Mental Status: She is alert and oriented to person, place, and time.      Sensory: Sensory deficit present.      Comments: [place]-Leonel 5.07 monofilamant testing is diminished Vikash feet. Sharp/dull sensation diminished Bilaterally. Light touch absent Bilaterally.       Psychiatric:         Behavior: Behavior normal.           Assessment:       Encounter Diagnoses   Name Primary?    Type II diabetes mellitus with neurological manifestations Yes    Onychomycosis due to dermatophyte     Corn or callus            Plan:       Alisia was seen today for diabetic foot exam.    Diagnoses and all orders for this visit:    Type II diabetes mellitus with neurological manifestations    Onychomycosis due to dermatophyte    Corn or callus      I counseled the patient on her conditions, their implications and medical management.    Shoe inspection. Diabetic Foot Education. Patient reminded of the importance of good nutrition and blood sugar control to help prevent podiatric complications of diabetes. Patient instructed on proper foot hygeine. We discussed wearing proper shoe gear, daily foot inspections, never walking without protective shoe gear, caution putting sharp  instruments to feet     Discussed DM foot care:  Wear comfortable, proper fitting shoes. Wash feet daily. Dry well. After drying, apply moisturizer to feet (no lotion to webspaces). Inspect feet daily for skin breaks, blisters, swelling, or redness. Wear cotton socks (preferably white)  Change socks every day. Do NOT walk barefoot. Do NOT use heating pads or warm/hot water soaks     With patient's permission, nails were aggressively reduced and debrided 1-5 b/l, removing all offending nail and debris. Patient tolerated this well and no blood was drawn. Patient reports relief following the procedure.     The affected area was cleansed with an alcohol prep pad. Next, utilizing a 5mm curette, the hyperkeratotic tissues were trimmed from areas as noted above, down to appropriate level of skin. Care was taken to remove any nucleated core from the center of the lesion. No pinpoint bleeding was encountered. The patient tolerated relief following this procedure.      Discussed regular and routine moisturizer to skin of both feet to help improve dry skin. Advised to apply twice daily until resolution of symptoms. Avoid between toes.     RTC 2-3 months, sooner PRN

## 2022-12-21 ENCOUNTER — PATIENT MESSAGE (OUTPATIENT)
Dept: PRIMARY CARE CLINIC | Facility: CLINIC | Age: 77
End: 2022-12-21
Payer: MEDICARE

## 2022-12-21 NOTE — TELEPHONE ENCOUNTER
Spoke with daughter, Lennie.   Offered Virtual visit for Ms Crump due to possibility of ice on roads. She is going to discuss with Estrellita and will send us a my chart tonight or tomorrow.

## 2022-12-22 ENCOUNTER — PATIENT MESSAGE (OUTPATIENT)
Dept: PRIMARY CARE CLINIC | Facility: CLINIC | Age: 77
End: 2022-12-22
Payer: MEDICARE

## 2022-12-23 ENCOUNTER — TELEPHONE (OUTPATIENT)
Dept: PRIMARY CARE CLINIC | Facility: CLINIC | Age: 77
End: 2022-12-23
Payer: MEDICARE

## 2022-12-23 ENCOUNTER — LAB VISIT (OUTPATIENT)
Dept: LAB | Facility: HOSPITAL | Age: 77
End: 2022-12-23
Attending: INTERNAL MEDICINE
Payer: MEDICARE

## 2022-12-23 ENCOUNTER — OFFICE VISIT (OUTPATIENT)
Dept: PRIMARY CARE CLINIC | Facility: CLINIC | Age: 77
End: 2022-12-23
Payer: MEDICARE

## 2022-12-23 VITALS
WEIGHT: 140.63 LBS | HEIGHT: 62 IN | HEART RATE: 79 BPM | TEMPERATURE: 99 F | OXYGEN SATURATION: 100 % | BODY MASS INDEX: 25.88 KG/M2 | RESPIRATION RATE: 18 BRPM | SYSTOLIC BLOOD PRESSURE: 133 MMHG | DIASTOLIC BLOOD PRESSURE: 76 MMHG

## 2022-12-23 DIAGNOSIS — I10 BENIGN ESSENTIAL HYPERTENSION: Primary | ICD-10-CM

## 2022-12-23 DIAGNOSIS — Z86.711 HISTORY OF PULMONARY EMBOLUS (PE): ICD-10-CM

## 2022-12-23 DIAGNOSIS — J47.9 BRONCHIECTASIS WITHOUT COMPLICATION: ICD-10-CM

## 2022-12-23 DIAGNOSIS — Z79.4 DIABETES MELLITUS TYPE 2, INSULIN DEPENDENT: ICD-10-CM

## 2022-12-23 DIAGNOSIS — E11.9 DIABETES MELLITUS TYPE 2, INSULIN DEPENDENT: ICD-10-CM

## 2022-12-23 DIAGNOSIS — E21.3 HYPERPARATHYROIDISM: ICD-10-CM

## 2022-12-23 DIAGNOSIS — I70.0 AORTIC ATHEROSCLEROSIS: ICD-10-CM

## 2022-12-23 DIAGNOSIS — E11.42 TYPE 2 DIABETES MELLITUS WITH POLYNEUROPATHY: ICD-10-CM

## 2022-12-23 LAB — D DIMER PPP IA.FEU-MCNC: <0.19 MG/L FEU

## 2022-12-23 PROCEDURE — 1126F PR PAIN SEVERITY QUANTIFIED, NO PAIN PRESENT: ICD-10-PCS | Mod: CPTII,S$GLB,, | Performed by: INTERNAL MEDICINE

## 2022-12-23 PROCEDURE — 1159F MED LIST DOCD IN RCRD: CPT | Mod: CPTII,S$GLB,, | Performed by: INTERNAL MEDICINE

## 2022-12-23 PROCEDURE — 99215 OFFICE O/P EST HI 40 MIN: CPT | Mod: S$GLB,,, | Performed by: INTERNAL MEDICINE

## 2022-12-23 PROCEDURE — 1160F RVW MEDS BY RX/DR IN RCRD: CPT | Mod: CPTII,S$GLB,, | Performed by: INTERNAL MEDICINE

## 2022-12-23 PROCEDURE — 36415 COLL VENOUS BLD VENIPUNCTURE: CPT | Mod: PN | Performed by: INTERNAL MEDICINE

## 2022-12-23 PROCEDURE — 99215 PR OFFICE/OUTPT VISIT, EST, LEVL V, 40-54 MIN: ICD-10-PCS | Mod: S$GLB,,, | Performed by: INTERNAL MEDICINE

## 2022-12-23 PROCEDURE — 1101F PR PT FALLS ASSESS DOC 0-1 FALLS W/OUT INJ PAST YR: ICD-10-PCS | Mod: CPTII,S$GLB,, | Performed by: INTERNAL MEDICINE

## 2022-12-23 PROCEDURE — 3078F PR MOST RECENT DIASTOLIC BLOOD PRESSURE < 80 MM HG: ICD-10-PCS | Mod: CPTII,S$GLB,, | Performed by: INTERNAL MEDICINE

## 2022-12-23 PROCEDURE — 1160F PR REVIEW ALL MEDS BY PRESCRIBER/CLIN PHARMACIST DOCUMENTED: ICD-10-PCS | Mod: CPTII,S$GLB,, | Performed by: INTERNAL MEDICINE

## 2022-12-23 PROCEDURE — 3075F SYST BP GE 130 - 139MM HG: CPT | Mod: CPTII,S$GLB,, | Performed by: INTERNAL MEDICINE

## 2022-12-23 PROCEDURE — 1157F ADVNC CARE PLAN IN RCRD: CPT | Mod: CPTII,S$GLB,, | Performed by: INTERNAL MEDICINE

## 2022-12-23 PROCEDURE — 3078F DIAST BP <80 MM HG: CPT | Mod: CPTII,S$GLB,, | Performed by: INTERNAL MEDICINE

## 2022-12-23 PROCEDURE — 1126F AMNT PAIN NOTED NONE PRSNT: CPT | Mod: CPTII,S$GLB,, | Performed by: INTERNAL MEDICINE

## 2022-12-23 PROCEDURE — 3288F PR FALLS RISK ASSESSMENT DOCUMENTED: ICD-10-PCS | Mod: CPTII,S$GLB,, | Performed by: INTERNAL MEDICINE

## 2022-12-23 PROCEDURE — 1157F PR ADVANCE CARE PLAN OR EQUIV PRESENT IN MEDICAL RECORD: ICD-10-PCS | Mod: CPTII,S$GLB,, | Performed by: INTERNAL MEDICINE

## 2022-12-23 PROCEDURE — 1159F PR MEDICATION LIST DOCUMENTED IN MEDICAL RECORD: ICD-10-PCS | Mod: CPTII,S$GLB,, | Performed by: INTERNAL MEDICINE

## 2022-12-23 PROCEDURE — 99999 PR PBB SHADOW E&M-EST. PATIENT-LVL V: CPT | Mod: PBBFAC,,, | Performed by: INTERNAL MEDICINE

## 2022-12-23 PROCEDURE — 3075F PR MOST RECENT SYSTOLIC BLOOD PRESS GE 130-139MM HG: ICD-10-PCS | Mod: CPTII,S$GLB,, | Performed by: INTERNAL MEDICINE

## 2022-12-23 PROCEDURE — 1101F PT FALLS ASSESS-DOCD LE1/YR: CPT | Mod: CPTII,S$GLB,, | Performed by: INTERNAL MEDICINE

## 2022-12-23 PROCEDURE — 99499 RISK ADDL DX/OHS AUDIT: ICD-10-PCS | Mod: HCNC,S$GLB,, | Performed by: INTERNAL MEDICINE

## 2022-12-23 PROCEDURE — 99999 PR PBB SHADOW E&M-EST. PATIENT-LVL V: ICD-10-PCS | Mod: PBBFAC,,, | Performed by: INTERNAL MEDICINE

## 2022-12-23 PROCEDURE — 3288F FALL RISK ASSESSMENT DOCD: CPT | Mod: CPTII,S$GLB,, | Performed by: INTERNAL MEDICINE

## 2022-12-23 PROCEDURE — 85379 FIBRIN DEGRADATION QUANT: CPT | Performed by: INTERNAL MEDICINE

## 2022-12-23 PROCEDURE — 99499 UNLISTED E&M SERVICE: CPT | Mod: HCNC,S$GLB,, | Performed by: INTERNAL MEDICINE

## 2022-12-23 RX ORDER — LANCETS
1 EACH MISCELLANEOUS 3 TIMES DAILY
Qty: 270 EACH | Refills: 3 | Status: SHIPPED | OUTPATIENT
Start: 2022-12-23 | End: 2023-07-19

## 2022-12-23 RX ORDER — DEXTROSE 4 G
TABLET,CHEWABLE ORAL
Qty: 1 EACH | Refills: 1 | Status: SHIPPED | OUTPATIENT
Start: 2022-12-23

## 2022-12-23 NOTE — TELEPHONE ENCOUNTER
----- Message from Renita Walton sent at 12/23/2022 10:55 AM CST -----  Contact: Aggie Lora @  [phone]  Patient granddaughter says she's running 10 min late for her appointment

## 2022-12-23 NOTE — PROGRESS NOTES
"Subjective:       Patient ID: Alisia Gusman is a 77 y.o. female.    Chief Complaint: BP f/u    HPI  Both daughters are out of town - [place] in [place] and [place] in [place].     Last seen pt 11/11/22. Here for f/u today.   Mild hyperCa, which has resolved on most recent labs. PTH 11/11/22 160.9. Cr ok.   20lbs weight loss over the last yr. H/o meningioma s/p resection. Due to f/u w/ NSGY 3/2023.   Good appetite. Sister who lives next door makes food and pt keeps it in the freezer.     DM2 - Lantus 22 units qam. Off ozempic due to not able to get it at the pharmacy. Reordered at our last visit 11/11/22. Reports able to get ozempic and taking weekly. UTD w/ foot exam.   Checks her sugars a few times a day - one reading of 69 but otherwise fairly at goal. Otherwise the other times lowest is 80s.  A1c 11/11/22 6.3.  Follows w/ Dr. Nelson.  Has eye appt upcoming 12/27/22.    Also restarted amlodipine 5mg daily for HTN and cont coreg 12.5mg BID.   Losartan-->cough  Reviewed her log at home. Doing well.     Started on mupirocin topically BID x 7 days for small buttocks abscess. Reports it'll go away and come back.     CT chest 11/11/22 - stable mild bronchiectasis. Mild calcific aortic atherosclerosis. Stable B pulm micronodules. Possible remote fx of L several ribs.   Her atorvastatin was erroneously in her "prn" bag instead of her daily med bag. Sister, Julee, puts out her medicines for her.   Sometimes w/ nonproductive cough. No SOB. No CP.     Review of Systems  Comprehensive review of systems otherwise negative. See history/subjective section for more details.    Objective:      Physical Exam    /76   Pulse 79   Temp 98.5 °F (36.9 °C) (Oral)   Resp 18   Ht 5' 2" (1.575 m)   Wt 63.8 kg (140 lb 10.5 oz)   LMP  (LMP Unknown) Comment: Partial  SpO2 100%   BMI 25.73 kg/m²     Gen - A+OX4, NAD, sometimes w/ some confusion w/ answers. I asked what her sister's name was that lives next door and she said Radha, which " "is her daughter's name. When I went back to ask again later, she said Julee, which is the right name.   HEENT - PERRL, OP clear. MMM. Frontal scar well healed.   Neck - no LAD  CV - RRR, no m/r  Chest - CTAB, no wheezing/rhonchi/crackles. Normal work of breathing  Abd - S/NT/ND/+BS  EXT - 2+ B radial pulses. No LE edema.   MSK - no spinal tenderness to palpation. Normal gait.   Skin - no rash.     Previous labs reviewed.     Assessment/Plan     Diagnoses and all orders for this visit:    Benign essential hypertension - Stable and controlled. Continue current medications.    Diabetes mellitus type 2, insulin dependent - cont current meds. Consider lowering long acting.  -     blood-glucose meter (ACCU-CHEK DOMENICO PLUS METER) Misc; USE AS DIRECTED    Hyperparathyroidism - Ca ok. Will cont to monitor.     Bronchiectasis without complication - sometimes w/ cough. Will monitor. Overall stable.     Aortic atherosclerosis - her atorvastatin was in the "prn" bag instead of her daily meds bag. Discussed importance of being on atorva. Placed the bottle bag into her daily meds and also communicated w/ Aggie, her granddaughter, to let Ms. Ladd know to put atorvastatin in her pill boxes.     Reminded to get her shingrix 2nd dose. Has MMG and eye exam scheduled for next Tuesday. Recommended COVID bivalent vaccine.     41 minutes was spent on patient with over half the time was spent in coordination of care and/or counseling.    Follow up in about 4 months (around 4/23/2023).      Selena Montemayor MD  Department of Internal Medicine - [facility]  10:23 AM    "

## 2022-12-27 ENCOUNTER — HOSPITAL ENCOUNTER (OUTPATIENT)
Dept: RADIOLOGY | Facility: HOSPITAL | Age: 77
Discharge: HOME | End: 2022-12-27
Attending: INTERNAL MEDICINE
Payer: MEDICARE

## 2022-12-27 ENCOUNTER — OFFICE VISIT (OUTPATIENT)
Dept: OPTOMETRY | Facility: CLINIC | Age: 77
End: 2022-12-27
Payer: MEDICARE

## 2022-12-27 VITALS — HEIGHT: 62 IN | WEIGHT: 143.31 LBS | BODY MASS INDEX: 26.37 KG/M2

## 2022-12-27 DIAGNOSIS — E11.9 DIABETES MELLITUS WITHOUT COMPLICATION: ICD-10-CM

## 2022-12-27 DIAGNOSIS — Z12.31 ENCOUNTER FOR SCREENING MAMMOGRAM FOR MALIGNANT NEOPLASM OF BREAST: ICD-10-CM

## 2022-12-27 PROCEDURE — 77067 SCR MAMMO BI INCL CAD: CPT | Mod: 26,52,, | Performed by: RADIOLOGY

## 2022-12-27 PROCEDURE — 77063 BREAST TOMOSYNTHESIS BI: CPT | Mod: TC,52

## 2022-12-27 PROCEDURE — 1159F PR MEDICATION LIST DOCUMENTED IN MEDICAL RECORD: ICD-10-PCS | Mod: CPTII,S$GLB,, | Performed by: OPTOMETRIST

## 2022-12-27 PROCEDURE — 3288F PR FALLS RISK ASSESSMENT DOCUMENTED: ICD-10-PCS | Mod: CPTII,S$GLB,, | Performed by: OPTOMETRIST

## 2022-12-27 PROCEDURE — 1100F PTFALLS ASSESS-DOCD GE2>/YR: CPT | Mod: CPTII,S$GLB,, | Performed by: OPTOMETRIST

## 2022-12-27 PROCEDURE — 1159F MED LIST DOCD IN RCRD: CPT | Mod: CPTII,S$GLB,, | Performed by: OPTOMETRIST

## 2022-12-27 PROCEDURE — 1126F PR PAIN SEVERITY QUANTIFIED, NO PAIN PRESENT: ICD-10-PCS | Mod: CPTII,S$GLB,, | Performed by: OPTOMETRIST

## 2022-12-27 PROCEDURE — 1100F PR PT FALLS ASSESS DOC 2+ FALLS/FALL W/INJURY/YR: ICD-10-PCS | Mod: CPTII,S$GLB,, | Performed by: OPTOMETRIST

## 2022-12-27 PROCEDURE — 2023F PR DILATED RETINAL EXAM W/O EVID OF RETINOPATHY: ICD-10-PCS | Mod: CPTII,S$GLB,, | Performed by: OPTOMETRIST

## 2022-12-27 PROCEDURE — 92004 PR EYE EXAM, NEW PATIENT,COMPREHESV: ICD-10-PCS | Mod: S$GLB,,, | Performed by: OPTOMETRIST

## 2022-12-27 PROCEDURE — 92004 COMPRE OPH EXAM NEW PT 1/>: CPT | Mod: S$GLB,,, | Performed by: OPTOMETRIST

## 2022-12-27 PROCEDURE — 2023F DILAT RTA XM W/O RTNOPTHY: CPT | Mod: CPTII,S$GLB,, | Performed by: OPTOMETRIST

## 2022-12-27 PROCEDURE — 1126F AMNT PAIN NOTED NONE PRSNT: CPT | Mod: CPTII,S$GLB,, | Performed by: OPTOMETRIST

## 2022-12-27 PROCEDURE — 99999 PR PBB SHADOW E&M-EST. PATIENT-LVL III: CPT | Mod: PBBFAC,,, | Performed by: OPTOMETRIST

## 2022-12-27 PROCEDURE — 3288F FALL RISK ASSESSMENT DOCD: CPT | Mod: CPTII,S$GLB,, | Performed by: OPTOMETRIST

## 2022-12-27 PROCEDURE — 77063 MAMMO DIGITAL SCREENING RIGHT WITH TOMO: ICD-10-PCS | Mod: 26,52,, | Performed by: RADIOLOGY

## 2022-12-27 PROCEDURE — 1157F PR ADVANCE CARE PLAN OR EQUIV PRESENT IN MEDICAL RECORD: ICD-10-PCS | Mod: CPTII,S$GLB,, | Performed by: OPTOMETRIST

## 2022-12-27 PROCEDURE — 77067 MAMMO DIGITAL SCREENING RIGHT WITH TOMO: ICD-10-PCS | Mod: 26,52,, | Performed by: RADIOLOGY

## 2022-12-27 PROCEDURE — 99999 PR PBB SHADOW E&M-EST. PATIENT-LVL III: ICD-10-PCS | Mod: PBBFAC,,, | Performed by: OPTOMETRIST

## 2022-12-27 PROCEDURE — 77063 BREAST TOMOSYNTHESIS BI: CPT | Mod: 26,52,, | Performed by: RADIOLOGY

## 2022-12-27 PROCEDURE — 1157F ADVNC CARE PLAN IN RCRD: CPT | Mod: CPTII,S$GLB,, | Performed by: OPTOMETRIST

## 2022-12-27 PROCEDURE — 77067 SCR MAMMO BI INCL CAD: CPT | Mod: TC,52

## 2022-12-27 NOTE — PROGRESS NOTES
HPI    CC: Eye strain ou especially at near, no pain or f/f.  LOVELY: New Patient  (+) Changes in vision   (-) Pain  (-) Irritation   (-) Itching   (-) Flashes  (-) Floaters  (+) Glasses wearer  (-) CL wearer  (-) Uses eye gtts  Does patient want a refraction today? Yes; after speaking with Dr. Hill, pt decided to hold off on refraction. Pt didn't realize she was   wearing OTC readers  (-) Eye injury  (+) Eye surgery - phaco ou  (-)POHx  (+)FOHx - cat = mother  (+)DM  Hemoglobin A1C       Date                     Value               Ref Range             Status                11/11/2022               6.3 (H)             4.0 - 5.6 %           Final            Last edited by Mari Hill, OD on 12/27/2022 10:49 AM.            Assessment /Plan     For exam results, see Encounter Report.    Diabetes mellitus without complication  -     Ambulatory referral/consult to Optometry      No retinopathy noted today.  Continued control with primary care physician and annual comprehensive eye exam.   Continue use of OTC reading glasses prn. Monitor yearly.      RTC in 1 year for annual eye exam unless changes noted sooner.

## 2023-02-09 DIAGNOSIS — Z00.00 ENCOUNTER FOR MEDICARE ANNUAL WELLNESS EXAM: ICD-10-CM

## 2023-03-21 ENCOUNTER — TELEPHONE (OUTPATIENT)
Dept: PODIATRY | Facility: CLINIC | Age: 78
End: 2023-03-21
Payer: MEDICARE

## 2023-03-21 ENCOUNTER — OFFICE VISIT (OUTPATIENT)
Dept: PODIATRY | Facility: CLINIC | Age: 78
End: 2023-03-21
Payer: MEDICARE

## 2023-03-21 VITALS
WEIGHT: 143 LBS | BODY MASS INDEX: 26.31 KG/M2 | RESPIRATION RATE: 18 BRPM | HEART RATE: 74 BPM | SYSTOLIC BLOOD PRESSURE: 180 MMHG | DIASTOLIC BLOOD PRESSURE: 86 MMHG | HEIGHT: 62 IN

## 2023-03-21 DIAGNOSIS — B35.1 ONYCHOMYCOSIS DUE TO DERMATOPHYTE: ICD-10-CM

## 2023-03-21 DIAGNOSIS — E11.49 TYPE II DIABETES MELLITUS WITH NEUROLOGICAL MANIFESTATIONS: Primary | ICD-10-CM

## 2023-03-21 DIAGNOSIS — L84 CORN OR CALLUS: ICD-10-CM

## 2023-03-21 PROCEDURE — 11056 PR TRIM BENIGN HYPERKERATOTIC SKIN LESION,2-4: ICD-10-PCS | Mod: Q9,HCNC,S$GLB, | Performed by: PODIATRIST

## 2023-03-21 PROCEDURE — 99499 NO LOS: ICD-10-PCS | Mod: HCNC,S$GLB,, | Performed by: PODIATRIST

## 2023-03-21 PROCEDURE — 3079F DIAST BP 80-89 MM HG: CPT | Mod: HCNC,CPTII,S$GLB, | Performed by: PODIATRIST

## 2023-03-21 PROCEDURE — 1159F PR MEDICATION LIST DOCUMENTED IN MEDICAL RECORD: ICD-10-PCS | Mod: HCNC,CPTII,S$GLB, | Performed by: PODIATRIST

## 2023-03-21 PROCEDURE — 1126F AMNT PAIN NOTED NONE PRSNT: CPT | Mod: HCNC,CPTII,S$GLB, | Performed by: PODIATRIST

## 2023-03-21 PROCEDURE — 99499 UNLISTED E&M SERVICE: CPT | Mod: HCNC,S$GLB,, | Performed by: PODIATRIST

## 2023-03-21 PROCEDURE — 99999 PR PBB SHADOW E&M-EST. PATIENT-LVL IV: CPT | Mod: PBBFAC,HCNC,, | Performed by: PODIATRIST

## 2023-03-21 PROCEDURE — 11721 DEBRIDE NAIL 6 OR MORE: CPT | Mod: Q9,59,HCNC,S$GLB | Performed by: PODIATRIST

## 2023-03-21 PROCEDURE — 3077F PR MOST RECENT SYSTOLIC BLOOD PRESSURE >= 140 MM HG: ICD-10-PCS | Mod: HCNC,CPTII,S$GLB, | Performed by: PODIATRIST

## 2023-03-21 PROCEDURE — 3072F PR LOW RISK FOR RETINOPATHY: ICD-10-PCS | Mod: HCNC,CPTII,S$GLB, | Performed by: PODIATRIST

## 2023-03-21 PROCEDURE — 99999 PR PBB SHADOW E&M-EST. PATIENT-LVL IV: ICD-10-PCS | Mod: PBBFAC,HCNC,, | Performed by: PODIATRIST

## 2023-03-21 PROCEDURE — 1159F MED LIST DOCD IN RCRD: CPT | Mod: HCNC,CPTII,S$GLB, | Performed by: PODIATRIST

## 2023-03-21 PROCEDURE — 3072F LOW RISK FOR RETINOPATHY: CPT | Mod: HCNC,CPTII,S$GLB, | Performed by: PODIATRIST

## 2023-03-21 PROCEDURE — 1157F ADVNC CARE PLAN IN RCRD: CPT | Mod: HCNC,CPTII,S$GLB, | Performed by: PODIATRIST

## 2023-03-21 PROCEDURE — 3077F SYST BP >= 140 MM HG: CPT | Mod: HCNC,CPTII,S$GLB, | Performed by: PODIATRIST

## 2023-03-21 PROCEDURE — 1157F PR ADVANCE CARE PLAN OR EQUIV PRESENT IN MEDICAL RECORD: ICD-10-PCS | Mod: HCNC,CPTII,S$GLB, | Performed by: PODIATRIST

## 2023-03-21 PROCEDURE — 1126F PR PAIN SEVERITY QUANTIFIED, NO PAIN PRESENT: ICD-10-PCS | Mod: HCNC,CPTII,S$GLB, | Performed by: PODIATRIST

## 2023-03-21 PROCEDURE — 11056 PARNG/CUTG B9 HYPRKR LES 2-4: CPT | Mod: Q9,HCNC,S$GLB, | Performed by: PODIATRIST

## 2023-03-21 PROCEDURE — 3079F PR MOST RECENT DIASTOLIC BLOOD PRESSURE 80-89 MM HG: ICD-10-PCS | Mod: HCNC,CPTII,S$GLB, | Performed by: PODIATRIST

## 2023-03-21 PROCEDURE — 11721 PR DEBRIDEMENT OF NAILS, 6 OR MORE: ICD-10-PCS | Mod: Q9,59,HCNC,S$GLB | Performed by: PODIATRIST

## 2023-03-21 NOTE — TELEPHONE ENCOUNTER
----- Message from Roselia Branch CMA sent at 3/21/2023  3:07 PM CDT -----  Regarding: Please call  Contact: Estrellita Lora (daughter) states she is checking on pt she is waiting in the ER parking lot. Please call [phone]    Thank you

## 2023-03-22 NOTE — PROGRESS NOTES
Subjective:      Patient ID: Alisia Gusman is a 77 y.o. female.    Chief Complaint: PCP (4/20/2023 10:20 AM Selena Montemayor MD ), Diabetic Foot Exam, Nail Care, and Callouses      Alisia is a 77 y.o. female who presents to the clinic for evaluation and treatment of high risk feet. Alisia has a past medical history of Acute pulmonary embolism without acute cor pulmonale (11/15/2021), Adult bronchiectasis (7/26/2017), Allergy, Anemia, Arthritis, Asthma, Breast cancer (1993), Breast cancer (2020), Cancer (1993), Cataract, Chronic cervical radiculopathy (9/20/2012), Diabetes mellitus, Diabetes mellitus type II, Diabetes with neurologic complications, Diverticulosis, Dry eyes, Dysphagia, GERD (gastroesophageal reflux disease), Hyperlipidemia, Hypertension, Neuropathy, Scalp tenderness, Sepsis (12/12/2021), Shortness of breath, and Ulcer. The patient's returns to clinic for routine high risk DM foot exam/care. N doing well.  This patient has documented high risk feet requiring routine maintenance secondary to diabetes mellitis and those secondary complications of diabetes, as mentioned..    PCP: Selena Montemayor MD    Date Last Seen by PCP: 12/23/2022   Chief Complaint   Patient presents with    PCP     4/20/2023 10:20 AM Selena Montemayor MD     Diabetic Foot Exam    Nail Care    Callouses         Hemoglobin A1C   Date Value Ref Range Status   11/11/2022 6.3 (H) 4.0 - 5.6 % Final     Comment:     ADA Screening Guidelines:  5.7-6.4%  Consistent with prediabetes  >or=6.5%  Consistent with diabetes    High levels of fetal hemoglobin interfere with the HbA1C  assay. Heterozygous hemoglobin variants (HbS, HgC, etc)do  not significantly interfere with this assay.   However, presence of multiple variants may affect accuracy.     08/10/2022 6.5 (H) 4.0 - 5.6 % Final     Comment:     ADA Screening Guidelines:  5.7-6.4%  Consistent with prediabetes  >or=6.5%  Consistent with diabetes    High levels of fetal hemoglobin interfere with the HbA1C  assay.  "Heterozygous hemoglobin variants (HbS, HgC, etc)do  not significantly interfere with this assay.   However, presence of multiple variants may affect accuracy.     02/08/2022 6.9 (H) 4.0 - 5.6 % Final     Comment:     ADA Screening Guidelines:  5.7-6.4%  Consistent with prediabetes  >or=6.5%  Consistent with diabetes    High levels of fetal hemoglobin interfere with the HbA1C  assay. Heterozygous hemoglobin variants (HbS, HgC, etc)do  not significantly interfere with this assay.   However, presence of multiple variants may affect accuracy.         Review of Systems   Constitutional: Negative for chills, decreased appetite and fever.   Respiratory:  Negative for cough and shortness of breath.    Skin:  Positive for dry skin and nail changes. Negative for color change, flushing, itching, poor wound healing, rash and skin cancer.   Musculoskeletal:  Negative for arthritis, back pain, falls, joint pain, joint swelling and myalgias.   Gastrointestinal:  Negative for nausea and vomiting.   Neurological:  Negative for loss of balance, numbness and paresthesias.   All other systems reviewed and are negative.        Objective:       Vitals:    03/21/23 1432   BP: (!) 180/86   Pulse: 74   Resp: 18   Weight: 64.9 kg (143 lb)   Height: 5' 2" (1.575 m)   PainSc: 0-No pain        Physical Exam  Vitals and nursing note reviewed.   Constitutional:       Appearance: She is well-developed.   Cardiovascular:      Pulses:           Dorsalis pedis pulses are 1+ on the right side and 1+ on the left side.      Comments: Posterior tibial pulses are diminished Bilaterally. Toes are cool to touch. Feet are warm proximally.There is decreased digital hair. Skin is atrophic, slightly hyperpigmented, and mildly edematous      Musculoskeletal:         General: Swelling (chronic- stable) present. No tenderness, deformity or signs of injury. Normal range of motion.      Right ankle: Normal.      Left ankle: Normal.      Right foot: No swelling, " deformity or crepitus.      Left foot: No swelling, deformity or crepitus.      Comments: Semi-reducible hammertoe contractures noted to toes 2-4 b/l-asymptomatic.  Otherwise adequate joint range of motion without pain, limitation, nor crepitation Bilateral feet and ankle joints. Muscle strength is 5/5 in all groups bilaterally.         Lymphadenopathy:      Comments: No palpable lymph nodes   Skin:     General: Skin is warm and dry.      Coloration: Skin is not ashen, cyanotic or pale.      Findings: No bruising, ecchymosis, erythema, lesion or rash.      Nails: There is no clubbing.      Comments: Nails x10 are elongated by  2-7mm's, thickened by 2-4 mm's, dystrophic, and are darkened in  coloration . Xerosis Bilaterally. No open lesions, lacerations or wounds noted    Hyperkeratotic lesion noted to distal tips of b/l hallux   Neurological:      Mental Status: She is alert and oriented to person, place, and time.      Sensory: Sensory deficit present.      Comments: [place]-Leonel 5.07 monofilamant testing is diminished Vikash feet. Sharp/dull sensation diminished Bilaterally. Light touch absent Bilaterally.       Psychiatric:         Behavior: Behavior normal.           Assessment:       Encounter Diagnoses   Name Primary?    Type II diabetes mellitus with neurological manifestations Yes    Onychomycosis due to dermatophyte     Corn or callus            Plan:       Alisia was seen today for pcp, diabetic foot exam, nail care and callouses.    Diagnoses and all orders for this visit:    Type II diabetes mellitus with neurological manifestations    Onychomycosis due to dermatophyte    Corn or callus      I counseled the patient on her conditions, their implications and medical management.    Shoe inspection. Diabetic Foot Education. Patient reminded of the importance of good nutrition and blood sugar control to help prevent podiatric complications of diabetes. Patient instructed on proper foot hygeine. We discussed  wearing proper shoe gear, daily foot inspections, never walking without protective shoe gear, caution putting sharp instruments to feet     Discussed DM foot care:  Wear comfortable, proper fitting shoes. Wash feet daily. Dry well. After drying, apply moisturizer to feet (no lotion to webspaces). Inspect feet daily for skin breaks, blisters, swelling, or redness. Wear cotton socks (preferably white)  Change socks every day. Do NOT walk barefoot. Do NOT use heating pads or warm/hot water soaks     With patient's permission, nails were aggressively reduced and debrided 1-5 b/l, removing all offending nail and debris. Patient tolerated this well and no blood was drawn. Patient reports relief following the procedure.     The affected area was cleansed with an alcohol prep pad. Next, utilizing a 5mm curette, the hyperkeratotic tissues were trimmed from areas as noted above, down to appropriate level of skin. Care was taken to remove any nucleated core from the center of the lesion. No pinpoint bleeding was encountered. The patient tolerated relief following this procedure.      Discussed regular and routine moisturizer to skin of both feet to help improve dry skin. Advised to apply twice daily until resolution of symptoms. Avoid between toes.     RTC 2-3 months, sooner PRN

## 2023-04-20 ENCOUNTER — OFFICE VISIT (OUTPATIENT)
Dept: PRIMARY CARE CLINIC | Facility: CLINIC | Age: 78
End: 2023-04-20
Payer: MEDICARE

## 2023-04-20 ENCOUNTER — LAB VISIT (OUTPATIENT)
Dept: LAB | Facility: HOSPITAL | Age: 78
End: 2023-04-20
Attending: INTERNAL MEDICINE
Payer: MEDICARE

## 2023-04-20 VITALS
SYSTOLIC BLOOD PRESSURE: 144 MMHG | BODY MASS INDEX: 25.36 KG/M2 | DIASTOLIC BLOOD PRESSURE: 70 MMHG | OXYGEN SATURATION: 99 % | HEART RATE: 83 BPM | TEMPERATURE: 98 F | WEIGHT: 137.81 LBS | HEIGHT: 62 IN

## 2023-04-20 DIAGNOSIS — C50.919 RECURRENT MALIGNANT NEOPLASM OF BREAST, UNSPECIFIED LATERALITY: ICD-10-CM

## 2023-04-20 DIAGNOSIS — E11.65 TYPE 2 DIABETES MELLITUS WITH HYPERGLYCEMIA, WITH LONG-TERM CURRENT USE OF INSULIN: ICD-10-CM

## 2023-04-20 DIAGNOSIS — Z79.4 TYPE 2 DIABETES MELLITUS WITH HYPERGLYCEMIA, WITH LONG-TERM CURRENT USE OF INSULIN: ICD-10-CM

## 2023-04-20 DIAGNOSIS — I70.0 AORTIC ATHEROSCLEROSIS: ICD-10-CM

## 2023-04-20 DIAGNOSIS — E78.5 HYPERLIPIDEMIA ASSOCIATED WITH TYPE 2 DIABETES MELLITUS: ICD-10-CM

## 2023-04-20 DIAGNOSIS — E21.3 HYPERPARATHYROIDISM: ICD-10-CM

## 2023-04-20 DIAGNOSIS — E11.69 HYPERLIPIDEMIA ASSOCIATED WITH TYPE 2 DIABETES MELLITUS: ICD-10-CM

## 2023-04-20 DIAGNOSIS — I10 BENIGN ESSENTIAL HYPERTENSION: ICD-10-CM

## 2023-04-20 DIAGNOSIS — D32.9 MENINGIOMA: Primary | ICD-10-CM

## 2023-04-20 DIAGNOSIS — Z78.0 POSTMENOPAUSAL ESTROGEN DEFICIENCY: ICD-10-CM

## 2023-04-20 DIAGNOSIS — Z98.890 H/O CRANIOTOMY: ICD-10-CM

## 2023-04-20 DIAGNOSIS — F03.90 DEMENTIA, UNSPECIFIED DEMENTIA SEVERITY, UNSPECIFIED DEMENTIA TYPE, UNSPECIFIED WHETHER BEHAVIORAL, PSYCHOTIC, OR MOOD DISTURBANCE OR ANXIETY: ICD-10-CM

## 2023-04-20 DIAGNOSIS — J47.9 BRONCHIECTASIS WITHOUT COMPLICATION: ICD-10-CM

## 2023-04-20 DIAGNOSIS — R44.1 VISUAL HALLUCINATION: ICD-10-CM

## 2023-04-20 DIAGNOSIS — D32.9 MENINGIOMA: ICD-10-CM

## 2023-04-20 LAB
ALBUMIN SERPL BCP-MCNC: 3.8 G/DL (ref 3.5–5.2)
ALP SERPL-CCNC: 49 U/L (ref 55–135)
ALT SERPL W/O P-5'-P-CCNC: 23 U/L (ref 10–44)
ANION GAP SERPL CALC-SCNC: 10 MMOL/L (ref 8–16)
AST SERPL-CCNC: 24 U/L (ref 10–40)
BASOPHILS # BLD AUTO: 0.04 K/UL (ref 0–0.2)
BASOPHILS NFR BLD: 0.7 % (ref 0–1.9)
BILIRUB SERPL-MCNC: 1.7 MG/DL (ref 0.1–1)
BUN SERPL-MCNC: 16 MG/DL (ref 8–23)
CALCIUM SERPL-MCNC: 10.4 MG/DL (ref 8.7–10.5)
CHLORIDE SERPL-SCNC: 105 MMOL/L (ref 95–110)
CO2 SERPL-SCNC: 27 MMOL/L (ref 23–29)
CREAT SERPL-MCNC: 0.8 MG/DL (ref 0.5–1.4)
DIFFERENTIAL METHOD: ABNORMAL
EOSINOPHIL # BLD AUTO: 0 K/UL (ref 0–0.5)
EOSINOPHIL NFR BLD: 0.5 % (ref 0–8)
ERYTHROCYTE [DISTWIDTH] IN BLOOD BY AUTOMATED COUNT: 13.3 % (ref 11.5–14.5)
EST. GFR  (NO RACE VARIABLE): >60 ML/MIN/1.73 M^2
ESTIMATED AVG GLUCOSE: 134 MG/DL (ref 68–131)
GLUCOSE SERPL-MCNC: 87 MG/DL (ref 70–110)
HBA1C MFR BLD: 6.3 % (ref 4–5.6)
HCT VFR BLD AUTO: 42.4 % (ref 37–48.5)
HGB BLD-MCNC: 14.1 G/DL (ref 12–16)
IMM GRANULOCYTES # BLD AUTO: 0.02 K/UL (ref 0–0.04)
IMM GRANULOCYTES NFR BLD AUTO: 0.4 % (ref 0–0.5)
LYMPHOCYTES # BLD AUTO: 2.8 K/UL (ref 1–4.8)
LYMPHOCYTES NFR BLD: 51.1 % (ref 18–48)
MCH RBC QN AUTO: 30.7 PG (ref 27–31)
MCHC RBC AUTO-ENTMCNC: 33.3 G/DL (ref 32–36)
MCV RBC AUTO: 92 FL (ref 82–98)
MONOCYTES # BLD AUTO: 0.5 K/UL (ref 0.3–1)
MONOCYTES NFR BLD: 8.2 % (ref 4–15)
NEUTROPHILS # BLD AUTO: 2.1 K/UL (ref 1.8–7.7)
NEUTROPHILS NFR BLD: 39.1 % (ref 38–73)
NRBC BLD-RTO: 0 /100 WBC
PLATELET # BLD AUTO: 126 K/UL (ref 150–450)
PMV BLD AUTO: 11.4 FL (ref 9.2–12.9)
POTASSIUM SERPL-SCNC: 4.1 MMOL/L (ref 3.5–5.1)
PROT SERPL-MCNC: 6.9 G/DL (ref 6–8.4)
PTH-INTACT SERPL-MCNC: 102.6 PG/ML (ref 9–77)
RBC # BLD AUTO: 4.6 M/UL (ref 4–5.4)
SODIUM SERPL-SCNC: 142 MMOL/L (ref 136–145)
WBC # BLD AUTO: 5.46 K/UL (ref 3.9–12.7)

## 2023-04-20 PROCEDURE — 1160F PR REVIEW ALL MEDS BY PRESCRIBER/CLIN PHARMACIST DOCUMENTED: ICD-10-PCS | Mod: HCNC,CPTII,S$GLB, | Performed by: INTERNAL MEDICINE

## 2023-04-20 PROCEDURE — 3078F PR MOST RECENT DIASTOLIC BLOOD PRESSURE < 80 MM HG: ICD-10-PCS | Mod: HCNC,CPTII,S$GLB, | Performed by: INTERNAL MEDICINE

## 2023-04-20 PROCEDURE — 1159F PR MEDICATION LIST DOCUMENTED IN MEDICAL RECORD: ICD-10-PCS | Mod: HCNC,CPTII,S$GLB, | Performed by: INTERNAL MEDICINE

## 2023-04-20 PROCEDURE — 1159F MED LIST DOCD IN RCRD: CPT | Mod: HCNC,CPTII,S$GLB, | Performed by: INTERNAL MEDICINE

## 2023-04-20 PROCEDURE — 99499 RISK ADDL DX/OHS AUDIT: ICD-10-PCS | Mod: HCNC,S$GLB,, | Performed by: INTERNAL MEDICINE

## 2023-04-20 PROCEDURE — 1123F PR ADV CARE PLAN DISCUSSED, PLAN OR SURROGATE DOCUMENTED: ICD-10-PCS | Mod: HCNC,CPTII,S$GLB, | Performed by: INTERNAL MEDICINE

## 2023-04-20 PROCEDURE — 1126F PR PAIN SEVERITY QUANTIFIED, NO PAIN PRESENT: ICD-10-PCS | Mod: HCNC,CPTII,S$GLB, | Performed by: INTERNAL MEDICINE

## 2023-04-20 PROCEDURE — 3072F PR LOW RISK FOR RETINOPATHY: ICD-10-PCS | Mod: HCNC,CPTII,S$GLB, | Performed by: INTERNAL MEDICINE

## 2023-04-20 PROCEDURE — 83970 ASSAY OF PARATHORMONE: CPT | Mod: HCNC | Performed by: INTERNAL MEDICINE

## 2023-04-20 PROCEDURE — 99999 PR PBB SHADOW E&M-EST. PATIENT-LVL V: ICD-10-PCS | Mod: PBBFAC,HCNC,, | Performed by: INTERNAL MEDICINE

## 2023-04-20 PROCEDURE — 85025 COMPLETE CBC W/AUTO DIFF WBC: CPT | Mod: HCNC | Performed by: INTERNAL MEDICINE

## 2023-04-20 PROCEDURE — 1126F AMNT PAIN NOTED NONE PRSNT: CPT | Mod: HCNC,CPTII,S$GLB, | Performed by: INTERNAL MEDICINE

## 2023-04-20 PROCEDURE — 3077F SYST BP >= 140 MM HG: CPT | Mod: HCNC,CPTII,S$GLB, | Performed by: INTERNAL MEDICINE

## 2023-04-20 PROCEDURE — 3072F LOW RISK FOR RETINOPATHY: CPT | Mod: HCNC,CPTII,S$GLB, | Performed by: INTERNAL MEDICINE

## 2023-04-20 PROCEDURE — 99215 OFFICE O/P EST HI 40 MIN: CPT | Mod: HCNC,S$GLB,, | Performed by: INTERNAL MEDICINE

## 2023-04-20 PROCEDURE — 3078F DIAST BP <80 MM HG: CPT | Mod: HCNC,CPTII,S$GLB, | Performed by: INTERNAL MEDICINE

## 2023-04-20 PROCEDURE — 99215 PR OFFICE/OUTPT VISIT, EST, LEVL V, 40-54 MIN: ICD-10-PCS | Mod: HCNC,S$GLB,, | Performed by: INTERNAL MEDICINE

## 2023-04-20 PROCEDURE — 83036 HEMOGLOBIN GLYCOSYLATED A1C: CPT | Mod: HCNC | Performed by: INTERNAL MEDICINE

## 2023-04-20 PROCEDURE — 3077F PR MOST RECENT SYSTOLIC BLOOD PRESSURE >= 140 MM HG: ICD-10-PCS | Mod: HCNC,CPTII,S$GLB, | Performed by: INTERNAL MEDICINE

## 2023-04-20 PROCEDURE — 99499 UNLISTED E&M SERVICE: CPT | Mod: HCNC,S$GLB,, | Performed by: INTERNAL MEDICINE

## 2023-04-20 PROCEDURE — 36415 COLL VENOUS BLD VENIPUNCTURE: CPT | Mod: HCNC,PN | Performed by: INTERNAL MEDICINE

## 2023-04-20 PROCEDURE — 1160F RVW MEDS BY RX/DR IN RCRD: CPT | Mod: HCNC,CPTII,S$GLB, | Performed by: INTERNAL MEDICINE

## 2023-04-20 PROCEDURE — 80053 COMPREHEN METABOLIC PANEL: CPT | Mod: HCNC | Performed by: INTERNAL MEDICINE

## 2023-04-20 PROCEDURE — 1123F ACP DISCUSS/DSCN MKR DOCD: CPT | Mod: HCNC,CPTII,S$GLB, | Performed by: INTERNAL MEDICINE

## 2023-04-20 PROCEDURE — 99999 PR PBB SHADOW E&M-EST. PATIENT-LVL V: CPT | Mod: PBBFAC,HCNC,, | Performed by: INTERNAL MEDICINE

## 2023-04-20 RX ORDER — AMLODIPINE BESYLATE 5 MG/1
5 TABLET ORAL DAILY
Qty: 90 TABLET | Refills: 3 | Status: SHIPPED | OUTPATIENT
Start: 2023-04-20 | End: 2023-05-18 | Stop reason: SDUPTHER

## 2023-04-20 RX ORDER — ISOPROPYL ALCOHOL 70 ML/100ML
1 SWAB TOPICAL DAILY
Qty: 100 EACH | Refills: 3 | Status: SHIPPED | OUTPATIENT
Start: 2023-04-20

## 2023-04-20 RX ORDER — INSULIN GLARGINE 100 [IU]/ML
18 INJECTION, SOLUTION SUBCUTANEOUS DAILY
Qty: 3 EACH | Refills: 1
Start: 2023-04-20 | End: 2023-06-07 | Stop reason: SDUPTHER

## 2023-04-20 RX ORDER — FAMOTIDINE 20 MG/1
TABLET, FILM COATED ORAL
COMMUNITY
Start: 2023-03-10 | End: 2023-10-18

## 2023-04-20 NOTE — PROGRESS NOTES
Subjective:       Patient ID: Alisia Gusman is a 77 y.o. female.    Chief Complaint: diabetes follow up  HPI  Pt is well known to me. Accompanied by granddaughter today.  Lives by self. Elderly sister, Julee, also lives next door. Has 2 daughter but both are out of town. Sister sets up pt's meds in pill box. Pt gives herself the ozempic. Per daughter, sister is reliable.     Eats 3 meals a day. Sometimes w/ decreased appetite. Walks w/ her wheelchair while she's in the house. No falls.     Meningioma s/p R frontal craniotomy and resection 11/2021.   Started having VH/delusions, poor memory and insomnia. Saw Dr. Santos in memory clinic 3/2022 and per note, delusions are commonly seen w/ R hemispheric lesions. On seroquel 50mg 1.5 tabs nightly.   Follows w/ Dr. Lorenz - LOV 8/16/22 in NSGY. Due for f/u w/ repeat MRI in a yr.   MRI brain 8/10/22 - Evolution of postsurgical changes.  No recurrent meningioma at the right frontal operative bed.  Stable small left frontal meningioma.  There is a small focus of extra-axial hemorrhage overlying the right frontal vertex however these are late subacute blood products with no evidence of acute hemorrhage.    Pt developed a PE after brain resection. On eliquis 5mg BID.     Recurrently breast CA s/p L mastectomy 2020. On anastrozole till end of 5/2025.   R MMG 12/27/22 neg.  Follows w/ Dr. Olvera (LOV 9/21/22) and Dr. Nieves.    DM2 - ozempic 1mg weekly, lantus 18 units daily - administers herself.   Checks her sugars 3x/day and they've all look great.   Follows w/  Dipp - LOV 8/16/22.  A1c - 6.3 11/11/22  Foot - Dr. Dao 3/22/23.  Eye - Dr. Hill - no retinopathy 12/27/22.   MAC - 11/11/22 wnl.    Elevated PTH - last level 11/2022 160.9. Ca wnl.  DEXA 5/6/2020 - osteopenia.     HTN - coreg 12.5mg BID and amlo 5mg qd.  Lacunar infarcts on head imaging 2021.     HLD - atorvastatin 40mg daily.   CT chest 11/2022 - Thoracic aorta is normal caliber and contains mild  calcific atherosclerosis.  Heart is normal size.  No pericardial effusion.  LDL 47 11/11/22    Stable mild bronchiectasis within the bilateral lower lobes.  Few thin bandlike densities within the lingula and left lower lobe which may represent subsegmental atelectasis or pulmonary scarring.  Stable right lung pulmonary micronodules (series 4, image 87 and 177).  Stable left lung pulmonary micronodule (series 4, image 283).  No new suspicious pulmonary nodule or mass.  New no focal consolidation, effusion, or pneumothorax.    Review of Systems   Constitutional:  Positive for chills. Negative for fever.   HENT:  Negative for congestion, postnasal drip, rhinorrhea and sinus pressure.    Eyes:  Negative for visual disturbance.   Respiratory:  Positive for cough (sometimes w/ coughing spells, sendy at night. productive of white sputun. stable.). Negative for shortness of breath and wheezing.    Cardiovascular:  Negative for chest pain, palpitations and leg swelling.        Soreness at the L breast scar - unchanged.    Gastrointestinal:  Positive for abdominal pain and diarrhea (daily. may have some stomach pains after eating in the morning. then within 15 min, has to get BM - watery and w/ some pieces in it. sometimes formed stools. pain resolves.). Negative for constipation, nausea and vomiting.   Endocrine: Negative for polydipsia and polyuria.   Genitourinary:  Negative for dysuria, frequency and urgency.   Musculoskeletal:  Positive for arthralgias (better when exercising her knees, back and legs). Negative for back pain.   Skin:  Negative for rash (itchy at the bra area in the back. that's resolved now).   Neurological:  Negative for dizziness, weakness, light-headedness and numbness.   Psychiatric/Behavioral:  Positive for hallucinations. Negative for dysphoric mood and sleep disturbance. The patient is not nervous/anxious.        Objective:      Physical Exam    BP (!) 144/70   Pulse 83   Temp 97.8 °F (36.6 °C)  "(Oral)   Ht 5' 2" (1.575 m)   Wt 62.5 kg (137 lb 12.6 oz)   LMP  (LMP Unknown) Comment: Partial  SpO2 99%   BMI 25.20 kg/m²     Gen - A+O, NAD  HEENT - PERRL, OP clear. MMM. B ext auditory canals w/ some cerumen.   Neck - no LAD  CV - RRR, I/VI PENNY best at RUSB.   CHEST - ctab, no wheezing/rhonchi/crackles  Abd - S/NT/ND/+BS  Ext - 2+ B radial and DP pulses. No LE edema. Onychomycosis.   Skin - no rash.   MSK - kyphosis. Normal gait.          Assessment/Plan     Diagnoses and all orders for this visit:    Meningioma w/ h/o craniotomy - f/u w/ Dr. Hutchison w/ repeat MRI.   -     CBC Auto Differential; Future  -     Ambulatory referral/consult to Neurology; Future    Visual hallucination s/p craniotomy. SLUMS test 10 of 30. Make appt w/ Dr. Silva since Dr. Santos is no longer w/ [facility].   -     CBC Auto Differential; Future  -     Ambulatory referral/consult to Neurology; Future    Recurrent malignant neoplasm of breast, unspecified laterality - cont anastrozole. F/u w/ Dr. Olvera.  -     CBC Auto Differential; Future  -     Comprehensive Metabolic Panel; Future    Type 2 diabetes mellitus with hyperglycemia, with long-term current use of insulin - reviewed her log at home. Cont ozempic. Decrease lantus to 18 units nightly from 22 units night.   -     CBC Auto Differential; Future  -     Comprehensive Metabolic Panel; Future  -     Hemoglobin A1C; Future  -     alcohol swabs (BD ALCOHOL SWABS) PadM; Apply 1 each topically once daily.  -     insulin (LANTUS SOLOSTAR U-100 INSULIN) glargine 100 units/mL SubQ pen; Inject 18 Units into the skin once daily.    Benign essential hypertension - BP not quite at goal. Make sure you're taking amlodipine 5mg daily (not in the medicine bag she brought today). 1 mo f/u for BP.   -     Comprehensive Metabolic Panel; Future  -     amLODIPine (NORVASC) 5 MG tablet; Take 1 tablet (5 mg total) by mouth once daily.    Hyperlipidemia associated with type 2 diabetes mellitus -cont statin.   - "     Comprehensive Metabolic Panel; Future    Aortic atherosclerosis - cont statin.  -     Comprehensive Metabolic Panel; Future    Bronchiectasis without complication - reassurance. If increased sputum production or changes in color, pt to let us know.     Hyperparathyroidism  -     DXA Bone Density Axial Skeleton 1 or more sites; Future  -     PTH, Intact; Future    Postmenopausal estrogen deficiency  -     DXA Bone Density Axial Skeleton 1 or more sites; Future    Dementia, unspecified dementia severity, unspecified dementia type, unspecified whether behavioral, psychotic, or mood disturbance or anxiety  -     Ambulatory referral/consult to Neurology; Future    Advance Care Planning     Date: 04/20/2023    Living Will  Reviewed living will and HCPOA on file. Pt wishes no changes to be made.     Power of   Reviewed living will and HCPOA on file. Pt wishes no changes to be made.     60 minutes was spent on patient with over half the time was spent in coordination of care and/or counseling.    Follow up in about 1 month (around 5/20/2023).      Selena Montemayor MD  Department of Internal Medicine - [facility]  7:54 AM

## 2023-04-20 NOTE — PATIENT INSTRUCTIONS
Make appointment with neurology - Dr. Silva - for memory and also the visual hallucinations.     Make sure you're taking amlodipine 5mg daily as that's for blood pressure.     You can decrease lantus from the 22 units daily to 18 units daily.     I'm going to send in some alcohol swabs for that I want you to use those instead of the paper towel to wipe the spot you're about to inject for lantus or ozempic.     Ask your pharmacy to get your bivalent COVID booster and the second shingles vaccine if you haven't had it yet.    Schedule bone density scan at your convenience.     Labs today.

## 2023-05-08 ENCOUNTER — TELEPHONE (OUTPATIENT)
Dept: PRIMARY CARE CLINIC | Facility: CLINIC | Age: 78
End: 2023-05-08
Payer: MEDICARE

## 2023-05-08 NOTE — TELEPHONE ENCOUNTER
----- Message from Tremontana Chevalier sent at 5/8/2023  9:29 AM CDT -----  Regarding: appt transportation  Consult/Advisory    Name Of Caller:  pt      Contact Preference:  [phone]    Nature of call:  pt wanting to s/w someone in Dr. Montemayor's  office to arrange transportation for appts on 05/18 and 05/23. Pt wanting to get these two appts on same day. Pls call pt to advise.

## 2023-05-09 ENCOUNTER — DOCUMENTATION ONLY (OUTPATIENT)
Dept: HEMATOLOGY/ONCOLOGY | Facility: CLINIC | Age: 78
End: 2023-05-09
Payer: MEDICARE

## 2023-05-09 NOTE — PROGRESS NOTES
Received message that patient needed assistance with transportation for an appointment on 5/17. Called to discuss with the patient. She would like  to reach out to one of her grandchildren to see about bringing her first. She would like me to follow up with her at a later date in regards to this.

## 2023-05-10 ENCOUNTER — TELEPHONE (OUTPATIENT)
Dept: HEMATOLOGY/ONCOLOGY | Facility: CLINIC | Age: 78
End: 2023-05-10
Payer: MEDICARE

## 2023-05-10 NOTE — TELEPHONE ENCOUNTER
"----- Message from Stella Ulloa sent at 5/10/2023  8:18 AM CDT -----  Regarding: Pt advice  Contact: Pt     Pt calling to inform DR her granddaughter will be coming to appt with her   Please call and adv     Confirmed contact below:   Contact Name: Alisia Gusman  Phone Number: [phone]               Additional Notes:  "Thank you for all that you do for our patients"                                           "

## 2023-05-15 DIAGNOSIS — E11.69 TYPE 2 DIABETES MELLITUS WITH OTHER SPECIFIED COMPLICATION, WITH LONG-TERM CURRENT USE OF INSULIN: Primary | ICD-10-CM

## 2023-05-15 DIAGNOSIS — Z79.4 TYPE 2 DIABETES MELLITUS WITH OTHER SPECIFIED COMPLICATION, WITH LONG-TERM CURRENT USE OF INSULIN: Primary | ICD-10-CM

## 2023-05-16 RX ORDER — PEN NEEDLE, DIABETIC 32GX 5/32"
NEEDLE, DISPOSABLE MISCELLANEOUS
Qty: 100 EACH | Refills: 3 | Status: SHIPPED | OUTPATIENT
Start: 2023-05-16

## 2023-05-17 ENCOUNTER — OFFICE VISIT (OUTPATIENT)
Dept: HEMATOLOGY/ONCOLOGY | Facility: CLINIC | Age: 78
End: 2023-05-17
Payer: MEDICARE

## 2023-05-17 VITALS
SYSTOLIC BLOOD PRESSURE: 179 MMHG | BODY MASS INDEX: 25.8 KG/M2 | RESPIRATION RATE: 18 BRPM | HEART RATE: 76 BPM | TEMPERATURE: 98 F | DIASTOLIC BLOOD PRESSURE: 91 MMHG | HEIGHT: 62 IN | OXYGEN SATURATION: 99 % | WEIGHT: 140.19 LBS

## 2023-05-17 DIAGNOSIS — Z12.31 OTHER SCREENING MAMMOGRAM: ICD-10-CM

## 2023-05-17 DIAGNOSIS — Z86.018 HISTORY OF MENINGIOMA: ICD-10-CM

## 2023-05-17 DIAGNOSIS — Z12.31 ENCOUNTER FOR SCREENING MAMMOGRAM FOR MALIGNANT NEOPLASM OF BREAST: ICD-10-CM

## 2023-05-17 DIAGNOSIS — Z17.0 CARCINOMA OF AXILLARY TAIL OF LEFT BREAST IN FEMALE, ESTROGEN RECEPTOR POSITIVE: Primary | ICD-10-CM

## 2023-05-17 DIAGNOSIS — C50.612 CARCINOMA OF AXILLARY TAIL OF LEFT BREAST IN FEMALE, ESTROGEN RECEPTOR POSITIVE: Primary | ICD-10-CM

## 2023-05-17 DIAGNOSIS — Z79.811 PROPHYLACTIC USE OF ANASTROZOLE (ARIMIDEX): ICD-10-CM

## 2023-05-17 PROCEDURE — 1126F AMNT PAIN NOTED NONE PRSNT: CPT | Mod: CPTII,,, | Performed by: INTERNAL MEDICINE

## 2023-05-17 PROCEDURE — 99999 PR PBB SHADOW E&M-EST. PATIENT-LVL III: ICD-10-PCS | Mod: PBBFAC,,, | Performed by: INTERNAL MEDICINE

## 2023-05-17 PROCEDURE — 3080F PR MOST RECENT DIASTOLIC BLOOD PRESSURE >= 90 MM HG: ICD-10-PCS | Mod: CPTII,,, | Performed by: INTERNAL MEDICINE

## 2023-05-17 PROCEDURE — 3072F PR LOW RISK FOR RETINOPATHY: ICD-10-PCS | Mod: CPTII,,, | Performed by: INTERNAL MEDICINE

## 2023-05-17 PROCEDURE — 99213 PR OFFICE/OUTPT VISIT, EST, LEVL III, 20-29 MIN: ICD-10-PCS | Mod: ,,, | Performed by: INTERNAL MEDICINE

## 2023-05-17 PROCEDURE — 1159F MED LIST DOCD IN RCRD: CPT | Mod: CPTII,,, | Performed by: INTERNAL MEDICINE

## 2023-05-17 PROCEDURE — 3077F SYST BP >= 140 MM HG: CPT | Mod: CPTII,,, | Performed by: INTERNAL MEDICINE

## 2023-05-17 PROCEDURE — 3080F DIAST BP >= 90 MM HG: CPT | Mod: CPTII,,, | Performed by: INTERNAL MEDICINE

## 2023-05-17 PROCEDURE — 1101F PT FALLS ASSESS-DOCD LE1/YR: CPT | Mod: CPTII,,, | Performed by: INTERNAL MEDICINE

## 2023-05-17 PROCEDURE — 1157F PR ADVANCE CARE PLAN OR EQUIV PRESENT IN MEDICAL RECORD: ICD-10-PCS | Mod: CPTII,,, | Performed by: INTERNAL MEDICINE

## 2023-05-17 PROCEDURE — 3288F PR FALLS RISK ASSESSMENT DOCUMENTED: ICD-10-PCS | Mod: CPTII,,, | Performed by: INTERNAL MEDICINE

## 2023-05-17 PROCEDURE — 1160F RVW MEDS BY RX/DR IN RCRD: CPT | Mod: CPTII,,, | Performed by: INTERNAL MEDICINE

## 2023-05-17 PROCEDURE — 1101F PR PT FALLS ASSESS DOC 0-1 FALLS W/OUT INJ PAST YR: ICD-10-PCS | Mod: CPTII,,, | Performed by: INTERNAL MEDICINE

## 2023-05-17 PROCEDURE — 1160F PR REVIEW ALL MEDS BY PRESCRIBER/CLIN PHARMACIST DOCUMENTED: ICD-10-PCS | Mod: CPTII,,, | Performed by: INTERNAL MEDICINE

## 2023-05-17 PROCEDURE — 1126F PR PAIN SEVERITY QUANTIFIED, NO PAIN PRESENT: ICD-10-PCS | Mod: CPTII,,, | Performed by: INTERNAL MEDICINE

## 2023-05-17 PROCEDURE — 1157F ADVNC CARE PLAN IN RCRD: CPT | Mod: CPTII,,, | Performed by: INTERNAL MEDICINE

## 2023-05-17 PROCEDURE — 3077F PR MOST RECENT SYSTOLIC BLOOD PRESSURE >= 140 MM HG: ICD-10-PCS | Mod: CPTII,,, | Performed by: INTERNAL MEDICINE

## 2023-05-17 PROCEDURE — 99999 PR PBB SHADOW E&M-EST. PATIENT-LVL III: CPT | Mod: PBBFAC,,, | Performed by: INTERNAL MEDICINE

## 2023-05-17 PROCEDURE — 99213 OFFICE O/P EST LOW 20 MIN: CPT | Mod: ,,, | Performed by: INTERNAL MEDICINE

## 2023-05-17 PROCEDURE — 3072F LOW RISK FOR RETINOPATHY: CPT | Mod: CPTII,,, | Performed by: INTERNAL MEDICINE

## 2023-05-17 PROCEDURE — 3288F FALL RISK ASSESSMENT DOCD: CPT | Mod: CPTII,,, | Performed by: INTERNAL MEDICINE

## 2023-05-17 PROCEDURE — 1159F PR MEDICATION LIST DOCUMENTED IN MEDICAL RECORD: ICD-10-PCS | Mod: CPTII,,, | Performed by: INTERNAL MEDICINE

## 2023-05-17 NOTE — PROGRESS NOTES
Subjective:       Patient ID: Alisia Gusman is a 77 y.o. female.    Chief Complaint: 6 m/o fu and questions/concerns (Pt wants to know which products are safe to use for her hair)      HPI     Mrs. Gusman presents to the clinic for evaluation.  I had last seen her in September 2022 and I had recommended a six-month follow-up, however, it is only today that she returns to the clinic.     Briefly, she is a 77-year-old  female with a remote history of left sided breast cancer treated in 1993 with a lumpectomy, radiation therapy, and 5 years of tamoxifen.  Apparently the size of her tumor in 1993 was 4 mm.  She completed her 5 years of tamoxifen and she was followed expectantly.      An in breast recurrence was noted in January 2020.  A core biopsy on 01/15/2020 showed an infiltrating ductal carcinoma that was ER positive [place] negative and HER 2 negative.  On 02/20/2020 she underwent a left mastectomy and a sentinel lymph node biopsy.  The sentinel lymph node was negative.  On the mastectomy specimen there was a 2 mm area of DCIS and there was a 6 mm area of invasive cancer.  Resection margins were clear.  She has made an uneventful recovery and was started on anastrozole in early April 2020.    Her latest mammogram in December 2021 was read as BI-RADS I and a one year follow-up was recommended.      Review of Systems    Overall she feels well.  She states that she has tolerated the anastrozole well so far and has not experienced any side effects.  She does mention that she occasionally experiences sharp pains in the left chest wall.  ECOG PS is 1.  She denies any anxiety, depression, easy bruising, fevers, chills, night  sweats, weight loss, nausea, vomiting, diarrhea, constipation, diplopia, chest pain, palpitations, shortness of breath, breast pain, abdominal pain, extremity pain, or difficulty ambulating.  The remainder of the ten-point ROS, including general, skin, lymph, H/N, cardiorespiratory, GI,  , Neuro, Endocrine, and psychiatric is negative.     Objective:      Physical Exam    She is alert, oriented to time, place, person, pleasant, well nourished, in no acute physical distress.                                  VITAL SIGNS:  Reviewed                                      HEENT:  Normal.  There are no nasal, oral, lip, gingival, auricular, lid,    or conjunctival lesions.  Mucosae are moist and pink, and there is no        thrush.  Pupils are equal, reactive to light and accommodation.              Extraocular muscle movements are intact.  Dentition is fair.  There is no frontal or maxillary tenderness.                                     NECK:  Supple without JVD, adenopathy, or thyromegaly.                       LUNGS:  Clear to auscultation without wheezing, rales, or rhonchi.           CARDIOVASCULAR:  Reveals an S1, S2, no murmurs, no rubs, no gallops.         ABDOMEN:  Soft, nontender, without organomegaly.  Bowel sounds are    present.                                                                     EXTREMITIES:  No cyanosis, clubbing, or edema.                               BREASTS:  She is status post left mastectomy with a well-healed incision.    There are no masses in the right breast.                                        LYMPHATIC:  There is no cervical, axillary, or supraclavicular adenopathy.   SKIN:  Warm and moist, without petechiae, rashes, induration, or ecchymoses.           NEUROLOGIC:  DTRs are 0-1+ bilaterally, symmetrical, motor function is 5/5,  and cranial nerves are  within normal limits.    Assessment:       1. 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 Prophylactic use of anastrozole (Arimidex)    3. History of meningioma      Plan:         I had a long discussion with Mrs. Gusman.  I recommended that she remain on anastrozole which she should take for a minimum of 5 years, through the end of March 2025.  I will see her again  in 4 months.  Her mammogram will be repeated in December and will be checked by Dr. Nieves.  Her multiple questions were answered to her satisfaction.        Route Chart for Scheduling    Med Onc Chart Routing      Follow up with physician 4 months.   Follow up with KATIE    Infusion scheduling note    Injection scheduling note    Labs    Imaging    Pharmacy appointment    Other referrals

## 2023-05-18 ENCOUNTER — OFFICE VISIT (OUTPATIENT)
Dept: PRIMARY CARE CLINIC | Facility: CLINIC | Age: 78
End: 2023-05-18
Payer: MEDICARE

## 2023-05-18 VITALS
BODY MASS INDEX: 25.97 KG/M2 | TEMPERATURE: 98 F | DIASTOLIC BLOOD PRESSURE: 72 MMHG | WEIGHT: 141.13 LBS | HEART RATE: 92 BPM | HEIGHT: 62 IN | SYSTOLIC BLOOD PRESSURE: 148 MMHG | OXYGEN SATURATION: 99 %

## 2023-05-18 DIAGNOSIS — R60.0 LEG EDEMA: ICD-10-CM

## 2023-05-18 DIAGNOSIS — E11.9 TYPE 2 DIABETES MELLITUS WITHOUT COMPLICATION, WITH LONG-TERM CURRENT USE OF INSULIN: Primary | ICD-10-CM

## 2023-05-18 DIAGNOSIS — Z79.4 TYPE 2 DIABETES MELLITUS WITHOUT COMPLICATION, WITH LONG-TERM CURRENT USE OF INSULIN: Primary | ICD-10-CM

## 2023-05-18 DIAGNOSIS — R17 SERUM TOTAL BILIRUBIN ELEVATED: ICD-10-CM

## 2023-05-18 DIAGNOSIS — I10 BENIGN ESSENTIAL HYPERTENSION: ICD-10-CM

## 2023-05-18 DIAGNOSIS — D69.6 THROMBOCYTOPENIA: ICD-10-CM

## 2023-05-18 PROCEDURE — 1157F ADVNC CARE PLAN IN RCRD: CPT | Mod: CPTII,,, | Performed by: INTERNAL MEDICINE

## 2023-05-18 PROCEDURE — 3072F PR LOW RISK FOR RETINOPATHY: ICD-10-PCS | Mod: CPTII,,, | Performed by: INTERNAL MEDICINE

## 2023-05-18 PROCEDURE — 1160F PR REVIEW ALL MEDS BY PRESCRIBER/CLIN PHARMACIST DOCUMENTED: ICD-10-PCS | Mod: CPTII,,, | Performed by: INTERNAL MEDICINE

## 2023-05-18 PROCEDURE — 3077F PR MOST RECENT SYSTOLIC BLOOD PRESSURE >= 140 MM HG: ICD-10-PCS | Mod: CPTII,,, | Performed by: INTERNAL MEDICINE

## 2023-05-18 PROCEDURE — 1126F AMNT PAIN NOTED NONE PRSNT: CPT | Mod: CPTII,,, | Performed by: INTERNAL MEDICINE

## 2023-05-18 PROCEDURE — 99214 OFFICE O/P EST MOD 30 MIN: CPT | Mod: ,,, | Performed by: INTERNAL MEDICINE

## 2023-05-18 PROCEDURE — 3072F LOW RISK FOR RETINOPATHY: CPT | Mod: CPTII,,, | Performed by: INTERNAL MEDICINE

## 2023-05-18 PROCEDURE — 1160F RVW MEDS BY RX/DR IN RCRD: CPT | Mod: CPTII,,, | Performed by: INTERNAL MEDICINE

## 2023-05-18 PROCEDURE — 99999 PR PBB SHADOW E&M-EST. PATIENT-LVL V: CPT | Mod: PBBFAC,,, | Performed by: INTERNAL MEDICINE

## 2023-05-18 PROCEDURE — 1159F MED LIST DOCD IN RCRD: CPT | Mod: CPTII,,, | Performed by: INTERNAL MEDICINE

## 2023-05-18 PROCEDURE — 3078F DIAST BP <80 MM HG: CPT | Mod: CPTII,,, | Performed by: INTERNAL MEDICINE

## 2023-05-18 PROCEDURE — 1159F PR MEDICATION LIST DOCUMENTED IN MEDICAL RECORD: ICD-10-PCS | Mod: CPTII,,, | Performed by: INTERNAL MEDICINE

## 2023-05-18 PROCEDURE — 1101F PT FALLS ASSESS-DOCD LE1/YR: CPT | Mod: CPTII,,, | Performed by: INTERNAL MEDICINE

## 2023-05-18 PROCEDURE — 1126F PR PAIN SEVERITY QUANTIFIED, NO PAIN PRESENT: ICD-10-PCS | Mod: CPTII,,, | Performed by: INTERNAL MEDICINE

## 2023-05-18 PROCEDURE — 3078F PR MOST RECENT DIASTOLIC BLOOD PRESSURE < 80 MM HG: ICD-10-PCS | Mod: CPTII,,, | Performed by: INTERNAL MEDICINE

## 2023-05-18 PROCEDURE — 99214 PR OFFICE/OUTPT VISIT, EST, LEVL IV, 30-39 MIN: ICD-10-PCS | Mod: ,,, | Performed by: INTERNAL MEDICINE

## 2023-05-18 PROCEDURE — 3288F FALL RISK ASSESSMENT DOCD: CPT | Mod: CPTII,,, | Performed by: INTERNAL MEDICINE

## 2023-05-18 PROCEDURE — 3077F SYST BP >= 140 MM HG: CPT | Mod: CPTII,,, | Performed by: INTERNAL MEDICINE

## 2023-05-18 PROCEDURE — 1157F PR ADVANCE CARE PLAN OR EQUIV PRESENT IN MEDICAL RECORD: ICD-10-PCS | Mod: CPTII,,, | Performed by: INTERNAL MEDICINE

## 2023-05-18 PROCEDURE — 1101F PR PT FALLS ASSESS DOC 0-1 FALLS W/OUT INJ PAST YR: ICD-10-PCS | Mod: CPTII,,, | Performed by: INTERNAL MEDICINE

## 2023-05-18 PROCEDURE — 99999 PR PBB SHADOW E&M-EST. PATIENT-LVL V: ICD-10-PCS | Mod: PBBFAC,,, | Performed by: INTERNAL MEDICINE

## 2023-05-18 PROCEDURE — 3288F PR FALLS RISK ASSESSMENT DOCUMENTED: ICD-10-PCS | Mod: CPTII,,, | Performed by: INTERNAL MEDICINE

## 2023-05-18 PROCEDURE — 99499 UNLISTED E&M SERVICE: CPT | Mod: S$GLB,,, | Performed by: INTERNAL MEDICINE

## 2023-05-18 RX ORDER — AMLODIPINE BESYLATE 5 MG/1
5 TABLET ORAL DAILY
Qty: 90 TABLET | Refills: 3 | Status: SHIPPED | OUTPATIENT
Start: 2023-05-18 | End: 2024-05-17

## 2023-05-18 RX ORDER — FUROSEMIDE 20 MG/1
20 TABLET ORAL DAILY PRN
Qty: 7 TABLET | Refills: 0 | Status: SHIPPED | OUTPATIENT
Start: 2023-05-18 | End: 2023-09-13

## 2023-05-18 NOTE — PROGRESS NOTES
"Subjective:       Patient ID: Alisia Gusman is a 77 y.o. female.    Chief Complaint: Hypertension    HPI  BP was high last visit. Was questioning whether she was taking amlodipine 5 b/c it was not in her medicine bag. Still not in it today. Will resend it it.     DM2 - ozempic 1mg weekly, lantus 18 units daily - administers herself.   Checks her sugars 3x/day and they've all look great except once during lunch sugar was 72.   A1c 4/20/23 6.3    Low PLT 4/20/23. T bili slightly higher than baseline.   No issues w/ bleeding. No abdominal pain/nausea/vomiting/diarrhea/constipation.     Review of Systems  as above in HPI.     Objective:      Physical Exam    BP (!) 148/72 (BP Location: Left arm, Patient Position: Sitting, BP Method: Large (Manual))   Pulse 92   Temp 98.2 °F (36.8 °C) (Oral)   Ht 5' 2" (1.575 m)   Wt 64 kg (141 lb 1.5 oz)   LMP  (LMP Unknown) Comment: Partial  SpO2 99%   BMI 25.81 kg/m²     Gen - A+O, NAD  HEENT - PERRL, OP clear. MMM. B ext auditory canals w/ some cerumen.   Neck - no LAD  CV - RRR, I/VI PENNY best at RUSB.   CHEST - ctab, no wheezing/rhonchi/crackles  Abd - S/NT/ND/+BS  Ext - 2+ B radial and DP pulses. No LE edema.   Skin - no rash.   MSK - kyphosis. Normal gait.    Previous labs reviewed.     Assessment/Plan     Alisia was seen today for hypertension.    Diagnoses and all orders for this visit:    Type 2 diabetes mellitus without complication, with long-term current use of insulin - doing well. Cont current meds.     Benign essential hypertension -   I sent in amlodipine 5mg daily. Make sure you're taking this for blood pressure.   Due to some mild leg swelling, I want you to take lasix 20mg daily for the next week.  Follow up for nurse's visit in 4 weeks to recheck your blood pressure.   -     amLODIPine (NORVASC) 5 MG tablet; Take 1 tablet (5 mg total) by mouth once daily.    Serum total bilirubin elevated  -     US Abdomen Complete; Future    Thrombocytopenia  -     US Abdomen " Complete; Future    Leg edema  -     furosemide (LASIX) 20 MG tablet; Take 1 tablet (20 mg total) by mouth daily as needed (leg swelling).      Follow up in about 3 months (around 8/18/2023).      Selena Montemayor MD  Department of Internal Medicine - [facility]  1:20 PM

## 2023-05-18 NOTE — PATIENT INSTRUCTIONS
I sent in amlodipine 5mg daily. Make sure you're taking this for blood pressure.   Due to some mild leg swelling, I want you to take lasix 20mg daily for the next week.  Follow up for nurse's visit in 4 weeks to recheck your blood pressure.

## 2023-05-23 ENCOUNTER — HOSPITAL ENCOUNTER (OUTPATIENT)
Dept: RADIOLOGY | Facility: CLINIC | Age: 78
Discharge: HOME | End: 2023-05-23
Attending: INTERNAL MEDICINE
Payer: MEDICARE

## 2023-05-23 DIAGNOSIS — E21.3 HYPERPARATHYROIDISM: ICD-10-CM

## 2023-05-23 DIAGNOSIS — Z78.0 POSTMENOPAUSAL ESTROGEN DEFICIENCY: ICD-10-CM

## 2023-05-23 PROCEDURE — 77080 DXA BONE DENSITY AXIAL: CPT | Mod: TC

## 2023-05-23 PROCEDURE — 77081 DXA BONE DENSITY APPENDICULR: CPT | Mod: 59,TC

## 2023-05-23 PROCEDURE — 77080 DXA BONE DENSITY AXIAL SKELETON 1 OR MORE SITES: ICD-10-PCS | Mod: 26,,, | Performed by: INTERNAL MEDICINE

## 2023-05-23 PROCEDURE — 77080 DXA BONE DENSITY AXIAL: CPT | Mod: 26,,, | Performed by: INTERNAL MEDICINE

## 2023-05-29 ENCOUNTER — HOSPITAL ENCOUNTER (OUTPATIENT)
Dept: RADIOLOGY | Facility: HOSPITAL | Age: 78
Discharge: HOME | End: 2023-05-29
Attending: INTERNAL MEDICINE
Payer: MEDICARE

## 2023-05-29 DIAGNOSIS — R17 SERUM TOTAL BILIRUBIN ELEVATED: ICD-10-CM

## 2023-05-29 DIAGNOSIS — D69.6 THROMBOCYTOPENIA: ICD-10-CM

## 2023-05-29 PROCEDURE — 76700 US EXAM ABDOM COMPLETE: CPT | Mod: 26,,, | Performed by: RADIOLOGY

## 2023-05-29 PROCEDURE — 76700 US ABDOMEN COMPLETE: ICD-10-PCS | Mod: 26,,, | Performed by: RADIOLOGY

## 2023-05-29 PROCEDURE — 76700 US EXAM ABDOM COMPLETE: CPT | Mod: TC

## 2023-05-31 ENCOUNTER — TELEPHONE (OUTPATIENT)
Dept: PRIMARY CARE CLINIC | Facility: CLINIC | Age: 78
End: 2023-05-31
Payer: MEDICARE

## 2023-05-31 NOTE — TELEPHONE ENCOUNTER
Please call and notify pt:  Abdominal ultrasound shows some simple right kidney cysts, that are not worrisome as they're benign. Liver and the ducts are fine. Nothing to account for mildly elevated total bilirubin, which is good.  Bone density scan shows osteopenia, which means your bones are less dense than normal but not quite osteoporosis. I recommend taking at least Vitamin D of 1000 IU daily. In addition, I recommend light weight exercises to prevent progression to osteoporosis. We can repeat the bone density scan in 2-4 yrs.

## 2023-06-07 DIAGNOSIS — E11.65 TYPE 2 DIABETES MELLITUS WITH HYPERGLYCEMIA, WITH LONG-TERM CURRENT USE OF INSULIN: ICD-10-CM

## 2023-06-07 DIAGNOSIS — Z79.4 TYPE 2 DIABETES MELLITUS WITH HYPERGLYCEMIA, WITH LONG-TERM CURRENT USE OF INSULIN: ICD-10-CM

## 2023-06-07 RX ORDER — INSULIN GLARGINE 100 [IU]/ML
18 INJECTION, SOLUTION SUBCUTANEOUS DAILY
Qty: 3 EACH | Refills: 1 | Status: SHIPPED | OUTPATIENT
Start: 2023-06-07 | End: 2024-01-16

## 2023-06-07 NOTE — TELEPHONE ENCOUNTER
Patient advised of labs, asked that I send through portal as well. Said she is taking Vitamin D daily, but I will double check with relatives.

## 2023-06-15 ENCOUNTER — PATIENT MESSAGE (OUTPATIENT)
Dept: PRIMARY CARE CLINIC | Facility: CLINIC | Age: 78
End: 2023-06-15
Payer: MEDICARE

## 2023-06-16 ENCOUNTER — CLINICAL SUPPORT (OUTPATIENT)
Dept: PRIMARY CARE CLINIC | Facility: CLINIC | Age: 78
End: 2023-06-16
Payer: MEDICARE

## 2023-06-16 VITALS — DIASTOLIC BLOOD PRESSURE: 59 MMHG | SYSTOLIC BLOOD PRESSURE: 127 MMHG

## 2023-06-16 PROCEDURE — 99999 PR PBB SHADOW E&M-EST. PATIENT-LVL III: ICD-10-PCS | Mod: PBBFAC,HCNC,,

## 2023-06-16 PROCEDURE — 99999 PR PBB SHADOW E&M-EST. PATIENT-LVL III: CPT | Mod: PBBFAC,HCNC,,

## 2023-06-16 NOTE — PROGRESS NOTES
Pt here for blood pressure check. Granddaughter brought her, and is waiting in the car.   States she did not bring her blood pressure logs, thinks someone took them.   Left machine at home    Patient states she took her blood pressure medications today at 8am.  Had some fruit for breakfast, is waiting to take insulin    Manual blood pressure today 127/59.     Printed patients medications out and highlighted her blood pressure medications.   Walked patient to car, and spoke to her granddaughter ( Marine)   Let her know that blood pressure was doing good, and that pt has blood pressure logs to fill out,  As well as a printout of medications.     Let them know that they can send home blood pressures through the portal, and we can update them.

## 2023-06-25 DIAGNOSIS — I10 BENIGN ESSENTIAL HYPERTENSION: ICD-10-CM

## 2023-06-26 RX ORDER — CARVEDILOL 12.5 MG/1
TABLET ORAL
Qty: 180 TABLET | Refills: 3 | Status: SHIPPED | OUTPATIENT
Start: 2023-06-26 | End: 2023-11-24

## 2023-07-03 DIAGNOSIS — Z79.811 USE OF AROMATASE INHIBITORS: ICD-10-CM

## 2023-07-03 RX ORDER — ANASTROZOLE 1 MG/1
TABLET ORAL
Qty: 90 TABLET | Refills: 3 | Status: SHIPPED | OUTPATIENT
Start: 2023-07-03

## 2023-07-13 ENCOUNTER — PATIENT MESSAGE (OUTPATIENT)
Dept: PRIMARY CARE CLINIC | Facility: CLINIC | Age: 78
End: 2023-07-13
Payer: MEDICARE

## 2023-07-13 DIAGNOSIS — E11.69 HYPERLIPIDEMIA ASSOCIATED WITH TYPE 2 DIABETES MELLITUS: Primary | ICD-10-CM

## 2023-07-13 DIAGNOSIS — Z98.890 S/P CRANIOTOMY: ICD-10-CM

## 2023-07-13 DIAGNOSIS — E78.5 HYPERLIPIDEMIA ASSOCIATED WITH TYPE 2 DIABETES MELLITUS: Primary | ICD-10-CM

## 2023-07-13 NOTE — TELEPHONE ENCOUNTER
Let daughter know about pill packing service that's free through our pharmacies. [employer] won't cover a nurse coming in weekly indefinitely to set up meds. Referral for OPCM to discuss possibly assisted living options.

## 2023-07-19 ENCOUNTER — TELEPHONE (OUTPATIENT)
Dept: NEUROSURGERY | Facility: CLINIC | Age: 78
End: 2023-07-19
Payer: MEDICARE

## 2023-07-19 RX ORDER — ATORVASTATIN CALCIUM 40 MG/1
TABLET, FILM COATED ORAL
Qty: 90 TABLET | Refills: 3 | Status: SHIPPED | OUTPATIENT
Start: 2023-07-19

## 2023-07-19 NOTE — TELEPHONE ENCOUNTER
Called patient and spoke with the granddaughter to confirm they have an appointment set for September and got an MRI scheduled for before the appointment.    ----- Message from Lucie Burden sent at 7/19/2023 11:42 AM CDT -----  Regarding: sooner appt  Contact: Stella [phone]    Patient Requesting Sooner Appointment.    Reason for sooner appt.:she missed scheduled appt on July 19  When is the first available appointment?Sept 13 @ 8:00  Communication Preference:Stella [phone]  Additional Information:

## 2023-07-20 ENCOUNTER — TELEPHONE (OUTPATIENT)
Dept: PRIMARY CARE CLINIC | Facility: CLINIC | Age: 78
End: 2023-07-20
Payer: MEDICARE

## 2023-07-20 ENCOUNTER — PATIENT MESSAGE (OUTPATIENT)
Dept: PRIMARY CARE CLINIC | Facility: CLINIC | Age: 78
End: 2023-07-20
Payer: MEDICARE

## 2023-07-26 ENCOUNTER — OUTPATIENT CASE MANAGEMENT (OUTPATIENT)
Dept: ADMINISTRATIVE | Facility: OTHER | Age: 78
End: 2023-07-26
Payer: MEDICARE

## 2023-07-26 NOTE — PROGRESS NOTES
Outpatient Care Management   - Low Risk Patient Assessment    Patient: Alisia Gusman  MRN:  [id number]  Date of Service:  7/26/2023  Completed by:  Lina Mendoza LMSW  Referral Date: 07/13/2023    Reason for Visit   Patient presents with    Social Work Assessment - Low/Mod Risk    Plan Of Care    Case Closure       Brief Summary:  received a referral from     SW received referral from patient PCP. SW completed assessment with patient granddaughter Stella. Per chart review Stella accompanies patient to all appointments as well as have access to her portal. Stella reports patient resides alone but sister lives next door. Stella reports patient can complete ADL's without assistance. Granddaughter reports IADL's are handled by POA and herself. Granddaughter reports patient ambulates with a cane. Granddaughter denied patient needs assistance with food, shelter, medication or medical. Patient ambulates with a walker. Per chart review POA on file. . Care plan was created in collaboration with patient/caregiver input.

## 2023-08-18 ENCOUNTER — TELEPHONE (OUTPATIENT)
Dept: PRIMARY CARE CLINIC | Facility: CLINIC | Age: 78
End: 2023-08-18
Payer: MEDICARE

## 2023-08-18 NOTE — TELEPHONE ENCOUNTER
Spoke to Stella, pt has been rescheduled for her missed appointment would you like her to do any labs before the follow up with you?     Has neuro f/u right before seeing you

## 2023-08-29 ENCOUNTER — PATIENT MESSAGE (OUTPATIENT)
Dept: PRIMARY CARE CLINIC | Facility: CLINIC | Age: 78
End: 2023-08-29
Payer: MEDICARE

## 2023-08-29 ENCOUNTER — TELEPHONE (OUTPATIENT)
Dept: PRIMARY CARE CLINIC | Facility: CLINIC | Age: 78
End: 2023-08-29
Payer: MEDICARE

## 2023-08-29 DIAGNOSIS — Z98.890 S/P CRANIOTOMY: ICD-10-CM

## 2023-08-29 DIAGNOSIS — R44.1 VISUAL HALLUCINATIONS: ICD-10-CM

## 2023-08-29 DIAGNOSIS — F41.9 ANXIETY: Primary | ICD-10-CM

## 2023-08-29 NOTE — TELEPHONE ENCOUNTER
Pt states she is just feeling anxious and does not like being in home alone. She states her sister came by for lunch, and that she spoke w/ her daughter Lennie about her feelings.  Let her know we can touch base w/ her in the am to see how she is doing

## 2023-08-29 NOTE — TELEPHONE ENCOUNTER
"Incoming call from patient, she was very upset and wanted to talk to Dr. Montemayor. I asked her what was going on and she said people and things in her house have her really anxious and "she cant do it anymore" scheduled pt for eval with PCP tomorrow, also notified Stella who will be bringing her to the appointment and will be calling her mom to go check on the patient to make sure she's ok. Added PHQ and AHMET to apt notes.   "

## 2023-08-29 NOTE — TELEPHONE ENCOUNTER
B, can you check on pt? Making sure she's not trying to harm herself or anyone else - if SI/HI, go to ER. Otherwise can wait till tomorrow. Please let me know.

## 2023-08-30 NOTE — TELEPHONE ENCOUNTER
If her sister can ride w/ her for the Lyft ride, that's fine w/ me but she cannot come on Lyft by herself unfortunately. Separately, I can also order care at home. Let me know.

## 2023-08-30 NOTE — TELEPHONE ENCOUNTER
Spoke w/ pt. Says she does not have a ride for appt. Sister is not home, is out right now. Maybe go ahead and order care at home.   I can try to message / call family to see if anyone can at least accompany her for Lyft ride.

## 2023-08-30 NOTE — TELEPHONE ENCOUNTER
"Spoke w/ pt's daughter Lennie. Let her know about conversation with pt, and that we are unable to provide Lyft for pt with severe cognitive issues / memory problems for the safety of pt.   Lennie said that her aunt does in fact live next door, but has a lot of health issues of her own, so that may not be the best  to ride with pt to any appointments.   Let Lennie know that PCP should be ordering "care at home" which is NP that comes to visit pt in home. Advised her that they would call to make appt. Also advised Lennie that it would probably be best if someone is there with Ms Crump, as it is going to be someone that the pt does not know coming into the home.  Lennie verbalized understanding, and agreed to call us if she has any further questions or concerns.   "

## 2023-08-31 ENCOUNTER — PES CALL (OUTPATIENT)
Dept: HOME HEALTH SERVICES | Facility: CLINIC | Age: 78
End: 2023-08-31
Payer: MEDICARE

## 2023-09-01 ENCOUNTER — PES CALL (OUTPATIENT)
Dept: HOME HEALTH SERVICES | Facility: CLINIC | Age: 78
End: 2023-09-01
Payer: MEDICARE

## 2023-09-05 ENCOUNTER — PES CALL (OUTPATIENT)
Dept: HOME HEALTH SERVICES | Facility: CLINIC | Age: 78
End: 2023-09-05
Payer: MEDICARE

## 2023-09-10 ENCOUNTER — OUTPATIENT CASE MANAGEMENT (OUTPATIENT)
Dept: NEUROLOGY | Facility: CLINIC | Age: 78
End: 2023-09-10
Payer: MEDICARE

## 2023-09-10 DIAGNOSIS — R46.89 COGNITIVE AND BEHAVIORAL CHANGES: Primary | ICD-10-CM

## 2023-09-10 DIAGNOSIS — R41.89 COGNITIVE AND BEHAVIORAL CHANGES: Primary | ICD-10-CM

## 2023-09-10 PROCEDURE — 99358 PROLONG SERVICE W/O CONTACT: CPT | Mod: S$GLB,,, | Performed by: PSYCHIATRY & NEUROLOGY

## 2023-09-10 PROCEDURE — 99358 PR PROLONGED SERV,NO CONTACT,1ST HR: ICD-10-PCS | Mod: S$GLB,,, | Performed by: PSYCHIATRY & NEUROLOGY

## 2023-09-11 ENCOUNTER — HOSPITAL ENCOUNTER (EMERGENCY)
Facility: HOSPITAL | Age: 78
Discharge: HOME | End: 2023-09-11
Attending: EMERGENCY MEDICINE
Payer: MEDICARE

## 2023-09-11 VITALS
OXYGEN SATURATION: 100 % | DIASTOLIC BLOOD PRESSURE: 67 MMHG | HEART RATE: 97 BPM | BODY MASS INDEX: 25.79 KG/M2 | TEMPERATURE: 98 F | SYSTOLIC BLOOD PRESSURE: 139 MMHG | RESPIRATION RATE: 18 BRPM | WEIGHT: 141 LBS

## 2023-09-11 DIAGNOSIS — B02.9 HERPES ZOSTER WITHOUT COMPLICATION: Primary | ICD-10-CM

## 2023-09-11 LAB
BUN SERPL-MCNC: 15 MG/DL (ref 6–30)
CHLORIDE SERPL-SCNC: 105 MMOL/L (ref 95–110)
CREAT SERPL-MCNC: 0.7 MG/DL (ref 0.5–1.4)
GLUCOSE SERPL-MCNC: 98 MG/DL (ref 70–110)
HCT VFR BLD CALC: 39 %PCV (ref 36–54)
POC IONIZED CALCIUM: 1.24 MMOL/L (ref 1.06–1.42)
POC TCO2 (MEASURED): 26 MMOL/L (ref 23–29)
POCT GLUCOSE: 115 MG/DL (ref 70–110)
POTASSIUM BLD-SCNC: 4 MMOL/L (ref 3.5–5.1)
SAMPLE: NORMAL
SODIUM BLD-SCNC: 140 MMOL/L (ref 136–145)

## 2023-09-11 PROCEDURE — 82962 GLUCOSE BLOOD TEST: CPT | Mod: HCNC

## 2023-09-11 PROCEDURE — 99284 EMERGENCY DEPT VISIT MOD MDM: CPT | Mod: HCNC

## 2023-09-11 PROCEDURE — 80048 BASIC METABOLIC PNL TOTAL CA: CPT | Mod: HCNC

## 2023-09-11 RX ORDER — CEPHALEXIN 500 MG/1
500 CAPSULE ORAL EVERY 8 HOURS
Qty: 15 CAPSULE | Refills: 0 | Status: SHIPPED | OUTPATIENT
Start: 2023-09-11 | End: 2023-09-16

## 2023-09-11 RX ORDER — VALACYCLOVIR HYDROCHLORIDE 1 G/1
1000 TABLET, FILM COATED ORAL 3 TIMES DAILY
Qty: 21 TABLET | Refills: 0 | Status: SHIPPED | OUTPATIENT
Start: 2023-09-11 | End: 2023-09-18

## 2023-09-11 NOTE — PROGRESS NOTES
[facility]    PATIENT: Alisia Gusman  DATE: 09/10/2023  MRN: [id number]  PRIMARY PROVIDER: Seelna Montemayor MD    Future Appointments   Date Time Provider Department Center   9/13/2023  8:00 AM Carlos Silva MD [facility] NEURO8 [facility]   9/18/2023 10:00 AM Alexy Hines MD [facility] [id number] [facility]   9/19/2023  1:30 PM [facility] [id number] [facility]   9/19/2023  3:40 PM Moiz Hutchison DO [facility] NEUROSC [facility]   9/21/2023  3:00 PM Selena Montemayor MD [facility] [address] [place]             I reviewed old records and/or communicated with other professionals or the patient's family from 08:42 PM until 09:13 PM on 09/10/2023. This is directly related to a face-to-face visit encounter with the patient (Evaluation and Management service) conducted on 2023-09-13.  I reviewed the following documentation for a total of 31 minutes.  CPT codes for billing for prolonged evaluation and management service (non-face-to-face review of records or communications with patient's family or other medical professionals):  [id number]  [facility] EMR records I reviewed include:     Relevant Background/Context  Known Relevant Family history:  No Known Relevant Family History.  Neurocognitive Disorder:  The patient/family denies a history of early/late onset cognitive impairment.  Movement Disorder:  The patient/family denies a history of PD, PDD, tremor.  Motorneuron Disorder:  The patient/family denies a history of ALS, MND, PLS.  Developmental Disorder:  The patient/family denies a history of Dyslexia, ADHD, ASD.  Psychiatric Disorder:  The patient/family denies a history of MDD, BD, AHMET, Schizophrenia.  Known Relevant Genetics:  There is no relevant genetic biomarkers available on record.  Developmental Milestones:  The patient/family report no known birth complications or early life problems. The patient met all developmental milestones.  Education/Learning Capacity:  The patient/family  report no signs or symptoms suggestive of developmental learning disorder.  HS  Estimated Educational Experience: 12 years of formal education.  Social History:  Lives alone but sister lives next door and provides support.     Neurocognitive Disorder Features  Onset/Duration:  Jul 2023 (~2-month)  First Symptom:  Memory impairment  Progression:  Gradually Progressive  Clinical Course:  Specialist: neurosurgery (02/01/2021)  Type: Chart Review. 76 y. O. -year-old female who presents today for post-operative follow-up s/p right frontal crani for gross total resection of meningioma on 11/15/21. Doing well from neuro standpoint. Remains mildly confused but no focal deficits. P/w her daughter. Wound has healed well. No HA, seizures, or sensory motor changes. She developed DVT/PE postop and was placed on eliquis.  Primary Care Provider (04/09/2021)  Type: Chart Review. Failed mini-cog during general screening.  Primary Care Provider (11/01/2021)  Type: Chart Review. Pt is well known to me. Accompanied by granddaughter, Stella, who lives in [place] - checks on pt once in a while. her  passed away in April - thinks he had a seizure in his sleep and fell out of bed. Found dead the next morning. Grief but is improving. Drinking more than eating. Soft drinks and soup, etc. Eating more sandwiches. Weight loss of about 6lbs in the last 3 mo. Reports sleeping well. Believes in God so doing ok w/ grief. Lives alone but sister lives next door (other half of house). Good support. Before her  passed, pt had issues w/ memory such as paying bills. Told youngest her daughter about it and so pt hasn't had to do anything w/ that. Stopped cooking altogether b/c pt forgets to turn off stove. Does not drive. Sister cooks.  Neurologist (03/22/2022)  Type: Chart Review. 76 y. O. Female with a history of meningioma s/p resection, HTN, HLD, DM2, breast CA, and PE who presents today to the [facility] due to  concerns regarding cognitive impairment. Ms. the patient is accompanied by her granddaughter, Stella, who participates in providing history. Additional information is obtained by reviewing available medical records.  . In November 2021, the patient began experiencing generalized weakness which persisted for several days and fell on 11/06/2021 while walking around her home. The patient's daughter-in-law checked on her that evening and found her on the floor. Her son noted that she was acting strangely earlier that day. The patient was transported to the hospital and was found to have AMS and decreased strength in all extremities. Head CT followed by brain MRI were performed on 11/06/2021 and revealed a 4. 2 x 4. 6 x 3. 3 cm right frontoparietal homogeneously enhancing mass consistent with meningioma. There is also substantial vasogenic edema throughout the right frontal and parietal lobes and mass effect with 5 mm leftward midline shift and subfalcine herniation. Additionally, there was a 0. 8 cm left frontal homogeneously enhancing lesion also consistent with meningioma. She underwent resection of right meningioma 11/15/2021 and was discharged on 11/24/2021. On day of discharge, no neuro deficits were noted on exam.  . The patient had relatively mild trouble with memory prior to meningioma resection, but since undergoing resection of meningioma she has reportedly experienced substantial worsening of cognitive decline. The patient's granddaughter reports that the patient has had trouble recalling recent events and conversations which leads to repeating questions. She also reportedly forgets to take medication and loses items around her home. She often forgets things that she should be able to recall without issue, such as the names of her siblings. The patient has 7 siblings, but was only able to recall 4 of them during her evaluation today. Her cognitive processing speed has slowed and she has difficulty with  attention, planning, and problem solving. Comprehension is also impaired which impacts her ability to understand certain concepts. For example, a window broke in her bathroom and she wanted to make an insurance claim on it, however, and her daughter explained to her that it would be more reasonable just to pay for the window out-of-pocket because the insurance deductible is higher than the cash price of the window. The patient was unable to understand this idea and continued to insist on filing an insurance claim. She has had increased difficulty with word finding and occasionally makes word substitutions, though this does not significantly impair her ability to communicate. She is much more easily frustrated than she was in the past. She admits to worsening anxiety, but denies depression. However, Stella says that the patient has been increasingly depressed recently. The patient says that she sleeps well and may wake once during the night to use the restroom.  . The the patient has reportedly experienced delusions since meningioma resection and reports that people have been entering her home and stealing her clothes. She says that she has never seen these people in her home but knows that they are there because she can hear her floor creaking when they walk. She says that they sleep all day in the bed but they cover their face with the pillow. She says that she has pulled the covers off of the people she thinks are in her bed, but they instantly disappear. She reports that somehow these people got her keys, which she thinks occurred because she left her purse at home when she was in the hospital. She reports that these unknown people park in front of her house every night, which makes her very anxious because they have her keys. She has [employer] security and says that she does not know how they would get into her home. She is reportedly telling everyone she knows about this.  . Lennie, daughter, [phone]. Called  3/23. Lennie says that she thinks the patient is actually experiencing hallucinations in addition to delusions. She says the patient will talk about a woman coming in and out of her home and then leaving in a yellow car at night. 76 y. O. Female with a history of meningioma s/p resection, HTN, HLD, DM2, breast CA, and PE who presents to the [facility] due to cognitive impairment and delusions. The patient was found to have a 4. 2 x 4. 6 x 3. 3 cm meningioma involving the right anterior frontal lobe as well as a smaller left frontal meningioma. Right frontal meningioma was resected on 11/15/21. After discharge, the patient was noted to have substantial memory deficits and began experiencing delusions that people were coming into her home and stealing her things. In addition trouble with memory and behavior, she has reportedly had trouble with language, cognitive processing speed, attention, planning, problem solving, and judgment. On today's evaluation, MMSE was 23/30 with points lost on orientation to time (-2), attention (-3), delayed recall (-3), and 3-step command (-1). MRI performed 2/1/22 was reviewed and significant for mild generalized volume loss without regional predominance. Right frontal postsurgical changes are noted. There is T2/FLAIR hyperintensity of the supratentorial and pontine white matter consistent with mild chronic microvascular ischemia.  . Patient's cognitive impairment and delusions are likely associated with right frontal meningioma and/or surgical resection. Right frontal lesions can result in impaired attention, working memory, and executive functioning. These cognitive processes are necessary for effective encoding and retrieval of information and impairment can indirectly lead to memory deficits. Additionally, right frontal lobe lesions are often associated with delusions.  Primary Care Provider (04/20/2023)  Type: Chart Review. Pt is well known to me. Accompanied  by granddaughter today. Lives by self. Elderly sister, Julee, also lives next door. Has 2 her daughter but both are out of town. Sister sets up pt's meds in pill box. Pt gives herself the ozempic. Per daughter, sister is reliable.  . Eats 3 meals a day. Sometimes w/ decreased appetite. Walks w/ her wheelchair while she's in the house. No falls.  . Meningioma s/p R frontal craniotomy and resection 11/2021. Started having VH/delusions, poor memory and insomnia. Saw Dr. Santos in memory clinic 3/2022 and per note, delusions are commonly seen w/ R hemispheric lesions. On seroquel 50mg 1. 5 tabs nightly. Follows w/ Dr. Lorenz - LOV 8/16/22 in [place]. Due for f/u w/ repeat MRI in a yr. MRI brain 8/10/22 - Evolution of postsurgical changes.  No recurrent meningioma at the right frontal operative bed.  Stable small left frontal meningioma. There is a small focus of extra-axial hemorrhage overlying the right frontal vertex however these are late subacute blood products with no evidence of acute hemorrhage.  . Pt developed a PE after brain resection. On eliquis 5mg BID.  [facility] - Carlos Silva MD. Neurologist (07/19/2023)  Type: Chart Review. ARRIVED > 30 MINS LATE.     Current Presentation  Recent/Interim History:  In early April 2021, the patient failed a mini-cog test during general screening, indicating potential cognitive impairment. Moving to November 2021, the patient's her  passed away, possibly due to a seizure during sleep. The patient experienced grief but showed signs of improvement. Their appetite for regular meals decreased, while there was an increased consumption of soft drinks and soup. In the past three months, the patient had lost approximately 6 pounds in weight. Memory issues were evident, such as forgetting to turn off the stove. Although the patient lived alone, their sister resided next door and provided support. The patient did not drive, and their sister cooked for them. The  patient reported sleeping well. In early February 2021, the patient underwent a post-operative follow-up after a right frontal craniotomy for the resection of a meningioma. Mild confusion was noted, but no focal deficits were observed. Additionally, the patient developed deep vein thrombosis (DVT) and pulmonary embolism (PE) postoperatively and was prescribed Eliquis for treatment. By late March 2022, the patient presented to the [facility] due to concerns about cognitive impairment. They had experienced generalized weakness and had a fall in November 2021. Brain imaging revealed a right frontoparietal meningioma, which had been surgically removed. However, there had been a substantial worsening of cognitive decline since the surgery. The patient encountered difficulties with memory, comprehension, word finding, attention, planning, problem-solving, and judgment. They reported delusions of people entering their home and stealing belongings, along with increased anxiety (while denying depression). The patient experienced relatively good sleep, occasionally waking up at night for bathroom trips. The Mini-Mental State Examination (MMSE) score was 23 out of 30, indicating cognitive impairment. MRI findings showed postsurgical changes, mild generalized volume loss, and chronic microvascular ischemia. Moving to late April 2023, the patient had a follow-up visit with their primary care provider. They lived alone but had an elderly sister residing next door who assisted with medication management. The patient reported eating three meals a day but occasionally had a decreased appetite. They used a wheelchair inside the house and had no reported falls. The patient continued to experience visual hallucinations, delusions, poor memory, and insomnia. Seroquel was prescribed to manage these symptoms. The patient also had ongoing follow-up care with Dr. Lorenz for neurosurgical issues. It was noted that the  patient had developed a pulmonary embolism following the brain resection and was currently taking Eliquis for treatment.  Unresolved Concern(s) reported by patient/family:  2020-02 :: Recurrently breast CA s/p L mastectomy 2020. On anastrozole till end of 5/2025.  2021-02 :: Post-operative follow-up after right frontal craniotomy for resection of meningioma.  2021-04 :: Patient's  passed away  2021-04 :: dementia per mini-cog  2021-10 :: Weight loss of about 6 pounds in the last 3 months.  2021-11 :: Developed deep vein thrombosis (DVT) and pulmonary embolism (PE)  2021-11 :: Issues with memory, such as forgetting to turn off the stove.  2021-12 :: Substantial worsening of cognitive decline since the surgery.  2022-03 :: MMSE score of 23/30  2023-04 :: Continues to experience visual hallucinations (VH), delusions, poor memory, and insomnia.          Neuroimaging:    MRI brain/head without contrast on 8/10/2022  Formal interpretation by Radiology:  Evolution of postsurgical changes. No recurrent meningioma at the right frontal operative bed. Stable small left frontal meningioma.   There is a small focus of extra-axial hemorrhage overlying the right frontal vertex however these are late subacute blood products with no evidence of acute hemorrhage.  Independently reviewed radiological imaging by Carlos Urias MD. MPH. Behavioral Neurologist  T1: Mild generalized cortical atrophy largely consistent with age-related changes. No significant focal cortical atrophy pattern aside from postsurgical encephalomalacia of the right prefrontal cortex. Intact corpus callosum normal alignment ratio. Intact brainstem with normal volume ratio. Mild bilateral hippocampal volume loss.  T2/FLAIR: mild bilateral anterior greater than posterior periventricular capping would be changes across the midline scattered subcortical hyperintensities in the PCA/ MCA external watershed distribution. Evolution of postsurgical changes.  Regional vasogenic edema and encephalomalacia of the right prefrontal cortex with cystic cavitation and microhemorrhage. Secondary changes in the bilateral and internal watershed distribution bilateral.  DWI/ADC: No Significant DWI hyperintensities/hypointensities. No ADC correlation.  SWI/GRE: Small focus of extra-axial hemorrhage overlying the right frontal vertex  Impression: : Moderate degree of right prefrontal and vasogenic edema and mild encephalomalacia status post meningioma removal. Mild to moderate bilateral hippocampal volume loss with relative sparing the bilateral cortices. Mild anterior and posterior periventricular capping with scattered subcortical internal external watershed territory hyperintensities however no significant atherosclerotic disease.     Working Diagnosis/Differential  Mixed path - DLB + VAD     Sincerely,  Carlos Silva MD. MPH.    B[facility] Disorders Program  [facility]

## 2023-09-11 NOTE — FIRST PROVIDER EVALUATION
Medical screening examination initiated.  I have conducted a focused provider triage encounter, findings are as follows:    Brief history of present illness:  Beginning Saturday, noticed spots on her scalp,burning. No facial rash or eye irritation.     Vitals:    09/11/23 1847   BP: 139/67   BP Location: Right arm   Patient Position: Sitting   Pulse: 97   Resp: 18   Temp: 98.2 °F (36.8 °C)   TempSrc: Oral   SpO2: 100%   Weight: 64 kg (141 lb)        No distress, pustules to scalp     Brief workup plan:  POC glucose     Preliminary workup initiated; this workup will be continued and followed by the physician or advanced practice provider that is assigned to the patient when roomed.

## 2023-09-12 ENCOUNTER — PATIENT OUTREACH (OUTPATIENT)
Dept: EMERGENCY MEDICINE | Facility: HOSPITAL | Age: 78
End: 2023-09-12
Payer: MEDICARE

## 2023-09-12 ENCOUNTER — TELEPHONE (OUTPATIENT)
Dept: PRIMARY CARE CLINIC | Facility: CLINIC | Age: 78
End: 2023-09-12
Payer: MEDICARE

## 2023-09-12 NOTE — ED PROVIDER NOTES
Encounter Date: 9/11/2023     History     Chief Complaint   Patient presents with    Pustules      Beginning Saturday on top of head. Reports stinging sensation     HPI  77 y/o F with medical history of HTN, HLD, DM2, PE in 2021 and breast CA s/p mastectomy/XRT presents with scalp burning and pain. Onset 2 days ago. Today noted small bumps and pustules on top of her head. No trauma. No new shampoo/soaps.  No eye redness or pain. No drainage from ears.No fevers, No headache. No neck stiffness      Review of patient's allergies indicates:   Allergen Reactions    Grass pollen-june grass standard Other (See Comments)     Causes sinus symptoms like coughing    Losartan      cough    Nubain [nalbuphine] Other (See Comments)     Other reaction(s): Hypotension    Sinus & allergy [chlorpheniramine-phenylephrine]      Past Medical History:   Diagnosis Date    Acute pulmonary embolism without acute cor pulmonale 11/15/2021    Adult bronchiectasis 7/26/2017    Allergy     Anemia     Arthritis     Asthma     Breast cancer 1993    left w/ radiation     Breast cancer 2020    IDC, left mastectomy    Cancer 1993    L eft breast    Cataract     Chronic cervical radiculopathy 9/20/2012    Diabetes mellitus     Diabetes mellitus type II     Diabetes with neurologic complications     Diverticulosis     Dry eyes     Dysphagia     after knee surgery June 2014    GERD (gastroesophageal reflux disease)     Hyperlipidemia     Hypertension     Neuropathy     feet    Scalp tenderness     Sepsis 12/12/2021    Shortness of breath     Ulcer      Past Surgical History:   Procedure Laterality Date    BREAST BIOPSY Left 1993    BREAST LUMPECTOMY Left 1993    CARPAL TUNNEL RELEASE Bilateral 2011    CATARACT EXTRACTION W/  INTRAOCULAR LENS IMPLANT Bilateral 2013 and 2014    CHOLECYSTECTOMY      COLONOSCOPY N/A 11/6/2015    Procedure: COLONOSCOPY;  Surgeon: Major Michelle MD;  Location: [facility] ([address] [place] [address]);  Service: Endoscopy;  Laterality: N/A;     COLONOSCOPY N/A 5/8/2019    Procedure: COLONOSCOPY;  Surgeon: Major Michelle MD;  Location: [facility] ENDO (4TH FLR);  Service: Endoscopy;  Laterality: N/A;    CRANIOTOMY USING FRAMELESS STEREOTAXY Right 11/15/2021    Procedure: Right Frontal Craniotomy for Meningioma with  Stealth Navigation;  Surgeon: Moiz Hutchison DO;  Location: [address];  Service: Neurosurgery;  Laterality: Right;    EYE SURGERY      HYSTERECTOMY      partial hyst    INJECTION FOR SENTINEL NODE IDENTIFICATION Left 2/20/2020    Procedure: INJECTION, FOR SENTINEL NODE IDENTIFICATION;  Surgeon: Hamida Nieves MD;  Location: [address];  Service: General;  Laterality: Left;    JOINT REPLACEMENT Left June 2014    knee    MASTECTOMY Left 2020    SENTINEL LYMPH NODE BIOPSY Left 2/20/2020    Procedure: BIOPSY, LYMPH NODE, SENTINEL;  Surgeon: Hamida Nieves MD;  Location: [address];  Service: General;  Laterality: Left;    UNILATERAL MASTECTOMY Left 2/20/2020    Procedure: MASTECTOMY, UNILATERAL;  Surgeon: Hamida Nieves MD;  Location: [address];  Service: General;  Laterality: Left;    uvuloplasty       Family History   Problem Relation Age of Onset    Cataracts Mother     Diabetes Mother     Heart attack Mother     Heart disease Father     Heart attack Father     Diabetes Sister     Colon polyps Sister     Breast cancer Sister     Colon polyps Sister     Diabetes Sister     Colon cancer Sister     Arthritis Sister     Psoriasis Sister     Stroke Sister     Seizures Sister     Diabetes Sister     No Known Problems Brother     Breast cancer Paternal Aunt     No Known Problems Daughter     No Known Problems Daughter     No Known Problems Son     Asthma Neg Hx     Emphysema Neg Hx     Lupus Neg Hx     Rheum arthritis Neg Hx     Melanoma Neg Hx     Irritable bowel syndrome Neg Hx     Inflammatory bowel disease Neg Hx     Stomach cancer Neg Hx     Esophageal cancer Neg Hx      Social History     Tobacco Use    Smoking status: Never     Smokeless tobacco: Never   Substance Use Topics    Alcohol use: No    Drug use: No     Review of Systems   Constitutional:  Negative for fever.   HENT:  Negative for mouth sores and sore throat.    Eyes:  Negative for redness and visual disturbance.   Respiratory:  Negative for shortness of breath.    Cardiovascular:  Negative for chest pain.   Gastrointestinal:  Negative for nausea and vomiting.   Skin:  Positive for rash.   Neurological:  Negative for headaches.       Physical Exam     Initial Vitals [09/11/23 1847]   BP Pulse Resp Temp SpO2   139/67 97 18 98.2 °F (36.8 °C) 100 %      MAP       --         Physical Exam    Nursing note and vitals reviewed.  Constitutional: She appears well-developed and well-nourished. No distress.   HENT:   Head: Atraumatic.   Mouth/Throat: Oropharynx is clear and moist.   No lesions over face, nose, external auditory canal   Eyes: Conjunctivae are normal.   Neck: Neck supple.   Cardiovascular:  Normal rate.           Pulmonary/Chest: No respiratory distress.   Musculoskeletal:         General: No tenderness or edema.      Cervical back: Neck supple.     Skin: Skin is warm and dry.   Over top of scalp predominantly to rt side with small open sores, draining clear fluid and some with pustules surrounding erythema         ED Course   Procedures  Labs Reviewed   POCT GLUCOSE - Abnormal; Notable for the following components:       Result Value    POCT Glucose 115 (*)     All other components within normal limits   ISTAT PROCEDURE          Imaging Results    None          Medications - No data to display    Medical Decision Making  77 y/o F with burning painful rash on top of head with erythema and some pustules- started 2 days ago  Appears to be herpes zoster given burning pain and small ulcerations although some pustules possible zoster with infection, less likely folliculitis. Will treat with valcyclovir given onset of sx within 72 hours, as well as for overlying cellulitis. Istat  for poc glucose and renal function  No e/o pineda hunt, no concern for herpes encephalitis. Routine return precautions.    Amount and/or Complexity of Data Reviewed  Independent Historian: caregiver     Details: Pt is usual self  Labs: ordered.     Details: Normal renal function    Risk  Prescription drug management.                               Clinical Impression:   Final diagnoses:  [B02.9] Herpes zoster without complication (Primary)        ED Disposition Condition    Discharge Stable          ED Prescriptions       Medication Sig Dispense Start Date End Date Auth. Provider    valACYclovir (VALTREX) 1000 MG tablet Take 1 tablet (1,000 mg total) by mouth 3 (three) times daily. for 7 days 21 tablet 9/11/2023 9/18/2023 Mimi Brock MD    cephALEXin (KEFLEX) 500 MG capsule Take 1 capsule (500 mg total) by mouth every 8 (eight) hours. for 5 days 15 capsule 9/11/2023 9/16/2023 Mimi Brock MD          Follow-up Information       Follow up With Specialties Details Why Contact Info    Selena Montemayor MD Internal Medicine Schedule an appointment as soon as possible for a visit   [id number] [id number]  [phone]               Mimi Brock MD  09/11/23 [id number]

## 2023-09-12 NOTE — TELEPHONE ENCOUNTER
----- Message from Leigh Pickering RN sent at 9/12/2023  9:31 AM CDT -----  Regarding: FW: ED F/U  Dx w/ Herpes Zoster. Pictures in Media. Let us know  ----- Message -----  From: Jay Mcgregor  Sent: 9/12/2023   9:08 AM CDT  To: [facility] Staff  Subject: ED F/U                                           Good morning,  This patient was seen in the ED. 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  Please assist with scheduling.  Jay Mcgregor

## 2023-09-12 NOTE — ED NOTES
Patient identifiers verified and correct for   LOC: The patient is awake, alert and aware of environment with an appropriate affect, the patient is oriented x 3 and speaking appropriately.   APPEARANCE: Patient appears comfortable and in no acute distress, patient is clean and well groomed.  SKIN: The skin is warm and dry, color consistent with ethnicity, patient has normal skin turgor and moist mucus membranes, skin intact, no breakdown or bruising noted. Small abscesses noted to the top of her head.  MUSCULOSKELETAL: Patient moving all extremities spontaneously, no swelling noted.  RESPIRATORY: Airway is open and patent, respirations are spontaneous, patient has a normal effort and rate, no accessory muscle use noted, pt placed on continuous pulse ox with O2 sats noted at 97% on room air.  CARDIAC: Pt placed on cardiac monitor. Patient has a normal rate and regular rhythm, no edema noted, capillary refill < 3 seconds.   GASTRO: Soft and non tender to palpation, no distention noted, normoactive bowel sounds present in all four quadrants. Pt states bowel movements have been regular.  : Pt denies any pain or frequency with urination.  NEURO: Pt opens eyes spontaneously, behavior appropriate to situation, follows commands, facial expression symmetrical, bilateral hand grasp equal and even, purposeful motor response noted, normal sensation in all extremities when touched with a finger.

## 2023-09-12 NOTE — TELEPHONE ENCOUNTER
No answer from relatives at this time. Sent portal message to see if someone can bring her to an appt this Fri with Dr Sterling.

## 2023-09-12 NOTE — PROGRESS NOTES
Patient was discharged from the ED on 9/11/23. Spoke with patients grand daughter who agreed to Post ED Discharge Navigation. Message sent to PCP staff for assistance scheduled follow-up with 7 days.  Jay Mcgregor

## 2023-09-12 NOTE — DISCHARGE INSTRUCTIONS
Take valcyclovir and keflex as prescribed  Take tylenol if needed for pain  Follow up with your doctor  Return to ED for worsenign rash, ear or eye problems, headache, confusion or any other concerns

## 2023-09-13 ENCOUNTER — TELEPHONE (OUTPATIENT)
Dept: NEUROLOGY | Facility: CLINIC | Age: 78
End: 2023-09-13
Payer: MEDICARE

## 2023-09-13 ENCOUNTER — HOSPITAL ENCOUNTER (OUTPATIENT)
Dept: CARDIOLOGY | Facility: CLINIC | Age: 78
Discharge: HOME | End: 2023-09-13
Payer: MEDICARE

## 2023-09-13 ENCOUNTER — LAB VISIT (OUTPATIENT)
Dept: LAB | Facility: HOSPITAL | Age: 78
End: 2023-09-13
Attending: PSYCHIATRY & NEUROLOGY
Payer: MEDICARE

## 2023-09-13 ENCOUNTER — OFFICE VISIT (OUTPATIENT)
Dept: NEUROLOGY | Facility: CLINIC | Age: 78
End: 2023-09-13
Payer: MEDICARE

## 2023-09-13 VITALS
WEIGHT: 139.75 LBS | BODY MASS INDEX: 25.72 KG/M2 | SYSTOLIC BLOOD PRESSURE: 131 MMHG | DIASTOLIC BLOOD PRESSURE: 68 MMHG | HEIGHT: 62 IN | HEART RATE: 85 BPM

## 2023-09-13 DIAGNOSIS — F02.80 MIXED DEMENTIA: ICD-10-CM

## 2023-09-13 DIAGNOSIS — G30.9 MIXED DEMENTIA: ICD-10-CM

## 2023-09-13 DIAGNOSIS — F02.A18 MILD LEWY BODY DEMENTIA WITH OTHER BEHAVIORAL DISTURBANCE: ICD-10-CM

## 2023-09-13 DIAGNOSIS — G20.C PARKINSONISM, UNSPECIFIED PARKINSONISM TYPE: ICD-10-CM

## 2023-09-13 DIAGNOSIS — Z98.890 H/O CRANIOTOMY: ICD-10-CM

## 2023-09-13 DIAGNOSIS — F01.50 MIXED DEMENTIA: Primary | ICD-10-CM

## 2023-09-13 DIAGNOSIS — F01.50 MIXED DEMENTIA: ICD-10-CM

## 2023-09-13 DIAGNOSIS — G31.83 MILD LEWY BODY DEMENTIA WITH OTHER BEHAVIORAL DISTURBANCE: ICD-10-CM

## 2023-09-13 DIAGNOSIS — R44.1 VISUAL HALLUCINATION: ICD-10-CM

## 2023-09-13 DIAGNOSIS — F02.80 MIXED DEMENTIA: Primary | ICD-10-CM

## 2023-09-13 DIAGNOSIS — G30.9 MIXED DEMENTIA: Primary | ICD-10-CM

## 2023-09-13 LAB
ALBUMIN SERPL BCP-MCNC: 3.9 G/DL (ref 3.5–5.2)
ALP SERPL-CCNC: 56 U/L (ref 55–135)
ALT SERPL W/O P-5'-P-CCNC: 31 U/L (ref 10–44)
ANION GAP SERPL CALC-SCNC: 10 MMOL/L (ref 8–16)
AST SERPL-CCNC: 28 U/L (ref 10–40)
BASOPHILS # BLD AUTO: 0.03 K/UL (ref 0–0.2)
BASOPHILS NFR BLD: 0.6 % (ref 0–1.9)
BILIRUB SERPL-MCNC: 1.6 MG/DL (ref 0.1–1)
BUN SERPL-MCNC: 16 MG/DL (ref 8–23)
CALCIUM SERPL-MCNC: 10.4 MG/DL (ref 8.7–10.5)
CHLORIDE SERPL-SCNC: 105 MMOL/L (ref 95–110)
CO2 SERPL-SCNC: 25 MMOL/L (ref 23–29)
CREAT SERPL-MCNC: 0.8 MG/DL (ref 0.5–1.4)
DIFFERENTIAL METHOD: ABNORMAL
EOSINOPHIL # BLD AUTO: 0 K/UL (ref 0–0.5)
EOSINOPHIL NFR BLD: 0.7 % (ref 0–8)
ERYTHROCYTE [DISTWIDTH] IN BLOOD BY AUTOMATED COUNT: 13.2 % (ref 11.5–14.5)
EST. GFR  (NO RACE VARIABLE): >60 ML/MIN/1.73 M^2
FOLATE SERPL-MCNC: 14.8 NG/ML (ref 4–24)
GLUCOSE SERPL-MCNC: 124 MG/DL (ref 70–110)
HCT VFR BLD AUTO: 38.9 % (ref 37–48.5)
HGB BLD-MCNC: 13.2 G/DL (ref 12–16)
IGG SERPL-MCNC: 977 MG/DL (ref 650–1600)
IMM GRANULOCYTES # BLD AUTO: 0.03 K/UL (ref 0–0.04)
IMM GRANULOCYTES NFR BLD AUTO: 0.6 % (ref 0–0.5)
LYMPHOCYTES # BLD AUTO: 2.5 K/UL (ref 1–4.8)
LYMPHOCYTES NFR BLD: 45.8 % (ref 18–48)
MAGNESIUM SERPL-MCNC: 2 MG/DL (ref 1.6–2.6)
MCH RBC QN AUTO: 30.4 PG (ref 27–31)
MCHC RBC AUTO-ENTMCNC: 33.9 G/DL (ref 32–36)
MCV RBC AUTO: 90 FL (ref 82–98)
MONOCYTES # BLD AUTO: 0.5 K/UL (ref 0.3–1)
MONOCYTES NFR BLD: 9.3 % (ref 4–15)
NEUTROPHILS # BLD AUTO: 2.3 K/UL (ref 1.8–7.7)
NEUTROPHILS NFR BLD: 43 % (ref 38–73)
NRBC BLD-RTO: 0 /100 WBC
PLATELET # BLD AUTO: 155 K/UL (ref 150–450)
PMV BLD AUTO: 11.4 FL (ref 9.2–12.9)
POTASSIUM SERPL-SCNC: 3.9 MMOL/L (ref 3.5–5.1)
PROT SERPL-MCNC: 7.3 G/DL (ref 6–8.4)
RBC # BLD AUTO: 4.34 M/UL (ref 4–5.4)
SODIUM SERPL-SCNC: 140 MMOL/L (ref 136–145)
T4 SERPL-MCNC: 10 UG/DL (ref 4.5–11.5)
TSH SERPL DL<=0.005 MIU/L-ACNC: 1 UIU/ML (ref 0.4–4)
WBC # BLD AUTO: 5.39 K/UL (ref 3.9–12.7)

## 2023-09-13 PROCEDURE — 1125F AMNT PAIN NOTED PAIN PRSNT: CPT | Mod: HCNC,CPTII,S$GLB, | Performed by: PSYCHIATRY & NEUROLOGY

## 2023-09-13 PROCEDURE — 84436 ASSAY OF TOTAL THYROXINE: CPT | Mod: HCNC | Performed by: PSYCHIATRY & NEUROLOGY

## 2023-09-13 PROCEDURE — 99215 PR OFFICE/OUTPT VISIT, EST, LEVL V, 40-54 MIN: ICD-10-PCS | Mod: HCNC,S$GLB,, | Performed by: PSYCHIATRY & NEUROLOGY

## 2023-09-13 PROCEDURE — 0361U NEURFLMNT LT CHN DIG IA QUAN: CPT | Mod: HCNC | Performed by: PSYCHIATRY & NEUROLOGY

## 2023-09-13 PROCEDURE — 1101F PR PT FALLS ASSESS DOC 0-1 FALLS W/OUT INJ PAST YR: ICD-10-PCS | Mod: HCNC,CPTII,S$GLB, | Performed by: PSYCHIATRY & NEUROLOGY

## 2023-09-13 PROCEDURE — 84443 ASSAY THYROID STIM HORMONE: CPT | Mod: HCNC | Performed by: PSYCHIATRY & NEUROLOGY

## 2023-09-13 PROCEDURE — 93005 EKG 12-LEAD: ICD-10-PCS | Mod: HCNC,S$GLB,, | Performed by: PSYCHIATRY & NEUROLOGY

## 2023-09-13 PROCEDURE — 93010 EKG 12-LEAD: ICD-10-PCS | Mod: HCNC,S$GLB,, | Performed by: INTERNAL MEDICINE

## 2023-09-13 PROCEDURE — 1125F PR PAIN SEVERITY QUANTIFIED, PAIN PRESENT: ICD-10-PCS | Mod: HCNC,CPTII,S$GLB, | Performed by: PSYCHIATRY & NEUROLOGY

## 2023-09-13 PROCEDURE — 96132 PR NEUROPSYCHOLOGIC TEST EVAL SVCS, 1ST HR: ICD-10-PCS | Mod: 59,HCNC,S$GLB, | Performed by: PSYCHIATRY & NEUROLOGY

## 2023-09-13 PROCEDURE — 83520 IMMUNOASSAY QUANT NOS NONAB: CPT | Mod: HCNC | Performed by: PSYCHIATRY & NEUROLOGY

## 2023-09-13 PROCEDURE — 82746 ASSAY OF FOLIC ACID SERUM: CPT | Mod: HCNC | Performed by: PSYCHIATRY & NEUROLOGY

## 2023-09-13 PROCEDURE — 3078F DIAST BP <80 MM HG: CPT | Mod: HCNC,CPTII,S$GLB, | Performed by: PSYCHIATRY & NEUROLOGY

## 2023-09-13 PROCEDURE — 1157F ADVNC CARE PLAN IN RCRD: CPT | Mod: HCNC,CPTII,S$GLB, | Performed by: PSYCHIATRY & NEUROLOGY

## 2023-09-13 PROCEDURE — 99417 PR PROLONGED SVC, OUTPT, W/WO DIRECT PT CONTACT,  EA ADDTL 15 MIN: ICD-10-PCS | Mod: HCNC,S$GLB,, | Performed by: PSYCHIATRY & NEUROLOGY

## 2023-09-13 PROCEDURE — 3075F SYST BP GE 130 - 139MM HG: CPT | Mod: HCNC,CPTII,S$GLB, | Performed by: PSYCHIATRY & NEUROLOGY

## 2023-09-13 PROCEDURE — 3072F PR LOW RISK FOR RETINOPATHY: ICD-10-PCS | Mod: HCNC,CPTII,S$GLB, | Performed by: PSYCHIATRY & NEUROLOGY

## 2023-09-13 PROCEDURE — 96116 PR NEUROBEHAVIORAL STATUS EXAM BY PSYCH/PHYS: ICD-10-PCS | Mod: 59,HCNC,S$GLB, | Performed by: PSYCHIATRY & NEUROLOGY

## 2023-09-13 PROCEDURE — 82784 ASSAY IGA/IGD/IGG/IGM EACH: CPT | Mod: HCNC | Performed by: PSYCHIATRY & NEUROLOGY

## 2023-09-13 PROCEDURE — 1157F PR ADVANCE CARE PLAN OR EQUIV PRESENT IN MEDICAL RECORD: ICD-10-PCS | Mod: HCNC,CPTII,S$GLB, | Performed by: PSYCHIATRY & NEUROLOGY

## 2023-09-13 PROCEDURE — 86780 TREPONEMA PALLIDUM: CPT | Mod: HCNC | Performed by: PSYCHIATRY & NEUROLOGY

## 2023-09-13 PROCEDURE — 84425 ASSAY OF VITAMIN B-1: CPT | Mod: HCNC | Performed by: PSYCHIATRY & NEUROLOGY

## 2023-09-13 PROCEDURE — 1101F PT FALLS ASSESS-DOCD LE1/YR: CPT | Mod: HCNC,CPTII,S$GLB, | Performed by: PSYCHIATRY & NEUROLOGY

## 2023-09-13 PROCEDURE — 93010 ELECTROCARDIOGRAM REPORT: CPT | Mod: HCNC,S$GLB,, | Performed by: INTERNAL MEDICINE

## 2023-09-13 PROCEDURE — 3288F FALL RISK ASSESSMENT DOCD: CPT | Mod: HCNC,CPTII,S$GLB, | Performed by: PSYCHIATRY & NEUROLOGY

## 2023-09-13 PROCEDURE — 99999 PR PBB SHADOW E&M-EST. PATIENT-LVL IV: ICD-10-PCS | Mod: PBBFAC,HCNC,, | Performed by: PSYCHIATRY & NEUROLOGY

## 2023-09-13 PROCEDURE — 85025 COMPLETE CBC W/AUTO DIFF WBC: CPT | Mod: HCNC | Performed by: PSYCHIATRY & NEUROLOGY

## 2023-09-13 PROCEDURE — 99215 OFFICE O/P EST HI 40 MIN: CPT | Mod: HCNC,S$GLB,, | Performed by: PSYCHIATRY & NEUROLOGY

## 2023-09-13 PROCEDURE — 96132 NRPSYC TST EVAL PHYS/QHP 1ST: CPT | Mod: 59,HCNC,S$GLB, | Performed by: PSYCHIATRY & NEUROLOGY

## 2023-09-13 PROCEDURE — 1160F PR REVIEW ALL MEDS BY PRESCRIBER/CLIN PHARMACIST DOCUMENTED: ICD-10-PCS | Mod: HCNC,CPTII,S$GLB, | Performed by: PSYCHIATRY & NEUROLOGY

## 2023-09-13 PROCEDURE — 3288F PR FALLS RISK ASSESSMENT DOCUMENTED: ICD-10-PCS | Mod: HCNC,CPTII,S$GLB, | Performed by: PSYCHIATRY & NEUROLOGY

## 2023-09-13 PROCEDURE — 93005 ELECTROCARDIOGRAM TRACING: CPT | Mod: HCNC,S$GLB,, | Performed by: PSYCHIATRY & NEUROLOGY

## 2023-09-13 PROCEDURE — 80053 COMPREHEN METABOLIC PANEL: CPT | Mod: HCNC | Performed by: PSYCHIATRY & NEUROLOGY

## 2023-09-13 PROCEDURE — 1160F RVW MEDS BY RX/DR IN RCRD: CPT | Mod: HCNC,CPTII,S$GLB, | Performed by: PSYCHIATRY & NEUROLOGY

## 2023-09-13 PROCEDURE — 1159F MED LIST DOCD IN RCRD: CPT | Mod: HCNC,CPTII,S$GLB, | Performed by: PSYCHIATRY & NEUROLOGY

## 2023-09-13 PROCEDURE — 83735 ASSAY OF MAGNESIUM: CPT | Mod: HCNC | Performed by: PSYCHIATRY & NEUROLOGY

## 2023-09-13 PROCEDURE — 3075F PR MOST RECENT SYSTOLIC BLOOD PRESS GE 130-139MM HG: ICD-10-PCS | Mod: HCNC,CPTII,S$GLB, | Performed by: PSYCHIATRY & NEUROLOGY

## 2023-09-13 PROCEDURE — 96116 NUBHVL XM PHYS/QHP 1ST HR: CPT | Mod: 59,HCNC,S$GLB, | Performed by: PSYCHIATRY & NEUROLOGY

## 2023-09-13 PROCEDURE — 99999 PR PBB SHADOW E&M-EST. PATIENT-LVL IV: CPT | Mod: PBBFAC,HCNC,, | Performed by: PSYCHIATRY & NEUROLOGY

## 2023-09-13 PROCEDURE — 99417 PROLNG OP E/M EACH 15 MIN: CPT | Mod: HCNC,S$GLB,, | Performed by: PSYCHIATRY & NEUROLOGY

## 2023-09-13 PROCEDURE — 3078F PR MOST RECENT DIASTOLIC BLOOD PRESSURE < 80 MM HG: ICD-10-PCS | Mod: HCNC,CPTII,S$GLB, | Performed by: PSYCHIATRY & NEUROLOGY

## 2023-09-13 PROCEDURE — 3072F LOW RISK FOR RETINOPATHY: CPT | Mod: HCNC,CPTII,S$GLB, | Performed by: PSYCHIATRY & NEUROLOGY

## 2023-09-13 PROCEDURE — 1159F PR MEDICATION LIST DOCUMENTED IN MEDICAL RECORD: ICD-10-PCS | Mod: HCNC,CPTII,S$GLB, | Performed by: PSYCHIATRY & NEUROLOGY

## 2023-09-13 PROCEDURE — 83921 ORGANIC ACID SINGLE QUANT: CPT | Mod: HCNC | Performed by: PSYCHIATRY & NEUROLOGY

## 2023-09-13 RX ORDER — SUVOREXANT 10 MG/1
1 TABLET, FILM COATED ORAL NIGHTLY
Qty: 30 TABLET | Refills: 3 | Status: SHIPPED | OUTPATIENT
Start: 2023-09-13

## 2023-09-13 NOTE — TELEPHONE ENCOUNTER
----- Message from Daisy Smith sent at 9/13/2023  1:03 PM CDT -----  There was an issue with the Pernicious Anemia test ordered  09/13/2023.  The specimen was quantity not sufficient.  The ordered has been cancelled and will need to be reordered and recollected.  If there are any questions please call the sendout laboratory. Anyone in the sendout lab will be able to assist you

## 2023-09-13 NOTE — PROGRESS NOTES
[facility]     PATIENT: Alisia Gusman  VISIT DATE: 2023  MRN: [id number]  PRIMARY PROVIDER: Selena Montemayor MD  : 1945       Chief complaint: Progressive Cognitive Impairment     History of present illness:      The patient is a 78-year-old right-handed female who presents today to the [facility] due to concerns related to Progressive Cognitive Impairment.  The patient is accompanied by the daughter who participates in providing history.  Additional information is obtained by reviewing available medical records.     Relevant Background/Context  Known Relevant Family history:  Mother -  at age mid-70s DM2 complications  Father -  at age mid-70s maybe cancer  Neurocognitive Disorder:  Paternal Aunt - onset 60s  70s  Movement Disorder:  The patient/family denies a history of PD, PDD, tremor.  Motorneuron Disorder:  The patient/family denies a history of ALS, MND, PLS.  Developmental Disorder:  Nephew - learning disorder  Psychiatric Disorder:  Daughter - MDD  Granddaughter - MDD with hospitalization  Known Relevant Genetics:  There is no relevant genetic biomarkers available on record.  Developmental Milestones:  The patient/family report no known birth complications or early life problems. The patient met all developmental milestones.  Education/Learning Capacity:  The patient/family report no signs or symptoms suggestive of developmental learning disorder.  HS  Estimated Educational Experience: 12 years of formal education.  Social History:  Lives alone but sister lives next door and provides support.  passed in . Still involved in Worship groups  Career/Skill Reserve:   and . Retired  Retired/Quit:      Neurocognitive Disorder Features  Onset/Duration:  2019 (~4-year)  First Symptom:  Behavior/Psychiatric impairment  Progression:  Gradually  Progressive  Clinical Course:  Specialist: neurosurgery (02/01/2021)  Type: Chart Review. 76 y. O. -year-old female who presents today for post-operative follow-up s/p right frontal crani for gross total resection of meningioma on 11/15/21. Doing well from neuro standpoint. Remains mildly confused but no focal deficits. P/w her daughter. Wound has healed well. No HA, seizures, or sensory motor changes. She developed DVT/PE postop and was placed on eliquis.  Primary Care Provider (04/09/2021)  Type: Chart Review. Failed mini-cog during general screening.  Primary Care Provider (11/01/2021)  Type: Chart Review. Pt is well known to me. Accompanied by granddaughter, Stella, who lives in [place] - checks on pt once in a while. her  passed away in April - thinks he had a seizure in his sleep and fell out of bed. Found dead the next morning. Grief but is improving. Drinking more than eating. Soft drinks and soup, etc. Eating more sandwiches. Weight loss of about 6lbs in the last 3 mo. Reports sleeping well. Believes in God so doing ok w/ grief. Lives alone but sister lives next door (other half of house). Good support. Before her  passed, pt had issues w/ memory such as paying bills. Told youngest her daughter about it and so pt hasn't had to do anything w/ that. Stopped cooking altogether b/c pt forgets to turn off stove. Does not drive. Sister cooks.  Neurologist (03/22/2022)  Type: Chart Review. 76 y. O. Female with a history of meningioma s/p resection, HTN, HLD, DM2, breast CA, and PE who presents today to the [facility] due to concerns regarding cognitive impairment. Ms. the patient is accompanied by her granddaughter, Stella, who participates in providing history. Additional information is obtained by reviewing available medical records.  . In November 2021, the patient began experiencing generalized weakness which persisted for several days and fell on 11/06/2021 while walking  around her home. The patient's daughter-in-law checked on her that evening and found her on the floor. Her son noted that she was acting strangely earlier that day. The patient was transported to the hospital and was found to have AMS and decreased strength in all extremities. Head CT followed by brain MRI were performed on 11/06/2021 and revealed a 4. 2 x 4. 6 x 3. 3 cm right frontoparietal homogeneously enhancing mass consistent with meningioma. There is also substantial vasogenic edema throughout the right frontal and parietal lobes and mass effect with 5 mm leftward midline shift and subfalcine herniation. Additionally, there was a 0. 8 cm left frontal homogeneously enhancing lesion also consistent with meningioma. She underwent resection of right meningioma 11/15/2021 and was discharged on 11/24/2021. On day of discharge, no neuro deficits were noted on exam.  . The patient had relatively mild trouble with memory prior to meningioma resection, but since undergoing resection of meningioma she has reportedly experienced substantial worsening of cognitive decline. The patient's granddaughter reports that the patient has had trouble recalling recent events and conversations which leads to repeating questions. She also reportedly forgets to take medication and loses items around her home. She often forgets things that she should be able to recall without issue, such as the names of her siblings. The patient has 7 siblings, but was only able to recall 4 of them during her evaluation today. Her cognitive processing speed has slowed and she has difficulty with attention, planning, and problem solving. Comprehension is also impaired which impacts her ability to understand certain concepts. For example, a window broke in her bathroom and she wanted to make an insurance claim on it, however, and her daughter explained to her that it would be more reasonable just to pay for the window out-of-pocket because the insurance  deductible is higher than the cash price of the window. The patient was unable to understand this idea and continued to insist on filing an insurance claim. She has had increased difficulty with word finding and occasionally makes word substitutions, though this does not significantly impair her ability to communicate. She is much more easily frustrated than she was in the past. She admits to worsening anxiety, but denies depression. However, Stella says that the patient has been increasingly depressed recently. The patient says that she sleeps well and may wake once during the night to use the restroom.  . The the patient has reportedly experienced delusions since meningioma resection and reports that people have been entering her home and stealing her clothes. She says that she has never seen these people in her home but knows that they are there because she can hear her floor creaking when they walk. She says that they sleep all day in the bed but they cover their face with the pillow. She says that she has pulled the covers off of the people she thinks are in her bed, but they instantly disappear. She reports that somehow these people got her keys, which she thinks occurred because she left her purse at home when she was in the hospital. She reports that these unknown people park in front of her house every night, which makes her very anxious because they have her keys. She has [employer] security and says that she does not know how they would get into her home. She is reportedly telling everyone she knows about this.  . Lennie, daughter, [phone]. Called 3/23. Lennie says that she thinks the patient is actually experiencing hallucinations in addition to delusions. She says the patient will talk about a woman coming in and out of her home and then leaving in a yellow car at night. 76 y. O. Female with a history of meningioma s/p resection, HTN, HLD, DM2, breast CA, and PE who presents to the [facility] due to cognitive impairment and delusions. The patient was found to have a 4. 2 x 4. 6 x 3. 3 cm meningioma involving the right anterior frontal lobe as well as a smaller left frontal meningioma. Right frontal meningioma was resected on 11/15/21. After discharge, the patient was noted to have substantial memory deficits and began experiencing delusions that people were coming into her home and stealing her things. In addition trouble with memory and behavior, she has reportedly had trouble with language, cognitive processing speed, attention, planning, problem solving, and judgment. On today's evaluation, MMSE was 23/30 with points lost on orientation to time (-2), attention (-3), delayed recall (-3), and 3-step command (-1). MRI performed 2/1/22 was reviewed and significant for mild generalized volume loss without regional predominance. Right frontal postsurgical changes are noted. There is T2/FLAIR hyperintensity of the supratentorial and pontine white matter consistent with mild chronic microvascular ischemia.  . Patient's cognitive impairment and delusions are likely associated with right frontal meningioma and/or surgical resection. Right frontal lesions can result in impaired attention, working memory, and executive functioning. These cognitive processes are necessary for effective encoding and retrieval of information and impairment can indirectly lead to memory deficits. Additionally, right frontal lobe lesions are often associated with delusions.  Primary Care Provider (04/20/2023)  Type: Chart Review. Pt is well known to me. Accompanied by granddaughter today. Lives by self. Elderly sister, Julee, also lives next door. Has 2 her daughter but both are out of town. Sister sets up pt's meds in pill box. Pt gives herself the ozempic. Per daughter, sister is reliable.  . Eats 3 meals a day. Sometimes w/ decreased appetite. Walks w/ her wheelchair while she's in the house. No falls.  . Meningioma  s/p R frontal craniotomy and resection 11/2021. Started having VH/delusions, poor memory and insomnia. Saw Dr. Santos in memory clinic 3/2022 and per note, delusions are commonly seen w/ R hemispheric lesions. On seroquel 50mg 1. 5 tabs nightly. Follows w/ Dr. Lorenz - LOV 8/16/22 in [facility]GY. Due for f/u w/ repeat MRI in a yr. MRI brain 8/10/22 - Evolution of postsurgical changes.  No recurrent meningioma at the right frontal operative bed.  Stable small left frontal meningioma. There is a small focus of extra-axial hemorrhage overlying the right frontal vertex however these are late subacute blood products with no evidence of acute hemorrhage.  . Pt developed a PE after brain resection. On eliquis 5mg BID.  [facility] - Carlos Silva MD. Neurologist (07/19/2023)  Type: Chart Review. ARRIVED > 30 MINS LATE.     Current Presentation  Recent/Interim History:  The patient presents accompanied by her family, who provide the primary historical information. Additional information was gleaned from a review of previous medical records. Looking back, her family notes concerns about a change in her temperament as early as 2019. During that time, they reported increasing irritability which marked a change from the patient's typical temperament. In February 2020, the patient underwent recurrent breast surgery, specifically a mastectomy. Subsequent to this, she began treatment with anastrozole, which is planned to continue until April 2025. During her evaluation for breast cancer, a meningioma was discovered in the right prefrontal cortex. This led to a right frontal craniotomy for resection of the meningioma in February 2021. Tragically, her  passed away in April 2021. Following his passing, she was evaluated by her primary care physician. At this appointment, she exhibited signs of cognitive impairment, scoring within the dementia range on the mini-cog test. In the subsequent months, her family grew increasingly  concerned about her memory deficits, especially in short-term recall. They observed a decline in her ability to cook and recognized difficulties she had following directions. By August 2021, her family first noticed new-onset hallucinations. Over the next few months, she lost around 6 pounds, possibly due to decreased eating. It's uncertain whether she was forgetting to eat or was experiencing depression related to grieving her . In November 2021, her 's cause of death was identified as a possible seizure during sleep. Though she grieved, there were signs of recovery. However, she consumed fewer regular meals, opting instead for soft drinks and soup. Notably, she had incidents of forgetting to turn off the stove. She lived alone, but her sister, who lived next door, offered support. The patient didn't drive, relying on her sister for meals and reporting good sleep quality. By late March 2022, concerns about cognitive impairment led to a presentation at the [facility]. After a fall in November 2021, brain imaging showed a right frontoparietal meningioma, which was subsequently removed. Post-surgery, a significant cognitive decline was observed. She struggled with memory, comprehension, word-finding, attention, planning, problem-solving, and judgment. She reported delusions and increased anxiety, although depression was denied. Her Mini-Mental State Examination (MMSE) score was 23 out of 30, indicative of cognitive impairment. MRI findings revealed post-surgical changes, mild volume loss, and chronic microvascular ischemia. By April 2023, during a follow-up with her primary care provider, she continued living alone, with her elderly sister next door assisting with medications. The patient ate three meals daily but sometimes had a decreased appetite. She navigated her home using a wheelchair and had not fallen recently. Persistent symptoms included visual hallucinations, delusions,  memory issues, and insomnia. To address these, Seroquel was prescribed. She also continued follow-ups with Dr. Lorenz for neurosurgical issues. It's essential to note that post-brain resection, she developed a pulmonary embolism and was prescribed Eliquis. 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 Since mid-2021, the patient experienced well-formed hallucinations, idiopathic progressive asymmetric parkinsonism, fluctuating arousal levels, and REM sleep behavior disorder. Her clinical presentation was complicated by her history of cancer and hormonal therapy that began in 2020. The diagnosis strongly points to Lewy body dementia with mixed pathology vascular disease. The benefits of additional diagnostic testing were discussed.  Unresolved Concern(s) reported by patient/family:  Hallucinations/Insomnia     Review of cognitive, visuospatial, motor, sensory, and behavioral systems:     Memory:   The patient's memory has worsened in the past few years.  She does repeat statements or asks the same question repeatedly.  She does have difficulty remembering recent important conversations.  She does not have difficulty remembering recent events.  She does forget information within minutes.  Her recent retrograde memory is intact.  Her remote memory is intact.  Attention:   The patient's attention and concentration are impaired.  She does have attentional fluctuations.  She does have difficulty with selective attention.  She does become easily distracted.  She does have difficulty with divided attention.  Executive:   The patient's cognitive processing speed is slower.  She does have difficulty with working memory.  She does misplace personal items (e.g., keys, cell phone, wallet) more frequently.  She does have difficulty keeping track of her medications.  She does have  difficulty with planning/organizing/completing multistep tasks.  She does have difficulty with executive attention.  She does not have difficulty with flexible thinking.  She does not have difficulty with response inhibition.  She denies new impulsivity or rash/careless actions.  Her judgment is intact.  Language:   The patient's speech is affected.  She does not forget people's names more frequently.  She does not have word-finding difficulties.  Her speech is fluent and non-effortful.  Her speech is grammatically intact.  She does not make word substitutions.  She does not have difficulty reading.  She appears to have impaired comprehension.  Visuospatial:   The patient has new visuospatial problems.  She has become confused or disoriented in *new*, unfamiliar places.  She does have trouble navigating.  She does not get lost in familiar places.  She does not have visuospatial disorientation.  She does not have difficulty recognizing objects or faces.  She denies problems with driving or parking.  Motor/Coordination:   The patient does have difficulty with walking.  She does feel imbalanced.  She denies having fallen.  She reports new muscle weakness.  She does have difficulty buttoning shirts, operating zippers, or manipulating tools/utensils.  Her handwriting has not become micrographic.  She does not have a resting tremor.  She denies having any new involuntary movements and/or muscle jerking.  She does not have swallowing difficulty.  She denies new muscle cramps and twitching.  Sensory:   The patient reports a new sensory disturbance described as numbness, tingling, paresthesias, and/or pain.  The patient denies a loss of vision, blurry vision, or double vision.  The patient denies new loss of hearing or worsening tinnitus.  The patient does have anosmia.  Sleep:   The patient reports difficulty sleeping.  The patient does have difficulty going to sleep.  The patient reports difficulty staying asleep and/or  frequently awakening at night.  The patient does snore and/or have witnessed apneas while sleeping.  When she wakes up in the morning, she does not feel well-rested.  She has been reported to have dream-enactment behavior. Comment: at least 3 years  She denies symptoms suggestive of restless leg syndrome.  Behavior:   The patient's personality has not changed.  She does not have symptoms of disinhibition and social inappropriateness.  She does not have symptoms to suggest a loss of manners or decorum.  She does not appear apathetic or has decreased motivation.  She does not appear to have a change in inertia.  There is no report that The patient has had a change in their emotional expression.  She does have emotional blunting or lability.  She does have symptoms of irritability and mood lability.  She does not have symptoms of agitation, aggression, or violent outbursts.  Her insight into his disease and situation is impaired.  Her personal hygiene is intact.  She is not exhibiting a diminished response to other people's needs and feelings.  She is not exhibiting a diminished social interest, interrelatedness, or personal warmth.  She denies restlessness.  She denies new and/or worsening simple repetitive behaviors.  Her speech has not become simplified or become repetitive/stereotyped.  She denies new/worsening complex repetitive/ritualistic compulsions and behaviors.  She does not have symptoms of hyper-religiosity or dogmatism.  Her interests/pleasures have not become restrictive, simplified, interrupting, or repetitive.  She denies a change of self-stimulating behavior.  She denies any changes in eating behavior.  She denies increased consumption of food or substances.  She denies oral exploration or consumption of inedible objects.  Psychiatric:   She does feel depressed.  She is not exhibiting symptoms of social withdrawal/indifference.  She does have anxiety.  She does not exhibit cycling behavior.  She does  not exhibit hyperactive behavior.  She is exhibited symptoms of paranoia.  She does have delusions.  She does have hallucinations.  She does not have a history of sensitivity to neuroleptic/psychotropic medications.  Medical Review of Systems:   The patient does have constipation.  The patient does not have urinary incontinence.  The patient denies orthostatic lightheadedness.  The patient's weight is unstable.  Functional status:  Difficulty performing the following Instrumental ADLs:  Housekeeping: No  Food Preparation: Yes  Shopping: No  Ability to Handle Finances: Yes  Transportation/Driving: Yes  Household Appliances/Stove: No  Laundry: No  Difficulty performing the following Basic ADLs:  Dressing: No  Bathing: No  Toileting: No  Personal hygiene and grooming: No  Feeding: No  Care Management:  Patient/Family Safety Concerns:  Medication Adherence: No  Home Safety: Yes  Wandered: No  Firearms: No  Fall Risk: No  Home Alone: No          Past Medical History:   Diagnosis Date    Acute pulmonary embolism without acute cor pulmonale 11/15/2021    Adult bronchiectasis 7/26/2017    Allergy     Anemia     Arthritis     Asthma     Breast cancer 1993    left w/ radiation     Breast cancer 2020    IDC, left mastectomy    Cancer 1993    L eft breast    Cataract     Chronic cervical radiculopathy 9/20/2012    Diabetes mellitus     Diabetes mellitus type II     Diabetes with neurologic complications     Diverticulosis     Dry eyes     Dysphagia     after knee surgery June 2014    GERD (gastroesophageal reflux disease)     Hyperlipidemia     Hypertension     Neuropathy     feet    Scalp tenderness     Sepsis 12/12/2021    Shortness of breath     Ulcer        Past Surgical History:   Procedure Laterality Date    BREAST BIOPSY Left 1993    BREAST LUMPECTOMY Left 1993    CARPAL TUNNEL RELEASE Bilateral 2011    CATARACT EXTRACTION W/  INTRAOCULAR LENS IMPLANT Bilateral 2013 and 2014    CHOLECYSTECTOMY      COLONOSCOPY N/A  11/6/2015    Procedure: COLONOSCOPY;  Surgeon: Major Michelle MD;  Location: [facility] ENDO (4TH FLR);  Service: Endoscopy;  Laterality: N/A;    COLONOSCOPY N/A 5/8/2019    Procedure: COLONOSCOPY;  Surgeon: Major Michelle MD;  Location: [facility] ENDO (4TH FLR);  Service: Endoscopy;  Laterality: N/A;    CRANIOTOMY USING FRAMELESS STEREOTAXY Right 11/15/2021    Procedure: Right Frontal Craniotomy for Meningioma with  Stealth Navigation;  Surgeon: Moiz Hutchison DO;  Location: [address];  Service: Neurosurgery;  Laterality: Right;    EYE SURGERY      HYSTERECTOMY      partial hyst    INJECTION FOR SENTINEL NODE IDENTIFICATION Left 2/20/2020    Procedure: INJECTION, FOR SENTINEL NODE IDENTIFICATION;  Surgeon: Hamida Nieves MD;  Location: [address];  Service: General;  Laterality: Left;    JOINT REPLACEMENT Left June 2014    knee    MASTECTOMY Left 2020    SENTINEL LYMPH NODE BIOPSY Left 2/20/2020    Procedure: BIOPSY, LYMPH NODE, SENTINEL;  Surgeon: Hamida Nieves MD;  Location: [address];  Service: General;  Laterality: Left;    UNILATERAL MASTECTOMY Left 2/20/2020    Procedure: MASTECTOMY, UNILATERAL;  Surgeon: Hamida Nieves MD;  Location: [address];  Service: General;  Laterality: Left;    uvuloplasty         Family History   Problem Relation Age of Onset    Cataracts Mother     Diabetes Mother     Heart attack Mother     Heart disease Father     Heart attack Father     Diabetes Sister     Colon polyps Sister     Breast cancer Sister     Colon polyps Sister     Diabetes Sister     Colon cancer Sister     Arthritis Sister     Psoriasis Sister     Stroke Sister     Seizures Sister     Diabetes Sister     No Known Problems Brother     Breast cancer Paternal Aunt     No Known Problems Daughter     No Known Problems Daughter     No Known Problems Son     Asthma Neg Hx     Emphysema Neg Hx     Lupus Neg Hx     Rheum arthritis Neg Hx     Melanoma Neg Hx     Irritable bowel syndrome Neg Hx      "Inflammatory bowel disease Neg Hx     Stomach cancer Neg Hx     Esophageal cancer Neg Hx        Social History     Socioeconomic History    Marital status:    Occupational History    Occupation: Retired   Tobacco Use    Smoking status: Never    Smokeless tobacco: Never   Substance and Sexual Activity    Alcohol use: No    Drug use: No    Sexual activity: Not Currently     Partners: Male   Social History Narrative    No assistance w/ ADLs. 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 Lives with  at home. 3 children who live nearby.         Born [place] raised in [place]. She completed 12th grade.  Moved to [place] around  when she got .  She reports that she worked for [employer] for 27 years.   2021. She currently lives alone.        Medication:     Current Outpatient Medications on File Prior to Visit   Medication Sig Dispense Refill    amLODIPine (NORVASC) 5 MG tablet Take 1 tablet (5 mg total) by mouth once daily. 90 tablet 3    anastrozole (ARIMIDEX) 1 mg Tab TAKE 1 TABLET EVERY DAY 90 tablet 3    alcohol swabs (BD ALCOHOL SWABS) PadM Apply 1 each topically once daily. 100 each 3    apixaban (ELIQUIS) 5 mg Tab TAKE 1 TABLET TWICE DAILY 180 tablet 3    atorvastatin (LIPITOR) 40 MG tablet TAKE 1 TABLET EVERY DAY 90 tablet 3    BD INSULIN SYRINGE ULTRA-FINE 1 mL 31 gauge x 5/16 Syrg TO USE  TWICE DAILY WITH  INSULIN 100 each 11    BD VEO INSULIN SYRINGE UF 0.3 mL 31 gauge x 15/64" Syrg USE  TO INJECT TWICE DAILY 200 Syringe 3    blood sugar diagnostic (ACCU-CHEK GUIDE TEST STRIPS) Strp 1 strip by Misc.(Non-Drug; Combo Route) route 3 (three) times daily. 300 strip 3    blood-glucose meter (ACCU-CHEK DOMENICO PLUS METER) Misc USE AS DIRECTED 1 each 1    carvediloL (COREG) 12.5 MG tablet TAKE 1 TABLET TWICE DAILY WITH MEALS 180 tablet 3    cephALEXin (KEFLEX) 500 MG capsule Take 1 capsule (500 mg total) by mouth " "every 8 (eight) hours. for 5 days 15 capsule 0    cyanocobalamin-cobamamide (B-12 PLUS) 5,000-100 mcg Subl Take 5000 mcg daily. 90 tablet 3    famotidine (PEPCID) 20 MG tablet Take by mouth.      FOLIC ACID/MULTIVIT-MIN/LUTEIN (CENTRUM SILVER ORAL) Take 1 tablet by mouth once daily.      furosemide (LASIX) 20 MG tablet Take 1 tablet (20 mg total) by mouth daily as needed (leg swelling). 7 tablet 0    insulin (LANTUS SOLOSTAR U-100 INSULIN) glargine 100 units/mL SubQ pen Inject 18 Units into the skin once daily. 3 each 1    ketoconazole (NIZORAL) 2 % cream Apply topically once daily. Apply daily to affected toenail for 6-12 months 60 g 4    pen needle, diabetic (DROPLET PEN NEEDLE) 32 gauge x 5/32" Ndle USE TO INJECT ONE TIME DAILY 100 each 3    QUEtiapine (SEROQUEL) 50 MG tablet TAKE 1 AND 1/2 TABLETS EVERY EVENING 135 tablet 3    semaglutide (OZEMPIC) 1 mg/dose (4 mg/3 mL) INJECT 1MG (0.75 ML) UNDER THE SKIN EVERY 7 DAYS. 12 pen 3    valACYclovir (VALTREX) 1000 MG tablet Take 1 tablet (1,000 mg total) by mouth 3 (three) times daily. for 7 days 21 tablet 0     No current facility-administered medications on file prior to visit.        Review of patient's allergies indicates:   Allergen Reactions    Grass pollen-june grass standard Other (See Comments)     Causes sinus symptoms like coughing    Losartan      cough    Nubain [nalbuphine] Other (See Comments)     Other reaction(s): Hypotension    Sinus & allergy [chlorpheniramine-phenylephrine]        Medications Reconciliation:   I have reconciled the patient's home medications and discharge medications with the patient/family. I have updated all changes.  Refer to After-Visit Medication List.    Objective:  Vital Signs:  There were no vitals filed for this visit.  Wt Readings from Last 3 Encounters:   09/13/23 0814 63.4 kg (139 lb 12.4 oz)   09/11/23 1847 64 kg (141 lb)   05/18/23 1309 64 kg (141 lb 1.5 oz)     Body mass index is 25.56 kg/m².           Neurological " examination:  Mental Status:   Her appearance deviates from typical expectations given their age and context. Comment: looks older than stated age;  Throughout the interview, she is cooperative, her eye contact is appropriate.  Her behavior is appropriate to the clinical context without impropriety or improper language/conduct.  Her behavior was not characterized by episodes of sudden uncontrollable and inappropriate laughing or crying.  The patient's energy level is abnormal. Comment: Hypoactive;  Her orientation is not entirely accurate.  Her attention/concentration is impaired.  She is unable to complete three-step commands without making errors.  Her fund of knowledge was less than what would be expected given age, culture and level of education.  Her thought process is not logical or goal-oriented. Comment: tangential, circumstantial, bradyphrenia, perseveration;  She demonstrated impaired insight based on actions, awareness of her illness, plans for the future.  She demonstrated good judgment based on actions and plans for the future.  She has evidence of hallucinations.  She has evidence of delusions.  Cranial Nerves:   Her pupils were normal.  Her visual fields were full to confrontation in all quadrants.  Her ocular pursuit in the horizontal and vertical plane was complete.  Her saccadic initiation, velocity, and amplitude are normal.  Her blink rate was abnormal. Comment: decreased;  Her facial strength was normal.  Her facial expression was abnormal. Comment: Hypomimia;  Her hearing was normal bilaterally.  Her tongue showed no evidence of scalloping.  She tongue movement with normal.  She had no significant evidence of anterocollis or retrocollis.  Speech/Language:   The patient's speech was not completely fluent and non-effortful.  Her speech timbre is abnormal. Comment: quiet;  Her speech rate is abnormal. Comment: slow;  Her respirations are within normal range and appropriate to context.  Her speech  "timbre is normal.  She made articulation (segmental features) errors.  She has no speech dysdiadochokinesia with repetition of syllables such as "/PA/, /TA/, /KA/, /OM/".  She made prosody (suprasegmental features) errors.  Her tone was emotionally limited, and her tempo was arrhythmic. Comment: monotone;  The patient's speech is dysarthric.  She has hypokinetic dysarthria. Comment: Slow rate, articulation impairment with reduced range ; Slow rate, articulation impairment with reduced range  The patient's speech was without evidence of anomia.  She makes no phonological loop errors.  She can comprehend commands that cross the midline (e.g., with your left thumb, touch your right ear).  She can comprehend syntactically complex sentences.  She makes superordinate errors when asked to draw an animal. Comment: drawing an elephant and giraffe.; drawing an elephant and giraffe.  Motor:   The patient's bilateral upper/lower extremity muscles bulk is abnormal. Comment: atrophy; atrophy  The patient's upper extremity muscle tone is increased.  The patient's bilateral upper extremity muscle tone does not suggest spasticity.  There is evidence of rigidity/cogwheeling. Comment: B/L, R>L, Mod; Muscle tone is increased and there is evidence of rigidity/cogwheeling.  The patient's bilateral upper extremity muscle tone has no evidence of paratonia.  Assessment of motor strength was symmetric and at minimal anti-gravity.  There is no pronator or downward drift.  There is no myoclonus observed in The patient's bilateral upper and lower extremities.  There are no fasciculations observed in The patient's bilateral upper and lower extremities.  Coordination:   She has no bilateral upper extremity limb dysmetria or past pointing on finger-nose-finger bilaterally.  She has no limb dysdiadochokinesia of the upper extremity on the pronation/supination test and screwing in a light bulb or lower extremity during tapping ball of each foot " bilaterally.  She has no visible tremor.  She has evidence of interhemispheric motor control deficits.  She demonstrates evidence of motor overflow. Comment: right to left;  The patient's upper extremity fine motor coordination was abnormal. Comment: B/L, R>L, Mod;  The patient's upper extremity fine motor coordination was not slow. Comment: finger tapping, pronation/supination, and the open-close fist was slow.; finger tapping, pronation/supination, and the open-close fist was slow.  The patient's upper extremity fine motor coordination was not hypometric. Comment: finger tapping, pronation/supination, and the open-close fist showed hypometria.; finger tapping, pronation/supination, and the open-close fist showed hypometria.  The patient's upper extremity fine motor coordination was not dysrhythmic. Comment: finger tapping, pronation/supination, and the open-close fist showed dysrhythmia.; finger tapping, pronation/supination, and the open-close fist showed dysrhythmia.  Higher Cortical Function:   The patient showed evidence of simultanagnosia.  The patient showed evidence of visuospatial constructional dysfunction.  The patient showed evidence of angular gyrus disconnection (insular-operculum).  She has evidence of dysgraphia.  The patient showed evidence of apraxia.  She showed dysexecutive behavior.  She showed no utilization or imitation behavior.  She has evidence of perseverative or stereotyped behavior.  She has no stimulus-bound behavior.  Sensory:   Her sensation was diminished to light touch, and vibratory sense. Comment: in the bilateral upper and lower extremities in a length-dependant pattern.; in the bilateral upper and lower extremities in a length-dependant pattern.  Reflexes:   Reflexes were abnormal. Comment: decreased throughout;  Gait:   She has normal posture sitting unaided.  She is unable to rise from a chair and sit back down without using their arms.  Her gait was abnormal.  Her posture  while walking is abnormal. Comment: hunched;  Her gait initiation/inhibition was abnormal. Comment: blockage;  Her stance while walking is abnormal. Comment: narrow base;  Her gait speed was abnormal (70-80 F 1.13 m/s M 1.26 m/s, >80 F 0.94 m/sec, M 0.97 m/sec). Comment: slow;  Her stride (gait cycle) was abnormal. Comment: shuffling;  Her arms swing is abnormal. Comment: B/L, R>L; asymmetric and decreased amplitude.  She takes turns in >4 steps.  She has truncal ataxia.  When attempting to walk abnormally (heels, tiptoes, tandem), she makes errors.  She has evidence of posture/balance impairment.  Retropulsion testing showed abnormal recovery.  She has evidence of a specific gait disorder.  She has evidence of parkinsonism gait disorder. Comment: Gait is rigid akinetic with a short step length, a narrow base, and a stooped posture involving the neck, shoulders, and trunk. Arm swing is reduced. Steppage height is reduced (shuffling). Stride variability is increased. When asked to walk faster, patients increase the step frequency rather than step length.; Gait is rigid akinetic with a short step length, a narrow base, and a stooped posture involving the neck, shoulders, and trunk. Arm swing is reduced. Steppage height is reduced (shuffling). Stride variability is increased. When asked to walk faster, patients increase the step frequency rather than step length.  Neuropsychological Evaluation Summary:  Prior Neurocognitive/Neurobehavioral Evaluation(s)  No Prior Testing Available  Neurocognitive/Neurobehavioral Evaluation completed on 2023-09-13    Memory    Registration-3 2/3 Impairment: Moderate.   Recall-3 0/3 Impairment: Significant.   Recall-5 0/5 Impairment: Significant.   Registration-5 3/0     T1 2/9 Impairment: -3.5 STDs below the average score based on age and education.   T2 4/9 Impairment: -4.3 STDs below the average score based on age and education.   T3 5/9 Impairment: -2.2 STDs below the average score  based on age and education.   T4 5/9 Impairment: -4.3 STDs below the average score based on age and education.   DR-30 Sec 3/9 Impairment: -1.7 STDs below the average score based on age and education.   Executive    Three-step command 3/3 Within Normal Limits.   Trials-1 0/1 Impairment: Significant.   WORLD Backward 2/5 Impairment: Significant.   Digit Span - 2 1/2 Impairment: Moderate.   Serial Sevens 0/3 Impairment: Significant.   Fluency 0/1 Impairment: Significant.   Digit Span Backwards 2 Impairment: -2.4 STDs below the average score based on age and education.   Lexical Fluency - F 3 Impairment: -2.6 STDs below the average score based on age and education.   Lexical Fluency - A 4 Impairment: -2.4 STDs below the average score based on age and education.   Lexical Fluency - S 3 Impairment: -2.6 STDs below the average score based on age and education.   Semantic Fluency - Animals 9 Impairment: -2.3 STDs below the average score based on age and education.   Visuospatial    Intersecting Pentagons 0/1 Impairment: Significant.   3D Cube Copy 0/1 Impairment: Significant.   Clock Draw 2/3 Impairment: Moderate.   Mckenzie Copy 4/17 Impairment: Significant.   Overlapping Images - Update 10/12 Impairment: Mild to Normal.   Picture Synthesis 1/3 Impairment: Significant.   Famous Faces 3/5 Impairment: Moderate.   Calculations 1/5 Impairment: Significant.   Noise Pareidolia Test 0/5 Impairment: Significant.   Attention    Orientation-10 9/10 Impairment: Mild to Normal.   Orientation-6 5/6 Impairment: Mild to Normal.   Alternating Sequence 1/1 Within Normal Limits.   Digit Span Forwards 6 Within Normal Limits.   Language    Repetition-1 1/1 Within Normal Limits.   Naming-2 1/2 Impairment: Moderate.   Following written command 1/1 Within Normal Limits.   Writing a complete sentence 1/1 Within Normal Limits.   Naming-3 1/3 Impairment: Significant.   Repetition-2 2/2 Within Normal Limits.   Abstraction 1/2 Impairment: Moderate.    15-Item BNT 5/15 Impairment: -4.7 STDs below the average score based on age and education.   Repetition of Phrases 5/5 Within Normal Limits.   Verbal Agility 5/6 Impairment: Mild to Normal.   SYDBAT - Semantic Association 23/30 Impairment: Mild to Normal.   Repeat & Point - Nonfluent 10/10 Within Normal Limits.   Repeat & Point - Semantics 7/10 Impairment: Moderate.   Neurocognitive Focused Evaluation Aggregate Score(s)    MMSE 20/30 MMSE Score suggestive of moderate cognitive impairment.   MOCA 13/30 MOCA Score suggestive of moderate cognitive impairment.   Neuropsychiatric/Behavioral Focused Evaluation Assessment    BEHAV5+ 4/6 See ROS section for a full description   Laboratories:     Lab Date Value [Reference]   Myopathy/Myalgia           CPK 2021, Nov-15    125 [20 - 180 U/L]      Coagulopathy Screening   D-Dimer 2021, Nov-15    17.26 (H)      Metabolic Screening   Hemoglobin A1C External 2023, Apr-20    6.3 (H) [4.0 - 5.6 %]      Ionized Calcium 2022, Nov-11    1.36 [1.06 - 1.42 mmol/L]      Lactate, Ricki 11/06/2021  1.4 [0.5 - 2.2 mmol/L]      TSH 2021, Nov-06    1.140 [0.400 - 4.000 uIU/mL]      Cholesterol 2022, Nov-11    127 [120 - 199 mg/dL]      HDL 2022, Nov-11    70 [40 - 75 mg/dL]      Non-HDL Cholesterol 2022, Nov-11    57 [mg/dL]      Triglycerides 2022, Nov-11    50 [30 - 150 mg/dL]      Vit D, 25-Hydroxy 2022, Nov-11    63 [30 - 96 ng/mL]      Neuroendocrine/Electrolyte Screening   PTH 2023, Apr-20    102.6 (H) [9.0 - 77.0 pg/mL]      Creatinine 2022, Aug-10    0.9 [0.5 - 1.4 mg/dL]      Infectious Disease/Immunocompromised Screening   SARS-CoV-2 RNA, Amplification, Qual 2021, Dec-12    Negative      SARS-CoV2 (COVID-19) Qualitative PCR 2021, Nov-14    Not Detected [Not Detected]      Strongyloides Ab IgG 11/15/2021  Negative      HIV 1/2 Ag/Ab 2021, Nov-15    Negative           Neuroimaging:    MRI brain/head without contrast on 8/10/2022  Formal interpretation by Radiology:  Evolution of  postsurgical changes. No recurrent meningioma at the right frontal operative bed. Stable small left frontal meningioma.   There is a small focus of extra-axial hemorrhage overlying the right frontal vertex however these are late subacute blood products with no evidence of acute hemorrhage.  Independently reviewed radiological imaging by Carlos Urias MD. MPH. Behavioral Neurologist  T1: Mild generalized cortical atrophy largely consistent with age-related changes. No significant focal cortical atrophy pattern aside from postsurgical encephalomalacia of the right prefrontal cortex. Intact corpus callosum normal alignment ratio. Intact brainstem with normal volume ratio. Mild bilateral hippocampal volume loss.  T2/FLAIR: mild bilateral anterior greater than posterior periventricular capping would be changes across the midline scattered subcortical hyperintensities in the PCA/ MCA external watershed distribution. Evolution of postsurgical changes. Regional vasogenic edema and encephalomalacia of the right prefrontal cortex with cystic cavitation and microhemorrhage. Secondary changes in the bilateral and internal watershed distribution bilateral.  DWI/ADC: No Significant DWI hyperintensities/hypointensities. No ADC correlation.  SWI/GRE: Small focus of extra-axial hemorrhage overlying the right frontal vertex  Impression: : Moderate degree of right prefrontal and vasogenic edema and mild encephalomalacia status post meningioma removal. Mild to moderate bilateral hippocampal volume loss with relative sparing the bilateral cortices. Mild anterior and posterior periventricular capping with scattered subcortical internal external watershed territory hyperintensities however no significant atherosclerotic disease.     Procedures:    Electrocardiogram on 12/11/2021  Formal interpretation:  Vent. Rate : 108 BPM     Atrial Rate : 108 BPM    P-R Int : 200 ms          QRS Dur : 076 ms     QT Int : 316 ms       P-R-T Axes :  070 -10 023 degrees    QTc Int : 423 ms Sinus tachycardia Low anterior forces Abnormal ECG  Independently reviewed Electrocardiogram by Carlos Urias MD. MPH. Behavioral Neurologist  Impression: : Received ECG has no evidence of sinus node disease. HR (>=50-60). Prolonged WV interval (>0.22 s). Broad QRS complex (> 0.12 s).     Clinical Summary:     The patient is a 78-year-old right-handed female with a relevant past medical history of BC s/p surgery and hormonal therapy, DM2, HTN, HLD, hyperparathyroidism, meningioma s/p resection, who presents reporting a 4-year history of gradually progressive neurocognitive impairment.       The clinical history is suggestive of:  Memory Impairment: STM encoding impairment, LTM encoding-retrieval impairment, Amnesia of fixation  Attention Impairment: Attention, Alertness, Selective attention, Sustained attention, Shifting attention  Executive Impairment: Energization, Working Memory, Set-Shifting  Language Impairment: Language Dysfunction, Receptive Dysfunction  Visuospatial Impairment: Allocentric Spatial Processing  Motor/Coordination Impairment: Sensory motor integration, Motor weakness  Sensory Impairment: Limbic Dysfunction  Behavior Impairment: Emotional Regulation, Self-Preservation Dysregulation  Psychiatric Impairment: Neurovegetative, Signal-Noise Dysregulation, Paranoia, Fixed-False Beliefs, Hallucinations  Medical Review of Systems Impairment: Autonomic Dysfunction  iADL Impairment: [place] Instrumental Activities of Daily Living Scale  The neurological examination is significant for:  Cerebellar Dysfunction: truncal ataxia (walking)  Cortical Frontal Dysfunction: non-fluent aphasia (fluency)  Cortical Temporal Dysfunction: semantic aphasia (superordinate errors)  Cortical Temporal-Parietal Dysfunction: dysgraphia  Cortical Transcallosal Disconnection: interhemispheric motor control (interhemispheric motor control ), motor efference (motor overflow)  Executive  Impairment: serial processing, thought disorder, thought disorder, dysexecutive behavior (perseverative/stereotyped)  Motor Dysfunction: muscle atrophy  Movement Disorder (Gait): strength (difficulty rising), abnormal features (abnormal posture, initiation/inhibition, stance, speed, stride/cycle, abnormal swing, difficulty turning), gait syndrome (rigid-akinetic)  Movement Disorder (Hypokinetic): parkinsonism (diminished facial expression, tone, bradykinesia), dyskinesia (slowing, hypometria, dysrhythmia), gait imbalance (postural reflexes)  Movement Disorder (Ocular): eyelid apraxia  Movement Disorder (Speech): abnormal vocal features (volume, rate, articulation, prosody), AOS (tone), dysarthria (hypokinetic)  Pyramidal Impairment: abnormal reflexes  Sensory Dysfunction: peripheral (A? fibers)  The neurocognitive battery is significant (based on age and education) for:  Amnestic predominant multidomain major cognitive impairment  Moderate Visuospatial Impairment: visuospatial construction, simultanagnosia, prosopagnosia, math, pareidolia.  Moderate Memory Impairment: She scored >3 standard deviations below the norm on at least one measure. She had difficulty with attention, encoding, recall. She had a steep incomplete learning curve. Her free recall was significantly impaired by time.  Moderate Executive Impairment: She scored >2 standard deviations below the norm on at least one measure. She had difficulty with semantic fluency, lexical fluency, working memory.  Moderate Language Impairment: She scored >3 standard deviations below the norm on at least one measure. She had difficulty with naming, abstract, agility, semantic association, Semantics.  Very Mild Attention Impairment: orientation.  MMSE 20/30: MMSE Score suggestive of moderate cognitive impairment.  MOCA 13/30: MOCA Score suggestive of moderate cognitive impairment.  BEHAV5+ 4/6: See ROS section for a full description  The neurologically relevant  imaging is significant for  MRI brain/head without contrast (8/10/2022): Moderate degree of right prefrontal and vasogenic edema and mild encephalomalacia status post meningioma removal. Mild to moderate bilateral hippocampal volume loss with relative sparing the bilateral cortices. Mild anterior and posterior periventricular capping with scattered subcortical internal external watershed territory hyperintensities however no significant atherosclerotic disease.        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     The patient's clinical presentation meets the criteria for Dementia (Luis Carlos, et al. 2011 Alzheimer's & Dementia).     Concern regarding an intraindividual change in cognition diagnosed through a combination of history and objective cognitive assessment  Impairment in two or more cognitive domains  Interference with independence in functional abilities.  Represents a decline from previous levels of functioning.  Not explained by delirium or major psychiatric disorder     The patient's clinical syndrome is best described as Dementia with Lewy Bodies (DLB) (McBillith et al., Neurology 2005).  A progressive cognitive decline that interferes with normal social or occupational function.  Prominent or persistent memory impairment may not necessarily occur in the early stages but is usually evident with progression.  Deficits on tests of attention, executive function, and visuospatial ability may be especially prominent.  Fluctuating cognition with pronounced variations in attention and alertness.  Recurrent visual hallucinations.  Spontaneous motor features of parkinsonism.  REM sleep behavior disorder.  Severe neuroleptic sensitivity.        At present, all neurodegenerative diseases can only be diagnosed with 100% certainty  through a brain autopsy. The suspected neuropathology underlying the patient's neurocognitive impairment is likely a mixture of pathologies (Alzheimer's Disease Related Pathology, Lewy body disease/alpha-synucleinopathy, Vascular Contributions to Cognitive Impairment and Dementia).  There are no plasma protein biomarkers available on record.  There are no CSF protein biomarkers available on record.  There are no dermatological protein biomarkers available on record.  There is no relevant genetic biomarkers available on record.     The patient presents with a history of at least four years of progressive amnestic-predominant multi-domain major cognitive impairment. This impairment is significant enough to interfere with instrumental activities of daily living and is consistent with a diagnosis of dementia. The clinical examination aligns with fluctuating arousal levels, idiopathic progressive parkinsonism, and hallucinations, all of which are sufficient for a diagnosis of Lewy body dementia. We have discussed the benefits of additional diagnostic testing, and the family wishes to proceed. We recommend starting Belsomra at 10 mg for insomnia and confirming the diagnosis through a skin biopsy.     The observations made above, were discussed with the patient and their supporting historian(s) (daughter). We have discussed the additional diagnostic(s) and/or managenent below.     Prior Authorization Statement(s) Suvorexant (as well as other orexin inhibitors) is extremely effective, well-tolerated in those over 65 with minimal adverse reactions in clinical trials, and non-addictive. In clinical trials it was shown to improve total sleep time, sleep latency, waking after sleep onset, and quality of sleep, and the only adverse reaction that clearly  from placebo was morning grogginess (Suvorexant 7%, placebo 3%). If insurance requires a prior authorization, guidelines established by the American Academy of Sleep  Medicine (AASM) should be sufficient justification for using Suvorexant. Per the AASM guidelines (Perri et al, J Clin Sleep Med, 2017;13(2):307-349), Suvorexant is the most reasonable choice for the treatment of the insomnia in those over 65, particularly in the setting of cognitive impairment. Benzodiazepines (e.g., Triazolam, Temazepam), Z drugs (e.g., Zolpidem, Eszopiclone), and anticholinergic medications (e.g., Doxepin) are not recommended to treat insomnia in those over 65 and are listed on the AGS Beers Criteria (AGS Beers Criteria Update Expert Panel, J Am Geriatr Soc, 2019;67(4):674-694). These medications increase the risk for falls and worsen cognition. Other medications, including Zaleplon and Ramelteon are only indicated for sleep-onset insomnia. Trazodone is not recommended by AASM for the treatment of insomnia.     Care Management Plan:    #Diagnostic Screening for reversible forms of neurocognitive disorders  We recommend screening for reversible causes of neurocognitive impairment with plasma laboratories  We have ordered plasma CBC, CMP, Vitamins (B1, B9, B12), Mg, RPR, MMA, TSH, T4, Nfl  #Diagnostic Screening for cardiac conduction impairment before initiation of acetylcholinesterase inhibitor medication.  We have placed an order for a echocardiogram  #Diagnostic Screening for measurable forms of neurodegenerative pathology.  We have discussed opportunities for biomarker testing (CSF Wiley biomarkers, IDEAs Amyloid-PET, Syn-One skin biopsy).  We scheduled a skin biopsy for assessment of Syn-One alpha-synuclein related pathology  #Optimize Neurocognitive Impairment and Quality  We have discussed the MIND Diet and other lifestyle behavior that may help maintain brain health.  We have provided written/digital reading material  Next appt start donepezil  #Optimize Behavioral Management and Quality.  No indication for memantine at this time  #Optimize Sleep Hygiene and Quality  We discussed and  recommended additional diagnostic/management of sleep disorder to optimize brain health and longevity.  Start Belsomra 10 mg at night.  Continue Seroquel 75 mg HS  #Optimize Cerebrovascular Health.  The patient has a documented history of hyperlipidemia and/or hypercholesteremia with long-term complications such as cerebrovascular disease, peripheral vascular disease, and/or aortic atherosclerosis. Collectively these risk factors may contribute to cerebral atherosclerosis, and cerebral hypoperfusion compounded neurocognitive disorder. We discussed maximizing cerebrovascular-related medical therapy, including but not limited to cholesterol medications and antiplatelet agents. We have discussed the value of aggressively controlling vascular risk factors like hypertension, hyperlipidemia, and Diabetes SBP<130, LDL<100, and A1C<7.0. We discussed the need to optimize lifestyle choices, including a heart-healthy diet (e.g., Mediterranean or DASH), increased cardiovascular exercise (goal 150 minutes of moderate-intensity per week), and staying cognitively and socially active.  Continue eliquis 5 mg  Continue lipitor 40 mg  #Behavioral/Environmental Strategies  We recommend engaging in activities that stimulate cognitively and socially while avoiding excessive stimulation and fatigue in overwhelmingly complex situations.  We recommend integrating routine and schedule into your daily life. https://www.alzheimersproject.org/news/the-importance-of-routine-and-familiarity-to-persons-with-dementia/  #Health Maintenance/Lifestyle Advice  We have discussed the value in aggressively controlling vascular risk factors like hypertension, hyperlipidemia, and Diabetes SBP<130, LDL<100, A1C<7.0.  We discussed the need to optimize lifestyle choices including a heart-healthy diet (e.g., Mediterranean or DASH), increased cardiovascular exercise (goal 150 minutes of moderate-intensity per week), and stay cognitively and socially active.  We  "recommend the MIND diet, a combination of two healthy diets: the Mediterranean diet and the DASH (Dietary Approaches to Stop Hypertension) diet, and includes a variety of brain-friendly foods to optimize cognitive health and longevity.  #Support  We all need support sometimes. Get easy access to local resources, community programs, and services. https://www.communityresourcefinder.org/  Learn more about Cognitive Impairment in [place]: https://www.alz.org/professionals/public-health/state-overview/[place]  #Safety  The Alzheimer's Association administers the nationwide "Safe Return" program with identification bracelets, necklaces, or clothing tags and 24-hour assistance. More information is available online at https://www.alz.org/help-support/caregiving/safety/medicalert-with-24-7-wandering-support  #Follow up:  Follow-up as needed.    Thank you for allowing us to participate in the care of your patient. Please do not hesitate to contact us with any questions or concerns.     It was a pleasure seeing The patient and we look forward to seeing them at their follow-up visit.     This note is dictated on M*Modal Fluency Direct word recognition program. There are word recognition mistakes that are occasionally missed on review.         Scheduled Follow-up :  Future Appointments   Date Time Provider Department Center   9/18/2023 10:00 AM Alexy Hines MD [facility] [id number] [facility]   9/19/2023  1:30 PM [facility]   9/19/2023  3:40 PM Moiz Hutchison,  [facility] NEUROSC [facility]   9/21/2023  3:00 PM [facility] [place]       After Visit Medication List :     Medication List            Accurate as of September 13, 2023  8:15 AM. If you have any questions, ask your nurse or doctor.                CONTINUE taking these medications      alcohol swabs Padm  Commonly known as: BD ALCOHOL SWABS  Apply 1 each topically once daily.     amLODIPine 5 MG tablet  Commonly known as: " "NORVASC  Take 1 tablet (5 mg total) by mouth once daily.     anastrozole 1 mg Tab  Commonly known as: ARIMIDEX  TAKE 1 TABLET EVERY DAY     atorvastatin 40 MG tablet  Commonly known as: LIPITOR  TAKE 1 TABLET EVERY DAY     BD INSULIN SYRINGE ULTRA-FINE 1 mL 31 gauge x 5/16 Syrg  Generic drug: insulin syringe-needle U-100  TO USE  TWICE DAILY WITH  INSULIN     BD VEO INSULIN SYRINGE UF 0.3 mL 31 gauge x 15/64" Syrg  Generic drug: insulin syringe-needle U-100  USE  TO INJECT TWICE DAILY     blood sugar diagnostic Strp  Commonly known as: ACCU-CHEK GUIDE TEST STRIPS  1 strip by Misc.(Non-Drug; Combo Route) route 3 (three) times daily.     blood-glucose meter Misc  Commonly known as: ACCU-CHEK DOMENICO PLUS METER  USE AS DIRECTED     carvediloL 12.5 MG tablet  Commonly known as: COREG  TAKE 1 TABLET TWICE DAILY WITH MEALS     CENTRUM SILVER ORAL     cephALEXin 500 MG capsule  Commonly known as: KEFLEX  Take 1 capsule (500 mg total) by mouth every 8 (eight) hours. for 5 days     cyanocobalamin-cobamamide 5,000-100 mcg Subl  Commonly known as: B-12 PLUS  Take 5000 mcg daily.     DROPLET PEN NEEDLE 32 gauge x 5/32" Ndle  Generic drug: pen needle, diabetic  USE TO INJECT ONE TIME DAILY     ELIQUIS 5 mg Tab  Generic drug: apixaban  TAKE 1 TABLET TWICE DAILY     famotidine 20 MG tablet  Commonly known as: PEPCID     furosemide 20 MG tablet  Commonly known as: LASIX  Take 1 tablet (20 mg total) by mouth daily as needed (leg swelling).     insulin glargine 100 units/mL SubQ pen  Commonly known as: LANTUS SOLOSTAR U-100 INSULIN  Inject 18 Units into the skin once daily.     ketoconazole 2 % cream  Commonly known as: NIZORAL  Apply topically once daily. Apply daily to affected toenail for 6-12 months     OZEMPIC 1 mg/dose (4 mg/3 mL)  Generic drug: semaglutide  INJECT 1MG (0.75 ML) UNDER THE SKIN EVERY 7 DAYS.     QUEtiapine 50 MG tablet  Commonly known as: SEROQUEL  TAKE 1 AND 1/2 TABLETS EVERY EVENING     valACYclovir 1000 MG " tablet  Commonly known as: VALTREX  Take 1 tablet (1,000 mg total) by mouth 3 (three) times daily. for 7 days              Signing Physician:  Carlos Silva MD    Billing:        -----------------------------------------------------------------------------    I spent a total of 105 minutes (time-in: 08:00 AM; time-out: 09:45 AM) on 2023-09-13, in person face-to-face with the patient and caregiver(s), >50% of that time was spent counseling regarding the symptoms, treatment plan, risks, therapeutic options, lifestyle modifications, and/or safety issues for the diagnoses above.    10/14 Review of Systems completed and is negative except as stated above in HPI (Systems reviewed: Const, Eyes, ENT, Resp, CV, GI, , MSK, Skin, Neuro)    I reviewed previous labs for a total of 5 minutes on 2023-09-13. This is directly related to the face-to-face encounter. Review of previous labs was performed all negative except as stated above in HPI    I reviewed previous diagnostic testing for a total of 5 minutes on 2023-09-13. This is directly related to the face-to-face encounter. A review of previous diagnostic testing was performed was noted to be within normal limits except as is stated above in HPI    I performed a neurobehavioral status examination that included a clinical assessment of thinking, reasoning, and judgment. Please see above HPI and ROS for full details. This exam was performed on 2023-09-13 and included 11 minutes spent on direct face-to-face clinical observation and interview with the patient and 24 minutes spent interpreting test results and preparing the report. The total time of 35 minutes spent on the neurobehavioral status examination is not included in the time spent on evaluation and management coding.    I performed a neuropsychological evaluation that included the application of a series of standardized neurocognitive tests. Please see the informal neuropsychological assessment above for full details.  This evaluation was performed on 2023-09-13 and included 15 minutes spent on direct face-to-face clinical standardized test administration with the patient and 19 minutes spent on interpreting standardized test results, integrating patient data into a treatment plan, and providing feedback to the patient and caregiver. The total time of 34 minutes spent on the neuropsychological evaluation is not included in the time spent on evaluation and management coding.    Total Billing time spent on encounter/documentation for this patient's evaluation and management, not including the neurobehavioral status examination and neuropsychological evaluation: 89 minutes.

## 2023-09-14 LAB — TREPONEMA PALLIDUM IGG+IGM AB [PRESENCE] IN SERUM OR PLASMA BY IMMUNOASSAY: NONREACTIVE

## 2023-09-15 LAB — NEUROFILAMENT LIGHT CHAIN, PLASMA: 29.9 PG/ML

## 2023-09-16 DIAGNOSIS — G47.00 INSOMNIA, UNSPECIFIED TYPE: ICD-10-CM

## 2023-09-18 ENCOUNTER — TELEPHONE (OUTPATIENT)
Dept: NEUROLOGY | Facility: CLINIC | Age: 78
End: 2023-09-18
Payer: MEDICARE

## 2023-09-18 ENCOUNTER — OFFICE VISIT (OUTPATIENT)
Dept: HEMATOLOGY/ONCOLOGY | Facility: CLINIC | Age: 78
End: 2023-09-18
Payer: MEDICARE

## 2023-09-18 VITALS
HEART RATE: 79 BPM | DIASTOLIC BLOOD PRESSURE: 79 MMHG | SYSTOLIC BLOOD PRESSURE: 163 MMHG | TEMPERATURE: 98 F | BODY MASS INDEX: 26.17 KG/M2 | WEIGHT: 142.19 LBS | OXYGEN SATURATION: 99 % | HEIGHT: 62 IN

## 2023-09-18 DIAGNOSIS — Z79.811 PROPHYLACTIC USE OF ANASTROZOLE (ARIMIDEX): ICD-10-CM

## 2023-09-18 DIAGNOSIS — C50.612 CARCINOMA OF AXILLARY TAIL OF LEFT BREAST IN FEMALE, ESTROGEN RECEPTOR POSITIVE: Primary | ICD-10-CM

## 2023-09-18 DIAGNOSIS — Z17.0 CARCINOMA OF AXILLARY TAIL OF LEFT BREAST IN FEMALE, ESTROGEN RECEPTOR POSITIVE: Primary | ICD-10-CM

## 2023-09-18 LAB
METHYLMALONATE SERPL-SCNC: 0.27 UMOL/L
VIT B1 BLD-MCNC: 74 UG/L (ref 38–122)

## 2023-09-18 PROCEDURE — 99999 PR PBB SHADOW E&M-EST. PATIENT-LVL IV: ICD-10-PCS | Mod: PBBFAC,HCNC,, | Performed by: INTERNAL MEDICINE

## 2023-09-18 PROCEDURE — 1157F PR ADVANCE CARE PLAN OR EQUIV PRESENT IN MEDICAL RECORD: ICD-10-PCS | Mod: HCNC,CPTII,S$GLB, | Performed by: INTERNAL MEDICINE

## 2023-09-18 PROCEDURE — 1160F PR REVIEW ALL MEDS BY PRESCRIBER/CLIN PHARMACIST DOCUMENTED: ICD-10-PCS | Mod: HCNC,CPTII,S$GLB, | Performed by: INTERNAL MEDICINE

## 2023-09-18 PROCEDURE — 99213 OFFICE O/P EST LOW 20 MIN: CPT | Mod: HCNC,S$GLB,, | Performed by: INTERNAL MEDICINE

## 2023-09-18 PROCEDURE — 1159F MED LIST DOCD IN RCRD: CPT | Mod: HCNC,CPTII,S$GLB, | Performed by: INTERNAL MEDICINE

## 2023-09-18 PROCEDURE — 1101F PR PT FALLS ASSESS DOC 0-1 FALLS W/OUT INJ PAST YR: ICD-10-PCS | Mod: HCNC,CPTII,S$GLB, | Performed by: INTERNAL MEDICINE

## 2023-09-18 PROCEDURE — 1126F AMNT PAIN NOTED NONE PRSNT: CPT | Mod: HCNC,CPTII,S$GLB, | Performed by: INTERNAL MEDICINE

## 2023-09-18 PROCEDURE — 3288F PR FALLS RISK ASSESSMENT DOCUMENTED: ICD-10-PCS | Mod: HCNC,CPTII,S$GLB, | Performed by: INTERNAL MEDICINE

## 2023-09-18 PROCEDURE — 1160F RVW MEDS BY RX/DR IN RCRD: CPT | Mod: HCNC,CPTII,S$GLB, | Performed by: INTERNAL MEDICINE

## 2023-09-18 PROCEDURE — 3072F PR LOW RISK FOR RETINOPATHY: ICD-10-PCS | Mod: HCNC,CPTII,S$GLB, | Performed by: INTERNAL MEDICINE

## 2023-09-18 PROCEDURE — 3078F PR MOST RECENT DIASTOLIC BLOOD PRESSURE < 80 MM HG: ICD-10-PCS | Mod: HCNC,CPTII,S$GLB, | Performed by: INTERNAL MEDICINE

## 2023-09-18 PROCEDURE — 3072F LOW RISK FOR RETINOPATHY: CPT | Mod: HCNC,CPTII,S$GLB, | Performed by: INTERNAL MEDICINE

## 2023-09-18 PROCEDURE — 99213 PR OFFICE/OUTPT VISIT, EST, LEVL III, 20-29 MIN: ICD-10-PCS | Mod: HCNC,S$GLB,, | Performed by: INTERNAL MEDICINE

## 2023-09-18 PROCEDURE — 1157F ADVNC CARE PLAN IN RCRD: CPT | Mod: HCNC,CPTII,S$GLB, | Performed by: INTERNAL MEDICINE

## 2023-09-18 PROCEDURE — 3078F DIAST BP <80 MM HG: CPT | Mod: HCNC,CPTII,S$GLB, | Performed by: INTERNAL MEDICINE

## 2023-09-18 PROCEDURE — 1101F PT FALLS ASSESS-DOCD LE1/YR: CPT | Mod: HCNC,CPTII,S$GLB, | Performed by: INTERNAL MEDICINE

## 2023-09-18 PROCEDURE — 1126F PR PAIN SEVERITY QUANTIFIED, NO PAIN PRESENT: ICD-10-PCS | Mod: HCNC,CPTII,S$GLB, | Performed by: INTERNAL MEDICINE

## 2023-09-18 PROCEDURE — 3077F SYST BP >= 140 MM HG: CPT | Mod: HCNC,CPTII,S$GLB, | Performed by: INTERNAL MEDICINE

## 2023-09-18 PROCEDURE — 3077F PR MOST RECENT SYSTOLIC BLOOD PRESSURE >= 140 MM HG: ICD-10-PCS | Mod: HCNC,CPTII,S$GLB, | Performed by: INTERNAL MEDICINE

## 2023-09-18 PROCEDURE — 3288F FALL RISK ASSESSMENT DOCD: CPT | Mod: HCNC,CPTII,S$GLB, | Performed by: INTERNAL MEDICINE

## 2023-09-18 PROCEDURE — 1159F PR MEDICATION LIST DOCUMENTED IN MEDICAL RECORD: ICD-10-PCS | Mod: HCNC,CPTII,S$GLB, | Performed by: INTERNAL MEDICINE

## 2023-09-18 PROCEDURE — 99999 PR PBB SHADOW E&M-EST. PATIENT-LVL IV: CPT | Mod: PBBFAC,HCNC,, | Performed by: INTERNAL MEDICINE

## 2023-09-18 RX ORDER — QUETIAPINE FUMARATE 50 MG/1
TABLET, FILM COATED ORAL
Qty: 135 TABLET | Refills: 3 | Status: SHIPPED | OUTPATIENT
Start: 2023-09-18

## 2023-09-18 NOTE — TELEPHONE ENCOUNTER
Call placed to patient and family regarding Skin Biopsy scheduled for Friday.  Instructed to stop all blood thinning medication including ASA, Ibuprofen, Mobic/Meloxican, Eliquis, and Naproxen and to wear comfortable clothing.  Procedure explained.  All questions answered.       Messaged pt and selected option to be notified if not read by tomorrow

## 2023-09-18 NOTE — TELEPHONE ENCOUNTER
Patient's daughter left voicemail for  SW with concern for patient's medication.    She states prescription for sleeping medication was sent to [facility]. Asked if prescription could be sent to [facility] pharmacy or to [facility] so patient could be able to get it when she goes to those stores.    ---  PARVIN called patient's daughter to discuss request and clarify where the patient's medication should be sent. SW left voicemail.

## 2023-09-18 NOTE — PROGRESS NOTES
Subjective:       Patient ID: Alisia Gusman is a 78 y.o. female.    Chief Complaint: No chief complaint on file.      HPI     Mrs. Gusman presents to the clinic for evaluation.  I had last seen her in mid May 2023.      Briefly, she is an 78-year-old  female with a remote history of left sided breast cancer treated in 1993 with a lumpectomy, radiation therapy, and 5 years of tamoxifen.  Apparently the size of her tumor in 1993 was 4 mm.  She completed her 5 years of tamoxifen and she was followed expectantly.      An in breast recurrence was noted in January 2020.  A core biopsy on 01/15/2020 showed an infiltrating ductal carcinoma that was ER positive UT negative and HER 2 negative.  On 02/20/2020 she underwent a left mastectomy and a sentinel lymph node biopsy.  The sentinel lymph node was negative.  On the mastectomy specimen there was a 2 mm area of DCIS and there was a 6 mm area of invasive cancer.  Resection margins were clear.  She has made an uneventful recovery and was started on anastrozole in early April 2020.    Her latest mammogram in December 2022 was read as BI-RADS I and a one year follow-up was recommended.      Review of Systems    Overall she feels well.  She states that she has tolerated the anastrozole well so far and has not experienced any side effects.  She does mention that she occasionally experiences sharp pains in the left chest wall.  ECOG PS is 1.  She denies any anxiety, depression, easy bruising, fevers, chills, night  sweats, weight loss, nausea, vomiting, diarrhea, constipation, diplopia, chest pain, palpitations, shortness of breath, breast pain, abdominal pain, extremity pain, or difficulty ambulating.  The remainder of the ten-point ROS, including general, skin, lymph, H/N, cardiorespiratory, GI, , Neuro, Endocrine, and psychiatric is negative.     Objective:      Physical Exam    She is alert, oriented to time, place, person, pleasant, well nourished, in no  acute physical distress.                                  VITAL SIGNS:  Reviewed                                      HEENT:  Normal.  There are no nasal, oral, lip, gingival, auricular, lid,    or conjunctival lesions.  Mucosae are moist and pink, and there is no        thrush.  Pupils are equal, reactive to light and accommodation.              Extraocular muscle movements are intact.  Dentition is fair.  There is no frontal or maxillary tenderness.                                     NECK:  Supple without JVD, adenopathy, or thyromegaly.                       LUNGS:  Clear to auscultation without wheezing, rales, or rhonchi.           CARDIOVASCULAR:  Reveals an S1, S2, no murmurs, no rubs, no gallops.         ABDOMEN:  Soft, nontender, without organomegaly.  Bowel sounds are    present.                                                                     EXTREMITIES:  No cyanosis, clubbing, or edema.                               BREASTS:  She is status post left mastectomy with a well-healed incision.    There are no masses in the right breast.                                        LYMPHATIC:  There is no cervical, axillary, or supraclavicular adenopathy.   SKIN:  Warm and moist, without petechiae, rashes, induration, or ecchymoses.           NEUROLOGIC:  DTRs are 0-1+ bilaterally, symmetrical, motor function is 5/5,  and cranial nerves are  within normal limits.    Assessment:       1. 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 Prophylactic use of anastrozole (Arimidex)      Plan:         I had a long discussion with Mrs. Gusman.  I recommended that she remain on anastrozole which she should take for a minimum of 5 years, through the end of March 2025.  I will see her again in 4 months.  Her mammogram will be repeated in December and will be checked by Dr. Nieves.  Her multiple questions were answered to her satisfaction.        Route Chart for Scheduling    Med Onc  Chart Routing      Follow up with physician 4 months.   Follow up with KATIE    Infusion scheduling note    Injection scheduling note    Labs    Imaging    Pharmacy appointment    Other referrals

## 2023-09-19 ENCOUNTER — HOSPITAL ENCOUNTER (OUTPATIENT)
Dept: RADIOLOGY | Facility: HOSPITAL | Age: 78
Discharge: HOME | End: 2023-09-19
Attending: NEUROLOGICAL SURGERY
Payer: MEDICARE

## 2023-09-19 ENCOUNTER — PATIENT MESSAGE (OUTPATIENT)
Dept: PRIMARY CARE CLINIC | Facility: CLINIC | Age: 78
End: 2023-09-19
Payer: MEDICARE

## 2023-09-19 ENCOUNTER — OFFICE VISIT (OUTPATIENT)
Dept: NEUROSURGERY | Facility: CLINIC | Age: 78
End: 2023-09-19
Payer: MEDICARE

## 2023-09-19 VITALS
SYSTOLIC BLOOD PRESSURE: 167 MMHG | DIASTOLIC BLOOD PRESSURE: 91 MMHG | HEIGHT: 62 IN | WEIGHT: 142.19 LBS | BODY MASS INDEX: 26.17 KG/M2 | HEART RATE: 88 BPM

## 2023-09-19 DIAGNOSIS — Z98.890 S/P CRANIOTOMY: Primary | ICD-10-CM

## 2023-09-19 DIAGNOSIS — Z98.890 S/P CRANIOTOMY: ICD-10-CM

## 2023-09-19 DIAGNOSIS — Z86.018 HISTORY OF MENINGIOMA: ICD-10-CM

## 2023-09-19 DIAGNOSIS — D32.9 MENINGIOMA: ICD-10-CM

## 2023-09-19 LAB — PHOSPHO-TAU (181P): 1.09 PG/ML (ref 0–0.97)

## 2023-09-19 PROCEDURE — 70553 MRI BRAIN STEM W/O & W/DYE: CPT | Mod: 26,HCNC,, | Performed by: STUDENT IN AN ORGANIZED HEALTH CARE EDUCATION/TRAINING PROGRAM

## 2023-09-19 PROCEDURE — 1101F PT FALLS ASSESS-DOCD LE1/YR: CPT | Mod: HCNC,CPTII,S$GLB, | Performed by: NEUROLOGICAL SURGERY

## 2023-09-19 PROCEDURE — 3080F DIAST BP >= 90 MM HG: CPT | Mod: HCNC,CPTII,S$GLB, | Performed by: NEUROLOGICAL SURGERY

## 2023-09-19 PROCEDURE — 1126F AMNT PAIN NOTED NONE PRSNT: CPT | Mod: HCNC,CPTII,S$GLB, | Performed by: NEUROLOGICAL SURGERY

## 2023-09-19 PROCEDURE — 99213 PR OFFICE/OUTPT VISIT, EST, LEVL III, 20-29 MIN: ICD-10-PCS | Mod: HCNC,S$GLB,, | Performed by: NEUROLOGICAL SURGERY

## 2023-09-19 PROCEDURE — 3288F PR FALLS RISK ASSESSMENT DOCUMENTED: ICD-10-PCS | Mod: HCNC,CPTII,S$GLB, | Performed by: NEUROLOGICAL SURGERY

## 2023-09-19 PROCEDURE — 1160F PR REVIEW ALL MEDS BY PRESCRIBER/CLIN PHARMACIST DOCUMENTED: ICD-10-PCS | Mod: HCNC,CPTII,S$GLB, | Performed by: NEUROLOGICAL SURGERY

## 2023-09-19 PROCEDURE — 99213 OFFICE O/P EST LOW 20 MIN: CPT | Mod: HCNC,S$GLB,, | Performed by: NEUROLOGICAL SURGERY

## 2023-09-19 PROCEDURE — 3077F PR MOST RECENT SYSTOLIC BLOOD PRESSURE >= 140 MM HG: ICD-10-PCS | Mod: HCNC,CPTII,S$GLB, | Performed by: NEUROLOGICAL SURGERY

## 2023-09-19 PROCEDURE — 1159F PR MEDICATION LIST DOCUMENTED IN MEDICAL RECORD: ICD-10-PCS | Mod: HCNC,CPTII,S$GLB, | Performed by: NEUROLOGICAL SURGERY

## 2023-09-19 PROCEDURE — 1157F ADVNC CARE PLAN IN RCRD: CPT | Mod: HCNC,CPTII,S$GLB, | Performed by: NEUROLOGICAL SURGERY

## 2023-09-19 PROCEDURE — 1159F MED LIST DOCD IN RCRD: CPT | Mod: HCNC,CPTII,S$GLB, | Performed by: NEUROLOGICAL SURGERY

## 2023-09-19 PROCEDURE — 3072F PR LOW RISK FOR RETINOPATHY: ICD-10-PCS | Mod: HCNC,CPTII,S$GLB, | Performed by: NEUROLOGICAL SURGERY

## 2023-09-19 PROCEDURE — 3072F LOW RISK FOR RETINOPATHY: CPT | Mod: HCNC,CPTII,S$GLB, | Performed by: NEUROLOGICAL SURGERY

## 2023-09-19 PROCEDURE — 99999 PR PBB SHADOW E&M-EST. PATIENT-LVL IV: CPT | Mod: PBBFAC,HCNC,, | Performed by: NEUROLOGICAL SURGERY

## 2023-09-19 PROCEDURE — 99999 PR PBB SHADOW E&M-EST. PATIENT-LVL IV: ICD-10-PCS | Mod: PBBFAC,HCNC,, | Performed by: NEUROLOGICAL SURGERY

## 2023-09-19 PROCEDURE — 1126F PR PAIN SEVERITY QUANTIFIED, NO PAIN PRESENT: ICD-10-PCS | Mod: HCNC,CPTII,S$GLB, | Performed by: NEUROLOGICAL SURGERY

## 2023-09-19 PROCEDURE — 1157F PR ADVANCE CARE PLAN OR EQUIV PRESENT IN MEDICAL RECORD: ICD-10-PCS | Mod: HCNC,CPTII,S$GLB, | Performed by: NEUROLOGICAL SURGERY

## 2023-09-19 PROCEDURE — 3080F PR MOST RECENT DIASTOLIC BLOOD PRESSURE >= 90 MM HG: ICD-10-PCS | Mod: HCNC,CPTII,S$GLB, | Performed by: NEUROLOGICAL SURGERY

## 2023-09-19 PROCEDURE — 70553 MRI BRAIN W WO CONTRAST: ICD-10-PCS | Mod: 26,HCNC,, | Performed by: STUDENT IN AN ORGANIZED HEALTH CARE EDUCATION/TRAINING PROGRAM

## 2023-09-19 PROCEDURE — A9585 GADOBUTROL INJECTION: HCPCS | Mod: HCNC | Performed by: NEUROLOGICAL SURGERY

## 2023-09-19 PROCEDURE — 70553 MRI BRAIN STEM W/O & W/DYE: CPT | Mod: TC,HCNC

## 2023-09-19 PROCEDURE — 3288F FALL RISK ASSESSMENT DOCD: CPT | Mod: HCNC,CPTII,S$GLB, | Performed by: NEUROLOGICAL SURGERY

## 2023-09-19 PROCEDURE — 1101F PR PT FALLS ASSESS DOC 0-1 FALLS W/OUT INJ PAST YR: ICD-10-PCS | Mod: HCNC,CPTII,S$GLB, | Performed by: NEUROLOGICAL SURGERY

## 2023-09-19 PROCEDURE — 25500020 PHARM REV CODE 255: Mod: HCNC | Performed by: NEUROLOGICAL SURGERY

## 2023-09-19 PROCEDURE — 3077F SYST BP >= 140 MM HG: CPT | Mod: HCNC,CPTII,S$GLB, | Performed by: NEUROLOGICAL SURGERY

## 2023-09-19 PROCEDURE — 1160F RVW MEDS BY RX/DR IN RCRD: CPT | Mod: HCNC,CPTII,S$GLB, | Performed by: NEUROLOGICAL SURGERY

## 2023-09-19 RX ORDER — GADOBUTROL 604.72 MG/ML
7 INJECTION INTRAVENOUS
Status: COMPLETED | OUTPATIENT
Start: 2023-09-19 | End: 2023-09-19

## 2023-09-19 RX ADMIN — GADOBUTROL 7 ML: 604.72 INJECTION INTRAVENOUS at 02:09

## 2023-09-19 NOTE — TELEPHONE ENCOUNTER
SW called patient's daughter to inquire where she would prefer the prescirption would like to be sent. PARVIN left voicemail with inquiry and provided call back number.

## 2023-09-19 NOTE — PROGRESS NOTES
CHIEF COMPLAINT:  Annual surveillance    INTERVAL HISTORY (9/19/23):  Annual post resection surveillance of right frontal meningioma with small left frontal.  She is doing well without any new issues.  New MRI remains stable.    INTERVAL HISTORY (8/16/22):  9m postop routine f/u.  Denies any HA, seizures, change in behavior or thinking, vision change, or sensory motor change.  Wound healed with hair regrowth.    HPI:    Alisia Gusman is a 78 y.o.-year-old female who presents today for post-operative follow-up s/p right frontal crani for gross total resection of meningioma on 11/15/21. Doing well from neuro standpoint.  Remains mildly confused but no focal deficits.  P/w daughter.  Wound has healed well.  No HA, seizures, or sensory motor changes. She developed DVT/PE postop and was placed on eliquis.      ROS:  As stated in the above HPI    PE:  Vitals:    09/19/23 1509   BP: (!) 167/91   Pulse: 88       AAOX3  NAD  CN 2-12 intact     Strength:      Deltoids Biceps Triceps Wrist Ext. Wrist Flex. Hand    RUE 5 5 5 5 5 5   LUE 5 5 5 5 5 5    Hip Flex. Knee Flex. Knee Ext. Dorsi Flex Plantar Flex EHL   RLE 5 5 5 5 5 5   LLE 5 5 5 5 5 5     Sensation:  Intact to light touch (All 4 extremities)    Gait:  normal    Right frontal  incision:  Healed well, hair grown back, no signs of infx    Cranial plate palpable beneath skin but no breakdown or tenderness    IMAGING:  All imaging reviewed by me.    [facility], 9/19/23:  1. No recurrence of right frontal meningioma  2. Stable small left frontal meningioma    [facility], 8/10/22:  1. No recurrence of right frontal meningioma  2. Stable small left frontal meningioma    Postop MRI - GTR    ASSESSMENT:   Problem List Items Addressed This Visit          Neuro    S/P craniotomy - Primary    Relevant Orders    MRI Brain W WO Contrast       Oncology    Meningioma    Relevant Orders    MRI Brain W WO Contrast     S/p right frontal crani for gross total resection of meningioma (final path  WHO grade I).  Continues to do well.  No issues or neuro deficits. MRI does not show any recurrence and stable small left frontal meningioma.  Continue surveillance.    PLAN:   - RTC in 1yr with [facility]    Time spent on this encounter: 20 minutes. This includes face-to-face time and non-face to face time preparing to see the patient (eg, review of tests), obtaining and/or reviewing separately obtained history, documenting clinical information in the electronic or other health record, independently interpreting results and communicating results to the patient/family/caregiver, or care coordinator.

## 2023-09-19 NOTE — TELEPHONE ENCOUNTER
Called pt's daughter Estrellita as third attempt  LVM  Called aliza as fourth attempt  LVM  Called pt's mobile again- no answer  Tried her home number  Left detailed voicemail  Tried her other mobile number  Spoke to son   Asked him to pls contact her and have her call us   He said he will, but he thinks she wont be able to make Friday appt  I set a reminder for tomorrow to make sure she has called us back for potential r/s

## 2023-09-20 ENCOUNTER — TELEPHONE (OUTPATIENT)
Dept: NEUROLOGY | Facility: CLINIC | Age: 78
End: 2023-09-20
Payer: MEDICARE

## 2023-09-20 NOTE — TELEPHONE ENCOUNTER
Due to her dementia, unfortunately cannot send Lyft per our [employer] policy. She will need to have transportation.

## 2023-09-20 NOTE — TELEPHONE ENCOUNTER
SW called pt to instruct her to call the number on the back of her insurance card to inquire if her specific insurance plan provides transportation assistance to non emergent medical appointments.    PARVIN provided a number [phone] to talk with [employer] to inquire about setting up a ride for her appointments.    PARVIN provided callback number if patient has any further questions.

## 2023-09-20 NOTE — TELEPHONE ENCOUNTER
----- Message from Mari Cosme MA sent at 9/19/2023  2:28 PM CDT -----  Have they reached out to cancel Friday or get instructions?

## 2023-09-20 NOTE — TELEPHONE ENCOUNTER
PARVIN called pt's daughter to instruct her to call the number on the back of patient's insurance card to inquire if her specific insurance plan provides transportation assistance to non emergent medical appointments.     PARVIN provided a number [phone] to talk with [employer] to inquire about setting up a ride for patient's appointments.     PARVIN provided callback number if family has any further questions.

## 2023-09-20 NOTE — TELEPHONE ENCOUNTER
Called pt to see if she needs to cancel for Friday per what her son told me yesterday, and if not, needs instructions for prep for SYN One  Spoke w/ pt    Rescheduled appt    She asked if we have any resources for rides covered by insurance  I told her I will check with social work about ride resources, and that for her appt tomorrow for Dr Montemayor, her son had mentioned he could give her a ride probably    I told her I will also send a msg to Dr Montemayor's staff bc her appt is marked for reschedule    Pt verbalized understanding and had no further concerns or questions.

## 2023-09-20 NOTE — TELEPHONE ENCOUNTER
[facility] Jasmine [facility] Staff  Caller: [phone] (Today,  9:30 AM)  Patient is returning a phone call.   Who left a message for the patient: MA   Does patient know what this is regarding:  appt   Would you like a call back, or a response through your [facility] portal?:   call   Comments:  possible dates 9/21 3:00  9/28 or 9/29

## 2023-09-22 ENCOUNTER — TELEPHONE (OUTPATIENT)
Dept: PODIATRY | Facility: CLINIC | Age: 78
End: 2023-09-22
Payer: MEDICARE

## 2023-09-22 ENCOUNTER — TELEPHONE (OUTPATIENT)
Dept: NEUROLOGY | Facility: CLINIC | Age: 78
End: 2023-09-22
Payer: MEDICARE

## 2023-09-22 NOTE — TELEPHONE ENCOUNTER
PARVIN LVM for pt requesting call back from patient to clarify request, if she would like to discharge from  services. PARVIN provided call back number.

## 2023-09-22 NOTE — TELEPHONE ENCOUNTER
----- Message from Ginette Pablo sent at 9/22/2023  1:59 PM CDT -----  Regarding: Pt Advice  Contact: [phone]  CONSULT/ADVISORY    Name of Caller: CINDA TATUM [[id number]]    Contact Preference:  [phone]    Nature of Call: Pt is requesting assistance in removing home health care out of her home.  Please call.

## 2023-10-16 DIAGNOSIS — R60.0 LEG EDEMA: ICD-10-CM

## 2023-10-16 DIAGNOSIS — K21.9 GASTROESOPHAGEAL REFLUX DISEASE, UNSPECIFIED WHETHER ESOPHAGITIS PRESENT: Primary | ICD-10-CM

## 2023-10-18 RX ORDER — FAMOTIDINE 20 MG/1
20 TABLET, FILM COATED ORAL NIGHTLY
Qty: 90 TABLET | Refills: 10 | Status: SHIPPED | OUTPATIENT
Start: 2023-10-18

## 2023-10-18 RX ORDER — FUROSEMIDE 20 MG/1
TABLET ORAL
Qty: 7 TABLET | Refills: 10 | Status: SHIPPED | OUTPATIENT
Start: 2023-10-18

## 2023-10-25 ENCOUNTER — PATIENT MESSAGE (OUTPATIENT)
Dept: PODIATRY | Facility: CLINIC | Age: 78
End: 2023-10-25
Payer: MEDICARE

## 2023-10-25 ENCOUNTER — PATIENT MESSAGE (OUTPATIENT)
Dept: ADMINISTRATIVE | Facility: HOSPITAL | Age: 78
End: 2023-10-25
Payer: MEDICARE

## 2023-10-25 DIAGNOSIS — E11.59 HYPERTENSION ASSOCIATED WITH DIABETES: ICD-10-CM

## 2023-10-25 DIAGNOSIS — E11.65 UNCONTROLLED TYPE 2 DIABETES MELLITUS WITH HYPERGLYCEMIA: ICD-10-CM

## 2023-10-25 DIAGNOSIS — I15.2 HYPERTENSION ASSOCIATED WITH DIABETES: ICD-10-CM

## 2023-10-25 RX ORDER — BLOOD SUGAR DIAGNOSTIC
STRIP MISCELLANEOUS 3 TIMES DAILY
Qty: 300 STRIP | Refills: 10 | Status: SHIPPED | OUTPATIENT
Start: 2023-10-25

## 2023-11-02 ENCOUNTER — TELEPHONE (OUTPATIENT)
Dept: NEUROLOGY | Facility: CLINIC | Age: 78
End: 2023-11-02
Payer: MEDICARE

## 2023-11-02 NOTE — TELEPHONE ENCOUNTER
SW called pt's caregiver, Estrellita, to offer [facility] [employer] dementia care management program.  She dialed her sister, Lennie, into the call.  SW outlined the service to them both and also explained that is they chose not to join the study they could still receive SW services for dementia through [facility].  They asked for time to discuss and said they would call SW with their decision. SW remains available.

## 2023-11-06 ENCOUNTER — TELEPHONE (OUTPATIENT)
Dept: NEUROLOGY | Facility: CLINIC | Age: 78
End: 2023-11-06
Payer: MEDICARE

## 2023-11-06 ENCOUNTER — OUTPATIENT CASE MANAGEMENT (OUTPATIENT)
Dept: NEUROLOGY | Facility: CLINIC | Age: 78
End: 2023-11-06
Payer: MEDICARE

## 2023-11-06 NOTE — PROGRESS NOTES
PARVIN  LVM for Estrellita MOULTON noting that family can receive SW services outside of the [facility] program should they have hesitancy about joining the study.  SW remains available.

## 2023-11-06 NOTE — TELEPHONE ENCOUNTER
"Called pt/family to confirm appt for Friday for SYN One Skin Biopsy.      LVM    Called pt's daughter Estrellita as second attempt  LVM    Tried pt's daughter Lennie but that was same number as pt's main number    Called pt's mobile number   Spoke to Mr Rushing   He said to call her cell which is listed as "home"     Called that number "Per Dr Silva's advising, I instructed them to stop all blood thinning medication including Eliquis and NSAIDS like ASA, Ibuprofen, Mobic/Meloxican, and Naproxen and to wear comfortable clothing."    Pt verbalized understanding and had no further concerns or questions.    sent msg    "

## 2023-11-07 ENCOUNTER — OFFICE VISIT (OUTPATIENT)
Dept: PODIATRY | Facility: CLINIC | Age: 78
End: 2023-11-07
Payer: MEDICARE

## 2023-11-07 VITALS
DIASTOLIC BLOOD PRESSURE: 82 MMHG | WEIGHT: 140 LBS | BODY MASS INDEX: 25.76 KG/M2 | HEIGHT: 62 IN | SYSTOLIC BLOOD PRESSURE: 168 MMHG | RESPIRATION RATE: 18 BRPM | HEART RATE: 81 BPM

## 2023-11-07 DIAGNOSIS — E11.49 TYPE II DIABETES MELLITUS WITH NEUROLOGICAL MANIFESTATIONS: Primary | ICD-10-CM

## 2023-11-07 DIAGNOSIS — L84 CORN OR CALLUS: ICD-10-CM

## 2023-11-07 DIAGNOSIS — B35.1 ONYCHOMYCOSIS DUE TO DERMATOPHYTE: ICD-10-CM

## 2023-11-07 PROCEDURE — 99499 NO LOS: ICD-10-PCS | Mod: HCNC,S$GLB,, | Performed by: PODIATRIST

## 2023-11-07 PROCEDURE — 11056 PR TRIM BENIGN HYPERKERATOTIC SKIN LESION,2-4: ICD-10-PCS | Mod: Q9,HCNC,S$GLB, | Performed by: PODIATRIST

## 2023-11-07 PROCEDURE — 11721 DEBRIDE NAIL 6 OR MORE: CPT | Mod: Q9,59,HCNC,S$GLB | Performed by: PODIATRIST

## 2023-11-07 PROCEDURE — 99499 UNLISTED E&M SERVICE: CPT | Mod: HCNC,S$GLB,, | Performed by: PODIATRIST

## 2023-11-07 PROCEDURE — 99999 PR PBB SHADOW E&M-EST. PATIENT-LVL IV: ICD-10-PCS | Mod: PBBFAC,HCNC,, | Performed by: PODIATRIST

## 2023-11-07 PROCEDURE — 11056 PARNG/CUTG B9 HYPRKR LES 2-4: CPT | Mod: Q9,HCNC,S$GLB, | Performed by: PODIATRIST

## 2023-11-07 PROCEDURE — 11721 PR DEBRIDEMENT OF NAILS, 6 OR MORE: ICD-10-PCS | Mod: Q9,59,HCNC,S$GLB | Performed by: PODIATRIST

## 2023-11-07 PROCEDURE — 99999 PR PBB SHADOW E&M-EST. PATIENT-LVL IV: CPT | Mod: PBBFAC,HCNC,, | Performed by: PODIATRIST

## 2023-11-10 ENCOUNTER — TELEPHONE (OUTPATIENT)
Dept: NEUROLOGY | Facility: CLINIC | Age: 78
End: 2023-11-10
Payer: MEDICARE

## 2023-11-10 ENCOUNTER — PROCEDURE VISIT (OUTPATIENT)
Dept: NEUROLOGY | Facility: CLINIC | Age: 78
End: 2023-11-10
Payer: MEDICARE

## 2023-11-10 VITALS
DIASTOLIC BLOOD PRESSURE: 95 MMHG | WEIGHT: 140.44 LBS | HEART RATE: 77 BPM | SYSTOLIC BLOOD PRESSURE: 164 MMHG | HEIGHT: 62 IN | BODY MASS INDEX: 25.84 KG/M2

## 2023-11-10 DIAGNOSIS — G20.C PARKINSONISM, UNSPECIFIED PARKINSONISM TYPE: Primary | ICD-10-CM

## 2023-11-10 PROCEDURE — 11105 PR PUNCH BIOPSY, SKIN, EA ADDTL LESION: ICD-10-PCS | Mod: HCNC,S$GLB,, | Performed by: PSYCHIATRY & NEUROLOGY

## 2023-11-10 PROCEDURE — 11105 PUNCH BX SKIN EA SEP/ADDL: CPT | Mod: HCNC,S$GLB,, | Performed by: PSYCHIATRY & NEUROLOGY

## 2023-11-10 PROCEDURE — 11104 PR PUNCH BIOPSY, SKIN, SINGLE LESION: ICD-10-PCS | Mod: HCNC,S$GLB,, | Performed by: PSYCHIATRY & NEUROLOGY

## 2023-11-10 PROCEDURE — 11104 PUNCH BX SKIN SINGLE LESION: CPT | Mod: HCNC,S$GLB,, | Performed by: PSYCHIATRY & NEUROLOGY

## 2023-11-10 NOTE — TELEPHONE ENCOUNTER
Printed off med list, will mail out. Will you follow up with them and see if they can come in for an appointment with Dr. GREENE?

## 2023-11-10 NOTE — TELEPHONE ENCOUNTER
View All Conversations on this Encounter   Mari Cosme MA Yu, Mary, MD; Albina Walters Staff; [facility] Clinical Support Staff Just now (1:03 PM)     AT  [facility] see note. I think Dr Montemayor is out of office currently, but if another provider in the office could advise and staff could call her CG Stella to let them know that would be great! She also requested a printed list of meds she should be taking to be sent to  her address     Thanks!      Mari Cosme MA Just now (1:03 PM)     AT   came into appt and was unsure which meds she's taking and had some confusion about if she should still be on amlodipine and eliquis (we only asked her stop the eliquis for this week in preparation for the procedure today)      Routing this to PCP and staff to advise further since no current [facility] cardiologist on chart

## 2023-11-10 NOTE — PROCEDURES
[facility]  B[facility] Cognitive [facility] Program     PATIENT: Alisia Gusman  VISIT DATE: 11/10/2023  MRN: [id number]  PRIMARY PROVIDER: Selena Montemayor MD  : 1945    PRE-OP DIAGNOSIS: parkinsonism  POST-OP DIAGNOSIS: Same   PROCEDURE: skin punch biopsy    Performing Physician: Carlos Silva MD.  Supervising Physician (if applicable): N/A     PROCEDURE:   _  Shave Biopsy  X  Punch Biopsy  _  Incisional Biopsy    DESCRIPTION:  - Punch Size: 0.5 cm  - Number of Punch Biopsies: 3  + CPT 88689 (punch biopsy, neck) 1st procedure  + CPT 93499 (punch biopsy, each additional lesion, thigh) 2nd procedure  + CPT 15604 (punch biopsy, each additional lesion, ankle) 3rd procedure    The area surrounding the skin lesion was prepared and draped in the  usual sterile manner. The lesion was removed in the usual manner by punch biopsy method noted above. Three full thickness cylindrical samples of the skin were removed from the upper back, lower thigh, and lower leg. The intent of the biopsy is to remove a sample of a cutaneous lesion for Syn-One diagnostic pathologic examination. Hemostasis was assured. The patient tolerated  the procedure well.     Closure:       _  Monsels for hemostasis                _  Suture   X Simple closure  _ None    Followup: The patient tolerated the procedure well without  complications.  Standard post-procedure care is explained and return  precautions are given.    Pathology/biopsy specimens were sent directly to [facility] for processing. Pathology was not sent via in-house [facility] laboratory. Pathology results will be returned within 4-6 weeks and scanned into [facility] Electronic Medical Record.

## 2023-11-13 NOTE — TELEPHONE ENCOUNTER
ANNA left on [employer]'s phone. Will send portal message as well. Looks like family made appt w/ Ms Sheth on 11/15, I will include her in this message.

## 2023-11-14 NOTE — TELEPHONE ENCOUNTER
PARVIN LVM and sent emails to two family members regarding [employer] program.  This pt will be marked lost to follow up due to nonrepsonse of CG to 5 offers of program.

## 2023-11-15 ENCOUNTER — OFFICE VISIT (OUTPATIENT)
Dept: INTERNAL MEDICINE | Facility: CLINIC | Age: 78
End: 2023-11-15
Payer: MEDICARE

## 2023-11-15 ENCOUNTER — LAB VISIT (OUTPATIENT)
Dept: LAB | Facility: HOSPITAL | Age: 78
End: 2023-11-15
Payer: MEDICARE

## 2023-11-15 VITALS
OXYGEN SATURATION: 97 % | HEIGHT: 62 IN | BODY MASS INDEX: 25.97 KG/M2 | SYSTOLIC BLOOD PRESSURE: 130 MMHG | DIASTOLIC BLOOD PRESSURE: 58 MMHG | WEIGHT: 141.13 LBS | HEART RATE: 68 BPM

## 2023-11-15 DIAGNOSIS — Z00.00 ANNUAL PHYSICAL EXAM: Primary | ICD-10-CM

## 2023-11-15 DIAGNOSIS — E78.5 HYPERLIPIDEMIA ASSOCIATED WITH TYPE 2 DIABETES MELLITUS: ICD-10-CM

## 2023-11-15 DIAGNOSIS — I10 HYPERTENSION, UNSPECIFIED TYPE: ICD-10-CM

## 2023-11-15 DIAGNOSIS — Z86.711 HISTORY OF PULMONARY EMBOLISM: ICD-10-CM

## 2023-11-15 DIAGNOSIS — E11.69 TYPE 2 DIABETES MELLITUS WITH OTHER SPECIFIED COMPLICATION, WITHOUT LONG-TERM CURRENT USE OF INSULIN: ICD-10-CM

## 2023-11-15 DIAGNOSIS — E11.69 HYPERLIPIDEMIA ASSOCIATED WITH TYPE 2 DIABETES MELLITUS: ICD-10-CM

## 2023-11-15 LAB
CHOLEST SERPL-MCNC: 159 MG/DL (ref 120–199)
CHOLEST/HDLC SERPL: 1.8 {RATIO} (ref 2–5)
ESTIMATED AVG GLUCOSE: 148 MG/DL (ref 68–131)
HBA1C MFR BLD: 6.8 % (ref 4–5.6)
HDLC SERPL-MCNC: 86 MG/DL (ref 40–75)
HDLC SERPL: 54.1 % (ref 20–50)
LDLC SERPL CALC-MCNC: 53.6 MG/DL (ref 63–159)
NONHDLC SERPL-MCNC: 73 MG/DL
TRIGL SERPL-MCNC: 97 MG/DL (ref 30–150)

## 2023-11-15 PROCEDURE — 1157F ADVNC CARE PLAN IN RCRD: CPT | Mod: HCNC,CPTII,S$GLB, | Performed by: PHYSICIAN ASSISTANT

## 2023-11-15 PROCEDURE — 1160F PR REVIEW ALL MEDS BY PRESCRIBER/CLIN PHARMACIST DOCUMENTED: ICD-10-PCS | Mod: HCNC,CPTII,S$GLB, | Performed by: PHYSICIAN ASSISTANT

## 2023-11-15 PROCEDURE — 1157F PR ADVANCE CARE PLAN OR EQUIV PRESENT IN MEDICAL RECORD: ICD-10-PCS | Mod: HCNC,CPTII,S$GLB, | Performed by: PHYSICIAN ASSISTANT

## 2023-11-15 PROCEDURE — 1101F PT FALLS ASSESS-DOCD LE1/YR: CPT | Mod: HCNC,CPTII,S$GLB, | Performed by: PHYSICIAN ASSISTANT

## 2023-11-15 PROCEDURE — 1126F PR PAIN SEVERITY QUANTIFIED, NO PAIN PRESENT: ICD-10-PCS | Mod: HCNC,CPTII,S$GLB, | Performed by: PHYSICIAN ASSISTANT

## 2023-11-15 PROCEDURE — 3078F DIAST BP <80 MM HG: CPT | Mod: HCNC,CPTII,S$GLB, | Performed by: PHYSICIAN ASSISTANT

## 2023-11-15 PROCEDURE — 3078F PR MOST RECENT DIASTOLIC BLOOD PRESSURE < 80 MM HG: ICD-10-PCS | Mod: HCNC,CPTII,S$GLB, | Performed by: PHYSICIAN ASSISTANT

## 2023-11-15 PROCEDURE — 3072F PR LOW RISK FOR RETINOPATHY: ICD-10-PCS | Mod: HCNC,CPTII,S$GLB, | Performed by: PHYSICIAN ASSISTANT

## 2023-11-15 PROCEDURE — 3075F PR MOST RECENT SYSTOLIC BLOOD PRESS GE 130-139MM HG: ICD-10-PCS | Mod: HCNC,CPTII,S$GLB, | Performed by: PHYSICIAN ASSISTANT

## 2023-11-15 PROCEDURE — 1126F AMNT PAIN NOTED NONE PRSNT: CPT | Mod: HCNC,CPTII,S$GLB, | Performed by: PHYSICIAN ASSISTANT

## 2023-11-15 PROCEDURE — 99397 PER PM REEVAL EST PAT 65+ YR: CPT | Mod: HCNC,S$GLB,, | Performed by: PHYSICIAN ASSISTANT

## 2023-11-15 PROCEDURE — 1101F PR PT FALLS ASSESS DOC 0-1 FALLS W/OUT INJ PAST YR: ICD-10-PCS | Mod: HCNC,CPTII,S$GLB, | Performed by: PHYSICIAN ASSISTANT

## 2023-11-15 PROCEDURE — 99999 PR PBB SHADOW E&M-EST. PATIENT-LVL IV: CPT | Mod: PBBFAC,HCNC,, | Performed by: PHYSICIAN ASSISTANT

## 2023-11-15 PROCEDURE — 36415 COLL VENOUS BLD VENIPUNCTURE: CPT | Mod: HCNC | Performed by: PHYSICIAN ASSISTANT

## 2023-11-15 PROCEDURE — 3072F LOW RISK FOR RETINOPATHY: CPT | Mod: HCNC,CPTII,S$GLB, | Performed by: PHYSICIAN ASSISTANT

## 2023-11-15 PROCEDURE — 80061 LIPID PANEL: CPT | Mod: HCNC | Performed by: PHYSICIAN ASSISTANT

## 2023-11-15 PROCEDURE — 3288F PR FALLS RISK ASSESSMENT DOCUMENTED: ICD-10-PCS | Mod: HCNC,CPTII,S$GLB, | Performed by: PHYSICIAN ASSISTANT

## 2023-11-15 PROCEDURE — 99999 PR PBB SHADOW E&M-EST. PATIENT-LVL IV: ICD-10-PCS | Mod: PBBFAC,HCNC,, | Performed by: PHYSICIAN ASSISTANT

## 2023-11-15 PROCEDURE — 99397 PR PREVENTIVE VISIT,EST,65 & OVER: ICD-10-PCS | Mod: HCNC,S$GLB,, | Performed by: PHYSICIAN ASSISTANT

## 2023-11-15 PROCEDURE — 1159F PR MEDICATION LIST DOCUMENTED IN MEDICAL RECORD: ICD-10-PCS | Mod: HCNC,CPTII,S$GLB, | Performed by: PHYSICIAN ASSISTANT

## 2023-11-15 PROCEDURE — 3288F FALL RISK ASSESSMENT DOCD: CPT | Mod: HCNC,CPTII,S$GLB, | Performed by: PHYSICIAN ASSISTANT

## 2023-11-15 PROCEDURE — 83036 HEMOGLOBIN GLYCOSYLATED A1C: CPT | Mod: HCNC | Performed by: PHYSICIAN ASSISTANT

## 2023-11-15 PROCEDURE — 3075F SYST BP GE 130 - 139MM HG: CPT | Mod: HCNC,CPTII,S$GLB, | Performed by: PHYSICIAN ASSISTANT

## 2023-11-15 PROCEDURE — 1159F MED LIST DOCD IN RCRD: CPT | Mod: HCNC,CPTII,S$GLB, | Performed by: PHYSICIAN ASSISTANT

## 2023-11-15 PROCEDURE — 1160F RVW MEDS BY RX/DR IN RCRD: CPT | Mod: HCNC,CPTII,S$GLB, | Performed by: PHYSICIAN ASSISTANT

## 2023-11-15 NOTE — Clinical Note
I saw Mrs. Gusman with her dtr Lennie today. Appears to be doing quite well. We clarified med questions.

## 2023-11-15 NOTE — PROGRESS NOTES
Subjective     Patient ID: Alisia Gusman is a 78 y.o. female.    Chief Complaint: Annual Exam    HPI    Established pt of Selena Montemayor MD (new to me)    Pt attended by her dtr Lennie  States they are her for annual, prompted on MyChart to schedule  Last seen by PCP May 2023    No acute concerns today  There was message from Neuro MA about medication concern, Now resolved, reviewed with pt and dtr on meds/indication. Specifically amlodipine and eliquis she should be taking    DM: Home glucose reading in 110s. No hypoglycemia 18u Lantus and ozempic 1mg weekly, Eye exam schedule Foot exam UTD    HTN: BP much better on amlodipine 5mg    Dementia: stable, no recent behavioral concerns. continues to follow with Dr. Silva, new med prescribed but hasn't started yet, completed punch biopsy for Syn-One test last week.     Meningioma: s/p craniotomy 2021. UTD on surveillance with NSGY    Breast Ca: UTD on surveillance with Dr. Olvera in heme/onc f/u MMG scheduled.     Past Medical History:   Diagnosis Date    Acute pulmonary embolism without acute cor pulmonale 11/15/2021    Adult bronchiectasis 7/26/2017    Allergy     Anemia     Arthritis     Asthma     Breast cancer 1993    left w/ radiation     Breast cancer 2020    IDC, left mastectomy    Cancer 1993    L eft breast    Cataract     Chronic cervical radiculopathy 9/20/2012    Diabetes mellitus     Diabetes mellitus type II     Diabetes with neurologic complications     Diverticulosis     Dry eyes     Dysphagia     after knee surgery June 2014    GERD (gastroesophageal reflux disease)     Hyperlipidemia     Hypertension     Neuropathy     feet    Scalp tenderness     Sepsis 12/12/2021    Shortness of breath     Ulcer      Social History     Tobacco Use    Smoking status: Never    Smokeless tobacco: Never   Substance Use Topics    Alcohol use: No    Drug use: No     Review of patient's allergies indicates:   Allergen Reactions    Grass pollen-tim grass standard Other (See  "Comments)     Causes sinus symptoms like coughing    Losartan      cough    Nubain [nalbuphine] Other (See Comments)     Other reaction(s): Hypotension    Sinus & allergy [chlorpheniramine-phenylephrine]            Review of Systems   Constitutional:  Negative for chills, diaphoresis and fever.   Respiratory:  Negative for cough and shortness of breath.    Cardiovascular:  Negative for chest pain and leg swelling (improved, no use of lasix recently).   Gastrointestinal:  Positive for diarrhea (occ diarrhea). Negative for abdominal pain, constipation, nausea and vomiting.   Integumentary:  Negative for rash.   Neurological:  Negative for headaches. Memory loss: dementia, at baseline.  Psychiatric/Behavioral:  Negative for dysphoric mood.           Objective  BP (!) 130/58 (BP Location: Right arm, Patient Position: Sitting, BP Method: Medium (Manual))   Pulse 68   Ht 5' 2" (1.575 m)   Wt 64 kg (141 lb 1.5 oz)   LMP  (LMP Unknown) Comment: Partial  SpO2 97%   BMI 25.81 kg/m²       Physical Exam  Vitals reviewed.   Constitutional:       General: She is not in acute distress.     Appearance: She is well-developed. She is not ill-appearing.   HENT:      Head: Normocephalic and atraumatic.      Right Ear: Tympanic membrane, ear canal and external ear normal.      Left Ear: Tympanic membrane, ear canal and external ear normal.   Cardiovascular:      Rate and Rhythm: Normal rate and regular rhythm.      Heart sounds: No murmur heard.  Pulmonary:      Effort: Pulmonary effort is normal.      Breath sounds: Normal breath sounds. No wheezing or rales.   Abdominal:      General: Bowel sounds are normal.      Palpations: Abdomen is soft.      Tenderness: There is no abdominal tenderness.   Musculoskeletal:      Right lower leg: No edema.      Left lower leg: No edema.   Lymphadenopathy:      Cervical: No cervical adenopathy.   Skin:     General: Skin is warm and dry.      Findings: No rash.   Neurological:      Mental " Status: She is alert. Mental status is at baseline.   Psychiatric:         Mood and Affect: Mood normal.            Assessment and Plan     1. Annual physical exam  HM reviewed and updated  EYE, MMG scheduled  Update vaccines today  Recent lab from Neuro reviewed    2. Type 2 diabetes mellitus with other specified complication, without long-term current use of insulin  Lab Results   Component Value Date    HGBA1C 6.3 (H) 04/20/2023   At goal, continue current meds  -     Hemoglobin A1C; Future; Expected date: 11/15/2023  -     Lipid Panel; Future; Expected date: 11/15/2023 (pt non fasting today)  -     MICROALBUMIN / CREATININE RATIO URINE; Future; Expected date: 11/15/2023    3. Hyperlipidemia associated with type 2 diabetes mellitus  Stable  Continue statin therapy    4. Hypertension, unspecified type  Improved  At goal  Continue amlodipine 5mg, carvedilol 12.5mg    5. History of pulmonary embolism  Continue eliquis as prev prescribed        Recommend RTC in March 2024 with PCP for routine follow up or sooner if needed.     Ginette Sheth PA-C      Future Appointments   Date Time Provider Department Center   2/2/2024 10:00 AM Alexy Hines MD [facility]   2/6/2024 10:00 AM Emily Dao DPM [facility]   3/6/2024  8:00 AM Mari Hill OD [facility] [place]   3/6/2024 10:15 AM AWILDA HANNAH [facility]2 [facility]

## 2023-11-16 ENCOUNTER — LAB VISIT (OUTPATIENT)
Dept: LAB | Facility: HOSPITAL | Age: 78
End: 2023-11-16
Payer: MEDICARE

## 2023-11-16 DIAGNOSIS — E11.69 TYPE 2 DIABETES MELLITUS WITH OTHER SPECIFIED COMPLICATION, WITHOUT LONG-TERM CURRENT USE OF INSULIN: ICD-10-CM

## 2023-11-16 LAB
ALBUMIN/CREAT UR: 9 UG/MG (ref 0–30)
CREAT UR-MCNC: 177 MG/DL (ref 15–325)
MICROALBUMIN UR DL<=1MG/L-MCNC: 16 UG/ML

## 2023-11-16 PROCEDURE — 82043 UR ALBUMIN QUANTITATIVE: CPT | Mod: HCNC | Performed by: PHYSICIAN ASSISTANT

## 2023-11-17 NOTE — PROGRESS NOTES
Subjective:      Patient ID: Alisia Gusman is a 78 y.o. female.    Chief Complaint: PCP (Selena Montemayor MD  5/18/23), Diabetic Foot Exam, Nail Care, and Callouses      Alisia is a 78 y.o. female who presents to the clinic for evaluation and treatment of high risk feet. Alisia has a past medical history of Acute pulmonary embolism without acute cor pulmonale (11/15/2021), Adult bronchiectasis (7/26/2017), Allergy, Anemia, Arthritis, Asthma, Breast cancer (1993), Breast cancer (2020), Cancer (1993), Cataract, Chronic cervical radiculopathy (9/20/2012), Diabetes mellitus, Diabetes mellitus type II, Diabetes with neurologic complications, Diverticulosis, Dry eyes, Dysphagia, GERD (gastroesophageal reflux disease), Hyperlipidemia, Hypertension, Neuropathy, Scalp tenderness, Sepsis (12/12/2021), Shortness of breath, and Ulcer. The patient's returns to clinic for routine high risk DM foot exam/care. N doing well.  This patient has documented high risk feet requiring routine maintenance secondary to diabetes mellitis and those secondary complications of diabetes, as mentioned..    PCP: Selena Montemayor MD    Date Last Seen by PCP: 5/18/2023   Chief Complaint   Patient presents with    PCP     Selena Montemayor MD  5/18/23    Diabetic Foot Exam    Nail Care    Callouses         Hemoglobin A1C   Date Value Ref Range Status   11/15/2023 6.8 (H) 4.0 - 5.6 % Final     Comment:     ADA Screening Guidelines:  5.7-6.4%  Consistent with prediabetes  >or=6.5%  Consistent with diabetes    High levels of fetal hemoglobin interfere with the HbA1C  assay. Heterozygous hemoglobin variants (HbS, HgC, etc)do  not significantly interfere with this assay.   However, presence of multiple variants may affect accuracy.     04/20/2023 6.3 (H) 4.0 - 5.6 % Final     Comment:     ADA Screening Guidelines:  5.7-6.4%  Consistent with prediabetes  >or=6.5%  Consistent with diabetes    High levels of fetal hemoglobin interfere with the HbA1C  assay. Heterozygous  "hemoglobin variants (HbS, HgC, etc)do  not significantly interfere with this assay.   However, presence of multiple variants may affect accuracy.     11/11/2022 6.3 (H) 4.0 - 5.6 % Final     Comment:     ADA Screening Guidelines:  5.7-6.4%  Consistent with prediabetes  >or=6.5%  Consistent with diabetes    High levels of fetal hemoglobin interfere with the HbA1C  assay. Heterozygous hemoglobin variants (HbS, HgC, etc)do  not significantly interfere with this assay.   However, presence of multiple variants may affect accuracy.         Review of Systems   Constitutional: Negative for chills, decreased appetite and fever.   Cardiovascular:  Negative for claudication.   Respiratory:  Negative for cough and shortness of breath.    Skin:  Positive for dry skin and nail changes. Negative for color change, flushing, itching, poor wound healing, rash and skin cancer.   Musculoskeletal:  Negative for arthritis, back pain, falls, joint pain, joint swelling and myalgias.   Gastrointestinal:  Negative for nausea and vomiting.   Neurological:  Negative for loss of balance, numbness and paresthesias.   All other systems reviewed and are negative.          Objective:       Vitals:    11/07/23 1514   BP: (!) 168/82   Pulse: 81   Resp: 18   Weight: 63.5 kg (140 lb)   Height: 5' 2" (1.575 m)   PainSc: 0-No pain        Physical Exam  Vitals and nursing note reviewed.   Constitutional:       Appearance: She is well-developed.   Cardiovascular:      Pulses:           Dorsalis pedis pulses are 1+ on the right side and 1+ on the left side.      Comments: Posterior tibial pulses are diminished Bilaterally. Toes are cool to touch. Feet are warm proximally.There is decreased digital hair. Skin is atrophic, slightly hyperpigmented, and mildly edematous      Musculoskeletal:         General: Swelling (chronic- stable) present. No tenderness, deformity or signs of injury. Normal range of motion.      Right ankle: Normal.      Left ankle: Normal. "      Right foot: No swelling, deformity or crepitus.      Left foot: No swelling, deformity or crepitus.      Comments: Semi-reducible hammertoe contractures noted to toes 2-4 b/l-asymptomatic.  Otherwise adequate joint range of motion without pain, limitation, nor crepitation Bilateral feet and ankle joints. Muscle strength is 5/5 in all groups bilaterally.         Lymphadenopathy:      Comments: No palpable lymph nodes   Skin:     General: Skin is warm and dry.      Coloration: Skin is not ashen, cyanotic or pale.      Findings: No bruising, ecchymosis, erythema, lesion or rash.      Nails: There is no clubbing.      Comments: Nails x10 are elongated by  2-7mm's, thickened by 2-4 mm's, dystrophic, and are darkened in  coloration . Xerosis Bilaterally. No open lesions, lacerations or wounds noted    Hyperkeratotic lesion noted to distal tips of b/l hallux   Neurological:      Mental Status: She is alert and oriented to person, place, and time.      Sensory: Sensory deficit present.      Comments: [place]-Leonel 5.07 monofilamant testing is diminished Vikash feet. Sharp/dull sensation diminished Bilaterally. Light touch absent Bilaterally.       Psychiatric:         Behavior: Behavior normal.             Assessment:       Encounter Diagnoses   Name Primary?    Type II diabetes mellitus with neurological manifestations Yes    Onychomycosis due to dermatophyte     Corn or callus            Plan:       Alisia was seen today for pcp, diabetic foot exam, nail care and callouses.    Diagnoses and all orders for this visit:    Type II diabetes mellitus with neurological manifestations    Onychomycosis due to dermatophyte    Corn or callus        I counseled the patient on her conditions, their implications and medical management.    Shoe inspection. Diabetic Foot Education. Patient reminded of the importance of good nutrition and blood sugar control to help prevent podiatric complications of diabetes. Patient instructed on  proper foot hygeine. We discussed wearing proper shoe gear, daily foot inspections, never walking without protective shoe gear, caution putting sharp instruments to feet     Discussed DM foot care:  Wear comfortable, proper fitting shoes. Wash feet daily. Dry well. After drying, apply moisturizer to feet (no lotion to webspaces). Inspect feet daily for skin breaks, blisters, swelling, or redness. Wear cotton socks (preferably white)  Change socks every day. Do NOT walk barefoot. Do NOT use heating pads or warm/hot water soaks     With patient's permission, nails were aggressively reduced and debrided 1-5 b/l, removing all offending nail and debris. Patient tolerated this well and no blood was drawn. Patient reports relief following the procedure.     The affected area was cleansed with an alcohol prep pad. Next, utilizing a 5mm curette, the hyperkeratotic tissues were trimmed from areas as noted above, down to appropriate level of skin. Care was taken to remove any nucleated core from the center of the lesion. No pinpoint bleeding was encountered. The patient tolerated relief following this procedure.      Discussed regular and routine moisturizer to skin of both feet to help improve dry skin. Advised to apply twice daily until resolution of symptoms. Avoid between toes.     RTC 2-3 months, sooner PRN

## 2023-11-24 DIAGNOSIS — I10 BENIGN ESSENTIAL HYPERTENSION: ICD-10-CM

## 2023-11-24 RX ORDER — CARVEDILOL 12.5 MG/1
TABLET ORAL
Qty: 180 TABLET | Refills: 3 | Status: SHIPPED | OUTPATIENT
Start: 2023-11-24

## 2023-11-30 ENCOUNTER — TELEPHONE (OUTPATIENT)
Dept: NEUROLOGY | Facility: CLINIC | Age: 78
End: 2023-11-30
Payer: MEDICARE

## 2023-11-30 NOTE — TELEPHONE ENCOUNTER
Received secure email w/ SYN One results  Scanned into media tab    Messaged pt and selected option to be notified if not read by  Tomorrow    Routing to provider as JEISON

## 2023-12-14 ENCOUNTER — TELEPHONE (OUTPATIENT)
Dept: PRIMARY CARE CLINIC | Facility: CLINIC | Age: 78
End: 2023-12-14
Payer: MEDICARE

## 2023-12-23 DIAGNOSIS — E11.69 DIABETES MELLITUS TYPE 2 IN OBESE: ICD-10-CM

## 2023-12-23 DIAGNOSIS — E78.5 HYPERLIPIDEMIA ASSOCIATED WITH TYPE 2 DIABETES MELLITUS: ICD-10-CM

## 2023-12-23 DIAGNOSIS — E66.9 DIABETES MELLITUS TYPE 2 IN OBESE: ICD-10-CM

## 2023-12-23 DIAGNOSIS — E11.42 DIABETIC PERIPHERAL NEUROPATHY: ICD-10-CM

## 2023-12-23 DIAGNOSIS — E11.69 HYPERLIPIDEMIA ASSOCIATED WITH TYPE 2 DIABETES MELLITUS: ICD-10-CM

## 2023-12-26 RX ORDER — SEMAGLUTIDE 1.34 MG/ML
INJECTION, SOLUTION SUBCUTANEOUS
Qty: 9 EACH | Refills: 3 | Status: SHIPPED | OUTPATIENT
Start: 2023-12-26

## 2024-01-11 ENCOUNTER — PATIENT MESSAGE (OUTPATIENT)
Dept: PRIMARY CARE CLINIC | Facility: CLINIC | Age: 79
End: 2024-01-11
Payer: MEDICARE

## 2024-01-13 DIAGNOSIS — E11.65 TYPE 2 DIABETES MELLITUS WITH HYPERGLYCEMIA, WITH LONG-TERM CURRENT USE OF INSULIN: ICD-10-CM

## 2024-01-13 DIAGNOSIS — Z79.4 TYPE 2 DIABETES MELLITUS WITH HYPERGLYCEMIA, WITH LONG-TERM CURRENT USE OF INSULIN: ICD-10-CM

## 2024-01-16 RX ORDER — INSULIN GLARGINE 100 [IU]/ML
18 INJECTION, SOLUTION SUBCUTANEOUS
Qty: 30 ML | Refills: 3 | Status: SHIPPED | OUTPATIENT
Start: 2024-01-16

## 2024-02-07 ENCOUNTER — PATIENT MESSAGE (OUTPATIENT)
Dept: RESEARCH | Facility: HOSPITAL | Age: 79
End: 2024-02-07
Payer: MEDICARE

## 2024-02-21 DIAGNOSIS — Z86.711 HISTORY OF PULMONARY EMBOLISM: ICD-10-CM

## 2024-02-21 RX ORDER — APIXABAN 5 MG/1
TABLET, FILM COATED ORAL
Qty: 180 TABLET | Refills: 3 | Status: SHIPPED | OUTPATIENT
Start: 2024-02-21

## 2024-02-22 PROBLEM — F02.80: Status: ACTIVE | Noted: 2024-02-22

## 2024-02-22 PROBLEM — J47.9 BRONCHIECTASIS, UNCOMPLICATED: Status: ACTIVE | Noted: 2024-02-22

## 2024-02-22 PROBLEM — G31.83: Status: ACTIVE | Noted: 2024-02-22

## 2024-02-22 PROBLEM — F03.90 UNSPECIFIED DEMENTIA, UNSPECIFIED SEVERITY, WITHOUT BEHAVIORAL DISTURBANCE, PSYCHOTIC DISTURBANCE, MOOD DISTURBANCE, AND ANXIETY: Status: ACTIVE | Noted: 2024-02-22

## 2024-02-22 PROBLEM — G20.A1 PARKINSON'S DISEASE: Status: ACTIVE | Noted: 2024-02-22

## 2024-02-22 PROBLEM — F02.A0 MILD LEWY BODY DEMENTIA WITHOUT BEHAVIORAL DISTURBANCE, PSYCHOTIC DISTURBANCE, MOOD DISTURBANCE, OR ANXIETY: Status: ACTIVE | Noted: 2024-02-22

## 2024-03-05 ENCOUNTER — OFFICE VISIT (OUTPATIENT)
Dept: HEMATOLOGY/ONCOLOGY | Facility: CLINIC | Age: 79
End: 2024-03-05
Payer: MEDICARE

## 2024-03-05 VITALS
BODY MASS INDEX: 26.01 KG/M2 | RESPIRATION RATE: 18 BRPM | HEIGHT: 62 IN | HEART RATE: 81 BPM | TEMPERATURE: 98 F | WEIGHT: 141.31 LBS | SYSTOLIC BLOOD PRESSURE: 173 MMHG | OXYGEN SATURATION: 98 % | DIASTOLIC BLOOD PRESSURE: 83 MMHG

## 2024-03-05 DIAGNOSIS — C50.612 CARCINOMA OF AXILLARY TAIL OF LEFT BREAST IN FEMALE, ESTROGEN RECEPTOR POSITIVE: ICD-10-CM

## 2024-03-05 DIAGNOSIS — Z17.0 CARCINOMA OF AXILLARY TAIL OF LEFT BREAST IN FEMALE, ESTROGEN RECEPTOR POSITIVE: ICD-10-CM

## 2024-03-05 DIAGNOSIS — Z79.811 PROPHYLACTIC USE OF ANASTROZOLE (ARIMIDEX): Primary | ICD-10-CM

## 2024-03-05 PROCEDURE — 3079F DIAST BP 80-89 MM HG: CPT | Mod: HCNC,CPTII,S$GLB, | Performed by: INTERNAL MEDICINE

## 2024-03-05 PROCEDURE — 3288F FALL RISK ASSESSMENT DOCD: CPT | Mod: HCNC,CPTII,S$GLB, | Performed by: INTERNAL MEDICINE

## 2024-03-05 PROCEDURE — 1101F PT FALLS ASSESS-DOCD LE1/YR: CPT | Mod: HCNC,CPTII,S$GLB, | Performed by: INTERNAL MEDICINE

## 2024-03-05 PROCEDURE — 1126F AMNT PAIN NOTED NONE PRSNT: CPT | Mod: HCNC,CPTII,S$GLB, | Performed by: INTERNAL MEDICINE

## 2024-03-05 PROCEDURE — 1160F RVW MEDS BY RX/DR IN RCRD: CPT | Mod: HCNC,CPTII,S$GLB, | Performed by: INTERNAL MEDICINE

## 2024-03-05 PROCEDURE — 1157F ADVNC CARE PLAN IN RCRD: CPT | Mod: HCNC,CPTII,S$GLB, | Performed by: INTERNAL MEDICINE

## 2024-03-05 PROCEDURE — 99999 PR PBB SHADOW E&M-EST. PATIENT-LVL IV: CPT | Mod: PBBFAC,HCNC,, | Performed by: INTERNAL MEDICINE

## 2024-03-05 PROCEDURE — 99213 OFFICE O/P EST LOW 20 MIN: CPT | Mod: HCNC,S$GLB,, | Performed by: INTERNAL MEDICINE

## 2024-03-05 PROCEDURE — 1159F MED LIST DOCD IN RCRD: CPT | Mod: HCNC,CPTII,S$GLB, | Performed by: INTERNAL MEDICINE

## 2024-03-05 PROCEDURE — 3077F SYST BP >= 140 MM HG: CPT | Mod: HCNC,CPTII,S$GLB, | Performed by: INTERNAL MEDICINE

## 2024-03-05 NOTE — PROGRESS NOTES
Subjective:       Patient ID: Alisia Gusman is a 78 y.o. female.    Chief Complaint: No chief complaint on file.      HPI     Mrs. Gusman presents to the clinic for follow up.  I had last seen her on September 18, 2023.      Briefly, she is an 78-year-old  female with a remote history of left sided breast cancer treated in 1993 with a lumpectomy, radiation therapy, and 5 years of tamoxifen.  Apparently the size of her tumor in 1993 was 4 mm.  She completed her 5 years of tamoxifen and she was followed expectantly.      An in breast recurrence was noted in January 2020.  A core biopsy on 01/15/2020 showed an infiltrating ductal carcinoma that was ER positive WI negative and HER 2 negative.  On 02/20/2020 she underwent a left mastectomy and a sentinel lymph node biopsy.  The sentinel lymph node was negative.  On the mastectomy specimen there was a 2 mm area of DCIS and there was a 6 mm area of invasive cancer.  Resection margins were clear.  She has made an uneventful recovery and was started on anastrozole in early April 2020.    Her latest mammogram in December 2022 was read as BI-RADS I and a one year follow-up was recommended.      Review of Systems    Overall she feels well.  She states that she has tolerated the anastrozole well so far and has not experienced any side effects.  She again mentions that she experiences intermittent sharp pains in the left chest wall.  ECOG PS is 1.  She denies any anxiety, depression, easy bruising, fevers, chills, night  sweats, weight loss, nausea, vomiting, diarrhea, constipation, diplopia, chest pain, palpitations, shortness of breath, breast pain, abdominal pain, extremity pain, or difficulty ambulating.  The remainder of the ten-point ROS, including general, skin, lymph, H/N, cardiorespiratory, GI, , Neuro, Endocrine, and psychiatric is negative.     Objective:      Physical Exam    She is alert, oriented to time, place, person, pleasant, well nourished,  in no acute physical distress.  She is here with a female .                                VITAL SIGNS:  Reviewed                                      HEENT:  Normal.  There are no nasal, oral, lip, gingival, auricular, lid,    or conjunctival lesions.  Mucosae are moist and pink, and there is no        thrush.  Pupils are equal, reactive to light and accommodation.              Extraocular muscle movements are intact.  Dentition is fair.  There is no frontal or maxillary tenderness.                                     NECK:  Supple without JVD, adenopathy, or thyromegaly.                       LUNGS:  Clear to auscultation without wheezing, rales, or rhonchi.           CARDIOVASCULAR:  Reveals an S1, S2, no murmurs, no rubs, no gallops.         ABDOMEN:  Soft, nontender, without organomegaly.  Bowel sounds are    present.                                                                     EXTREMITIES:  No cyanosis, clubbing, or edema.                               BREASTS:  She is status post left mastectomy with a well-healed incision.    There are no masses in the right breast.                                        LYMPHATIC:  There is no cervical, axillary, or supraclavicular adenopathy.   SKIN:  Warm and moist, without petechiae, rashes, induration, or ecchymoses.           NEUROLOGIC:  DTRs are 0-1+ bilaterally, symmetrical, motor function is 5/5,  and cranial nerves are  within normal limits.    Assessment:       1. Prophylactic use of anastrozole (Arimidex)    2. 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 Gusman.  I recommended that she remain on anastrozole which she should take for a minimum of 5 years, through the end of March 2025.  I will see her again in 6 months.  Her mammogram will be repeated tomorrow.  Her multiple questions were answered to her satisfaction.        Route Chart for Scheduling    Med  Onc Chart Routing      Follow up with physician 6 months.   Follow up with KATIE    Infusion scheduling note    Injection scheduling note    Labs    Imaging    Pharmacy appointment    Other referrals

## 2024-03-06 ENCOUNTER — HOSPITAL ENCOUNTER (OUTPATIENT)
Dept: RADIOLOGY | Facility: HOSPITAL | Age: 79
Discharge: HOME | End: 2024-03-06
Attending: INTERNAL MEDICINE
Payer: MEDICARE

## 2024-03-06 ENCOUNTER — OFFICE VISIT (OUTPATIENT)
Dept: OPTOMETRY | Facility: CLINIC | Age: 79
End: 2024-03-06
Payer: MEDICARE

## 2024-03-06 DIAGNOSIS — Z17.0 CARCINOMA OF AXILLARY TAIL OF LEFT BREAST IN FEMALE, ESTROGEN RECEPTOR POSITIVE: ICD-10-CM

## 2024-03-06 DIAGNOSIS — Z12.31 ENCOUNTER FOR SCREENING MAMMOGRAM FOR BREAST CANCER: ICD-10-CM

## 2024-03-06 DIAGNOSIS — E11.9 DIABETES MELLITUS WITHOUT COMPLICATION: Primary | ICD-10-CM

## 2024-03-06 DIAGNOSIS — C50.612 CARCINOMA OF AXILLARY TAIL OF LEFT BREAST IN FEMALE, ESTROGEN RECEPTOR POSITIVE: ICD-10-CM

## 2024-03-06 PROCEDURE — 1157F ADVNC CARE PLAN IN RCRD: CPT | Mod: CPTII,S$GLB,, | Performed by: OPTOMETRIST

## 2024-03-06 PROCEDURE — 77065 DX MAMMO INCL CAD UNI: CPT | Mod: 26,HCNC,RT,ICN | Performed by: RADIOLOGY

## 2024-03-06 PROCEDURE — 3288F FALL RISK ASSESSMENT DOCD: CPT | Mod: CPTII,S$GLB,, | Performed by: OPTOMETRIST

## 2024-03-06 PROCEDURE — 77061 BREAST TOMOSYNTHESIS UNI: CPT | Mod: 26,HCNC,ICN, | Performed by: RADIOLOGY

## 2024-03-06 PROCEDURE — 1101F PT FALLS ASSESS-DOCD LE1/YR: CPT | Mod: CPTII,S$GLB,, | Performed by: OPTOMETRIST

## 2024-03-06 PROCEDURE — 92014 COMPRE OPH EXAM EST PT 1/>: CPT | Mod: S$GLB,,, | Performed by: OPTOMETRIST

## 2024-03-06 PROCEDURE — 1159F MED LIST DOCD IN RCRD: CPT | Mod: CPTII,S$GLB,, | Performed by: OPTOMETRIST

## 2024-03-06 PROCEDURE — 77065 DX MAMMO INCL CAD UNI: CPT | Mod: TC,HCNC,RT

## 2024-03-06 PROCEDURE — 2023F DILAT RTA XM W/O RTNOPTHY: CPT | Mod: CPTII,S$GLB,, | Performed by: OPTOMETRIST

## 2024-03-06 PROCEDURE — 99999 PR PBB SHADOW E&M-EST. PATIENT-LVL III: CPT | Mod: PBBFAC,,, | Performed by: OPTOMETRIST

## 2024-03-06 PROCEDURE — 1126F AMNT PAIN NOTED NONE PRSNT: CPT | Mod: CPTII,S$GLB,, | Performed by: OPTOMETRIST

## 2024-03-06 PROCEDURE — 77061 BREAST TOMOSYNTHESIS UNI: CPT | Mod: TC,HCNC,RT

## 2024-03-06 RX ORDER — GADOBUTROL 604.72 MG/ML
INJECTION INTRAVENOUS
COMMUNITY
Start: 2023-09-19

## 2024-03-06 NOTE — PROGRESS NOTES
HPI    Pt is here today for routine eye exam. Patient denies pain/discomfort.   States that she is having difficulty threading needles. Patient would like   glasses rx, currently using OTC readers.   DLS: 2022 Dr. Hill  (-)Flashes   (-)Floaters   (-)Diplopia   (-)Headaches   (-)Itching   (-)Tearing  (-) Burning  (-)Dryness   (-) Photophobia  (-)Glare   (+) Blurred VA  Past Eye Sx: phaco ou  Eye Meds: (-)   Hemoglobin A1C       Date                     Value               Ref Range             Status                11/15/2023               6.8 (H)             4.0 - 5.6 %           Final            Last edited by Mari Hill, OD on 3/6/2024  8:52 AM.            Assessment /Plan     For exam results, see Encounter Report.    Diabetes mellitus without complication      No retinopathy noted today.  Continued control with primary care physician and annual comprehensive eye exam.   Continue use of OTC reading glasses prn. Monitor yearly.  *Recommended highest power OTC reading glasses for threading needles    RTC in 1 year for annual eye exam unless changes noted sooner.

## 2024-03-13 ENCOUNTER — OFFICE VISIT (OUTPATIENT)
Dept: HOME HEALTH SERVICES | Facility: CLINIC | Age: 79
End: 2024-03-13
Payer: MEDICARE

## 2024-03-13 VITALS
HEIGHT: 62 IN | WEIGHT: 141 LBS | SYSTOLIC BLOOD PRESSURE: 130 MMHG | BODY MASS INDEX: 25.95 KG/M2 | DIASTOLIC BLOOD PRESSURE: 80 MMHG | HEART RATE: 80 BPM

## 2024-03-13 DIAGNOSIS — E11.42 DIABETIC PERIPHERAL NEUROPATHY ASSOCIATED WITH TYPE 2 DIABETES MELLITUS: ICD-10-CM

## 2024-03-13 DIAGNOSIS — E11.69 HYPERLIPIDEMIA ASSOCIATED WITH TYPE 2 DIABETES MELLITUS: ICD-10-CM

## 2024-03-13 DIAGNOSIS — C50.612 CARCINOMA OF AXILLARY TAIL OF LEFT BREAST IN FEMALE, ESTROGEN RECEPTOR POSITIVE: ICD-10-CM

## 2024-03-13 DIAGNOSIS — Z00.00 ENCOUNTER FOR PREVENTIVE HEALTH EXAMINATION: Primary | ICD-10-CM

## 2024-03-13 DIAGNOSIS — I70.0 AORTIC ATHEROSCLEROSIS: ICD-10-CM

## 2024-03-13 DIAGNOSIS — D32.9 MENINGIOMA: ICD-10-CM

## 2024-03-13 DIAGNOSIS — Z17.0 CARCINOMA OF AXILLARY TAIL OF LEFT BREAST IN FEMALE, ESTROGEN RECEPTOR POSITIVE: ICD-10-CM

## 2024-03-13 DIAGNOSIS — E21.3 HYPERPARATHYROIDISM: ICD-10-CM

## 2024-03-13 DIAGNOSIS — E78.5 HYPERLIPIDEMIA ASSOCIATED WITH TYPE 2 DIABETES MELLITUS: ICD-10-CM

## 2024-03-13 DIAGNOSIS — K21.9 GASTROESOPHAGEAL REFLUX DISEASE, UNSPECIFIED WHETHER ESOPHAGITIS PRESENT: ICD-10-CM

## 2024-03-13 DIAGNOSIS — G20.A1 PARKINSON'S DISEASE, UNSPECIFIED WHETHER DYSKINESIA PRESENT, UNSPECIFIED WHETHER MANIFESTATIONS FLUCTUATE: ICD-10-CM

## 2024-03-13 PROBLEM — F02.A18 MILD LEWY BODY DEMENTIA WITH OTHER BEHAVIORAL DISTURBANCE: Status: ACTIVE | Noted: 2024-02-22

## 2024-03-13 PROCEDURE — 3288F FALL RISK ASSESSMENT DOCD: CPT | Mod: CPTII,S$GLB,, | Performed by: NURSE PRACTITIONER

## 2024-03-13 PROCEDURE — 1101F PT FALLS ASSESS-DOCD LE1/YR: CPT | Mod: CPTII,S$GLB,, | Performed by: NURSE PRACTITIONER

## 2024-03-13 PROCEDURE — 1160F RVW MEDS BY RX/DR IN RCRD: CPT | Mod: CPTII,S$GLB,, | Performed by: NURSE PRACTITIONER

## 2024-03-13 PROCEDURE — 1126F AMNT PAIN NOTED NONE PRSNT: CPT | Mod: CPTII,S$GLB,, | Performed by: NURSE PRACTITIONER

## 2024-03-13 PROCEDURE — 3048F LDL-C <100 MG/DL: CPT | Mod: CPTII,S$GLB,, | Performed by: NURSE PRACTITIONER

## 2024-03-13 PROCEDURE — 3044F HG A1C LEVEL LT 7.0%: CPT | Mod: CPTII,S$GLB,, | Performed by: NURSE PRACTITIONER

## 2024-03-13 PROCEDURE — 1170F FXNL STATUS ASSESSED: CPT | Mod: CPTII,S$GLB,, | Performed by: NURSE PRACTITIONER

## 2024-03-13 PROCEDURE — 3075F SYST BP GE 130 - 139MM HG: CPT | Mod: CPTII,S$GLB,, | Performed by: NURSE PRACTITIONER

## 2024-03-13 PROCEDURE — 99499 UNLISTED E&M SERVICE: CPT | Mod: S$GLB,,, | Performed by: NURSE PRACTITIONER

## 2024-03-13 PROCEDURE — G0439 PPPS, SUBSEQ VISIT: HCPCS | Mod: S$GLB,,, | Performed by: NURSE PRACTITIONER

## 2024-03-13 PROCEDURE — 3079F DIAST BP 80-89 MM HG: CPT | Mod: CPTII,S$GLB,, | Performed by: NURSE PRACTITIONER

## 2024-03-13 PROCEDURE — 1159F MED LIST DOCD IN RCRD: CPT | Mod: CPTII,S$GLB,, | Performed by: NURSE PRACTITIONER

## 2024-03-13 NOTE — PATIENT INSTRUCTIONS
Counseling and Referral of Other Preventative  (Italic type indicates deductible and co-insurance are waived)    Patient Name: Alisia Gusman  Today's Date: 3/13/2024    Health Maintenance       Date Due Completion Date    RSV Vaccine (Age 60+ and Pregnant patients) (1 - 1-dose 60+ series) Never done ---    Shingles Vaccine (2 of 2) 09/28/2022 8/3/2022    COVID-19 Vaccine (5 - 2023-24 season) 09/01/2023 8/3/2022    Colonoscopy 05/08/2024 5/8/2019    Override on 9/16/2010: Done    Hemoglobin A1c 05/15/2024 11/15/2023    Lipid Panel 11/15/2024 11/15/2023    Diabetes Urine Screening 11/16/2024 11/16/2023    Mammogram 03/06/2025 3/6/2024    Eye Exam 03/06/2025 3/6/2024    Override on 9/14/2020: Done    Override on 6/11/2019: Done (Dr Butts on Gen deglle)    Override on 3/16/2016: Done (seen by outside MD - Dr. Kike Butts)    Override on 6/1/2015: Done    Override on 2/7/2014: Done    Override on 1/29/2013: (N/S)    DEXA Scan 05/23/2026 5/23/2023    Override on 9/6/2011: Done    TETANUS VACCINE 05/31/2026 5/31/2016 (N/S)    Override on 5/31/2016: (N/S) (presciption given)        No orders of the defined types were placed in this encounter.    The following information is provided to all patients.  This information is to help you find resources for any of the problems found today that may be affecting your health:                  Living healthy guide: www.[facility].[place].gov      Understanding Diabetes: www.diabetes.org      Eating healthy: www.cdc.gov/healthyweight      CDC home safety checklist: www.cdc.gov/steadi/patient.html      Agency on Aging: www.goea.[place].[facility]      Alcoholics anonymous (AA): www.aa.org      Physical Activity: www.osvaldo.nih.gov/yw3yeth      Tobacco use: www.quitwithusla.org

## 2024-03-13 NOTE — PROGRESS NOTES
"  Alisia Gusman presented for a follow-up Medicare AWV today. The following components were reviewed and updated:    Medical history  Family History  Social history  Allergies and Current Medications  Health Risk Assessment  Health Maintenance  Care Team    **See Completed Assessments for Annual Wellness visit with in the encounter summary    The following assessments were completed:  Depression Screening  Cognitive function Screening-deferred  Timed Get Up Test  Whisper Test      Opioid documentation:      Patient does not have a current opioid prescription.          Vitals:    03/13/24 1010   BP: 130/80   Pulse: 80   Weight: 64 kg (141 lb)   Height: 5' 2" (1.575 m)     Body mass index is 25.79 kg/m².       Physical Exam  Constitutional:       Appearance: Normal appearance.   HENT:      Head: Normocephalic.   Pulmonary:      Effort: Pulmonary effort is normal.   Musculoskeletal:      Right lower leg: No edema.      Left lower leg: No edema.   Skin:     General: Skin is warm and dry.   Neurological:      Mental Status: She is alert. Mental status is at baseline.   Psychiatric:         Mood and Affect: Mood normal.         Behavior: Behavior normal.           Diagnoses and health risks identified today and associated recommendations/orders:  1. Encounter for preventive health examination  Assessments completed.   recommendations reviewed.  F/u with PCP as instructed.        2. Aortic atherosclerosis  Chronic, stable on current regimen. Followed by PCP      3. Hyperlipidemia associated with type 2 diabetes mellitus  Chronic, stable on current regimen. Followed by PCP      4. Carcinoma of axillary tail of left breast in female, estrogen receptor positive  Stable on current regimen. Followed by Heme/Onc      5. Meningioma  Chronic, stable on current regimen. Followed by PCP/Neurology      6. Gastroesophageal reflux disease, unspecified whether esophagitis present  Stable    7. Hyperparathyroidism  Chronic, stable on " current regimen. Followed by PCP      8. Parkinson's disease, unspecified whether dyskinesia present, unspecified whether manifestations fluctuate  Followed by PCP/Neurology      9. Diabetic peripheral neuropathy associated with type 2 diabetes mellitus  Chronic, stable on current regimen. Last A1C 6.8%  Followed by PCP        Provided Alisia with a 5-10 year written screening schedule and personal prevention plan. Recommendations were developed using the USPSTF age appropriate recommendations. Education, counseling, and referrals were provided as needed.  After Visit Summary printed and given to patient which includes a list of additional screenings\tests needed.    Follow up in about 1 year (around 3/13/2025) for Medicare AWV.      Porsha Obrien NP  h as use of life sustaining treatments such as ventilators and tube feeding when faced with a life limiting illness recorded on a living will that they will need to know. (How you want to be cared for as you near the end of your natural life)     X  Patient is unable to engage in a discussion regarding advanced directives due to severe physical and/or cognitive impairment.

## 2024-03-14 ENCOUNTER — TELEPHONE (OUTPATIENT)
Dept: PRIMARY CARE CLINIC | Facility: CLINIC | Age: 79
End: 2024-03-14
Payer: MEDICARE

## 2024-03-14 NOTE — TELEPHONE ENCOUNTER
Called pt # [phone], it is actually daughter Lennie's number. Let pt daughter know that we received a call from pt stating that pt is confused and thought maybe someone was in the home, and pt did not know if she took insulin today.   Advised / asked if someone could go and check on pt, and that I would call Ms Alisia Hogue stated she would call pt/ her mom, and also have someone go in and check on her.

## 2024-03-14 NOTE — TELEPHONE ENCOUNTER
----- Message from Krystyna Lee sent at 3/14/2024 10:55 AM CDT -----  Contact: Pt [phone]  Pt called and stated that she has someone coming to her home and is taking advantage of her but she could not tell me who it was.She said that it is a lady with the HH and she does not know her name. She stated that she alone.  She is also stating that she does not remember taking her insulin and wanted to know should she take it. She seems very confused.     Please call and advise

## 2024-03-26 ENCOUNTER — OFFICE VISIT (OUTPATIENT)
Dept: PODIATRY | Facility: CLINIC | Age: 79
End: 2024-03-26
Payer: MEDICARE

## 2024-03-26 VITALS
DIASTOLIC BLOOD PRESSURE: 84 MMHG | BODY MASS INDEX: 25.15 KG/M2 | HEIGHT: 62 IN | SYSTOLIC BLOOD PRESSURE: 178 MMHG | WEIGHT: 136.69 LBS | HEART RATE: 76 BPM

## 2024-03-26 DIAGNOSIS — L84 CORN OR CALLUS: ICD-10-CM

## 2024-03-26 DIAGNOSIS — E11.49 TYPE II DIABETES MELLITUS WITH NEUROLOGICAL MANIFESTATIONS: Primary | ICD-10-CM

## 2024-03-26 PROCEDURE — 99499 UNLISTED E&M SERVICE: CPT | Mod: HCNC,S$GLB,, | Performed by: PODIATRIST

## 2024-03-26 PROCEDURE — 11721 DEBRIDE NAIL 6 OR MORE: CPT | Mod: 59,Q9,HCNC,S$GLB | Performed by: PODIATRIST

## 2024-03-26 PROCEDURE — 99999 PR PBB SHADOW E&M-EST. PATIENT-LVL IV: CPT | Mod: PBBFAC,HCNC,, | Performed by: PODIATRIST

## 2024-03-26 PROCEDURE — 11056 PARNG/CUTG B9 HYPRKR LES 2-4: CPT | Mod: Q9,HCNC,S$GLB, | Performed by: PODIATRIST

## 2024-03-27 NOTE — ASSESSMENT & PLAN NOTE
Dapsone Counseling: I discussed with the patient the risks of dapsone including but not limited to hemolytic anemia, agranulocytosis, rashes, methemoglobinemia, kidney failure, peripheral neuropathy, headaches, GI upset, and liver toxicity.  Patients who start dapsone require monitoring including baseline LFTs and weekly CBCs for the first month, then every month thereafter.  The patient verbalized understanding of the proper use and possible adverse effects of dapsone.  All of the patient's questions and concerns were addressed. Reportedly took double to a week's worth of her home medications prior to arrival 2/2 altered mental status: lyrica, anastrozole, famotidine, atenolol, and pravastatin. CMP, INR WNL. Mental status now improved with treatment of sepsis.   Winlevi Pregnancy And Lactation Text: This medication is considered safe during pregnancy and breastfeeding. Isotretinoin Pregnancy And Lactation Text: This medication is Pregnancy Category X and is considered extremely dangerous during pregnancy. It is unknown if it is excreted in breast milk. Use Enhanced Medication Counseling?: No Spironolactone Counseling: Patient advised regarding risks of diarrhea, abdominal pain, hyperkalemia, birth defects (for female patients), liver toxicity and renal toxicity. The patient may need blood work to monitor liver and kidney function and potassium levels while on therapy. The patient verbalized understanding of the proper use and possible adverse effects of spironolactone.  All of the patient's questions and concerns were addressed. Topical Retinoid counseling:  Patient advised to apply a pea-sized amount only at bedtime and wait 30 minutes after washing their face before applying.  If too drying, patient may add a non-comedogenic moisturizer. The patient verbalized understanding of the proper use and possible adverse effects of retinoids.  All of the patient's questions and concerns were addressed. Topical Sulfur Applications Pregnancy And Lactation Text: This medication is Pregnancy Category C and has an unknown safety profile during pregnancy. It is unknown if this topical medication is excreted in breast milk. Birth Control Pills Counseling: Birth Control Pill Counseling: I discussed with the patient the potential side effects of OCPs including but not limited to increased risk of stroke, heart attack, thrombophlebitis, deep venous thrombosis, hepatic adenomas, breast changes, GI upset, headaches, and depression.  The patient verbalized understanding of the proper use and possible adverse effects of OCPs. All of the patient's questions and concerns were addressed. Erythromycin Pregnancy And Lactation Text: This medication is Pregnancy Category B and is considered safe during pregnancy. It is also excreted in breast milk. Sarecycline Counseling: Patient advised regarding possible photosensitivity and discoloration of the teeth, skin, lips, tongue and gums.  Patient instructed to avoid sunlight, if possible.  When exposed to sunlight, patients should wear protective clothing, sunglasses, and sunscreen.  The patient was instructed to call the office immediately if the following severe adverse effects occur:  hearing changes, easy bruising/bleeding, severe headache, or vision changes.  The patient verbalized understanding of the proper use and possible adverse effects of sarecycline.  All of the patient's questions and concerns were addressed. Tazorac Counseling:  Patient advised that medication is irritating and drying.  Patient may need to apply sparingly and wash off after an hour before eventually leaving it on overnight.  The patient verbalized understanding of the proper use and possible adverse effects of tazorac.  All of the patient's questions and concerns were addressed. Aklief Pregnancy And Lactation Text: It is unknown if this medication is safe to use during pregnancy.  It is unknown if this medication is excreted in breast milk.  Breastfeeding women should use the topical cream on the smallest area of the skin for the shortest time needed while breastfeeding.  Do not apply to nipple and areola. Bactrim Counseling:  I discussed with the patient the risks of sulfa antibiotics including but not limited to GI upset, allergic reaction, drug rash, diarrhea, dizziness, photosensitivity, and yeast infections.  Rarely, more serious reactions can occur including but not limited to aplastic anemia, agranulocytosis, methemoglobinemia, blood dyscrasias, liver or kidney failure, lung infiltrates or desquamative/blistering drug rashes. Tetracycline Pregnancy And Lactation Text: This medication is Pregnancy Category D and not consider safe during pregnancy. It is also excreted in breast milk. Doxycycline Pregnancy And Lactation Text: This medication is Pregnancy Category D and not consider safe during pregnancy. It is also excreted in breast milk but is considered safe for shorter treatment courses. Minocycline Counseling: Patient advised regarding possible photosensitivity and discoloration of the teeth, skin, lips, tongue and gums.  Patient instructed to avoid sunlight, if possible.  When exposed to sunlight, patients should wear protective clothing, sunglasses, and sunscreen.  The patient was instructed to call the office immediately if the following severe adverse effects occur:  hearing changes, easy bruising/bleeding, severe headache, or vision changes.  The patient verbalized understanding of the proper use and possible adverse effects of minocycline.  All of the patient's questions and concerns were addressed. Dapsone Pregnancy And Lactation Text: This medication is Pregnancy Category C and is not considered safe during pregnancy or breast feeding. Azithromycin Counseling:  I discussed with the patient the risks of azithromycin including but not limited to GI upset, allergic reaction, drug rash, diarrhea, and yeast infections. Topical Clindamycin Pregnancy And Lactation Text: This medication is Pregnancy Category B and is considered safe during pregnancy. It is unknown if it is excreted in breast milk. Spironolactone Pregnancy And Lactation Text: This medication can cause feminization of the male fetus and should be avoided during pregnancy. The active metabolite is also found in breast milk. Benzoyl Peroxide Counseling: Patient counseled that medicine may cause skin irritation and bleach clothing.  In the event of skin irritation, the patient was advised to reduce the amount of the drug applied or use it less frequently.   The patient verbalized understanding of the proper use and possible adverse effects of benzoyl peroxide.  All of the patient's questions and concerns were addressed. Tazorac Pregnancy And Lactation Text: This medication is not safe during pregnancy. It is unknown if this medication is excreted in breast milk. Isotretinoin Counseling: Patient should get monthly blood tests, not donate blood, not drive at night if vision affected, not share medication, and not undergo elective surgery for 6 months after tx completed. Side effects reviewed, pt to contact office should one occur. Azelaic Acid Counseling: Patient counseled that medicine may cause skin irritation and to avoid applying near the eyes.  In the event of skin irritation, the patient was advised to reduce the amount of the drug applied or use it less frequently.   The patient verbalized understanding of the proper use and possible adverse effects of azelaic acid.  All of the patient's questions and concerns were addressed. High Dose Vitamin A Counseling: Side effects reviewed, pt to contact office should one occur. Bactrim Pregnancy And Lactation Text: This medication is Pregnancy Category D and is known to cause fetal risk.  It is also excreted in breast milk. Detail Level: Detailed Winlevi Counseling:  I discussed with the patient the risks of topical clascoterone including but not limited to erythema, scaling, itching, and stinging. Patient voiced their understanding. Birth Control Pills Pregnancy And Lactation Text: This medication should be avoided if pregnant and for the first 30 days post-partum. Benzoyl Peroxide Pregnancy And Lactation Text: This medication is Pregnancy Category C. It is unknown if benzoyl peroxide is excreted in breast milk. Topical Sulfur Applications Counseling: Topical Sulfur Counseling: Patient counseled that this medication may cause skin irritation or allergic reactions.  In the event of skin irritation, the patient was advised to reduce the amount of the drug applied or use it less frequently.   The patient verbalized understanding of the proper use and possible adverse effects of topical sulfur application.  All of the patient's questions and concerns were addressed. Aklief counseling:  Patient advised to apply a pea-sized amount only at bedtime and wait 30 minutes after washing their face before applying.  If too drying, patient may add a non-comedogenic moisturizer.  The most commonly reported side effects including irritation, redness, scaling, dryness, stinging, burning, itching, and increased risk of sunburn.  The patient verbalized understanding of the proper use and possible adverse effects of retinoids.  All of the patient's questions and concerns were addressed. Topical Retinoid Pregnancy And Lactation Text: This medication is Pregnancy Category C. It is unknown if this medication is excreted in breast milk. Erythromycin Counseling:  I discussed with the patient the risks of erythromycin including but not limited to GI upset, allergic reaction, drug rash, diarrhea, increase in liver enzymes, and yeast infections. Topical Clindamycin Counseling: Patient counseled that this medication may cause skin irritation or allergic reactions.  In the event of skin irritation, the patient was advised to reduce the amount of the drug applied or use it less frequently.   The patient verbalized understanding of the proper use and possible adverse effects of clindamycin.  All of the patient's questions and concerns were addressed. Azelaic Acid Pregnancy And Lactation Text: This medication is considered safe during pregnancy and breast feeding. Azithromycin Pregnancy And Lactation Text: This medication is considered safe during pregnancy and is also secreted in breast milk. Tetracycline Counseling: Patient counseled regarding possible photosensitivity and increased risk for sunburn.  Patient instructed to avoid sunlight, if possible.  When exposed to sunlight, patients should wear protective clothing, sunglasses, and sunscreen.  The patient was instructed to call the office immediately if the following severe adverse effects occur:  hearing changes, easy bruising/bleeding, severe headache, or vision changes.  The patient verbalized understanding of the proper use and possible adverse effects of tetracycline.  All of the patient's questions and concerns were addressed. Patient understands to avoid pregnancy while on therapy due to potential birth defects. Doxycycline Counseling:  Patient counseled regarding possible photosensitivity and increased risk for sunburn.  Patient instructed to avoid sunlight, if possible.  When exposed to sunlight, patients should wear protective clothing, sunglasses, and sunscreen.  The patient was instructed to call the office immediately if the following severe adverse effects occur:  hearing changes, easy bruising/bleeding, severe headache, or vision changes.  The patient verbalized understanding of the proper use and possible adverse effects of doxycycline.  All of the patient's questions and concerns were addressed. High Dose Vitamin A Pregnancy And Lactation Text: High dose vitamin A therapy is contraindicated during pregnancy and breast feeding.

## 2024-04-24 ENCOUNTER — HOSPITAL ENCOUNTER (EMERGENCY)
Facility: HOSPITAL | Age: 79
Discharge: HOME | End: 2024-04-24
Attending: STUDENT IN AN ORGANIZED HEALTH CARE EDUCATION/TRAINING PROGRAM
Payer: MEDICARE

## 2024-04-24 VITALS
WEIGHT: 130 LBS | HEIGHT: 62 IN | TEMPERATURE: 98 F | OXYGEN SATURATION: 100 % | DIASTOLIC BLOOD PRESSURE: 82 MMHG | BODY MASS INDEX: 23.92 KG/M2 | HEART RATE: 97 BPM | RESPIRATION RATE: 16 BRPM | SYSTOLIC BLOOD PRESSURE: 128 MMHG

## 2024-04-24 DIAGNOSIS — K63.89 COLONIC MASS: ICD-10-CM

## 2024-04-24 DIAGNOSIS — K57.20 DIVERTICULITIS OF LARGE INTESTINE WITH PERFORATION WITHOUT ABSCESS OR BLEEDING: Primary | ICD-10-CM

## 2024-04-24 LAB
ALBUMIN SERPL BCP-MCNC: 3.5 G/DL (ref 3.5–5.2)
ALP SERPL-CCNC: 61 U/L (ref 55–135)
ALT SERPL W/O P-5'-P-CCNC: 18 U/L (ref 10–44)
ANION GAP SERPL CALC-SCNC: 13 MMOL/L (ref 8–16)
AST SERPL-CCNC: 33 U/L (ref 10–40)
BACTERIA #/AREA URNS AUTO: ABNORMAL /HPF
BASOPHILS # BLD AUTO: 0.02 K/UL (ref 0–0.2)
BASOPHILS NFR BLD: 0.2 % (ref 0–1.9)
BILIRUB SERPL-MCNC: 2.6 MG/DL (ref 0.1–1)
BILIRUB UR QL STRIP: NEGATIVE
BNP SERPL-MCNC: 77 PG/ML (ref 0–99)
BUN SERPL-MCNC: 20 MG/DL (ref 8–23)
CALCIUM SERPL-MCNC: 10 MG/DL (ref 8.7–10.5)
CHLORIDE SERPL-SCNC: 101 MMOL/L (ref 95–110)
CLARITY UR REFRACT.AUTO: ABNORMAL
CO2 SERPL-SCNC: 22 MMOL/L (ref 23–29)
COLOR UR AUTO: YELLOW
CREAT SERPL-MCNC: 0.8 MG/DL (ref 0.5–1.4)
DIFFERENTIAL METHOD BLD: ABNORMAL
EOSINOPHIL # BLD AUTO: 0 K/UL (ref 0–0.5)
EOSINOPHIL NFR BLD: 0.4 % (ref 0–8)
ERYTHROCYTE [DISTWIDTH] IN BLOOD BY AUTOMATED COUNT: 12.6 % (ref 11.5–14.5)
EST. GFR  (NO RACE VARIABLE): >60 ML/MIN/1.73 M^2
GLUCOSE SERPL-MCNC: 199 MG/DL (ref 70–110)
GLUCOSE UR QL STRIP: ABNORMAL
HCT VFR BLD AUTO: 42.1 % (ref 37–48.5)
HCV AB SERPL QL IA: NORMAL
HGB BLD-MCNC: 14.5 G/DL (ref 12–16)
HGB UR QL STRIP: NEGATIVE
HIV 1+2 AB+HIV1 P24 AG SERPL QL IA: NORMAL
HYALINE CASTS UR QL AUTO: 0 /LPF
IMM GRANULOCYTES # BLD AUTO: 0.05 K/UL (ref 0–0.04)
IMM GRANULOCYTES NFR BLD AUTO: 0.6 % (ref 0–0.5)
INFLUENZA A, MOLECULAR: NEGATIVE
INFLUENZA B, MOLECULAR: NEGATIVE
KETONES UR QL STRIP: ABNORMAL
LEUKOCYTE ESTERASE UR QL STRIP: ABNORMAL
LIPASE SERPL-CCNC: 11 U/L (ref 4–60)
LYMPHOCYTES # BLD AUTO: 1.6 K/UL (ref 1–4.8)
LYMPHOCYTES NFR BLD: 20.2 % (ref 18–48)
MAGNESIUM SERPL-MCNC: 1.9 MG/DL (ref 1.6–2.6)
MCH RBC QN AUTO: 30.5 PG (ref 27–31)
MCHC RBC AUTO-ENTMCNC: 34.4 G/DL (ref 32–36)
MCV RBC AUTO: 89 FL (ref 82–98)
MICROSCOPIC COMMENT: ABNORMAL
MONOCYTES # BLD AUTO: 0.5 K/UL (ref 0.3–1)
MONOCYTES NFR BLD: 6.6 % (ref 4–15)
NEUTROPHILS # BLD AUTO: 5.8 K/UL (ref 1.8–7.7)
NEUTROPHILS NFR BLD: 72 % (ref 38–73)
NITRITE UR QL STRIP: NEGATIVE
NON-SQ EPI CELLS #/AREA URNS AUTO: 3 /HPF
NRBC BLD-RTO: 0 /100 WBC
OHS QRS DURATION: 70 MS
OHS QTC CALCULATION: 446 MS
PH UR STRIP: 5 [PH] (ref 5–8)
PLATELET # BLD AUTO: 158 K/UL (ref 150–450)
PMV BLD AUTO: 11 FL (ref 9.2–12.9)
POCT GLUCOSE: 210 MG/DL (ref 70–110)
POTASSIUM SERPL-SCNC: 4.5 MMOL/L (ref 3.5–5.1)
PROT SERPL-MCNC: 7.9 G/DL (ref 6–8.4)
PROT UR QL STRIP: ABNORMAL
RBC # BLD AUTO: 4.75 M/UL (ref 4–5.4)
RBC #/AREA URNS AUTO: 13 /HPF (ref 0–4)
SARS-COV-2 RDRP RESP QL NAA+PROBE: NEGATIVE
SODIUM SERPL-SCNC: 136 MMOL/L (ref 136–145)
SP GR UR STRIP: 1.02 (ref 1–1.03)
SPECIMEN SOURCE: NORMAL
SQUAMOUS #/AREA URNS AUTO: 11 /HPF
TROPONIN I SERPL DL<=0.01 NG/ML-MCNC: <0.006 NG/ML (ref 0–0.03)
TROPONIN I SERPL DL<=0.01 NG/ML-MCNC: <0.006 NG/ML (ref 0–0.03)
URN SPEC COLLECT METH UR: ABNORMAL
WBC # BLD AUTO: 8.06 K/UL (ref 3.9–12.7)
WBC #/AREA URNS AUTO: 72 /HPF (ref 0–5)

## 2024-04-24 PROCEDURE — 87389 HIV-1 AG W/HIV-1&-2 AB AG IA: CPT | Mod: HCNC | Performed by: PHYSICIAN ASSISTANT

## 2024-04-24 PROCEDURE — 63600175 PHARM REV CODE 636 W HCPCS: Mod: HCNC | Performed by: PHYSICIAN ASSISTANT

## 2024-04-24 PROCEDURE — 25000003 PHARM REV CODE 250: Mod: HCNC | Performed by: PHYSICIAN ASSISTANT

## 2024-04-24 PROCEDURE — 96374 THER/PROPH/DIAG INJ IV PUSH: CPT | Mod: HCNC

## 2024-04-24 PROCEDURE — 99285 EMERGENCY DEPT VISIT HI MDM: CPT | Mod: 25,HCNC

## 2024-04-24 PROCEDURE — 83690 ASSAY OF LIPASE: CPT | Mod: HCNC | Performed by: PHYSICIAN ASSISTANT

## 2024-04-24 PROCEDURE — 84484 ASSAY OF TROPONIN QUANT: CPT | Mod: HCNC | Performed by: PHYSICIAN ASSISTANT

## 2024-04-24 PROCEDURE — U0002 COVID-19 LAB TEST NON-CDC: HCPCS | Mod: HCNC | Performed by: PHYSICIAN ASSISTANT

## 2024-04-24 PROCEDURE — 85025 COMPLETE CBC W/AUTO DIFF WBC: CPT | Mod: HCNC | Performed by: PHYSICIAN ASSISTANT

## 2024-04-24 PROCEDURE — 83735 ASSAY OF MAGNESIUM: CPT | Mod: HCNC | Performed by: PHYSICIAN ASSISTANT

## 2024-04-24 PROCEDURE — 87502 INFLUENZA DNA AMP PROBE: CPT | Mod: HCNC | Performed by: PHYSICIAN ASSISTANT

## 2024-04-24 PROCEDURE — 25500020 PHARM REV CODE 255: Mod: HCNC | Performed by: STUDENT IN AN ORGANIZED HEALTH CARE EDUCATION/TRAINING PROGRAM

## 2024-04-24 PROCEDURE — 80053 COMPREHEN METABOLIC PANEL: CPT | Mod: HCNC | Performed by: PHYSICIAN ASSISTANT

## 2024-04-24 PROCEDURE — 93005 ELECTROCARDIOGRAM TRACING: CPT | Mod: HCNC

## 2024-04-24 PROCEDURE — 86803 HEPATITIS C AB TEST: CPT | Mod: HCNC | Performed by: PHYSICIAN ASSISTANT

## 2024-04-24 PROCEDURE — 81001 URINALYSIS AUTO W/SCOPE: CPT | Mod: HCNC | Performed by: PHYSICIAN ASSISTANT

## 2024-04-24 PROCEDURE — 87086 URINE CULTURE/COLONY COUNT: CPT | Mod: HCNC | Performed by: PHYSICIAN ASSISTANT

## 2024-04-24 PROCEDURE — 84484 ASSAY OF TROPONIN QUANT: CPT | Mod: 91,HCNC | Performed by: PHYSICIAN ASSISTANT

## 2024-04-24 PROCEDURE — 93010 ELECTROCARDIOGRAM REPORT: CPT | Mod: HCNC,,, | Performed by: INTERNAL MEDICINE

## 2024-04-24 PROCEDURE — 83880 ASSAY OF NATRIURETIC PEPTIDE: CPT | Mod: HCNC | Performed by: PHYSICIAN ASSISTANT

## 2024-04-24 PROCEDURE — 96361 HYDRATE IV INFUSION ADD-ON: CPT | Mod: HCNC

## 2024-04-24 PROCEDURE — 82962 GLUCOSE BLOOD TEST: CPT | Mod: HCNC

## 2024-04-24 RX ORDER — ONDANSETRON HYDROCHLORIDE 2 MG/ML
4 INJECTION, SOLUTION INTRAVENOUS
Status: COMPLETED | OUTPATIENT
Start: 2024-04-24 | End: 2024-04-24

## 2024-04-24 RX ORDER — ONDANSETRON 4 MG/1
4 TABLET, ORALLY DISINTEGRATING ORAL EVERY 6 HOURS PRN
Qty: 15 TABLET | Refills: 0 | Status: SHIPPED | OUTPATIENT
Start: 2024-04-24 | End: 2024-05-14

## 2024-04-24 RX ORDER — AMOXICILLIN AND CLAVULANATE POTASSIUM 875; 125 MG/1; MG/1
1 TABLET, FILM COATED ORAL
Status: COMPLETED | OUTPATIENT
Start: 2024-04-24 | End: 2024-04-24

## 2024-04-24 RX ORDER — AMOXICILLIN AND CLAVULANATE POTASSIUM 875; 125 MG/1; MG/1
1 TABLET, FILM COATED ORAL 2 TIMES DAILY
Qty: 20 TABLET | Refills: 0 | Status: SHIPPED | OUTPATIENT
Start: 2024-04-24 | End: 2024-05-14

## 2024-04-24 RX ADMIN — ONDANSETRON 4 MG: 2 INJECTION INTRAMUSCULAR; INTRAVENOUS at 03:04

## 2024-04-24 RX ADMIN — SODIUM CHLORIDE 500 ML: 9 INJECTION, SOLUTION INTRAVENOUS at 03:04

## 2024-04-24 RX ADMIN — AMOXICILLIN AND CLAVULANATE POTASSIUM 1 TABLET: 875; 125 TABLET, FILM COATED ORAL at 05:04

## 2024-04-24 RX ADMIN — IOHEXOL 75 ML: 350 INJECTION, SOLUTION INTRAVENOUS at 04:04

## 2024-04-24 NOTE — PLAN OF CARE
04/24/24 1741   Post-Acute Status   Post-Acute Authorization Home Health   Home Health Status Referrals Sent   Discharge Delays None known at this time   Discharge Plan   Discharge Plan A Home Health     Met with patient and adult daughter Lennie via phone to review discharge recommendation of home health and is agreeable to plan    Patient/family provided list of facilities in-network with patient's payor plan. Providers that are owned, operated, or affiliated with [facility] are included on the list.     Notified that referral sent to below listed facilities from in-network list based on proximity to home/family support:   [facility]  2.[facility]  3.[facility]  4.[facility]  5. [facility]    Patient/family instructed to identify preference.    Preferred Facility: (if more than 1, listed in order of descending preference)  No preference    If an additional preferred facility not listed above is identified, additional referral to be sent. If above facilities unable to accept, will send additional referrals to in-network providers.     Discharge Plan A and Plan B have been determined by review of patient's clinical status, future medical and therapeutic needs, and coverage/benefits for post-acute care in coordination with multidisciplinary team members.    Milka Rae, JAVIER, MSW, LMSW, RSW   Case Management  [facility]  Email: [email or web address]

## 2024-04-24 NOTE — PROVIDER PROGRESS NOTES - EMERGENCY DEPT.
Encounter Date: 4/24/2024    ED Physician Progress Notes          ED Physician Hand-off Note:    ED Course: I assumed care of patient from off-going ED physician team. Briefly, Patient is an 78 year old female presenting for vague abdominal discomfort and not feeling well    At the time of signout plan was pending CT abdomen pelvis, UA, 2nd troponin.    Medications given in the ED:    Medications   amoxicillin-clavulanate 875-125mg per tablet 1 tablet (has no administration in time range)   sodium chloride 0.9% bolus 500 mL 500 mL (0 mLs Intravenous Stopped 4/24/24 1601)   ondansetron injection 4 mg (4 mg Intravenous Given 4/24/24 1501)   iohexoL (OMNIPAQUE 350) injection 75 mL (75 mLs Intravenous Given 4/24/24 1605)     CT is concerning for acute diverticulitis as well as possible colonic mass concerning for malignancy.  I spoke with the patient's granddaughter Teri Figueroa [phone] as well as her daughter Lennie Miranda with her permission who is her POA at [phone].  Daughter has some concern about her living at home alone with dementia.  She is worried that she might be not taking her medication or taking duplicate medications.    I discussed this patient with ED  Milka who spoke with the family and gave them some options for home placement and home health.    I discussed the results of CT scan with the patient as well as her daughter.  She is feeling much better after therapy and is tolerating p.o. intake.  Second troponin is negative.  Will discharge with referral to GI, Colorectal surgery and instructions to return to the ED for worsening symptoms. Stressed the importance of follow-up, strict ED return precautions given.  Patient voiced understanding and is comfortable with discharge.

## 2024-04-24 NOTE — PLAN OF CARE
"[facility]      HOME HEALTH ORDERS  FACE TO FACE ENCOUNTER    Patient Name: Alisia Gusman  YOB: 1945    PCP: Selena Montemayor MD   PCP Address: [address] / [place]  PCP Phone Number: [phone]  PCP Fax: [phone]    Encounter Date: 4/24/24    Admit to Home Health    Diagnoses:  There are no hospital problems to display for this patient.      Follow Up Appointments:  Future Appointments   Date Time Provider Department Center   5/14/2024 10:20 AM Selena Montemayor MD [id number]   7/3/2024 10:00 AM Emily Dao DPM [facility]       Allergies:  Review of patient's allergies indicates:   Allergen Reactions    Grass pollen-june grass standard Other (See Comments)     Causes sinus symptoms like coughing    Losartan      cough    Nubain [nalbuphine] Other (See Comments)     Other reaction(s): Hypotension    Antihistamines - alkylamine     Sinus & allergy [chlorpheniramine-phenylephrine]        Medications: Review discharge medications with patient and family and provide education.    No current facility-administered medications for this encounter.     Current Outpatient Medications   Medication Sig Dispense Refill    ACCU-CHEK GUIDE TEST STRIPS Strp TEST BLOOD SUGAR THREE TIMES DAILY 300 strip 10    alcohol swabs (BD ALCOHOL SWABS) PadM Apply 1 each topically once daily. 100 each 3    amLODIPine (NORVASC) 5 MG tablet Take 1 tablet (5 mg total) by mouth once daily. 90 tablet 3    amoxicillin-clavulanate 875-125mg (AUGMENTIN) 875-125 mg per tablet Take 1 tablet by mouth 2 (two) times daily. for 10 days 20 tablet 0    anastrozole (ARIMIDEX) 1 mg Tab TAKE 1 TABLET EVERY DAY 90 tablet 3    atorvastatin (LIPITOR) 40 MG tablet TAKE 1 TABLET EVERY DAY 90 tablet 3    BD INSULIN SYRINGE ULTRA-FINE 1 mL 31 gauge x 5/16 Syrg TO USE  TWICE DAILY WITH  INSULIN 100 each 11    BD VEO INSULIN SYRINGE UF 0.3 mL 31 gauge x 15/64" Syrg USE  TO INJECT TWICE DAILY 200 " "Syringe 3    blood-glucose meter (ACCU-CHEK DOMENICO PLUS METER) Misc USE AS DIRECTED 1 each 1    carvediloL (COREG) 12.5 MG tablet TAKE 1 TABLET TWICE DAILY WITH MEALS 180 tablet 3    cyanocobalamin-cobamamide (B-12 PLUS) 5,000-100 mcg Subl Take 5000 mcg daily. 90 tablet 3    ELIQUIS 5 mg Tab TAKE 1 TABLET TWICE DAILY 180 tablet 3    famotidine (PEPCID) 20 MG tablet TAKE 1 TABLET EVERY EVENING 90 tablet 10    FOLIC ACID/MULTIVIT-MIN/LUTEIN (CENTRUM SILVER ORAL) Take 1 tablet by mouth once daily.      furosemide (LASIX) 20 MG tablet TAKE 1 TABLET EVERY DAY AS NEEDED FOR LEG SWELLING 7 tablet 10    GADAVIST 1 mmol/mL (604.72 mg/mL) Soln injection       ketoconazole (NIZORAL) 2 % cream Apply topically once daily. Apply daily to affected toenail for 6-12 months 60 g 4    LANTUS SOLOSTAR U-100 INSULIN glargine 100 units/mL SubQ pen INJECT 18 UNITS INTO THE SKIN ONCE DAILY. 30 mL 3    ondansetron (ZOFRAN-ODT) 4 MG TbDL Take 1 tablet (4 mg total) by mouth every 6 (six) hours as needed. 15 tablet 0    pen needle, diabetic (DROPLET PEN NEEDLE) 32 gauge x 5/32" Ndle USE TO INJECT ONE TIME DAILY 100 each 3    QUEtiapine (SEROQUEL) 50 MG tablet TAKE 1 AND 1/2 TABLETS EVERY EVENING 135 tablet 3    semaglutide (OZEMPIC) 1 mg/dose (4 mg/3 mL) INJECT 1MG UNDER THE SKIN EVERY 7 DAYS. 9 each 3    suvorexant (BELSOMRA) 10 mg Tab Take 1 tablet by mouth every evening. 30 tablet 3    valACYclovir (VALTREX) 1000 MG tablet Take 1 tablet (1,000 mg total) by mouth 3 (three) times daily. for 7 days 21 tablet 0     Current Discharge Medication List        START taking these medications    Details   amoxicillin-clavulanate 875-125mg (AUGMENTIN) 875-125 mg per tablet Take 1 tablet by mouth 2 (two) times daily. for 10 days  Qty: 20 tablet, Refills: 0      ondansetron (ZOFRAN-ODT) 4 MG TbDL Take 1 tablet (4 mg total) by mouth every 6 (six) hours as needed.  Qty: 15 tablet, Refills: 0           CONTINUE these medications which have NOT CHANGED    " "Details   ACCU-CHEK GUIDE TEST STRIPS Strp TEST BLOOD SUGAR THREE TIMES DAILY  Qty: 300 strip, Refills: 10    Associated Diagnoses: Hypertension associated with diabetes; Uncontrolled type 2 diabetes mellitus with hyperglycemia      alcohol swabs (BD ALCOHOL SWABS) PadM Apply 1 each topically once daily.  Qty: 100 each, Refills: 3    Associated Diagnoses: Type 2 diabetes mellitus with hyperglycemia, with long-term current use of insulin      amLODIPine (NORVASC) 5 MG tablet Take 1 tablet (5 mg total) by mouth once daily.  Qty: 90 tablet, Refills: 3    Associated Diagnoses: Benign essential hypertension      anastrozole (ARIMIDEX) 1 mg Tab TAKE 1 TABLET EVERY DAY  Qty: 90 tablet, Refills: 3    Associated Diagnoses: Use of aromatase inhibitors      atorvastatin (LIPITOR) 40 MG tablet TAKE 1 TABLET EVERY DAY  Qty: 90 tablet, Refills: 3      BD INSULIN SYRINGE ULTRA-FINE 1 mL 31 gauge x 5/16 Syrg TO USE  TWICE DAILY WITH  INSULIN  Qty: 100 each, Refills: 11      BD VEO INSULIN SYRINGE UF 0.3 mL 31 gauge x 15/64" Syrg USE  TO INJECT TWICE DAILY  Qty: 200 Syringe, Refills: 3    Associated Diagnoses: Diabetes mellitus type 2 in obese; Current use of insulin; Type 2 diabetes mellitus, uncontrolled, with neuropathy; Diabetic peripheral neuropathy      blood-glucose meter (ACCU-CHEK DOMENICO PLUS METER) Misc USE AS DIRECTED  Qty: 1 each, Refills: 1    Comments: Dispense whatever brand covered by insurance.  Associated Diagnoses: Diabetes mellitus type 2, insulin dependent      carvediloL (COREG) 12.5 MG tablet TAKE 1 TABLET TWICE DAILY WITH MEALS  Qty: 180 tablet, Refills: 3    Comments: .  Associated Diagnoses: Benign essential hypertension      cyanocobalamin-cobamamide (B-12 PLUS) 5,000-100 mcg Subl Take 5000 mcg daily.  Qty: 90 tablet, Refills: 3    Associated Diagnoses: B12 deficiency      ELIQUIS 5 mg Tab TAKE 1 TABLET TWICE DAILY  Qty: 180 tablet, Refills: 3    Associated Diagnoses: History of pulmonary embolism    " "  famotidine (PEPCID) 20 MG tablet TAKE 1 TABLET EVERY EVENING  Qty: 90 tablet, Refills: 10      FOLIC ACID/MULTIVIT-MIN/LUTEIN (CENTRUM SILVER ORAL) Take 1 tablet by mouth once daily.      furosemide (LASIX) 20 MG tablet TAKE 1 TABLET EVERY DAY AS NEEDED FOR LEG SWELLING  Qty: 7 tablet, Refills: 10    Associated Diagnoses: Leg edema      GADAVIST 1 mmol/mL (604.72 mg/mL) Soln injection       ketoconazole (NIZORAL) 2 % cream Apply topically once daily. Apply daily to affected toenail for 6-12 months  Qty: 60 g, Refills: 4      LANTUS SOLOSTAR U-100 INSULIN glargine 100 units/mL SubQ pen INJECT 18 UNITS INTO THE SKIN ONCE DAILY.  Qty: 30 mL, Refills: 3    Associated Diagnoses: Type 2 diabetes mellitus with hyperglycemia, with long-term current use of insulin      pen needle, diabetic (DROPLET PEN NEEDLE) 32 gauge x 5/32" Ndle USE TO INJECT ONE TIME DAILY  Qty: 100 each, Refills: 3    Associated Diagnoses: Type 2 diabetes mellitus with other specified complication, with long-term current use of insulin      QUEtiapine (SEROQUEL) 50 MG tablet TAKE 1 AND 1/2 TABLETS EVERY EVENING  Qty: 135 tablet, Refills: 3    Associated Diagnoses: Insomnia, unspecified type      semaglutide (OZEMPIC) 1 mg/dose (4 mg/3 mL) INJECT 1MG UNDER THE SKIN EVERY 7 DAYS.  Qty: 9 each, Refills: 3    Associated Diagnoses: Diabetes mellitus type 2 in obese; Diabetic peripheral neuropathy; Hyperlipidemia associated with type 2 diabetes mellitus      suvorexant (BELSOMRA) 10 mg Tab Take 1 tablet by mouth every evening.  Qty: 30 tablet, Refills: 3      valACYclovir (VALTREX) 1000 MG tablet Take 1 tablet (1,000 mg total) by mouth 3 (three) times daily. for 7 days  Qty: 21 tablet, Refills: 0               I have seen and examined this patient within the last 30 days. My clinical findings that support the need for the home health skilled services and home bound status are the following:no   Mental confusion making it unsafe for patient to leave home " alone due to  Dementia.     Diet:   diabetic diet 2000 calorie    Labs:  Report Lab results to PCP.    Referrals/ Consults  Occupational Therapy to evaluate and treat. Evaluate home environment for safety and equipment needs. Perform/Instruct on transfers, ADL training, ROM, and therapeutic exercises.    Activities:   activity as tolerated    Nursing:   Agency to admit patient within 24 hours of hospital discharge unless specified on physician order or at patient request    SN to complete comprehensive assessment including routine vital signs. Instruct on disease process and s/s of complications to report to MD. Review/verify medication list sent home with the patient at time of discharge  and instruct patient/caregiver as needed. Frequency may be adjusted depending on start of care date.     Skilled nurse to perform up to 3 visits PRN for symptoms related to diagnosis    Notify MD if SBP > 160 or < 90; DBP > 90 or < 50; HR > 120 or < 50; Temp > 101; O2 < 88%; Other:       Ok to schedule additional visits based on staff availability and patient request on consecutive days within the home health episode.    When multiple disciplines ordered:    Start of Care occurs on Sunday - Wednesday schedule remaining discipline evaluations as ordered on separate consecutive days following the start of care.    Thursday SOC -schedule subsequent evaluations Friday and Monday the following week.     Friday - Saturday SOC - schedule subsequent discipline evaluations on consecutive days starting Monday of the following week.    For all post-discharge communication and subsequent orders please contact patient's primary care physician. If unable to reach primary care physician or do not receive response within 30 minutes, please contact  for clinical staff order clarification    Miscellaneous   Diabetic Care:   SN to perform and educate Diabetic management with blood glucose monitoring:    Home Health Aide:  Medical Social Work Twice  weekly    Wound Care Orders  no and      I certify that this patient is confined to her home and needs intermittent skilled nursing care.

## 2024-04-24 NOTE — ED NOTES
"Patient states she has "weakness in her chest" and felt like she "couldn't go no more". No headache reported by the patient at this time. No chest pain. + nausea. + sob at this time. + generalized weakness. Pt has not taken insulin today or yesterday. Hx of dementia. Pt states she has had memory problems  "

## 2024-04-24 NOTE — ED PROVIDER NOTES
Encounter Date: 4/24/2024       History     Chief Complaint   Patient presents with    Headache     Feels better afraid she has the flu     78-year-old female with a PMHx of PE (on Eliquis), breast cancer, meningioma, type 2 diabetes, HTN, HLD, dementia presents to the ED with epigastric and chest discomfort.  Her symptoms started 3-4 days ago. She has associated nausea, vomiting, and shortness of breath.  States that shortness of breath is not new though she is unsure how long she's had it. Last bowel movement was yesterday.  She notes that occasionally she has bright red blood with bowel movements, though unsure of last episode.  She is not eating much because of nausea. She denies chest pain, fever, nasal congestion, sore throat, palpitations, dysuria, hematuria, urinary frequency, flank pain.    The history is provided by the patient. The history is limited by the absence of a caregiver.     Review of patient's allergies indicates:   Allergen Reactions    Grass pollen-june grass standard Other (See Comments)     Causes sinus symptoms like coughing    Losartan      cough    Nubain [nalbuphine] Other (See Comments)     Other reaction(s): Hypotension    Antihistamines - alkylamine     Sinus & allergy [chlorpheniramine-phenylephrine]      Past Medical History:   Diagnosis Date    Acute pulmonary embolism without acute cor pulmonale 11/15/2021    Adult bronchiectasis 7/26/2017    Allergy     Anemia     Arthritis     Asthma     Breast cancer 1993    left w/ radiation     Breast cancer 2020    IDC, left mastectomy    Cancer 1993    L eft breast    Cataract     Chronic cervical radiculopathy 9/20/2012    Diabetes mellitus     Diabetes mellitus type II     Diabetes with neurologic complications     Diverticulosis     Dry eyes     Dysphagia     after knee surgery June 2014    GERD (gastroesophageal reflux disease)     Hyperlipidemia     Hypertension     Neuropathy     feet    Scalp tenderness     Sepsis 12/12/2021     Shortness of breath     Ulcer     Unspecified dementia, unspecified severity, without behavioral disturbance, psychotic disturbance, mood disturbance, and anxiety 2/22/2024     Past Surgical History:   Procedure Laterality Date    BREAST BIOPSY Left 1993    BREAST LUMPECTOMY Left 1993    CARPAL TUNNEL RELEASE Bilateral 2011    CATARACT EXTRACTION W/  INTRAOCULAR LENS IMPLANT Bilateral 2013 and 2014    CHOLECYSTECTOMY      COLONOSCOPY N/A 11/6/2015    Procedure: COLONOSCOPY;  Surgeon: Major Michelle MD;  Location: [facility] ENDO (4TH FLR);  Service: Endoscopy;  Laterality: N/A;    COLONOSCOPY N/A 5/8/2019    Procedure: COLONOSCOPY;  Surgeon: Major Michelle MD;  Location: [facility] ENDO (4TH FLR);  Service: Endoscopy;  Laterality: N/A;    CRANIOTOMY USING FRAMELESS STEREOTAXY Right 11/15/2021    Procedure: Right Frontal Craniotomy for Meningioma with  Stealth Navigation;  Surgeon: Moiz Hutchison DO;  Location: [facility] OR [address] [place] [address];  Service: Neurosurgery;  Laterality: Right;    EYE SURGERY      HYSTERECTOMY      partial hyst    INJECTION FOR SENTINEL NODE IDENTIFICATION Left 2/20/2020    Procedure: INJECTION, FOR SENTINEL NODE IDENTIFICATION;  Surgeon: Hamida Nieves MD;  Location: [facility] OR [address] [place] [address];  Service: General;  Laterality: Left;    JOINT REPLACEMENT Left June 2014    knee    MASTECTOMY Left 2020    SENTINEL LYMPH NODE BIOPSY Left 2/20/2020    Procedure: BIOPSY, LYMPH NODE, SENTINEL;  Surgeon: Hamida Nieves MD;  Location: [address];  Service: General;  Laterality: Left;    UNILATERAL MASTECTOMY Left 2/20/2020    Procedure: MASTECTOMY, UNILATERAL;  Surgeon: Hamida Nieves MD;  Location: [address];  Service: General;  Laterality: Left;    uvuloplasty       Family History   Problem Relation Name Age of Onset    Cataracts Mother Anat     Diabetes Mother Anat     Heart attack Mother Anat     Heart disease Father Octive     Heart attack Father Octive     Diabetes Sister Martha     Colon polyps Sister  Martha     Breast cancer Sister Patricia     Colon polyps Sister Patricia     Diabetes Sister Leigh     Colon cancer Sister Leigh     Arthritis Sister Julee     Psoriasis Sister Julee     Stroke Sister Selena     Seizures Sister Selena     Diabetes Sister      No Known Problems Brother Noah     Breast cancer Paternal Aunt      No Known Problems Daughter Radha     No Known Problems Daughter Lennie     No Known Problems Son Сергей     Asthma Neg Hx      Emphysema Neg Hx      Lupus Neg Hx      Rheum arthritis Neg Hx      Melanoma Neg Hx      Irritable bowel syndrome Neg Hx      Inflammatory bowel disease Neg Hx      Stomach cancer Neg Hx      Esophageal cancer Neg Hx       Social History     Tobacco Use    Smoking status: Never    Smokeless tobacco: Never   Substance Use Topics    Alcohol use: No    Drug use: No     Review of Systems   Constitutional:  Positive for fatigue. Negative for chills and fever.   HENT:  Negative for congestion and sore throat.    Respiratory:  Positive for shortness of breath.    Cardiovascular:  Negative for chest pain.   Gastrointestinal:  Positive for blood in stool, nausea and vomiting. Negative for constipation and diarrhea.   Genitourinary:  Negative for dysuria, flank pain, frequency and hematuria.       Physical Exam     Initial Vitals [04/24/24 1331]   BP Pulse Resp Temp SpO2   135/73 82 16 98.9 °F (37.2 °C) 99 %      MAP       --         Physical Exam    Nursing note and vitals reviewed.  Constitutional: She appears well-developed and well-nourished. She is not diaphoretic. No distress.   HENT:   Head: Normocephalic and atraumatic.   Nose: Nose normal.   Eyes: Conjunctivae and EOM are normal.   Neck: Neck supple.   Cardiovascular:  Normal rate, regular rhythm, normal heart sounds and intact distal pulses.           Pulmonary/Chest: Breath sounds normal. No respiratory distress.   Abdominal: Abdomen is soft. There is abdominal tenderness in the left lower quadrant. There is no  guarding.   Musculoskeletal:      Cervical back: Neck supple.     Neurological: She is alert and oriented to person, place, and time. Gait normal.   Skin: No rash noted.   Psychiatric: She has a normal mood and affect. Thought content normal.         ED Course   Procedures  Labs Reviewed   CBC W/ AUTO DIFFERENTIAL - Abnormal; Notable for the following components:       Result Value    Immature Granulocytes 0.6 (*)     Immature Grans (Abs) 0.05 (*)     All other components within normal limits   COMPREHENSIVE METABOLIC PANEL - Abnormal; Notable for the following components:    CO2 22 (*)     Glucose 199 (*)     Total Bilirubin 2.6 (*)     All other components within normal limits   URINALYSIS, REFLEX TO URINE CULTURE - Abnormal; Notable for the following components:    Appearance, UA Hazy (*)     Protein, UA 1+ (*)     Glucose, UA 2+ (*)     Ketones, UA Trace (*)     Leukocytes, UA 3+ (*)     All other components within normal limits    Narrative:     Specimen Source->Urine   URINALYSIS MICROSCOPIC - Abnormal; Notable for the following components:    RBC, UA 13 (*)     WBC, UA 72 (*)     Non-Squam Epith 3 (*)     All other components within normal limits    Narrative:     Specimen Source->Urine   POCT GLUCOSE - Abnormal; Notable for the following components:    POCT Glucose 210 (*)     All other components within normal limits   INFLUENZA A & B BY MOLECULAR   CULTURE, URINE   HIV 1 / 2 ANTIBODY    Narrative:     Release to patient->Immediate   HEPATITIS C ANTIBODY    Narrative:     Release to patient->Immediate   SARS-COV-2 RNA AMPLIFICATION, QUAL   LIPASE   TROPONIN I   B-TYPE NATRIURETIC PEPTIDE   MAGNESIUM   TROPONIN I     EKG Readings: (Independently Interpreted)   Initial Reading: No STEMI. Rhythm: Normal Sinus Rhythm. Heart Rate: 81. Axis: Left Axis Deviation. Clinical Impression: Normal Sinus Rhythm   Nonspecific st changes in anterior leads     ECG Results              EKG 12-lead (Final result)         Collection Time Result Time QRS Duration OHS QTC Calculation    04/24/24 14:07:36 04/24/24 15:58:12 70 446                     Final result by Interface, Lab In [facility] (04/24/24 15:58:19)                   Narrative:    Test Reason : R07.9,    Vent. Rate : 081 BPM     Atrial Rate : 081 BPM     P-R Int : 150 ms          QRS Dur : 070 ms      QT Int : 384 ms       P-R-T Axes : 061 -05 003 degrees     QTc Int : 446 ms    Normal sinus rhythm  Nonspecific ST and T wave abnormality  Abnormal ECG  When compared with ECG of 13-SEP-2023 10:53,  ST now depressed in Anterior leads  Nonspecific T wave abnormality now evident in Anterior leads  Confirmed by Varghese APONTE MD ([id number]) on 4/24/2024 3:58:10 PM    Referred By: [email or web address]   SELF           Confirmed By:Varghese APONTE MD                                  Imaging Results               CT Abdomen Pelvis With IV Contrast NO Oral Contrast (Final result)  Result time 04/24/24 17:16:09      Final result by Johnnie Odom Jr., MD (04/24/24 17:16:09)                   Impression:      1. Mild thickening and edema with some mucosal hyperenhancement in the gastric antrum/pylorus.  There may be some involvement of the duodenum.  Correlation advised.  2. Extensive colonic diverticulosis with a short segment of colonic wall thickening and adjacent inflammatory changes in the proximal sigmoid colon concerning for acute diverticulitis.  3. Additional area of somewhat featureless wall thickening at the junction of the descending and sigmoid colon.  While this may represent a 2nd area of diverticulitis findings are concerning for primary colonic neoplasm.  Further evaluation when clinically appropriate suggested.  4. Stable pulmonary micronodule in the left upper lobe.  5. Additional findings as above.  This report was flagged in Epic as abnormal.    Electronically signed by resident: Gene Christianson  Date:    04/24/2024  Time:    16:17    Electronically signed by: Johnnie Odom  MD  Date:    04/24/2024  Time:    17:16               Narrative:    EXAMINATION:  CT ABDOMEN PELVIS WITH IV CONTRAST    CLINICAL HISTORY:  Abdominal pain, acute, nonlocalized;Nausea/vomiting;    TECHNIQUE:  Low dose axial images, sagittal and coronal reformations were obtained from the lung bases to the pubic symphysis following the IV administration of 75 mL of Omnipaque 350 .  Oral contrast was not given.    COMPARISON:  Abdominal ultrasound 05/29/2023, CT chest abdomen pelvis 03/02/2022    FINDINGS:  SOFT TISSUES: Postoperative change of left mastectomy.    LUNG BASES/VISUALIZED MEDIASTINUM: Visualized heart is normal size.  Trace pericardial fluid.  Stable pulmonary micronodule in the left upper lobe (series 2, image 11).  Subsegmental atelectasis versus scarring in bilateral lung bases.    HEPATOBILIARY: Liver is normal size. No focal hepatic lesions. No biliary ductal dilatation.  Cholecystectomy.    PANCREAS: Pancreatic atrophy.  No focal masses or ductal dilatation.    SPLEEN: Normal size.    ADRENALS: No adrenal nodules.    KIDNEYS/URETERS: Kidneys are normal size and location and enhance symmetrically. Bilateral simple cysts and subcentimeter hypodensities too small to characterize, unchanged.  No stones or hydronephrosis.  Visualized ureters are unremarkable.    BLADDER/PELVIC ORGANS: Bladder is unremarkable.  Hysterectomy.  No adnexal masses.    PERITONEUM / RETROPERITONEUM: No free air or fluid.    LYMPH NODES: No lymphadenopathy.    VESSELS: Aorta maintains normal course and caliber.  Mild calcific atherosclerosis of the aorta and its branches.  Major aortic branches are patent.  Portal venous system is patent.    GI TRACT: Small hiatal hernia.  Wall thickening and edema with subtle mucosal hyperenhancement of the gastric antrum/pylorus (series 2, image 52).  No significant adjacent mesenteric inflammatory change.  No evidence of bowel obstruction.  Extensive colonic diverticulosis with an area of  colonic wall thickening and surrounding fat stranding in the sigmoid colon (series 2, image 136).  Additional area of somewhat featureless wall thickening at the junction of the descending and sigmoid colon series 2, image 122.  Minimal fat stranding in this region.  The appendix is unremarkable.  Small volume liquid stool in the ascending colon.    BONES: Degenerative changes.  No acute fractures or suspicious osseous lesions.                                       X-Ray Chest PA And Lateral (Final result)  Result time 04/24/24 15:28:01      Final result by Silver Buckley MD (04/24/24 15:28:01)                   Impression:      No acute intrathoracic process.      Electronically signed by: Silver Buckley MD  Date:    04/24/2024  Time:    15:28               Narrative:    EXAMINATION:  XR CHEST PA AND LATERAL    CLINICAL HISTORY:  Shortness of breath    TECHNIQUE:  PA and lateral views of the chest were performed.    COMPARISON:  12/11/2021.    FINDINGS:  There are postoperative changes in the left axillary region.  The trachea is unremarkable.  There are calcifications of the aortic knob.  The cardiomediastinal silhouette is within normal limits.  There is no evidence of free air beneath the hemidiaphragms.  There are no pleural effusions.  There is no evidence of a pneumothorax.  There is no evidence of pneumomediastinum.  No airspace opacity is present.    There are postoperative changes in the right upper abdominal quadrant.    There are degenerative changes in the osseous structures.                                       Medications   sodium chloride 0.9% bolus 500 mL 500 mL (0 mLs Intravenous Stopped 4/24/24 1601)   ondansetron injection 4 mg (4 mg Intravenous Given 4/24/24 1501)   iohexoL (OMNIPAQUE 350) injection 75 mL (75 mLs Intravenous Given 4/24/24 1605)   amoxicillin-clavulanate 875-125mg per tablet 1 tablet (1 tablet Oral Given 4/24/24 1731)     Medical Decision Making  78-year-old female with past  medical history of PE (on Eliquis,) breast cancer, meningioma, type 2 diabetes, HTN, HLD, dementia presents to the ED with epigastric and chest discomfort. Nontoxic appearing. Hemodynamically stable. Afebrile. Exam as above. I will initiate workup and reassess.    Ddx:  Viral illness, gastroenteritis, cholecystitis, pancreatitis, diverticulitis. ACS, pneumonia, UTI, dehydration, electrolyte derangement    Labs without leukocytosis. H&H stable, low suspecion for GI bleed. No significant electrolyte derangements.    Abdominal labs are reassuring. LFTs, alk phos, lipase are WNL. Bilirubin is elevated though is chronically elevated. Up trending.     Viral swabs negative    EKG with NSR. Nonspecific st changes in anterior leads. Will trend troponin      Initial troponin WNL. Normal BNP. CXR without infiltrate, effusion, pneumothorax, mass or other acute findings.    I Will sign out at this time to ED team due to shift change. Dispo pending second troponin, CT AP and UA.     Amount and/or Complexity of Data Reviewed  Labs: ordered. Decision-making details documented in ED Course.  Radiology: ordered.    Risk  Prescription drug management.               ED Course as of 04/25/24 1105   Wed Apr 24, 2024   1457 WBC: 8.06 [HM]   1457 SARS-CoV-2 RNA, Amplification, Qual: Negative [HM]   1458 Influenza A, Molecular: Negative [HM]   1458 Influenza B, Molecular: Negative [HM]   u Apr 25, 2024   1046 Sodium: 136 [HM]   1046 Potassium: 4.5 [HM]   1046 Glucose(!): 199 [HM]   1046 CO2(!): 22 [HM]   1046 Anion Gap: 13 [HM]   1046 BILIRUBIN TOTAL(!): 2.6 [HM]   1046 ALP: 61 [HM]   1046 AST: 33 [HM]   1046 ALT: 18 [HM]   1046 Lipase: 11 [HM]      ED Course User Index  [] Candace Lira PA-C                           Clinical Impression:  Final diagnoses:  [K57.20] Diverticulitis of large intestine with perforation without abscess or bleeding (Primary)  [K63.89] Colonic mass          ED Disposition Condition    Discharge Stable           ED Prescriptions       Medication Sig Dispense Start Date End Date Auth. Provider    amoxicillin-clavulanate 875-125mg (AUGMENTIN) 875-125 mg per tablet Take 1 tablet by mouth 2 (two) times daily. for 10 days 20 tablet 4/24/2024 5/4/2024 Tenisha Guerrero PA-C    ondansetron (ZOFRAN-ODT) 4 MG TbDL Take 1 tablet (4 mg total) by mouth every 6 (six) hours as needed. 15 tablet 4/24/2024 -- Tenisha Guerrero PA-C          Follow-up Information       Follow up With Specialties Details Why Contact Info Additional Information    [facility] Gastroenterology Schedule an appointment as soon as possible for a visit in 1 week  [facility] [facility] [id number] [phone] GI Center & Urology - Main [facility], 4th Floor Please park in South [address]age and take Atrium elevator    [facility] Colon and Rectal Surgery Schedule an appointment as soon as possible for a visit in 1 week  [facility] [facility] [id number] [phone] GI Center & Urology - [facility] 4th Floor Please park in South Garage and use Atrium elevator    [facility] - Emergency Dept Emergency Medicine Go to  If symptoms worsen [address] [facility] [id number] [phone]              Candace Lira PA-C  04/25/24 1050       Candace Lira PA-C  04/25/24 1101

## 2024-04-24 NOTE — PLAN OF CARE
[facility]  Initial Discharge Assessment       Primary Care Provider: Selean Montemayor MD    Admission Diagnosis: No admission diagnoses are documented for this encounter.    Admission Date: 4/24/2024  Expected Discharge Date:     Pt contacted daughter Lennie [phone] regarding pt needs and care at home.  As per Lennie pt lives alone and pt sister lives next door and helps with pt care on and off during the day.  Pt is independent with her ambulation and ADL's and does not require assistance or equipment.  SW discussed pt care needs and provided various resources over the phone (sitters, assisted living, medicare.gov website) to help with pt care.  As per daughter Lennie she lives in [place], other daughter Estrellita lives in [place] and son Сергей lives in [place].      SW discussed Acute Care at Home [facility] and daughter Lennie was amenable.  SW explained that she did not know if pt met criteria because it was after hours, however SW said she would send the referral anyway.  SW also discussed home health and daughter was also amenable.      Transition of Care Barriers: (P) None    Payor: HUMANA MANAGED MEDICARE / Plan: HUMANA MEDICARE SELECT PARTNER / Product Type: Medicare Advantage /     Extended Emergency Contact Information  Primary Emergency Contact: Estrellita Lora  Mobile Phone: [phone]  Relation: Daughter  Secondary Emergency Contact: Lennie Miranda   [place]  Mobile Phone: [phone]  Relation: Daughter    Discharge Plan A: (P) Other  Discharge Plan B: (P) [facility] - [facility] [address] [facility]  [address] [facility] [address]  Phone: [phone] Fax: [phone]    [facility] [place] - [address]  [address] [place]) [place] [address]  Phone: [phone] Fax: [phone]    [facility] [address]  [address] [place]  [place], [facility] [address]  [place] [address]  Phone: [phone] Fax: [phone]    [facility] [address] [place] [address] - [address] [place] [address]  [address] [facility] [address]  Phone: [phone] Fax: [phone]      Initial Assessment (most recent)       Adult Discharge Assessment - 04/24/24 1728          Discharge Assessment    Assessment Type Discharge Planning Assessment     Confirmed/corrected address, phone number and insurance Yes     Confirmed Demographics Correct on Facesheet     Source of Information patient     People in Home alone     Facility Arrived From: home     Do you expect to return to your current living situation? Yes     Do you have help at home or someone to help you manage your care at home? No     Prior to hospitilization cognitive status: Alert/Oriented;No Deficits     Current cognitive status: Alert/Oriented;No Deficits     Walking or Climbing Stairs Difficulty no     Dressing/Bathing Difficulty no     Home Accessibility wheelchair accessible     Home Layout Able to live on 1st floor     Equipment Currently Used at Home none     Patient currently being followed by outpatient case management? No     Do you currently have service(s) that help you manage your care at home? No (P)      Do you have any problems affording any of your prescribed medications? No (P)      Is the patient taking medications as prescribed? yes (P)      Who is going to help you get home at discharge? family/friends (P)      How do you get to doctors appointments? family or friend will provide (P)      Are you on dialysis? No (P)      Do you take coumadin? No (P)      Discharge Plan A Other (P)      Discharge Plan B Home Health (P)      DME Needed Upon Discharge  none (P)      Discharge Plan discussed with: Patient;Adult children (P)      Transition of Care Barriers None (P)                    Discharge Plan A and Plan B have been determined by review of patient's clinical status, future medical and  therapeutic needs, and coverage/benefits for post-acute care in coordination with multidisciplinary team members.    Milka Rae CD, MSW, LMSW, RSW   Case Management  [facility]  Email: [email or web address]

## 2024-04-24 NOTE — DISCHARGE INSTRUCTIONS
Diagnosis: Diverticulitis, colon mass    You have an infection in your colon.  Your CT scan also showed an abnormality in your colon that could be due to colon cancer.  I am prescribing an antibiotic medicine, Augmentin as well as medicine for nausea, Zofran that you can take as needed.    I sent referrals to our Gastroenterology and colon and Rectal surgery doctors for follow-up.  Please call to schedule a follow-up appointment.  If you start to have any worsening symptoms, or new symptoms such as fever, inability to hold down food or other concerns please return to the emergency department.

## 2024-04-24 NOTE — PLAN OF CARE
SW emailed referral to [facility] Acute Care at Home [email or web address]; unsure at this time if pt qualifies    Will follow-up on 04/25       Milka Rae CD, MSW, LMSW, RSW   Case Management  [facility]  Email: [email or web address]

## 2024-04-25 ENCOUNTER — TELEPHONE (OUTPATIENT)
Dept: SURGERY | Facility: CLINIC | Age: 79
End: 2024-04-25
Payer: MEDICARE

## 2024-04-25 ENCOUNTER — PATIENT OUTREACH (OUTPATIENT)
Dept: EMERGENCY MEDICINE | Facility: HOSPITAL | Age: 79
End: 2024-04-25
Payer: MEDICARE

## 2024-04-25 ENCOUNTER — TELEPHONE (OUTPATIENT)
Dept: PRIMARY CARE CLINIC | Facility: CLINIC | Age: 79
End: 2024-04-25
Payer: MEDICARE

## 2024-04-25 NOTE — TELEPHONE ENCOUNTER
It is in a couple of odd places. Look in the left upper corner by the little notes and flags, this is what is under there. I have not seen this before  Also, look under notes on yesterday at 18:49.      [facility] Notice      This is a general notification that this patient was enrolled in the [facility] Home program - an admission alternative service for patients presenting [place] [facility]        This patient was stable for discharge and will be care for by our home based team over the next 15 days.       The patient will have several services available to them over the time with the program including in home visits with virtual provider (MD,KATIE) in tandem with an in-person paramedic, virtual nursing, and virtual care management.   The service will be able to manage conditions in the home setting and transition them back to PCP on conclusion.       If you have any questions in regards to patient care during this episode of care call our clinical line, monitored 24/7, at [phone].      If you have any questions in regards to the program, do not hesitate to reach our to Dr. Angel King, medical director, via secure chat.

## 2024-04-25 NOTE — TELEPHONE ENCOUNTER
Spoke w/ daughter Lennie. Lennie stated that someone w/ [facility] is going out to see pt on tomorrow, and that Ms Crump has appt on 5/14.  Also needs f/u with GI and colorectal due to CT results.   If f/u w/ PA not needed, pls let her know. If needed, she will look to see if transport can be arranged.

## 2024-04-25 NOTE — TELEPHONE ENCOUNTER
Spoke with pt's daughter, Lennie, regarding appt scheduled with Dr. Freeman for a colonic mass on 5/6 at 2:40 PM in the [facility]. Lennie verbally confirmed appt time and location. Denies further questions at this time.

## 2024-04-25 NOTE — TELEPHONE ENCOUNTER
Can you check on pt? S/p ED/obs. Can you schedule her an appt w/ PA Maryam in the next week? Thanks!

## 2024-04-25 NOTE — PROGRESS NOTES
Spoke with Pt's daughter regarding her recent ED visit. She states her mother is doing ok and denies any concerns at this time. She will take care of scheduling appt's per her referral. Pt is scheduled for a PCP appt 5-14-24. ED Navigator will continue to f/u and assist as needed.

## 2024-04-25 NOTE — TELEPHONE ENCOUNTER
I don't see anything in the chart about the acute care? Not sure what that is. Is it just HH? Would like her to have f/u visit in a week w/ Hamida if it's not [facility] home visit.

## 2024-04-25 NOTE — CARE UPDATE
[facility] Notice     This is a general notification that this patient was enrolled in the [facility] program - an admission alternative service for patients [place] [facility]        This patient was stable for discharge and will be care for by our home based team over the next 15 days.       The patient will have several services available to them over the time with the program including in home visits with virtual provider (MD,KATIE) in tandem with an in-person paramedic, virtual nursing, and virtual care management.   The service will be able to manage conditions in the home setting and transition them back to PCP on conclusion.       If you have any questions in regards to patient care during this episode of care call our clinical line, monitored 24/7, at [phone].      If you have any questions in regards to the program, do not hesitate to reach our to Dr. Angel King, medical director, via secure chat.

## 2024-04-25 NOTE — PLAN OF CARE
SW updated careport referral with Home Health orders    As per careport [facility] is able to admit patient on 05/01      Milka Rae CD, MSW, LMSW, RSW   Case Management  [facility]  Email: [email or web address]

## 2024-04-26 LAB
BACTERIA UR CULT: NORMAL
BACTERIA UR CULT: NORMAL

## 2024-04-26 NOTE — TELEPHONE ENCOUNTER
Ok, I see it now. Then that's fine. Ok to just utilize acute home care at [facility] - this is a new postdischarge initiative they're doing.

## 2024-04-29 NOTE — PROGRESS NOTES
Subjective:      Patient ID: Alisia Gusman is a 78 y.o. female.    Chief Complaint: Nail Care and Nail Problem      Alisia is a 78 y.o. female who presents to the clinic for evaluation and treatment of high risk feet. Alisia has a past medical history of Acute pulmonary embolism without acute cor pulmonale (11/15/2021), Adult bronchiectasis (7/26/2017), Allergy, Anemia, Arthritis, Asthma, Breast cancer (1993), Breast cancer (2020), Cancer (1993), Cataract, Chronic cervical radiculopathy (9/20/2012), Diabetes mellitus, Diabetes mellitus type II, Diabetes with neurologic complications, Diverticulosis, Dry eyes, Dysphagia, GERD (gastroesophageal reflux disease), Hyperlipidemia, Hypertension, Neuropathy, Scalp tenderness, Sepsis (12/12/2021), Shortness of breath, Ulcer, and Unspecified dementia, unspecified severity, without behavioral disturbance, psychotic disturbance, mood disturbance, and anxiety (2/22/2024). The patient's returns to clinic for routine high risk DM foot exam/care. doing well.  This patient has documented high risk feet requiring routine maintenance secondary to diabetes mellitis and those secondary complications of diabetes, as mentioned..    PCP: Selena Montemayor MD    Date Last Seen by PCP: 5/18/2023   Chief Complaint   Patient presents with    Nail Care    Nail Problem         Hemoglobin A1C   Date Value Ref Range Status   11/15/2023 6.8 (H) 4.0 - 5.6 % Final     Comment:     ADA Screening Guidelines:  5.7-6.4%  Consistent with prediabetes  >or=6.5%  Consistent with diabetes    High levels of fetal hemoglobin interfere with the HbA1C  assay. Heterozygous hemoglobin variants (HbS, HgC, etc)do  not significantly interfere with this assay.   However, presence of multiple variants may affect accuracy.     04/20/2023 6.3 (H) 4.0 - 5.6 % Final     Comment:     ADA Screening Guidelines:  5.7-6.4%  Consistent with prediabetes  >or=6.5%  Consistent with diabetes    High levels of fetal hemoglobin interfere  "with the HbA1C  assay. Heterozygous hemoglobin variants (HbS, HgC, etc)do  not significantly interfere with this assay.   However, presence of multiple variants may affect accuracy.     11/11/2022 6.3 (H) 4.0 - 5.6 % Final     Comment:     ADA Screening Guidelines:  5.7-6.4%  Consistent with prediabetes  >or=6.5%  Consistent with diabetes    High levels of fetal hemoglobin interfere with the HbA1C  assay. Heterozygous hemoglobin variants (HbS, HgC, etc)do  not significantly interfere with this assay.   However, presence of multiple variants may affect accuracy.         Review of Systems   Constitutional: Negative for chills, decreased appetite and fever.   Cardiovascular:  Negative for claudication.   Respiratory:  Negative for cough and shortness of breath.    Skin:  Positive for dry skin and nail changes. Negative for color change, flushing, itching, poor wound healing, rash and skin cancer.   Musculoskeletal:  Negative for arthritis, back pain, falls, joint pain, joint swelling and myalgias.   Gastrointestinal:  Negative for nausea and vomiting.   Neurological:  Negative for loss of balance, numbness and paresthesias.   All other systems reviewed and are negative.          Objective:       Vitals:    03/26/24 1530   BP: (!) 178/84   Pulse: 76   Weight: 62 kg (136 lb 11 oz)   Height: 5' 2" (1.575 m)   PainSc: 0-No pain        Physical Exam  Vitals and nursing note reviewed.   Constitutional:       Appearance: She is well-developed.   Cardiovascular:      Pulses:           Dorsalis pedis pulses are 1+ on the right side and 1+ on the left side.      Comments: Posterior tibial pulses are diminished Bilaterally. Toes are cool to touch. Feet are warm proximally.There is decreased digital hair. Skin is atrophic, slightly hyperpigmented, and mildly edematous      Musculoskeletal:         General: Swelling (chronic- stable) present. No tenderness, deformity or signs of injury. Normal range of motion.      Right ankle: " Normal.      Left ankle: Normal.      Right foot: No swelling, deformity or crepitus.      Left foot: No swelling, deformity or crepitus.      Comments: Semi-reducible hammertoe contractures noted to toes 2-4 b/l-asymptomatic.  Otherwise adequate joint range of motion without pain, limitation, nor crepitation Bilateral feet and ankle joints. Muscle strength is 5/5 in all groups bilaterally.         Lymphadenopathy:      Comments: No palpable lymph nodes   Skin:     General: Skin is warm and dry.      Coloration: Skin is not ashen, cyanotic or pale.      Findings: No bruising, ecchymosis, erythema, lesion or rash.      Nails: There is no clubbing.      Comments: Nails x10 are elongated by  2-5mm's, thickened by 2-4 mm's, dystrophic, and are darkened in  coloration . Xerosis Bilaterally. No open lesions, lacerations or wounds noted    Hyperkeratotic lesion noted to distal tips of b/l hallux   Neurological:      Mental Status: She is alert and oriented to person, place, and time.      Sensory: Sensory deficit present.      Comments: [place] 5.07 monofilamant testing is diminished Vikash feet. Sharp/dull sensation diminished Bilaterally. Light touch absent Bilaterally.       Psychiatric:         Behavior: Behavior normal.             Assessment:       Encounter Diagnoses   Name Primary?    Type II diabetes mellitus with neurological manifestations Yes    Corn or callus            Plan:       Alisia was seen today for nail care and nail problem.    Diagnoses and all orders for this visit:    Type II diabetes mellitus with neurological manifestations    Corn or callus        I counseled the patient on her conditions, their implications and medical management.    Shoe inspection. Diabetic Foot Education. Patient reminded of the importance of good nutrition and blood sugar control to help prevent podiatric complications of diabetes. Patient instructed on proper foot hygeine. We discussed wearing proper shoe gear, daily  foot inspections, never walking without protective shoe gear, caution putting sharp instruments to feet     Discussed DM foot care:  Wear comfortable, proper fitting shoes. Wash feet daily. Dry well. After drying, apply moisturizer to feet (no lotion to webspaces). Inspect feet daily for skin breaks, blisters, swelling, or redness. Wear cotton socks (preferably white)  Change socks every day. Do NOT walk barefoot. Do NOT use heating pads or warm/hot water soaks     With patient's permission, nails were aggressively reduced and debrided 1-5 b/l, removing all offending nail and debris. Patient tolerated this well and no blood was drawn. Patient reports relief following the procedure.     The affected area was cleansed with an alcohol prep pad. Next, utilizing a 5mm curette, the hyperkeratotic tissues were trimmed from areas as noted above, down to appropriate level of skin. Care was taken to remove any nucleated core from the center of the lesion. No pinpoint bleeding was encountered. The patient tolerated relief following this procedure.      Discussed regular and routine moisturizer to skin of both feet to help improve dry skin. Advised to apply twice daily until resolution of symptoms. Avoid between toes.     RTC 2-3 months, sooner PRN

## 2024-05-01 ENCOUNTER — TELEPHONE (OUTPATIENT)
Dept: PRIMARY CARE CLINIC | Facility: CLINIC | Age: 79
End: 2024-05-01
Payer: MEDICARE

## 2024-05-03 ENCOUNTER — HOSPITAL ENCOUNTER (EMERGENCY)
Facility: HOSPITAL | Age: 79
Discharge: HOME | End: 2024-05-03
Attending: EMERGENCY MEDICINE | Admitting: EMERGENCY MEDICINE
Payer: MEDICARE

## 2024-05-03 VITALS
BODY MASS INDEX: 23.92 KG/M2 | RESPIRATION RATE: 16 BRPM | TEMPERATURE: 97 F | DIASTOLIC BLOOD PRESSURE: 80 MMHG | SYSTOLIC BLOOD PRESSURE: 175 MMHG | OXYGEN SATURATION: 99 % | HEIGHT: 62 IN | WEIGHT: 130 LBS | HEART RATE: 85 BPM

## 2024-05-03 DIAGNOSIS — R10.30 LOWER ABDOMINAL PAIN: Primary | ICD-10-CM

## 2024-05-03 DIAGNOSIS — R42 LIGHTHEADEDNESS: ICD-10-CM

## 2024-05-03 LAB
ALBUMIN SERPL BCP-MCNC: 3.5 G/DL (ref 3.5–5.2)
ALP SERPL-CCNC: 76 U/L (ref 55–135)
ALT SERPL W/O P-5'-P-CCNC: 32 U/L (ref 10–44)
ANION GAP SERPL CALC-SCNC: 7 MMOL/L (ref 8–16)
AST SERPL-CCNC: 37 U/L (ref 10–40)
BASOPHILS # BLD AUTO: 0.04 K/UL (ref 0–0.2)
BASOPHILS NFR BLD: 0.6 % (ref 0–1.9)
BILIRUB SERPL-MCNC: 1.4 MG/DL (ref 0.1–1)
BILIRUB UR QL STRIP: NEGATIVE
BUN SERPL-MCNC: 8 MG/DL (ref 8–23)
CALCIUM SERPL-MCNC: 10 MG/DL (ref 8.7–10.5)
CHLORIDE SERPL-SCNC: 106 MMOL/L (ref 95–110)
CLARITY UR REFRACT.AUTO: CLEAR
CO2 SERPL-SCNC: 24 MMOL/L (ref 23–29)
COLOR UR AUTO: YELLOW
CREAT SERPL-MCNC: 0.8 MG/DL (ref 0.5–1.4)
DIFFERENTIAL METHOD BLD: ABNORMAL
EOSINOPHIL # BLD AUTO: 0 K/UL (ref 0–0.5)
EOSINOPHIL NFR BLD: 0.6 % (ref 0–8)
ERYTHROCYTE [DISTWIDTH] IN BLOOD BY AUTOMATED COUNT: 12.6 % (ref 11.5–14.5)
EST. GFR  (NO RACE VARIABLE): >60 ML/MIN/1.73 M^2
GLUCOSE SERPL-MCNC: 142 MG/DL (ref 70–110)
GLUCOSE UR QL STRIP: NEGATIVE
HCT VFR BLD AUTO: 40.8 % (ref 37–48.5)
HGB BLD-MCNC: 13.4 G/DL (ref 12–16)
HGB UR QL STRIP: NEGATIVE
IMM GRANULOCYTES # BLD AUTO: 0.05 K/UL (ref 0–0.04)
IMM GRANULOCYTES NFR BLD AUTO: 0.7 % (ref 0–0.5)
KETONES UR QL STRIP: NEGATIVE
LEUKOCYTE ESTERASE UR QL STRIP: ABNORMAL
LYMPHOCYTES # BLD AUTO: 3.1 K/UL (ref 1–4.8)
LYMPHOCYTES NFR BLD: 44.6 % (ref 18–48)
MCH RBC QN AUTO: 30.9 PG (ref 27–31)
MCHC RBC AUTO-ENTMCNC: 32.8 G/DL (ref 32–36)
MCV RBC AUTO: 94 FL (ref 82–98)
MICROSCOPIC COMMENT: NORMAL
MONOCYTES # BLD AUTO: 0.6 K/UL (ref 0.3–1)
MONOCYTES NFR BLD: 8.3 % (ref 4–15)
NEUTROPHILS # BLD AUTO: 3.1 K/UL (ref 1.8–7.7)
NEUTROPHILS NFR BLD: 45.2 % (ref 38–73)
NITRITE UR QL STRIP: NEGATIVE
NON-SQ EPI CELLS #/AREA URNS AUTO: <1 /HPF
NRBC BLD-RTO: 0 /100 WBC
OHS QRS DURATION: 74 MS
OHS QTC CALCULATION: 417 MS
PH UR STRIP: 6 [PH] (ref 5–8)
PLATELET # BLD AUTO: 157 K/UL (ref 150–450)
PMV BLD AUTO: 10.4 FL (ref 9.2–12.9)
POCT GLUCOSE: 145 MG/DL (ref 70–110)
POCT GLUCOSE: 163 MG/DL (ref 70–110)
POTASSIUM SERPL-SCNC: 3.9 MMOL/L (ref 3.5–5.1)
PROT SERPL-MCNC: 7 G/DL (ref 6–8.4)
PROT UR QL STRIP: NEGATIVE
RBC # BLD AUTO: 4.34 M/UL (ref 4–5.4)
RBC #/AREA URNS AUTO: 2 /HPF (ref 0–4)
SODIUM SERPL-SCNC: 137 MMOL/L (ref 136–145)
SP GR UR STRIP: 1.01 (ref 1–1.03)
SQUAMOUS #/AREA URNS AUTO: 0 /HPF
URN SPEC COLLECT METH UR: ABNORMAL
WBC # BLD AUTO: 6.86 K/UL (ref 3.9–12.7)
WBC #/AREA URNS AUTO: 2 /HPF (ref 0–5)

## 2024-05-03 PROCEDURE — 99285 EMERGENCY DEPT VISIT HI MDM: CPT | Mod: 25,HCNC

## 2024-05-03 PROCEDURE — 93005 ELECTROCARDIOGRAM TRACING: CPT | Mod: HCNC

## 2024-05-03 PROCEDURE — 25500020 PHARM REV CODE 255: Mod: HCNC | Performed by: EMERGENCY MEDICINE

## 2024-05-03 PROCEDURE — 81001 URINALYSIS AUTO W/SCOPE: CPT | Mod: HCNC | Performed by: STUDENT IN AN ORGANIZED HEALTH CARE EDUCATION/TRAINING PROGRAM

## 2024-05-03 PROCEDURE — 80053 COMPREHEN METABOLIC PANEL: CPT | Mod: HCNC | Performed by: STUDENT IN AN ORGANIZED HEALTH CARE EDUCATION/TRAINING PROGRAM

## 2024-05-03 PROCEDURE — 93010 ELECTROCARDIOGRAM REPORT: CPT | Mod: HCNC,,, | Performed by: INTERNAL MEDICINE

## 2024-05-03 PROCEDURE — 82962 GLUCOSE BLOOD TEST: CPT | Mod: HCNC,91

## 2024-05-03 PROCEDURE — 85025 COMPLETE CBC W/AUTO DIFF WBC: CPT | Mod: HCNC | Performed by: STUDENT IN AN ORGANIZED HEALTH CARE EDUCATION/TRAINING PROGRAM

## 2024-05-03 RX ORDER — DICYCLOMINE HYDROCHLORIDE 20 MG/1
20 TABLET ORAL 2 TIMES DAILY
Qty: 20 TABLET | Refills: 0 | Status: SHIPPED | OUTPATIENT
Start: 2024-05-03 | End: 2024-05-14

## 2024-05-03 RX ADMIN — IOHEXOL 75 ML: 350 INJECTION, SOLUTION INTRAVENOUS at 04:05

## 2024-05-03 NOTE — ED PROVIDER NOTES
ED Physician Hand-off Note:    ED Course: I assumed care of patient from off-going ED physician team. Briefly, Patient is a 78-year-old female who presents ED for left lower quadrant abdominal pain.  Had a recent episode of diverticulitis.    At the time of signout plan was pending CT abdomen pelvis.    Medications given in the ED:    Medications   iohexoL (OMNIPAQUE 350) injection 75 mL (75 mLs Intravenous Given 5/3/24 9503)       Disposition:  Reviewed CT abdomen pelvis no acute process.  No signs of diverticulitis.  Lab work is reassuring.  UA negative for UTI.  She is overall well-appearing.  Tolerating p.o..  Vital signs reassuring with mild hypertension but otherwise normal.  Will discharge home and discussed PCP follow-up.    Patient comfortable with discharge. Patient counseled regarding exam, results, diagnosis, treatment, and plan.    Impression: LLQ Abdominal pain       dEward Douglass PA-C  05/03/24 7559

## 2024-05-03 NOTE — ED TRIAGE NOTES
Alisia Gusman, a 78 y.o. female presents to the ED w/ complaint of Pt c/o abdominal pain, she thinks her diverticulitis is flaring up. She states she hasn't had proper O2 intake.    Triage note:  Chief Complaint   Patient presents with    Dehydration     Presents to the ED with complaints of lightheadedness and signs of dehydration, also reports some abdominal pain but pain feels better since last week, no trouble urinating or having a bowel, told by nursing staff that she does not drink enough water, has hx of diabetes and hypertension.      Review of patient's allergies indicates:   Allergen Reactions    Grass pollen-june grass standard Other (See Comments)     Causes sinus symptoms like coughing    Losartan      cough    Nubain [nalbuphine] Other (See Comments)     Other reaction(s): Hypotension    Antihistamines - alkylamine     Sinus & allergy [chlorpheniramine-phenylephrine]      Past Medical History:   Diagnosis Date    Acute pulmonary embolism without acute cor pulmonale 11/15/2021    Adult bronchiectasis 7/26/2017    Allergy     Anemia     Arthritis     Asthma     Breast cancer 1993    left w/ radiation     Breast cancer 2020    IDC, left mastectomy    Cancer 1993    L eft breast    Cataract     Chronic cervical radiculopathy 9/20/2012    Diabetes mellitus     Diabetes mellitus type II     Diabetes with neurologic complications     Diverticulosis     Dry eyes     Dysphagia     after knee surgery June 2014    GERD (gastroesophageal reflux disease)     Hyperlipidemia     Hypertension     Neuropathy     feet    Scalp tenderness     Sepsis 12/12/2021    Shortness of breath     Ulcer     Unspecified dementia, unspecified severity, without behavioral disturbance, psychotic disturbance, mood disturbance, and anxiety 2/22/2024

## 2024-05-03 NOTE — DISCHARGE INSTRUCTIONS
You were seen in the emergency department for abdominal pain.  Your CT scan did not show any abnormalities.  Your blood work was normal.  Your urine test did not show any signs of UTI.  I have prescribed you medications to help with abdominal cramping.  If you continue to have symptoms please follow-up with your primary care doctor.

## 2024-05-03 NOTE — ED PROVIDER NOTES
Encounter Date: 5/3/2024       History     Chief Complaint   Patient presents with    Dehydration     Presents to the ED with complaints of lightheadedness and signs of dehydration, also reports some abdominal pain but pain feels better since last week, no trouble urinating or having a bowel, told by nursing staff that she does not drink enough water, has hx of diabetes and hypertension.      78-year-old female medical history of diabetes mellitus, breast cancer with radiation, hypertension, hyperlipidemia, GERD and history of PE presents to the emergency department due to continued abdominal discomfort.  She describes it as an intermittent cramping.  She also reports that she has been having frequent bowel movements however no blood in her stools.  Patient reports this has ongoing for a week and she was recently evaluated for the same presentation and diagnosed with acute diverticulitis.  She states the antibiotics initially helped a lot.  She reports that she was still been able to tolerate p.o..  Patient reports that she lives alone her daughters live in [place] in 1 son lives in [facility].  She lives next door to her sister who she was states helps her out.  Patient appears sat in exam room.  She was not experiencing fevers chills.  She was not endorse dizziness.  She was not endorse feeling dehydrated.  She does not endorse feeling lightheaded.      Review of patient's allergies indicates:   Allergen Reactions    Grass pollen-june grass standard Other (See Comments)     Causes sinus symptoms like coughing    Losartan      cough    Nubain [nalbuphine] Other (See Comments)     Other reaction(s): Hypotension    Antihistamines - alkylamine     Sinus & allergy [chlorpheniramine-phenylephrine]      Past Medical History:   Diagnosis Date    Acute pulmonary embolism without acute cor pulmonale 11/15/2021    Adult bronchiectasis 7/26/2017    Allergy     Anemia     Arthritis     Asthma     Breast cancer 1993    left  w/ radiation     Breast cancer 2020    IDC, left mastectomy    Cancer 1993    L eft breast    Cataract     Chronic cervical radiculopathy 9/20/2012    Diabetes mellitus     Diabetes mellitus type II     Diabetes with neurologic complications     Diverticulosis     Dry eyes     Dysphagia     after knee surgery June 2014    GERD (gastroesophageal reflux disease)     Hyperlipidemia     Hypertension     Neuropathy     feet    Scalp tenderness     Sepsis 12/12/2021    Shortness of breath     Ulcer     Unspecified dementia, unspecified severity, without behavioral disturbance, psychotic disturbance, mood disturbance, and anxiety 2/22/2024     Past Surgical History:   Procedure Laterality Date    BREAST BIOPSY Left 1993    BREAST LUMPECTOMY Left 1993    CARPAL TUNNEL RELEASE Bilateral 2011    CATARACT EXTRACTION W/  INTRAOCULAR LENS IMPLANT Bilateral 2013 and 2014    CHOLECYSTECTOMY      COLONOSCOPY N/A 11/6/2015    Procedure: COLONOSCOPY;  Surgeon: Major Michelle MD;  Location: [facility] ([address] [place] [address]);  Service: Endoscopy;  Laterality: N/A;    COLONOSCOPY N/A 5/8/2019    Procedure: COLONOSCOPY;  Surgeon: Major Michelle MD;  Location: [facility] ([address] [place] [address]);  Service: Endoscopy;  Laterality: N/A;    CRANIOTOMY USING FRAMELESS STEREOTAXY Right 11/15/2021    Procedure: Right Frontal Craniotomy for Meningioma with  Stealth Navigation;  Surgeon: Moiz Hutchison DO;  Location: [address];  Service: Neurosurgery;  Laterality: Right;    EYE SURGERY      HYSTERECTOMY      partial hyst    INJECTION FOR SENTINEL NODE IDENTIFICATION Left 2/20/2020    Procedure: INJECTION, FOR SENTINEL NODE IDENTIFICATION;  Surgeon: Hamida Nieves MD;  Location: [address];  Service: General;  Laterality: Left;    JOINT REPLACEMENT Left June 2014    knee    MASTECTOMY Left 2020    SENTINEL LYMPH NODE BIOPSY Left 2/20/2020    Procedure: BIOPSY, LYMPH NODE, SENTINEL;  Surgeon: Hamida Nieves MD;  Location: [address];  Service: General;   Laterality: Left;    UNILATERAL MASTECTOMY Left 2/20/2020    Procedure: MASTECTOMY, UNILATERAL;  Surgeon: Hamida Nieves MD;  Location: [facility] OR [address] [place] [address];  Service: General;  Laterality: Left;    uvuloplasty       Family History   Problem Relation Name Age of Onset    Cataracts Mother Anat     Diabetes Mother Anat     Heart attack Mother Anat     Heart disease Father Octive     Heart attack Father Octive     Diabetes Sister Martha     Colon polyps Sister Martha     Breast cancer Sister Patricia     Colon polyps Sister Patricia     Diabetes Sister Leigh     Colon cancer Sister Leigh     Arthritis Sister Julee     Psoriasis Sister Julee     Stroke Sister Selena     Seizures Sister Selena     Diabetes Sister      No Known Problems Brother Noah     Breast cancer Paternal Aunt      No Known Problems Daughter Radha     No Known Problems Daughter Lennie     No Known Problems Son Сергей     Asthma Neg Hx      Emphysema Neg Hx      Lupus Neg Hx      Rheum arthritis Neg Hx      Melanoma Neg Hx      Irritable bowel syndrome Neg Hx      Inflammatory bowel disease Neg Hx      Stomach cancer Neg Hx      Esophageal cancer Neg Hx       Social History     Tobacco Use    Smoking status: Never    Smokeless tobacco: Never   Substance Use Topics    Alcohol use: No    Drug use: No     Review of Systems  See HPI   Physical Exam     Initial Vitals [05/03/24 1314]   BP Pulse Resp Temp SpO2   (!) 175/80 85 16 97.3 °F (36.3 °C) 99 %      MAP       --         Physical Exam    Vitals reviewed.  Constitutional: She appears well-developed.   HENT:   Head: Normocephalic and atraumatic.   Eyes: Conjunctivae and EOM are normal.   Neck:   Normal range of motion.  Cardiovascular:  Normal rate.           Pulmonary/Chest: No respiratory distress.   Abdominal: Abdomen is soft. She exhibits no distension. There is abdominal tenderness (Left upper quadrant). There is no rebound.   Musculoskeletal:         General: Normal range of motion.      Cervical  back: Normal range of motion.     Neurological: She is alert and oriented to person, place, and time.   Skin: Skin is warm and dry.   Psychiatric: She has a normal mood and affect. Thought content normal.         ED Course   Procedures  Labs Reviewed   CBC W/ AUTO DIFFERENTIAL - Abnormal; Notable for the following components:       Result Value    Immature Granulocytes 0.7 (*)     Immature Grans (Abs) 0.05 (*)     All other components within normal limits   COMPREHENSIVE METABOLIC PANEL - Abnormal; Notable for the following components:    Glucose 142 (*)     Total Bilirubin 1.4 (*)     Anion Gap 7 (*)     All other components within normal limits   URINALYSIS, REFLEX TO URINE CULTURE   POCT GLUCOSE MONITORING CONTINUOUS          Imaging Results    None          Medications - No data to display  Medical Decision Making  78-year-old female presents emergency department due to abdominal discomfort.  She was diagnosed with diverticulitis on 04/24 take antibiotics and reports initially improvement of symptoms.  However she was not experiencing intermittent cramping.  Differential diagnosis includes failed p.o. ABX for diverticulitis, ulcers/sepsis criteria, surgical concerns such as appendicitis it was a lower suspicion.  Also ordered UA to rule out cystitis.  Triage she was reported patient was states lightheaded however patient denies this in exam room.  Upward reveal any signs of anemia, FELIZ or signs of dehydration.  EKGs unchanged from previous, Normal sinus 81 beats per minute.  Patient will be signed out to oncoming PA pending CT abdominal scan//disposition    Amount and/or Complexity of Data Reviewed  Radiology: ordered.                                      Clinical Impression:  Final diagnoses:  [R42] Lightheadedness  [R10.30] Lower abdominal pain (Primary)                 Bell Godwin, JARON  05/03/24 1445

## 2024-05-03 NOTE — FIRST PROVIDER EVALUATION
"Medical screening examination initiated.  I have conducted a focused provider triage encounter, findings are as follows:    Brief history of present illness:  Patient states she's concerned she has some dehydration and lightheadedness. States she was in ED recently for similar sxs and per chart review was diagnosed with diverticulitis.     Vitals:    05/03/24 1314   BP: (!) 175/80   BP Location: Right arm   Patient Position: Sitting   Pulse: 85   Resp: 16   Temp: 97.3 °F (36.3 °C)   TempSrc: Oral   SpO2: 99%   Weight: 59 kg (130 lb)   Height: 5' 2" (1.575 m)       Pertinent physical exam:  Ambulatory. Alert and oriented, satting well on RA, no acute distress    Brief workup plan:  ECG, glucose, labs    Preliminary workup initiated; this workup will be continued and followed by the physician or advanced practice provider that is assigned to the patient when roomed.  "

## 2024-05-06 ENCOUNTER — OFFICE VISIT (OUTPATIENT)
Dept: SURGERY | Facility: CLINIC | Age: 79
End: 2024-05-06
Payer: MEDICARE

## 2024-05-06 ENCOUNTER — TELEPHONE (OUTPATIENT)
Dept: SURGERY | Facility: CLINIC | Age: 79
End: 2024-05-06
Payer: MEDICARE

## 2024-05-06 VITALS
DIASTOLIC BLOOD PRESSURE: 59 MMHG | BODY MASS INDEX: 25.62 KG/M2 | RESPIRATION RATE: 19 BRPM | WEIGHT: 139.25 LBS | HEIGHT: 62 IN | HEART RATE: 98 BPM | SYSTOLIC BLOOD PRESSURE: 123 MMHG | OXYGEN SATURATION: 98 %

## 2024-05-06 DIAGNOSIS — K63.89 COLONIC MASS: ICD-10-CM

## 2024-05-06 PROCEDURE — 1126F AMNT PAIN NOTED NONE PRSNT: CPT | Mod: HCNC,CPTII,S$GLB, | Performed by: SURGERY

## 2024-05-06 PROCEDURE — 1157F ADVNC CARE PLAN IN RCRD: CPT | Mod: HCNC,CPTII,S$GLB, | Performed by: SURGERY

## 2024-05-06 PROCEDURE — 3288F FALL RISK ASSESSMENT DOCD: CPT | Mod: HCNC,CPTII,S$GLB, | Performed by: SURGERY

## 2024-05-06 PROCEDURE — 99214 OFFICE O/P EST MOD 30 MIN: CPT | Mod: HCNC,S$GLB,, | Performed by: SURGERY

## 2024-05-06 PROCEDURE — 3078F DIAST BP <80 MM HG: CPT | Mod: HCNC,CPTII,S$GLB, | Performed by: SURGERY

## 2024-05-06 PROCEDURE — 3074F SYST BP LT 130 MM HG: CPT | Mod: HCNC,CPTII,S$GLB, | Performed by: SURGERY

## 2024-05-06 PROCEDURE — 1101F PT FALLS ASSESS-DOCD LE1/YR: CPT | Mod: HCNC,CPTII,S$GLB, | Performed by: SURGERY

## 2024-05-06 PROCEDURE — 99999 PR PBB SHADOW E&M-EST. PATIENT-LVL IV: CPT | Mod: PBBFAC,HCNC,, | Performed by: SURGERY

## 2024-05-07 ENCOUNTER — PATIENT OUTREACH (OUTPATIENT)
Dept: EMERGENCY MEDICINE | Facility: HOSPITAL | Age: 79
End: 2024-05-07
Payer: MEDICARE

## 2024-05-07 DIAGNOSIS — I10 BENIGN ESSENTIAL HYPERTENSION: ICD-10-CM

## 2024-05-07 RX ORDER — AMLODIPINE BESYLATE 5 MG/1
5 TABLET ORAL
Qty: 90 TABLET | Refills: 3 | Status: SHIPPED | OUTPATIENT
Start: 2024-05-07

## 2024-05-07 NOTE — PROGRESS NOTES
Call placed per ED Navigator to f/u from last encounter. No answer. left v/m. ED Navigator will continue to f/u.

## 2024-05-08 NOTE — PROGRESS NOTES
CRS Office Visit History and Physical    Referring Md:   Tenisha Valladares Pa-c  [address] [place] [address]    SUBJECTIVE:     Chief Complaint: diverticulitis     History of Present Illness:  The patient is an estbalished patient to this practice (Jolie, 2022)   Course is as follows:  Alisia Gusman is a 78 y.o. female presents with diverticulitis.  Has a history of diverticulitis, last seen by Dr. Dave in 2022.  At that time, given her co-morbidities, was not recommended to have further work up in light of up to date colonoscopy in 2019.  Since that time, patient presented to ED 4/24/24 with concern for thickening of the antrum/pylorus, as well as colonic thickening concerning for possible neoplasm.   Repeat imaging on 5/3 with improvement of imaging abnormalities.  Of note, patient does have dementia with somewhat limited history obtained.      Last Colonoscopy: 2019, diverticulosis throughout colon     Review of patient's allergies indicates:   Allergen Reactions    Grass pollen-june grass standard Other (See Comments)     Causes sinus symptoms like coughing    Losartan      cough    Nubain [nalbuphine] Other (See Comments)     Other reaction(s): Hypotension    Antihistamines - alkylamine     Sinus & allergy [chlorpheniramine-phenylephrine]        Past Medical History:   Diagnosis Date    Acute pulmonary embolism without acute cor pulmonale 11/15/2021    Adult bronchiectasis 7/26/2017    Allergy     Anemia     Arthritis     Asthma     Breast cancer 1993    left w/ radiation     Breast cancer 2020    IDC, left mastectomy    Cancer 1993    L eft breast    Cataract     Chronic cervical radiculopathy 9/20/2012    Diabetes mellitus     Diabetes mellitus type II     Diabetes with neurologic complications     Diverticulosis     Dry eyes     Dysphagia     after knee surgery June 2014    GERD (gastroesophageal reflux disease)     Hyperlipidemia     Hypertension     Neuropathy     feet    Scalp  tenderness     Sepsis 12/12/2021    Shortness of breath     Ulcer     Unspecified dementia, unspecified severity, without behavioral disturbance, psychotic disturbance, mood disturbance, and anxiety 2/22/2024     Past Surgical History:   Procedure Laterality Date    BREAST BIOPSY Left 1993    BREAST LUMPECTOMY Left 1993    CARPAL TUNNEL RELEASE Bilateral 2011    CATARACT EXTRACTION W/  INTRAOCULAR LENS IMPLANT Bilateral 2013 and 2014    CHOLECYSTECTOMY      COLONOSCOPY N/A 11/6/2015    Procedure: COLONOSCOPY;  Surgeon: Major Michelle MD;  Location: [facility] ENDO (4TH FLR);  Service: Endoscopy;  Laterality: N/A;    COLONOSCOPY N/A 5/8/2019    Procedure: COLONOSCOPY;  Surgeon: Major Michelle MD;  Location: [facility] ENDO (4TH FLR);  Service: Endoscopy;  Laterality: N/A;    CRANIOTOMY USING FRAMELESS STEREOTAXY Right 11/15/2021    Procedure: Right Frontal Craniotomy for Meningioma with  Stealth Navigation;  Surgeon: Moiz Hutchison DO;  Location: [address];  Service: Neurosurgery;  Laterality: Right;    EYE SURGERY      HYSTERECTOMY      partial hyst    INJECTION FOR SENTINEL NODE IDENTIFICATION Left 2/20/2020    Procedure: INJECTION, FOR SENTINEL NODE IDENTIFICATION;  Surgeon: Hamida Nieves MD;  Location: [facility] OR [address] [place] [address];  Service: General;  Laterality: Left;    JOINT REPLACEMENT Left June 2014    knee    MASTECTOMY Left 2020    SENTINEL LYMPH NODE BIOPSY Left 2/20/2020    Procedure: BIOPSY, LYMPH NODE, SENTINEL;  Surgeon: Hamida Nieves MD;  Location: [address];  Service: General;  Laterality: Left;    UNILATERAL MASTECTOMY Left 2/20/2020    Procedure: MASTECTOMY, UNILATERAL;  Surgeon: Hamida Nieves MD;  Location: [address];  Service: General;  Laterality: Left;    uvuloplasty       Family History   Problem Relation Name Age of Onset    Cataracts Mother Anat     Diabetes Mother Anat     Heart attack Mother Anat     Heart disease Father Octive     Heart attack Father Octive     Diabetes Sister  "Martha     Colon polyps Sister Martha     Breast cancer Sister Patricia     Colon polyps Sister Patricia     Diabetes Sister Leigh     Colon cancer Sister Leigh     Arthritis Sister Julee     Psoriasis Sister Julee     Stroke Sister Selena     Seizures Sister Selena     Diabetes Sister      No Known Problems Brother Noah     Breast cancer Paternal Aunt      No Known Problems Daughter Radha     No Known Problems Daughter Lennie     No Known Problems Son Сергей     Asthma Neg Hx      Emphysema Neg Hx      Lupus Neg Hx      Rheum arthritis Neg Hx      Melanoma Neg Hx      Irritable bowel syndrome Neg Hx      Inflammatory bowel disease Neg Hx      Stomach cancer Neg Hx      Esophageal cancer Neg Hx       Social History     Tobacco Use    Smoking status: Never    Smokeless tobacco: Never   Substance Use Topics    Alcohol use: No    Drug use: No        Review of Systems:  Review of Systems   All other systems reviewed and are negative.    OBJECTIVE:     Vital Signs (Most Recent)  BP (!) 123/59 (BP Location: Right arm, Patient Position: Sitting)   Pulse 98   Resp 19   Ht 5' 2.01" (1.575 m)   Wt 63.2 kg (139 lb 3.5 oz)   LMP  (LMP Unknown) Comment: Partial  SpO2 98%   BMI 25.46 kg/m²     Physical Exam:  General: 78 y.o. female in no distress   Neuro: alert and oriented x 4.  Moves all extremities.     HEENT: normocephalic, atraumatic, PERRL, EOMI   Respiratory: respirations are even and unlabored  Cardiac: regular rate and rhythm  Abdomen: soft, NTND   Extremities: Warm dry and intact  Skin: no rashes    Labs:   NA     Imaging:   CT abd/pel:   Impression:     1. Mild thickening and edema with some mucosal hyperenhancement in the gastric antrum/pylorus.  There may be some involvement of the duodenum.  Correlation advised.  2. Extensive colonic diverticulosis with a short segment of colonic wall thickening and adjacent inflammatory changes in the proximal sigmoid colon concerning for acute diverticulitis.  3. " Additional area of somewhat featureless wall thickening at the junction of the descending and sigmoid colon.  While this may represent a 2nd area of diverticulitis findings are concerning for primary colonic neoplasm.  Further evaluation when clinically appropriate suggested.  4. Stable pulmonary micronodule in the left upper lobe.  5. Additional findings as above.  This report was flagged in Epic as abnormal.     Electronically signed by resident: Gene Christianson  Date:                                            04/24/2024  Time:                                           16:17     Electronically signed by:Johnnie Odom MD  Date:                                            04/24/2024  Time:                                           17:16    CT abd/pel   Impression:     1. No acute abnormality  2. Prominent diverticulosis of the colon with no evidence of acute diverticulitis.  Recommend follow-up if symptoms persist.  3. Bilateral renal cysts.  4. Hepatic steatosis.        Electronically signed by:Ozzie Wise  Date:                                            05/03/2024  Time:                                           18:12      ASSESSMENT/PLAN:     Diagnoses and all orders for this visit:    Colonic mass  -     Ambulatory referral/consult to Colorectal Surgery        78 y.o. female with concern for recurrent diverticulitis     - imaging and prior endoscopy and clinic notes personally reviewed and patient's care discussed with her daughterLennie by telephone per patient's preference   - We reviewed imaging findings and risks, benefits and alternatives of colonoscopy given patient's age and comorbidities.  Given findings of initial imaging, Dr. Montemayor has recommended EGD/colonoscopy.  Patient scheduled for pre-procedure consultation later this month  - will follow up results of colonoscopy if patient and family elect to proceed with procedure     Belinda Freeman MD  Staff Surgeon  Colon & Rectal Surgery

## 2024-05-13 ENCOUNTER — PATIENT OUTREACH (OUTPATIENT)
Dept: EMERGENCY MEDICINE | Facility: HOSPITAL | Age: 79
End: 2024-05-13
Payer: MEDICARE

## 2024-05-13 NOTE — PROGRESS NOTES
Successfully left message reminding patient of appointment with PCP tomorrow at 2pm.    LISANDRA Hoffman  Ed Navigator

## 2024-05-14 ENCOUNTER — LAB VISIT (OUTPATIENT)
Dept: LAB | Facility: HOSPITAL | Age: 79
End: 2024-05-14
Attending: INTERNAL MEDICINE
Payer: MEDICARE

## 2024-05-14 ENCOUNTER — OFFICE VISIT (OUTPATIENT)
Dept: PRIMARY CARE CLINIC | Facility: CLINIC | Age: 79
End: 2024-05-14
Payer: MEDICARE

## 2024-05-14 VITALS
WEIGHT: 138.88 LBS | OXYGEN SATURATION: 99 % | SYSTOLIC BLOOD PRESSURE: 160 MMHG | BODY MASS INDEX: 25.55 KG/M2 | HEIGHT: 62 IN | TEMPERATURE: 98 F | DIASTOLIC BLOOD PRESSURE: 74 MMHG | HEART RATE: 87 BPM

## 2024-05-14 DIAGNOSIS — E11.9 CONTROLLED TYPE 2 DIABETES MELLITUS WITHOUT COMPLICATION, WITHOUT LONG-TERM CURRENT USE OF INSULIN: ICD-10-CM

## 2024-05-14 DIAGNOSIS — G31.83 MILD LEWY BODY DEMENTIA WITH OTHER BEHAVIORAL DISTURBANCE: ICD-10-CM

## 2024-05-14 DIAGNOSIS — E78.5 HYPERLIPIDEMIA, UNSPECIFIED HYPERLIPIDEMIA TYPE: ICD-10-CM

## 2024-05-14 DIAGNOSIS — Z87.19 HISTORY OF DIVERTICULITIS: ICD-10-CM

## 2024-05-14 DIAGNOSIS — D32.9 MENINGIOMA: ICD-10-CM

## 2024-05-14 DIAGNOSIS — C50.612 CARCINOMA OF AXILLARY TAIL OF LEFT BREAST IN FEMALE, ESTROGEN RECEPTOR POSITIVE: ICD-10-CM

## 2024-05-14 DIAGNOSIS — R60.0 LEG EDEMA: ICD-10-CM

## 2024-05-14 DIAGNOSIS — F02.A18 MILD LEWY BODY DEMENTIA WITH OTHER BEHAVIORAL DISTURBANCE: ICD-10-CM

## 2024-05-14 DIAGNOSIS — I70.0 AORTIC ATHEROSCLEROSIS: ICD-10-CM

## 2024-05-14 DIAGNOSIS — R93.5 ABNORMAL CT OF THE ABDOMEN: Primary | ICD-10-CM

## 2024-05-14 DIAGNOSIS — Z17.0 CARCINOMA OF AXILLARY TAIL OF LEFT BREAST IN FEMALE, ESTROGEN RECEPTOR POSITIVE: ICD-10-CM

## 2024-05-14 PROBLEM — J47.9 BRONCHIECTASIS, UNCOMPLICATED: Status: RESOLVED | Noted: 2024-02-22 | Resolved: 2024-05-14

## 2024-05-14 LAB
CHOLEST SERPL-MCNC: 134 MG/DL (ref 120–199)
CHOLEST/HDLC SERPL: 1.9 {RATIO} (ref 2–5)
ESTIMATED AVG GLUCOSE: 151 MG/DL (ref 68–131)
HBA1C MFR BLD: 6.9 % (ref 4–5.6)
HDLC SERPL-MCNC: 70 MG/DL (ref 40–75)
HDLC SERPL: 52.2 % (ref 20–50)
LDLC SERPL CALC-MCNC: 50.2 MG/DL (ref 63–159)
NONHDLC SERPL-MCNC: 64 MG/DL
TRIGL SERPL-MCNC: 69 MG/DL (ref 30–150)

## 2024-05-14 PROCEDURE — 83036 HEMOGLOBIN GLYCOSYLATED A1C: CPT | Mod: HCNC | Performed by: INTERNAL MEDICINE

## 2024-05-14 PROCEDURE — 3288F FALL RISK ASSESSMENT DOCD: CPT | Mod: HCNC,CPTII,S$GLB, | Performed by: INTERNAL MEDICINE

## 2024-05-14 PROCEDURE — 3078F DIAST BP <80 MM HG: CPT | Mod: HCNC,CPTII,S$GLB, | Performed by: INTERNAL MEDICINE

## 2024-05-14 PROCEDURE — 1123F ACP DISCUSS/DSCN MKR DOCD: CPT | Mod: HCNC,CPTII,S$GLB, | Performed by: INTERNAL MEDICINE

## 2024-05-14 PROCEDURE — 3077F SYST BP >= 140 MM HG: CPT | Mod: HCNC,CPTII,S$GLB, | Performed by: INTERNAL MEDICINE

## 2024-05-14 PROCEDURE — 80061 LIPID PANEL: CPT | Mod: HCNC | Performed by: INTERNAL MEDICINE

## 2024-05-14 PROCEDURE — 1159F MED LIST DOCD IN RCRD: CPT | Mod: HCNC,CPTII,S$GLB, | Performed by: INTERNAL MEDICINE

## 2024-05-14 PROCEDURE — 99214 OFFICE O/P EST MOD 30 MIN: CPT | Mod: HCNC,S$GLB,, | Performed by: INTERNAL MEDICINE

## 2024-05-14 PROCEDURE — 1126F AMNT PAIN NOTED NONE PRSNT: CPT | Mod: HCNC,CPTII,S$GLB, | Performed by: INTERNAL MEDICINE

## 2024-05-14 PROCEDURE — 36415 COLL VENOUS BLD VENIPUNCTURE: CPT | Mod: HCNC,PN | Performed by: INTERNAL MEDICINE

## 2024-05-14 PROCEDURE — 99999 PR PBB SHADOW E&M-EST. PATIENT-LVL V: CPT | Mod: PBBFAC,HCNC,, | Performed by: INTERNAL MEDICINE

## 2024-05-14 PROCEDURE — 1101F PT FALLS ASSESS-DOCD LE1/YR: CPT | Mod: HCNC,CPTII,S$GLB, | Performed by: INTERNAL MEDICINE

## 2024-05-14 PROCEDURE — 1160F RVW MEDS BY RX/DR IN RCRD: CPT | Mod: HCNC,CPTII,S$GLB, | Performed by: INTERNAL MEDICINE

## 2024-05-14 RX ORDER — FUROSEMIDE 20 MG/1
20 TABLET ORAL DAILY
Qty: 7 TABLET | Refills: 0 | Status: SHIPPED | OUTPATIENT
Start: 2024-05-14 | End: 2024-05-21

## 2024-05-14 NOTE — PROGRESS NOTES
Subjective:       Patient ID: Alisia Gusman is a 78 y.o. female.    Chief Complaint: Annual Exam    HPI  Last seen pt over a yr ago.  Accompanied by Julee, her sister who lives next door and helps w/ meds.   IRMA is Lennie, daughter who lives in [place] - [phone].   Weight is stable. Good appetite. Julee cooks for her. Goes to [employer] class once a week. Sits down to watch TV. Reads Bible. People watch. Was walking inside the house previously but hasn't really done too much lately.    Pt went to ED 4/24/24 due to abdominal pain/nausea/vomiting/SOB.   CT A/P 4/25/24:  1. Mild thickening and edema with some mucosal hyperenhancement in the gastric antrum/pylorus.  There may be some involvement of the duodenum.  Correlation advised.  2. Extensive colonic diverticulosis with a short segment of colonic wall thickening and adjacent inflammatory changes in the proximal sigmoid colon concerning for acute diverticulitis.  3. Additional area of somewhat featureless wall thickening at the junction of the descending and sigmoid colon.  While this may represent a 2nd area of diverticulitis findings are concerning for primary colonic neoplasm.  Further evaluation when clinically appropriate suggested.  4. Stable pulmonary micronodule in the left upper lobe.  5. Additional findings as above.  This report was flagged in Epic as abnormal.  CXR  There are postoperative changes in the left axillary region.  The trachea is unremarkable.  There are calcifications of the aortic knob.  The cardiomediastinal silhouette is within normal limits.  There is no evidence of free air beneath the hemidiaphragms.  There are no pleural effusions.  There is no evidence of a pneumothorax.  There is no evidence of pneumomediastinum.  No airspace opacity is present.  There are postoperative changes in the right upper abdominal quadrant.  There are degenerative changes in the osseous structures.  --->dx on augmentin BID x 10 days and zofran prn. Acute  Home Care was involved w/ pt for 15 days post discharge.     Went to ED for lightheadedness and dehydration 5/3/24  Repeat CT A/P 5/3  1. No acute abnormality  2. Prominent diverticulosis of the colon with no evidence of acute diverticulitis.  Recommend follow-up if symptoms persist.  3. Bilateral renal cysts.  4. Hepatic steatosis.    Saw Dr. Freeman 5/6/24 in colorectal.  Told needed a colonoscopy.     Reports still w/ some L sided chest soreness sometimes when she feels like she's upset that she can't drive anymore - may last a few min and goes away; thinks it's due to anxiety; no palpitations; sometimes w/ some SOB. No abdominal pain. No nausea/vomiting/diarrhea. Reports doesn't drink a lot of water.   TTE 11/2021  The left ventricle is normal in size with concentric remodeling and normal systolic function. The estimated ejection fraction is 60%.  Normal right ventricular size with normal right ventricular systolic function.  Normal left ventricular diastolic function.  Normal central venous pressure (3 mmHg).  There is no evidence of intracardiac shunting.    H/O breast CA s/p lumpectomy, XRT and tamoxifen 1990s. Breast CA recurrence 2020. S/p L mastectomy and on anastrozole since.   Follows w/ Dr. Olvera - LOV 3/5/24 - f/u 6 mo.  R diagnostic MMG 3/6/24 neg    Dm2 - ozempic 1mg weekly and lantus 18 units qd  Checks her sugars TID - reports lowest was 100. No hypoglycemia symptoms per pt.   A1c - 11/15/23 6.8  Eye - Dr. Hill 3/6/24 no retinopathy  Foot Dr. Hayden Rosa 3/26/24  MAC 11/15/23 - neg  Urinating well. No falls. Making it to the bathroom on time.     Meningioma s/p R frontal resection 11/2021.   Last MRI 9/19/23   Operative change including right frontal craniotomy for meningioma resection.  Evolving focus of overlying extra-axial hemorrhage measuring on the order of 1.5 cm, with additional surrounding punctate foci extra-axial hemorrhage.  Smooth dural thickening and enhancement along the site of  "resection and right frontal convexity without nodular or masslike enhancement to suggest recurrent lesion.  Stable small left extra-axial lesion, presumed meningioma.  Since resection, has had ongoing VH.   Last visit w/ Dr. Hutchison in NSGY 9/19/23 - F/U in 1 yr w/ repeat MRI.     Dementia w/ Lewy Bodies  S/p skin bx for parkinsonism w/ Dr. Silva 11/10/23.  Agitation - seroquel 50mg 1.5 tabs qhs    HTN - amlodipine 5mg qd.   BP cuff at home broke. Some leg swelling.  BNP 77 4/24/24. Sleeping well at night - sleeps on 2 pillows at night (chronic) and no change. No SOB.    Losartan-->cough    HLD - lipitor 40mg qd.     Review of Systems  Comprehensive review of systems otherwise negative. See history/subjective section for more details.    Objective:      Physical Exam    BP (!) 160/74 (BP Location: Right arm, Patient Position: Sitting, BP Method: Large (Manual))   Pulse 87   Temp 97.7 °F (36.5 °C) (Oral)   Ht 5' 2" (1.575 m)   Wt 63 kg (138 lb 14.2 oz)   LMP  (LMP Unknown) Comment: Partial  SpO2 99%   BMI 25.40 kg/m²     Gen - A+O, NAD  HEENT - PERRL, OP clear. MMM. B ext auditory canals w/ some cerumen.   Neck - no LAD  CV - RRR, no murmurs.   CHEST - ctab, no wheezing/rhonchi/crackles  Abd - S/NT/ND/+BS  Ext - 2+ B radial and DP pulses. B pedal edema.   Skin - no rash.   MSK - kyphosis. Normal gait. No spinal tenderness to palpation.     Labs reviewed.     Assessment/Plan     Alisia was seen today for annual exam.    Diagnoses and all orders for this visit:    Abnormal CT of the abdomen  -     Ambulatory referral/consult to Endo Procedure ; Future  -     Ambulatory referral/consult to Endo Procedure ; Future    History of diverticulitis  -     Ambulatory referral/consult to Endo Procedure ; Future    Leg edema  -     furosemide (LASIX) 20 MG tablet; Take 1 tablet (20 mg total) by mouth once daily. for 7 days    Hyperlipidemia, unspecified hyperlipidemia type  -     Lipid Panel; " Future    Aortic atherosclerosis  -     Lipid Panel; Future    Controlled type 2 diabetes mellitus without complication, without long-term current use of insulin - cont lantus and ozempic.   -     Microalbumin/Creatinine Ratio, Urine; Future  -     Hemoglobin A1C; Future    Carcinoma of axillary tail of left breast in female, estrogen receptor positive - cont anastrazole.     Mild Lewy body dementia with other behavioral disturbance - daughter is looking into an assisted living facility for pt.     Meningioma - follow w/ NSGY at end of year.     Advance Care Planning     Date: 05/14/2024    Power of   I initiated the process of voluntary advance care planning today and explained the importance of this process to the patient.  I introduced the concept of advance directives to the patient, as well. Then the patient received detailed information about the importance of designating a Health Care Power of  (HCPOA). She was also instructed to communicate with this person about their wishes for future healthcare, should she become sick and lose decision-making capacity. The patient has previously appointed a HCPOA. After our discussion, the patient has decided to complete a HCPOA and has appointed her daughter, health care agent:  Lennie Miranda  & health care agent number:  [phone]  I spent a total time of 3 minutes discussing this issue with the patient.       Follow up in about 2 weeks (around 5/28/2024).      Selena Montemayor MD  Department of Internal Medicine - [facility] Chasidy  1:55 PM

## 2024-05-22 ENCOUNTER — PATIENT MESSAGE (OUTPATIENT)
Dept: NEUROLOGY | Facility: CLINIC | Age: 79
End: 2024-05-22
Payer: MEDICARE

## 2024-05-22 ENCOUNTER — TELEPHONE (OUTPATIENT)
Dept: ENDOSCOPY | Facility: HOSPITAL | Age: 79
End: 2024-05-22

## 2024-05-22 ENCOUNTER — CLINICAL SUPPORT (OUTPATIENT)
Dept: ENDOSCOPY | Facility: HOSPITAL | Age: 79
End: 2024-05-22
Attending: INTERNAL MEDICINE
Payer: MEDICARE

## 2024-05-22 DIAGNOSIS — R93.5 ABNORMAL CT OF THE ABDOMEN: ICD-10-CM

## 2024-05-22 DIAGNOSIS — Z87.19 HISTORY OF DIVERTICULITIS: Primary | ICD-10-CM

## 2024-05-22 NOTE — PLAN OF CARE
Contacted patient's daughter, Lennie, to schedule an EGD and Colonoscopy and unable to schedule due to no availability at this time. Patient requested a call back. New PAT appt made and Dept number provided to pt [phone].

## 2024-05-22 NOTE — TELEPHONE ENCOUNTER
Contacted patient's daughter, Lennie, to schedule an EGD and Colonoscopy and unable to schedule due to no availability for both procedures on same day at one of the needed hospital locations based on pt's health history. Patient requested a call back. New PAT appt made and Dept number provided to pt [phone].

## 2024-05-24 ENCOUNTER — DOCUMENT SCAN (OUTPATIENT)
Dept: HOME HEALTH SERVICES | Facility: HOSPITAL | Age: 79
End: 2024-05-24
Payer: MEDICARE

## 2024-05-25 ENCOUNTER — EXTERNAL HOME HEALTH (OUTPATIENT)
Dept: HOME HEALTH SERVICES | Facility: HOSPITAL | Age: 79
End: 2024-05-25
Payer: MEDICARE

## 2024-05-29 NOTE — TELEPHONE ENCOUNTER
Addended by: LILLY HOWARD on: 5/29/2024 10:43 AM     Modules accepted: Orders     Pt came into appt and was unsure which meds she's taking and had some confusion about if she should still be on amlodipine and eliquis (we only asked her stop the eliquis for this week in preparation for the procedure today)     Routing this to PCP and staff to advise further since no current [facility] cardiologist on chart

## 2024-05-31 ENCOUNTER — TELEPHONE (OUTPATIENT)
Dept: ENDOSCOPY | Facility: HOSPITAL | Age: 79
End: 2024-05-31
Payer: MEDICARE

## 2024-05-31 DIAGNOSIS — R93.5 ABNORMAL CT OF THE ABDOMEN: Primary | ICD-10-CM

## 2024-05-31 DIAGNOSIS — Z87.19 HISTORY OF DIVERTICULITIS: ICD-10-CM

## 2024-05-31 RX ORDER — SODIUM, POTASSIUM,MAG SULFATES 17.5-3.13G
1 SOLUTION, RECONSTITUTED, ORAL ORAL DAILY
Qty: 1 KIT | Refills: 0 | Status: SHIPPED | OUTPATIENT
Start: 2024-05-31 | End: 2024-06-02

## 2024-05-31 NOTE — TELEPHONE ENCOUNTER
Ok from  to hold eliquis x2 days prior to colonoscopy/egd procedure.      Patient Calls  (Newest Message First)  View All Conversations on this Encounter   Selena Montemayor MD  You; Albina Walters Staff20 minutes ago (2:16 PM)       Yes, thats fine.      You routed conversation to Albina Walters Staff; Selena Montemayor MD26 minutes ago (2:11 PM)     You27 minutes ago (2:10 PM)     CF  Dear ,     Patient has a scheduled procedure Colonoscopy/EGD on 7/18/24 and is currently taking a blood thinner prescribed by your office. In order to ensure patient safety, we would like to confirm that the patient can place their blood thinner medication on hold for the procedure. Can he/she discontinue Eliquis (apixaban) for a minimum of 2 days prior to the procedure?     Thank you for your prompt reply.     [facility] Endoscopy Scheduling  [phone]

## 2024-05-31 NOTE — TELEPHONE ENCOUNTER
Dear ,    Patient has a scheduled procedure Colonoscopy/EGD on 7/18/24 and is currently taking a blood thinner prescribed by your office. In order to ensure patient safety, we would like to confirm that the patient can place their blood thinner medication on hold for the procedure. Can he/she discontinue Eliquis (apixaban) for a minimum of 2 days prior to the procedure?     Thank you for your prompt reply.    [facility] Endoscopy Scheduling  [phone]

## 2024-05-31 NOTE — TELEPHONE ENCOUNTER
Spoke to pt to schedule procedure(s) Colonoscopy/EGD       Physician to perform procedure(s) Dr. JANES Michelle  Date of Procedure (s) 7/18/24  Arrival Time 8:00 AM  Time of Procedure(s) 9:00 AM   Location of Procedure(s) [facility] [address]nd Floor  Type of Rx Prep sent to patient: Suprep  Instructions provided to patient via [facility]    Patient was informed on the following information and verbalized understanding. Screening questionnaire reviewed with patient and complete. If procedure requires anesthesia, a responsible adult needs to be present to accompany the patient home, patient cannot drive after receiving anesthesia. Appointment details are tentative, especially check-in time. Patient will receive a prep-op call 7 days prior to confirm check-in time for procedure. If applicable the patient should contact their pharmacy to verify Rx for procedure prep is ready for pick-up. Patient was advised to call the scheduling department at [phone] if pharmacy states no Rx is available. Patient was advised to call the endoscopy scheduling department if any questions or concerns arise.      [facility] Endoscopy Scheduling Department

## 2024-06-12 ENCOUNTER — TELEPHONE (OUTPATIENT)
Dept: PRIMARY CARE CLINIC | Facility: CLINIC | Age: 79
End: 2024-06-12
Payer: MEDICARE

## 2024-06-12 DIAGNOSIS — E11.621 DIABETIC ULCER OF TOE OF RIGHT FOOT ASSOCIATED WITH TYPE 2 DIABETES MELLITUS, UNSPECIFIED ULCER STAGE: Primary | ICD-10-CM

## 2024-06-12 DIAGNOSIS — L97.519 DIABETIC ULCER OF TOE OF RIGHT FOOT ASSOCIATED WITH TYPE 2 DIABETES MELLITUS, UNSPECIFIED ULCER STAGE: Primary | ICD-10-CM

## 2024-06-13 ENCOUNTER — PATIENT MESSAGE (OUTPATIENT)
Dept: PODIATRY | Facility: CLINIC | Age: 79
End: 2024-06-13
Payer: MEDICARE

## 2024-06-18 ENCOUNTER — OFFICE VISIT (OUTPATIENT)
Dept: PRIMARY CARE CLINIC | Facility: CLINIC | Age: 79
End: 2024-06-18
Payer: MEDICARE

## 2024-06-18 VITALS
BODY MASS INDEX: 24.54 KG/M2 | HEIGHT: 62 IN | HEART RATE: 103 BPM | SYSTOLIC BLOOD PRESSURE: 118 MMHG | TEMPERATURE: 98 F | DIASTOLIC BLOOD PRESSURE: 78 MMHG | WEIGHT: 133.38 LBS | RESPIRATION RATE: 18 BRPM

## 2024-06-18 DIAGNOSIS — E11.42 DIABETIC PERIPHERAL NEUROPATHY ASSOCIATED WITH TYPE 2 DIABETES MELLITUS: ICD-10-CM

## 2024-06-18 DIAGNOSIS — R09.89 DECREASED DORSALIS PEDIS PULSE: ICD-10-CM

## 2024-06-18 DIAGNOSIS — E11.621 DIABETIC ULCER OF TOE OF LEFT FOOT ASSOCIATED WITH TYPE 2 DIABETES MELLITUS, WITH FAT LAYER EXPOSED: Primary | ICD-10-CM

## 2024-06-18 DIAGNOSIS — L97.522 DIABETIC ULCER OF TOE OF LEFT FOOT ASSOCIATED WITH TYPE 2 DIABETES MELLITUS, WITH FAT LAYER EXPOSED: Primary | ICD-10-CM

## 2024-06-18 PROCEDURE — 99999 PR PBB SHADOW E&M-EST. PATIENT-LVL V: CPT | Mod: PBBFAC,HCNC,, | Performed by: INTERNAL MEDICINE

## 2024-06-18 NOTE — Clinical Note
Dr. Dao, I saw Ms. Crump today. Please see my note w/ the picture of the L big toe ulcer. I didn't order HH b/c I'm not sure what wound care orders you'd want. Let me know if you need me to order. She's seeing you tomorrow.  Selena

## 2024-06-18 NOTE — PROGRESS NOTES
"Subjective:       Patient ID: Alisia Gusman is a 78 y.o. female.    Chief Complaint: foot ulcer    HPI  Accompanied by sister Julee.     Last saw pt 5/14/24  Went to ER for L big toe ulcer 6/12/24 - Rx PCN and bactrim DS (end date tomorrow). Has podiatry appt w/ Dr. Dao tomorrow.   XR 6/12/24 - There is DJD of the 1st MTP joint and a mild hallux valgus deformity. There are spurs on the calcaneus. No fracture, dislocation, or bone destruction seen.   Soreness at the left big toe and the base of the big toe. Otherwise no pain. Able to walk. No drainage.   B feet has burning sensation but this is chronic. Reports no falls. No injury she can think of that started the ulcer but thinks possibly maybe a nail in the carpet.   No fevers/chills.   Sister has been giving her her medicines.   Tolerating antibiotics fine - no nausea/diarrhea/abdominal pain.     Dm2 - ozempic 1mg weekly and lantus 18 units qd   A1c 5/14/24 6.9  Checks sugars daily. Brought in her log.     Has Cscope scheduled for 7/9/24    Review of Systems      Objective:      Physical Exam    /78 (BP Location: Left arm, Patient Position: Sitting, BP Method: Medium (Manual))   Pulse 103   Temp 98.1 °F (36.7 °C) (Oral)   Ht 5' 2" (1.575 m)   LMP  (LMP Unknown) Comment: Partial  BMI 25.60 kg/m²     GEN - A+OX4, NAD. Some delusions.  HEENT - PERRL, EOMI, OP clear  Neck - No thyromegaly or cervical LAD. No thyroid masses felt.  CV - RRR, no m/r   Chest - CTAB, no wheezing or rhonchi  Abd - S/NT/ND/+BS.   Ext - decreased DP B. Trace B edema. L big toe w/ some effusion.       Assessment/Plan     Alisia was seen today for recurrent skin infections.    Diagnoses and all orders for this visit:    Diabetic ulcer of toe of left foot associated with type 2 diabetes mellitus, with fat layer exposed - looks fairly clean. Finish abx. F/u w/ podiatry tomorrow. Defer HH skilled nurse orders to podiatrist if needed. F/u w/ me in 2 weeks.  -     US Lower " Extremity Arteries Bilateral; Future  -     Sedimentation rate; Future  -     C-Reactive Protein; Future    Diabetic peripheral neuropathy associated with type 2 diabetes mellitus - make sure to have good foot hygiene. Wear shoes inside the house. Check feet daily.     Decreased dorsalis pedis pulse  -     US Lower Extremity Arteries Bilateral; Future        Follow up in about 13 days (around 7/1/2024).      Selena Montemayor MD  Department of Internal Medicine - [facility]  1:47 PM

## 2024-06-19 ENCOUNTER — LAB VISIT (OUTPATIENT)
Dept: LAB | Facility: HOSPITAL | Age: 79
End: 2024-06-19
Attending: INTERNAL MEDICINE
Payer: MEDICARE

## 2024-06-19 ENCOUNTER — OFFICE VISIT (OUTPATIENT)
Dept: PODIATRY | Facility: CLINIC | Age: 79
End: 2024-06-19
Payer: MEDICARE

## 2024-06-19 VITALS
WEIGHT: 134.5 LBS | DIASTOLIC BLOOD PRESSURE: 73 MMHG | RESPIRATION RATE: 17 BRPM | BODY MASS INDEX: 24.75 KG/M2 | HEART RATE: 83 BPM | SYSTOLIC BLOOD PRESSURE: 142 MMHG | TEMPERATURE: 98 F | HEIGHT: 62 IN

## 2024-06-19 DIAGNOSIS — E11.49 TYPE II DIABETES MELLITUS WITH NEUROLOGICAL MANIFESTATIONS: Primary | ICD-10-CM

## 2024-06-19 DIAGNOSIS — L97.522 ULCER OF LEFT FOOT WITH FAT LAYER EXPOSED: ICD-10-CM

## 2024-06-19 DIAGNOSIS — L97.522 DIABETIC ULCER OF TOE OF LEFT FOOT ASSOCIATED WITH TYPE 2 DIABETES MELLITUS, WITH FAT LAYER EXPOSED: ICD-10-CM

## 2024-06-19 DIAGNOSIS — E11.621 DIABETIC ULCER OF TOE OF LEFT FOOT ASSOCIATED WITH TYPE 2 DIABETES MELLITUS, WITH FAT LAYER EXPOSED: ICD-10-CM

## 2024-06-19 LAB
CRP SERPL-MCNC: 0.4 MG/L (ref 0–8.2)
ERYTHROCYTE [SEDIMENTATION RATE] IN BLOOD BY PHOTOMETRIC METHOD: 26 MM/HR (ref 0–36)

## 2024-06-19 PROCEDURE — 3077F SYST BP >= 140 MM HG: CPT | Mod: CPTII,S$GLB,, | Performed by: PODIATRIST

## 2024-06-19 PROCEDURE — 99999 PR PBB SHADOW E&M-EST. PATIENT-LVL V: CPT | Mod: PBBFAC,,, | Performed by: PODIATRIST

## 2024-06-19 PROCEDURE — 87070 CULTURE OTHR SPECIMN AEROBIC: CPT | Performed by: PODIATRIST

## 2024-06-19 PROCEDURE — 1157F ADVNC CARE PLAN IN RCRD: CPT | Mod: CPTII,S$GLB,, | Performed by: PODIATRIST

## 2024-06-19 PROCEDURE — 87077 CULTURE AEROBIC IDENTIFY: CPT | Performed by: PODIATRIST

## 2024-06-19 PROCEDURE — 86140 C-REACTIVE PROTEIN: CPT | Performed by: INTERNAL MEDICINE

## 2024-06-19 PROCEDURE — 11042 DBRDMT SUBQ TIS 1ST 20SQCM/<: CPT | Mod: S$GLB,,, | Performed by: PODIATRIST

## 2024-06-19 PROCEDURE — 1125F AMNT PAIN NOTED PAIN PRSNT: CPT | Mod: CPTII,S$GLB,, | Performed by: PODIATRIST

## 2024-06-19 PROCEDURE — 3078F DIAST BP <80 MM HG: CPT | Mod: CPTII,S$GLB,, | Performed by: PODIATRIST

## 2024-06-19 PROCEDURE — 85652 RBC SED RATE AUTOMATED: CPT | Performed by: INTERNAL MEDICINE

## 2024-06-19 PROCEDURE — 36415 COLL VENOUS BLD VENIPUNCTURE: CPT | Performed by: INTERNAL MEDICINE

## 2024-06-19 PROCEDURE — 1159F MED LIST DOCD IN RCRD: CPT | Mod: CPTII,S$GLB,, | Performed by: PODIATRIST

## 2024-06-19 PROCEDURE — 87186 SC STD MICRODIL/AGAR DIL: CPT | Performed by: PODIATRIST

## 2024-06-19 PROCEDURE — 99214 OFFICE O/P EST MOD 30 MIN: CPT | Mod: 25,S$GLB,, | Performed by: PODIATRIST

## 2024-06-19 PROCEDURE — 87075 CULTR BACTERIA EXCEPT BLOOD: CPT | Performed by: PODIATRIST

## 2024-06-21 LAB
BACTERIA SPEC AEROBE CULT: ABNORMAL
BACTERIA SPEC ANAEROBE CULT: NORMAL

## 2024-06-26 ENCOUNTER — OFFICE VISIT (OUTPATIENT)
Dept: PODIATRY | Facility: CLINIC | Age: 79
End: 2024-06-26
Payer: MEDICARE

## 2024-06-26 VITALS
HEART RATE: 95 BPM | DIASTOLIC BLOOD PRESSURE: 82 MMHG | RESPIRATION RATE: 17 BRPM | HEIGHT: 62 IN | SYSTOLIC BLOOD PRESSURE: 134 MMHG | WEIGHT: 134.5 LBS | BODY MASS INDEX: 24.75 KG/M2

## 2024-06-26 DIAGNOSIS — E11.49 TYPE II DIABETES MELLITUS WITH NEUROLOGICAL MANIFESTATIONS: Primary | ICD-10-CM

## 2024-06-26 DIAGNOSIS — L97.522 ULCER OF LEFT FOOT WITH FAT LAYER EXPOSED: ICD-10-CM

## 2024-06-26 PROCEDURE — 99499 UNLISTED E&M SERVICE: CPT | Mod: S$GLB,,, | Performed by: PODIATRIST

## 2024-06-26 PROCEDURE — 11042 DBRDMT SUBQ TIS 1ST 20SQCM/<: CPT | Mod: S$GLB,,, | Performed by: PODIATRIST

## 2024-06-26 PROCEDURE — 99999 PR PBB SHADOW E&M-EST. PATIENT-LVL IV: CPT | Mod: PBBFAC,,, | Performed by: PODIATRIST

## 2024-06-26 NOTE — PROGRESS NOTES
Subjective:      Patient ID: Alisia Gusman is a 78 y.o. female.    Chief Complaint: Wound Care (Left foot ) and Dressing Change      Alisia is a 78 y.o. female who presents to the clinic for evaluation and treatment of high risk feet. Alisia has a past medical history of Acute pulmonary embolism without acute cor pulmonale (11/15/2021), Adult bronchiectasis (7/26/2017), Allergy, Anemia, Arthritis, Asthma, Breast cancer (1993), Breast cancer (2020), Cancer (1993), Cataract, Chronic cervical radiculopathy (9/20/2012), Diabetes mellitus, Diabetes mellitus type II, Diabetes with neurologic complications, Diverticulosis, Dry eyes, Dysphagia, GERD (gastroesophageal reflux disease), Hyperlipidemia, Hypertension, Neuropathy, Scalp tenderness, Sepsis (12/12/2021), Shortness of breath, Ulcer, and Unspecified dementia, unspecified severity, without behavioral disturbance, psychotic disturbance, mood disturbance, and anxiety (2/22/2024). The patient's returns to clinic for r evaluation of an ulceration to her left big toe.  She was seen in the emergency room as well as primary care and has taken 2 courses of antibiotics for this issue.  She reports the pain has mildly improved.  She denies nausea vomiting fever and/or chills  This patient has documented high risk feet requiring routine maintenance secondary to diabetes mellitis and those secondary complications of diabetes, as mentioned..    PCP: Selena Montemayor MD    Date Last Seen by PCP: 6/18/2024   Chief Complaint   Patient presents with    Wound Care     Left foot     Dressing Change         Hemoglobin A1C   Date Value Ref Range Status   05/14/2024 6.9 (H) 4.0 - 5.6 % Final     Comment:     ADA Screening Guidelines:  5.7-6.4%  Consistent with prediabetes  >or=6.5%  Consistent with diabetes    High levels of fetal hemoglobin interfere with the HbA1C  assay. Heterozygous hemoglobin variants (HbS, HgC, etc)do  not significantly interfere with this assay.   However, presence of  "multiple variants may affect accuracy.     11/15/2023 6.8 (H) 4.0 - 5.6 % Final     Comment:     ADA Screening Guidelines:  5.7-6.4%  Consistent with prediabetes  >or=6.5%  Consistent with diabetes    High levels of fetal hemoglobin interfere with the HbA1C  assay. Heterozygous hemoglobin variants (HbS, HgC, etc)do  not significantly interfere with this assay.   However, presence of multiple variants may affect accuracy.     04/20/2023 6.3 (H) 4.0 - 5.6 % Final     Comment:     ADA Screening Guidelines:  5.7-6.4%  Consistent with prediabetes  >or=6.5%  Consistent with diabetes    High levels of fetal hemoglobin interfere with the HbA1C  assay. Heterozygous hemoglobin variants (HbS, HgC, etc)do  not significantly interfere with this assay.   However, presence of multiple variants may affect accuracy.         Review of Systems   Constitutional: Negative for chills, decreased appetite and fever.   Cardiovascular:  Negative for claudication.   Respiratory:  Negative for cough and shortness of breath.    Skin:  Positive for dry skin and nail changes. Negative for color change, flushing, itching, poor wound healing, rash and skin cancer.   Musculoskeletal:  Negative for arthritis, back pain, falls, joint pain, joint swelling and myalgias.   Gastrointestinal:  Negative for nausea and vomiting.   Neurological:  Negative for loss of balance, numbness and paresthesias.   All other systems reviewed and are negative.          Objective:       Vitals:    06/26/24 1358   BP: 134/82   Pulse: 95   Resp: 17   Weight: 61 kg (134 lb 7.7 oz)   Height: 5' 2" (1.575 m)   PainSc: 0-No pain        Physical Exam  Vitals and nursing note reviewed.   Constitutional:       Appearance: She is well-developed.   Cardiovascular:      Pulses:           Dorsalis pedis pulses are 1+ on the right side and 1+ on the left side.      Comments: Posterior tibial pulses are diminished Bilaterally. Toes are cool to touch. Feet are warm proximally.There is " decreased digital hair. Skin is atrophic, slightly hyperpigmented, and mildly edematous      Musculoskeletal:         General: Swelling (L forefoot) present. No tenderness, deformity or signs of injury. Normal range of motion.      Right ankle: Normal.      Left ankle: Normal.      Right foot: No swelling, deformity or crepitus.      Left foot: No swelling, deformity or crepitus.      Comments: Semi-reducible hammertoe contractures noted to toes 2-4 b/l-asymptomatic.  Otherwise adequate joint range of motion without pain, limitation, nor crepitation Bilateral feet and ankle joints. Muscle strength is 5/5 in all groups bilaterally.         Lymphadenopathy:      Comments: No palpable lymph nodes   Skin:     General: Skin is warm and dry.      Coloration: Skin is not ashen, cyanotic or pale.      Findings: No bruising, ecchymosis, erythema, lesion or rash.      Nails: There is no clubbing.   Neurological:      Mental Status: She is alert and oriented to person, place, and time.      Sensory: Sensory deficit present.      Comments: [place]-Leonel 5.07 monofilamant testing is diminished Vikash feet. Sharp/dull sensation diminished Bilaterally. Light touch absent Bilaterally.       Psychiatric:         Behavior: Behavior normal.       6.19.24  L hallux pre debridement     L hallux post debridement       0.8x1.0x0.1 cm ulceration to the plantar hallux with surrounding hyperkeratotic tissue.  Moderate edema is observed to the left hallux.  No deep probe.  No malodor.  No purulence.  Serosanguineous drainage noted.  No exposed bone or tendinous structures observed.    6.26.24    0.6x0.9x0.1 cm granular ulcer plantar hallux No surrounding erythema, edema, malodor, nor drainage noted         CRP-    CRP   Date Value Ref Range Status   06/19/2024 0.4 0.0 - 8.2 mg/L Final     ESR-   Sed Rate   Date Value Ref Range Status   06/19/2024 26 0 - 36 mm/Hr Final     CBC-   WBC   Date Value Ref Range Status   06/12/2024 5.51 3.90 - 12.70  K/uL Final     A1C-   Hemoglobin A1C   Date Value Ref Range Status   05/14/2024 6.9 (H) 4.0 - 5.6 % Final     Comment:     ADA Screening Guidelines:  5.7-6.4%  Consistent with prediabetes  >or=6.5%  Consistent with diabetes    High levels of fetal hemoglobin interfere with the HbA1C  assay. Heterozygous hemoglobin variants (HbS, HgC, etc)do  not significantly interfere with this assay.   However, presence of multiple variants may affect accuracy.       MICRO:    Aerobic Bacterial Culture   Date Value Ref Range Status   06/19/2024 ENTEROCOCCUS FAECALIS  Many   (A)  Final        Anaerobic Culture   Date Value Ref Range Status   06/19/2024 No anaerobes isolated  Final     -----------------------------------------------------------------------------------------------------------------------------------------------------------    Assessment:       Encounter Diagnoses   Name Primary?    Type II diabetes mellitus with neurological manifestations Yes    Ulcer of left foot with fat layer exposed            Plan:       Alisia was seen today for wound care and dressing change.    Diagnoses and all orders for this visit:    Type II diabetes mellitus with neurological manifestations    Ulcer of left foot with fat layer exposed        I counseled the patient on her conditions, their implications and medical management.    Independent review of patients previous clinical notes    Reviewed current medication(s), and lab(s) specific to foot ailment(s) with patient in detail. All questions answered     Overall improvement in wound noted wound culture with resistant Enterococcus bacteria noted however clinically the wound appears free of infection we will continue to monitor the area versus plantar patient is on IV antibiotics.  All questions answered she voices understanding.  Has completed oral antibiotics.    Debridement: With verbal consent, nonviable tissues on the left foot were debrided beyond sub q utilizing a  sterile No. 3  scalpel and forceps. Minimal bleeding controlled with direct pressure  The patient tolerated this well.     Dressings: [employer]  Offloading:Gina PADILLA MA assisted w/ application of football dressing under my direct supervision. Darco shoe applied to offload the area. Advised patient that this should be worn on the affected foot at all times when ambulating     Follow-up:Patient is to return to the clinic in 1 week  for follow-up but should call [facility] immediately if any signs of infection, such as fever, chills, sweats, increased redness or pain.    Short-term goals include maintaining good offloading and minimizing bioburden, promoting granulation and epithelialization to healing.  Long-term goals include keeping the wound healed by good offloading and medical management under the direction of internist.

## 2024-06-28 ENCOUNTER — TELEPHONE (OUTPATIENT)
Dept: PODIATRY | Facility: CLINIC | Age: 79
End: 2024-06-28
Payer: MEDICARE

## 2024-06-28 NOTE — TELEPHONE ENCOUNTER
Spoke with pt daughter in regards pt appt for 7/3/24. Pt confirmed and verbalized understanding.

## 2024-07-03 ENCOUNTER — OFFICE VISIT (OUTPATIENT)
Dept: PODIATRY | Facility: CLINIC | Age: 79
End: 2024-07-03
Payer: MEDICARE

## 2024-07-03 VITALS
SYSTOLIC BLOOD PRESSURE: 154 MMHG | DIASTOLIC BLOOD PRESSURE: 76 MMHG | WEIGHT: 134.5 LBS | HEIGHT: 62 IN | BODY MASS INDEX: 24.75 KG/M2 | HEART RATE: 92 BPM

## 2024-07-03 DIAGNOSIS — E11.49 TYPE II DIABETES MELLITUS WITH NEUROLOGICAL MANIFESTATIONS: Primary | ICD-10-CM

## 2024-07-03 DIAGNOSIS — L97.522 ULCER OF LEFT FOOT WITH FAT LAYER EXPOSED: ICD-10-CM

## 2024-07-03 PROCEDURE — 99999 PR PBB SHADOW E&M-EST. PATIENT-LVL IV: CPT | Mod: PBBFAC,,, | Performed by: PODIATRIST

## 2024-07-03 NOTE — PROGRESS NOTES
Subjective:      Patient ID: Alisia Gusman is a 78 y.o. female.    Chief Complaint: Wound Care (Left great toe)      Alisia is a 78 y.o. female who presents to the clinic for evaluation and treatment of high risk feet. Alisia has a past medical history of Acute pulmonary embolism without acute cor pulmonale (11/15/2021), Adult bronchiectasis (7/26/2017), Allergy, Anemia, Arthritis, Asthma, Breast cancer (1993), Breast cancer (2020), Cancer (1993), Cataract, Chronic cervical radiculopathy (9/20/2012), Diabetes mellitus, Diabetes mellitus type II, Diabetes with neurologic complications, Diverticulosis, Dry eyes, Dysphagia, GERD (gastroesophageal reflux disease), Hyperlipidemia, Hypertension, Neuropathy, Scalp tenderness, Sepsis (12/12/2021), Shortness of breath, Ulcer, and Unspecified dementia, unspecified severity, without behavioral disturbance, psychotic disturbance, mood disturbance, and anxiety (2/22/2024). The patient's returns to clinic for r evaluation of an ulceration to her left big toe.  She was seen in the emergency room as well as primary care and has taken 2 courses of antibiotics for this issue.  She reports the pain has mildly improved.  She denies nausea vomiting fever and/or chills  This patient has documented high risk feet requiring routine maintenance secondary to diabetes mellitis and those secondary complications of diabetes, as mentioned..    PCP: Selena Montemayor MD    Date Last Seen by PCP: 6/18/2024   Chief Complaint   Patient presents with    Wound Care     Left great toe         Hemoglobin A1C   Date Value Ref Range Status   05/14/2024 6.9 (H) 4.0 - 5.6 % Final     Comment:     ADA Screening Guidelines:  5.7-6.4%  Consistent with prediabetes  >or=6.5%  Consistent with diabetes    High levels of fetal hemoglobin interfere with the HbA1C  assay. Heterozygous hemoglobin variants (HbS, HgC, etc)do  not significantly interfere with this assay.   However, presence of multiple variants may affect  "accuracy.     11/15/2023 6.8 (H) 4.0 - 5.6 % Final     Comment:     ADA Screening Guidelines:  5.7-6.4%  Consistent with prediabetes  >or=6.5%  Consistent with diabetes    High levels of fetal hemoglobin interfere with the HbA1C  assay. Heterozygous hemoglobin variants (HbS, HgC, etc)do  not significantly interfere with this assay.   However, presence of multiple variants may affect accuracy.     04/20/2023 6.3 (H) 4.0 - 5.6 % Final     Comment:     ADA Screening Guidelines:  5.7-6.4%  Consistent with prediabetes  >or=6.5%  Consistent with diabetes    High levels of fetal hemoglobin interfere with the HbA1C  assay. Heterozygous hemoglobin variants (HbS, HgC, etc)do  not significantly interfere with this assay.   However, presence of multiple variants may affect accuracy.         Review of Systems   Constitutional: Negative for chills, decreased appetite and fever.   Cardiovascular:  Negative for claudication.   Respiratory:  Negative for cough and shortness of breath.    Skin:  Positive for dry skin and nail changes. Negative for color change, flushing, itching, poor wound healing, rash and skin cancer.   Musculoskeletal:  Negative for arthritis, back pain, falls, joint pain, joint swelling and myalgias.   Gastrointestinal:  Negative for nausea and vomiting.   Neurological:  Negative for loss of balance, numbness and paresthesias.   All other systems reviewed and are negative.          Objective:       Vitals:    07/03/24 1014   BP: (!) 154/76   Pulse: 92   Weight: 61 kg (134 lb 7.7 oz)   Height: 5' 2" (1.575 m)   PainSc: 0-No pain        Physical Exam  Vitals and nursing note reviewed.   Constitutional:       Appearance: She is well-developed.   Cardiovascular:      Pulses:           Dorsalis pedis pulses are 1+ on the right side and 1+ on the left side.      Comments: Posterior tibial pulses are diminished Bilaterally. Toes are cool to touch. Feet are warm proximally.There is decreased digital hair. Skin is " atrophic, slightly hyperpigmented, and mildly edematous      Musculoskeletal:         General: Swelling (L forefoot) present. No tenderness, deformity or signs of injury. Normal range of motion.      Right ankle: Normal.      Left ankle: Normal.      Right foot: No swelling, deformity or crepitus.      Left foot: No swelling, deformity or crepitus.      Comments: Semi-reducible hammertoe contractures noted to toes 2-4 b/l-asymptomatic.  Otherwise adequate joint range of motion without pain, limitation, nor crepitation Bilateral feet and ankle joints. Muscle strength is 5/5 in all groups bilaterally.         Lymphadenopathy:      Comments: No palpable lymph nodes   Skin:     General: Skin is warm and dry.      Coloration: Skin is not ashen, cyanotic or pale.      Findings: No bruising, ecchymosis, erythema, lesion or rash.      Nails: There is no clubbing.   Neurological:      Mental Status: She is alert and oriented to person, place, and time.      Sensory: Sensory deficit present.      Comments: [place]-Leonel 5.07 monofilamant testing is diminished Vikash feet. Sharp/dull sensation diminished Bilaterally. Light touch absent Bilaterally.       Psychiatric:         Behavior: Behavior normal.       6.19.24  L hallux pre debridement     L hallux post debridement       0.8x1.0x0.1 cm ulceration to the plantar hallux with surrounding hyperkeratotic tissue.  Moderate edema is observed to the left hallux.  No deep probe.  No malodor.  No purulence.  Serosanguineous drainage noted.  No exposed bone or tendinous structures observed.    6.26.24    0.6x0.9x0.1 cm granular ulcer plantar hallux No surrounding erythema, edema, malodor, nor drainage noted     7.3.24:    Blister noted to the medial aspect of the hallux with healthy granular base.  Full-thickness lesion.  No surrounding erythema nor edema.    0.5x0.7x0.1 cm ulcer with intact dry margins.  Granular wound bed with 100% fibrin covering.  No surrounding erythema nor  edema.  No increased temperature.  No purulence.  No deep probe.        CRP-    CRP   Date Value Ref Range Status   06/19/2024 0.4 0.0 - 8.2 mg/L Final     ESR-   Sed Rate   Date Value Ref Range Status   06/19/2024 26 0 - 36 mm/Hr Final     CBC-   WBC   Date Value Ref Range Status   06/12/2024 5.51 3.90 - 12.70 K/uL Final     A1C-   Hemoglobin A1C   Date Value Ref Range Status   05/14/2024 6.9 (H) 4.0 - 5.6 % Final     Comment:     ADA Screening Guidelines:  5.7-6.4%  Consistent with prediabetes  >or=6.5%  Consistent with diabetes    High levels of fetal hemoglobin interfere with the HbA1C  assay. Heterozygous hemoglobin variants (HbS, HgC, etc)do  not significantly interfere with this assay.   However, presence of multiple variants may affect accuracy.       MICRO:    Aerobic Bacterial Culture   Date Value Ref Range Status   06/19/2024 ENTEROCOCCUS FAECALIS  Many   (A)  Final        Anaerobic Culture   Date Value Ref Range Status   06/19/2024 No anaerobes isolated  Final     -----------------------------------------------------------------------------------------------------------------------------------------------------------    Assessment:       Encounter Diagnoses   Name Primary?    Type II diabetes mellitus with neurological manifestations Yes    Ulcer of left foot with fat layer exposed            Plan:       Alisia was seen today for wound care.    Diagnoses and all orders for this visit:    Type II diabetes mellitus with neurological manifestations    Ulcer of left foot with fat layer exposed        I counseled the patient on her conditions, their implications and medical management.    Independent review of patients previous clinical notes    Reviewed current medication(s), and lab(s) specific to foot ailment(s) with patient in detail. All questions answered       Debridement: With verbal consent, nonviable tissues on the left foot were debrided beyond sub q utilizing a  sterile No. 3 scalpel and forceps.  Minimal bleeding controlled with direct pressure  The patient tolerated this well.     Dressings: Andie   Offloading:Gina Holbrook LPN assisted w/ application of football dressing under my direct supervision. Darco shoe applied to offload the area. Advised patient that this should be worn on the affected foot at all times when ambulating     Follow-up:Patient is to return to the clinic in 1 week  for follow-up but should call [facility] immediately if any signs of infection, such as fever, chills, sweats, increased redness or pain.    Short-term goals include maintaining good offloading and minimizing bioburden, promoting granulation and epithelialization to healing.  Long-term goals include keeping the wound healed by good offloading and medical management under the direction of internist.

## 2024-07-09 ENCOUNTER — CLINICAL SUPPORT (OUTPATIENT)
Dept: ENDOSCOPY | Facility: HOSPITAL | Age: 79
End: 2024-07-09
Attending: INTERNAL MEDICINE
Payer: MEDICARE

## 2024-07-09 DIAGNOSIS — R93.5 ABNORMAL CT OF THE ABDOMEN: ICD-10-CM

## 2024-07-09 DIAGNOSIS — Z87.19 HISTORY OF DIVERTICULITIS: ICD-10-CM

## 2024-07-10 ENCOUNTER — OFFICE VISIT (OUTPATIENT)
Dept: PODIATRY | Facility: CLINIC | Age: 79
End: 2024-07-10
Payer: MEDICARE

## 2024-07-10 VITALS
HEIGHT: 62 IN | BODY MASS INDEX: 24.75 KG/M2 | WEIGHT: 134.5 LBS | SYSTOLIC BLOOD PRESSURE: 127 MMHG | RESPIRATION RATE: 18 BRPM | DIASTOLIC BLOOD PRESSURE: 76 MMHG | HEART RATE: 96 BPM

## 2024-07-10 DIAGNOSIS — Z91.199 NONCOMPLIANCE: ICD-10-CM

## 2024-07-10 DIAGNOSIS — F02.A18 MILD LEWY BODY DEMENTIA WITH OTHER BEHAVIORAL DISTURBANCE: ICD-10-CM

## 2024-07-10 DIAGNOSIS — G31.83 MILD LEWY BODY DEMENTIA WITH OTHER BEHAVIORAL DISTURBANCE: ICD-10-CM

## 2024-07-10 DIAGNOSIS — L97.522 ULCER OF LEFT FOOT WITH FAT LAYER EXPOSED: ICD-10-CM

## 2024-07-10 DIAGNOSIS — E11.49 TYPE II DIABETES MELLITUS WITH NEUROLOGICAL MANIFESTATIONS: Primary | ICD-10-CM

## 2024-07-10 PROCEDURE — 99499 UNLISTED E&M SERVICE: CPT | Mod: HCNC,S$GLB,, | Performed by: PODIATRIST

## 2024-07-10 PROCEDURE — 99999 PR PBB SHADOW E&M-EST. PATIENT-LVL IV: CPT | Mod: PBBFAC,HCNC,, | Performed by: PODIATRIST

## 2024-07-10 PROCEDURE — 11042 DBRDMT SUBQ TIS 1ST 20SQCM/<: CPT | Mod: HCNC,S$GLB,, | Performed by: PODIATRIST

## 2024-07-17 ENCOUNTER — TELEPHONE (OUTPATIENT)
Dept: ENDOSCOPY | Facility: HOSPITAL | Age: 79
End: 2024-07-17
Payer: MEDICARE

## 2024-07-17 NOTE — TELEPHONE ENCOUNTER
Spoke to patient's daughter, Lennie,  to reschedule procedure(s) Colonoscopy/EGD       Physician to perform procedure(s) Dr. JACQUELIN Hassan  Date of Procedure (s) 10/24/24  Arrival Time 7:45 AM  Time of Procedure(s) 8:45 AM   Location of Procedure(s) [address]  Type of Rx Prep sent to patient: Suprep  Instructions provided to patient via [facility]    Patient was informed on the following information and verbalized understanding. Screening questionnaire reviewed with patient and complete. If procedure requires anesthesia, a responsible adult needs to be present to accompany the patient home, patient cannot drive after receiving anesthesia. Appointment details are tentative, especially check-in time. Patient will receive a prep-op call 7 days prior to confirm check-in time for procedure. If applicable the patient should contact their pharmacy to verify Rx for procedure prep is ready for pick-up. Patient was advised to call the scheduling department at [phone] if pharmacy states no Rx is available. Patient was advised to call the endoscopy scheduling department if any questions or concerns arise.      [facility] Scheduling Department    The patient is currently under an internal PCP Dr. Selena Montemayor care and requires a blood thinner Eliquis (apixaban) for their upcoming scheduled Colonoscopy/EGD on 10/24/24.

## 2024-07-17 NOTE — PROGRESS NOTES
"    Subjective:      Patient ID: Alisia Gusman is a 78 y.o. female.    Chief Complaint: Wound Care (Left foot great toe ) and Dressing Change (Football )      Alisia is a 78 y.o. female who presents to the clinic for evaluation and treatment of high risk feet. Alisia has a past medical history of Acute pulmonary embolism without acute cor pulmonale (11/15/2021), Adult bronchiectasis (7/26/2017), Allergy, Anemia, Arthritis, Asthma, Breast cancer (1993), Breast cancer (2020), Cancer (1993), Cataract, Chronic cervical radiculopathy (9/20/2012), Diabetes mellitus, Diabetes mellitus type II, Diabetes with neurologic complications, Diverticulosis, Dry eyes, Dysphagia, GERD (gastroesophageal reflux disease), Hyperlipidemia, Hypertension, Neuropathy, Scalp tenderness, Sepsis (12/12/2021), Shortness of breath, Ulcer, and Unspecified dementia, unspecified severity, without behavioral disturbance, psychotic disturbance, mood disturbance, and anxiety (2/22/2024). The patient's returns to clinic for r evaluation of an ulceration to her left big toe.  She was seen in the emergency room as well as primary care and has taken 2 courses of antibiotics for this issue.  She reports the pain has mildly improved.  She denies nausea vomiting fever and/or chills  This patient has documented high risk feet requiring routine maintenance secondary to diabetes mellitis and those secondary complications of diabetes, as mentioned..    7.10.24: pt reports "someone" removed her bandages. No pedal complaints     PCP: Selena Montemayor MD    Date Last Seen by PCP: 6/18/2024   Chief Complaint   Patient presents with    Wound Care     Left foot great toe     Dressing Change     Football          Hemoglobin A1C   Date Value Ref Range Status   05/14/2024 6.9 (H) 4.0 - 5.6 % Final     Comment:     ADA Screening Guidelines:  5.7-6.4%  Consistent with prediabetes  >or=6.5%  Consistent with diabetes    High levels of fetal hemoglobin interfere with the HbA1C  assay. " "Heterozygous hemoglobin variants (HbS, HgC, etc)do  not significantly interfere with this assay.   However, presence of multiple variants may affect accuracy.     11/15/2023 6.8 (H) 4.0 - 5.6 % Final     Comment:     ADA Screening Guidelines:  5.7-6.4%  Consistent with prediabetes  >or=6.5%  Consistent with diabetes    High levels of fetal hemoglobin interfere with the HbA1C  assay. Heterozygous hemoglobin variants (HbS, HgC, etc)do  not significantly interfere with this assay.   However, presence of multiple variants may affect accuracy.     04/20/2023 6.3 (H) 4.0 - 5.6 % Final     Comment:     ADA Screening Guidelines:  5.7-6.4%  Consistent with prediabetes  >or=6.5%  Consistent with diabetes    High levels of fetal hemoglobin interfere with the HbA1C  assay. Heterozygous hemoglobin variants (HbS, HgC, etc)do  not significantly interfere with this assay.   However, presence of multiple variants may affect accuracy.         Review of Systems   Constitutional: Negative for chills, decreased appetite and fever.   Cardiovascular:  Negative for claudication.   Respiratory:  Negative for cough and shortness of breath.    Skin:  Positive for dry skin and nail changes. Negative for color change, flushing, itching, poor wound healing, rash and skin cancer.   Musculoskeletal:  Negative for arthritis, back pain, falls, joint pain, joint swelling and myalgias.   Gastrointestinal:  Negative for nausea and vomiting.   Neurological:  Negative for loss of balance, numbness and paresthesias.   All other systems reviewed and are negative.          Objective:       Vitals:    07/10/24 1037   BP: 127/76   Pulse: 96   Resp: 18   Weight: 61 kg (134 lb 7.7 oz)   Height: 5' 2" (1.575 m)   PainSc: 0-No pain        Physical Exam  Vitals and nursing note reviewed.   Constitutional:       Appearance: She is well-developed.   Cardiovascular:      Pulses:           Dorsalis pedis pulses are 1+ on the right side and 1+ on the left side.      " Comments: Posterior tibial pulses are diminished Bilaterally. Toes are cool to touch. Feet are warm proximally.There is decreased digital hair. Skin is atrophic, slightly hyperpigmented, and mildly edematous      Musculoskeletal:         General: Swelling (L forefoot) present. No tenderness, deformity or signs of injury. Normal range of motion.      Right ankle: Normal.      Left ankle: Normal.      Right foot: No swelling, deformity or crepitus.      Left foot: No swelling, deformity or crepitus.      Comments: Semi-reducible hammertoe contractures noted to toes 2-4 b/l-asymptomatic.  Otherwise adequate joint range of motion without pain, limitation, nor crepitation Bilateral feet and ankle joints. Muscle strength is 5/5 in all groups bilaterally.         Lymphadenopathy:      Comments: No palpable lymph nodes   Skin:     General: Skin is warm and dry.      Coloration: Skin is not ashen, cyanotic or pale.      Findings: No bruising, ecchymosis, erythema, lesion or rash.      Nails: There is no clubbing.   Neurological:      Mental Status: She is alert and oriented to person, place, and time.      Sensory: Sensory deficit present.      Comments: [place]-Leonel 5.07 monofilamant testing is diminished Vikash feet. Sharp/dull sensation diminished Bilaterally. Light touch absent Bilaterally.       Psychiatric:         Behavior: Behavior normal.       6.19.24  L hallux pre debridement     L hallux post debridement       0.8x1.0x0.1 cm ulceration to the plantar hallux with surrounding hyperkeratotic tissue.  Moderate edema is observed to the left hallux.  No deep probe.  No malodor.  No purulence.  Serosanguineous drainage noted.  No exposed bone or tendinous structures observed.    6.26.24    0.6x0.9x0.1 cm granular ulcer plantar hallux No surrounding erythema, edema, malodor, nor drainage noted     7.3.24:    Blister noted to the medial aspect of the hallux with healthy granular base.  Full-thickness lesion.  No  surrounding erythema nor edema.    0.5x0.7x0.1 cm ulcer with intact dry margins.  Granular wound bed with 100% fibrin covering.  No surrounding erythema nor edema.  No increased temperature.  No purulence.  No deep probe.    7.10.24    Decreased ayush wound maceration healthy granular base w/ ayush wound hyperkeratosis     CRP-    CRP   Date Value Ref Range Status   06/19/2024 0.4 0.0 - 8.2 mg/L Final     ESR-   Sed Rate   Date Value Ref Range Status   06/19/2024 26 0 - 36 mm/Hr Final     CBC-   WBC   Date Value Ref Range Status   06/12/2024 5.51 3.90 - 12.70 K/uL Final     A1C-   Hemoglobin A1C   Date Value Ref Range Status   05/14/2024 6.9 (H) 4.0 - 5.6 % Final     Comment:     ADA Screening Guidelines:  5.7-6.4%  Consistent with prediabetes  >or=6.5%  Consistent with diabetes    High levels of fetal hemoglobin interfere with the HbA1C  assay. Heterozygous hemoglobin variants (HbS, HgC, etc)do  not significantly interfere with this assay.   However, presence of multiple variants may affect accuracy.       MICRO:    Aerobic Bacterial Culture   Date Value Ref Range Status   06/19/2024 ENTEROCOCCUS FAECALIS  Many   (A)  Final        Anaerobic Culture   Date Value Ref Range Status   06/19/2024 No anaerobes isolated  Final     -----------------------------------------------------------------------------------------------------------------------------------------------------------    Assessment:       Encounter Diagnoses   Name Primary?    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    Diagnoses and all orders for this visit:    Type II diabetes mellitus with neurological manifestations    Ulcer of left foot with fat layer exposed    Mild Lewy body dementia with other behavioral  "disturbance    Noncompliance        I counseled the patient on her conditions, their implications and medical management.    Independent review of patients previous clinical notes    Reviewed current medication(s), and lab(s) specific to foot ailment(s) with patient in detail. All questions answered     Long discussion w/ pt and her sister regarding the Importance of NOT removing bandages. Pt has demetia and reports "someone comes into her home and removes her bandages"   I contacted pt's daughter he reports she is awaiting placement at a facility     Debridement: With verbal consent, nonviable tissues on the left foot were debrided beyond sub q utilizing a  sterile No. 3 scalpel and forceps. Minimal bleeding controlled with direct pressure  The patient tolerated this well.     Dressings: Andie   Offloading:Gina Holbrook LPN assisted w/ application of football dressing under my direct supervision. Darco shoe applied to offload the area. Advised patient that this should be worn on the affected foot at all times when ambulating     Follow-up:Patient is to return to the clinic in 1 week  for follow-up but should call [facility] immediately if any signs of infection, such as fever, chills, sweats, increased redness or pain.    Short-term goals include maintaining good offloading and minimizing bioburden, promoting granulation and epithelialization to healing.  Long-term goals include keeping the wound healed by good offloading and medical management under the direction of internist.        "

## 2024-07-17 NOTE — TELEPHONE ENCOUNTER
Received call from patient's daughter. Patient took Eliquis this morning and did not hold Ozempic on Friday. Patient rescheduled.

## 2024-07-18 ENCOUNTER — TELEPHONE (OUTPATIENT)
Dept: ENDOSCOPY | Facility: HOSPITAL | Age: 79
End: 2024-07-18

## 2024-07-18 ENCOUNTER — CLINICAL SUPPORT (OUTPATIENT)
Dept: ENDOSCOPY | Facility: HOSPITAL | Age: 79
End: 2024-07-18
Attending: INTERNAL MEDICINE
Payer: MEDICARE

## 2024-07-18 DIAGNOSIS — Z87.19 HISTORY OF DIVERTICULITIS: ICD-10-CM

## 2024-07-18 DIAGNOSIS — R93.5 ABNORMAL CT OF THE ABDOMEN: ICD-10-CM

## 2024-07-18 NOTE — PLAN OF CARE
Contacted patient's daughter- Lennie to schedule EGD/Colonoscopy. She states that her mom has already been rescheduled on 10/24/24 at [facility] [place]. Same verified by writer. All questions answered. Understanding verbalized.

## 2024-07-18 NOTE — TELEPHONE ENCOUNTER
Contacted patient's daughter- Lennie to schedule EGD/Colonoscopy. She states that her mom has already been rescheduled on 10/24/24 at [facility] [place]. Same verified by writer. All questions answered. Understanding verbalized.

## 2024-07-19 ENCOUNTER — PATIENT MESSAGE (OUTPATIENT)
Dept: PODIATRY | Facility: CLINIC | Age: 79
End: 2024-07-19
Payer: MEDICARE

## 2024-07-22 ENCOUNTER — EXTERNAL HOME HEALTH (OUTPATIENT)
Dept: HOME HEALTH SERVICES | Facility: HOSPITAL | Age: 79
End: 2024-07-22
Payer: MEDICARE

## 2024-07-23 ENCOUNTER — OFFICE VISIT (OUTPATIENT)
Dept: PODIATRY | Facility: CLINIC | Age: 79
End: 2024-07-23
Payer: MEDICARE

## 2024-07-23 VITALS
HEIGHT: 62 IN | BODY MASS INDEX: 24.75 KG/M2 | WEIGHT: 134.5 LBS | SYSTOLIC BLOOD PRESSURE: 146 MMHG | DIASTOLIC BLOOD PRESSURE: 71 MMHG | HEART RATE: 86 BPM

## 2024-07-23 DIAGNOSIS — F02.A18 MILD LEWY BODY DEMENTIA WITH OTHER BEHAVIORAL DISTURBANCE: ICD-10-CM

## 2024-07-23 DIAGNOSIS — L97.522 ULCER OF LEFT FOOT WITH FAT LAYER EXPOSED: ICD-10-CM

## 2024-07-23 DIAGNOSIS — G31.83 MILD LEWY BODY DEMENTIA WITH OTHER BEHAVIORAL DISTURBANCE: ICD-10-CM

## 2024-07-23 DIAGNOSIS — E11.49 TYPE II DIABETES MELLITUS WITH NEUROLOGICAL MANIFESTATIONS: Primary | ICD-10-CM

## 2024-07-23 PROCEDURE — 99999 PR PBB SHADOW E&M-EST. PATIENT-LVL IV: CPT | Mod: PBBFAC,HCNC,, | Performed by: PODIATRIST

## 2024-07-23 PROCEDURE — 99499 UNLISTED E&M SERVICE: CPT | Mod: HCNC,S$GLB,, | Performed by: PODIATRIST

## 2024-07-23 PROCEDURE — 11042 DBRDMT SUBQ TIS 1ST 20SQCM/<: CPT | Mod: HCNC,S$GLB,, | Performed by: PODIATRIST

## 2024-07-23 NOTE — PROGRESS NOTES
"      Subjective:      Patient ID: Alisia Gusman is a 78 y.o. female.    Chief Complaint: Wound Care (Left great toe ulcer)      Alisia is a 78 y.o. female who presents to the clinic for evaluation and treatment of high risk feet. Alisia has a past medical history of Acute pulmonary embolism without acute cor pulmonale (11/15/2021), Adult bronchiectasis (7/26/2017), Allergy, Anemia, Arthritis, Asthma, Breast cancer (1993), Breast cancer (2020), Cancer (1993), Cataract, Chronic cervical radiculopathy (9/20/2012), Diabetes mellitus, Diabetes mellitus type II, Diabetes with neurologic complications, Diverticulosis, Dry eyes, Dysphagia, GERD (gastroesophageal reflux disease), Hyperlipidemia, Hypertension, Neuropathy, Scalp tenderness, Sepsis (12/12/2021), Shortness of breath, Ulcer, and Unspecified dementia, unspecified severity, without behavioral disturbance, psychotic disturbance, mood disturbance, and anxiety (2/22/2024). The patient's returns to clinic for r evaluation of an ulceration to her left big toe.  She was seen in the emergency room as well as primary care and has taken 2 courses of antibiotics for this issue.  She reports the pain has mildly improved.  She denies nausea vomiting fever and/or chills  This patient has documented high risk feet requiring routine maintenance secondary to diabetes mellitis and those secondary complications of diabetes, as mentioned..    7.23.24: pt reports "someone" removed her bandages. No pedal complaints     PCP: Selena Montemayor MD    Date Last Seen by PCP: 6/18/2024   Chief Complaint   Patient presents with    Wound Care     Left great toe ulcer         Hemoglobin A1C   Date Value Ref Range Status   05/14/2024 6.9 (H) 4.0 - 5.6 % Final     Comment:     ADA Screening Guidelines:  5.7-6.4%  Consistent with prediabetes  >or=6.5%  Consistent with diabetes    High levels of fetal hemoglobin interfere with the HbA1C  assay. Heterozygous hemoglobin variants (HbS, HgC, etc)do  not " "significantly interfere with this assay.   However, presence of multiple variants may affect accuracy.     11/15/2023 6.8 (H) 4.0 - 5.6 % Final     Comment:     ADA Screening Guidelines:  5.7-6.4%  Consistent with prediabetes  >or=6.5%  Consistent with diabetes    High levels of fetal hemoglobin interfere with the HbA1C  assay. Heterozygous hemoglobin variants (HbS, HgC, etc)do  not significantly interfere with this assay.   However, presence of multiple variants may affect accuracy.     04/20/2023 6.3 (H) 4.0 - 5.6 % Final     Comment:     ADA Screening Guidelines:  5.7-6.4%  Consistent with prediabetes  >or=6.5%  Consistent with diabetes    High levels of fetal hemoglobin interfere with the HbA1C  assay. Heterozygous hemoglobin variants (HbS, HgC, etc)do  not significantly interfere with this assay.   However, presence of multiple variants may affect accuracy.         Review of Systems   Constitutional: Negative for chills, decreased appetite and fever.   Cardiovascular:  Negative for claudication.   Respiratory:  Negative for cough and shortness of breath.    Skin:  Positive for dry skin and nail changes. Negative for color change, flushing, itching, poor wound healing, rash and skin cancer.   Musculoskeletal:  Negative for arthritis, back pain, falls, joint pain, joint swelling and myalgias.   Gastrointestinal:  Negative for nausea and vomiting.   Neurological:  Negative for loss of balance, numbness and paresthesias.   All other systems reviewed and are negative.          Objective:       Vitals:    07/23/24 0929   BP: (!) 146/71   Pulse: 86   Weight: 61 kg (134 lb 7.7 oz)   Height: 5' 2" (1.575 m)   PainSc: 0-No pain        Physical Exam  Vitals and nursing note reviewed.   Constitutional:       Appearance: She is well-developed.   Cardiovascular:      Pulses:           Dorsalis pedis pulses are 1+ on the right side and 1+ on the left side.      Comments: Posterior tibial pulses are diminished Bilaterally. " Toes are cool to touch. Feet are warm proximally.There is decreased digital hair. Skin is atrophic, slightly hyperpigmented, and mildly edematous      Musculoskeletal:         General: Swelling (L forefoot) present. No tenderness, deformity or signs of injury. Normal range of motion.      Right ankle: Normal.      Left ankle: Normal.      Right foot: No swelling, deformity or crepitus.      Left foot: No swelling, deformity or crepitus.      Comments: Semi-reducible hammertoe contractures noted to toes 2-4 b/l-asymptomatic.  Otherwise adequate joint range of motion without pain, limitation, nor crepitation Bilateral feet and ankle joints. Muscle strength is 5/5 in all groups bilaterally.         Lymphadenopathy:      Comments: No palpable lymph nodes   Skin:     General: Skin is warm and dry.      Coloration: Skin is not ashen, cyanotic or pale.      Findings: No bruising, ecchymosis, erythema, lesion or rash.      Nails: There is no clubbing.   Neurological:      Mental Status: She is alert and oriented to person, place, and time.      Sensory: Sensory deficit present.      Comments: [place]-Leonel 5.07 monofilamant testing is diminished Vikash feet. Sharp/dull sensation diminished Bilaterally. Light touch absent Bilaterally.       Psychiatric:         Behavior: Behavior normal.       6.19.24  L hallux pre debridement     L hallux post debridement       0.8x1.0x0.1 cm ulceration to the plantar hallux with surrounding hyperkeratotic tissue.  Moderate edema is observed to the left hallux.  No deep probe.  No malodor.  No purulence.  Serosanguineous drainage noted.  No exposed bone or tendinous structures observed.    6.26.24    0.6x0.9x0.1 cm granular ulcer plantar hallux No surrounding erythema, edema, malodor, nor drainage noted     7.3.24:    Blister noted to the medial aspect of the hallux with healthy granular base.  Full-thickness lesion.  No surrounding erythema nor edema.    0.5x0.7x0.1 cm ulcer with intact  dry margins.  Granular wound bed with 100% fibrin covering.  No surrounding erythema nor edema.  No increased temperature.  No purulence.  No deep probe.    7.10.24    Decreased ayush wound maceration healthy granular base w/ ayush wound hyperkeratosis     7.23.24    2.1x1.0x0.2 c,m granular base macerated margin. No surrounding erythema, edema, malodor, nor drainage noted       CRP-    CRP   Date Value Ref Range Status   06/19/2024 0.4 0.0 - 8.2 mg/L Final     ESR-   Sed Rate   Date Value Ref Range Status   06/19/2024 26 0 - 36 mm/Hr Final     CBC-   WBC   Date Value Ref Range Status   06/12/2024 5.51 3.90 - 12.70 K/uL Final     A1C-   Hemoglobin A1C   Date Value Ref Range Status   05/14/2024 6.9 (H) 4.0 - 5.6 % Final     Comment:     ADA Screening Guidelines:  5.7-6.4%  Consistent with prediabetes  >or=6.5%  Consistent with diabetes    High levels of fetal hemoglobin interfere with the HbA1C  assay. Heterozygous hemoglobin variants (HbS, HgC, etc)do  not significantly interfere with this assay.   However, presence of multiple variants may affect accuracy.       MICRO:    Aerobic Bacterial Culture   Date Value Ref Range Status   06/19/2024 ENTEROCOCCUS FAECALIS  Many   (A)  Final        Anaerobic Culture   Date Value Ref Range Status   06/19/2024 No anaerobes isolated  Final     -----------------------------------------------------------------------------------------------------------------------------------------------------------    Assessment:       Encounter Diagnoses   Name Primary?    Type II diabetes mellitus with neurological manifestations Yes    Ulcer of left foot with fat layer exposed     Mild Lewy body dementia with other behavioral disturbance            Plan:       Alisia was seen today for wound care.    Diagnoses and all orders for this visit:    Type II diabetes mellitus with neurological manifestations    Ulcer of left foot with fat layer exposed    Mild Lewy body dementia with other behavioral  "disturbance        I counseled the patient on her conditions, their implications and medical management.    Independent review of patients previous clinical notes    Reviewed current medication(s), and lab(s) specific to foot ailment(s) with patient in detail. All questions answered     Long discussion w/ pt and her sister regarding the Importance of NOT removing bandages. Pt has demetia and reports "someone comes into her home and removes her bandages". Tubi  applied to assit with detering removal of bandages. Advised ot this is detrimental to her wound healing and increases her chances of infection/amputation. Currently the wound appears infection free. Her non compliance is the largest limiting factor. Pt's sister states she will assist        Debridement: With verbal consent, nonviable tissues on the left foot were debrided beyond sub q utilizing a  sterile No. 3 scalpel and forceps. Minimal bleeding controlled with direct pressure  The patient tolerated this well.     Dressings: alginate  Offloading:Gina Holbrook LPN assisted w/ application of football dressing under my direct supervision. Darco shoe applied to offload the area. Advised patient that this should be worn on the affected foot at all times when ambulating     Follow-up:Patient is to return to the clinic in 1 week  for follow-up but should call [facility] immediately if any signs of infection, such as fever, chills, sweats, increased redness or pain.    Short-term goals include maintaining good offloading and minimizing bioburden, promoting granulation and epithelialization to healing.  Long-term goals include keeping the wound healed by good offloading and medical management under the direction of internist.        "

## 2024-07-29 ENCOUNTER — PATIENT MESSAGE (OUTPATIENT)
Dept: PODIATRY | Facility: CLINIC | Age: 79
End: 2024-07-29
Payer: MEDICARE

## 2024-07-31 ENCOUNTER — OFFICE VISIT (OUTPATIENT)
Dept: PODIATRY | Facility: CLINIC | Age: 79
End: 2024-07-31
Payer: MEDICARE

## 2024-07-31 VITALS
BODY MASS INDEX: 24.75 KG/M2 | DIASTOLIC BLOOD PRESSURE: 87 MMHG | SYSTOLIC BLOOD PRESSURE: 133 MMHG | WEIGHT: 134.5 LBS | HEART RATE: 95 BPM | HEIGHT: 62 IN | RESPIRATION RATE: 18 BRPM

## 2024-07-31 DIAGNOSIS — F02.A18 MILD LEWY BODY DEMENTIA WITH OTHER BEHAVIORAL DISTURBANCE: ICD-10-CM

## 2024-07-31 DIAGNOSIS — G31.83 MILD LEWY BODY DEMENTIA WITH OTHER BEHAVIORAL DISTURBANCE: ICD-10-CM

## 2024-07-31 DIAGNOSIS — Z91.199 NONCOMPLIANCE: ICD-10-CM

## 2024-07-31 DIAGNOSIS — L97.522 ULCER OF LEFT FOOT WITH FAT LAYER EXPOSED: ICD-10-CM

## 2024-07-31 DIAGNOSIS — E11.49 TYPE II DIABETES MELLITUS WITH NEUROLOGICAL MANIFESTATIONS: Primary | ICD-10-CM

## 2024-07-31 PROCEDURE — 11042 DBRDMT SUBQ TIS 1ST 20SQCM/<: CPT | Mod: HCNC,S$GLB,, | Performed by: PODIATRIST

## 2024-07-31 PROCEDURE — 99999 PR PBB SHADOW E&M-EST. PATIENT-LVL III: CPT | Mod: PBBFAC,HCNC,, | Performed by: PODIATRIST

## 2024-07-31 PROCEDURE — 99499 UNLISTED E&M SERVICE: CPT | Mod: HCNC,S$GLB,, | Performed by: PODIATRIST

## 2024-08-02 ENCOUNTER — PATIENT MESSAGE (OUTPATIENT)
Dept: PODIATRY | Facility: CLINIC | Age: 79
End: 2024-08-02
Payer: MEDICARE

## 2024-08-06 ENCOUNTER — OUTPATIENT CASE MANAGEMENT (OUTPATIENT)
Dept: ADMINISTRATIVE | Facility: OTHER | Age: 79
End: 2024-08-06
Payer: MEDICARE

## 2024-08-06 ENCOUNTER — OFFICE VISIT (OUTPATIENT)
Dept: PODIATRY | Facility: CLINIC | Age: 79
End: 2024-08-06
Payer: MEDICARE

## 2024-08-06 VITALS
BODY MASS INDEX: 24.75 KG/M2 | WEIGHT: 134.5 LBS | HEART RATE: 88 BPM | SYSTOLIC BLOOD PRESSURE: 156 MMHG | DIASTOLIC BLOOD PRESSURE: 81 MMHG | RESPIRATION RATE: 18 BRPM | HEIGHT: 62 IN

## 2024-08-06 DIAGNOSIS — E11.49 TYPE II DIABETES MELLITUS WITH NEUROLOGICAL MANIFESTATIONS: Primary | ICD-10-CM

## 2024-08-06 DIAGNOSIS — L84 CORN OR CALLUS: ICD-10-CM

## 2024-08-06 DIAGNOSIS — Z91.199 NONCOMPLIANCE: ICD-10-CM

## 2024-08-06 DIAGNOSIS — G31.83 MILD LEWY BODY DEMENTIA WITH OTHER BEHAVIORAL DISTURBANCE: ICD-10-CM

## 2024-08-06 DIAGNOSIS — F02.A18 MILD LEWY BODY DEMENTIA WITH OTHER BEHAVIORAL DISTURBANCE: ICD-10-CM

## 2024-08-06 DIAGNOSIS — B35.1 ONYCHOMYCOSIS DUE TO DERMATOPHYTE: ICD-10-CM

## 2024-08-06 DIAGNOSIS — L97.522 ULCER OF LEFT FOOT WITH FAT LAYER EXPOSED: ICD-10-CM

## 2024-08-06 PROCEDURE — 11721 DEBRIDE NAIL 6 OR MORE: CPT | Mod: 59,Q9,HCNC,S$GLB | Performed by: PODIATRIST

## 2024-08-06 PROCEDURE — 99999 PR PBB SHADOW E&M-EST. PATIENT-LVL IV: CPT | Mod: PBBFAC,HCNC,, | Performed by: PODIATRIST

## 2024-08-06 PROCEDURE — 11042 DBRDMT SUBQ TIS 1ST 20SQCM/<: CPT | Mod: 59,HCNC,S$GLB, | Performed by: PODIATRIST

## 2024-08-06 PROCEDURE — 99499 UNLISTED E&M SERVICE: CPT | Mod: HCNC,S$GLB,, | Performed by: PODIATRIST

## 2024-08-06 PROCEDURE — 11056 PARNG/CUTG B9 HYPRKR LES 2-4: CPT | Mod: Q9,HCNC,S$GLB, | Performed by: PODIATRIST

## 2024-08-07 ENCOUNTER — TELEPHONE (OUTPATIENT)
Dept: PRIMARY CARE CLINIC | Facility: CLINIC | Age: 79
End: 2024-08-07
Payer: MEDICARE

## 2024-08-07 ENCOUNTER — TELEPHONE (OUTPATIENT)
Dept: PODIATRY | Facility: CLINIC | Age: 79
End: 2024-08-07
Payer: MEDICARE

## 2024-08-08 ENCOUNTER — OUTPATIENT CASE MANAGEMENT (OUTPATIENT)
Dept: ADMINISTRATIVE | Facility: OTHER | Age: 79
End: 2024-08-08
Payer: MEDICARE

## 2024-08-08 NOTE — PROGRESS NOTES
Late entry for 8/8/24  Collaborating with Leigh Pickering RN in PCP office and Dr. Dao, Podiatry re: home health vs [facility]. Will assist as needed.

## 2024-08-09 ENCOUNTER — TELEPHONE (OUTPATIENT)
Dept: PODIATRY | Facility: CLINIC | Age: 79
End: 2024-08-09
Payer: MEDICARE

## 2024-08-13 ENCOUNTER — TELEPHONE (OUTPATIENT)
Dept: PRIMARY CARE CLINIC | Facility: CLINIC | Age: 79
End: 2024-08-13
Payer: MEDICARE

## 2024-08-13 NOTE — PROGRESS NOTES
Collaborating with Leigh Pickering, RN in PCP office. Need to determine if  or [facility] are seeing pt in the home. Per Megan at [facility], they're out of network. Per [employer] website, [facility] is in network. Spoke to Cecilia at [facility] re: referral and order was sent. Received call back from [facility] stating they cannot accept the referral. Referral faxed to Megan at [facility].

## 2024-08-13 NOTE — TELEPHONE ENCOUNTER
----- Message from Crystal Ulloa sent at 8/13/2024 12:02 PM CDT -----  Contact: [phone]  Type: Returning a call    Who left a message? Kasandra     When did the practice call? Today 8/13/24    Does patient know what this is regarding:    Would the patient rather a call back or a response via [facility]?callback    Comments:

## 2024-08-14 NOTE — PROGRESS NOTES
Received voice message from Megan at [facility] stating they can accept pt for services. Updated Dr. Dao, Leigh Pickering, RN in PCP clinic via inbox message.

## 2024-08-16 ENCOUNTER — PATIENT MESSAGE (OUTPATIENT)
Dept: PODIATRY | Facility: CLINIC | Age: 79
End: 2024-08-16
Payer: MEDICARE

## 2024-08-21 ENCOUNTER — OFFICE VISIT (OUTPATIENT)
Dept: PODIATRY | Facility: CLINIC | Age: 79
End: 2024-08-21
Payer: MEDICARE

## 2024-08-21 VITALS
HEIGHT: 62 IN | BODY MASS INDEX: 24.75 KG/M2 | HEART RATE: 92 BPM | DIASTOLIC BLOOD PRESSURE: 76 MMHG | WEIGHT: 134.5 LBS | SYSTOLIC BLOOD PRESSURE: 140 MMHG | RESPIRATION RATE: 17 BRPM

## 2024-08-21 DIAGNOSIS — G31.83 MILD LEWY BODY DEMENTIA WITH OTHER BEHAVIORAL DISTURBANCE: ICD-10-CM

## 2024-08-21 DIAGNOSIS — Z91.199 NONCOMPLIANCE: ICD-10-CM

## 2024-08-21 DIAGNOSIS — F02.A18 MILD LEWY BODY DEMENTIA WITH OTHER BEHAVIORAL DISTURBANCE: ICD-10-CM

## 2024-08-21 DIAGNOSIS — L97.522 ULCER OF LEFT FOOT WITH FAT LAYER EXPOSED: ICD-10-CM

## 2024-08-21 DIAGNOSIS — E11.49 TYPE II DIABETES MELLITUS WITH NEUROLOGICAL MANIFESTATIONS: Primary | ICD-10-CM

## 2024-08-21 PROCEDURE — 99999 PR PBB SHADOW E&M-EST. PATIENT-LVL IV: CPT | Mod: PBBFAC,HCNC,, | Performed by: PODIATRIST

## 2024-08-21 PROCEDURE — 11042 DBRDMT SUBQ TIS 1ST 20SQCM/<: CPT | Mod: HCNC,S$GLB,, | Performed by: PODIATRIST

## 2024-08-21 PROCEDURE — 99499 UNLISTED E&M SERVICE: CPT | Mod: HCNC,S$GLB,, | Performed by: PODIATRIST

## 2024-08-26 ENCOUNTER — TELEPHONE (OUTPATIENT)
Dept: PRIMARY CARE CLINIC | Facility: CLINIC | Age: 79
End: 2024-08-26
Payer: MEDICARE

## 2024-08-26 ENCOUNTER — TELEPHONE (OUTPATIENT)
Dept: ENDOSCOPY | Facility: HOSPITAL | Age: 79
End: 2024-08-26
Payer: MEDICARE

## 2024-08-26 NOTE — TELEPHONE ENCOUNTER
Dear Dr Montemayor,    Patient has a scheduled procedure Colonoscopy/EGD on 10/24/24 and is currently taking a blood thinner. In order to ensure patient safety, we would like to confirm that the patient can place their blood thinner medication on hold for the procedure. Can he/she discontinue Eliquis (apixaban) for a minimum of 2 days prior to the procedure?     Thank you for your prompt reply.    [facility] Endoscopy Scheduling

## 2024-08-26 NOTE — PROGRESS NOTES
"        Subjective:      Patient ID: Alisia Gusman is a 79 y.o. female.    Chief Complaint: Wound Care (Left foot toe ulcer ) and Dressing Change      Alisia is a 79 y.o. female who presents to the clinic for evaluation and treatment of high risk feet. Alisia has a past medical history of Acute pulmonary embolism without acute cor pulmonale (11/15/2021), Adult bronchiectasis (7/26/2017), Allergy, Anemia, Arthritis, Asthma, Breast cancer (1993), Breast cancer (2020), Cancer (1993), Cataract, Chronic cervical radiculopathy (9/20/2012), Diabetes mellitus, Diabetes mellitus type II, Diabetes with neurologic complications, Diverticulosis, Dry eyes, Dysphagia, GERD (gastroesophageal reflux disease), Hyperlipidemia, Hypertension, Neuropathy, Scalp tenderness, Sepsis (12/12/2021), Shortness of breath, Ulcer, and Unspecified dementia, unspecified severity, without behavioral disturbance, psychotic disturbance, mood disturbance, and anxiety (2/22/2024). The patient's returns to clinic for r evaluation of an ulceration to her left big toe.  She was seen in the emergency room as well as primary care and has taken 2 courses of antibiotics for this issue.  She reports the pain has mildly improved.  She denies nausea vomiting fever and/or chills  This patient has documented high risk feet requiring routine maintenance secondary to diabetes mellitis and those secondary complications of diabetes, as mentioned..    7.23.24: pt reports "someone" removed her bandages. No pedal complaints     PCP: Selena Montemayor MD    Date Last Seen by PCP: 6/18/2024   Chief Complaint   Patient presents with    Wound Care     Left foot toe ulcer     Dressing Change         Hemoglobin A1C   Date Value Ref Range Status   05/14/2024 6.9 (H) 4.0 - 5.6 % Final     Comment:     ADA Screening Guidelines:  5.7-6.4%  Consistent with prediabetes  >or=6.5%  Consistent with diabetes    High levels of fetal hemoglobin interfere with the HbA1C  assay. Heterozygous hemoglobin " "variants (HbS, HgC, etc)do  not significantly interfere with this assay.   However, presence of multiple variants may affect accuracy.     11/15/2023 6.8 (H) 4.0 - 5.6 % Final     Comment:     ADA Screening Guidelines:  5.7-6.4%  Consistent with prediabetes  >or=6.5%  Consistent with diabetes    High levels of fetal hemoglobin interfere with the HbA1C  assay. Heterozygous hemoglobin variants (HbS, HgC, etc)do  not significantly interfere with this assay.   However, presence of multiple variants may affect accuracy.     04/20/2023 6.3 (H) 4.0 - 5.6 % Final     Comment:     ADA Screening Guidelines:  5.7-6.4%  Consistent with prediabetes  >or=6.5%  Consistent with diabetes    High levels of fetal hemoglobin interfere with the HbA1C  assay. Heterozygous hemoglobin variants (HbS, HgC, etc)do  not significantly interfere with this assay.   However, presence of multiple variants may affect accuracy.         Review of Systems   Constitutional: Negative for chills, decreased appetite and fever.   Cardiovascular:  Negative for claudication.   Respiratory:  Negative for cough and shortness of breath.    Skin:  Positive for dry skin and nail changes. Negative for color change, flushing, itching, poor wound healing, rash and skin cancer.   Musculoskeletal:  Negative for arthritis, back pain, falls, joint pain, joint swelling and myalgias.   Gastrointestinal:  Negative for nausea and vomiting.   Neurological:  Negative for loss of balance, numbness and paresthesias.   All other systems reviewed and are negative.          Objective:       Vitals:    07/31/24 0905   BP: 133/87   Pulse: 95   Resp: 18   Weight: 61 kg (134 lb 7.7 oz)   Height: 5' 2" (1.575 m)   PainSc: 0-No pain        Physical Exam  Vitals and nursing note reviewed.   Constitutional:       Appearance: She is well-developed.   Cardiovascular:      Pulses:           Dorsalis pedis pulses are 1+ on the right side and 1+ on the left side.      Comments: Posterior tibial " pulses are diminished Bilaterally. Toes are cool to touch. Feet are warm proximally.There is decreased digital hair. Skin is atrophic, slightly hyperpigmented, and mildly edematous      Musculoskeletal:         General: Swelling (L forefoot) present. No tenderness, deformity or signs of injury. Normal range of motion.      Right ankle: Normal.      Left ankle: Normal.      Right foot: No swelling, deformity or crepitus.      Left foot: No swelling, deformity or crepitus.      Comments: Semi-reducible hammertoe contractures noted to toes 2-4 b/l-asymptomatic.  Otherwise adequate joint range of motion without pain, limitation, nor crepitation Bilateral feet and ankle joints. Muscle strength is 5/5 in all groups bilaterally.         Lymphadenopathy:      Comments: No palpable lymph nodes   Skin:     General: Skin is warm and dry.      Coloration: Skin is not ashen, cyanotic or pale.      Findings: No bruising, ecchymosis, erythema, lesion or rash.      Nails: There is no clubbing.   Neurological:      Mental Status: She is alert and oriented to person, place, and time.      Sensory: Sensory deficit present.      Comments: [place]-Leonel 5.07 monofilamant testing is diminished Vikash feet. Sharp/dull sensation diminished Bilaterally. Light touch absent Bilaterally.       Psychiatric:         Behavior: Behavior normal.       6.19.24  L hallux pre debridement     L hallux post debridement       0.8x1.0x0.1 cm ulceration to the plantar hallux with surrounding hyperkeratotic tissue.  Moderate edema is observed to the left hallux.  No deep probe.  No malodor.  No purulence.  Serosanguineous drainage noted.  No exposed bone or tendinous structures observed.    6.26.24    0.6x0.9x0.1 cm granular ulcer plantar hallux No surrounding erythema, edema, malodor, nor drainage noted     7.3.24:    Blister noted to the medial aspect of the hallux with healthy granular base.  Full-thickness lesion.  No surrounding erythema nor  edema.    0.5x0.7x0.1 cm ulcer with intact dry margins.  Granular wound bed with 100% fibrin covering.  No surrounding erythema nor edema.  No increased temperature.  No purulence.  No deep probe.    7.10.24    Decreased ayush wound maceration healthy granular base w/ ayush wound hyperkeratosis     7.23.24    2.1x1.0x0.2 c,m granular base macerated margin. No surrounding erythema, edema, malodor, nor drainage noted     7.31.24    0.5x0.2x0.1 cm granular base with dry intact periwound.  No erythema no edema.  No deep probe.    CRP-    CRP   Date Value Ref Range Status   06/19/2024 0.4 0.0 - 8.2 mg/L Final     ESR-   Sed Rate   Date Value Ref Range Status   06/19/2024 26 0 - 36 mm/Hr Final     CBC-   WBC   Date Value Ref Range Status   06/12/2024 5.51 3.90 - 12.70 K/uL Final     A1C-   Hemoglobin A1C   Date Value Ref Range Status   05/14/2024 6.9 (H) 4.0 - 5.6 % Final     Comment:     ADA Screening Guidelines:  5.7-6.4%  Consistent with prediabetes  >or=6.5%  Consistent with diabetes    High levels of fetal hemoglobin interfere with the HbA1C  assay. Heterozygous hemoglobin variants (HbS, HgC, etc)do  not significantly interfere with this assay.   However, presence of multiple variants may affect accuracy.       MICRO:    Aerobic Bacterial Culture   Date Value Ref Range Status   06/19/2024 ENTEROCOCCUS FAECALIS  Many   (A)  Final        Anaerobic Culture   Date Value Ref Range Status   06/19/2024 No anaerobes isolated  Final     -----------------------------------------------------------------------------------------------------------------------------------------------------------    Assessment:       Encounter Diagnoses   Name Primary?    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    Diagnoses and  "all orders for this visit:    Type II diabetes mellitus with neurological manifestations    Mild Lewy body dementia with other behavioral disturbance    Ulcer of left foot with fat layer exposed    Noncompliance        I counseled the patient on her conditions, their implications and medical management.    Independent review of patients previous clinical notes    Reviewed current medication(s), and lab(s) specific to foot ailment(s) with patient in detail. All questions answered     Long discussion w/ pt and her sister regarding the Importance of NOT removing bandages. Pt has demetia and reports "someone comes into her home and removes her bandages". Tubi  applied to assit with detering removal of bandages. Advised ot this is detrimental to her wound healing and increases her chances of infection/amputation. Currently the wound appears infection free. Her non compliance is the largest limiting factor. Pt's sister states she will assist        Debridement: With verbal consent, nonviable tissues on the left foot were debrided beyond sub q utilizing a  sterile No. 3 scalpel and forceps. Minimal bleeding controlled with direct pressure  The patient tolerated this well.     Dressings: alginate  Offloading:Gina Holbrook LPN assisted w/ application of football dressing under my direct supervision. Darco shoe applied to offload the area. Advised patient that this should be worn on the affected foot at all times when ambulating     Follow-up:Patient is to return to the clinic in 1 week  for follow-up but should call [facility] immediately if any signs of infection, such as fever, chills, sweats, increased redness or pain.    Short-term goals include maintaining good offloading and minimizing bioburden, promoting granulation and epithelialization to healing.  Long-term goals include keeping the wound healed by good offloading and medical management under the direction of internist.        "

## 2024-08-26 NOTE — TELEPHONE ENCOUNTER
B, can you check on on that foot ulcer? Thanks!    Also sending message to Dr. Olvera to see if she needs to continue eliquis as this was for when she had a PE after brain resection for the meningioma. Does have h/o recurrent breast CA and on arimidex till 3/2025.

## 2024-08-26 NOTE — TELEPHONE ENCOUNTER
Per Dr. Olvera:  I think that the safest thing to do would be to continue the eliquis until 2-3 months AFTEr the completion of the 5 years on arimidex.  The reason for the continuation of eliquis after the discontinuation of the arimidex is that arimidex has a half life of two weeks, so 5 half lives equal 10 weeks.

## 2024-08-26 NOTE — TELEPHONE ENCOUNTER
----- Message from Gopal Fernandez RN sent at 7/17/2024  4:09 PM CDT -----  Regarding: 10/24 BT  The patient is currently under an internal PCP Dr. Selena Montemayor care and requires a blood thinner Eliquis (apixaban) for their upcoming scheduled Colonoscopy/EGD on 10/24/24.

## 2024-08-27 NOTE — PROGRESS NOTES
Subjective:      Patient ID: Alisia Gusman is a 79 y.o. female.    Chief Complaint: Foot Ulcer (Left foot great toe ), Dressing Change (Football ), and Foot Swelling      Alisia is a 79 y.o. female who presents to the clinic for evaluation and treatment of high risk feet. Alisia has a past medical history of Acute pulmonary embolism without acute cor pulmonale (11/15/2021), Adult bronchiectasis (7/26/2017), Allergy, Anemia, Arthritis, Asthma, Breast cancer (1993), Breast cancer (2020), Cancer (1993), Cataract, Chronic cervical radiculopathy (9/20/2012), Diabetes mellitus, Diabetes mellitus type II, Diabetes with neurologic complications, Diverticulosis, Dry eyes, Dysphagia, GERD (gastroesophageal reflux disease), Hyperlipidemia, Hypertension, Neuropathy, Scalp tenderness, Sepsis (12/12/2021), Shortness of breath, Ulcer, and Unspecified dementia, unspecified severity, without behavioral disturbance, psychotic disturbance, mood disturbance, and anxiety (2/22/2024). The patient's returns to clinic for r evaluation of an ulceration to her left big toe.    PCP: Selena Montemayor MD    Date Last Seen by PCP: 6/18/2024   Chief Complaint   Patient presents with    Foot Ulcer     Left foot great toe     Dressing Change     Football     Foot Swelling         Hemoglobin A1C   Date Value Ref Range Status   05/14/2024 6.9 (H) 4.0 - 5.6 % Final     Comment:     ADA Screening Guidelines:  5.7-6.4%  Consistent with prediabetes  >or=6.5%  Consistent with diabetes    High levels of fetal hemoglobin interfere with the HbA1C  assay. Heterozygous hemoglobin variants (HbS, HgC, etc)do  not significantly interfere with this assay.   However, presence of multiple variants may affect accuracy.     11/15/2023 6.8 (H) 4.0 - 5.6 % Final     Comment:     ADA Screening Guidelines:  5.7-6.4%  Consistent with prediabetes  >or=6.5%  Consistent with diabetes    High levels of fetal hemoglobin interfere with the HbA1C  assay. Heterozygous hemoglobin variants  "(HbS, HgC, etc)do  not significantly interfere with this assay.   However, presence of multiple variants may affect accuracy.     04/20/2023 6.3 (H) 4.0 - 5.6 % Final     Comment:     ADA Screening Guidelines:  5.7-6.4%  Consistent with prediabetes  >or=6.5%  Consistent with diabetes    High levels of fetal hemoglobin interfere with the HbA1C  assay. Heterozygous hemoglobin variants (HbS, HgC, etc)do  not significantly interfere with this assay.   However, presence of multiple variants may affect accuracy.         Review of Systems   Constitutional: Negative for chills, decreased appetite and fever.   Cardiovascular:  Negative for claudication.   Respiratory:  Negative for cough and shortness of breath.    Skin:  Positive for dry skin and nail changes. Negative for color change, flushing, itching, poor wound healing, rash and skin cancer.   Musculoskeletal:  Negative for arthritis, back pain, falls, joint pain, joint swelling and myalgias.   Gastrointestinal:  Negative for nausea and vomiting.   Neurological:  Negative for loss of balance, numbness and paresthesias.   All other systems reviewed and are negative.          Objective:       Vitals:    08/21/24 1044   BP: (!) 140/76   Pulse: 92   Resp: 17   Weight: 61 kg (134 lb 7.7 oz)   Height: 5' 2" (1.575 m)   PainSc: 0-No pain        Physical Exam  Vitals and nursing note reviewed.   Constitutional:       Appearance: She is well-developed.   Cardiovascular:      Pulses:           Dorsalis pedis pulses are 1+ on the right side and 1+ on the left side.      Comments: Posterior tibial pulses are diminished Bilaterally. Toes are cool to touch. Feet are warm proximally.There is decreased digital hair. Skin is atrophic, slightly hyperpigmented, and mildly edematous      Musculoskeletal:         General: Swelling (L forefoot) present. No tenderness, deformity or signs of injury. Normal range of motion.      Right ankle: Normal.      Left ankle: Normal.      Right foot: No " swelling, deformity or crepitus.      Left foot: No swelling, deformity or crepitus.      Comments: Semi-reducible hammertoe contractures noted to toes 2-4 b/l-asymptomatic.  Otherwise adequate joint range of motion without pain, limitation, nor crepitation Bilateral feet and ankle joints. Muscle strength is 5/5 in all groups bilaterally.         Lymphadenopathy:      Comments: No palpable lymph nodes   Skin:     General: Skin is warm and dry.      Coloration: Skin is not ashen, cyanotic or pale.      Findings: No acne, bruising, burn, ecchymosis, erythema, signs of injury, lesion or rash.      Nails: There is no clubbing.   Neurological:      Mental Status: She is alert and oriented to person, place, and time.      Sensory: Sensory deficit present.      Comments: [place]-Leonel 5.07 monofilamant testing is diminished Vikash feet. Sharp/dull sensation diminished Bilaterally. Light touch absent Bilaterally.       Psychiatric:         Behavior: Behavior normal.       6.19.24  L hallux pre debridement     L hallux post debridement       0.8x1.0x0.1 cm ulceration to the plantar hallux with surrounding hyperkeratotic tissue.  Moderate edema is observed to the left hallux.  No deep probe.  No malodor.  No purulence.  Serosanguineous drainage noted.  No exposed bone or tendinous structures observed.    6.26.24    0.6x0.9x0.1 cm granular ulcer plantar hallux No surrounding erythema, edema, malodor, nor drainage noted     7.3.24:    Blister noted to the medial aspect of the hallux with healthy granular base.  Full-thickness lesion.  No surrounding erythema nor edema.    0.5x0.7x0.1 cm ulcer with intact dry margins.  Granular wound bed with 100% fibrin covering.  No surrounding erythema nor edema.  No increased temperature.  No purulence.  No deep probe.    7.10.24    Decreased ayush wound maceration healthy granular base w/ ayush wound hyperkeratosis     7.23.24    2.1x1.0x0.2 c,m granular base macerated margin. No surrounding  erythema, edema, malodor, nor drainage noted     7.31.24   0.5x0.3x0.2 cm     8.6.24    0.7x0.5x0.3 cm granular wound bed  Nails x 10  are elongated by  2-4mm's, thickened by 2-3 mm's, dystrophic, and are darkened in  coloration . Xerosis Bilaterally. No open lesions, lacerations or wounds noted  Hyperkeratotic tissue noted to distal r toes 1-2     8.21.24    0.6x0.3x0.2 cm     CRP-    CRP   Date Value Ref Range Status   06/19/2024 0.4 0.0 - 8.2 mg/L Final     ESR-   Sed Rate   Date Value Ref Range Status   06/19/2024 26 0 - 36 mm/Hr Final     CBC-   WBC   Date Value Ref Range Status   06/12/2024 5.51 3.90 - 12.70 K/uL Final     A1C-   Hemoglobin A1C   Date Value Ref Range Status   05/14/2024 6.9 (H) 4.0 - 5.6 % Final     Comment:     ADA Screening Guidelines:  5.7-6.4%  Consistent with prediabetes  >or=6.5%  Consistent with diabetes    High levels of fetal hemoglobin interfere with the HbA1C  assay. Heterozygous hemoglobin variants (HbS, HgC, etc)do  not significantly interfere with this assay.   However, presence of multiple variants may affect accuracy.       MICRO:    Aerobic Bacterial Culture   Date Value Ref Range Status   06/19/2024 ENTEROCOCCUS FAECALIS  Many   (A)  Final        Anaerobic Culture   Date Value Ref Range Status   06/19/2024 No anaerobes isolated  Final     -----------------------------------------------------------------------------------------------------------------------------------------------------------    Assessment:       Encounter Diagnoses   Name Primary?    Type II diabetes mellitus with neurological manifestations Yes    Ulcer of left foot with fat layer exposed     Mild Lewy body dementia with other behavioral disturbance     Noncompliance            Plan:       Alisia was seen today for foot ulcer, dressing change and foot swelling.    Diagnoses and all orders for this visit:    Type II diabetes mellitus with neurological manifestations    Ulcer of left foot with fat layer  "exposed    Mild Lewy body dementia with other behavioral disturbance    Noncompliance        I counseled the patient on her conditions, their implications and medical management.    Independent review of patients previous clinical notes    Reviewed current medication(s), and lab(s) specific to foot ailment(s) with patient in detail. All questions answered     Long discussion w/ pt and her sister regarding the Importance of NOT removing bandages. Pt has demetia and reports "someone comes into her home and removes her bandages".  Patient's sister voices frustrations regarding trying to convince her sister to leave bandages on her foot.  At this point it is best for patient to receive healthcare services through that this foot wound can be address more frequently in the case of accidental removal of the football dressing by the patient.         Debridement: With verbal consent, nonviable tissues on the left foot were debrided beyond sub q utilizing a  sterile No. 3 scalpel and forceps. Minimal bleeding controlled with direct pressure  The patient tolerated this well.     Dressings: HB foam  Offloading:Gina PADILLA MA assisted w/ application of football dressing under my direct supervision. Darco shoe applied to offload the area. Advised patient that this should be worn on the affected foot at all times when ambulating     Patient is relocating tomorrow to [place] to live closer to family.  Patient's daughter has placed her in assisted living facility which does have nursing on staff.  It has been a pleasure taking care of Ms. Crump.  Follow up with any questions or concerns.    "

## 2024-10-10 ENCOUNTER — TELEPHONE (OUTPATIENT)
Dept: PRIMARY CARE CLINIC | Facility: CLINIC | Age: 79
End: 2024-10-10
Payer: MEDICARE

## 2024-10-17 ENCOUNTER — TELEPHONE (OUTPATIENT)
Dept: ENDOSCOPY | Facility: HOSPITAL | Age: 79
End: 2024-10-17
Payer: MEDICARE

## 2024-10-17 NOTE — TELEPHONE ENCOUNTER
Spoke to patient's daughter Lennie regarding EGD/colonoscopy scheduled on 10/24/24, daughter states that she has moved her mom to [place] in August and requested for these procedures to be canceled.

## 2024-10-20 NOTE — PROGRESS NOTES
Subjective:      Patient ID: Alisia Gusman is a 76 y.o. female.    Chief Complaint: PCP (Ginette Sheth PA-C  12/27/22), Diabetic Foot Exam, Nail Care, and Foot Problem (LEFT FOOT 2ND TOE WOUND TURNING BLACK )    Alisia is a 76 y.o. female who presents to the clinic for evaluation and treatment of high risk feet. Alisia has a past medical history of Acute pulmonary embolism without acute cor pulmonale (11/15/2021), Adult bronchiectasis (7/26/2017), Allergy, Anemia, Arthritis, Asthma, Breast cancer (1993), Breast cancer (2020), Cancer (1993), Cataract, Chronic cervical radiculopathy (9/20/2012), Diabetes mellitus, Diabetes mellitus type II, Diabetes with neurologic complications, Diverticulosis, Dry eyes, Dysphagia, GERD (gastroesophageal reflux disease), Hyperlipidemia, Hypertension, Neuropathy, Scalp tenderness, Sepsis (12/12/2021), Shortness of breath, and Ulcer. The patient's chief complaint is HRFC. Has discoloration and healing ulcer to L 2nd toe that was present for a few months. Did not get it checked out because of transportation but has been applying kerasal and moisturizer to it. Wants to have it assessed and requesting additional recommendations. No pain. Swelling in both ankles but states she elevates and this helps.  This patient has documented high risk feet requiring routine maintenance secondary to diabetes mellitis and those secondary complications of diabetes, as mentioned..    PCP: Selena Montemayor MD    Date Last Seen by PCP:   Chief Complaint   Patient presents with    PCP     Ginette Sheth PA-C  12/27/22    Diabetic Foot Exam    Nail Care    Foot Problem     LEFT FOOT 2ND TOE WOUND TURNING BLACK          Hemoglobin A1C   Date Value Ref Range Status   02/08/2022 6.9 (H) 4.0 - 5.6 % Final     Comment:     ADA Screening Guidelines:  5.7-6.4%  Consistent with prediabetes  >or=6.5%  Consistent with diabetes    High levels of fetal hemoglobin interfere with the HbA1C  assay. Heterozygous hemoglobin  "variants (HbS, HgC, etc)do  not significantly interfere with this assay.   However, presence of multiple variants may affect accuracy.     11/06/2021 7.5 (H) 4.0 - 5.6 % Final     Comment:     ADA Screening Guidelines:  5.7-6.4%  Consistent with prediabetes  >or=6.5%  Consistent with diabetes    High levels of fetal hemoglobin interfere with the HbA1C  assay. Heterozygous hemoglobin variants (HbS, HgC, etc)do  not significantly interfere with this assay.   However, presence of multiple variants may affect accuracy.     10/29/2021 7.5 (H) 4.0 - 5.6 % Final     Comment:     ADA Screening Guidelines:  5.7-6.4%  Consistent with prediabetes  >or=6.5%  Consistent with diabetes    High levels of fetal hemoglobin interfere with the HbA1C  assay. Heterozygous hemoglobin variants (HbS, HgC, etc)do  not significantly interfere with this assay.   However, presence of multiple variants may affect accuracy.         Review of Systems   Constitutional: Negative for chills, decreased appetite and fever.   Cardiovascular: Positive for leg swelling (L>R stable ).   Respiratory: Negative for cough and shortness of breath.    Skin: Positive for dry skin, nail changes and suspicious lesions. Negative for color change, flushing, itching, poor wound healing, rash and skin cancer.   Musculoskeletal: Negative for arthritis, back pain, falls, joint pain, joint swelling and myalgias.   Gastrointestinal: Negative for nausea and vomiting.   Neurological: Negative for loss of balance, numbness and paresthesias.   All other systems reviewed and are negative.          Objective:       Vitals:    07/12/22 0940   BP: (!) 163/91   Pulse: 78   Resp: 18   Weight: 68 kg (149 lb 14.6 oz)   Height: 5' 2" (1.575 m)   PainSc: 0-No pain        Physical Exam  Vitals and nursing note reviewed.   Constitutional:       Appearance: She is well-developed.   Cardiovascular:      Pulses:           Dorsalis pedis pulses are 1+ on the right side and 1+ on the left " side.      Comments: Posterior tibial pulses are diminished Bilaterally. Toes are cool to touch. Feet are warm proximally.There is decreased digital hair. Skin is atrophic, slightly hyperpigmented, and mildly edematous      Musculoskeletal:         General: Swelling present. No tenderness, deformity or signs of injury. Normal range of motion.      Right ankle: Normal.      Left ankle: Normal.      Right foot: No swelling, deformity or crepitus.      Left foot: No swelling, deformity or crepitus.      Comments: Semi-reducible hammertoe contractures noted to toes 2-4 b/l-asymptomatic.  Otherwise adequate joint range of motion without pain, limitation, nor crepitation Bilateral feet and ankle joints. Muscle strength is 5/5 in all groups bilaterally.         Lymphadenopathy:      Comments: No palpable lymph nodes   Skin:     General: Skin is warm and dry.      Coloration: Skin is not ashen, cyanotic or pale.      Findings: No bruising, ecchymosis, erythema, lesion or rash.      Nails: There is no clubbing.      Comments: Nails x10 are elongated by  1-5mm's, thickened by 2-4 mm's, dystrophic, and are darkened in  coloration . Xerosis Bilaterally. No open lesions, lacerations or wounds noted    Hyperkeratotic lesion noted to distal tips of b/l hallux and 2nd toes and L 3rd toe. Skin lines present to lesion/s. No signs of deep tissue injury to all lesions except L 2nd toe which appears to be healed ulceration. Lysed toenail with new nail growing. Healed nail bed. No erythema, open wound, drainage or SOI.    Neurological:      Mental Status: She is alert and oriented to person, place, and time.      Sensory: Sensory deficit present.      Comments: [place] 5.07 monofilamant testing is diminished Vikash feet. Sharp/dull sensation diminished Bilaterally. Light touch absent Bilaterally.       Psychiatric:         Behavior: Behavior normal.             Assessment:       Encounter Diagnoses   Name Primary?    Type II  diabetes mellitus with neurological manifestations Yes    Hammertoe, bilateral     Pre-ulcerative calluses     Corn or callus     Onychomycosis due to dermatophyte          Plan:       Alisia was seen today for pcp, diabetic foot exam, nail care and foot problem.    Diagnoses and all orders for this visit:    Type II diabetes mellitus with neurological manifestations  -     DIABETIC SHOES FOR HOME USE    Hammertoe, bilateral  -     DIABETIC SHOES FOR HOME USE    Pre-ulcerative calluses  -     DIABETIC SHOES FOR HOME USE    Corn or callus  -     DIABETIC SHOES FOR HOME USE    Onychomycosis due to dermatophyte      I counseled the patient on her conditions, their implications and medical management.    Shoe inspection. Diabetic Foot Education. Patient reminded of the importance of good nutrition and blood sugar control to help prevent podiatric complications of diabetes. Patient instructed on proper foot hygeine. We discussed wearing proper shoe gear, daily foot inspections, never walking without protective shoe gear, never putting sharp instruments to feet    - With patient's permission, nails were aggressively reduced and debrided x 10 to their soft tissue attachment mechanically and with electric , removing all offending nail and debris. Patient relates relief following the procedure. She will continue to monitor the areas daily, inspect her feet, wear protective shoe gear when ambulatory, moisturizer to maintain skin integrity and follow in this office in approximately 2-3 months, sooner p.r.n.    The affected area was cleansed with an alcohol prep pad. Next, utilizing a 5mm curette, the hyperkeratotic tissues were trimmed from b/l distal tips of hallux, b/l distal tips of 2nd toes and tip of L 3rd toe, down to appropriate level of skin. Care was taken to remove any nucleated core from the center of the lesion. No pinpoint bleeding was encountered. The patient tolerated relief following this procedure.      L 2nd toe appears to be a healed ulceration. Pre ulcerative.     Rx diabetic shoes with custom molded inserts to be worn at all times while ambulating. Prescription provided with list of local retailers.     Discussed regular and routine moisturizer to skin of both feet to help improve dry skin. Advised to apply twice daily until resolution of symptoms. Avoid between toes.     Discussed proper and consistent elevation of lower extremities, above the level of the heart, while at rest, to help control/improve edema.     RTC 3 weeks for L 2nd toe check and every 9 weeks for routine high risk foot care, sooner PRN    show

## 2024-12-05 ENCOUNTER — TELEPHONE (OUTPATIENT)
Dept: PRIMARY CARE CLINIC | Facility: CLINIC | Age: 79
End: 2024-12-05
Payer: MEDICARE

## 2024-12-11 DIAGNOSIS — G47.00 INSOMNIA, UNSPECIFIED TYPE: ICD-10-CM

## 2024-12-11 RX ORDER — QUETIAPINE FUMARATE 50 MG/1
TABLET, FILM COATED ORAL
Qty: 135 TABLET | Refills: 3 | Status: SHIPPED | OUTPATIENT
Start: 2024-12-11

## 2025-05-08 ENCOUNTER — TELEPHONE (OUTPATIENT)
Dept: PRIMARY CARE CLINIC | Facility: CLINIC | Age: 80
End: 2025-05-08
Payer: MEDICARE

## 2025-05-08 NOTE — TELEPHONE ENCOUNTER
----- Message from Krystyna sent at 5/8/2025 10:59 AM CDT -----  Contact: [facility] [phone]  Requesting an RX refill or new RX.Is this a refill or new RX: Refill RX name and strength (copy/paste from chart):  LANTUS SOLOSTAR U-100 INSULIN glargine 100 units/mL SubQ pen Is this a 30 day or 90 day RX: 90Pharmacy name and phone # (copy/paste from chart):  [facility] - [place], [place] - [address] [facility] [address]Ysriv: [phone] Fax: [phone]

## 2025-05-09 ENCOUNTER — TELEPHONE (OUTPATIENT)
Dept: PRIMARY CARE CLINIC | Facility: CLINIC | Age: 80
End: 2025-05-09
Payer: MEDICARE

## 2025-05-09 NOTE — TELEPHONE ENCOUNTER
----- Message from Austin sent at 5/9/2025  9:33 AM CDT -----  Contact: [phone]  Requesting an RX refill or new RX.Is this a refill or new RX:  refillRX name and strength (copy/paste from chart):  LANTUS SOLOSTAR U-100 INSULIN glargine 100 units/mL SubQ pen Is this a 30 day or 90 day RX: Pharmacy name and phone # (copy/paste from chart):  [facility] [address] [facility] [address]Vnfqf: [phone] Fax: [phone] doctors have asked that we provide their patients with the following 2 reminders -- prescription refills can take up to 72 hours, and a friendly reminder that in the future you can use your [facility] account to request refills: yes

## 2025-05-16 ENCOUNTER — PATIENT MESSAGE (OUTPATIENT)
Facility: CLINIC | Age: 80
End: 2025-05-16
Payer: MEDICARE

## (undated) DEVICE — CASSETTE SONOPET IQ IRRIGATION

## (undated) DEVICE — PACK UNIVERSAL SPLIT II

## (undated) DEVICE — BLADE 4IN EDGE INSULATED

## (undated) DEVICE — SEE MEDLINE ITEM 157128

## (undated) DEVICE — Device

## (undated) DEVICE — BRA SURGICAL LG 38-40

## (undated) DEVICE — DRAIN CHANNEL ROUND 19FR

## (undated) DEVICE — BLADE SURG CARBON STEEL SZ11

## (undated) DEVICE — SYR IRRIGATION BULB STER 60ML

## (undated) DEVICE — ADHESIVE DERMABOND ADVANCED

## (undated) DEVICE — BUR BONE CUT MICRO TPS 3X3.8MM

## (undated) DEVICE — HEMOSTAT SURGICEL 4X8IN

## (undated) DEVICE — EVACUATOR WOUND BULB 100CC

## (undated) DEVICE — SEE MEDLINE ITEM 154981

## (undated) DEVICE — TRAY MINOR GEN SURG

## (undated) DEVICE — SPONGE LAP 18X18 PREWASHED

## (undated) DEVICE — TOWEL OR DISP STRL BLUE 4/PK

## (undated) DEVICE — ELECTRODE REM PLYHSV RETURN 9

## (undated) DEVICE — DRAPE CORETEMP FLD WRM 56X62IN

## (undated) DEVICE — SUT CTD VICRYL 3-0 CR/SH

## (undated) DEVICE — MARKERS SPHERZ PASSIVE

## (undated) DEVICE — GAUZE FLUFF XXLG 36X36 2 PLY

## (undated) DEVICE — TAPE SURG MEDIPORE 6X72IN

## (undated) DEVICE — TUBE FRAZIER 5MM 2FT SOFT TIP

## (undated) DEVICE — ROUTER TAPERED 2.3MM

## (undated) DEVICE — DRAPE OPMI STERILE

## (undated) DEVICE — COVERS PROBE NR-48 STERILE

## (undated) DEVICE — DRESSING XEROFORM FOIL PK 1X8

## (undated) DEVICE — APPLIER CLIP LIAGCLIP 9.375IN

## (undated) DEVICE — SEE MEDLINE ITEM 157103

## (undated) DEVICE — SUT VICRYL PLUS 3-0 SH 18IN

## (undated) DEVICE — DIFFUSER

## (undated) DEVICE — TIP SONAPET IQ STANDARD 12CM

## (undated) DEVICE — SYR DISP LL 5CC

## (undated) DEVICE — SUT SILK 3-0 SH 18IN BLACK

## (undated) DEVICE — CORD BIPOLAR 12 FOOT

## (undated) DEVICE — HOOK STAY ELAS 5MM 8EA/PK

## (undated) DEVICE — DRESSING TELFA N ADH 3X8

## (undated) DEVICE — MARKER SKIN STND TIP BLUE BARR

## (undated) DEVICE — TRAY FOLEY 16FR INFECTION CONT

## (undated) DEVICE — SUT VICRYL PLUS 4-0 P3 18IN

## (undated) DEVICE — CUP MEDICINE STERILE 2OZ

## (undated) DEVICE — SUT VICRYL 3-0 27 SH

## (undated) DEVICE — SEE MEDLINE ITEM 152572

## (undated) DEVICE — CONTAINER SPECIMEN STRL 4OZ

## (undated) DEVICE — SUT 4/0 18IN NUROLON BLK B

## (undated) DEVICE — SUT 2/0 30IN SILK BLK BRAI

## (undated) DEVICE — SEE MEDLINE ITEM 146417

## (undated) DEVICE — KIT SURGIFLO HEMOSTATIC MATRIX

## (undated) DEVICE — DRESSING SURGICAL 1/2X1/2

## (undated) DEVICE — DRESSING SURGICAL 1X3

## (undated) DEVICE — DRESSING SURGICAL 3/4X3/4

## (undated) DEVICE — CARTRIDGE OIL

## (undated) DEVICE — DRAPE THYROID WITH ARMBOARD

## (undated) DEVICE — SPONGE NEURO 1/4X1/4

## (undated) DEVICE — SEE MEDLINE ITEM 156905

## (undated) DEVICE — BIT DRILL WIRE PASS 1.0MM

## (undated) DEVICE — STOCKINET 4INX48

## (undated) DEVICE — NDL 18GA X1 1/2 REG BEVEL

## (undated) DEVICE — SUT CTD VICRYL 4-0 BR PS-2

## (undated) DEVICE — SEE MEDLINE ITEM 152622

## (undated) DEVICE — NEOGUARD COVER 4X30CM STERILE

## (undated) DEVICE — SYS LABEL CORRECT MED

## (undated) DEVICE — DRESSING SURGICAL 1X1

## (undated) DEVICE — DRAPE STERI INSTRUMENT 1018

## (undated) DEVICE — SUT ETHILON 2-0 PSLX 30IN

## (undated) DEVICE — SPRAY MASTISOL

## (undated) DEVICE — RUBBERBAND STERILE 3X1/8IN

## (undated) DEVICE — ELECTRODE BLADE INSULATED 1 IN